# Patient Record
Sex: FEMALE | Race: WHITE | NOT HISPANIC OR LATINO | Employment: OTHER | URBAN - METROPOLITAN AREA
[De-identification: names, ages, dates, MRNs, and addresses within clinical notes are randomized per-mention and may not be internally consistent; named-entity substitution may affect disease eponyms.]

---

## 2019-07-10 ENCOUNTER — TELEPHONE (OUTPATIENT)
Dept: SCHEDULING | Facility: IMAGING CENTER | Age: 79
End: 2019-07-10

## 2019-07-24 ENCOUNTER — OFFICE VISIT (OUTPATIENT)
Dept: MEDICAL GROUP | Facility: MEDICAL CENTER | Age: 79
End: 2019-07-24
Payer: MEDICARE

## 2019-07-24 ENCOUNTER — HOSPITAL ENCOUNTER (OUTPATIENT)
Dept: RADIOLOGY | Facility: MEDICAL CENTER | Age: 79
End: 2019-07-24
Attending: FAMILY MEDICINE
Payer: MEDICARE

## 2019-07-24 VITALS
BODY MASS INDEX: 27.02 KG/M2 | SYSTOLIC BLOOD PRESSURE: 150 MMHG | HEART RATE: 76 BPM | WEIGHT: 146.83 LBS | HEIGHT: 62 IN | TEMPERATURE: 98.7 F | RESPIRATION RATE: 16 BRPM | DIASTOLIC BLOOD PRESSURE: 80 MMHG | OXYGEN SATURATION: 97 %

## 2019-07-24 DIAGNOSIS — I10 ESSENTIAL HYPERTENSION: ICD-10-CM

## 2019-07-24 DIAGNOSIS — E03.9 ACQUIRED HYPOTHYROIDISM: ICD-10-CM

## 2019-07-24 DIAGNOSIS — F51.04 CHRONIC INSOMNIA: ICD-10-CM

## 2019-07-24 DIAGNOSIS — F41.9 ANXIETY: ICD-10-CM

## 2019-07-24 DIAGNOSIS — M54.2 NECK PAIN: ICD-10-CM

## 2019-07-24 DIAGNOSIS — E78.2 MIXED HYPERLIPIDEMIA: ICD-10-CM

## 2019-07-24 DIAGNOSIS — Z00.00 HEALTHCARE MAINTENANCE: ICD-10-CM

## 2019-07-24 DIAGNOSIS — Z00.00 HEALTH CARE MAINTENANCE: ICD-10-CM

## 2019-07-24 DIAGNOSIS — N18.30 CKD (CHRONIC KIDNEY DISEASE) STAGE 3, GFR 30-59 ML/MIN (HCC): ICD-10-CM

## 2019-07-24 PROCEDURE — 72040 X-RAY EXAM NECK SPINE 2-3 VW: CPT

## 2019-07-24 PROCEDURE — 99204 OFFICE O/P NEW MOD 45 MIN: CPT | Performed by: FAMILY MEDICINE

## 2019-07-24 RX ORDER — FUROSEMIDE 20 MG/1
20 TABLET ORAL 2 TIMES DAILY
COMMUNITY
End: 2020-03-31 | Stop reason: SDUPTHER

## 2019-07-24 RX ORDER — CHOLECALCIFEROL (VITAMIN D3) 125 MCG
CAPSULE ORAL
COMMUNITY
End: 2021-11-16

## 2019-07-24 RX ORDER — SIMVASTATIN 10 MG
10 TABLET ORAL NIGHTLY
COMMUNITY
End: 2020-05-11 | Stop reason: SDUPTHER

## 2019-07-24 RX ORDER — SODIUM CHLORIDE 1 G/1
1 TABLET ORAL 3 TIMES DAILY
COMMUNITY
End: 2019-09-03 | Stop reason: SDUPTHER

## 2019-07-24 RX ORDER — LOSARTAN POTASSIUM 50 MG/1
50 TABLET ORAL DAILY
COMMUNITY
End: 2020-05-29 | Stop reason: SDUPTHER

## 2019-07-24 RX ORDER — DIAZEPAM 10 MG/1
10 TABLET ORAL EVERY 6 HOURS PRN
COMMUNITY
End: 2019-07-24

## 2019-07-24 RX ORDER — CHOLECALCIFEROL (VITAMIN D3) 25 MCG
TABLET,CHEWABLE ORAL
COMMUNITY
End: 2021-11-16

## 2019-07-24 RX ORDER — LEVOTHYROXINE SODIUM 0.05 MG/1
50 TABLET ORAL
COMMUNITY
End: 2020-05-29 | Stop reason: SDUPTHER

## 2019-07-24 RX ORDER — MIRTAZAPINE 15 MG/1
15 TABLET, FILM COATED ORAL
Qty: 90 TAB | Refills: 2 | Status: SHIPPED | OUTPATIENT
Start: 2019-07-24 | End: 2020-11-13

## 2019-07-24 NOTE — LETTER
[facility]  Scar Limon M.D.  [address]  [place] [address]  Fax: [phone]   Authorization for Release/Disclosure of   Protected Health Information   Name: MARIELY BERMUDEZ : 1940 SSN: [id number]   Address: [address] [place] [address]  [place] [address] Phone:    [phone] (home)    I authorize the entity listed below to release/disclose the PHI below to:   [facility]/Scar Limon M.D. and Scar Limon M.D.   Provider or Entity Name:  Jessica Day   Address   City, [facility], [facility]   Phone:  [phone]-    Fax:  [phone]   Reason for request: continuity of care   Information to be released:    [  ] LAST COLONOSCOPY,  including any PATH REPORT and follow-up  [  ] LAST FIT/COLOGUARD RESULT [  ] LAST DEXA  [  ] LAST MAMMOGRAM  [  ] LAST PAP  [  ] LAST LABS [  ] RETINA EXAM REPORT  [  ] IMMUNIZATION RECORDS  [  ] Release all info      [  ] Check here and initial the line next to each item to release ALL health information INCLUDING  _____ Care and treatment for drug and / or alcohol abuse  _____ HIV testing, infection status, or AIDS  _____ Genetic Testing    DATES OF SERVICE OR TIME PERIOD TO BE DISCLOSED: _____________  I understand and acknowledge that:  * This Authorization may be revoked at any time by you in writing, except if your health information has already been used or disclosed.  * Your health information that will be used or disclosed as a result of you signing this authorization could be re-disclosed by the recipient. If this occurs, your re-disclosed health information may no longer be protected by State or Federal laws.  * You may refuse to sign this Authorization. Your refusal will not affect your ability to obtain treatment.  * This Authorization becomes effective upon signing and will  on (date) __________.      If no date is indicated, this Authorization will  one (1) year from the signature date.    Name: Mariely  Easley    Signature:   Date:     7/24/2019       PLEASE FAX REQUESTED RECORDS BACK TO: [phone]

## 2019-07-24 NOTE — LETTER
[facility]  Scar Limon M.D.  [address]  [place] [address]  Fax: [phone]   Authorization for Release/Disclosure of   Protected Health Information   Name: MARIELY BERMUDEZ : 1940 SSN: [id number]   Address: [address] [place] [address]  [place] [address] Phone:    [phone] (home)    I authorize the entity listed below to release/disclose the PHI below to:   [facility]/Scar Limon M.D. and Scar Limon M.D.   Provider or Entity Name:  Taras Her   Address   [facility], [facility], [facility]   Phone:  [phone]    Fax:  [phone]   Reason for request: continuity of care   Information to be released:    [  ] LAST COLONOSCOPY,  including any PATH REPORT and follow-up  [  ] LAST FIT/COLOGUARD RESULT [  ] LAST DEXA  [  ] LAST MAMMOGRAM  [  ] LAST PAP  [  ] LAST LABS [  ] RETINA EXAM REPORT  [  ] IMMUNIZATION RECORDS  [ X ] Release all info      [  ] Check here and initial the line next to each item to release ALL health information INCLUDING  _____ Care and treatment for drug and / or alcohol abuse  _____ HIV testing, infection status, or AIDS  _____ Genetic Testing    DATES OF SERVICE OR TIME PERIOD TO BE DISCLOSED: _____________  I understand and acknowledge that:  * This Authorization may be revoked at any time by you in writing, except if your health information has already been used or disclosed.  * Your health information that will be used or disclosed as a result of you signing this authorization could be re-disclosed by the recipient. If this occurs, your re-disclosed health information may no longer be protected by State or Federal laws.  * You may refuse to sign this Authorization. Your refusal will not affect your ability to obtain treatment.  * This Authorization becomes effective upon signing and will  on (date) __________.      If no date is indicated, this Authorization will  one (1) year from the signature date.    Name: Mariely  Easley    Signature:   Date:     7/24/2019       PLEASE FAX REQUESTED RECORDS BACK TO: [phone]
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
## 2019-09-03 RX ORDER — SODIUM CHLORIDE 1 G/1
1 TABLET ORAL 3 TIMES DAILY
Qty: 90 TAB | Refills: 0 | Status: SHIPPED | OUTPATIENT
Start: 2019-09-03 | End: 2019-10-18 | Stop reason: SDUPTHER

## 2019-10-20 RX ORDER — SODIUM CHLORIDE 1 G/1
TABLET ORAL
Qty: 90 TAB | Refills: 1 | Status: SHIPPED | OUTPATIENT
Start: 2019-10-20 | End: 2020-02-03 | Stop reason: SDUPTHER

## 2019-12-16 ENCOUNTER — OFFICE VISIT (OUTPATIENT)
Dept: MEDICAL GROUP | Facility: PHYSICIAN GROUP | Age: 79
End: 2019-12-16
Payer: MEDICARE

## 2019-12-16 VITALS
OXYGEN SATURATION: 99 % | HEIGHT: 62 IN | TEMPERATURE: 97.2 F | SYSTOLIC BLOOD PRESSURE: 144 MMHG | DIASTOLIC BLOOD PRESSURE: 66 MMHG | WEIGHT: 146 LBS | HEART RATE: 60 BPM | BODY MASS INDEX: 26.87 KG/M2

## 2019-12-16 DIAGNOSIS — E78.2 MIXED HYPERLIPIDEMIA: ICD-10-CM

## 2019-12-16 DIAGNOSIS — N18.30 CKD (CHRONIC KIDNEY DISEASE) STAGE 3, GFR 30-59 ML/MIN (HCC): ICD-10-CM

## 2019-12-16 DIAGNOSIS — E03.9 ACQUIRED HYPOTHYROIDISM: ICD-10-CM

## 2019-12-16 DIAGNOSIS — Z23 NEED FOR VACCINATION: ICD-10-CM

## 2019-12-16 DIAGNOSIS — I10 ESSENTIAL HYPERTENSION: ICD-10-CM

## 2019-12-16 DIAGNOSIS — R09.81 SINUS CONGESTION: ICD-10-CM

## 2019-12-16 DIAGNOSIS — E53.8 VITAMIN B12 DEFICIENCY: ICD-10-CM

## 2019-12-16 DIAGNOSIS — G47.00 INSOMNIA, UNSPECIFIED TYPE: ICD-10-CM

## 2019-12-16 PROCEDURE — G0009 ADMIN PNEUMOCOCCAL VACCINE: HCPCS | Performed by: FAMILY MEDICINE

## 2019-12-16 PROCEDURE — 90732 PPSV23 VACC 2 YRS+ SUBQ/IM: CPT | Performed by: FAMILY MEDICINE

## 2019-12-16 PROCEDURE — 99214 OFFICE O/P EST MOD 30 MIN: CPT | Mod: 25 | Performed by: FAMILY MEDICINE

## 2019-12-16 RX ORDER — FLUTICASONE PROPIONATE 50 MCG
1 SPRAY, SUSPENSION (ML) NASAL DAILY
Qty: 16 G | Refills: 5 | Status: SHIPPED | OUTPATIENT
Start: 2019-12-16 | End: 2020-11-13 | Stop reason: SDUPTHER

## 2019-12-16 RX ORDER — TRAZODONE HYDROCHLORIDE 50 MG/1
50 TABLET ORAL NIGHTLY PRN
Qty: 30 TAB | Refills: 3 | Status: SHIPPED | OUTPATIENT
Start: 2019-12-16 | End: 2020-11-13

## 2019-12-16 SDOH — HEALTH STABILITY: MENTAL HEALTH: HOW OFTEN DO YOU HAVE A DRINK CONTAINING ALCOHOL?: MONTHLY OR LESS

## 2019-12-16 SDOH — HEALTH STABILITY: MENTAL HEALTH: HOW MANY STANDARD DRINKS CONTAINING ALCOHOL DO YOU HAVE ON A TYPICAL DAY?: 1 OR 2

## 2019-12-16 ASSESSMENT — PATIENT HEALTH QUESTIONNAIRE - PHQ9: CLINICAL INTERPRETATION OF PHQ2 SCORE: 0

## 2019-12-16 NOTE — PROGRESS NOTES
CC: Insomnia    HISTORY OF THE PRESENT ILLNESS: Patient is a 79 y.o. female. This pleasant patient is here today to establish care discuss health problems below.    Patient's main concern today is insomnia.  She moved here from [place] earlier this year.  Reports that in [place] she was getting Valium for her insomnia.  States she only took it up to 1 time per week.  She was seen by geriatrics.  Who would not refill the Valium and discussed risks of benzodiazepines with her.  She was given a prescription for Remeron instead but reports being very worried about the side effects after reading the label.  She is open to trying something else.    Also wants to get lab work done.  Has a history of hypothyroidism and reports it is been a long time since her thyroid labs were checked.  She is currently on 50 mcg of Synthroid daily.    She is also complaining of sinus congestion.  Reports that she had a fairly severe cold and cough a few weeks ago and has some residual congestion at this time.  No issues with fevers or chills.  She has been using saline nasal spray.    Allergies: Patient has no known allergies.    Current Outpatient Medications Ordered in Epic   Medication Sig Dispense Refill   • traZODone (DESYREL) 50 MG Tab Take 1 Tab by mouth at bedtime as needed for Sleep. 30 Tab 3   • fluticasone (FLONASE) 50 MCG/ACT nasal spray Spray 1 Spray in nose every day. 16 g 5   • sodium chloride (SALT) 1 GM Tab TAKE 1 TABLET BY MOUTH THREE TIMES DAILY 90 Tab 1   • furosemide (LASIX) 20 MG Tab Take 20 mg by mouth 2 times a day.     • levothyroxine (SYNTHROID) 50 MCG Tab Take 50 mcg by mouth Every morning on an empty stomach.     • metoprolol (LOPRESSOR) 25 MG Tab Take 25 mg by mouth 2 times a day.     • aspirin EC (ECOTRIN) 81 MG Tablet Delayed Response Take 81 mg by mouth every day.     • losartan (COZAAR) 50 MG Tab Take 50 mg by mouth every day.     • Melatonin 5 MG Tab Take  by mouth.     • simvastatin (ZOCOR) 10 MG  "Tab Take 10 mg by mouth every evening.     • Cyanocobalamin (B-12) 2500 MCG Tab Take  by mouth.     • Cholecalciferol (VITAMIN D3) 2000 units Tab Take  by mouth.     • SUPER B COMPLEX/C Cap Take  by mouth.     • mirtazapine (REMERON) 15 MG Tab Take 1 Tab by mouth every bedtime. 90 Tab 2     No current Epic-ordered facility-administered medications on file.        History reviewed. No pertinent past medical history.    History reviewed. No pertinent surgical history.    Social History     Tobacco Use   • Smoking status: Former Smoker     Packs/day: 0.00     Last attempt to quit: 1979     Years since quittin.4   • Smokeless tobacco: Never Used   Substance Use Topics   • Alcohol use: Yes     Alcohol/week: 8.4 oz     Types: 14 Glasses of wine per week     Frequency: Monthly or less     Drinks per session: 1 or 2   • Drug use: No       Social History     Patient does not qualify to have social determinant information on file (likely too young).   Social History Narrative   • Not on file       History reviewed. No pertinent family history.    ROS:     - Constitutional: Negative for fever, chills, unexpected weight change, and fatigue/generalized weakness.     - Respiratory: Negative for cough, sputum production, chest congestion, dyspnea, wheezing, and crackles.      - Cardiovascular: Negative for chest pain, palpitations, orthopnea, PND, and bilateral lower extremity edema.     Exam: /66 (BP Location: Left arm, Patient Position: Sitting, BP Cuff Size: Adult)   Pulse 60   Temp 36.2 °C (97.2 °F) (Temporal)   Ht 1.575 m (5' 2\")   Wt 66.2 kg (146 lb)   SpO2 99%  Body mass index is 26.7 kg/m².    General: Well appearing, NAD  HEENT: Normocephalic. Conjunctiva clear, lids without ptosis, pupils equal and reactive to light accommodation, ears normal shape and contour, canals are clear bilaterally, tympanic membranes without bulging or erythema and normal cone of light, oropharynx is without erythema, edema " or exudates.   Neck: Supple without JVD. No thyromegaly or nodules  Pulmonary: Clear to ausculation.  Normal effort. No rales, ronchi, or wheezing.  Cardiovascular: Regular rate and rhythm without murmur, rubs or gallop.   Abdomen: Soft, nontender, nondistended. Normal bowel sounds. Liver and spleen are not palpable. No rebound or guarding  Neurologic: normal gait  Lymph: No cervical, supraclavicular lymph nodes are palpable  Skin: Warm and dry.  No obvious lesions.  Musculoskeletal:  No extremity cyanosis, clubbing, or edema.  Psych: Normal mood and affect. Alert and oriented. Judgment and insight is normal.    Please note that this dictation was created using voice recognition software. I have made every reasonable attempt to correct obvious errors, but I expect that there are errors of grammar and possibly content that I did not discover before finalizing the note.      Assessment/Plan  Mariely was seen today for establish care, annual exam and requesting labs.    Diagnoses and all orders for this visit:    Essential hypertension  Chronic well-controlled problem for the patient.  Continue Lopressor and Cozaar.  -     Comp Metabolic Panel; Future    Mixed hyperlipidemia  Chronic issue for the patient, has been well controlled on simvastatin.  Will continue at this time.    Acquired hypothyroidism  Chronic well-controlled problem for the patient.  Checking TSH.  Continue levothyroxine.  -     TSH WITH REFLEX TO FT4; Future    Vitamin B12 deficiency  Chronic issue for the patient, on supplementation.  Checking vitamin B-12 levels.  -     VITAMIN B12; Future    Sinus congestion  New problem for the patient as she is recovering from a cold.  No signs of bacterial infection at this time.  Will trial Flonase nasal spray.  -     fluticasone (FLONASE) 50 MCG/ACT nasal spray; Spray 1 Spray in nose every day.    Need for vaccination  -     PneumoVax PPV23 =>5yo    CKD (chronic kidney disease) stage 3, GFR 30-59 ml/min  ([facility]CC)  Chronic issue for the patient.  Checking CMP as above.    Insomnia, unspecified type  Chronic uncontrolled issue for the patient.  Had extended discussion with both patient and daughter today about risk of benzodiazepine use and specifically why we do not use them for sleep especially in elderly patients.  I provided her reassurance with regard to the Remeron as I feel would be a much more appropriate sleep medication for her.  I have also given her trazodone to try as well (low-dose).  She knows not to trial them together.  She agrees to let me know if either of them work for her.  At this time we will not continue Valium.  As she only takes it once in a while, no risk of withdrawal.  -     traZODone (DESYREL) 50 MG Tab; Take 1 Tab by mouth at bedtime as needed for Sleep.    Follow up in 4 months or sooner if needed.     Fernanda Askew, DO  [facility]

## 2020-01-03 ENCOUNTER — HOSPITAL ENCOUNTER (OUTPATIENT)
Dept: LAB | Facility: MEDICAL CENTER | Age: 80
End: 2020-01-03
Attending: FAMILY MEDICINE
Payer: MEDICARE

## 2020-01-03 DIAGNOSIS — I10 ESSENTIAL HYPERTENSION: ICD-10-CM

## 2020-01-03 DIAGNOSIS — E53.8 VITAMIN B12 DEFICIENCY: ICD-10-CM

## 2020-01-03 DIAGNOSIS — E03.9 ACQUIRED HYPOTHYROIDISM: ICD-10-CM

## 2020-01-03 LAB
ALBUMIN SERPL BCP-MCNC: 4.3 G/DL (ref 3.2–4.9)
ALBUMIN/GLOB SERPL: 1.7 G/DL
ALP SERPL-CCNC: 49 U/L (ref 30–99)
ALT SERPL-CCNC: 22 U/L (ref 2–50)
ANION GAP SERPL CALC-SCNC: 9 MMOL/L (ref 0–11.9)
AST SERPL-CCNC: 16 U/L (ref 12–45)
BILIRUB SERPL-MCNC: 0.5 MG/DL (ref 0.1–1.5)
BUN SERPL-MCNC: 15 MG/DL (ref 8–22)
CALCIUM SERPL-MCNC: 8.6 MG/DL (ref 8.5–10.5)
CHLORIDE SERPL-SCNC: 105 MMOL/L (ref 96–112)
CO2 SERPL-SCNC: 26 MMOL/L (ref 20–33)
CREAT SERPL-MCNC: 1 MG/DL (ref 0.5–1.4)
GLOBULIN SER CALC-MCNC: 2.6 G/DL (ref 1.9–3.5)
GLUCOSE SERPL-MCNC: 106 MG/DL (ref 65–99)
POTASSIUM SERPL-SCNC: 4.6 MMOL/L (ref 3.6–5.5)
PROT SERPL-MCNC: 6.9 G/DL (ref 6–8.2)
SODIUM SERPL-SCNC: 140 MMOL/L (ref 135–145)
TSH SERPL DL<=0.005 MIU/L-ACNC: 0.54 UIU/ML (ref 0.38–5.33)
VIT B12 SERPL-MCNC: >1500 PG/ML (ref 211–911)

## 2020-01-03 PROCEDURE — 80053 COMPREHEN METABOLIC PANEL: CPT

## 2020-01-03 PROCEDURE — 82607 VITAMIN B-12: CPT

## 2020-01-03 PROCEDURE — 36415 COLL VENOUS BLD VENIPUNCTURE: CPT

## 2020-01-03 PROCEDURE — 84443 ASSAY THYROID STIM HORMONE: CPT

## 2020-01-07 ENCOUNTER — TELEPHONE (OUTPATIENT)
Dept: MEDICAL GROUP | Facility: PHYSICIAN GROUP | Age: 80
End: 2020-01-07

## 2020-01-07 NOTE — TELEPHONE ENCOUNTER
Patient notified via phone.    Patient describes runny nose / post nasal drip which causes her to lose her voice for a short time in the morning, wants to know what you would recommend, OTC if possible. Thank you.

## 2020-01-07 NOTE — TELEPHONE ENCOUNTER
----- Message from Fernanda Askew D.O. sent at 1/6/2020  5:56 PM PST -----  Please call patient let her know that her labs were all normal/stable.  Kidney function is mildly decreased, but this is something that we will continue to keep an eye on.

## 2020-02-03 RX ORDER — SODIUM CHLORIDE 1 G/1
1 TABLET ORAL 3 TIMES DAILY
Qty: 90 TAB | Refills: 1 | Status: SHIPPED | OUTPATIENT
Start: 2020-02-03 | End: 2020-07-30 | Stop reason: SDUPTHER

## 2020-03-31 NOTE — TELEPHONE ENCOUNTER
Received request via: Patient    Was the patient seen in the last year in this department? Yes LOV 12/16/2019    Does the patient have an active prescription (recently filled or refills available) for medication(s) requested? No

## 2020-04-01 RX ORDER — FUROSEMIDE 20 MG/1
20 TABLET ORAL 2 TIMES DAILY
Qty: 60 TAB | Refills: 5 | Status: SHIPPED | OUTPATIENT
Start: 2020-04-01 | End: 2020-11-06 | Stop reason: SDUPTHER

## 2020-04-01 NOTE — TELEPHONE ENCOUNTER
Was the patient seen in the last year in this department? Yes    Does patient have an active prescription for medications requested? No     Received Request Via: Pharmacy      Pt met protocol?: Yes, ov 012/19, bp 144/66

## 2020-04-10 NOTE — TELEPHONE ENCOUNTER
Historical med, not written by Fernanda Askew.  Discussed in Last office visit with PCP on 12/16/2019.   Please refill as you see fit.

## 2020-05-11 RX ORDER — SIMVASTATIN 10 MG
10 TABLET ORAL EVERY EVENING
Qty: 90 TAB | Refills: 3 | Status: SHIPPED | OUTPATIENT
Start: 2020-05-11 | End: 2021-02-11 | Stop reason: SDUPTHER

## 2020-05-29 ENCOUNTER — TELEPHONE (OUTPATIENT)
Dept: URGENT CARE | Facility: PHYSICIAN GROUP | Age: 80
End: 2020-05-29

## 2020-05-29 RX ORDER — LOSARTAN POTASSIUM 50 MG/1
50 TABLET ORAL DAILY
Qty: 90 TAB | Refills: 3 | Status: SHIPPED | OUTPATIENT
Start: 2020-05-29 | End: 2021-04-12

## 2020-05-29 RX ORDER — LEVOTHYROXINE SODIUM 0.05 MG/1
50 TABLET ORAL
Qty: 90 TAB | Refills: 3 | Status: SHIPPED | OUTPATIENT
Start: 2020-05-29

## 2020-05-29 NOTE — TELEPHONE ENCOUNTER
1. Name: Mariely Easley    Call Back Number: [phone]      How would the patient prefer to be contacted with a response: Phone call OK to leave a detailed message    2. Which medication(s) is being requested? Levothyroxine 0.05 mg Tablets, Losartan 50 mg Tablets    3. What is the preferred Pharmacy? [facility] in [place] at [address] [place] [address]    Patient was informed they may receive a return phone call from our office with any additional questions before processing this request.

## 2020-07-31 RX ORDER — SODIUM CHLORIDE 1 G/1
1 TABLET ORAL 3 TIMES DAILY
Qty: 90 TAB | Refills: 0 | Status: SHIPPED | OUTPATIENT
Start: 2020-07-31 | End: 2021-11-16

## 2020-10-09 ENCOUNTER — OFFICE VISIT (OUTPATIENT)
Dept: MEDICAL GROUP | Facility: PHYSICIAN GROUP | Age: 80
End: 2020-10-09
Payer: MEDICARE

## 2020-10-09 VITALS
OXYGEN SATURATION: 97 % | HEART RATE: 94 BPM | WEIGHT: 150 LBS | DIASTOLIC BLOOD PRESSURE: 76 MMHG | TEMPERATURE: 98.6 F | HEIGHT: 62 IN | SYSTOLIC BLOOD PRESSURE: 152 MMHG | BODY MASS INDEX: 27.6 KG/M2

## 2020-10-09 DIAGNOSIS — I10 ESSENTIAL HYPERTENSION: ICD-10-CM

## 2020-10-09 DIAGNOSIS — R22.1 NECK SWELLING: ICD-10-CM

## 2020-10-09 DIAGNOSIS — M62.838 NECK MUSCLE SPASM: ICD-10-CM

## 2020-10-09 PROCEDURE — 99214 OFFICE O/P EST MOD 30 MIN: CPT | Performed by: FAMILY MEDICINE

## 2020-10-09 RX ORDER — CYCLOBENZAPRINE HCL 5 MG
5 TABLET ORAL NIGHTLY PRN
Qty: 30 TAB | Refills: 0 | Status: SHIPPED | OUTPATIENT
Start: 2020-10-09 | End: 2020-11-13

## 2020-10-09 ASSESSMENT — PATIENT HEALTH QUESTIONNAIRE - PHQ9: CLINICAL INTERPRETATION OF PHQ2 SCORE: 0
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
## 2020-11-09 RX ORDER — FUROSEMIDE 20 MG/1
20 TABLET ORAL 2 TIMES DAILY
Qty: 180 TAB | Refills: 1 | Status: SHIPPED | OUTPATIENT
Start: 2020-11-09 | End: 2021-03-02

## 2020-11-09 NOTE — TELEPHONE ENCOUNTER
Refill X 6 months, sent to pharmacy.Pt. Seen in the last 6 months per protocol.   Lab Results   Component Value Date/Time    SODIUM 140 01/03/2020 12:54 PM    POTASSIUM 4.6 01/03/2020 12:54 PM    CHLORIDE 105 01/03/2020 12:54 PM    CO2 26 01/03/2020 12:54 PM    GLUCOSE 106 (H) 01/03/2020 12:54 PM    BUN 15 01/03/2020 12:54 PM    CREATININE 1.00 01/03/2020 12:54 PM

## 2020-11-13 ENCOUNTER — OFFICE VISIT (OUTPATIENT)
Dept: MEDICAL GROUP | Facility: PHYSICIAN GROUP | Age: 80
End: 2020-11-13
Payer: MEDICARE

## 2020-11-13 VITALS
BODY MASS INDEX: 27.23 KG/M2 | DIASTOLIC BLOOD PRESSURE: 70 MMHG | OXYGEN SATURATION: 96 % | HEIGHT: 62 IN | TEMPERATURE: 97.7 F | SYSTOLIC BLOOD PRESSURE: 128 MMHG | WEIGHT: 148 LBS | HEART RATE: 66 BPM

## 2020-11-13 DIAGNOSIS — Z78.0 POSTMENOPAUSAL: ICD-10-CM

## 2020-11-13 DIAGNOSIS — R22.1 NECK SWELLING: ICD-10-CM

## 2020-11-13 PROCEDURE — 99214 OFFICE O/P EST MOD 30 MIN: CPT | Performed by: FAMILY MEDICINE

## 2020-11-13 RX ORDER — CETIRIZINE HYDROCHLORIDE 10 MG/1
10 TABLET ORAL DAILY
COMMUNITY
End: 2021-11-16

## 2020-11-13 RX ORDER — FLUTICASONE PROPIONATE 50 MCG
1 SPRAY, SUSPENSION (ML) NASAL DAILY
Qty: 16 G | Refills: 5 | Status: SHIPPED | OUTPATIENT
Start: 2020-11-13 | End: 2020-12-09 | Stop reason: SDUPTHER

## 2020-11-13 NOTE — PROGRESS NOTES
CC: Neck swelling    HISTORY OF THE PRESENT ILLNESS: Patient is a 80 y.o. female. This pleasant patient is here today for follow-up.    Patient is here today for follow-up on her neck swelling.  She was seen about a month ago.  In the setting of straining her neck muscles moving boxes, she noted some significant swelling in her neck in the left supraclavicular area.  She was prescribed a muscle relaxer and was also using Tylenol as needed.  She reports that symptoms have completely resolved.  She did use the muscle relaxers a couple of times which helped with her symptoms.  She did use the muscle relaxers a couple of times which helped with her symptoms.  She has no other concerns from the standpoint today.    She does note that she needs refills on her Flonase which is very effective for her and her allergies.    It has been about 5 years since her last DEXA scan and she is open to getting this repeated.    Allergies: Patient has no known allergies.    Current Outpatient Medications Ordered in Epic   Medication Sig Dispense Refill   • cetirizine (ZYRTEC) 10 MG Tab Take 10 mg by mouth every day.     • fluticasone (FLONASE) 50 MCG/ACT nasal spray Administer 1 Spray into affected nostril(S) every day. 16 g 5   • furosemide (LASIX) 20 MG Tab Take 1 Tab by mouth 2 times a day. 180 Tab 1   • metoprolol (LOPRESSOR) 25 MG Tab Take 1 Tab by mouth 2 times a day. 180 Tab 0   • sodium chloride (SALT) 1 GM Tab Take 1 Tab by mouth 3 times a day. 90 Tab 0   • levothyroxine (SYNTHROID) 50 MCG Tab Take 1 Tab by mouth Every morning on an empty stomach. 90 Tab 3   • losartan (COZAAR) 50 MG Tab Take 1 Tab by mouth every day. 90 Tab 3   • simvastatin (ZOCOR) 10 MG Tab Take 1 Tab by mouth every evening. 90 Tab 3   • aspirin EC (ECOTRIN) 81 MG Tablet Delayed Response Take 81 mg by mouth every day.     • Melatonin 5 MG Tab Take  by mouth.     • Cyanocobalamin (B-12) 2500 MCG Tab Take  by mouth.     • Cholecalciferol (VITAMIN D3) 2000  "units Tab Take  by mouth.     • SUPER B COMPLEX/C Cap Take  by mouth.       No current Epic-ordered facility-administered medications on file.        History reviewed. No pertinent past medical history.    History reviewed. No pertinent surgical history.    Social History     Tobacco Use   • Smoking status: Former Smoker     Packs/day: 0.00     Quit date: 1979     Years since quittin.3   • Smokeless tobacco: Never Used   Substance Use Topics   • Alcohol use: Yes     Alcohol/week: 8.4 oz     Types: 14 Glasses of wine per week     Frequency: Monthly or less     Drinks per session: 1 or 2   • Drug use: No       Social History     Social History Narrative   • Not on file       History reviewed. No pertinent family history.    ROS:     - Constitutional: Negative for fever, chills, unexpected weight change, and fatigue/generalized weakness.     - Respiratory: Negative for cough, sputum production, chest congestion, dyspnea, wheezing, and crackles.      - Cardiovascular: Negative for chest pain, palpitations, orthopnea, PND, and bilateral lower extremity edema.     Exam: /70 (BP Location: Right arm, Patient Position: Sitting, BP Cuff Size: Adult)   Pulse 66   Temp 36.5 °C (97.7 °F) (Temporal)   Ht 1.575 m (5' 2\")   Wt 67.1 kg (148 lb)   SpO2 96%  Body mass index is 27.07 kg/m².    General: Well appearing, NAD  Neck: Supple without JVD. No thyromegaly or nodules  Pulmonary: Clear to ausculation.  Normal effort. No rales, ronchi, or wheezing.  Lymph: No cervical, supraclavicular lymph nodes are palpable  Skin: Warm and dry.  No obvious lesions.  Musculoskeletal:  No extremity cyanosis, clubbing, or edema.  Psych: Normal mood and affect. Alert and oriented. Judgment and insight is normal.    Please note that this dictation was created using voice recognition software. I have made every reasonable attempt to correct obvious errors, but I expect that there are errors of grammar and possibly content that I " did not discover before finalizing the note.      Assessment/Plan  Mariely was seen today for follow-up and medication refill.    Diagnoses and all orders for this visit:    Postmenopausal  -     DS-BONE DENSITY STUDY (DEXA); Future    Neck swelling  Noted 1 month ago in the setting of a neck strain.  She had some supraclavicular fullness on the left at that time.  I wanted her to return so I could reevaluate and make sure we did not need to obtain an ultrasound.  On exam today, it is completely resolved.  She has no lymphadenopathy or other notable abnormalities on exam.    Other orders  -     fluticasone (FLONASE) 50 MCG/ACT nasal spray; Administer 1 Spray into affected nostril(S) every day.    Follow-up in 6 months or sooner if needed.    Fernanda Askew,   [facility]

## 2020-12-09 RX ORDER — FLUTICASONE PROPIONATE 50 MCG
1 SPRAY, SUSPENSION (ML) NASAL DAILY
Qty: 16 ML | Refills: 5 | Status: SHIPPED | OUTPATIENT
Start: 2020-12-09 | End: 2021-04-12

## 2020-12-17 ENCOUNTER — PATIENT OUTREACH (OUTPATIENT)
Dept: HEALTH INFORMATION MANAGEMENT | Facility: OTHER | Age: 80
End: 2020-12-17

## 2020-12-17 NOTE — PROGRESS NOTES
Welcome Call for [facility]    Outcome:   Spoke to patient. She stated she would like to continue with Fernanda Askew DO as her PCP.       Attempt # 1  -[id number]

## 2021-01-11 DIAGNOSIS — Z23 NEED FOR VACCINATION: ICD-10-CM

## 2021-02-11 NOTE — TELEPHONE ENCOUNTER
[facility] is requesting new Rx for Simvastatin 10 MG Tablet as a 100-Day Supply to be sent to the pharmacy. Please advise if appropriate.

## 2021-02-12 RX ORDER — SIMVASTATIN 10 MG
10 TABLET ORAL EVERY EVENING
Qty: 100 TABLET | Refills: 1 | Status: SHIPPED | OUTPATIENT
Start: 2021-02-12 | End: 2021-11-08

## 2021-02-12 NOTE — TELEPHONE ENCOUNTER
No results found for: CHOLSTRLTOT, LDL, HDL, TRIGLYCERIDE    Lab Results   Component Value Date/Time    SODIUM 140 01/03/2020 12:54 PM    POTASSIUM 4.6 01/03/2020 12:54 PM    CHLORIDE 105 01/03/2020 12:54 PM    CO2 26 01/03/2020 12:54 PM    GLUCOSE 106 (H) 01/03/2020 12:54 PM    BUN 15 01/03/2020 12:54 PM    CREATININE 1.00 01/03/2020 12:54 PM        Last seen by PCP 11/13/2020. Will send 6 month(s) to the pharmacy.

## 2021-03-02 RX ORDER — FUROSEMIDE 20 MG/1
TABLET ORAL
Qty: 180 TABLET | Refills: 1 | Status: SHIPPED | OUTPATIENT
Start: 2021-03-02 | End: 2021-11-19

## 2021-04-13 RX ORDER — FLUTICASONE PROPIONATE 50 MCG
SPRAY, SUSPENSION (ML) NASAL
Qty: 48 ML | Refills: 1 | Status: SHIPPED | OUTPATIENT
Start: 2021-04-13 | End: 2021-11-08

## 2021-04-13 RX ORDER — LOSARTAN POTASSIUM 50 MG/1
TABLET ORAL
Qty: 90 TABLET | Refills: 1 | Status: SHIPPED | OUTPATIENT
Start: 2021-04-13 | End: 2021-08-26

## 2021-05-17 ENCOUNTER — PATIENT MESSAGE (OUTPATIENT)
Dept: HEALTH INFORMATION MANAGEMENT | Facility: OTHER | Age: 81
End: 2021-05-17

## 2021-07-16 ENCOUNTER — OFFICE VISIT (OUTPATIENT)
Dept: MEDICAL GROUP | Facility: PHYSICIAN GROUP | Age: 81
End: 2021-07-16
Payer: MEDICARE

## 2021-07-16 VITALS
OXYGEN SATURATION: 97 % | DIASTOLIC BLOOD PRESSURE: 70 MMHG | TEMPERATURE: 98 F | SYSTOLIC BLOOD PRESSURE: 128 MMHG | HEIGHT: 62 IN | HEART RATE: 68 BPM | WEIGHT: 148 LBS | BODY MASS INDEX: 27.23 KG/M2

## 2021-07-16 DIAGNOSIS — I10 ESSENTIAL HYPERTENSION: ICD-10-CM

## 2021-07-16 DIAGNOSIS — E78.2 MIXED HYPERLIPIDEMIA: ICD-10-CM

## 2021-07-16 DIAGNOSIS — E87.1 HYPONATREMIA: ICD-10-CM

## 2021-07-16 DIAGNOSIS — E03.9 ACQUIRED HYPOTHYROIDISM: ICD-10-CM

## 2021-07-16 DIAGNOSIS — Z00.00 ENCOUNTER FOR PREVENTIVE CARE: ICD-10-CM

## 2021-07-16 DIAGNOSIS — E53.8 VITAMIN B12 DEFICIENCY: ICD-10-CM

## 2021-07-16 DIAGNOSIS — F41.9 ANXIETY: ICD-10-CM

## 2021-07-16 DIAGNOSIS — N18.30 STAGE 3 CHRONIC KIDNEY DISEASE, UNSPECIFIED WHETHER STAGE 3A OR 3B CKD: ICD-10-CM

## 2021-07-16 DIAGNOSIS — Z00.00 MEDICARE ANNUAL WELLNESS VISIT, SUBSEQUENT: Primary | ICD-10-CM

## 2021-07-16 PROCEDURE — G0439 PPPS, SUBSEQ VISIT: HCPCS | Performed by: FAMILY MEDICINE

## 2021-07-16 ASSESSMENT — ACTIVITIES OF DAILY LIVING (ADL): BATHING_REQUIRES_ASSISTANCE: 0

## 2021-07-16 ASSESSMENT — PATIENT HEALTH QUESTIONNAIRE - PHQ9: CLINICAL INTERPRETATION OF PHQ2 SCORE: 0

## 2021-07-16 ASSESSMENT — ENCOUNTER SYMPTOMS: GENERAL WELL-BEING: EXCELLENT

## 2021-07-16 NOTE — PROGRESS NOTES
Chief Complaint   Patient presents with   • Annual Wellness Visit         HPI:  Mariely is a 81 y.o. here for Medicare Annual Wellness Visit    Patient is here for annual wellness visit today.  She is doing remarkably well, and denies any updates on her health.  She did stop taking her sodium chloride tablets as she felt they were giving her an upset stomach.  She was previously on this per nephrology in [place] due to hyponatremia.    Patient Active Problem List    Diagnosis Date Noted   • Vitamin B12 deficiency 12/16/2019   • Essential hypertension 07/24/2019   • Acquired hypothyroidism 07/24/2019   • Mixed hyperlipidemia 07/24/2019   • Anxiety 07/24/2019   • CKD (chronic kidney disease) stage 3, GFR 30-59 ml/min ([facility]) 07/24/2019       Current Outpatient Medications   Medication Sig Dispense Refill   • losartan (COZAAR) 50 MG Tab TAKE 1 TABLET BY MOUTH EVERY DAY 90 tablet 1   • fluticasone (FLONASE) 50 MCG/ACT nasal spray USE 1 SPRAY INTO AFFECTED NOSTRIL EVERY DAY 48 mL 1   • furosemide (LASIX) 20 MG Tab TAKE 1 TABLET BY MOUTH TWICE A  tablet 1   • simvastatin (ZOCOR) 10 MG Tab Take 1 tablet by mouth every evening. Pt to make appt and get labs prior to more refills. 100 tablet 1   • metoprolol (LOPRESSOR) 25 MG Tab Take 1 Tab by mouth 2 times a day. 180 Tab 2   • cetirizine (ZYRTEC) 10 MG Tab Take 10 mg by mouth every day.     • levothyroxine (SYNTHROID) 50 MCG Tab Take 1 Tab by mouth Every morning on an empty stomach. 90 Tab 3   • aspirin EC (ECOTRIN) 81 MG Tablet Delayed Response Take 81 mg by mouth every day.     • Melatonin 5 MG Tab Take  by mouth.     • Cyanocobalamin (B-12) 2500 MCG Tab Take  by mouth.     • Cholecalciferol (VITAMIN D3) 2000 units Tab Take  by mouth.     • sodium chloride (SALT) 1 GM Tab Take 1 Tab by mouth 3 times a day. (Patient not taking: Reported on 7/16/2021) 90 Tab 0     No current facility-administered medications for this visit.        Patient is taking medications as noted  "in medication list.  Current supplements as per medication list.     Allergies: Patient has no known allergies.    Current social contact/activities:     Is patient current with immunizations? No, due for SHINGRIX (Shingles). Patient is interested in receiving NONE today.    She  reports that she quit smoking about 42 years ago. She smoked 0.00 packs per day. She has never used smokeless tobacco. She reports current alcohol use of about 8.4 oz of alcohol per week. She reports that she does not use drugs.  Counseling given: Not Answered        DPA/Advanced directive: Patient has Advanced Directive on file.     ROS:    Gait: Uses no assistive device   Ostomy: No   Other tubes: No   Amputations: No   Chronic oxygen use No   Last eye exam \"probably due\"  Wears hearing aids: No   : Reports urinary leakage during the last 6 months that has somewhat interfered with their daily activities or sleep.      Screening:        Depression Screening    Little interest or pleasure in doing things?  0 - not at all  Feeling down, depressed, or hopeless? 0 - not at all  Patient Health Questionnaire Score: 0    If depressive symptoms identified deferred to follow up visit unless specifically addressed in assessment and plan.    Interpretation of PHQ-9 Total Score   Score Severity   1-4 No Depression   5-9 Mild Depression   10-14 Moderate Depression   15-19 Moderately Severe Depression   20-27 Severe Depression    Screening for Cognitive Impairment    Three Minute Recall (captain, garden, picture)  3/3    Nathan clock face with all 12 numbers and set the hands to show 5 past 8.  Yes    If cognitive concerns identified, deferred for follow up unless specifically addressed in assessment and plan.    Fall Risk Assessment    Has the patient had two or more falls in the last year or any fall with injury in the last year?  No  If fall risk identified, deferred for follow up unless specifically addressed in assessment and plan.    Safety " Assessment    Throw rugs on floor.  Yes  Handrails on all stairs.  Yes  Good lighting in all hallways.  Yes  Difficulty hearing.  No  Patient counseled about all safety risks that were identified.    Functional Assessment ADLs    Are there any barriers preventing you from cooking for yourself or meeting nutritional needs?  No.    Are there any barriers preventing you from driving safely or obtaining transportation?  No.    Are there any barriers preventing you from using a telephone or calling for help?  No.    Are there any barriers preventing you from shopping?  No.    Are there any barriers preventing you from taking care of your own finances?  No.    Are there any barriers preventing you from managing your medications?  No.    Are there any barriers preventing you from showering, bathing or dressing yourself?  No.    Are you currently engaging in any exercise or physical activity?  Yes.     What is your perception of your health?  Excellent.    Health Maintenance Summary                IMM DTaP/Tdap/Td Vaccine Overdue 3/12/1961     IMM ZOSTER VACCINES Overdue 3/12/1992     BONE DENSITY Overdue 3/12/2007     IMM INFLUENZA Next Due 2021      Done 10/6/2020 Imm Admin: Influenza Vaccine Quad Inj (Pf)     Patient has more history with this topic...          Patient Care Team:  Fernanda Askew D.O. as PCP - General (Family Medicine)    Social History     Tobacco Use   • Smoking status: Former Smoker     Packs/day: 0.00     Quit date: 1979     Years since quittin.0   • Smokeless tobacco: Never Used   Vaping Use   • Vaping Use: Never used   Substance Use Topics   • Alcohol use: Yes     Alcohol/week: 8.4 oz     Types: 14 Glasses of wine per week   • Drug use: No     History reviewed. No pertinent family history.  She  has no past medical history on file.   History reviewed. No pertinent surgical history.        Exam:     /70 (BP Location: Right arm, Patient Position: Sitting, BP Cuff Size:  "Adult)   Pulse 68   Temp 36.7 °C (98 °F) (Temporal)   Ht 1.575 m (5' 2\")   Wt 67.1 kg (148 lb)   SpO2 97%  Body mass index is 27.07 kg/m².    Hearing excellent.    Dentition good  Alert, oriented in no acute distress.  Eye contact is good, speech goal directed, affect calm      Assessment and Plan. The following treatment and monitoring plan is recommended:      Mariely was seen today for annual wellness visit.    Diagnoses and all orders for this visit:    Medicare annual wellness visit, subsequent    Essential hypertension  Very well controlled on current medication, due for metabolic panel.  -     Comp Metabolic Panel; Future    Acquired hypothyroidism  Historically well controlled on levothyroxine.  Due for thyroid labs.  -     TSH; Future  -     FREE THYROXINE; Future    Mixed hyperlipidemia  Well-controlled on simvastatin, lipid profile ordered.  -     Lipid Profile; Future    Anxiety  Patient reports very well controlled at this time, not on medication.    Stage 3 chronic kidney disease, unspecified whether stage 3a or 3b CKD (HCC)  Last GFR 54 in January 2020.  Will recheck at this time.  -     Comp Metabolic Panel; Future    Vitamin B12 deficiency  Currently on B12 supplementation, recheck levels.  -     VITAMIN B12; Future    Encounter for preventive care  -     CBC WITH DIFFERENTIAL; Future  -     Comp Metabolic Panel; Future  -     Lipid Profile; Future  -     VITAMIN B12; Future  -     VITAMIN D,25 HYDROXY; Future  -     TSH; Future  -     FREE THYROXINE; Future    Hyponatremia  Has not been taking sodium chloride tablets for the past month due to upset stomach.  Checking CMP/sodium levels as above.    Services suggested: No services needed at this time  Health Care Screening recommendations as per orders if indicated.  Referrals offered: PT/OT/Nutrition counseling/Behavioral Health/Smoking cessation as per orders if indicated.    Discussion today about general wellness and lifestyle habits:  "   · Prevent falls and reduce trip hazards; Cautioned about securing or removing rugs.  · Have a working fire alarm and carbon monoxide detector;   · Engage in regular physical activity and social activities       Follow-up: Return in about 4 months (around 11/16/2021).

## 2021-08-20 ENCOUNTER — HOSPITAL ENCOUNTER (OUTPATIENT)
Dept: LAB | Facility: MEDICAL CENTER | Age: 81
End: 2021-08-20
Attending: FAMILY MEDICINE
Payer: MEDICARE

## 2021-08-20 DIAGNOSIS — E53.8 VITAMIN B12 DEFICIENCY: ICD-10-CM

## 2021-08-20 DIAGNOSIS — N18.30 STAGE 3 CHRONIC KIDNEY DISEASE, UNSPECIFIED WHETHER STAGE 3A OR 3B CKD: ICD-10-CM

## 2021-08-20 DIAGNOSIS — Z00.00 ENCOUNTER FOR PREVENTIVE CARE: ICD-10-CM

## 2021-08-20 DIAGNOSIS — E78.2 MIXED HYPERLIPIDEMIA: ICD-10-CM

## 2021-08-20 DIAGNOSIS — I10 ESSENTIAL HYPERTENSION: ICD-10-CM

## 2021-08-20 DIAGNOSIS — E03.9 ACQUIRED HYPOTHYROIDISM: ICD-10-CM

## 2021-08-20 LAB
25(OH)D3 SERPL-MCNC: 47 NG/ML (ref 30–100)
ALBUMIN SERPL BCP-MCNC: 4.6 G/DL (ref 3.2–4.9)
ALBUMIN/GLOB SERPL: 1.8 G/DL
ALP SERPL-CCNC: 68 U/L (ref 30–99)
ALT SERPL-CCNC: 27 U/L (ref 2–50)
ANION GAP SERPL CALC-SCNC: 14 MMOL/L (ref 7–16)
AST SERPL-CCNC: 19 U/L (ref 12–45)
BASOPHILS # BLD AUTO: 0.7 % (ref 0–1.8)
BASOPHILS # BLD: 0.06 K/UL (ref 0–0.12)
BILIRUB SERPL-MCNC: 0.4 MG/DL (ref 0.1–1.5)
BUN SERPL-MCNC: 31 MG/DL (ref 8–22)
CALCIUM SERPL-MCNC: 9.7 MG/DL (ref 8.5–10.5)
CHLORIDE SERPL-SCNC: 98 MMOL/L (ref 96–112)
CHOLEST SERPL-MCNC: 172 MG/DL (ref 100–199)
CO2 SERPL-SCNC: 25 MMOL/L (ref 20–33)
CREAT SERPL-MCNC: 1.54 MG/DL (ref 0.5–1.4)
EOSINOPHIL # BLD AUTO: 0.17 K/UL (ref 0–0.51)
EOSINOPHIL NFR BLD: 2 % (ref 0–6.9)
ERYTHROCYTE [DISTWIDTH] IN BLOOD BY AUTOMATED COUNT: 53.3 FL (ref 35.9–50)
GLOBULIN SER CALC-MCNC: 2.6 G/DL (ref 1.9–3.5)
GLUCOSE SERPL-MCNC: 133 MG/DL (ref 65–99)
HCT VFR BLD AUTO: 47.3 % (ref 37–47)
HDLC SERPL-MCNC: 60 MG/DL
HGB BLD-MCNC: 14.9 G/DL (ref 12–16)
IMM GRANULOCYTES # BLD AUTO: 0.06 K/UL (ref 0–0.11)
IMM GRANULOCYTES NFR BLD AUTO: 0.7 % (ref 0–0.9)
LDLC SERPL CALC-MCNC: 76 MG/DL
LYMPHOCYTES # BLD AUTO: 1.29 K/UL (ref 1–4.8)
LYMPHOCYTES NFR BLD: 15.5 % (ref 22–41)
MCH RBC QN AUTO: 30.5 PG (ref 27–33)
MCHC RBC AUTO-ENTMCNC: 31.5 G/DL (ref 33.6–35)
MCV RBC AUTO: 96.7 FL (ref 81.4–97.8)
MONOCYTES # BLD AUTO: 0.44 K/UL (ref 0–0.85)
MONOCYTES NFR BLD AUTO: 5.3 % (ref 0–13.4)
NEUTROPHILS # BLD AUTO: 6.32 K/UL (ref 2–7.15)
NEUTROPHILS NFR BLD: 75.8 % (ref 44–72)
NRBC # BLD AUTO: 0 K/UL
NRBC BLD-RTO: 0 /100 WBC
PLATELET # BLD AUTO: 245 K/UL (ref 164–446)
PMV BLD AUTO: 12.8 FL (ref 9–12.9)
POTASSIUM SERPL-SCNC: 4.5 MMOL/L (ref 3.6–5.5)
PROT SERPL-MCNC: 7.2 G/DL (ref 6–8.2)
RBC # BLD AUTO: 4.89 M/UL (ref 4.2–5.4)
SODIUM SERPL-SCNC: 137 MMOL/L (ref 135–145)
T4 FREE SERPL-MCNC: 1.61 NG/DL (ref 0.93–1.7)
TRIGL SERPL-MCNC: 179 MG/DL (ref 0–149)
TSH SERPL DL<=0.005 MIU/L-ACNC: 1.9 UIU/ML (ref 0.38–5.33)
VIT B12 SERPL-MCNC: >4000 PG/ML (ref 211–911)
WBC # BLD AUTO: 8.3 K/UL (ref 4.8–10.8)

## 2021-08-20 PROCEDURE — 82607 VITAMIN B-12: CPT

## 2021-08-20 PROCEDURE — 82306 VITAMIN D 25 HYDROXY: CPT

## 2021-08-20 PROCEDURE — 84439 ASSAY OF FREE THYROXINE: CPT

## 2021-08-20 PROCEDURE — 85025 COMPLETE CBC W/AUTO DIFF WBC: CPT

## 2021-08-20 PROCEDURE — 80061 LIPID PANEL: CPT

## 2021-08-20 PROCEDURE — 80053 COMPREHEN METABOLIC PANEL: CPT

## 2021-08-20 PROCEDURE — 36415 COLL VENOUS BLD VENIPUNCTURE: CPT

## 2021-08-20 PROCEDURE — 84443 ASSAY THYROID STIM HORMONE: CPT

## 2021-08-24 DIAGNOSIS — N18.30 STAGE 3 CHRONIC KIDNEY DISEASE, UNSPECIFIED WHETHER STAGE 3A OR 3B CKD: ICD-10-CM

## 2021-08-24 DIAGNOSIS — R74.8 ELEVATED VITAMIN B12 LEVEL: ICD-10-CM

## 2021-08-24 DIAGNOSIS — R73.01 ELEVATED FASTING BLOOD SUGAR: ICD-10-CM

## 2021-08-26 RX ORDER — LOSARTAN POTASSIUM 50 MG/1
TABLET ORAL
Qty: 100 TABLET | Refills: 1 | Status: SHIPPED | OUTPATIENT
Start: 2021-08-26 | End: 2022-01-04

## 2021-08-26 NOTE — TELEPHONE ENCOUNTER
Refill X 6 months, sent to pharmacy.Pt. Seen in the last 6 months per protocol.   Lab Results   Component Value Date/Time    SODIUM 137 08/20/2021 11:16 AM    POTASSIUM 4.5 08/20/2021 11:16 AM    CHLORIDE 98 08/20/2021 11:16 AM    CO2 25 08/20/2021 11:16 AM    GLUCOSE 133 (H) 08/20/2021 11:16 AM    BUN 31 (H) 08/20/2021 11:16 AM    CREATININE 1.54 (H) 08/20/2021 11:16 AM

## 2021-11-08 RX ORDER — SIMVASTATIN 10 MG
10 TABLET ORAL EVERY EVENING
Qty: 100 TABLET | Refills: 3 | Status: SHIPPED | OUTPATIENT
Start: 2021-11-08 | End: 2022-11-17 | Stop reason: SDUPTHER

## 2021-11-08 RX ORDER — FLUTICASONE PROPIONATE 50 MCG
1 SPRAY, SUSPENSION (ML) NASAL DAILY
Qty: 48 ML | Refills: 3 | Status: SHIPPED | OUTPATIENT
Start: 2021-11-08 | End: 2023-01-08

## 2021-11-09 ENCOUNTER — TELEPHONE (OUTPATIENT)
Dept: MEDICAL GROUP | Facility: PHYSICIAN GROUP | Age: 81
End: 2021-11-09

## 2021-11-09 NOTE — TELEPHONE ENCOUNTER
ESTABLISHED PATIENT PRE-VISIT PLANNING     Patient was contacted to complete PVP.     Note: Patient will not be contacted if there is no indication to call.     1.  Reviewed notes from the last few office visits within the medical group: Yes    2.  If any orders were placed at last visit or intended to be done for this visit (i.e. 6 mos follow-up), do we have Results/Consult Notes?         •  Labs - Labs ordered, NOT completed. Patient advised to complete prior to next appointment.  Note: If patient appointment is for lab review and patient did not complete labs, check with provider if OK to reschedule patient until labs completed.       •  Imaging - Imaging was not ordered at last office visit.       •  Referrals - No referrals were ordered at last office visit.    3. Is this appointment scheduled as a Hospital Follow-Up? No    4.  Immunizations were updated in Epic using Reconcile Outside Information activity? Yes    5.  Patient is due for the following Health Maintenance Topics:   Health Maintenance Due   Topic Date Due   • IMM DTaP/Tdap/Td Vaccine (1 - Tdap) Never done   • IMM ZOSTER VACCINES (1 of 2) Never done   • BONE DENSITY  Never done   • IMM INFLUENZA (1) 09/01/2021   • COVID-19 Vaccine (3 - Booster for Moderna series) 09/08/2021       6.  AHA (Pulse8) form printed for Provider? Yes

## 2021-11-10 ENCOUNTER — HOSPITAL ENCOUNTER (OUTPATIENT)
Dept: LAB | Facility: MEDICAL CENTER | Age: 81
End: 2021-11-10
Attending: FAMILY MEDICINE
Payer: MEDICARE

## 2021-11-10 DIAGNOSIS — N18.30 STAGE 3 CHRONIC KIDNEY DISEASE, UNSPECIFIED WHETHER STAGE 3A OR 3B CKD: ICD-10-CM

## 2021-11-10 DIAGNOSIS — R74.8 ELEVATED VITAMIN B12 LEVEL: ICD-10-CM

## 2021-11-10 DIAGNOSIS — R73.01 ELEVATED FASTING BLOOD SUGAR: ICD-10-CM

## 2021-11-10 LAB
ALBUMIN SERPL BCP-MCNC: 5.1 G/DL (ref 3.2–4.9)
BUN SERPL-MCNC: 25 MG/DL (ref 8–22)
CALCIUM SERPL-MCNC: 9.5 MG/DL (ref 8.5–10.5)
CHLORIDE SERPL-SCNC: 100 MMOL/L (ref 96–112)
CO2 SERPL-SCNC: 25 MMOL/L (ref 20–33)
CREAT SERPL-MCNC: 1.02 MG/DL (ref 0.5–1.4)
EST. AVERAGE GLUCOSE BLD GHB EST-MCNC: 114 MG/DL
GLUCOSE SERPL-MCNC: 129 MG/DL (ref 65–99)
HBA1C MFR BLD: 5.6 % (ref 4–5.6)
PHOSPHATE SERPL-MCNC: 3.8 MG/DL (ref 2.5–4.5)
POTASSIUM SERPL-SCNC: 4.7 MMOL/L (ref 3.6–5.5)
SODIUM SERPL-SCNC: 136 MMOL/L (ref 135–145)
VIT B12 SERPL-MCNC: >4000 PG/ML (ref 211–911)

## 2021-11-10 PROCEDURE — 83036 HEMOGLOBIN GLYCOSYLATED A1C: CPT

## 2021-11-10 PROCEDURE — 36415 COLL VENOUS BLD VENIPUNCTURE: CPT

## 2021-11-10 PROCEDURE — 80069 RENAL FUNCTION PANEL: CPT

## 2021-11-10 PROCEDURE — 82607 VITAMIN B-12: CPT

## 2021-11-16 ENCOUNTER — OFFICE VISIT (OUTPATIENT)
Dept: MEDICAL GROUP | Facility: PHYSICIAN GROUP | Age: 81
End: 2021-11-16
Payer: MEDICARE

## 2021-11-16 VITALS
WEIGHT: 147 LBS | OXYGEN SATURATION: 97 % | DIASTOLIC BLOOD PRESSURE: 64 MMHG | BODY MASS INDEX: 27.05 KG/M2 | HEART RATE: 57 BPM | HEIGHT: 62 IN | SYSTOLIC BLOOD PRESSURE: 136 MMHG | TEMPERATURE: 97.4 F

## 2021-11-16 DIAGNOSIS — N18.30 STAGE 3 CHRONIC KIDNEY DISEASE, UNSPECIFIED WHETHER STAGE 3A OR 3B CKD: ICD-10-CM

## 2021-11-16 DIAGNOSIS — J30.2 SEASONAL ALLERGIES: ICD-10-CM

## 2021-11-16 DIAGNOSIS — E78.2 MIXED HYPERLIPIDEMIA: ICD-10-CM

## 2021-11-16 DIAGNOSIS — E03.9 ACQUIRED HYPOTHYROIDISM: ICD-10-CM

## 2021-11-16 DIAGNOSIS — Z78.0 POSTMENOPAUSAL: ICD-10-CM

## 2021-11-16 DIAGNOSIS — E53.8 VITAMIN B12 DEFICIENCY: ICD-10-CM

## 2021-11-16 DIAGNOSIS — I10 ESSENTIAL HYPERTENSION: ICD-10-CM

## 2021-11-16 PROCEDURE — 99214 OFFICE O/P EST MOD 30 MIN: CPT | Performed by: FAMILY MEDICINE

## 2021-11-16 RX ORDER — CETIRIZINE HYDROCHLORIDE 10 MG/1
10 TABLET ORAL DAILY
Qty: 90 TABLET | Refills: 3 | Status: SHIPPED | OUTPATIENT
Start: 2021-11-16 | End: 2023-01-08

## 2021-11-16 ASSESSMENT — FIBROSIS 4 INDEX: FIB4 SCORE: 1.18

## 2021-11-16 NOTE — PROGRESS NOTES
CC: Allergies    HISTORY OF THE PRESENT ILLNESS: Patient is a 81 y.o. female.     Patient is here today to follow-up on chronic health conditions including chronic kidney disease, hypothyroidism, hypertension, hyperlipidemia.  She has new concern of seasonal allergies as well.    Allergies: Patient has no known allergies.    Current Outpatient Medications Ordered in Epic   Medication Sig Dispense Refill   • cetirizine (ZYRTEC) 10 MG Tab Take 1 Tablet by mouth every day. 90 Tablet 3   • simvastatin (ZOCOR) 10 MG Tab Take 1 Tablet by mouth every evening. *Appointment needed for additional refills* 100 Tablet 3   • fluticasone (FLONASE) 50 MCG/ACT nasal spray Administer 1 Spray into affected nostril(S) every day. *Follow up needed for additional refills* 48 mL 3   • metoprolol tartrate (LOPRESSOR) 25 MG Tab TAKE 1 TABLET BY MOUTH TWICE A  Tablet 2   • losartan (COZAAR) 50 MG Tab TAKE 1 TABLET BY MOUTH EVERY  Tablet 1   • furosemide (LASIX) 20 MG Tab TAKE 1 TABLET BY MOUTH TWICE A  tablet 1   • levothyroxine (SYNTHROID) 50 MCG Tab Take 1 Tab by mouth Every morning on an empty stomach. 90 Tab 3   • aspirin EC (ECOTRIN) 81 MG Tablet Delayed Response Take 81 mg by mouth every day.       No current [facility]-ordered facility-administered medications on file.       History reviewed. No pertinent past medical history.    History reviewed. No pertinent surgical history.    Social History     Tobacco Use   • Smoking status: Former Smoker     Packs/day: 0.00     Quit date: 1979     Years since quittin.3   • Smokeless tobacco: Never Used   Vaping Use   • Vaping Use: Never used   Substance Use Topics   • Alcohol use: Yes     Alcohol/week: 8.4 oz     Types: 14 Glasses of wine per week   • Drug use: No       Social History     Social History Narrative   • Not on file       History reviewed. No pertinent family history.      Labs: Labs reviewed from 2021 and November 10, 2021.      Exam: /64  "(BP Location: Left arm, Patient Position: Sitting, BP Cuff Size: Adult)   Pulse (!) 57   Temp 36.3 °C (97.4 °F) (Temporal)   Ht 1.575 m (5' 2\")   Wt 66.7 kg (147 lb)   SpO2 97%  Body mass index is 26.89 kg/m².    General: Well appearing, NAD  Pulmonary: Clear to ausculation.  Normal effort. No rales, ronchi, or wheezing.  Cardiovascular: Regular rate and rhythm without murmur, rubs or gallop. No lower extremity edema.   Psych: Normal mood and affect. Alert and oriented. Judgment and insight is normal.    Please note that this dictation was created using voice recognition software. I have made every reasonable attempt to correct obvious errors, but I expect that there are errors of grammar and possibly content that I did not discover before finalizing the note.      Assessment/Plan  Mariely was seen today for follow-up and seasonal allergies.    Diagnoses and all orders for this visit:    Seasonal allergies  Patient reports significant runny nose and watering eyes.  She was using Zyrtec successfully, but reports that the medication is fairly pricey for her.  We will go ahead and send in prescription for Zyrtec.  -     cetirizine (ZYRTEC) 10 MG Tab; Take 1 Tablet by mouth every day.    Vitamin B12 deficiency  Patient noted to have vitamin B12 levels greater than 4000.  Currently she is taking high dosage of vitamin B12 daily.  Recommend she discontinue at this time, and will recheck labs in about 4 to 6 months.  -     VITAMIN B12; Future    Postmenopausal  She reports her last DEXA scan was about 5 years ago and that she was osteopenic at that time.  -     DS-BONE DENSITY STUDY (DEXA); Future    Mixed hyperlipidemia  Chronic issue, very well controlled on simvastatin based on most recent lab work.  We will continue current medication.    Essential hypertension  She does bring in her home blood pressures readings which all look excellent and age-appropriate.  We will continue current dosage of losartan, metoprolol " and Lasix.    Stage 3 chronic kidney disease, unspecified whether stage 3a or 3b CKD (HCC)  Most recent GFR noted to be 52.  Recommend good hydration and avoidance of nephrotoxins.  Recheck kidney function in 4 to 6 months.  -     Renal Function Panel; Future    Acquired hypothyroidism  Well-controlled on current dose of levothyroxine based on most recent lab work.    Follow up in 4-6 months or sooner if needed.     Fernanda Askew DO  [facility]

## 2021-11-19 RX ORDER — FUROSEMIDE 20 MG/1
TABLET ORAL
Qty: 180 TABLET | Refills: 2 | Status: SHIPPED | OUTPATIENT
Start: 2021-11-19 | End: 2022-08-03 | Stop reason: SDUPTHER

## 2021-11-24 ENCOUNTER — TELEPHONE (OUTPATIENT)
Dept: HEALTH INFORMATION MANAGEMENT | Facility: OTHER | Age: 81
End: 2021-11-24

## 2022-04-04 ENCOUNTER — TELEPHONE (OUTPATIENT)
Dept: MEDICAL GROUP | Facility: PHYSICIAN GROUP | Age: 82
End: 2022-04-04
Payer: MEDICARE

## 2022-04-04 NOTE — TELEPHONE ENCOUNTER
ESTABLISHED PATIENT PRE-VISIT PLANNING     Patient was contacted to complete PVP.     Note: Patient will not be contacted if there is no indication to call.     1.  Reviewed notes from the last few office visits within the medical group: Yes    2.  If any orders were placed at last visit or intended to be done for this visit (i.e. 6 mos follow-up), do we have Results/Consult Notes?         •  Labs - Labs ordered, NOT completed. Patient advised to complete prior to next appointment.  Note: If patient appointment is for lab review and patient did not complete labs, check with provider if OK to reschedule patient until labs completed.       •  Imaging - Imaging ordered, NOT completed. Patient advised to complete prior to next appointment.       •  Referrals - No referrals were ordered at last office visit.    3. Is this appointment scheduled as a Hospital Follow-Up? No    4.  Immunizations were updated in Epic using Reconcile Outside Information activity? Yes    5.  Patient is due for the following Health Maintenance Topics:   Health Maintenance Due   Topic Date Due   • IMM DTaP/Tdap/Td Vaccine (1 - Tdap) Never done   • IMM ZOSTER VACCINES (1 of 2) Never done   • BONE DENSITY  Never done       6.  AHA (Pulse8) form printed for Provider? Yes

## 2022-04-05 ENCOUNTER — HOSPITAL ENCOUNTER (OUTPATIENT)
Dept: LAB | Facility: MEDICAL CENTER | Age: 82
End: 2022-04-05
Attending: FAMILY MEDICINE
Payer: MEDICARE

## 2022-04-05 DIAGNOSIS — N18.30 STAGE 3 CHRONIC KIDNEY DISEASE, UNSPECIFIED WHETHER STAGE 3A OR 3B CKD: ICD-10-CM

## 2022-04-05 DIAGNOSIS — E53.8 VITAMIN B12 DEFICIENCY: ICD-10-CM

## 2022-04-05 LAB
ALBUMIN SERPL BCP-MCNC: 4.1 G/DL (ref 3.2–4.9)
BUN SERPL-MCNC: 17 MG/DL (ref 8–22)
CALCIUM SERPL-MCNC: 9.1 MG/DL (ref 8.5–10.5)
CHLORIDE SERPL-SCNC: 99 MMOL/L (ref 96–112)
CO2 SERPL-SCNC: 22 MMOL/L (ref 20–33)
CREAT SERPL-MCNC: 1.03 MG/DL (ref 0.5–1.4)
GFR SERPLBLD CREATININE-BSD FMLA CKD-EPI: 55 ML/MIN/1.73 M 2
GLUCOSE SERPL-MCNC: 112 MG/DL (ref 65–99)
PHOSPHATE SERPL-MCNC: 3.4 MG/DL (ref 2.5–4.5)
POTASSIUM SERPL-SCNC: 4.7 MMOL/L (ref 3.6–5.5)
SODIUM SERPL-SCNC: 135 MMOL/L (ref 135–145)
VIT B12 SERPL-MCNC: 2608 PG/ML (ref 211–911)

## 2022-04-05 PROCEDURE — 82607 VITAMIN B-12: CPT

## 2022-04-05 PROCEDURE — 80069 RENAL FUNCTION PANEL: CPT

## 2022-04-05 PROCEDURE — 36415 COLL VENOUS BLD VENIPUNCTURE: CPT

## 2022-04-06 ENCOUNTER — HOSPITAL ENCOUNTER (EMERGENCY)
Facility: MEDICAL CENTER | Age: 82
End: 2022-04-06
Attending: EMERGENCY MEDICINE
Payer: MEDICARE

## 2022-04-06 ENCOUNTER — APPOINTMENT (OUTPATIENT)
Dept: RADIOLOGY | Facility: MEDICAL CENTER | Age: 82
End: 2022-04-06
Attending: EMERGENCY MEDICINE
Payer: MEDICARE

## 2022-04-06 VITALS
OXYGEN SATURATION: 97 % | TEMPERATURE: 97 F | SYSTOLIC BLOOD PRESSURE: 173 MMHG | DIASTOLIC BLOOD PRESSURE: 74 MMHG | HEIGHT: 62 IN | BODY MASS INDEX: 27.22 KG/M2 | HEART RATE: 79 BPM | WEIGHT: 147.93 LBS | RESPIRATION RATE: 20 BRPM

## 2022-04-06 DIAGNOSIS — M54.9 PAIN, UPPER BACK: ICD-10-CM

## 2022-04-06 LAB
ALBUMIN SERPL BCP-MCNC: 4.2 G/DL (ref 3.2–4.9)
ALBUMIN/GLOB SERPL: 1.4 G/DL
ALP SERPL-CCNC: 144 U/L (ref 30–99)
ALT SERPL-CCNC: 24 U/L (ref 2–50)
ANION GAP SERPL CALC-SCNC: 13 MMOL/L (ref 7–16)
AST SERPL-CCNC: 18 U/L (ref 12–45)
BASOPHILS # BLD AUTO: 0.4 % (ref 0–1.8)
BASOPHILS # BLD: 0.04 K/UL (ref 0–0.12)
BILIRUB SERPL-MCNC: 0.4 MG/DL (ref 0.1–1.5)
BUN SERPL-MCNC: 19 MG/DL (ref 8–22)
CALCIUM SERPL-MCNC: 9.2 MG/DL (ref 8.5–10.5)
CHLORIDE SERPL-SCNC: 99 MMOL/L (ref 96–112)
CO2 SERPL-SCNC: 23 MMOL/L (ref 20–33)
CREAT SERPL-MCNC: 1 MG/DL (ref 0.5–1.4)
D DIMER PPP IA.FEU-MCNC: 0.78 UG/ML (FEU) (ref 0–0.5)
EOSINOPHIL # BLD AUTO: 0.31 K/UL (ref 0–0.51)
EOSINOPHIL NFR BLD: 2.9 % (ref 0–6.9)
ERYTHROCYTE [DISTWIDTH] IN BLOOD BY AUTOMATED COUNT: 52.2 FL (ref 35.9–50)
GFR SERPLBLD CREATININE-BSD FMLA CKD-EPI: 57 ML/MIN/1.73 M 2
GLOBULIN SER CALC-MCNC: 2.9 G/DL (ref 1.9–3.5)
GLUCOSE SERPL-MCNC: 127 MG/DL (ref 65–99)
HCT VFR BLD AUTO: 38.4 % (ref 37–47)
HGB BLD-MCNC: 12.2 G/DL (ref 12–16)
IMM GRANULOCYTES # BLD AUTO: 0.09 K/UL (ref 0–0.11)
IMM GRANULOCYTES NFR BLD AUTO: 0.8 % (ref 0–0.9)
LIPASE SERPL-CCNC: 17 U/L (ref 11–82)
LYMPHOCYTES # BLD AUTO: 1.11 K/UL (ref 1–4.8)
LYMPHOCYTES NFR BLD: 10.3 % (ref 22–41)
MCH RBC QN AUTO: 29.2 PG (ref 27–33)
MCHC RBC AUTO-ENTMCNC: 31.8 G/DL (ref 33.6–35)
MCV RBC AUTO: 91.9 FL (ref 81.4–97.8)
MONOCYTES # BLD AUTO: 0.57 K/UL (ref 0–0.85)
MONOCYTES NFR BLD AUTO: 5.3 % (ref 0–13.4)
NEUTROPHILS # BLD AUTO: 8.7 K/UL (ref 2–7.15)
NEUTROPHILS NFR BLD: 80.3 % (ref 44–72)
NRBC # BLD AUTO: 0 K/UL
NRBC BLD-RTO: 0 /100 WBC
PLATELET # BLD AUTO: 310 K/UL (ref 164–446)
PMV BLD AUTO: 11.5 FL (ref 9–12.9)
POTASSIUM SERPL-SCNC: 4.7 MMOL/L (ref 3.6–5.5)
PROT SERPL-MCNC: 7.1 G/DL (ref 6–8.2)
RBC # BLD AUTO: 4.18 M/UL (ref 4.2–5.4)
SODIUM SERPL-SCNC: 135 MMOL/L (ref 135–145)
TROPONIN T SERPL-MCNC: 13 NG/L (ref 6–19)
WBC # BLD AUTO: 10.8 K/UL (ref 4.8–10.8)

## 2022-04-06 PROCEDURE — 93005 ELECTROCARDIOGRAM TRACING: CPT | Performed by: EMERGENCY MEDICINE

## 2022-04-06 PROCEDURE — 83690 ASSAY OF LIPASE: CPT

## 2022-04-06 PROCEDURE — 71045 X-RAY EXAM CHEST 1 VIEW: CPT

## 2022-04-06 PROCEDURE — 71275 CT ANGIOGRAPHY CHEST: CPT | Mod: ME

## 2022-04-06 PROCEDURE — 96375 TX/PRO/DX INJ NEW DRUG ADDON: CPT

## 2022-04-06 PROCEDURE — 85025 COMPLETE CBC W/AUTO DIFF WBC: CPT

## 2022-04-06 PROCEDURE — 99285 EMERGENCY DEPT VISIT HI MDM: CPT

## 2022-04-06 PROCEDURE — 84484 ASSAY OF TROPONIN QUANT: CPT

## 2022-04-06 PROCEDURE — 700111 HCHG RX REV CODE 636 W/ 250 OVERRIDE (IP): Performed by: EMERGENCY MEDICINE

## 2022-04-06 PROCEDURE — 700117 HCHG RX CONTRAST REV CODE 255: Performed by: EMERGENCY MEDICINE

## 2022-04-06 PROCEDURE — 36415 COLL VENOUS BLD VENIPUNCTURE: CPT

## 2022-04-06 PROCEDURE — 96374 THER/PROPH/DIAG INJ IV PUSH: CPT

## 2022-04-06 PROCEDURE — 80053 COMPREHEN METABOLIC PANEL: CPT

## 2022-04-06 PROCEDURE — 85379 FIBRIN DEGRADATION QUANT: CPT

## 2022-04-06 PROCEDURE — 93005 ELECTROCARDIOGRAM TRACING: CPT

## 2022-04-06 RX ORDER — FAMOTIDINE 20 MG/1
20 TABLET, FILM COATED ORAL 2 TIMES DAILY
Qty: 30 TABLET | Refills: 0 | Status: SHIPPED | OUTPATIENT
Start: 2022-04-06 | End: 2022-04-28

## 2022-04-06 RX ORDER — KETOROLAC TROMETHAMINE 30 MG/ML
15 INJECTION, SOLUTION INTRAMUSCULAR; INTRAVENOUS ONCE
Status: COMPLETED | OUTPATIENT
Start: 2022-04-06 | End: 2022-04-06

## 2022-04-06 RX ORDER — NAPROXEN 500 MG/1
500 TABLET ORAL 2 TIMES DAILY PRN
Qty: 20 TABLET | Refills: 0 | Status: SHIPPED | OUTPATIENT
Start: 2022-04-06 | End: 2022-06-20

## 2022-04-06 RX ORDER — MORPHINE SULFATE 4 MG/ML
2 INJECTION INTRAVENOUS ONCE
Status: COMPLETED | OUTPATIENT
Start: 2022-04-06 | End: 2022-04-06

## 2022-04-06 RX ORDER — CYCLOBENZAPRINE HCL 10 MG
10 TABLET ORAL 3 TIMES DAILY PRN
Qty: 30 TABLET | Refills: 0 | Status: SHIPPED | OUTPATIENT
Start: 2022-04-06 | End: 2022-06-20

## 2022-04-06 RX ORDER — ONDANSETRON 2 MG/ML
4 INJECTION INTRAMUSCULAR; INTRAVENOUS ONCE
Status: COMPLETED | OUTPATIENT
Start: 2022-04-06 | End: 2022-04-06

## 2022-04-06 RX ADMIN — KETOROLAC TROMETHAMINE 15 MG: 30 INJECTION, SOLUTION INTRAMUSCULAR at 13:22

## 2022-04-06 RX ADMIN — IOHEXOL 50 ML: 350 INJECTION, SOLUTION INTRAVENOUS at 15:36

## 2022-04-06 RX ADMIN — ONDANSETRON 4 MG: 2 INJECTION INTRAMUSCULAR; INTRAVENOUS at 13:21

## 2022-04-06 RX ADMIN — MORPHINE SULFATE 2 MG: 4 INJECTION INTRAVENOUS at 13:22

## 2022-04-06 ASSESSMENT — FIBROSIS 4 INDEX: FIB4 SCORE: 1.19

## 2022-04-06 NOTE — DISCHARGE INSTRUCTIONS
Your back pain appears to be musculoskeletal.  Please follow-up with your primary doctor in the next week for recheck.  Take your medications as prescribed.  Recommend follow-up physical therapy for ongoing symptoms that are not improving.

## 2022-04-06 NOTE — ED NOTES
Pt provided with discharge instrucitons. Pt verbalized understanding. PIV removed and pt assisted out of ed with steady gait.

## 2022-04-06 NOTE — ED TRIAGE NOTES
Chief Complaint   Patient presents with   • Neck Pain     RT sided neck pain since 4/1. Now radiates into shoulder and RT side. Denies injury. Reports she has difficulty breathing, even at rest.    • Shoulder Pain   • Side Pain   • Difficulty Breathing     Pt to triage for above. Had basic labs drawn yesterday for routine lab work. Nothing of note. Protocol ordered. Pt to EKG.

## 2022-04-06 NOTE — ED PROVIDER NOTES
ED Provider Note    Scribed for Navarro Hall M.D. by Shaila Adorno. 2022, 1:06 PM.    Primary care provider: Fernanda Askew D.O.  Means of arrival: Walk-In  History obtained from: Patient  History limited by: None    CHIEF COMPLAINT  Chief Complaint   Patient presents with   • Neck Pain     RT sided neck pain since . Now radiates into shoulder and RT side. Denies injury. Reports she has difficulty breathing, even at rest.    • Shoulder Pain   • Side Pain   • Difficulty Breathing       HPI  Mariely Easley is a 82 y.o. female who presents to the Emergency Department for persistent right neck pain onset . The patient states it radiates to her right shoulder and lower back. It is exacerbated when turning her body. She has associated difficulty sleeping and difficulty breathing secondary to pain, denies cough, rash, dysuria, abdominal pain, leg swelling, or chest pain. She denies any recent falls. She notes she typically has muscle spasms on her left shoulder. She denies any past surgeries. She admits to recently traveling by car to [place].     REVIEW OF SYSTEMS  Pertinent positives include neck pain, shoulder pain, back pain, difficulty sleeping, and difficulty breathing. Pertinent negatives include no cough, rash, dysuria, abdominal pain, leg swelling or chest pain.  All other systems reviewed and negative.    PAST MEDICAL HISTORY   has a past medical history of Hypertension.    SURGICAL HISTORY  patient denies any surgical history    SOCIAL HISTORY  Social History     Tobacco Use   • Smoking status: Former Smoker     Packs/day: 0.00     Quit date: 1979     Years since quittin.7   • Smokeless tobacco: Never Used   Vaping Use   • Vaping Use: Never used   Substance Use Topics   • Alcohol use: Yes     Alcohol/week: 8.4 oz     Types: 14 Glasses of wine per week   • Drug use: No      Social History     Substance and Sexual Activity   Drug Use No       FAMILY HISTORY  None noted    CURRENT  "MEDICATIONS  Home Medications     Reviewed by Joby Winkler R.N. (Registered Nurse) on 04/06/22 at 1055  Med List Status: Partial   Medication Last Dose Status   aspirin EC (ECOTRIN) 81 MG Tablet Delayed Response  Active   cetirizine (ZYRTEC) 10 MG Tab  Active   fluticasone (FLONASE) 50 MCG/ACT nasal spray  Active   furosemide (LASIX) 20 MG Tab  Active   levothyroxine (SYNTHROID) 50 MCG Tab  Active   losartan (COZAAR) 50 MG Tab  Active   metoprolol tartrate (LOPRESSOR) 25 MG Tab  Active   simvastatin (ZOCOR) 10 MG Tab  Active                ALLERGIES  No Known Allergies    PHYSICAL EXAM  VITAL SIGNS: BP (!) 164/81   Pulse 73   Temp 36.5 °C (97.7 °F) (Temporal)   Resp 19   Ht 1.575 m (5' 2\")   Wt 67.1 kg (147 lb 14.9 oz)   SpO2 97%   BMI 27.06 kg/m²     Constitutional: Well developed, Well nourished, No acute distress, Non-toxic appearance.   HENT: Normocephalic, Atraumatic, mucous membranes moist, no erythema, exudates, swelling, or masses, nares patent  Eyes: nonicteric  Neck: Supple, no meningismus  Lymphatic: No lymphadenopathy noted.   Cardiovascular: Regular rate and rhythm, no gallops rubs or murmurs  Lungs: Clear bilaterally. No wheezing.  Abdomen: Bowel sounds normal, Soft, No lower quadrant tenderness.  Skin: Warm, Dry, no rash  Back: No tenderness, No CVA tenderness.   Genitalia: Deferred  Rectal: Deferred  Extremities: No edema. Pulses intact in all extremities.  Musculoskeletal: Reproducable pain with motion and palpation.  Neurologic: Alert, appropriate, follows commands, moving all extremities, normal speech. Strength is possibly slightly diminished in the right upper extremity. Sensation intact.   Psychiatric: Affect normal    DIAGNOSTIC STUDIES / PROCEDURES    LABS  Results for orders placed or performed during the hospital encounter of 04/06/22   Troponin   Result Value Ref Range    Troponin T 13 6 - 19 ng/L   CBC WITH DIFFERENTIAL   Result Value Ref Range    WBC 10.8 4.8 - 10.8 K/uL    RBC " 4.18 (L) 4.20 - 5.40 M/uL    Hemoglobin 12.2 12.0 - 16.0 g/dL    Hematocrit 38.4 37.0 - 47.0 %    MCV 91.9 81.4 - 97.8 fL    MCH 29.2 27.0 - 33.0 pg    MCHC 31.8 (L) 33.6 - 35.0 g/dL    RDW 52.2 (H) 35.9 - 50.0 fL    Platelet Count 310 164 - 446 K/uL    MPV 11.5 9.0 - 12.9 fL    Neutrophils-Polys 80.30 (H) 44.00 - 72.00 %    Lymphocytes 10.30 (L) 22.00 - 41.00 %    Monocytes 5.30 0.00 - 13.40 %    Eosinophils 2.90 0.00 - 6.90 %    Basophils 0.40 0.00 - 1.80 %    Immature Granulocytes 0.80 0.00 - 0.90 %    Nucleated RBC 0.00 /100 WBC    Neutrophils (Absolute) 8.70 (H) 2.00 - 7.15 K/uL    Lymphs (Absolute) 1.11 1.00 - 4.80 K/uL    Monos (Absolute) 0.57 0.00 - 0.85 K/uL    Eos (Absolute) 0.31 0.00 - 0.51 K/uL    Baso (Absolute) 0.04 0.00 - 0.12 K/uL    Immature Granulocytes (abs) 0.09 0.00 - 0.11 K/uL    NRBC (Absolute) 0.00 K/uL   COMP METABOLIC PANEL   Result Value Ref Range    Sodium 135 135 - 145 mmol/L    Potassium 4.7 3.6 - 5.5 mmol/L    Chloride 99 96 - 112 mmol/L    Co2 23 20 - 33 mmol/L    Anion Gap 13.0 7.0 - 16.0    Glucose 127 (H) 65 - 99 mg/dL    Bun 19 8 - 22 mg/dL    Creatinine 1.00 0.50 - 1.40 mg/dL    Calcium 9.2 8.5 - 10.5 mg/dL    AST(SGOT) 18 12 - 45 U/L    ALT(SGPT) 24 2 - 50 U/L    Alkaline Phosphatase 144 (H) 30 - 99 U/L    Total Bilirubin 0.4 0.1 - 1.5 mg/dL    Albumin 4.2 3.2 - 4.9 g/dL    Total Protein 7.1 6.0 - 8.2 g/dL    Globulin 2.9 1.9 - 3.5 g/dL    A-G Ratio 1.4 g/dL   LIPASE   Result Value Ref Range    Lipase 17 11 - 82 U/L   ESTIMATED GFR   Result Value Ref Range    GFR (CKD-EPI) 57 (A) >60 mL/min/1.73 m 2   EKG   Result Value Ref Range    Report       [facility] Emergency Dept.    Test Date:  2022  Pt Name:    JONES BERMUDEZ                Department: ER  MRN:        [id number]                      Room:  Gender:     Female                       Technician: [id number]  :        [id number]                   Requested By:ER TRIAGE PROTOCOL  Order #:    [id number]                     Reading MD:    Measurements  Intervals                                Axis  Rate:       69                           P:          27  MD:         188                          QRS:        6  QRSD:       72                           T:          18  QT:         428  QTc:        459    Interpretive Statements  SINUS RHYTHM  PROBABLE INFERIOR INFARCT, AGE INDETERMINATE  CONSIDER POSTERIOR WALL INVOLVEMENT  No previous ECG available for comparison        All labs reviewed by me.    EKG  Obtained at 1:24 PM  Normal Sinus rhythm   Rate 69  No ST change or ectopy  Axis normal   Intervals normal   Q wave in Lead 3     RADIOLOGY  CT-CTA CHEST PULMONARY ARTERY W/ RECONS   Final Result      1.  There is no CT evidence of acute pulmonary embolism.   2.  There is no pneumonia or pleural effusion.   3.  There is minimal subpleural interstitial and nodular opacity, probably chronic interstitial disease.   4.  There are small incidental ovoid right breast masses.            DX-CHEST-PORTABLE (1 VIEW)   Final Result      1.  Mild cardiomegaly.   2.  Otherwise, no acute cardiopulmonary disease.        The radiologist's interpretation of all radiological studies have been reviewed by me.    COURSE & MEDICAL DECISION MAKING  Nursing notes, VS, PMSFHx reviewed in chart.     1:06 PM Patient seen and examined at bedside. Ordered for DX-Chest, D-Dimer, Troponin, CBC w/ diff, CMP, Lipase, UA, and EKG to evaluate. Patient was treated with Zofran 4 mg, Toradol 15 mg, and Morphine 2 mg for her symptoms.     Decision Making:  This is a 82 y.o. year old female who presents with initially right neck pain going down into her upper back and then to her lower back.  The patient appears to have reproducible symptoms with turning bending and palpation.  Given the patient's age she had a cardiac work-up initiated that was negative.  She also had a D-dimer obtained which was positive-subsequent CT chest demonstrates no PE, no pneumonia, no  evidence of other obvious pathology.  The patient's vitals are reassuring.  She will be treated for musculoskeletal cause of pain and discharged home.  She does not have any abdominal pain on exam to suggest intra-abdominal pathology such as cholecystitis.  Transaminases are normal.      FINAL IMPRESSION  1. Pain, upper back          Shaila MARIE (Scribpatrick), am scribing for, and in the presence of, Navarro Hall M.D..    Electronically signed by: Shaila Adorno (Maribelibpatrick), 4/6/2022    Navarro MARIE M.D. personally performed the services described in this documentation, as scribed by Shaila Adorno in my presence, and it is both accurate and complete.    The note accurately reflects work and decisions made by me.  Navarro Hall M.D.  4/6/2022  4:02 PM

## 2022-04-12 ENCOUNTER — OFFICE VISIT (OUTPATIENT)
Dept: MEDICAL GROUP | Facility: PHYSICIAN GROUP | Age: 82
End: 2022-04-12
Payer: MEDICARE

## 2022-04-12 VITALS
OXYGEN SATURATION: 98 % | TEMPERATURE: 97.6 F | HEIGHT: 62 IN | HEART RATE: 85 BPM | BODY MASS INDEX: 25.03 KG/M2 | SYSTOLIC BLOOD PRESSURE: 134 MMHG | DIASTOLIC BLOOD PRESSURE: 72 MMHG | WEIGHT: 136 LBS

## 2022-04-12 DIAGNOSIS — Z76.89 ENCOUNTER TO ESTABLISH CARE: ICD-10-CM

## 2022-04-12 DIAGNOSIS — N18.31 STAGE 3A CHRONIC KIDNEY DISEASE: ICD-10-CM

## 2022-04-12 DIAGNOSIS — M62.830 MUSCLE SPASM OF BACK: ICD-10-CM

## 2022-04-12 DIAGNOSIS — I10 ESSENTIAL HYPERTENSION: ICD-10-CM

## 2022-04-12 DIAGNOSIS — E03.9 ACQUIRED HYPOTHYROIDISM: ICD-10-CM

## 2022-04-12 DIAGNOSIS — R74.8 ELEVATED VITAMIN B12 LEVEL: ICD-10-CM

## 2022-04-12 DIAGNOSIS — E78.2 MIXED HYPERLIPIDEMIA: ICD-10-CM

## 2022-04-12 PROCEDURE — 99214 OFFICE O/P EST MOD 30 MIN: CPT | Performed by: STUDENT IN AN ORGANIZED HEALTH CARE EDUCATION/TRAINING PROGRAM

## 2022-04-12 RX ORDER — DIAZEPAM 10 MG/1
5-10 TABLET ORAL EVERY 12 HOURS PRN
Qty: 30 TABLET | Refills: 0 | Status: SHIPPED | OUTPATIENT
Start: 2022-04-12 | End: 2022-10-13 | Stop reason: SDUPTHER

## 2022-04-12 ASSESSMENT — PATIENT HEALTH QUESTIONNAIRE - PHQ9: CLINICAL INTERPRETATION OF PHQ2 SCORE: 0

## 2022-04-12 ASSESSMENT — FIBROSIS 4 INDEX: FIB4 SCORE: 0.95

## 2022-04-12 NOTE — PROGRESS NOTES
Subjective:     Chief Complaint   Patient presents with   • Establish Care   • Spasms     Er fv      HISTORY OF THE PRESENT ILLNESS: Patient is a 82 y.o. female. This pleasant patient is here today to establish care and discuss ED follow up. His/her prior PCP was Dr. Askew.    ED follow up  Patient presented to the ER for right-sided neck pain that began on April 1 with radiation down the upper/lower back.  I reviewed the related encounter note, labs and imaging.    Muscle spasm of back  Acute on chronic but this time right when normally left. Used to take valium 10 mg as needed without any adverse side effects in great results but when she moved to [place] in 2018 that was not refilled.  She did take Flexeril recently for this spasm that occurred on the right prompting her ER visit and it was somewhat effective but not as much as Valium prior.  Had some trouble walking due to this pain but that is improved as well.  Denies any imbalance.    She states that she is improved.  Taking Tylenol in addition which has been helpful and was also given naproxen and famotidine by the ER which seems to be helpful as well, did not take this morning.    Essential hypertension  This is a chronic condition.  Patient reports compliance with her medication.  She states that her blood pressure is typically higher in the office so she brings a home log with good measurements.    Current Outpatient Medications Ordered in Epic   Medication Sig Dispense Refill   • diazepam (VALIUM) 10 MG tablet Take 0.5-1 Tablets by mouth every 12 hours as needed (muscle spasm) for up to 90 days. 30 Tablet 0   • cyclobenzaprine (FLEXERIL) 10 mg Tab Take 1 Tablet by mouth 3 times a day as needed for Moderate Pain. 30 Tablet 0   • naproxen (NAPROSYN) 500 MG Tab Take 1 Tablet by mouth 2 times a day as needed. 20 Tablet 0   • famotidine (PEPCID) 20 MG Tab Take 1 Tablet by mouth 2 times a day. 30 Tablet 0   • losartan (COZAAR) 50 MG Tab TAKE 1 TABLET BY MOUTH  "EVERY  Tablet 3   • furosemide (LASIX) 20 MG Tab TAKE 1 TABLET BY MOUTH TWICE A  Tablet 2   • cetirizine (ZYRTEC) 10 MG Tab Take 1 Tablet by mouth every day. 90 Tablet 3   • simvastatin (ZOCOR) 10 MG Tab Take 1 Tablet by mouth every evening. *Appointment needed for additional refills* 100 Tablet 3   • fluticasone (FLONASE) 50 MCG/ACT nasal spray Administer 1 Spray into affected nostril(S) every day. *Follow up needed for additional refills* 48 mL 3   • metoprolol tartrate (LOPRESSOR) 25 MG Tab TAKE 1 TABLET BY MOUTH TWICE A  Tablet 2   • levothyroxine (SYNTHROID) 50 MCG Tab Take 1 Tab by mouth Every morning on an empty stomach. 90 Tab 3   • aspirin EC (ECOTRIN) 81 MG Tablet Delayed Response Take 81 mg by mouth every day.       No current [facility]-ordered facility-administered medications on file.     ROS:   Gen: no fevers/chills, no changes in weight  ENT: no sore throat  Pulm: no sob, no cough  CV: no chest pain, no palpitations  GI: no nausea/vomiting, no diarrhea  : no dysuria  Skin: no rash  Neuro: no headaches, no numbness/tingling  Heme/Lymph: no easy bruising      Objective:     Exam: /72   Pulse 85   Temp 36.4 °C (97.6 °F) (Temporal)   Ht 1.575 m (5' 2\")   Wt 61.7 kg (136 lb)   SpO2 98%  Body mass index is 24.87 kg/m².    General: Well developed, well nourished in no acute distress.  Head: Normocephalic and atraumatic.  Eyes: Conjunctivae and extraocular motions are normal. Pupils are equal, round. No scleral icterus.   Mouth/Throat: Wearing mask.  Neck: Supple. Thyroid is not grossly enlarged.  Pulmonary: Clear to ausculation.  Normal effort. No rales, ronchi, or wheezing.  Cardiovascular: Regular rate and rhythm without murmur.  Neurologic: No gross/focal deficits. Normal gait.   Skin: Warm and dry.  No obvious lesions.  Musculoskeletal: No extremity cyanosis, clubbing, or edema.  Muscle spasm noted at the thoracolumbar junction on the right laterally and right middle " trapezius with some tenderness but mild.  No other findings.  Psych: Normal mood and affect. Alert and oriented x3. Judgment and insight is normal.    Labs: Reviewed from 8/20/2021, 4/6/2022  Imaging: Reviewed CTA chest 4/6/2022, chest x-ray 4/6/2022, EKG 4/6/2022    Assessment & Plan:   82 y.o. female with the following -    1. Encounter to establish care    2. Stage 3a chronic kidney disease (HCC)  This is a chronic condition, stable.  Continue adequate blood pressure control, ARB, avoid nephrotoxic medications.  Plan to trend in approximately 6 months.  Ensure adequate hydration.  - Comp Metabolic Panel; Future  - VITAMIN D,25 HYDROXY; Future    3. Essential hypertension  Chronic, acceptable control on repeat and her home log shows normal values.  No changes to current treatment.  - Comp Metabolic Panel; Future    4. Muscle spasm of back  Acute on chronic, this time on the right and improved.  Given her CKD we should in general try to avoid NSAIDs especially for any prolonged periods, patient agrees.  Okay to continue heat, Tylenol and other conservative measures such as topical pain relief.  As Flexeril was not very effective and had tolerated Valium for these type of spasm in the past we can refill in the event this recurs.  Patient states that this does not occur often but in the past has typically resulted in a ER visit unless she has a muscle relaxer at home.  Discussed the nature of this medication and its risks, patient expressed understanding and will not drink ETOH when taking.  Recommend that she trial a lower dose although she states that she did well with the 10 mg dose prior.  PDMP reviewed.  - diazepam (VALIUM) 10 MG tablet; Take 0.5-1 Tablets by mouth every 12 hours as needed (muscle spasm) for up to 90 days.  Dispense: 30 Tablet; Refill: 0    5. Elevated vitamin B12 level  Improved, continue to hold off on B12 level and will trend with her annual labs.  - VITAMIN B12; Future    6. Acquired  hypothyroidism  Chronic, well controlled. No changes to current treatment TSH for annual labs.,   - TSH WITH REFLEX TO FT4; Future    7. Mixed hyperlipidemia  Chronic, well controlled. No changes to current treatment.  Discussed the Q waves in lead III on her recent EKG, patient will bring her prior cardiology notes to her next visit.  Continue statin and aspirin.  - Lipid Profile; Future    I spent a total of 31 minutes with record review, exam, communication with the patient, and documentation of this encounter.    Return in about 4 months (around 8/12/2022) for Annual.    Please note that this dictation was created using voice recognition software. I have made every reasonable attempt to correct obvious errors, but I expect that there are errors of grammar and possibly content that I did not discover before finalizing the note.

## 2022-04-12 NOTE — ASSESSMENT & PLAN NOTE
Acute on chronic but this time right when normally left. Used to take valium 10 mg as needed without any adverse side effects in great results but when she moved to [place] in 2018 that was not refilled.  She did take Flexeril recently for this spasm that occurred on the right prompting her ER visit and it was somewhat effective but not as much as Valium prior.  Had some trouble walking due to this pain but that is improved as well.  Denies any imbalance.    She states that she is improved.  Taking Tylenol in addition which has been helpful and was also given naproxen and famotidine by the ER which seems to be helpful as well, did not take this morning.

## 2022-04-12 NOTE — ASSESSMENT & PLAN NOTE
This is a chronic condition.  Patient reports compliance with her medication.  She states that her blood pressure is typically higher in the office so she brings a home log with good measurements.

## 2022-05-18 ENCOUNTER — OFFICE VISIT (OUTPATIENT)
Dept: MEDICAL GROUP | Facility: PHYSICIAN GROUP | Age: 82
End: 2022-05-18
Payer: MEDICARE

## 2022-05-18 ENCOUNTER — HOSPITAL ENCOUNTER (OUTPATIENT)
Dept: RADIOLOGY | Facility: MEDICAL CENTER | Age: 82
End: 2022-05-18
Attending: NURSE PRACTITIONER
Payer: MEDICARE

## 2022-05-18 ENCOUNTER — TELEPHONE (OUTPATIENT)
Dept: MEDICAL GROUP | Facility: PHYSICIAN GROUP | Age: 82
End: 2022-05-18

## 2022-05-18 ENCOUNTER — HOSPITAL ENCOUNTER (OUTPATIENT)
Dept: LAB | Facility: MEDICAL CENTER | Age: 82
End: 2022-05-18
Attending: NURSE PRACTITIONER
Payer: MEDICARE

## 2022-05-18 VITALS
OXYGEN SATURATION: 95 % | HEART RATE: 79 BPM | TEMPERATURE: 98.6 F | DIASTOLIC BLOOD PRESSURE: 68 MMHG | SYSTOLIC BLOOD PRESSURE: 124 MMHG | BODY MASS INDEX: 26.72 KG/M2 | WEIGHT: 145.2 LBS | HEIGHT: 62 IN

## 2022-05-18 DIAGNOSIS — M79.605 PAIN AND SWELLING OF LEFT LOWER EXTREMITY: ICD-10-CM

## 2022-05-18 DIAGNOSIS — Z79.1 PATIENT TAKES NSAID (NON-STEROID ANTI-INFLAMMATORY DRUG): ICD-10-CM

## 2022-05-18 DIAGNOSIS — M79.89 PAIN AND SWELLING OF LEFT LOWER EXTREMITY: ICD-10-CM

## 2022-05-18 DIAGNOSIS — R79.89 ELEVATED D-DIMER: ICD-10-CM

## 2022-05-18 LAB
BASOPHILS # BLD AUTO: 0.7 % (ref 0–1.8)
BASOPHILS # BLD: 0.06 K/UL (ref 0–0.12)
CRP SERPL HS-MCNC: 5.15 MG/DL (ref 0–0.75)
D DIMER PPP IA.FEU-MCNC: 0.66 UG/ML (FEU) (ref 0–0.5)
EOSINOPHIL # BLD AUTO: 0.36 K/UL (ref 0–0.51)
EOSINOPHIL NFR BLD: 4 % (ref 0–6.9)
ERYTHROCYTE [DISTWIDTH] IN BLOOD BY AUTOMATED COUNT: 53.5 FL (ref 35.9–50)
HCT VFR BLD AUTO: 35.9 % (ref 37–47)
HGB BLD-MCNC: 11.2 G/DL (ref 12–16)
IMM GRANULOCYTES # BLD AUTO: 0.09 K/UL (ref 0–0.11)
IMM GRANULOCYTES NFR BLD AUTO: 1 % (ref 0–0.9)
LYMPHOCYTES # BLD AUTO: 0.83 K/UL (ref 1–4.8)
LYMPHOCYTES NFR BLD: 9.1 % (ref 22–41)
MCH RBC QN AUTO: 27.9 PG (ref 27–33)
MCHC RBC AUTO-ENTMCNC: 31.2 G/DL (ref 33.6–35)
MCV RBC AUTO: 89.3 FL (ref 81.4–97.8)
MONOCYTES # BLD AUTO: 0.5 K/UL (ref 0–0.85)
MONOCYTES NFR BLD AUTO: 5.5 % (ref 0–13.4)
NEUTROPHILS # BLD AUTO: 7.27 K/UL (ref 2–7.15)
NEUTROPHILS NFR BLD: 79.7 % (ref 44–72)
NRBC # BLD AUTO: 0 K/UL
NRBC BLD-RTO: 0 /100 WBC
PLATELET # BLD AUTO: 282 K/UL (ref 164–446)
PMV BLD AUTO: 11.9 FL (ref 9–12.9)
RBC # BLD AUTO: 4.02 M/UL (ref 4.2–5.4)
WBC # BLD AUTO: 9.1 K/UL (ref 4.8–10.8)

## 2022-05-18 PROCEDURE — 85379 FIBRIN DEGRADATION QUANT: CPT

## 2022-05-18 PROCEDURE — 93971 EXTREMITY STUDY: CPT | Mod: LT

## 2022-05-18 PROCEDURE — 85025 COMPLETE CBC W/AUTO DIFF WBC: CPT

## 2022-05-18 PROCEDURE — 86140 C-REACTIVE PROTEIN: CPT

## 2022-05-18 PROCEDURE — 36415 COLL VENOUS BLD VENIPUNCTURE: CPT

## 2022-05-18 PROCEDURE — 99215 OFFICE O/P EST HI 40 MIN: CPT | Performed by: NURSE PRACTITIONER

## 2022-05-18 ASSESSMENT — FIBROSIS 4 INDEX: FIB4 SCORE: 0.95

## 2022-05-18 NOTE — ASSESSMENT & PLAN NOTE
Patient reports pain in her calf for the last 3 weeks and swelling of her leg for about a week. She has tenderness to the touch. She reports difficulty walking. She denies any personal history of blood clot or family history. Denies any fever or chills. She denies any trauma to the leg or falls.     Will order STAT US to rule out DVT.     >>>Duplex negative for DVT. Patient does have slightly elevated CRP.     As patient has tenderness and welling of the left lower extremity will obtain US to rule out tendon rupture vs bakers cyst rupture.     Patient provided with strict ER precautions.

## 2022-05-18 NOTE — PROGRESS NOTES
CC: left calf swelling for 1 week                                                                                                                                   HPI:    Mareily presents today with the following.    Problem   Pain and Swelling of Left Lower Extremity       Current Outpatient Medications   Medication Sig Dispense Refill   • metoprolol tartrate (LOPRESSOR) 25 MG Tab TAKE 1 TABLET BY MOUTH TWICE A  Tablet 3   • diazepam (VALIUM) 10 MG tablet Take 0.5-1 Tablets by mouth every 12 hours as needed (muscle spasm) for up to 90 days. 30 Tablet 0   • losartan (COZAAR) 50 MG Tab TAKE 1 TABLET BY MOUTH EVERY  Tablet 3   • furosemide (LASIX) 20 MG Tab TAKE 1 TABLET BY MOUTH TWICE A  Tablet 2   • cetirizine (ZYRTEC) 10 MG Tab Take 1 Tablet by mouth every day. 90 Tablet 3   • simvastatin (ZOCOR) 10 MG Tab Take 1 Tablet by mouth every evening. *Appointment needed for additional refills* 100 Tablet 3   • fluticasone (FLONASE) 50 MCG/ACT nasal spray Administer 1 Spray into affected nostril(S) every day. *Follow up needed for additional refills* 48 mL 3   • levothyroxine (SYNTHROID) 50 MCG Tab Take 1 Tab by mouth Every morning on an empty stomach. 90 Tab 3   • aspirin EC (ECOTRIN) 81 MG Tablet Delayed Response Take 81 mg by mouth every day.     • famotidine (PEPCID) 20 MG Tab Take 1 Tablet by mouth 2 times a day. If taking naproxen (Patient not taking: Reported on 5/18/2022) 30 Tablet 0   • cyclobenzaprine (FLEXERIL) 10 mg Tab Take 1 Tablet by mouth 3 times a day as needed for Moderate Pain. (Patient not taking: Reported on 5/18/2022) 30 Tablet 0   • naproxen (NAPROSYN) 500 MG Tab Take 1 Tablet by mouth 2 times a day as needed. (Patient not taking: Reported on 5/18/2022) 20 Tablet 0     No current facility-administered medications for this visit.       Allergies as of 05/18/2022   • (No Known Allergies)        ROS:  All systems negative expect as addressed in assessment and plan.     /68  "(BP Location: Left arm, Patient Position: Sitting, BP Cuff Size: Adult)   Pulse 79   Temp 37 °C (98.6 °F) (Temporal)   Ht 1.575 m (5' 2\")   Wt 65.9 kg (145 lb 3.2 oz)   SpO2 95%   BMI 26.56 kg/m²     Physical Exam:  Gen:         Alert and oriented, No apparent distress.  Neck:        No Lymphadenopathy.   Lungs:     Clear to auscultation bilaterally. No wheezes, rales, or rhonchi.   CV:          Regular rate and rhythm. No murmurs, rubs or gallops.         Ext:          No clubbing, cyanosis, or peripheral edema.  Skin:  All visible skin intact without lesions.       Assessment and Plan:  82 y.o. female with the following issues.    1. Pain and swelling of left lower extremity  CRP QUANTITIVE (NON-CARDIAC)    D-DIMER    CBC WITH DIFFERENTIAL    US-EXTREMITY VENOUS LOWER UNILAT LEFT    US-EXTREMITY NON VASCULAR UNILATERAL LEFT   2. Elevated d-dimer  CRP QUANTITIVE (NON-CARDIAC)    D-DIMER    CBC WITH DIFFERENTIAL    US-EXTREMITY VENOUS LOWER UNILAT LEFT        Pain and swelling of left lower extremity  Patient reports pain in her calf for the last 3 weeks and swelling of her leg for about a week. She has tenderness to the touch. She reports difficulty walking. She denies any personal history of blood clot or family history. Denies any fever or chills. She denies any trauma to the leg or falls.     Will order STAT US to rule out DVT.     >>>Duplex negative for DVT. Patient does have slightly elevated CRP.     As patient has tenderness and welling of the left lower extremity will obtain US to rule out tendon rupture vs bakers cyst rupture.     Patient provided with strict ER precautions.         Return in about 1 week (around 5/25/2022) for follow up for leg swelling.    I spent a total of 45 minutes with record review, exam, and communication with the patient, communication with other providers, and documentation of this encounter. This does not include time spent on separately billable procedures/tests.    I " have placed the below orders and discussed them with an approved delegating provider.  The MA is performing the below orders under the direction of Dr. mendoza.    Please note that this dictation was created using voice recognition software. I have worked with consultants from the vendor as well as technical experts from [facility] to optimize the interface. I have made every reasonable attempt to correct obvious errors, but I expect that there are errors of grammar and possibly content that I did not discover before finalizing the note.

## 2022-05-18 NOTE — TELEPHONE ENCOUNTER
----- Message from MEREDITH BundyRLULU sent at 5/18/2022  1:43 PM PDT -----  Tripp guadarrama,     I also sent STAT labs and US to rule out DVT in left lower leg for this patient. Please keep an eye out and contact me ASAP if the patient has a blood clot.     Thank you,       ED Cervantes

## 2022-05-18 NOTE — TELEPHONE ENCOUNTER
Notified Aracelis WARD via voalte, per provider request, of abnormal stat labs. Notified that pt is scheduled for DVT-US for today at 1700. No new orders received.

## 2022-05-19 RX ORDER — FAMOTIDINE 20 MG/1
20 TABLET, FILM COATED ORAL 2 TIMES DAILY
Qty: 30 TABLET | Refills: 0 | Status: SHIPPED | OUTPATIENT
Start: 2022-05-19 | End: 2022-05-31

## 2022-05-19 NOTE — TELEPHONE ENCOUNTER
Spoke with pt and daughter. Let them know their US is scheduled for June 5th @1:45pm at the [facility] location.

## 2022-05-19 NOTE — TELEPHONE ENCOUNTER
Patient called and notified of STAT lab results and imaging results.     Patient does not have a blood clot but there is concern as she has elevated inflammatory markers.     Patient advised that case was also discussed with Dr. Vieira, her PCP. Advised that pain/swelling may be MSK related and recommended a soft tissue US and follow up with Dr. Vieira in 1 week.     Patient is agreeable to this and verbalizes understanding.

## 2022-05-28 DIAGNOSIS — Z79.1 PATIENT TAKES NSAID (NON-STEROID ANTI-INFLAMMATORY DRUG): ICD-10-CM

## 2022-05-31 RX ORDER — FAMOTIDINE 20 MG/1
20 TABLET, FILM COATED ORAL 2 TIMES DAILY
Qty: 30 TABLET | Refills: 0 | Status: SHIPPED | OUTPATIENT
Start: 2022-05-31 | End: 2022-06-09

## 2022-06-05 ENCOUNTER — APPOINTMENT (OUTPATIENT)
Dept: RADIOLOGY | Facility: MEDICAL CENTER | Age: 82
End: 2022-06-05
Attending: NURSE PRACTITIONER
Payer: MEDICARE

## 2022-06-08 ENCOUNTER — TELEPHONE (OUTPATIENT)
Dept: MEDICAL GROUP | Facility: PHYSICIAN GROUP | Age: 82
End: 2022-06-08
Payer: MEDICARE

## 2022-06-08 NOTE — TELEPHONE ENCOUNTER
VOICEMAIL  1. Caller Name: Mariely Easley           Call Back Number: [phone]    2. Message: Jose MENDEZ. Mariely's daughter called in regards to Mariely's sonogram appointment that is scheduled tomorrow for her left leg. Lexi states that Mariely woke up this morning with a lot of pain on her right leg as well as redness and swelling, she would like to know if she should get a sonogram on her right leg as well and wants to see if she can take anything for pain and to reduce the swelling.    3. Patient approves office to leave a detailed voicemail/Transmithart message: yes

## 2022-06-09 ENCOUNTER — HOSPITAL ENCOUNTER (OUTPATIENT)
Dept: RADIOLOGY | Facility: MEDICAL CENTER | Age: 82
End: 2022-06-09
Attending: NURSE PRACTITIONER
Payer: MEDICARE

## 2022-06-09 DIAGNOSIS — M79.89 PAIN AND SWELLING OF LEFT LOWER EXTREMITY: ICD-10-CM

## 2022-06-09 DIAGNOSIS — M79.605 PAIN AND SWELLING OF LEFT LOWER EXTREMITY: ICD-10-CM

## 2022-06-09 DIAGNOSIS — Z79.1 PATIENT TAKES NSAID (NON-STEROID ANTI-INFLAMMATORY DRUG): ICD-10-CM

## 2022-06-09 PROCEDURE — 76882 US LMTD JT/FCL EVL NVASC XTR: CPT | Mod: LT

## 2022-06-09 RX ORDER — FAMOTIDINE 20 MG/1
20 TABLET, FILM COATED ORAL 2 TIMES DAILY
Qty: 30 TABLET | Refills: 0 | Status: SHIPPED | OUTPATIENT
Start: 2022-06-09 | End: 2022-06-20

## 2022-06-09 NOTE — TELEPHONE ENCOUNTER
"Phone Number Called: [phone] (home)     Call outcome: Spoke to patient regarding message below.    Message: Spoke to patient regarding her right leg swelling. Patient reports swelling, redness, pain, and a \"lump\" in the right leg.  Advised patient to go to the ER for further evaluation of possible dvt.  Patient was agreeable.  "

## 2022-06-10 ENCOUNTER — APPOINTMENT (OUTPATIENT)
Dept: RADIOLOGY | Facility: MEDICAL CENTER | Age: 82
End: 2022-06-10
Attending: EMERGENCY MEDICINE
Payer: MEDICARE

## 2022-06-10 ENCOUNTER — HOSPITAL ENCOUNTER (EMERGENCY)
Facility: MEDICAL CENTER | Age: 82
End: 2022-06-10
Attending: EMERGENCY MEDICINE
Payer: MEDICARE

## 2022-06-10 VITALS
BODY MASS INDEX: 27.26 KG/M2 | OXYGEN SATURATION: 96 % | SYSTOLIC BLOOD PRESSURE: 131 MMHG | TEMPERATURE: 97.4 F | HEIGHT: 62 IN | WEIGHT: 148.15 LBS | DIASTOLIC BLOOD PRESSURE: 60 MMHG | HEART RATE: 64 BPM | RESPIRATION RATE: 18 BRPM

## 2022-06-10 DIAGNOSIS — Z71.1 FEARED CONDITION NOT DEMONSTRATED: ICD-10-CM

## 2022-06-10 DIAGNOSIS — L03.115 CELLULITIS OF RIGHT LOWER EXTREMITY: ICD-10-CM

## 2022-06-10 LAB
ALBUMIN SERPL BCP-MCNC: 4.1 G/DL (ref 3.2–4.9)
ALBUMIN/GLOB SERPL: 1.5 G/DL
ALP SERPL-CCNC: 126 U/L (ref 30–99)
ALT SERPL-CCNC: 11 U/L (ref 2–50)
ANION GAP SERPL CALC-SCNC: 14 MMOL/L (ref 7–16)
AST SERPL-CCNC: 12 U/L (ref 12–45)
BASOPHILS # BLD AUTO: 0.5 % (ref 0–1.8)
BASOPHILS # BLD: 0.05 K/UL (ref 0–0.12)
BILIRUB SERPL-MCNC: 0.5 MG/DL (ref 0.1–1.5)
BUN SERPL-MCNC: 15 MG/DL (ref 8–22)
CALCIUM SERPL-MCNC: 8.8 MG/DL (ref 8.5–10.5)
CHLORIDE SERPL-SCNC: 95 MMOL/L (ref 96–112)
CO2 SERPL-SCNC: 22 MMOL/L (ref 20–33)
CREAT SERPL-MCNC: 0.88 MG/DL (ref 0.5–1.4)
EOSINOPHIL # BLD AUTO: 0.37 K/UL (ref 0–0.51)
EOSINOPHIL NFR BLD: 3.6 % (ref 0–6.9)
ERYTHROCYTE [DISTWIDTH] IN BLOOD BY AUTOMATED COUNT: 55.1 FL (ref 35.9–50)
GFR SERPLBLD CREATININE-BSD FMLA CKD-EPI: 66 ML/MIN/1.73 M 2
GLOBULIN SER CALC-MCNC: 2.7 G/DL (ref 1.9–3.5)
GLUCOSE SERPL-MCNC: 116 MG/DL (ref 65–99)
HCT VFR BLD AUTO: 35.8 % (ref 37–47)
HGB BLD-MCNC: 11.6 G/DL (ref 12–16)
IMM GRANULOCYTES # BLD AUTO: 0.17 K/UL (ref 0–0.11)
IMM GRANULOCYTES NFR BLD AUTO: 1.6 % (ref 0–0.9)
LYMPHOCYTES # BLD AUTO: 1.01 K/UL (ref 1–4.8)
LYMPHOCYTES NFR BLD: 9.8 % (ref 22–41)
MCH RBC QN AUTO: 28.9 PG (ref 27–33)
MCHC RBC AUTO-ENTMCNC: 32.4 G/DL (ref 33.6–35)
MCV RBC AUTO: 89.3 FL (ref 81.4–97.8)
MONOCYTES # BLD AUTO: 0.54 K/UL (ref 0–0.85)
MONOCYTES NFR BLD AUTO: 5.2 % (ref 0–13.4)
NEUTROPHILS # BLD AUTO: 8.19 K/UL (ref 2–7.15)
NEUTROPHILS NFR BLD: 79.3 % (ref 44–72)
NRBC # BLD AUTO: 0 K/UL
NRBC BLD-RTO: 0 /100 WBC
PLATELET # BLD AUTO: 268 K/UL (ref 164–446)
PMV BLD AUTO: 11.3 FL (ref 9–12.9)
POTASSIUM SERPL-SCNC: 4.1 MMOL/L (ref 3.6–5.5)
PROT SERPL-MCNC: 6.8 G/DL (ref 6–8.2)
RBC # BLD AUTO: 4.01 M/UL (ref 4.2–5.4)
SODIUM SERPL-SCNC: 131 MMOL/L (ref 135–145)
WBC # BLD AUTO: 10.3 K/UL (ref 4.8–10.8)

## 2022-06-10 PROCEDURE — 85025 COMPLETE CBC W/AUTO DIFF WBC: CPT

## 2022-06-10 PROCEDURE — A9270 NON-COVERED ITEM OR SERVICE: HCPCS | Performed by: EMERGENCY MEDICINE

## 2022-06-10 PROCEDURE — 93971 EXTREMITY STUDY: CPT | Mod: RT

## 2022-06-10 PROCEDURE — 36415 COLL VENOUS BLD VENIPUNCTURE: CPT

## 2022-06-10 PROCEDURE — 99284 EMERGENCY DEPT VISIT MOD MDM: CPT

## 2022-06-10 PROCEDURE — 700102 HCHG RX REV CODE 250 W/ 637 OVERRIDE(OP): Performed by: EMERGENCY MEDICINE

## 2022-06-10 PROCEDURE — 80053 COMPREHEN METABOLIC PANEL: CPT

## 2022-06-10 RX ORDER — SULFAMETHOXAZOLE AND TRIMETHOPRIM 800; 160 MG/1; MG/1
1 TABLET ORAL ONCE
Status: COMPLETED | OUTPATIENT
Start: 2022-06-10 | End: 2022-06-10

## 2022-06-10 RX ORDER — SULFAMETHOXAZOLE AND TRIMETHOPRIM 800; 160 MG/1; MG/1
1 TABLET ORAL 2 TIMES DAILY
Qty: 14 TABLET | Refills: 0 | Status: SHIPPED | OUTPATIENT
Start: 2022-06-10 | End: 2022-06-17

## 2022-06-10 RX ADMIN — SULFAMETHOXAZOLE AND TRIMETHOPRIM 1 TABLET: 800; 160 TABLET ORAL at 13:09

## 2022-06-10 ASSESSMENT — PAIN DESCRIPTION - PAIN TYPE: TYPE: ACUTE PAIN

## 2022-06-10 ASSESSMENT — LIFESTYLE VARIABLES: DO YOU DRINK ALCOHOL: NO

## 2022-06-10 ASSESSMENT — FIBROSIS 4 INDEX: FIB4 SCORE: 1.04

## 2022-06-10 NOTE — ED TRIAGE NOTES
Mariely Easley  82 y.o. female  Chief Complaint   Patient presents with   • Leg Pain     Pt has right leg swelling with redness and pain. Pt was at a radiology appointment yesterday and they recommended she come to the ER for evaluation of  a possible blood clot.        Vitals:    06/10/22 0959   BP: (!) 140/66   Pulse: 70   Resp: 17   Temp: 36.3 °C (97.3 °F)   SpO2: 98%       Patient educated on triage process and encouraged to alert staff of any changes in condition.    Pt states she has had similar symptoms on her left leg and her PCP has sent her for US outpatient. Pt states the process has been long and they believe they ruled out a blood clot in that leg, but now the left leg has similar symtpoms and the pain is unbearable.     Upon assessment the right leg is hot to the touch, bright red and with tight turgor. Pt does not take any anticoagulants at home.

## 2022-06-10 NOTE — ED NOTES
Discharge instructions given to pt. Prescriptions sent to pt's pharmacy. Pt educated, verbalizes understanding. All belongings accounted for. Pt ambulated out of ED with steady gait to go home with family at side.

## 2022-06-10 NOTE — ED PROVIDER NOTES
ED Provider Note    ED Provider Note    Primary care provider: Luz Vieira D.O.  Means of arrival: Walk-in  History obtained from: Patient    CHIEF COMPLAINT  Chief Complaint   Patient presents with   • Leg Pain     Pt has right leg swelling with redness and pain. Pt was at a radiology appointment yesterday and they recommended she come to the ER for evaluation of  a possible blood clot.      Seen at 11:35 AM.   HPI  Mariely Easley is a 82 y.o. female who presents to the Emergency Department with right leg pain.  The patient has had left lower extremity pain for the past month, she has had 2 ultrasounds to evaluate this, 1 was most recent as yesterday.  The patient developed some right lower leg pain over the past 24 hours.  She was told at the ultrasound appointment that she should go to the emergency department for evaluation of this and that they could not add a right lower extremity ultrasound yesterday.  She denies any trauma to the right leg, she states that because the left leg has been hurting she has been favoring the right leg a little bit more.  She also reports getting severe charley horses intermittently that cause some pain.    She denies any recent fevers, chills, chest pain, shortness of breath, abdominal pain, nausea, vomiting, numbness or focal weakness.  No prior history of DVT or pulmonary embolus.  She does take 81 mg of aspirin daily.  She denies any trauma to the affected leg.    REVIEW OF SYSTEMS  See HPI,   Remainder of ROS negative.     PAST MEDICAL HISTORY   has a past medical history of Hypertension.    SURGICAL HISTORY  patient denies any surgical history    SOCIAL HISTORY  Social History     Tobacco Use   • Smoking status: Former Smoker     Packs/day: 0.00     Quit date: 1979     Years since quittin.9   • Smokeless tobacco: Never Used   Vaping Use   • Vaping Use: Never used   Substance Use Topics   • Alcohol use: Yes     Alcohol/week: 8.4 oz     Types: 14 Glasses of  "wine per week   • Drug use: No      Social History     Substance and Sexual Activity   Drug Use No       FAMILY HISTORY  History reviewed. No pertinent family history.    CURRENT MEDICATIONS  Reviewed.  See Encounter Summary.     ALLERGIES  No Known Allergies    PHYSICAL EXAM  VITAL SIGNS: /60   Pulse 64   Temp 36.3 °C (97.4 °F) (Temporal)   Resp 18   Ht 1.575 m (5' 2\")   Wt 67.2 kg (148 lb 2.4 oz)   SpO2 96%   BMI 27.10 kg/m²   Constitutional: Awake, alert in no apparent distress.  HENT: Normocephalic, Bilateral external ears normal. Nose normal.   Eyes: Conjunctiva normal, non-icteric, EOMI.    Thorax & Lungs: Easy unlabored respirations, Clear to ascultation bilaterally.  Cardiovascular: Regular rate, Regular rhythm, No murmurs, rubs or gallops. Bilateral pulses symmetrical.   Abdomen:  Soft, nontender, nondistended, normal active bowel sounds.   :    Skin: Visualized skin is  Dry, No erythema, No rash.   Musculoskeletal:   No cyanosis, clubbing or edema. No leg asymmetry.  Right lower leg: There is some firmness, slight increased warmth and mild ill-defined erythema over the medial aspect of the mid lower leg.  Neurologic: Alert, Grossly non-focal.  Normal speech, stance and gait.  Psychiatric: Normal affect, Normal mood  Lymphatic:  No cervical LAD        RADIOLOGY  US-EXTREMITY VENOUS LOWER UNILAT RIGHT   Final Result            COURSE & MEDICAL DECISION MAKING  Pertinent Labs & Imaging studies reviewed. (See chart for details)    Differential diagnoses include but are not limited to: Hematoma, infected hematoma, cellulitis, less likely DVT    11:35 AM - Medical record reviewed, patient has had 2 ultrasounds of the left lower extremity that have been negative for DVT, most recently was completed yesterday.    Decision Making:  This is a pleasant 82 y.o. year old female who presents with subacute left calf pain has been evaluated serial ultrasounds presents with right calf pain, swelling and " redness.  Differential as above.  Ultrasound negative for DVT, no hypoechoic region concerning for abscess or hematoma was identified either.  There is some mild interstitial edema noted on ultrasound.  On exam it is possible this is early cellulitis, there is a woody texture to the skin with some overlying erythema and some questionable streaking proximally.  She has no leukocytosis or leftward shift, she is afebrile.  This would not explain why the patient has had recurrent left leg pain.    Plan to treat with a week of Bactrim.  The patient was given the first dose in the emergency department.  If this is infectious, she should note improvement after about 48 hours.  If she has any worsening of symptoms recommend repeat evaluation in the ER.    Discharge Medications:  Discharge Medication List as of 6/10/2022  1:12 PM      START taking these medications    Details   sulfamethoxazole-trimethoprim (BACTRIM DS) 800-160 MG tablet Take 1 Tablet by mouth 2 times a day for 7 days., Disp-14 Tablet, R-0, Normal             The patient was discharged home (see d/c instructions) was told to return immediately for any signs or symptoms listed, or any worsening at all.  The patient verbally agreed to the discharge precautions and follow-up plan which is documented in EPIC.        FINAL IMPRESSION  1. Cellulitis of right lower extremity    2. Feared condition not demonstrated

## 2022-06-10 NOTE — ED NOTES
Patient from lobby to [facility] ambulatory with steady gait accompanied by ED volunteer and family. Chart up for ERP.

## 2022-06-20 ENCOUNTER — OFFICE VISIT (OUTPATIENT)
Dept: URGENT CARE | Facility: PHYSICIAN GROUP | Age: 82
End: 2022-06-20
Payer: MEDICARE

## 2022-06-20 VITALS
OXYGEN SATURATION: 96 % | SYSTOLIC BLOOD PRESSURE: 138 MMHG | BODY MASS INDEX: 26.54 KG/M2 | TEMPERATURE: 98.3 F | HEIGHT: 62 IN | DIASTOLIC BLOOD PRESSURE: 70 MMHG | HEART RATE: 83 BPM | WEIGHT: 144.2 LBS

## 2022-06-20 DIAGNOSIS — M79.605 LEG PAIN, BILATERAL: ICD-10-CM

## 2022-06-20 DIAGNOSIS — M79.604 LEG PAIN, BILATERAL: ICD-10-CM

## 2022-06-20 DIAGNOSIS — L03.115 CELLULITIS OF RIGHT LOWER EXTREMITY: ICD-10-CM

## 2022-06-20 PROCEDURE — 99214 OFFICE O/P EST MOD 30 MIN: CPT

## 2022-06-20 RX ORDER — DOXYCYCLINE HYCLATE 100 MG
100 TABLET ORAL 2 TIMES DAILY
Qty: 14 TABLET | Refills: 0 | Status: SHIPPED | OUTPATIENT
Start: 2022-06-20 | End: 2022-06-27

## 2022-06-20 RX ORDER — TRAMADOL HYDROCHLORIDE 50 MG/1
50 TABLET ORAL 2 TIMES DAILY PRN
Qty: 14 TABLET | Refills: 0 | Status: SHIPPED | OUTPATIENT
Start: 2022-06-20 | End: 2022-06-27

## 2022-06-20 RX ORDER — NALOXONE HYDROCHLORIDE 4 MG/.1ML
SPRAY NASAL
Qty: 1 EACH | Refills: 0 | Status: SHIPPED | OUTPATIENT
Start: 2022-06-20 | End: 2022-09-05

## 2022-06-20 ASSESSMENT — ENCOUNTER SYMPTOMS
DIZZINESS: 0
PALPITATIONS: 0
SHORTNESS OF BREATH: 0
VOMITING: 0
HEADACHES: 0
CHILLS: 0
FEVER: 0
COUGH: 0
NAUSEA: 0
SPUTUM PRODUCTION: 0

## 2022-06-20 ASSESSMENT — FIBROSIS 4 INDEX: FIB4 SCORE: 1.08

## 2022-06-20 NOTE — PROGRESS NOTES
Subjective     Mariely Easley is a 82 y.o. female who presents with Leg Pain (R leg pain x2 months. ER on 6/10 was given antibiotics that did not help it's gotten worse. Was diagnosed with Cellulitis of R lower extremity)            HPI     Patient presents today with symptoms that started 2 months ago.  Patient reports pain and swelling in both legs, with right worse.  She describes pain as achy to sharp, 9 out of 10 at its worst, aggravated by walking and movement, relieved by rest.  Patient reports taking ibuprofen with no relief.  Patient further reports she cannot take too much ibuprofen due to her kidney function.  Patient was seen at the ER on 6/10/2022.  Ultrasound of both lower extremities were completed which did not show any DVT, abscess, or hematoma.  Patient is prescribed Bactrim for a week.  Patient reports she has not had any improvement since.  She has also not been able to see her PCP, with her next appointment scheduled in August.  Patient denies any chest pain, shortness of breath.      Patient's current problem list, medications, and past medical/surgical history were reviewed in Epic.    PMH:  has a past medical history of Hypertension.  MEDS:   Current Outpatient Medications:   •  metoprolol tartrate (LOPRESSOR) 25 MG Tab, TAKE 1 TABLET BY MOUTH TWICE A DAY, Disp: 180 Tablet, Rfl: 3  •  diazepam (VALIUM) 10 MG tablet, Take 0.5-1 Tablets by mouth every 12 hours as needed (muscle spasm) for up to 90 days., Disp: 30 Tablet, Rfl: 0  •  losartan (COZAAR) 50 MG Tab, TAKE 1 TABLET BY MOUTH EVERY DAY, Disp: 100 Tablet, Rfl: 3  •  furosemide (LASIX) 20 MG Tab, TAKE 1 TABLET BY MOUTH TWICE A DAY, Disp: 180 Tablet, Rfl: 2  •  cetirizine (ZYRTEC) 10 MG Tab, Take 1 Tablet by mouth every day., Disp: 90 Tablet, Rfl: 3  •  simvastatin (ZOCOR) 10 MG Tab, Take 1 Tablet by mouth every evening. *Appointment needed for additional refills*, Disp: 100 Tablet, Rfl: 3  •  fluticasone (FLONASE) 50 MCG/ACT nasal  "spray, Administer 1 Spray into affected nostril(S) every day. *Follow up needed for additional refills*, Disp: 48 mL, Rfl: 3  •  levothyroxine (SYNTHROID) 50 MCG Tab, Take 1 Tab by mouth Every morning on an empty stomach., Disp: 90 Tab, Rfl: 3  •  aspirin EC (ECOTRIN) 81 MG Tablet Delayed Response, Take 81 mg by mouth every day., Disp: , Rfl:   ALLERGIES: No Known Allergies  SURGHX: No past surgical history on file.  SOCHX:  reports that she quit smoking about 42 years ago. She smoked 0.00 packs per day. She has never used smokeless tobacco. She reports current alcohol use of about 8.4 oz of alcohol per week. She reports that she does not use drugs.  FH: Reviewed with patient, not pertinent to this visit.       Review of Systems   Constitutional: Positive for malaise/fatigue. Negative for chills and fever.   Respiratory: Negative for cough, sputum production and shortness of breath.    Cardiovascular: Negative for chest pain and palpitations.   Gastrointestinal: Negative for nausea and vomiting.   Neurological: Negative for dizziness and headaches.              Objective     /70 (BP Location: Right arm, Patient Position: Sitting, BP Cuff Size: Adult)   Pulse 83   Temp 36.8 °C (98.3 °F) (Temporal)   Ht 1.575 m (5' 2\")   Wt 65.4 kg (144 lb 3.2 oz)   SpO2 96%   BMI 26.37 kg/m²      Physical Exam  Constitutional:       Appearance: Normal appearance.   HENT:      Head: Normocephalic.      Nose: Nose normal.   Eyes:      Extraocular Movements: Extraocular movements intact.   Cardiovascular:      Rate and Rhythm: Normal rate and regular rhythm.      Pulses: Normal pulses.      Heart sounds: Normal heart sounds.   Pulmonary:      Effort: Pulmonary effort is normal.      Breath sounds: Normal breath sounds.   Musculoskeletal:         General: Swelling and tenderness present. Normal range of motion.      Cervical back: Normal range of motion.        Legs:       Comments: Erythema on the anterior portion of the " right lower leg.  Left calf swollen and tender to deep palpation.   Skin:     General: Skin is warm and dry.   Neurological:      General: No focal deficit present.      Mental Status: She is alert.   Psychiatric:         Mood and Affect: Mood normal.         Behavior: Behavior normal.         Judgment: Judgment normal.         Assessment & Plan        1. Cellulitis of right lower extremity    - doxycycline (VIBRAMYCIN) 100 MG Tab; Take 1 Tablet by mouth 2 times a day for 7 days.  Dispense: 14 Tablet; Refill: 0    2. Leg pain, bilateral    - traMADol (ULTRAM) 50 MG Tab; Take 1 Tablet by mouth 2 times a day as needed for Moderate Pain or Severe Pain for up to 7 days.  Dispense: 14 Tablet; Refill: 0  - Naloxone (NARCAN) 4 MG/0.1ML Liquid; One spray in one nostril for overdose and call 911.  Dispense: 1 Each; Refill: 0  - Consent for Opiate Prescription    Discussed differentials with patient, DVT versus cellulitis, with the latter being more likely.  Patient is prescribed doxycycline twice daily for 7 days.  She is also given tramadol twice daily as needed for pain relief.  Patient was instructed to follow-up with PCP ASAP.  Patient instructed to go to the ER if no symptom improvement within 48 hours of taking antibiotics. .Discussed treatment plan with patient, she is agreeable and verbalized understanding.  Educated patient on signs and symptoms watch out for, when to return to the clinic or go to the ER.    Differential diagnoses, supportive care, and indications for immediate follow-up discussed with patient.  Pathogenesis of diagnosis discussed including typical length and natural progression.     Instructed to return to clinic or nearest emergency department for any change in condition, further concerns, or worsening of symptoms.    Electronically Signed by ED Falk

## 2022-06-26 ENCOUNTER — TELEPHONE (OUTPATIENT)
Dept: MEDICAL GROUP | Facility: PHYSICIAN GROUP | Age: 82
End: 2022-06-26
Payer: MEDICARE

## 2022-06-26 NOTE — TELEPHONE ENCOUNTER
ESTABLISHED PATIENT PRE-VISIT PLANNING     Patient was NOT contacted to complete PVP.     Note: Patient will not be contacted if there is no indication to call.     1.  Reviewed notes from the last few office visits within the medical group: Yes    2.  If any orders were placed at last visit or intended to be done for this visit (i.e. 6 mos follow-up), do we have Results/Consult Notes?         •  Labs - Labs ordered, completed on 5/18/22 and results are in chart.  Note: If patient appointment is for lab review and patient did not complete labs, check with provider if OK to reschedule patient until labs completed.       •  Imaging - Imaging was not ordered at last office visit.       •  Referrals - No referrals were ordered at last office visit.    3. Is this appointment scheduled as a Hospital Follow-Up? No    4.  Immunizations were updated in Epic using Reconcile Outside Information activity? Yes    5.  Patient is due for the following Health Maintenance Topics:   Health Maintenance Due   Topic Date Due   • IMM DTaP/Tdap/Td Vaccine (1 - Tdap) Never done   • IMM ZOSTER VACCINES (1 of 2) Never done   • BONE DENSITY  Never done       6.  AHA (Pulse8) form printed for Provider? Yes

## 2022-06-28 ENCOUNTER — HOSPITAL ENCOUNTER (OUTPATIENT)
Dept: LAB | Facility: MEDICAL CENTER | Age: 82
End: 2022-06-28
Attending: STUDENT IN AN ORGANIZED HEALTH CARE EDUCATION/TRAINING PROGRAM
Payer: MEDICARE

## 2022-06-28 ENCOUNTER — OFFICE VISIT (OUTPATIENT)
Dept: MEDICAL GROUP | Facility: PHYSICIAN GROUP | Age: 82
End: 2022-06-28
Payer: MEDICARE

## 2022-06-28 VITALS
WEIGHT: 139 LBS | OXYGEN SATURATION: 98 % | TEMPERATURE: 97.3 F | HEIGHT: 62 IN | HEART RATE: 66 BPM | BODY MASS INDEX: 25.58 KG/M2 | DIASTOLIC BLOOD PRESSURE: 60 MMHG | SYSTOLIC BLOOD PRESSURE: 140 MMHG

## 2022-06-28 DIAGNOSIS — L03.115 CELLULITIS OF RIGHT LOWER EXTREMITY: ICD-10-CM

## 2022-06-28 DIAGNOSIS — E87.1 HYPONATREMIA: ICD-10-CM

## 2022-06-28 DIAGNOSIS — E78.2 MIXED HYPERLIPIDEMIA: ICD-10-CM

## 2022-06-28 DIAGNOSIS — E03.9 ACQUIRED HYPOTHYROIDISM: ICD-10-CM

## 2022-06-28 DIAGNOSIS — D64.9 ANEMIA, UNSPECIFIED TYPE: ICD-10-CM

## 2022-06-28 DIAGNOSIS — I83.813 VARICOSE VEINS OF BOTH LOWER EXTREMITIES WITH PAIN: ICD-10-CM

## 2022-06-28 DIAGNOSIS — R60.0 EDEMA OF RIGHT LOWER EXTREMITY: ICD-10-CM

## 2022-06-28 PROBLEM — M79.605 PAIN AND SWELLING OF LEFT LOWER EXTREMITY: Status: RESOLVED | Noted: 2022-05-18 | Resolved: 2022-06-28

## 2022-06-28 PROBLEM — M79.89 PAIN AND SWELLING OF LEFT LOWER EXTREMITY: Status: RESOLVED | Noted: 2022-05-18 | Resolved: 2022-06-28

## 2022-06-28 LAB
ALBUMIN SERPL BCP-MCNC: 4.3 G/DL (ref 3.2–4.9)
ALBUMIN/GLOB SERPL: 1.7 G/DL
ALP SERPL-CCNC: 123 U/L (ref 30–99)
ALT SERPL-CCNC: 14 U/L (ref 2–50)
ANION GAP SERPL CALC-SCNC: 13 MMOL/L (ref 7–16)
AST SERPL-CCNC: 14 U/L (ref 12–45)
BASOPHILS # BLD AUTO: 1.1 % (ref 0–1.8)
BASOPHILS # BLD: 0.11 K/UL (ref 0–0.12)
BILIRUB SERPL-MCNC: 0.4 MG/DL (ref 0.1–1.5)
BUN SERPL-MCNC: 40 MG/DL (ref 8–22)
CALCIUM SERPL-MCNC: 9.3 MG/DL (ref 8.5–10.5)
CHLORIDE SERPL-SCNC: 98 MMOL/L (ref 96–112)
CHOLEST SERPL-MCNC: 101 MG/DL (ref 100–199)
CO2 SERPL-SCNC: 22 MMOL/L (ref 20–33)
CREAT SERPL-MCNC: 1.14 MG/DL (ref 0.5–1.4)
CRP SERPL HS-MCNC: 2.63 MG/DL (ref 0–0.75)
EOSINOPHIL # BLD AUTO: 0.53 K/UL (ref 0–0.51)
EOSINOPHIL NFR BLD: 5.2 % (ref 0–6.9)
ERYTHROCYTE [DISTWIDTH] IN BLOOD BY AUTOMATED COUNT: 58.5 FL (ref 35.9–50)
FERRITIN SERPL-MCNC: 261 NG/ML (ref 10–291)
FOLATE SERPL-MCNC: 2.9 NG/ML
GFR SERPLBLD CREATININE-BSD FMLA CKD-EPI: 49 ML/MIN/1.73 M 2
GLOBULIN SER CALC-MCNC: 2.6 G/DL (ref 1.9–3.5)
GLUCOSE SERPL-MCNC: 121 MG/DL (ref 65–99)
HCT VFR BLD AUTO: 37.5 % (ref 37–47)
HDLC SERPL-MCNC: 59 MG/DL
HGB BLD-MCNC: 11.9 G/DL (ref 12–16)
IMM GRANULOCYTES # BLD AUTO: 0.16 K/UL (ref 0–0.11)
IMM GRANULOCYTES NFR BLD AUTO: 1.6 % (ref 0–0.9)
IRON SATN MFR SERPL: 24 % (ref 15–55)
IRON SERPL-MCNC: 52 UG/DL (ref 40–170)
LDLC SERPL CALC-MCNC: 25 MG/DL
LYMPHOCYTES # BLD AUTO: 1.26 K/UL (ref 1–4.8)
LYMPHOCYTES NFR BLD: 12.3 % (ref 22–41)
MCH RBC QN AUTO: 28.9 PG (ref 27–33)
MCHC RBC AUTO-ENTMCNC: 31.7 G/DL (ref 33.6–35)
MCV RBC AUTO: 91 FL (ref 81.4–97.8)
MONOCYTES # BLD AUTO: 0.65 K/UL (ref 0–0.85)
MONOCYTES NFR BLD AUTO: 6.3 % (ref 0–13.4)
NEUTROPHILS # BLD AUTO: 7.54 K/UL (ref 2–7.15)
NEUTROPHILS NFR BLD: 73.5 % (ref 44–72)
NRBC # BLD AUTO: 0 K/UL
NRBC BLD-RTO: 0 /100 WBC
PLATELET # BLD AUTO: 279 K/UL (ref 164–446)
PMV BLD AUTO: 12.4 FL (ref 9–12.9)
POTASSIUM SERPL-SCNC: 4.2 MMOL/L (ref 3.6–5.5)
PROT SERPL-MCNC: 6.9 G/DL (ref 6–8.2)
RBC # BLD AUTO: 4.12 M/UL (ref 4.2–5.4)
SODIUM SERPL-SCNC: 133 MMOL/L (ref 135–145)
TIBC SERPL-MCNC: 216 UG/DL (ref 250–450)
TRIGL SERPL-MCNC: 86 MG/DL (ref 0–149)
TSH SERPL DL<=0.005 MIU/L-ACNC: 1.37 UIU/ML (ref 0.38–5.33)
UIBC SERPL-MCNC: 164 UG/DL (ref 110–370)
VIT B12 SERPL-MCNC: 3227 PG/ML (ref 211–911)
WBC # BLD AUTO: 10.3 K/UL (ref 4.8–10.8)

## 2022-06-28 PROCEDURE — 82746 ASSAY OF FOLIC ACID SERUM: CPT

## 2022-06-28 PROCEDURE — 83550 IRON BINDING TEST: CPT

## 2022-06-28 PROCEDURE — 86140 C-REACTIVE PROTEIN: CPT

## 2022-06-28 PROCEDURE — 84443 ASSAY THYROID STIM HORMONE: CPT

## 2022-06-28 PROCEDURE — 36415 COLL VENOUS BLD VENIPUNCTURE: CPT

## 2022-06-28 PROCEDURE — 82607 VITAMIN B-12: CPT

## 2022-06-28 PROCEDURE — 80053 COMPREHEN METABOLIC PANEL: CPT

## 2022-06-28 PROCEDURE — 85025 COMPLETE CBC W/AUTO DIFF WBC: CPT

## 2022-06-28 PROCEDURE — 99214 OFFICE O/P EST MOD 30 MIN: CPT | Performed by: STUDENT IN AN ORGANIZED HEALTH CARE EDUCATION/TRAINING PROGRAM

## 2022-06-28 PROCEDURE — 83540 ASSAY OF IRON: CPT

## 2022-06-28 PROCEDURE — 82728 ASSAY OF FERRITIN: CPT

## 2022-06-28 PROCEDURE — 80061 LIPID PANEL: CPT

## 2022-06-28 RX ORDER — AMOXICILLIN 875 MG/1
875 TABLET, COATED ORAL EVERY 12 HOURS
Qty: 14 TABLET | Refills: 0 | Status: SHIPPED | OUTPATIENT
Start: 2022-06-28 | End: 2022-07-05

## 2022-06-28 RX ORDER — DOXYCYCLINE HYCLATE 100 MG
100 TABLET ORAL EVERY 12 HOURS
Qty: 14 TABLET | Refills: 0 | Status: SHIPPED | OUTPATIENT
Start: 2022-06-28 | End: 2022-07-05

## 2022-06-28 ASSESSMENT — FIBROSIS 4 INDEX: FIB4 SCORE: 1.08

## 2022-06-28 NOTE — PROGRESS NOTES
Subjective:     Chief Complaint   Patient presents with   • Follow-Up     HPI:   Mariely presents today for ER/urgent care follow-up.    Patient has been struggling with that first left-sided lower extremity edema and most recently right-sided lower extremity edema along with erythema and pain.    She reports that she finished the Bactrim that was provided to her by the emergency department for the left leg issues.  Most recently she completed a course of doxycycline which was of some benefit of the right lower leg with reduction of erythema, edema and pain but still continues with the symptoms without fever/chills.  Denies any increased warmth of that area.  Describes the pain as throbbing.  She is wondering if vein problems is contributing.    Current Outpatient Medications Ordered in Epic   Medication Sig Dispense Refill   • doxycycline (VIBRAMYCIN) 100 MG Tab Take 1 Tablet by mouth every 12 hours for 7 days. 14 Tablet 0   • amoxicillin (AMOXIL) 875 MG tablet Take 1 Tablet by mouth every 12 hours for 7 days. 14 Tablet 0   • Naloxone (NARCAN) 4 MG/0.1ML Liquid One spray in one nostril for overdose and call 911. 1 Each 0   • metoprolol tartrate (LOPRESSOR) 25 MG Tab TAKE 1 TABLET BY MOUTH TWICE A  Tablet 3   • diazepam (VALIUM) 10 MG tablet Take 0.5-1 Tablets by mouth every 12 hours as needed (muscle spasm) for up to 90 days. 30 Tablet 0   • losartan (COZAAR) 50 MG Tab TAKE 1 TABLET BY MOUTH EVERY  Tablet 3   • furosemide (LASIX) 20 MG Tab TAKE 1 TABLET BY MOUTH TWICE A  Tablet 2   • cetirizine (ZYRTEC) 10 MG Tab Take 1 Tablet by mouth every day. 90 Tablet 3   • simvastatin (ZOCOR) 10 MG Tab Take 1 Tablet by mouth every evening. *Appointment needed for additional refills* 100 Tablet 3   • fluticasone (FLONASE) 50 MCG/ACT nasal spray Administer 1 Spray into affected nostril(S) every day. *Follow up needed for additional refills* 48 mL 3   • levothyroxine (SYNTHROID) 50 MCG Tab Take 1 Tab by mouth  "Every morning on an empty stomach. 90 Tab 3   • aspirin EC (ECOTRIN) 81 MG Tablet Delayed Response Take 81 mg by mouth every day.       No current [facility]-ordered facility-administered medications on file.     ROS:  Gen: no fevers/chills, no changes in weight  Eyes: no changes in vision  ENT: no sore throat  Pulm: no sob, no cough  CV: no chest pain, no palpitations  GI: no nausea/vomiting, no diarrhea  Skin: no rash  Neuro: no headaches, no numbness/tingling  Heme/Lymph: no easy bruising    Objective:     Exam:  BP (!) 140/60 (BP Location: Right arm, Patient Position: Sitting, BP Cuff Size: Adult)   Pulse 66   Temp 36.3 °C (97.3 °F) (Temporal)   Ht 1.575 m (5' 2\")   Wt 63 kg (139 lb)   SpO2 98%   BMI 25.42 kg/m²  Body mass index is 25.42 kg/m².    Physical Exam:  Constitutional: Well-developed and well-nourished. No acute distress.   Skin: Skin is warm and dry.   Head: Atraumatic without lesions.  Eyes: Conjunctivae and extraocular motions are normal. Pupils are equal, round. No scleral icterus.   Mouth/Throat: Wearing mask  Neck: Supple, trachea midline.   Cardiovascular: Regular rate and rhythm, S1 and S2 without murmur, rubs, or gallops.  Lungs: Normal inspiratory effort, CTA bilaterally, no wheezes/rhonchi/rales  Extremities: No cyanosis, clubbing.  Area of known well demarcated erythema without increased warmth but indurated and a focal location of the medial calf on the right with mild tenderness.  In that area +1 pitting edema.  Varicose veins noted.  Pulses +2 bilateral dorsalis pedis.  Neurological: Alert and oriented x 3. No gross/focal deficits.  Psychiatric:  Behavior, mood, and affect are appropriate.    Assessment & Plan:     82 y.o. female with the following -     1. Cellulitis of right lower extremity  Acute condition.  When she took Bactrim she had no erythema of the right lower extremity and has had some improvement but not complete resolution of findings as above.  We discussed that I am not " entirely certain that this does represent cellulitis given the lack of increased warmth and other systemic symptoms however we agreed to extend her antibiotics and also provided with amoxicillin as well for better strep coverage.  Evaluate this area of concern with ultrasound as below.  Trend CRP. Discussed return precautions.  She has a follow-up with me next week.  - doxycycline (VIBRAMYCIN) 100 MG Tab; Take 1 Tablet by mouth every 12 hours for 7 days.  Dispense: 14 Tablet; Refill: 0  - amoxicillin (AMOXIL) 875 MG tablet; Take 1 Tablet by mouth every 12 hours for 7 days.  Dispense: 14 Tablet; Refill: 0  - Referral to Vascular Surgery  - US-EXTREMITY NON VASCULAR UNILATERAL RIGHT; Future  - CRP QUANTITIVE (NON-CARDIAC); Future    2. Varicose veins of both lower extremities with pain  3. Edema of right lower extremity  Reviewed ultrasounds with patient.  It does appear that she did have a small thrombosis on the left which explains her symptoms on the left which have now resolved-I'm wondering if something similar is occurring on the right. Will evaluate further as above, refer to vascular surgery.  - Referral to Vascular Surgery    4. Hyponatremia  New, states that she has been on furosemide prior to this without any history of hyponatremia.  States that she was given salt tablets but has not started.  Will trend prior to having her start.  - Comp Metabolic Panel; Future    5. Anemia, unspecified type  Mild, new. Trend and evaluate further with labs as below.  - CBC WITH DIFFERENTIAL; Future  - VITAMIN B12; Future  - FOLATE; Future  - IRON/TOTAL IRON BIND; Future  - FERRITIN; Future    6. Mixed hyperlipidemia  Chronic, well controlled aside from slightly elevated triglycerides last year. No changes to current treatment pending repeat labs (ordered early for ease).  - Comp Metabolic Panel; Future  - Lipid Profile; Future    7. Acquired hypothyroidism  Chronic, well controlled. No changes to current treatment pending  labs.  - TSH WITH REFLEX TO FT4; Future    I spent a total of 34 minutes with record review, exam, communication with the patient, and documentation of this encounter.    Return in about 1 week (around 7/5/2022), or if symptoms worsen or fail to improve.    Please note that this dictation was created using voice recognition software. I have made every reasonable attempt to correct obvious errors, but I expect that there are errors of grammar and possibly content that I did not discover before finalizing the note.

## 2022-06-30 ENCOUNTER — HOSPITAL ENCOUNTER (OUTPATIENT)
Dept: RADIOLOGY | Facility: MEDICAL CENTER | Age: 82
End: 2022-06-30
Attending: STUDENT IN AN ORGANIZED HEALTH CARE EDUCATION/TRAINING PROGRAM
Payer: MEDICARE

## 2022-06-30 ENCOUNTER — TELEPHONE (OUTPATIENT)
Dept: MEDICAL GROUP | Facility: PHYSICIAN GROUP | Age: 82
End: 2022-06-30
Payer: MEDICARE

## 2022-06-30 DIAGNOSIS — L03.115 CELLULITIS OF RIGHT LOWER EXTREMITY: ICD-10-CM

## 2022-06-30 PROCEDURE — 76882 US LMTD JT/FCL EVL NVASC XTR: CPT | Mod: RT

## 2022-06-30 NOTE — TELEPHONE ENCOUNTER
----- Message from Luz Vieira D.O. sent at 6/30/2022 12:30 PM PDT -----  It looks like patient's vascular surgery referral went to [facility] and they are waiting on notes.  Other of there is any way to expedite getting her notes faxed over including the most recent ultrasounds.  If you are able to do that please do so.    Please let the patient know that it looks like there is a blood clot in the varicose vein on the right calf.  I don't know if she heard from vascular surgery yet. Please let me know if she has any questions or concerns or needs to be seen sooner.  She does have a follow-up with me on the fifth which I like to make sure she keeps that appointment.

## 2022-06-30 NOTE — TELEPHONE ENCOUNTER
Phone Number Called: [phone] (home)     Call outcome: Spoke to patient regarding message below.    Message: no questions or concerns. Will be coming in for her appt

## 2022-07-01 ENCOUNTER — TELEPHONE (OUTPATIENT)
Dept: MEDICAL GROUP | Facility: PHYSICIAN GROUP | Age: 82
End: 2022-07-01
Payer: MEDICARE

## 2022-07-01 NOTE — TELEPHONE ENCOUNTER
ESTABLISHED PATIENT PRE-VISIT PLANNING     Patient was NOT contacted to complete PVP.     Note: Patient will not be contacted if there is no indication to call.     1.  Reviewed notes from the last few office visits within the medical group: Yes    2.  If any orders were placed at last visit or intended to be done for this visit (i.e. 6 mos follow-up), do we have Results/Consult Notes?         •  Labs - Labs ordered, completed on 6/28/22 and results are in chart.  Note: If patient appointment is for lab review and patient did not complete labs, check with provider if OK to reschedule patient until labs completed.       •  Imaging - Imaging ordered, completed and results are in chart.       •  Referrals - Referral ordered, patient has NOT been seen.    3. Is this appointment scheduled as a Hospital Follow-Up? No    4.  Immunizations were updated in Epic using Reconcile Outside Information activity? Yes    5.  Patient is due for the following Health Maintenance Topics:   Health Maintenance Due   Topic Date Due   • IMM DTaP/Tdap/Td Vaccine (1 - Tdap) Never done   • IMM ZOSTER VACCINES (1 of 2) Never done   • BONE DENSITY  Never done       6.  AHA (Pulse8) form printed for Provider? Yes

## 2022-07-05 ENCOUNTER — OFFICE VISIT (OUTPATIENT)
Dept: MEDICAL GROUP | Facility: PHYSICIAN GROUP | Age: 82
End: 2022-07-05
Payer: MEDICARE

## 2022-07-05 ENCOUNTER — PATIENT OUTREACH (OUTPATIENT)
Dept: HEALTH INFORMATION MANAGEMENT | Facility: OTHER | Age: 82
End: 2022-07-05

## 2022-07-05 VITALS
BODY MASS INDEX: 25.21 KG/M2 | TEMPERATURE: 98.2 F | DIASTOLIC BLOOD PRESSURE: 60 MMHG | SYSTOLIC BLOOD PRESSURE: 126 MMHG | WEIGHT: 137 LBS | OXYGEN SATURATION: 96 % | HEART RATE: 69 BPM | HEIGHT: 62 IN

## 2022-07-05 DIAGNOSIS — I82.811 SUPERFICIAL THROMBOSIS OF LOWER EXTREMITY, RIGHT: ICD-10-CM

## 2022-07-05 DIAGNOSIS — N18.31 STAGE 3A CHRONIC KIDNEY DISEASE: ICD-10-CM

## 2022-07-05 DIAGNOSIS — D64.9 ANEMIA, UNSPECIFIED TYPE: ICD-10-CM

## 2022-07-05 DIAGNOSIS — E87.1 HYPONATREMIA: ICD-10-CM

## 2022-07-05 DIAGNOSIS — I83.813 VARICOSE VEINS OF BOTH LOWER EXTREMITIES WITH PAIN: ICD-10-CM

## 2022-07-05 DIAGNOSIS — E53.8 FOLATE DEFICIENCY: ICD-10-CM

## 2022-07-05 DIAGNOSIS — I10 ESSENTIAL HYPERTENSION: ICD-10-CM

## 2022-07-05 PROCEDURE — 99215 OFFICE O/P EST HI 40 MIN: CPT | Performed by: STUDENT IN AN ORGANIZED HEALTH CARE EDUCATION/TRAINING PROGRAM

## 2022-07-05 RX ORDER — FOLIC ACID 1 MG/1
1 TABLET ORAL DAILY
Qty: 90 TABLET | Refills: 3 | Status: SHIPPED | OUTPATIENT
Start: 2022-07-05 | End: 2023-06-21

## 2022-07-05 ASSESSMENT — FIBROSIS 4 INDEX: FIB4 SCORE: 1.07

## 2022-07-05 NOTE — PROGRESS NOTES
Subjective:     Chief Complaint   Patient presents with   • Follow-Up     HPI:   Mariely presents today for follow-up.    Patient reports improvement from last visit, completed antibiotics.  No fever or chills.  Denies any shortness of breath at rest or chest pain.  No palpitations.  Patient feels that since.  Leg does hurt she feels slightly short of breath related to that.  Pain has improved however.  Using cool compresses help, hasn't been able to wear compression stockings prior. She has not heard from vascular surgery.  Would like to go over her labs.    Current Outpatient Medications Ordered in Epic   Medication Sig Dispense Refill   • folic acid (FOLVITE) 1 MG Tab Take 1 Tablet by mouth every day. 90 Tablet 3   • Naloxone (NARCAN) 4 MG/0.1ML Liquid One spray in one nostril for overdose and call 911. 1 Each 0   • metoprolol tartrate (LOPRESSOR) 25 MG Tab TAKE 1 TABLET BY MOUTH TWICE A  Tablet 3   • diazepam (VALIUM) 10 MG tablet Take 0.5-1 Tablets by mouth every 12 hours as needed (muscle spasm) for up to 90 days. 30 Tablet 0   • losartan (COZAAR) 50 MG Tab TAKE 1 TABLET BY MOUTH EVERY  Tablet 3   • furosemide (LASIX) 20 MG Tab TAKE 1 TABLET BY MOUTH TWICE A  Tablet 2   • cetirizine (ZYRTEC) 10 MG Tab Take 1 Tablet by mouth every day. 90 Tablet 3   • simvastatin (ZOCOR) 10 MG Tab Take 1 Tablet by mouth every evening. *Appointment needed for additional refills* 100 Tablet 3   • fluticasone (FLONASE) 50 MCG/ACT nasal spray Administer 1 Spray into affected nostril(S) every day. *Follow up needed for additional refills* 48 mL 3   • levothyroxine (SYNTHROID) 50 MCG Tab Take 1 Tab by mouth Every morning on an empty stomach. 90 Tab 3   • aspirin EC (ECOTRIN) 81 MG Tablet Delayed Response Take 81 mg by mouth every day.       No current [facility]-ordered facility-administered medications on file.     ROS:  Gen: no fevers/chills, no changes in weight  Eyes: no changes in vision  ENT: no sore throat  Pulm:  "no cough  CV: no chest pain, no palpitations  GI: no nausea/vomiting  Skin: no rash  Neuro: no headaches, no numbness/tingling  Heme/Lymph: no easy bruising    Objective:     Exam:  /60 (BP Location: Left arm, Patient Position: Sitting, BP Cuff Size: Adult)   Pulse 69   Temp 36.8 °C (98.2 °F) (Temporal)   Ht 1.575 m (5' 2\")   Wt 62.1 kg (137 lb)   SpO2 96%   BMI 25.06 kg/m²  Body mass index is 25.06 kg/m².    Physical Exam:  Constitutional: Well-developed and well-nourished. No acute distress.   Skin: Skin is warm and dry. No rash noted.  Head: Atraumatic without lesions.  Eyes: Conjunctivae and extraocular motions are normal. Pupils are equal, round. No scleral icterus.   Mouth/Throat: Wearing mask.  Neck: Supple, trachea midline.   Cardiovascular: Regular rate and rhythm, S1 and S2 without murmur, rubs, or gallops.  Lungs: Normal inspiratory effort, CTA bilaterally, no wheezes/rhonchi/rales  Extremities: No cyanosis, clubbing, erythema.  Area of hyperpigmenation without erythema or increased warmth with induration (improved) with associated trace pitting edema in that location only in medial calf on the right with mild tenderness. Varicose veins noted bilaterally.  Neurological: Alert and oriented x 3. No gross/focal deficits.  Psychiatric:  Behavior, mood, and affect are appropriate.    Labs: Reviewed from 6/28/2022  Imaging: Vascular and soft tissue ultrasound reviewed from May/June 2022    Assessment & Plan:     82 y.o. female with the following -     1. Superficial thrombosis of lower extremity, right  2. Varicose veins of both lower extremities with pain  Acute issues in the setting of chronic varicose veins.  No evidence of infection at this time, appears improved from last visit.  Discussed diagnosis of thrombophlebitis and we reviewed her imaging.  Given that she has improved recommend continuing conservative measures with compression, elevation, warm compress and pain medications (avoiding " anti-inflammatories given CKD).  Discussed that if her symptoms were to progress she would need to follow-up as we would need to repeat her imaging to rule out DVT, understands return precautions.  Unfortunately her referral went to a center that does not deal with this issue so another urgent referral placed to vascular surgery.  - Referral to Vascular Surgery    3. Anemia, unspecified type  Chronic and stable.  May be related to folate deficiency so will replace and trend in 3 months.  - CBC WITH DIFFERENTIAL; Future  - FOLATE; Future  - VITAMIN B12; Future    4. Folate deficiency  Acute, see above.  - folic acid (FOLVITE) 1 MG Tab; Take 1 Tablet by mouth every day.  Dispense: 90 Tablet; Refill: 3  - FOLATE; Future    5. Hyponatremia  Acute but ongoing and improved.  Patient reports that she does not eat often and skips meals which may be related, will trend.  - Basic Metabolic Panel; Future    6. Stage 3a chronic kidney disease (HCC)  This is a chronic condition, encouraged hydration and avoid NSAIDs.  Blood pressure at goal today, continue ARB.  Trend with next labs.  - Basic Metabolic Panel; Future    I spent a total of 40 minutes with record review, exam, communication with the patient, communication with [facility] and documentation of this encounter.    Return in 5 weeks (on 8/9/2022), or if symptoms worsen or fail to improve.    Please note that this dictation was created using voice recognition software. I have made every reasonable attempt to correct obvious errors, but I expect that there are errors of grammar and possibly content that I did not discover before finalizing the note.

## 2022-07-05 NOTE — PROGRESS NOTES
Spoke with pt & daughter. Introduced self and CCM program. Pt agreed to enroll in CCM program. Scheduled intake for 8/9/22 at 1040 in [facility].

## 2022-08-03 DIAGNOSIS — Z76.0 MEDICATION REFILL: ICD-10-CM

## 2022-08-08 RX ORDER — FUROSEMIDE 20 MG/1
20 TABLET ORAL 2 TIMES DAILY
Qty: 180 TABLET | Refills: 0 | Status: SHIPPED | OUTPATIENT
Start: 2022-08-08 | End: 2022-11-09

## 2022-08-09 ENCOUNTER — PATIENT OUTREACH (OUTPATIENT)
Dept: HEALTH INFORMATION MANAGEMENT | Facility: OTHER | Age: 82
End: 2022-08-09

## 2022-08-09 ENCOUNTER — PATIENT MESSAGE (OUTPATIENT)
Dept: HEALTH INFORMATION MANAGEMENT | Facility: OTHER | Age: 82
End: 2022-08-09

## 2022-08-09 ENCOUNTER — OFFICE VISIT (OUTPATIENT)
Dept: MEDICAL GROUP | Facility: PHYSICIAN GROUP | Age: 82
End: 2022-08-09
Payer: MEDICARE

## 2022-08-09 ENCOUNTER — HOSPITAL ENCOUNTER (OUTPATIENT)
Dept: LAB | Facility: MEDICAL CENTER | Age: 82
End: 2022-08-09
Attending: STUDENT IN AN ORGANIZED HEALTH CARE EDUCATION/TRAINING PROGRAM
Payer: MEDICARE

## 2022-08-09 VITALS
TEMPERATURE: 97.1 F | OXYGEN SATURATION: 97 % | HEIGHT: 62 IN | SYSTOLIC BLOOD PRESSURE: 122 MMHG | WEIGHT: 137 LBS | DIASTOLIC BLOOD PRESSURE: 60 MMHG | HEART RATE: 64 BPM | BODY MASS INDEX: 25.21 KG/M2

## 2022-08-09 DIAGNOSIS — Z00.00 MEDICARE ANNUAL WELLNESS VISIT, SUBSEQUENT: Primary | ICD-10-CM

## 2022-08-09 DIAGNOSIS — Z78.0 ENCOUNTER FOR OSTEOPOROSIS SCREENING IN ASYMPTOMATIC POSTMENOPAUSAL PATIENT: ICD-10-CM

## 2022-08-09 DIAGNOSIS — N18.31 STAGE 3A CHRONIC KIDNEY DISEASE: ICD-10-CM

## 2022-08-09 DIAGNOSIS — E87.1 HYPONATREMIA: ICD-10-CM

## 2022-08-09 DIAGNOSIS — Z13.820 ENCOUNTER FOR OSTEOPOROSIS SCREENING IN ASYMPTOMATIC POSTMENOPAUSAL PATIENT: ICD-10-CM

## 2022-08-09 DIAGNOSIS — R25.2 MUSCLE CRAMPING: ICD-10-CM

## 2022-08-09 DIAGNOSIS — N39.3 STRESS INCONTINENCE OF URINE: ICD-10-CM

## 2022-08-09 DIAGNOSIS — I10 ESSENTIAL HYPERTENSION: ICD-10-CM

## 2022-08-09 DIAGNOSIS — I83.813 VARICOSE VEINS OF BOTH LOWER EXTREMITIES WITH PAIN: ICD-10-CM

## 2022-08-09 LAB
ANION GAP SERPL CALC-SCNC: 13 MMOL/L (ref 7–16)
BUN SERPL-MCNC: 17 MG/DL (ref 8–22)
CALCIUM SERPL-MCNC: 9 MG/DL (ref 8.5–10.5)
CHLORIDE SERPL-SCNC: 101 MMOL/L (ref 96–112)
CO2 SERPL-SCNC: 23 MMOL/L (ref 20–33)
CREAT SERPL-MCNC: 0.9 MG/DL (ref 0.5–1.4)
GFR SERPLBLD CREATININE-BSD FMLA CKD-EPI: 64 ML/MIN/1.73 M 2
GLUCOSE SERPL-MCNC: 118 MG/DL (ref 65–99)
MAGNESIUM SERPL-MCNC: 1.8 MG/DL (ref 1.5–2.5)
POTASSIUM SERPL-SCNC: 4.4 MMOL/L (ref 3.6–5.5)
SODIUM SERPL-SCNC: 137 MMOL/L (ref 135–145)

## 2022-08-09 PROCEDURE — 36415 COLL VENOUS BLD VENIPUNCTURE: CPT

## 2022-08-09 PROCEDURE — G0439 PPPS, SUBSEQ VISIT: HCPCS | Performed by: STUDENT IN AN ORGANIZED HEALTH CARE EDUCATION/TRAINING PROGRAM

## 2022-08-09 PROCEDURE — 83735 ASSAY OF MAGNESIUM: CPT

## 2022-08-09 PROCEDURE — 80048 BASIC METABOLIC PNL TOTAL CA: CPT

## 2022-08-09 RX ORDER — RIVAROXABAN 15 MG-20MG
KIT ORAL
COMMUNITY
Start: 2022-07-13 | End: 2022-09-05

## 2022-08-09 SDOH — ECONOMIC STABILITY: HOUSING INSECURITY
IN THE LAST 12 MONTHS, WAS THERE A TIME WHEN YOU DID NOT HAVE A STEADY PLACE TO SLEEP OR SLEPT IN A SHELTER (INCLUDING NOW)?: NO

## 2022-08-09 SDOH — ECONOMIC STABILITY: FOOD INSECURITY: WITHIN THE PAST 12 MONTHS, YOU WORRIED THAT YOUR FOOD WOULD RUN OUT BEFORE YOU GOT MONEY TO BUY MORE.: NEVER TRUE

## 2022-08-09 SDOH — ECONOMIC STABILITY: TRANSPORTATION INSECURITY
IN THE PAST 12 MONTHS, HAS THE LACK OF TRANSPORTATION KEPT YOU FROM MEDICAL APPOINTMENTS OR FROM GETTING MEDICATIONS?: NO

## 2022-08-09 SDOH — ECONOMIC STABILITY: TRANSPORTATION INSECURITY
IN THE PAST 12 MONTHS, HAS LACK OF TRANSPORTATION KEPT YOU FROM MEETINGS, WORK, OR FROM GETTING THINGS NEEDED FOR DAILY LIVING?: NO

## 2022-08-09 SDOH — ECONOMIC STABILITY: INCOME INSECURITY: IN THE LAST 12 MONTHS, WAS THERE A TIME WHEN YOU WERE NOT ABLE TO PAY THE MORTGAGE OR RENT ON TIME?: NO

## 2022-08-09 SDOH — ECONOMIC STABILITY: FOOD INSECURITY: WITHIN THE PAST 12 MONTHS, THE FOOD YOU BOUGHT JUST DIDN'T LAST AND YOU DIDN'T HAVE MONEY TO GET MORE.: NEVER TRUE

## 2022-08-09 SDOH — ECONOMIC STABILITY: HOUSING INSECURITY: IN THE LAST 12 MONTHS, HOW MANY PLACES HAVE YOU LIVED?: 1

## 2022-08-09 SDOH — HEALTH STABILITY: PHYSICAL HEALTH: ON AVERAGE, HOW MANY MINUTES DO YOU ENGAGE IN EXERCISE AT THIS LEVEL?: 0 MIN

## 2022-08-09 SDOH — HEALTH STABILITY: PHYSICAL HEALTH: ON AVERAGE, HOW MANY DAYS PER WEEK DO YOU ENGAGE IN MODERATE TO STRENUOUS EXERCISE (LIKE A BRISK WALK)?: 0 DAYS

## 2022-08-09 ASSESSMENT — LIFESTYLE VARIABLES
AUDIT-C TOTAL SCORE: 3
HOW OFTEN DO YOU HAVE A DRINK CONTAINING ALCOHOL: 2-3 TIMES A WEEK
HOW OFTEN DO YOU HAVE SIX OR MORE DRINKS ON ONE OCCASION: NEVER
HOW MANY STANDARD DRINKS CONTAINING ALCOHOL DO YOU HAVE ON A TYPICAL DAY: 1 OR 2
SKIP TO QUESTIONS 9-10: 1

## 2022-08-09 ASSESSMENT — PATIENT HEALTH QUESTIONNAIRE - PHQ9
CLINICAL INTERPRETATION OF PHQ2 SCORE: 1
SUM OF ALL RESPONSES TO PHQ QUESTIONS 1-9: 3
5. POOR APPETITE OR OVEREATING: 0 - NOT AT ALL

## 2022-08-09 ASSESSMENT — SOCIAL DETERMINANTS OF HEALTH (SDOH)
WITHIN THE LAST YEAR, HAVE YOU BEEN KICKED, HIT, SLAPPED, OR OTHERWISE PHYSICALLY HURT BY YOUR PARTNER OR EX-PARTNER?: NO
HOW OFTEN DO YOU GET TOGETHER WITH FRIENDS OR RELATIVES?: MORE THAN THREE TIMES A WEEK
IN A TYPICAL WEEK, HOW MANY TIMES DO YOU TALK ON THE PHONE WITH FAMILY, FRIENDS, OR NEIGHBORS?: MORE THAN THREE TIMES A WEEK
WITHIN THE LAST YEAR, HAVE YOU BEEN AFRAID OF YOUR PARTNER OR EX-PARTNER?: NO
HOW HARD IS IT FOR YOU TO PAY FOR THE VERY BASICS LIKE FOOD, HOUSING, MEDICAL CARE, AND HEATING?: NOT HARD AT ALL
WITHIN THE LAST YEAR, HAVE YOU BEEN HUMILIATED OR EMOTIONALLY ABUSED IN OTHER WAYS BY YOUR PARTNER OR EX-PARTNER?: NO
WITHIN THE LAST YEAR, HAVE TO BEEN RAPED OR FORCED TO HAVE ANY KIND OF SEXUAL ACTIVITY BY YOUR PARTNER OR EX-PARTNER?: NO

## 2022-08-09 ASSESSMENT — FIBROSIS 4 INDEX: FIB4 SCORE: 1.07

## 2022-08-09 ASSESSMENT — ENCOUNTER SYMPTOMS: GENERAL WELL-BEING: GOOD

## 2022-08-09 ASSESSMENT — ACTIVITIES OF DAILY LIVING (ADL): BATHING_REQUIRES_ASSISTANCE: 0

## 2022-08-09 NOTE — PROGRESS NOTES
8/9/2  [facility] Community Health Worker Intake  RN and CHW completed [facility] enrollment with patient Mariely. Mariely was accompanied by her daughter, Lexi. Mariely reports no financial barriers to housing or food. She lives in a stable home with another daughter. Mariely reports money is tight but denied food resources at this time. Mariely has not been able to drive due to blood clots in legs. Her 3 daughters will rotate transporting her to her medical visits - this has become challenging since 2 daughters are bus drivers for the school district and only able to take certain hours of the day off. CHW educated Mariely on [facility] transportation benefits through [employer]. Provided Mariely [facility] customer service contact information to call and schedule [employer] health transportation when needed. Mariely shares she has been doing heart healthy exercises the best that she can without the use of her legs. CHW provided exercise worksheets to do while seated - chair yoga, senior seated activities. Mariely reports a good understanding of her chronic conditions. Mariely has a strong family connection in town and is happy with her current level of socialization.     • Social determinates of health intake: Completed.   • Identified barriers to: Transportation.  • Contact information provided to Mariely Carrie Kaushal.  • Has PCP appointment scheduled for 10/13/22 with Luz Vieira DO.   • Case Management General Assessment in Epic: Completed.   • Review of care plan goals: Yes.   • Internal Referral to : No.       Action Plan:  CHW will continue to collaborate with RN on how to best support Mariely with her health goals.

## 2022-08-09 NOTE — PATIENT INSTRUCTIONS
Shingles vaccine can be given at the pharmacy.   Tetanus you can come back for when you are ready.  Consider pelvic floor physical therapy for incontinence if needed.  Avoid any alcohol with xarelto.    Labs today and the blood counts prior to next appointment in October    Advance Directive    Advance directives are legal documents that let you make choices ahead of time about your health care and medical treatment in case you become unable to communicate for yourself. Advance directives are a way for you to communicate your wishes to family, friends, and health care providers. This can help convey your decisions about end-of-life care if you become unable to communicate.  Discussing and writing advance directives should happen over time rather than all at once. Advance directives can be changed depending on your situation and what you want, even after you have signed the advance directives.  If you do not have an advance directive, some states assign family decision makers to act on your behalf based on how closely you are related to them. Each state has its own laws regarding advance directives. You may want to check with your health care provider, , or state representative about the laws in your state. There are different types of advance directives, such as:  Medical power of .  Living will.  Do not resuscitate (DNR) or do not attempt resuscitation (DNAR) order.  Health care proxy and medical power of   A health care proxy, also called a health care agent, is a person who is appointed to make medical decisions for you in cases in which you are unable to make the decisions yourself. Generally, people choose someone they know well and trust to represent their preferences. Make sure to ask this person for an agreement to act as your proxy. A proxy may have to exercise judgment in the event of a medical decision for which your wishes are not known.  A medical power of  is a legal  document that names your health care proxy. Depending on the laws in your state, after the document is written, it may also need to be:  Signed.  Notarized.  Dated.  Copied.  Witnessed.  Incorporated into your medical record.  You may also want to appoint someone to manage your financial affairs in a situation in which you are unable to do so. This is called a durable power of  for finances. It is a separate legal document from the durable power of  for health care. You may choose the same person or someone different from your health care proxy to act as your agent in financial matters.  If you do not appoint a proxy, or if there is a concern that the proxy is not acting in your best interests, a court-appointed guardian may be designated to act on your behalf.  Living will  A living will is a set of instructions documenting your wishes about medical care when you cannot express them yourself. Health care providers should keep a copy of your living will in your medical record. You may want to give a copy to family members or friends. To alert caregivers in case of an emergency, you can place a card in your wallet to let them know that you have a living will and where they can find it. A living will is used if you become:  Terminally ill.  Incapacitated.  Unable to communicate or make decisions.  Items to consider in your living will include:  The use or non-use of life-sustaining equipment, such as dialysis machines and breathing machines (ventilators).  A DNR or DNAR order, which is the instruction not to use cardiopulmonary resuscitation (CPR) if breathing or heartbeat stops.  The use or non-use of tube feeding.  Withholding of food and fluids.  Comfort (palliative) care when the goal becomes comfort rather than a cure.  Organ and tissue donation.  A living will does not give instructions for distributing your money and property if you should pass away. It is recommended that you seek the advice of  a  when writing a will. Decisions about taxes, beneficiaries, and asset distribution will be legally binding. This process can relieve your family and friends of any concerns surrounding disputes or questions that may come up about the distribution of your assets.  DNR or DNAR  A DNR or DNAR order is a request not to have CPR in the event that your heart stops beating or you stop breathing. If a DNR or DNAR order has not been made and shared, a health care provider will try to help any patient whose heart has stopped or who has stopped breathing. If you plan to have surgery, talk with your health care provider about how your DNR or DNAR order will be followed if problems occur.  Summary  Advance directives are the legal documents that allow you to make choices ahead of time about your health care and medical treatment in case you become unable to communicate for yourself.  The process of discussing and writing advance directives should happen over time. You can change the advance directives, even after you have signed them.  Advance directives include DNR or DNAR orders, living tay, and designating an agent as your medical power of .  This information is not intended to replace advice given to you by your health care provider. Make sure you discuss any questions you have with your health care provider.  Document Released: 03/26/2009 Document Revised: 01/22/2020 Document Reviewed: 11/06/2017  Elsevier Patient Education © 2020 [employer]

## 2022-08-09 NOTE — PROGRESS NOTES
INITIAL CARE MANAGEMENT CARE PLAN/ASSESEMENT     Mariely is a pleasant, 81yo female that agreed to participate in the Chronic Care Management (CCM) program. Mariely ie eligible for the CCM program with a risk score of three (3) and the diagnoses of Hypertension (HTN) and Chronic Kidney Disease (CKD). Mariely lives with one of her daughters, she has three daughters that live close by, her daughter Natasha was at the visit today and provided input into this assessment. Mariely has had no falls in the last year. Mariely's GFR today was 64, her previous lab work on 6/28/22 showed a GFR of 49. Mariely's blood pressure today was 122/60. Reviewing Mariely's lab work, her recent Triglyceride level was also elevated at 179. Mariely's daughters usually provide transportation to appointments, but are in need of a back-up plan as some of Mariely's specialty appointments can be very long. Mariely has been receiving treatment from [facility] for painful, lower extremity varicose veins, she has a follow-up appointment tomorrow. Mariely has some concerns over how to pay for bills she should be getting soon for the ultrasounds she needed to receive in regards to the varicose veins. Mariely also reported some mild food insecurity, although her daughter reports there is no need for food bank assistance. Mariely scored as inactive and will be provided with appropriate physical activities to meet her needs.         Medication Self-Management Goals:     Reviewed medications listed below with patient.      Current Outpatient Medications:     XARELTO STARTER PACK 15 & 20 MG Tablet Therapy Pack, AS DIRECTED PER PACKAGE INSTRUCTIONS, Disp: , Rfl:     furosemide (LASIX) 20 MG Tab, Take 1 Tablet by mouth 2 times a day., Disp: 180 Tablet, Rfl: 0    folic acid (FOLVITE) 1 MG Tab, Take 1 Tablet by mouth every day., Disp: 90 Tablet, Rfl: 3    Naloxone (NARCAN) 4 MG/0.1ML Liquid, One spray in one nostril for overdose and call 911., Disp: 1 Each, Rfl: 0    metoprolol  tartrate (LOPRESSOR) 25 MG Tab, TAKE 1 TABLET BY MOUTH TWICE A DAY, Disp: 180 Tablet, Rfl: 3    losartan (COZAAR) 50 MG Tab, TAKE 1 TABLET BY MOUTH EVERY DAY, Disp: 100 Tablet, Rfl: 3    cetirizine (ZYRTEC) 10 MG Tab, Take 1 Tablet by mouth every day., Disp: 90 Tablet, Rfl: 3    simvastatin (ZOCOR) 10 MG Tab, Take 1 Tablet by mouth every evening. *Appointment needed for additional refills*, Disp: 100 Tablet, Rfl: 3    fluticasone (FLONASE) 50 MCG/ACT nasal spray, Administer 1 Spray into affected nostril(S) every day. *Follow up needed for additional refills*, Disp: 48 mL, Rfl: 3    levothyroxine (SYNTHROID) 50 MCG Tab, Take 1 Tab by mouth Every morning on an empty stomach., Disp: 90 Tab, Rfl: 3    aspirin EC (ECOTRIN) 81 MG Tablet Delayed Response, Take 81 mg by mouth every day. (Patient not taking: Reported on 8/9/2022), Disp: , Rfl:       Goal: Mariely will continue to adhere to medication regimen as prescribed.            Physical/Functional/Environmental Status:      One or more falls in the last year: No  Advised to use a cane or walker to get around safely: No  Feels unsteady when walking: No  Steadies self on furniture while walking at home: No  Worried about falling: No  Needs to push with hands when rising from a chair: No  Has trouble stepping up onto a curb / using stairs: No  Often has to rush to the toilet: No  Has lost some feeling in feet: No  Takes medicine that makes him/her feel lightheaded or more tired than usual: No  Takes medicine to sleep or improve mood: No  Often feels sad or depressed: No  STEADI Risk for Falling Score: 0       Goal: Mariely will remain free of falls, staff will educate on fall risks.            Financial Status: Mariely's only concerns for future medical bills.     Goal: Staff to provide resources once Mariely presents medical bills.            Transportation Status: Mariely reports need for transportation to medical appointments.    Goal: Staff provided information on the free [employer]  john Schuster can receive through [employer]- her insurance.           Mental/Behavioral/Psychosocial Status:    Depression Screen (PHQ-2/PHQ-9) 7/16/2021 4/12/2022 8/9/2022   PHQ-2 Total Score 0 0 1   PHQ-9 Total Score - - 3       Interpretation of PHQ-9 Total Score   Score Severity   1-4 No Depression   5-9 Mild Depression   10-14 Moderate Depression   15-19 Moderately Severe Depression   20-27 Severe Depression       Goal:  Mariely reports no mental health needs at this time, staff to monitor for needs.              Chronic Care Management Care Plan      Goal:  Mariely will be able to identify which foods are kidney-friendly.   Barriers: None  Interventions: Staff to provide resources and educational opportunities.   Start Date: 8/9/2022  End Date:           Goal:  Mariely will verbalize how she can reduce her Triglyceride level through diet management and exercise.   Barriers: Knowledge of how Triglyceride levels can raise or lower with diet management.   Interventions: Staff to provide resources and diet education.     Start Date: 8/9/2022  End Date:                   Discussion: Spent time with Mariely and her daughter, discussed needs, both present and future    Goals: Created     Next Scheduled patient outreach: 9/9/2022

## 2022-08-09 NOTE — PROGRESS NOTES
Chief Complaint   Patient presents with   • Annual Wellness Visit     HPI:  Mariely is a 82 y.o. here for Medicare Annual Wellness Visit    Patient was started on xarelto for clot (?DVT-had an US in their office), has appt with vascular surgery tomorrow.  The pain has significantly improved, getting more issues from the left lower leg rather than the right currently.  No fevers or chills.  Still having issues with leg cramps, mostly at night. Taking lasix 20mg BID which is effective for edema, finds that she still needs to take it.     Patient Active Problem List    Diagnosis Date Noted   • Muscle cramping 08/09/2022   • Folate deficiency 07/05/2022   • Superficial thrombosis of lower extremity, right 07/05/2022   • Varicose veins of both lower extremities with pain 06/28/2022   • Hyponatremia 06/28/2022   • Anemia 06/28/2022   • Muscle spasm of back 04/12/2022   • Vitamin B12 deficiency 12/16/2019   • Essential hypertension 07/24/2019   • Acquired hypothyroidism 07/24/2019   • Mixed hyperlipidemia 07/24/2019   • Anxiety 07/24/2019   • CKD (chronic kidney disease) stage 3, GFR 30-59 ml/min ([facility]) 07/24/2019       Current Outpatient Medications   Medication Sig Dispense Refill   • XARELTO STARTER PACK 15 & 20 MG Tablet Therapy Pack AS DIRECTED PER PACKAGE INSTRUCTIONS     • furosemide (LASIX) 20 MG Tab Take 1 Tablet by mouth 2 times a day. 180 Tablet 0   • folic acid (FOLVITE) 1 MG Tab Take 1 Tablet by mouth every day. 90 Tablet 3   • Naloxone (NARCAN) 4 MG/0.1ML Liquid One spray in one nostril for overdose and call 911. 1 Each 0   • metoprolol tartrate (LOPRESSOR) 25 MG Tab TAKE 1 TABLET BY MOUTH TWICE A  Tablet 3   • losartan (COZAAR) 50 MG Tab TAKE 1 TABLET BY MOUTH EVERY  Tablet 3   • cetirizine (ZYRTEC) 10 MG Tab Take 1 Tablet by mouth every day. 90 Tablet 3   • simvastatin (ZOCOR) 10 MG Tab Take 1 Tablet by mouth every evening. *Appointment needed for additional refills* 100 Tablet 3   • fluticasone  (FLONASE) 50 MCG/ACT nasal spray Administer 1 Spray into affected nostril(S) every day. *Follow up needed for additional refills* 48 mL 3   • levothyroxine (SYNTHROID) 50 MCG Tab Take 1 Tab by mouth Every morning on an empty stomach. 90 Tab 3   • aspirin EC (ECOTRIN) 81 MG Tablet Delayed Response Take 81 mg by mouth every day. (Patient not taking: Reported on 8/9/2022)       No current facility-administered medications for this visit.      Patient is taking medications as noted in medication list.  Current supplements as per medication list.     Allergies: Patient has no known allergies.    Current social contact/activities: cooking, family interaction, shopping      Is patient current with immunizations? No, due for TDAP and SHINGRIX (Shingles). Patient is interested in receiving NONE today.  Patient would like to hold off until vascular issues are resolved.    She  reports that she quit smoking about 43 years ago. She has never used smokeless tobacco. She reports current alcohol use of about 8.4 oz of alcohol per week. She reports that she does not use drugs.  Counseling given: Not Answered    DPA/Advanced directive: Patient does not have an Advanced Directive.  A packet and workshop information was given on Advanced Directives.    ROS:    Gait: Uses no assistive device   Ostomy: No   Other tubes: No   Amputations: No   Chronic oxygen use No   Last eye exam will schedule an appt    Wears hearing aids: No   : Reports urinary leakage during the last 6 months that has somewhat interfered with their daily activities or sleep.  Only when cough/sneezing, wears a pad.  Not bothersome.    Screening:    Depression Screening  Little interest or pleasure in doing things?  0 - not at all  Feeling down, depressed, or hopeless? 1 - several days. Due to medical conditions.   Trouble falling or staying asleep, or sleeping too much?  1 - several days  Feeling tired or having little energy?  1 - several days  Poor appetite or  overeating?  0 - not at all  Feeling bad about yourself - or that you are a failure or have let yourself or your family down? 0 - not at all  Trouble concentrating on things, such as reading the newspaper or watching television? 0 - not at all  Moving or speaking so slowly that other people could have noticed.  Or the opposite - being so fidgety or restless that you have been moving around a lot more than usual?  0 - not at all  Thoughts that you would be better off dead, or of hurting yourself?  0 - not at all  Patient Health Questionnaire Score: 3    If depressive symptoms identified deferred to follow up visit unless specifically addressed in assessment and plan.    Interpretation of PHQ-9 Total Score   Score Severity   1-4 No Depression   5-9 Mild Depression   10-14 Moderate Depression   15-19 Moderately Severe Depression   20-27 Severe Depression    Screening for Cognitive Impairment  Three Minute Recall (daughter, heaven, mountain)  2/3    Draw clock face with all 12 numbers and set the hands to show 10 past 11.  Yes 4/5 (wrote 12 twice)  If cognitive concerns identified, deferred for follow up unless specifically addressed in assessment and plan.  Discussed with patient and her daughter, no cognitive concerns at this time.    Fall Risk Assessment  Has the patient had two or more falls in the last year or any fall with injury in the last year?  No  If fall risk identified, deferred for follow up unless specifically addressed in assessment and plan.    Safety Assessment  Throw rugs on floor.  No  Handrails on all stairs.  Yes  Good lighting in all hallways.  Yes  Difficulty hearing.  No  Patient counseled about all safety risks that were identified.    Functional Assessment ADLs  Are there any barriers preventing you from cooking for yourself or meeting nutritional needs?  No.    Are there any barriers preventing you from driving safely or obtaining transportation?  No.    Are there any barriers preventing you  from using a telephone or calling for help?  No.    Are there any barriers preventing you from shopping?  No.    Are there any barriers preventing you from taking care of your own finances?  No.    Are there any barriers preventing you from managing your medications?    No.    Are there any barriers preventing you from showering, bathing or dressing yourself?  No.    Are you currently engaging in any exercise or physical activity?  Yes.  Exercise, deep breathing   What is your perception of your health?  Good.    Health Maintenance Summary          Ordered - BONE DENSITY (Every 5 Years) Ordered on 8/9/2022    No completion history exists for this topic.          Overdue - IMM DTaP/Tdap/Td Vaccine (1 - Tdap) Overdue - never done    No completion history exists for this topic.          Overdue - IMM ZOSTER VACCINES (1 of 2) Overdue - never done    No completion history exists for this topic.          IMM INFLUENZA (1) Next due on 9/1/2022 11/11/2021  Imm Admin: Influenza Vaccine Adult HD    10/06/2020  Imm Admin: Influenza Vaccine Quad Inj (Pf)    09/13/2019  Imm Admin: Influenza Vaccine Adult HD    08/28/2018  Imm Admin: Influenza Vaccine Adult HD    08/17/2018  Imm Admin: Influenza Vaccine Adult HD    Only the first 5 history entries have been loaded, but more history exists.          Annual Wellness Visit (Every 366 Days) Next due on 8/10/2023    08/09/2022  Visit Dx: Medicare annual wellness visit, subsequent    08/09/2022  Level of Service: ANNUAL WELLNESS VISIT-INCLUDES PPPS SUBSEQUE*    07/16/2021  Level of Service: [place] ANNUAL WELLNESS VISIT-INCLUDES PPPS SUBSEQUE*    07/16/2021  Visit Dx: Medicare annual wellness visit, subsequent          IMM PNEUMOCOCCAL VACCINE: 65+ Years (Series Information) Completed    12/16/2019  Imm Admin: Pneumococcal polysaccharide vaccine (PPSV-23)    07/28/2015  Imm Admin: Pneumococcal Conjugate Vaccine (Prevnar/PCV-13)          COVID-19 Vaccine (Series Information) Completed     "2022  Imm Admin: Moderna SARS-CoV-2 Vaccine    2021  Imm Admin: Moderna SARS-CoV-2 Vaccine    2021  Imm Admin: Moderna SARS-CoV-2 Vaccine    2021  Imm Admin: Moderna SARS-CoV-2 Vaccine          IMM HEP B VACCINE (Series Information) Aged Out    No completion history exists for this topic.          IMM MENINGOCOCCAL ACWY VACCINE (Series Information) Aged Out    No completion history exists for this topic.          Discontinued - MAMMOGRAM  Discontinued    No completion history exists for this topic.          Discontinued - PAP SMEAR  Discontinued    No completion history exists for this topic.          Discontinued - COLORECTAL CANCER SCREENING  Discontinued    No completion history exists for this topic.              Patient Care Team:  Luz Vieira D.O. as PCP - General (Family Medicine)  Dulce Doe R.N. as RN Care Coordinator     Social History     Tobacco Use   • Smoking status: Former Smoker     Quit date: 1979     Years since quittin.0   • Smokeless tobacco: Never Used   Vaping Use   • Vaping Use: Never used   Substance Use Topics   • Alcohol use: Yes     Alcohol/week: 8.4 oz     Types: 14 Glasses of wine per week   • Drug use: No     History reviewed. No pertinent family history.  She  has a past medical history of Hypertension and Pain and swelling of left lower extremity (2022).   History reviewed. No pertinent surgical history.    Exam:   /60 (BP Location: Left arm, Patient Position: Sitting, BP Cuff Size: Adult)   Pulse 64   Temp 36.2 °C (97.1 °F) (Temporal)   Ht 1.575 m (5' 2\")   Wt 62.1 kg (137 lb)   SpO2 97%  Body mass index is 25.06 kg/m².    Hearing good.    Dentition good, partials and crowns noted.  Alert, oriented in no acute distress  Eye contact is good, speech goal directed, affect calm  Cardiovascular: Regular rate and rhythm, S1 and S2 without murmur, rubs, or gallops.  Lungs: Normal inspiratory effort, CTA bilaterally, no " wheezes/rhonchi/rales  Abdomen: Soft, nondistended.  Nontender without masses.  Extremities: No cyanosis, clubbing, erythema.  No edema. Area of hyperpigmenation without erythema or increased warmth  of medial calf on the right without tenderness and subtle firmness. Induration without acute findings on left calf. Varicose veins noted bilaterally.    Assessment and Plan. The following treatment and monitoring plan is recommended:      1. Medicare annual wellness visit, subsequent  Services suggested: No services needed at this time  Health Care Screening recommendations as per orders if indicated.  Referrals offered: PT/OT/Nutrition counseling/Behavioral Health/Smoking cessation as per orders if indicated.    Discussion today about general wellness and lifestyle habits:    · Prevent falls and reduce trip hazards; Cautioned about securing or removing rugs.  · Engage in regular physical activity and social activities.   · Reviewed recommended vaccinations  · Discussed advanced directives, patient at this point would like to be full code still.    2. Hyponatremia  Acute, suspect related to diuretic and mild at 133.  Will trend.  Discussed return precautions for signs/symptoms of hyponatremia.  - Basic Metabolic Panel; Future  - MAGNESIUM; Future    3. Muscle cramping  Acute on chronic-suspect related to #5 and related issues as below, will check electrolytes.  - Basic Metabolic Panel; Future  - MAGNESIUM; Future    4. Encounter for osteoporosis screening in asymptomatic postmenopausal patient  - DS-BONE DENSITY STUDY (DEXA); Future    5. Varicose veins of both lower extremities with pain  Working with [facility] for superficial thrombosis (?DVT based on patients report-we will request records).  Has a follow-up tomorrow, continue care with specialist.    6. Stress incontinence of urine  This is a chronic condition and mild.  Advised Kegel exercises.  If needed could consider pelvic floor physical therapy but she  prefers to defer at this time.    Follow-up: Return in about 2 months (around 10/9/2022), or if symptoms worsen or fail to improve.

## 2022-08-09 NOTE — LETTER
[facility]  Luz Vieira D.O.  [address]  [place] [address]  Fax: [phone]   Authorization for Release/Disclosure of   Protected Health Information   Name: JONES BERMUDEZ : 1940 SSN: [id number]   Address: [address] [place] [address]  [place] [address] Phone:    [phone] (home)    I authorize the entity listed below to release/disclose the PHI below to:   [facility]/Luz Vieira D.O. and Luz Vieira D.O.   Provider or Entity Name:     Address   City, State, [facility]   Phone:      Fax:     Reason for request: continuity of care   Information to be released:    [  ] LAST COLONOSCOPY,  including any PATH REPORT and follow-up  [  ] LAST FIT/COLOGUARD RESULT [  ] LAST DEXA  [  ] LAST MAMMOGRAM  [  ] LAST PAP  [  ] LAST LABS [  ] RETINA EXAM REPORT  [  ] IMMUNIZATION RECORDS  [  ] Release all info      [  ] Check here and initial the line next to each item to release ALL health information INCLUDING  _____ Care and treatment for drug and / or alcohol abuse  _____ HIV testing, infection status, or AIDS  _____ Genetic Testing    DATES OF SERVICE OR TIME PERIOD TO BE DISCLOSED: _____________  I understand and acknowledge that:  * This Authorization may be revoked at any time by you in writing, except if your health information has already been used or disclosed.  * Your health information that will be used or disclosed as a result of you signing this authorization could be re-disclosed by the recipient. If this occurs, your re-disclosed health information may no longer be protected by State or Federal laws.  * You may refuse to sign this Authorization. Your refusal will not affect your ability to obtain treatment.  * This Authorization becomes effective upon signing and will  on (date) __________.      If no date is indicated, this Authorization will  one (1) year from the signature date.    Name: Jones Bermudez    Signature:   Date:     2022       PLEASE FAX  REQUESTED RECORDS BACK TO: [phone]

## 2022-08-19 ENCOUNTER — PATIENT MESSAGE (OUTPATIENT)
Dept: HEALTH INFORMATION MANAGEMENT | Facility: OTHER | Age: 82
End: 2022-08-19

## 2022-09-05 ENCOUNTER — APPOINTMENT (OUTPATIENT)
Dept: RADIOLOGY | Facility: MEDICAL CENTER | Age: 82
DRG: 871 | End: 2022-09-05
Attending: EMERGENCY MEDICINE
Payer: MEDICARE

## 2022-09-05 ENCOUNTER — HOSPITAL ENCOUNTER (INPATIENT)
Facility: MEDICAL CENTER | Age: 82
LOS: 2 days | DRG: 871 | End: 2022-09-08
Attending: EMERGENCY MEDICINE | Admitting: INTERNAL MEDICINE
Payer: MEDICARE

## 2022-09-05 DIAGNOSIS — A41.9 SEPSIS WITHOUT ACUTE ORGAN DYSFUNCTION, DUE TO UNSPECIFIED ORGANISM (HCC): ICD-10-CM

## 2022-09-05 DIAGNOSIS — N10 ACUTE PYELONEPHRITIS: ICD-10-CM

## 2022-09-05 DIAGNOSIS — R00.0 TACHYCARDIA: ICD-10-CM

## 2022-09-05 DIAGNOSIS — R65.20 SEPSIS WITH ACUTE ORGAN DYSFUNCTION, DUE TO UNSPECIFIED ORGANISM, UNSPECIFIED TYPE, UNSPECIFIED WHETHER SEPTIC SHOCK PRESENT: ICD-10-CM

## 2022-09-05 DIAGNOSIS — A41.9 SEPSIS WITH ACUTE ORGAN DYSFUNCTION, DUE TO UNSPECIFIED ORGANISM, UNSPECIFIED TYPE, UNSPECIFIED WHETHER SEPTIC SHOCK PRESENT: ICD-10-CM

## 2022-09-05 DIAGNOSIS — R79.89 ELEVATED TROPONIN: ICD-10-CM

## 2022-09-05 DIAGNOSIS — R78.81 GRAM-NEGATIVE BACTEREMIA: ICD-10-CM

## 2022-09-05 DIAGNOSIS — N12 PYELONEPHRITIS: ICD-10-CM

## 2022-09-05 DIAGNOSIS — R06.02 SHORTNESS OF BREATH: ICD-10-CM

## 2022-09-05 LAB
ALBUMIN SERPL BCP-MCNC: 3.9 G/DL (ref 3.2–4.9)
ALBUMIN/GLOB SERPL: 1.3 G/DL
ALP SERPL-CCNC: 185 U/L (ref 30–99)
ALT SERPL-CCNC: 14 U/L (ref 2–50)
AMPHET UR QL SCN: NEGATIVE
ANION GAP SERPL CALC-SCNC: 17 MMOL/L (ref 7–16)
APPEARANCE UR: ABNORMAL
APTT PPP: 31.3 SEC (ref 24.7–36)
AST SERPL-CCNC: 12 U/L (ref 12–45)
B-OH-BUTYR SERPL-MCNC: 0.35 MMOL/L (ref 0.02–0.27)
BACTERIA #/AREA URNS HPF: ABNORMAL /HPF
BARBITURATES UR QL SCN: NEGATIVE
BASOPHILS # BLD AUTO: 0.2 % (ref 0–1.8)
BASOPHILS # BLD: 0.01 K/UL (ref 0–0.12)
BENZODIAZ UR QL SCN: NEGATIVE
BILIRUB SERPL-MCNC: 0.6 MG/DL (ref 0.1–1.5)
BILIRUB UR QL STRIP.AUTO: NEGATIVE
BUN SERPL-MCNC: 22 MG/DL (ref 8–22)
BZE UR QL SCN: NEGATIVE
CALCIUM SERPL-MCNC: 8.8 MG/DL (ref 8.5–10.5)
CANNABINOIDS UR QL SCN: NEGATIVE
CHLORIDE SERPL-SCNC: 99 MMOL/L (ref 96–112)
CK SERPL-CCNC: 29 U/L (ref 0–154)
CO2 SERPL-SCNC: 16 MMOL/L (ref 20–33)
COLOR UR: YELLOW
CREAT SERPL-MCNC: 1.27 MG/DL (ref 0.5–1.4)
EKG IMPRESSION: NORMAL
EKG IMPRESSION: NORMAL
EOSINOPHIL # BLD AUTO: 0.13 K/UL (ref 0–0.51)
EOSINOPHIL NFR BLD: 2.3 % (ref 0–6.9)
EPI CELLS #/AREA URNS HPF: NEGATIVE /HPF
ERYTHROCYTE [DISTWIDTH] IN BLOOD BY AUTOMATED COUNT: 63.5 FL (ref 35.9–50)
ETHANOL BLD-MCNC: <10.1 MG/DL
GFR SERPLBLD CREATININE-BSD FMLA CKD-EPI: 43 ML/MIN/1.73 M 2
GLOBULIN SER CALC-MCNC: 2.9 G/DL (ref 1.9–3.5)
GLUCOSE SERPL-MCNC: 83 MG/DL (ref 65–99)
GLUCOSE UR STRIP.AUTO-MCNC: NEGATIVE MG/DL
HCT VFR BLD AUTO: 33.6 % (ref 37–47)
HGB BLD-MCNC: 11.1 G/DL (ref 12–16)
HYALINE CASTS #/AREA URNS LPF: ABNORMAL /LPF
IMM GRANULOCYTES # BLD AUTO: 0.04 K/UL (ref 0–0.11)
IMM GRANULOCYTES NFR BLD AUTO: 0.7 % (ref 0–0.9)
INR PPP: 1.17 (ref 0.87–1.13)
KETONES UR STRIP.AUTO-MCNC: NEGATIVE MG/DL
LACTATE SERPL-SCNC: 1.7 MMOL/L (ref 0.5–2)
LACTATE SERPL-SCNC: 4.8 MMOL/L (ref 0.5–2)
LEUKOCYTE ESTERASE UR QL STRIP.AUTO: ABNORMAL
LYMPHOCYTES # BLD AUTO: 0.4 K/UL (ref 1–4.8)
LYMPHOCYTES NFR BLD: 6.9 % (ref 22–41)
MAGNESIUM SERPL-MCNC: 1.3 MG/DL (ref 1.5–2.5)
MCH RBC QN AUTO: 31.2 PG (ref 27–33)
MCHC RBC AUTO-ENTMCNC: 33 G/DL (ref 33.6–35)
MCV RBC AUTO: 94.4 FL (ref 81.4–97.8)
METHADONE UR QL SCN: NEGATIVE
MICRO URNS: ABNORMAL
MONOCYTES # BLD AUTO: 0.02 K/UL (ref 0–0.85)
MONOCYTES NFR BLD AUTO: 0.3 % (ref 0–13.4)
NEUTROPHILS # BLD AUTO: 5.16 K/UL (ref 2–7.15)
NEUTROPHILS NFR BLD: 89.6 % (ref 44–72)
NITRITE UR QL STRIP.AUTO: POSITIVE
NRBC # BLD AUTO: 0 K/UL
NRBC BLD-RTO: 0 /100 WBC
NT-PROBNP SERPL IA-MCNC: 1727 PG/ML (ref 0–125)
OPIATES UR QL SCN: NEGATIVE
OXYCODONE UR QL SCN: NEGATIVE
PCP UR QL SCN: NEGATIVE
PH UR STRIP.AUTO: 6.5 [PH] (ref 5–8)
PHOSPHATE SERPL-MCNC: 2.2 MG/DL (ref 2.5–4.5)
PLATELET # BLD AUTO: 262 K/UL (ref 164–446)
PMV BLD AUTO: 11.1 FL (ref 9–12.9)
POTASSIUM SERPL-SCNC: 4.3 MMOL/L (ref 3.6–5.5)
PROPOXYPH UR QL SCN: NEGATIVE
PROT SERPL-MCNC: 6.8 G/DL (ref 6–8.2)
PROT UR QL STRIP: 30 MG/DL
PROTHROMBIN TIME: 14.7 SEC (ref 12–14.6)
RBC # BLD AUTO: 3.56 M/UL (ref 4.2–5.4)
RBC # URNS HPF: ABNORMAL /HPF
RBC UR QL AUTO: ABNORMAL
SODIUM SERPL-SCNC: 132 MMOL/L (ref 135–145)
SP GR UR STRIP.AUTO: 1.03
TROPONIN T SERPL-MCNC: 24 NG/L (ref 6–19)
TROPONIN T SERPL-MCNC: 43 NG/L (ref 6–19)
TSH SERPL DL<=0.005 MIU/L-ACNC: 3.38 UIU/ML (ref 0.38–5.33)
UFH PPP CHRO-ACNC: <0.1 IU/ML
UROBILINOGEN UR STRIP.AUTO-MCNC: 0.2 MG/DL
WBC # BLD AUTO: 5.8 K/UL (ref 4.8–10.8)
WBC #/AREA URNS HPF: ABNORMAL /HPF

## 2022-09-05 PROCEDURE — 84100 ASSAY OF PHOSPHORUS: CPT

## 2022-09-05 PROCEDURE — 93971 EXTREMITY STUDY: CPT | Mod: LT

## 2022-09-05 PROCEDURE — 700111 HCHG RX REV CODE 636 W/ 250 OVERRIDE (IP)

## 2022-09-05 PROCEDURE — 99291 CRITICAL CARE FIRST HOUR: CPT

## 2022-09-05 PROCEDURE — 71045 X-RAY EXAM CHEST 1 VIEW: CPT

## 2022-09-05 PROCEDURE — 96367 TX/PROPH/DG ADDL SEQ IV INF: CPT

## 2022-09-05 PROCEDURE — 87086 URINE CULTURE/COLONY COUNT: CPT

## 2022-09-05 PROCEDURE — 700102 HCHG RX REV CODE 250 W/ 637 OVERRIDE(OP): Performed by: INTERNAL MEDICINE

## 2022-09-05 PROCEDURE — 700102 HCHG RX REV CODE 250 W/ 637 OVERRIDE(OP): Performed by: EMERGENCY MEDICINE

## 2022-09-05 PROCEDURE — 93005 ELECTROCARDIOGRAM TRACING: CPT

## 2022-09-05 PROCEDURE — 82550 ASSAY OF CK (CPK): CPT

## 2022-09-05 PROCEDURE — 700105 HCHG RX REV CODE 258: Performed by: EMERGENCY MEDICINE

## 2022-09-05 PROCEDURE — 99220 PR INITIAL OBSERVATION CARE,LEVL III: CPT | Performed by: INTERNAL MEDICINE

## 2022-09-05 PROCEDURE — 81001 URINALYSIS AUTO W/SCOPE: CPT

## 2022-09-05 PROCEDURE — 71275 CT ANGIOGRAPHY CHEST: CPT

## 2022-09-05 PROCEDURE — 80053 COMPREHEN METABOLIC PANEL: CPT

## 2022-09-05 PROCEDURE — A9270 NON-COVERED ITEM OR SERVICE: HCPCS | Performed by: INTERNAL MEDICINE

## 2022-09-05 PROCEDURE — 93005 ELECTROCARDIOGRAM TRACING: CPT | Performed by: EMERGENCY MEDICINE

## 2022-09-05 PROCEDURE — 82077 ASSAY SPEC XCP UR&BREATH IA: CPT

## 2022-09-05 PROCEDURE — 87040 BLOOD CULTURE FOR BACTERIA: CPT

## 2022-09-05 PROCEDURE — 84484 ASSAY OF TROPONIN QUANT: CPT

## 2022-09-05 PROCEDURE — 83735 ASSAY OF MAGNESIUM: CPT

## 2022-09-05 PROCEDURE — 83880 ASSAY OF NATRIURETIC PEPTIDE: CPT

## 2022-09-05 PROCEDURE — 96368 THER/DIAG CONCURRENT INF: CPT

## 2022-09-05 PROCEDURE — 700117 HCHG RX CONTRAST REV CODE 255: Performed by: EMERGENCY MEDICINE

## 2022-09-05 PROCEDURE — 700105 HCHG RX REV CODE 258: Performed by: INTERNAL MEDICINE

## 2022-09-05 PROCEDURE — 84443 ASSAY THYROID STIM HORMONE: CPT

## 2022-09-05 PROCEDURE — 85610 PROTHROMBIN TIME: CPT

## 2022-09-05 PROCEDURE — 83605 ASSAY OF LACTIC ACID: CPT

## 2022-09-05 PROCEDURE — 96365 THER/PROPH/DIAG IV INF INIT: CPT

## 2022-09-05 PROCEDURE — 87186 SC STD MICRODIL/AGAR DIL: CPT

## 2022-09-05 PROCEDURE — C9803 HOPD COVID-19 SPEC COLLECT: HCPCS | Performed by: EMERGENCY MEDICINE

## 2022-09-05 PROCEDURE — 700111 HCHG RX REV CODE 636 W/ 250 OVERRIDE (IP): Performed by: EMERGENCY MEDICINE

## 2022-09-05 PROCEDURE — 85730 THROMBOPLASTIN TIME PARTIAL: CPT

## 2022-09-05 PROCEDURE — 87077 CULTURE AEROBIC IDENTIFY: CPT

## 2022-09-05 PROCEDURE — 36415 COLL VENOUS BLD VENIPUNCTURE: CPT

## 2022-09-05 PROCEDURE — G0378 HOSPITAL OBSERVATION PER HR: HCPCS

## 2022-09-05 PROCEDURE — 80307 DRUG TEST PRSMV CHEM ANLYZR: CPT

## 2022-09-05 PROCEDURE — 85025 COMPLETE CBC W/AUTO DIFF WBC: CPT

## 2022-09-05 PROCEDURE — 700101 HCHG RX REV CODE 250: Performed by: INTERNAL MEDICINE

## 2022-09-05 PROCEDURE — 0241U HCHG SARS-COV-2 COVID-19 NFCT DS RESP RNA 4 TRGT MIC: CPT

## 2022-09-05 PROCEDURE — 700111 HCHG RX REV CODE 636 W/ 250 OVERRIDE (IP): Performed by: INTERNAL MEDICINE

## 2022-09-05 PROCEDURE — 82010 KETONE BODYS QUAN: CPT

## 2022-09-05 PROCEDURE — 96375 TX/PRO/DX INJ NEW DRUG ADDON: CPT

## 2022-09-05 PROCEDURE — A9270 NON-COVERED ITEM OR SERVICE: HCPCS | Performed by: EMERGENCY MEDICINE

## 2022-09-05 PROCEDURE — 85520 HEPARIN ASSAY: CPT

## 2022-09-05 RX ORDER — ACETAMINOPHEN 325 MG/1
650 TABLET ORAL ONCE
Status: COMPLETED | OUTPATIENT
Start: 2022-09-05 | End: 2022-09-05

## 2022-09-05 RX ORDER — HEPARIN SODIUM 1000 [USP'U]/ML
40 INJECTION, SOLUTION INTRAVENOUS; SUBCUTANEOUS PRN
Status: DISCONTINUED | OUTPATIENT
Start: 2022-09-05 | End: 2022-09-06

## 2022-09-05 RX ORDER — LEVOTHYROXINE SODIUM 0.05 MG/1
50 TABLET ORAL
Status: DISCONTINUED | OUTPATIENT
Start: 2022-09-06 | End: 2022-09-08 | Stop reason: HOSPADM

## 2022-09-05 RX ORDER — HEPARIN SODIUM 5000 [USP'U]/100ML
0-30 INJECTION, SOLUTION INTRAVENOUS CONTINUOUS
Status: DISCONTINUED | OUTPATIENT
Start: 2022-09-05 | End: 2022-09-06

## 2022-09-05 RX ORDER — FLUTICASONE PROPIONATE 50 MCG
1 SPRAY, SUSPENSION (ML) NASAL DAILY
Status: DISCONTINUED | OUTPATIENT
Start: 2022-09-06 | End: 2022-09-06

## 2022-09-05 RX ORDER — ACETAMINOPHEN 325 MG/1
650 TABLET ORAL EVERY 6 HOURS PRN
Status: DISCONTINUED | OUTPATIENT
Start: 2022-09-06 | End: 2022-09-08 | Stop reason: HOSPADM

## 2022-09-05 RX ORDER — BISACODYL 10 MG
10 SUPPOSITORY, RECTAL RECTAL
Status: DISCONTINUED | OUTPATIENT
Start: 2022-09-05 | End: 2022-09-08 | Stop reason: HOSPADM

## 2022-09-05 RX ORDER — FOLIC ACID 1 MG/1
1 TABLET ORAL DAILY
Status: DISCONTINUED | OUTPATIENT
Start: 2022-09-06 | End: 2022-09-08 | Stop reason: HOSPADM

## 2022-09-05 RX ORDER — ONDANSETRON 2 MG/ML
4 INJECTION INTRAMUSCULAR; INTRAVENOUS ONCE
Status: COMPLETED | OUTPATIENT
Start: 2022-09-05 | End: 2022-09-05

## 2022-09-05 RX ORDER — ENOXAPARIN SODIUM 100 MG/ML
40 INJECTION SUBCUTANEOUS DAILY
Status: DISCONTINUED | OUTPATIENT
Start: 2022-09-05 | End: 2022-09-08 | Stop reason: HOSPADM

## 2022-09-05 RX ORDER — OXYCODONE HYDROCHLORIDE 5 MG/1
2.5 TABLET ORAL
Status: DISCONTINUED | OUTPATIENT
Start: 2022-09-05 | End: 2022-09-06

## 2022-09-05 RX ORDER — ASPIRIN 81 MG/1
324 TABLET, CHEWABLE ORAL ONCE
Status: COMPLETED | OUTPATIENT
Start: 2022-09-05 | End: 2022-09-05

## 2022-09-05 RX ORDER — OXYCODONE HYDROCHLORIDE 5 MG/1
5 TABLET ORAL
Status: DISCONTINUED | OUTPATIENT
Start: 2022-09-05 | End: 2022-09-06

## 2022-09-05 RX ORDER — ONDANSETRON 4 MG/1
4 TABLET, ORALLY DISINTEGRATING ORAL EVERY 4 HOURS PRN
Status: DISCONTINUED | OUTPATIENT
Start: 2022-09-05 | End: 2022-09-08 | Stop reason: HOSPADM

## 2022-09-05 RX ORDER — CETIRIZINE HYDROCHLORIDE 10 MG/1
10 TABLET ORAL DAILY
Status: DISCONTINUED | OUTPATIENT
Start: 2022-09-06 | End: 2022-09-08 | Stop reason: HOSPADM

## 2022-09-05 RX ORDER — SODIUM CHLORIDE, SODIUM LACTATE, POTASSIUM CHLORIDE, CALCIUM CHLORIDE 600; 310; 30; 20 MG/100ML; MG/100ML; MG/100ML; MG/100ML
1000 INJECTION, SOLUTION INTRAVENOUS ONCE
Status: COMPLETED | OUTPATIENT
Start: 2022-09-05 | End: 2022-09-05

## 2022-09-05 RX ORDER — HEPARIN SODIUM 1000 [USP'U]/ML
80 INJECTION, SOLUTION INTRAVENOUS; SUBCUTANEOUS ONCE
Status: COMPLETED | OUTPATIENT
Start: 2022-09-05 | End: 2022-09-05

## 2022-09-05 RX ORDER — POLYETHYLENE GLYCOL 3350 17 G/17G
1 POWDER, FOR SOLUTION ORAL
Status: DISCONTINUED | OUTPATIENT
Start: 2022-09-05 | End: 2022-09-08 | Stop reason: HOSPADM

## 2022-09-05 RX ORDER — MORPHINE SULFATE 4 MG/ML
2 INJECTION INTRAVENOUS
Status: DISCONTINUED | OUTPATIENT
Start: 2022-09-05 | End: 2022-09-06

## 2022-09-05 RX ORDER — DIPHENHYDRAMINE HYDROCHLORIDE 50 MG/ML
12.5 INJECTION INTRAMUSCULAR; INTRAVENOUS ONCE
Status: DISCONTINUED | OUTPATIENT
Start: 2022-09-05 | End: 2022-09-06

## 2022-09-05 RX ORDER — ONDANSETRON 2 MG/ML
4 INJECTION INTRAMUSCULAR; INTRAVENOUS EVERY 4 HOURS PRN
Status: DISCONTINUED | OUTPATIENT
Start: 2022-09-05 | End: 2022-09-08 | Stop reason: HOSPADM

## 2022-09-05 RX ORDER — SIMVASTATIN 20 MG
10 TABLET ORAL EVERY EVENING
Status: DISCONTINUED | OUTPATIENT
Start: 2022-09-05 | End: 2022-09-08 | Stop reason: HOSPADM

## 2022-09-05 RX ORDER — AMOXICILLIN 250 MG
2 CAPSULE ORAL 2 TIMES DAILY
Status: DISCONTINUED | OUTPATIENT
Start: 2022-09-05 | End: 2022-09-08 | Stop reason: HOSPADM

## 2022-09-05 RX ORDER — SODIUM CHLORIDE 9 MG/ML
1000 INJECTION, SOLUTION INTRAVENOUS ONCE
Status: COMPLETED | OUTPATIENT
Start: 2022-09-05 | End: 2022-09-05

## 2022-09-05 RX ADMIN — HEPARIN SODIUM 18 UNITS/KG/HR: 5000 INJECTION, SOLUTION INTRAVENOUS at 19:48

## 2022-09-05 RX ADMIN — ACETAMINOPHEN 650 MG: 325 TABLET, FILM COATED ORAL at 21:10

## 2022-09-05 RX ADMIN — ASPIRIN 81 MG 324 MG: 81 TABLET ORAL at 20:40

## 2022-09-05 RX ADMIN — POTASSIUM PHOSPHATE, MONOBASIC AND POTASSIUM PHOSPHATE, DIBASIC 30 MMOL: 224; 236 INJECTION, SOLUTION, CONCENTRATE INTRAVENOUS at 23:30

## 2022-09-05 RX ADMIN — SIMVASTATIN 10 MG: 10 TABLET, FILM COATED ORAL at 22:57

## 2022-09-05 RX ADMIN — HEPARIN SODIUM 5000 UNITS: 1000 INJECTION, SOLUTION INTRAVENOUS; SUBCUTANEOUS at 19:47

## 2022-09-05 RX ADMIN — ONDANSETRON 4 MG: 2 INJECTION INTRAMUSCULAR; INTRAVENOUS at 21:07

## 2022-09-05 RX ADMIN — THIAMINE HYDROCHLORIDE 100 MG: 100 INJECTION, SOLUTION INTRAMUSCULAR; INTRAVENOUS at 23:30

## 2022-09-05 RX ADMIN — SODIUM CHLORIDE, POTASSIUM CHLORIDE, SODIUM LACTATE AND CALCIUM CHLORIDE 1000 ML: 600; 310; 30; 20 INJECTION, SOLUTION INTRAVENOUS at 21:02

## 2022-09-05 RX ADMIN — SODIUM CHLORIDE 1000 ML: 9 INJECTION, SOLUTION INTRAVENOUS at 18:15

## 2022-09-05 RX ADMIN — PIPERACILLIN AND TAZOBACTAM 4.5 G: 4; .5 INJECTION, POWDER, LYOPHILIZED, FOR SOLUTION INTRAVENOUS; PARENTERAL at 21:25

## 2022-09-05 RX ADMIN — MAGNESIUM SULFATE HEPTAHYDRATE 3 G: 500 INJECTION, SOLUTION INTRAMUSCULAR; INTRAVENOUS at 23:30

## 2022-09-05 RX ADMIN — IOHEXOL 50 ML: 350 INJECTION, SOLUTION INTRAVENOUS at 20:30

## 2022-09-05 RX ADMIN — METOPROLOL TARTRATE 25 MG: 25 TABLET, FILM COATED ORAL at 22:57

## 2022-09-05 ASSESSMENT — COPD QUESTIONNAIRES
HAVE YOU SMOKED AT LEAST 100 CIGARETTES IN YOUR ENTIRE LIFE: YES
DO YOU EVER COUGH UP ANY MUCUS OR PHLEGM?: NO/ONLY WITH OCCASIONAL COLDS OR INFECTIONS
DURING THE PAST 4 WEEKS HOW MUCH DID YOU FEEL SHORT OF BREATH: NONE/LITTLE OF THE TIME
COPD SCREENING SCORE: 4

## 2022-09-05 ASSESSMENT — FIBROSIS 4 INDEX: FIB4 SCORE: 1.07

## 2022-09-05 NOTE — ED TRIAGE NOTES
"Chief Complaint   Patient presents with    Shortness of Breath     Patient attended the rib cook off followed by an outing with daughter. Per daughter, patient all of a sudden began shivering and her lips were quivering. Patient stated at this time she felt dizzy as well as short of breath. Patient states she still feels jittery and feels as if she was \"shaking on the inside.\" Patient was recently told she had blood clots in her legs. Takes daily aspirin. Patient was recently on a blood thinner.     Dizziness     Endorses weakness during her period of dizziness.        "

## 2022-09-06 ENCOUNTER — APPOINTMENT (OUTPATIENT)
Dept: RADIOLOGY | Facility: MEDICAL CENTER | Age: 82
DRG: 871 | End: 2022-09-06
Attending: INTERNAL MEDICINE
Payer: MEDICARE

## 2022-09-06 ENCOUNTER — APPOINTMENT (OUTPATIENT)
Dept: CARDIOLOGY | Facility: MEDICAL CENTER | Age: 82
DRG: 871 | End: 2022-09-06
Attending: INTERNAL MEDICINE
Payer: MEDICARE

## 2022-09-06 PROBLEM — E83.42 HYPOMAGNESEMIA: Status: ACTIVE | Noted: 2022-09-06

## 2022-09-06 PROBLEM — I35.1 AORTIC INSUFFICIENCY: Status: ACTIVE | Noted: 2022-09-06

## 2022-09-06 PROBLEM — N28.1 RENAL CYST, LEFT: Status: ACTIVE | Noted: 2022-09-06

## 2022-09-06 PROBLEM — I95.9 HYPOTENSION: Status: ACTIVE | Noted: 2022-09-06

## 2022-09-06 PROBLEM — A41.9 SEPTIC SHOCK (HCC): Status: ACTIVE | Noted: 2022-09-06

## 2022-09-06 PROBLEM — N39.0 UTI (URINARY TRACT INFECTION): Status: ACTIVE | Noted: 2022-09-06

## 2022-09-06 PROBLEM — R79.89 ELEVATED LACTIC ACID LEVEL: Status: ACTIVE | Noted: 2022-09-06

## 2022-09-06 PROBLEM — R78.81 GRAM-NEGATIVE BACTEREMIA: Status: ACTIVE | Noted: 2022-09-06

## 2022-09-06 PROBLEM — R93.89 ABNORMAL CT OF THE CHEST: Status: ACTIVE | Noted: 2022-09-06

## 2022-09-06 PROBLEM — N12 PYELONEPHRITIS: Status: ACTIVE | Noted: 2022-09-06

## 2022-09-06 PROBLEM — R79.89 ELEVATED TROPONIN: Status: ACTIVE | Noted: 2022-09-06

## 2022-09-06 PROBLEM — R65.21 SEPTIC SHOCK (HCC): Status: ACTIVE | Noted: 2022-09-06

## 2022-09-06 LAB
ALBUMIN SERPL BCP-MCNC: 2.9 G/DL (ref 3.2–4.9)
ALBUMIN/GLOB SERPL: 1.2 G/DL
ALP SERPL-CCNC: 179 U/L (ref 30–99)
ALT SERPL-CCNC: 29 U/L (ref 2–50)
ANION GAP SERPL CALC-SCNC: 15 MMOL/L (ref 7–16)
ANISOCYTOSIS BLD QL SMEAR: ABNORMAL
AST SERPL-CCNC: 30 U/L (ref 12–45)
BASOPHILS # BLD AUTO: 0 % (ref 0–1.8)
BASOPHILS # BLD: 0 K/UL (ref 0–0.12)
BILIRUB SERPL-MCNC: 1 MG/DL (ref 0.1–1.5)
BUN SERPL-MCNC: 21 MG/DL (ref 8–22)
CALCIUM SERPL-MCNC: 7.9 MG/DL (ref 8.5–10.5)
CHLORIDE SERPL-SCNC: 101 MMOL/L (ref 96–112)
CO2 SERPL-SCNC: 15 MMOL/L (ref 20–33)
CORTIS SERPL-MCNC: 77.7 UG/DL (ref 0–23)
CREAT SERPL-MCNC: 1.36 MG/DL (ref 0.5–1.4)
EOSINOPHIL # BLD AUTO: 0 K/UL (ref 0–0.51)
EOSINOPHIL NFR BLD: 0 % (ref 0–6.9)
ERYTHROCYTE [DISTWIDTH] IN BLOOD BY AUTOMATED COUNT: 64.9 FL (ref 35.9–50)
FLUAV RNA SPEC QL NAA+PROBE: NEGATIVE
FLUBV RNA SPEC QL NAA+PROBE: NEGATIVE
GFR SERPLBLD CREATININE-BSD FMLA CKD-EPI: 39 ML/MIN/1.73 M 2
GLOBULIN SER CALC-MCNC: 2.5 G/DL (ref 1.9–3.5)
GLUCOSE SERPL-MCNC: 111 MG/DL (ref 65–99)
HCT VFR BLD AUTO: 29.7 % (ref 37–47)
HGB BLD-MCNC: 9.6 G/DL (ref 12–16)
LACTATE SERPL-SCNC: 2.8 MMOL/L (ref 0.5–2)
LV EJECT FRACT  99904: 65
LV EJECT FRACT MOD 2C 99903: 69.52
LV EJECT FRACT MOD 4C 99902: 65.66
LV EJECT FRACT MOD BP 99901: 66.89
LYMPHOCYTES # BLD AUTO: 1.47 K/UL (ref 1–4.8)
LYMPHOCYTES NFR BLD: 4.3 % (ref 22–41)
MACROCYTES BLD QL SMEAR: ABNORMAL
MAGNESIUM SERPL-MCNC: 2.3 MG/DL (ref 1.5–2.5)
MANUAL DIFF BLD: NORMAL
MCH RBC QN AUTO: 31 PG (ref 27–33)
MCHC RBC AUTO-ENTMCNC: 32.3 G/DL (ref 33.6–35)
MCV RBC AUTO: 95.8 FL (ref 81.4–97.8)
MONOCYTES # BLD AUTO: 0 K/UL (ref 0–0.85)
MONOCYTES NFR BLD AUTO: 0 % (ref 0–13.4)
MORPHOLOGY BLD-IMP: NORMAL
NEUTROPHILS # BLD AUTO: 32.73 K/UL (ref 2–7.15)
NEUTROPHILS NFR BLD: 95.7 % (ref 44–72)
NRBC # BLD AUTO: 0.02 K/UL
NRBC BLD-RTO: 0.1 /100 WBC
PLATELET # BLD AUTO: 257 K/UL (ref 164–446)
PLATELET BLD QL SMEAR: NORMAL
PMV BLD AUTO: 11.3 FL (ref 9–12.9)
POTASSIUM SERPL-SCNC: 4.6 MMOL/L (ref 3.6–5.5)
PROT SERPL-MCNC: 5.4 G/DL (ref 6–8.2)
RBC # BLD AUTO: 3.1 M/UL (ref 4.2–5.4)
RBC BLD AUTO: PRESENT
RSV RNA SPEC QL NAA+PROBE: NEGATIVE
SARS-COV-2 RNA RESP QL NAA+PROBE: NOTDETECTED
SODIUM SERPL-SCNC: 131 MMOL/L (ref 135–145)
SPECIMEN SOURCE: NORMAL
TROPONIN T SERPL-MCNC: 239 NG/L (ref 6–19)
TROPONIN T SERPL-MCNC: 272 NG/L (ref 6–19)
TROPONIN T SERPL-MCNC: 277 NG/L (ref 6–19)
WBC # BLD AUTO: 34.2 K/UL (ref 4.8–10.8)

## 2022-09-06 PROCEDURE — 96367 TX/PROPH/DG ADDL SEQ IV INF: CPT

## 2022-09-06 PROCEDURE — 96375 TX/PRO/DX INJ NEW DRUG ADDON: CPT

## 2022-09-06 PROCEDURE — 85025 COMPLETE CBC W/AUTO DIFF WBC: CPT

## 2022-09-06 PROCEDURE — 770022 HCHG ROOM/CARE - ICU (200)

## 2022-09-06 PROCEDURE — 700105 HCHG RX REV CODE 258: Performed by: INTERNAL MEDICINE

## 2022-09-06 PROCEDURE — 93306 TTE W/DOPPLER COMPLETE: CPT | Mod: 26 | Performed by: INTERNAL MEDICINE

## 2022-09-06 PROCEDURE — 93356 MYOCRD STRAIN IMG SPCKL TRCK: CPT

## 2022-09-06 PROCEDURE — 84484 ASSAY OF TROPONIN QUANT: CPT | Mod: 91

## 2022-09-06 PROCEDURE — 82533 TOTAL CORTISOL: CPT

## 2022-09-06 PROCEDURE — 80053 COMPREHEN METABOLIC PANEL: CPT

## 2022-09-06 PROCEDURE — 99292 CRITICAL CARE ADDL 30 MIN: CPT | Mod: GC | Performed by: INTERNAL MEDICINE

## 2022-09-06 PROCEDURE — 99291 CRITICAL CARE FIRST HOUR: CPT | Performed by: INTERNAL MEDICINE

## 2022-09-06 PROCEDURE — 700111 HCHG RX REV CODE 636 W/ 250 OVERRIDE (IP): Performed by: INTERNAL MEDICINE

## 2022-09-06 PROCEDURE — 93356 MYOCRD STRAIN IMG SPCKL TRCK: CPT | Performed by: INTERNAL MEDICINE

## 2022-09-06 PROCEDURE — 700101 HCHG RX REV CODE 250: Performed by: INTERNAL MEDICINE

## 2022-09-06 PROCEDURE — 83605 ASSAY OF LACTIC ACID: CPT

## 2022-09-06 PROCEDURE — 85007 BL SMEAR W/DIFF WBC COUNT: CPT

## 2022-09-06 PROCEDURE — A9270 NON-COVERED ITEM OR SERVICE: HCPCS | Performed by: INTERNAL MEDICINE

## 2022-09-06 PROCEDURE — 96366 THER/PROPH/DIAG IV INF ADDON: CPT

## 2022-09-06 PROCEDURE — 700102 HCHG RX REV CODE 250 W/ 637 OVERRIDE(OP): Performed by: INTERNAL MEDICINE

## 2022-09-06 PROCEDURE — 76775 US EXAM ABDO BACK WALL LIM: CPT

## 2022-09-06 PROCEDURE — 83735 ASSAY OF MAGNESIUM: CPT

## 2022-09-06 RX ORDER — SODIUM CHLORIDE 9 MG/ML
500 INJECTION, SOLUTION INTRAVENOUS ONCE
Status: COMPLETED | OUTPATIENT
Start: 2022-09-06 | End: 2022-09-06

## 2022-09-06 RX ORDER — MAGNESIUM SULFATE HEPTAHYDRATE 40 MG/ML
2 INJECTION, SOLUTION INTRAVENOUS ONCE
Status: COMPLETED | OUTPATIENT
Start: 2022-09-06 | End: 2022-09-06

## 2022-09-06 RX ORDER — NOREPINEPHRINE BITARTRATE 0.03 MG/ML
0-30 INJECTION, SOLUTION INTRAVENOUS CONTINUOUS
Status: DISCONTINUED | OUTPATIENT
Start: 2022-09-06 | End: 2022-09-07

## 2022-09-06 RX ORDER — SODIUM CHLORIDE, SODIUM LACTATE, POTASSIUM CHLORIDE, AND CALCIUM CHLORIDE .6; .31; .03; .02 G/100ML; G/100ML; G/100ML; G/100ML
500 INJECTION, SOLUTION INTRAVENOUS ONCE
Status: COMPLETED | OUTPATIENT
Start: 2022-09-06 | End: 2022-09-06

## 2022-09-06 RX ADMIN — NOREPINEPHRINE BITARTRATE 5 MCG/MIN: 1 INJECTION, SOLUTION, CONCENTRATE INTRAVENOUS at 03:37

## 2022-09-06 RX ADMIN — CEFTRIAXONE SODIUM 1 G: 10 INJECTION, POWDER, FOR SOLUTION INTRAVENOUS at 04:33

## 2022-09-06 RX ADMIN — ENOXAPARIN SODIUM 40 MG: 40 INJECTION SUBCUTANEOUS at 17:10

## 2022-09-06 RX ADMIN — FOLIC ACID 1 MG: 1 TABLET ORAL at 05:12

## 2022-09-06 RX ADMIN — FAMOTIDINE 20 MG: 10 INJECTION, SOLUTION INTRAVENOUS at 05:12

## 2022-09-06 RX ADMIN — MAGNESIUM SULFATE HEPTAHYDRATE 2 G: 40 INJECTION, SOLUTION INTRAVENOUS at 09:49

## 2022-09-06 RX ADMIN — HYDROCORTISONE SODIUM SUCCINATE 50 MG: 100 INJECTION, POWDER, FOR SOLUTION INTRAMUSCULAR; INTRAVENOUS at 00:55

## 2022-09-06 RX ADMIN — LEVOTHYROXINE SODIUM 50 MCG: 0.05 TABLET ORAL at 05:12

## 2022-09-06 RX ADMIN — SODIUM CHLORIDE 500 ML: 9 INJECTION, SOLUTION INTRAVENOUS at 01:46

## 2022-09-06 RX ADMIN — SIMVASTATIN 10 MG: 10 TABLET, FILM COATED ORAL at 17:09

## 2022-09-06 RX ADMIN — SODIUM CHLORIDE, POTASSIUM CHLORIDE, SODIUM LACTATE AND CALCIUM CHLORIDE 500 ML: 600; 310; 30; 20 INJECTION, SOLUTION INTRAVENOUS at 00:30

## 2022-09-06 RX ADMIN — CETIRIZINE HYDROCHLORIDE 10 MG: 10 TABLET, FILM COATED ORAL at 05:11

## 2022-09-06 ASSESSMENT — COGNITIVE AND FUNCTIONAL STATUS - GENERAL
MOBILITY SCORE: 18
CLIMB 3 TO 5 STEPS WITH RAILING: A LITTLE
SUGGESTED CMS G CODE MODIFIER DAILY ACTIVITY: CK
WALKING IN HOSPITAL ROOM: A LITTLE
SUGGESTED CMS G CODE MODIFIER MOBILITY: CK
TOILETING: A LITTLE
DRESSING REGULAR UPPER BODY CLOTHING: A LITTLE
HELP NEEDED FOR BATHING: A LITTLE
TURNING FROM BACK TO SIDE WHILE IN FLAT BAD: A LITTLE
MOVING FROM LYING ON BACK TO SITTING ON SIDE OF FLAT BED: A LITTLE
PERSONAL GROOMING: A LITTLE
DAILY ACTIVITIY SCORE: 18
DRESSING REGULAR LOWER BODY CLOTHING: A LITTLE
STANDING UP FROM CHAIR USING ARMS: A LITTLE
EATING MEALS: A LITTLE
MOVING TO AND FROM BED TO CHAIR: A LITTLE

## 2022-09-06 ASSESSMENT — ENCOUNTER SYMPTOMS
PALPITATIONS: 0
CHILLS: 0
WEIGHT LOSS: 1
HEARTBURN: 0
ABDOMINAL PAIN: 0
VOMITING: 0
BLURRED VISION: 0
DIARRHEA: 0
ORTHOPNEA: 0
NECK PAIN: 0
FOCAL WEAKNESS: 0
DOUBLE VISION: 0
FEVER: 0
COUGH: 0
HEMOPTYSIS: 0
FLANK PAIN: 0
HEADACHES: 0
NERVOUS/ANXIOUS: 0
NAUSEA: 0
BRUISES/BLEEDS EASILY: 0
POLYDIPSIA: 0
CHILLS: 1
SHORTNESS OF BREATH: 0
PHOTOPHOBIA: 0
SPUTUM PRODUCTION: 0
CONSTIPATION: 0
SHORTNESS OF BREATH: 1
BACK PAIN: 0
TREMORS: 0
SPEECH CHANGE: 0
HALLUCINATIONS: 0

## 2022-09-06 ASSESSMENT — LIFESTYLE VARIABLES
HAVE PEOPLE ANNOYED YOU BY CRITICIZING YOUR DRINKING: NO
TOTAL SCORE: 0
HOW MANY TIMES IN THE PAST YEAR HAVE YOU HAD 5 OR MORE DRINKS IN A DAY: 0
AVERAGE NUMBER OF DAYS PER WEEK YOU HAVE A DRINK CONTAINING ALCOHOL: 7
ON A TYPICAL DAY WHEN YOU DRINK ALCOHOL HOW MANY DRINKS DO YOU HAVE: 1
EVER FELT BAD OR GUILTY ABOUT YOUR DRINKING: NO
TOTAL SCORE: 0
ALCOHOL_USE: YES
TOTAL SCORE: 0
SUBSTANCE_ABUSE: 0
DOES PATIENT WANT TO STOP DRINKING: NO
HAVE YOU EVER FELT YOU SHOULD CUT DOWN ON YOUR DRINKING: NO
CONSUMPTION TOTAL: NEGATIVE
EVER HAD A DRINK FIRST THING IN THE MORNING TO STEADY YOUR NERVES TO GET RID OF A HANGOVER: NO

## 2022-09-06 ASSESSMENT — PATIENT HEALTH QUESTIONNAIRE - PHQ9
1. LITTLE INTEREST OR PLEASURE IN DOING THINGS: NOT AT ALL
SUM OF ALL RESPONSES TO PHQ9 QUESTIONS 1 AND 2: 0
2. FEELING DOWN, DEPRESSED, IRRITABLE, OR HOPELESS: NOT AT ALL

## 2022-09-06 ASSESSMENT — PAIN DESCRIPTION - PAIN TYPE: TYPE: ACUTE PAIN

## 2022-09-06 ASSESSMENT — FIBROSIS 4 INDEX: FIB4 SCORE: 1.73

## 2022-09-06 NOTE — ED NOTES
Lab called with critical result of Troponin 272 at 0358. Critical lab result read back to lab.   Dr. Calabrese notified of critical lab result at 0358.  Critical lab result read back by Dr. Calabrese.

## 2022-09-06 NOTE — H&P
"Hospital Medicine History & Physical Note    Date of Service  9/5/2022    Primary Care Physician  Luz Vieira D.O.      Code Status  Full Code    Chief Complaint  Chief Complaint   Patient presents with    Shortness of Breath     Patient attended the [facility] cook off followed by an outing with daughter. Per daughter, patient all of a sudden began shivering and her lips were quivering. Patient stated at this time she felt dizzy as well as short of breath. Patient states she still feels jittery and feels as if she was \"shaking on the inside.\" Patient was recently told she had blood clots in her legs. Takes daily aspirin. Patient was recently on a blood thinner.     Dizziness     Endorses weakness during her period of dizziness.        History of Presenting Illness  Mariely Easley is a 82 y.o. female with past medical history of hypertension, superficial vein thrombosis, who presented 9/5/2022 with complaints of acute onset shivering, subjective shortness of breath, dizziness, chills that started in [place].  Patient admits having dysuria in the last 3 days.  She was feeling okay when she woke up.  She is experiencing mild nausea, dry heaves, denies abdominal pain..  Patient was evaluated with CTA of pulmonary artery for possible PE, while she was started on heparin empirically.  CTA of pulmonary artery, as well as bilateral lower extremity DVT came back negative for DVT.  Patient noted to have fever 100.7, tachycardia 131, tachypnea 22, therefore started on antibiotics IV Zosyn and IV fluids for sepsis.  UA came back infected.  Patient felt better after fluids and antibiotics.  Lactic acid went up from 1.7->4.8 and blood pressure became soft 86/47, patient is asymptomatic, comfortable..    I discussed the plan of care with patient.    Review of Systems  Review of Systems   Constitutional:  Positive for chills, malaise/fatigue and weight loss. Negative for fever.   HENT:  Negative for ear pain, hearing loss " and tinnitus.    Eyes:  Negative for blurred vision, double vision and photophobia.   Respiratory:  Positive for shortness of breath. Negative for cough, hemoptysis and sputum production.    Cardiovascular:  Negative for chest pain, palpitations and orthopnea.   Gastrointestinal:  Negative for heartburn, nausea and vomiting.   Genitourinary:  Positive for dysuria. Negative for flank pain, frequency and hematuria.   Musculoskeletal:  Negative for back pain, joint pain and neck pain.   Skin:  Negative for itching and rash.   Neurological:  Negative for tremors, speech change, focal weakness and headaches.   Endo/Heme/Allergies:  Negative for environmental allergies and polydipsia. Does not bruise/bleed easily.   Psychiatric/Behavioral:  Negative for hallucinations and substance abuse. The patient is not nervous/anxious.      Past Medical History   has a past medical history of Hypertension and Pain and swelling of left lower extremity (5/18/2022).    Surgical History   has no past surgical history on file.     Family History     Family history reviewed with patient. There is no family history that is pertinent to the chief complaint.     Social History   reports that she quit smoking about 43 years ago. She has never used smokeless tobacco. She reports current alcohol use of about 8.4 oz per week. She reports that she does not use drugs.    Allergies  No Known Allergies    Medications  Prior to Admission Medications   Prescriptions Last Dose Informant Patient Reported? Taking?   Naloxone (NARCAN) 4 MG/0.1ML Liquid   No No   Sig: One spray in one nostril for overdose and call 911.   XARELTO STARTER PACK 15 & 20 MG Tablet Therapy Pack   Yes No   Sig: AS DIRECTED PER PACKAGE INSTRUCTIONS   aspirin EC (ECOTRIN) 81 MG Tablet Delayed Response   Yes No   Sig: Take 81 mg by mouth every day.   Patient not taking: Reported on 8/9/2022   cetirizine (ZYRTEC) 10 MG Tab   No No   Sig: Take 1 Tablet by mouth every day.   fluticasone  (FLONASE) 50 MCG/ACT nasal spray   No No   Sig: Administer 1 Spray into affected nostril(S) every day. *Follow up needed for additional refills*   folic acid (FOLVITE) 1 MG Tab   No No   Sig: Take 1 Tablet by mouth every day.   furosemide (LASIX) 20 MG Tab   No No   Sig: Take 1 Tablet by mouth 2 times a day.   levothyroxine (SYNTHROID) 50 MCG Tab   No No   Sig: Take 1 Tab by mouth Every morning on an empty stomach.   losartan (COZAAR) 50 MG Tab   No No   Sig: TAKE 1 TABLET BY MOUTH EVERY DAY   metoprolol tartrate (LOPRESSOR) 25 MG Tab   No No   Sig: TAKE 1 TABLET BY MOUTH TWICE A DAY   simvastatin (ZOCOR) 10 MG Tab   No No   Sig: Take 1 Tablet by mouth every evening. *Appointment needed for additional refills*      Facility-Administered Medications: None       Physical Exam  Temp:  [37.4 °C (99.4 °F)-38.2 °C (100.7 °F)] 38.2 °C (100.7 °F)  Pulse:  [110-131] 116  Resp:  [18-21] 18  BP: (124-137)/(57-78) 128/60  SpO2:  [94 %-95 %] 95 %  Blood Pressure : 128/60   Temperature: (!) 38.2 °C (100.7 °F)   Pulse: (!) 116   Respiration: 18   Pulse Oximetry: 95 %       Physical Exam  Vitals and nursing note reviewed.   Constitutional:       General: She is in acute distress.      Appearance: Normal appearance. She is ill-appearing.   HENT:      Head: Normocephalic and atraumatic.      Nose: Nose normal.      Mouth/Throat:      Mouth: Mucous membranes are moist.   Eyes:      Extraocular Movements: Extraocular movements intact.      Pupils: Pupils are equal, round, and reactive to light.   Cardiovascular:      Rate and Rhythm: Regular rhythm. Tachycardia present.   Pulmonary:      Effort: Pulmonary effort is normal.      Breath sounds: Normal breath sounds.   Abdominal:      General: Abdomen is flat. There is no distension.      Tenderness: There is no abdominal tenderness.   Musculoskeletal:         General: No swelling or deformity. Normal range of motion.      Cervical back: Normal range of motion and neck supple.   Skin:      General: Skin is warm and dry.   Neurological:      General: No focal deficit present.      Mental Status: She is alert and oriented to person, place, and time.      Comments: shivering   Psychiatric:         Mood and Affect: Mood normal.         Behavior: Behavior normal.       Laboratory:  Recent Labs     09/05/22  1657   WBC 5.8   RBC 3.56*   HEMOGLOBIN 11.1*   HEMATOCRIT 33.6*   MCV 94.4   MCH 31.2   MCHC 33.0*   RDW 63.5*   PLATELETCT 262   MPV 11.1     Recent Labs     09/05/22  1657   SODIUM 132*   POTASSIUM 4.3   CHLORIDE 99   CO2 16*   GLUCOSE 83   BUN 22   CREATININE 1.27   CALCIUM 8.8     Recent Labs     09/05/22  1657   ALTSGPT 14   ASTSGOT 12   ALKPHOSPHAT 185*   TBILIRUBIN 0.6   GLUCOSE 83     Recent Labs     09/05/22  1818   APTT 31.3   INR 1.17*     Recent Labs     09/05/22  1818   NTPROBNP 1727*         Recent Labs     09/05/22  1657 09/05/22  1818   TROPONINT 24* 43*       Imaging:  CT-CTA CHEST PULMONARY ARTERY W/ RECONS   Final Result         1. No CT evidence of pulmonary embolism.      2. Ill-defined interlobular septal thickening throughout, most in the periphery and lung bases, similar to prior, likely chronic interstitial lung disease.      DX-CHEST-PORTABLE (1 VIEW)   Final Result         Ill-defined opacifications in each lung have increased compared to the prior radiograph.  This could indicate worsening of pulmonary edema or inflammation.      US-EXTREMITY VENOUS LOWER UNILAT LEFT    (Results Pending)   EC-ECHOCARDIOGRAM COMPLETE W/O CONT    (Results Pending)       X-Ray:  I have personally reviewed the images and compared with prior images.    Assessment/Plan:  Justification for Admission Status  I anticipate this patient is appropriate for observation status at this time because UTI, sepsis    Patient will need a  bed on EMERGENCY service .  The need is secondary to UTI, sepsis.    * Sepsis (HCC)- (present on admission)  Assessment & Plan  This is Severe Sepsis Present on  admission  SIRS criteria identified on my evaluation include: Tachycardia, with heart rate greater than 90 BPM and Tachypnea, with respirations greater than 20 per minute  Clinical indicators of end organ dysfunction include Systolic blood pressure (SBP) <90 mmHg or mean arterial pressure <65 mmHg  Source is urinary  Sepsis protocol initiated  Crystalloid Fluid Administration: Fluid resuscitation ordered per standard protocol - 30 mL/kg per current or ideal body weight  IV antibiotics as appropriate for source of sepsis  Reassessment: I have reassessed the patient's hemodynamic status    Abnormal CT of the chest  Assessment & Plan  CTA pulmonary artery, negative for PE  . Ill-defined interlobular septal thickening throughout, most in the periphery and lung bases, similar to prior, likely chronic interstitial lung disease.  On room air  COVID-19/influenza screen is pending  Follow-up as outpatient with repeat images    Elevated troponin  Assessment & Plan  Denies prior history of heart disease.  CTA pulmonary artery negative for PE.  Denies chest pain, likely type II myocardial injury secondary to sepsis.  Rule out underlying cardiomyopathy and CAD.  Plan: Monitor on telemetry, repeat troponin, transthoracic echo    UTI (urinary tract infection)  Assessment & Plan  UA as noted positive for nitrates, leukocyte esterase, pyuria, bacteria.  Review of systems positive for dysuria.  Presented with sepsis  Continue Zosyn  Obtain urine culture    Hypotension  Assessment & Plan  Query secondary to sepsis and possible relative adrenal insufficiency  Received sepsis bolus at this time.  Will give additional 500 cc and empirically IV hydrocortisone and thiamine  Monitor blood pressure every 4 hours        CKD (chronic kidney disease) stage 3, GFR 30-59 ml/min ([facility])- (present on admission)  Assessment & Plan  Avoid nephrotoxins      Acquired hypothyroidism- (present on admission)  Assessment & Plan  Continue levothyroxine  TSH  within normal limits.    Essential hypertension  Assessment & Plan  We will hold blood pressure medications losartan and Lasix due to soft blood pressure.  We will continue metoprolol to avoid rebound tachycardia      VTE prophylaxis: enoxaparin ppx

## 2022-09-06 NOTE — ASSESSMENT & PLAN NOTE
This is Septic shock Present on admission  SIRS criteria identified on my evaluation include: Fever, with temperature greater than 101 deg F, Tachycardia, with heart rate greater than 90 BPM and Tachypnea, with respirations greater than 20 per minute  Indicators of septic shock include: Severe sepsis present and persistent hypotension after 30 ml/kg completed.   Sources is: urine  Sepsis protocol initiated  Crystalloid Fluid Administration: Fluid resuscitation ordered per standard protocol - 30 mL/kg per current or ideal body weight  IV antibiotics as appropriate for source of sepsis  Reassessment: I have reassessed the patient's hemodynamic status  - Blood culture positive for GNR - continue Ceftriaxone  - MAP > 65, currently off pressors but restart if needed    - If on pressors overnight, switch to Midodrine (10 mg, TID)

## 2022-09-06 NOTE — ASSESSMENT & PLAN NOTE
UA with nitrite, LE, bacteria   Associated septic shock  - UCx and renal US pending, blood culture positive for GNR  - Continue C3

## 2022-09-06 NOTE — PROGRESS NOTES
"ICU Daily Progress Note    Date of Service  9/6/2022    R Team: [facility] ICU Gold Team   Attending: GERRY Turner Jr.o.  Senior Resident: Dr. Roy  Contact Number: [phone]    Chief Complaint  Mariely Easley is a 82 y.o. female admitted 9/5/2022 with UTI, complicated by hypotension and thus transferred to ICU on 9/6 early morning for septic shock, managed with pressors.     Hospital Course  \"82 y.o. female with a history of HTN and hypothyroidism who presented 9/5/2022 with fevers, chills, light-headedness. The ED evaluation was notable for initial lactate 1.7, CTPA negative for PE/no significant infiltrates, UA concerning for infection. She was admitted to telemetry after she was given zosyn and IVF feeling better however developed hypotension and elevated lactated while waiting in the ER. I was consulted for septic shock requiring vasopressors.      She currently feels improved on levophed, she has mild malaise but otherwise denies SOB, CP, HA, abdominal pain. 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 Calabrese    9/6: BP doing much better, currently off pressors. No current complaints at this time.     Interval Problem Update     Transferred from floors to ICU for septic shock, initially requiring pressors for low BP. BP currently within normal range, even after discontinuing pressors. Denies any complaints at this time. Blood cultures positive for GNR - continue Ceftriaxone.     Consultants/Specialty  None    Code Status  Full Code    Disposition  Patient is not medically cleared for discharge.   Anticipate discharge to to home with close outpatient follow-up.  I have placed the appropriate orders for post-discharge needs.    Review of Systems  Review of Systems   Constitutional:  Negative for chills and fever.   HENT:  Negative for hearing loss and tinnitus.    Eyes:  Negative for blurred vision and double " vision.   Respiratory:  Negative for cough and shortness of breath.    Cardiovascular:  Negative for chest pain and palpitations.   Gastrointestinal:  Negative for abdominal pain, constipation, diarrhea, nausea and vomiting.   Genitourinary:  Positive for dysuria. Negative for flank pain, frequency and hematuria.   Neurological:  Negative for headaches.   Psychiatric/Behavioral:  The patient is not nervous/anxious.       Physical Exam  Temp:  [35.9 °C (96.7 °F)-38.2 °C (100.7 °F)] 35.9 °C (96.7 °F)  Pulse:  [] 82  Resp:  [17-36] 35  BP: ()/(45-78) 107/52  SpO2:  [91 %-97 %] 94 %    Physical Exam  Vitals reviewed.   Constitutional:       Appearance: She is ill-appearing.   HENT:      Head: Normocephalic and atraumatic.      Right Ear: External ear normal.      Left Ear: External ear normal.      Nose: Nose normal.      Mouth/Throat:      Mouth: Mucous membranes are moist.   Eyes:      Pupils: Pupils are equal, round, and reactive to light.   Cardiovascular:      Rate and Rhythm: Normal rate and regular rhythm.      Pulses: Normal pulses.   Pulmonary:      Effort: Pulmonary effort is normal. No respiratory distress.   Abdominal:      General: Abdomen is flat. There is no distension.      Palpations: Abdomen is soft.      Tenderness: There is no abdominal tenderness. There is no guarding or rebound.   Musculoskeletal:      Right lower leg: Edema (1+, pitting) present.      Left lower leg: Edema (1+, pititng) present.      Comments: Left calf > R calf which she says is baseline; no crepitus or fluctuance. No significant pain to palpation    Skin:     General: Skin is warm and dry.      Capillary Refill: Capillary refill takes 2 to 3 seconds.   Neurological:      General: No focal deficit present.      Mental Status: She is alert.      Motor: No weakness.       Fluids    Intake/Output Summary (Last 24 hours) at 9/6/2022 1149  Last data filed at 9/6/2022 1000  Gross per 24 hour   Intake 3469.44 ml   Output 400  ml   Net 3069.44 ml       Laboratory  Recent Labs     09/05/22  1657 09/06/22  0315   WBC 5.8 34.2*   RBC 3.56* 3.10*   HEMOGLOBIN 11.1* 9.6*   HEMATOCRIT 33.6* 29.7*   MCV 94.4 95.8   MCH 31.2 31.0   MCHC 33.0* 32.3*   RDW 63.5* 64.9*   PLATELETCT 262 257   MPV 11.1 11.3     Recent Labs     09/05/22  1657 09/06/22  0315   SODIUM 132* 131*   POTASSIUM 4.3 4.6   CHLORIDE 99 101   CO2 16* 15*   GLUCOSE 83 111*   BUN 22 21   CREATININE 1.27 1.36   CALCIUM 8.8 7.9*     Recent Labs     09/05/22  1818   APTT 31.3   INR 1.17*               Imaging  EC-ECHOCARDIOGRAM COMPLETE W/O CONT   Final Result      US-RENAL   Final Result      1.  No hydronephrosis. No renal calculus.   2.  A 1.3 cm left renal cyst.      US-EXTREMITY VENOUS LOWER UNILAT LEFT   Final Result      CT-CTA CHEST PULMONARY ARTERY W/ RECONS   Final Result         1. No CT evidence of pulmonary embolism.      2. Ill-defined interlobular septal thickening throughout, most in the periphery and lung bases, similar to prior, likely chronic interstitial lung disease.      DX-CHEST-PORTABLE (1 VIEW)   Final Result         Ill-defined opacifications in each lung have increased compared to the prior radiograph.  This could indicate worsening of pulmonary edema or inflammation.           Assessment/Plan  Problem Representation:    * Septic shock (HCC)- (present on admission)  Assessment & Plan  This is Septic shock Present on admission  SIRS criteria identified on my evaluation include: Fever, with temperature greater than 101 deg F, Tachycardia, with heart rate greater than 90 BPM and Tachypnea, with respirations greater than 20 per minute  Indicators of septic shock include: Severe sepsis present and persistent hypotension after 30 ml/kg completed.   Sources is: urine  Sepsis protocol initiated  Crystalloid Fluid Administration: Fluid resuscitation ordered per standard protocol - 30 mL/kg per current or ideal body weight  IV antibiotics as appropriate for source of  sepsis  Reassessment: I have reassessed the patient's hemodynamic status  - Blood culture positive for GNR - continue Ceftriaxone  - MAP > 65, currently off pressors but restart if needed    - If on pressors overnight, switch to Midodrine (10 mg, TID)    Aortic insufficiency- (present on admission)  Assessment & Plan  Moderate AI  F/U as an outpatient    Hypomagnesemia- (present on admission)  Assessment & Plan  1.3, given 5g of Mag Sulfate  Improved to 2.3    Gram-negative bacteremia- (present on admission)  Assessment & Plan  Due to pyelo - continue rocephin x7 day    Renal cyst, left- (present on admission)  Assessment & Plan  Patient notified of incidental finding    Pyelonephritis- (present on admission)  Assessment & Plan  UA with nitrite, LE, bacteria   Associated septic shock  - UCx and renal US pending, blood culture positive for GNR  - Continue C3    Elevated lactic acid level- (present on admission)  Assessment & Plan  - Likely 2/2 septic shock  - Now resolved with IF  - MAP goal > 65    Abnormal CT of the chest- (present on admission)  Assessment & Plan  Thickening of the intra-bronchial septum  Outpatient f/u    Elevated troponin  Assessment & Plan  - Troponin elevated to 272  - No cardiac symptoms, normal EKG on Tele  - Likely 2/2 demand ischemia  - Appears to be stabilizing, will recheck until numbers start to decrease    CKD (chronic kidney disease) stage 3, GFR 30-59 ml/min ([facility])- (present on admission)  Assessment & Plan  Renal dose meds, avoid nephrotoxins  Strict I/Os  Follow renal function    Mixed hyperlipidemia- (present on admission)  Assessment & Plan  Continue statin    Acquired hypothyroidism- (present on admission)  Assessment & Plan  Continue home synthroid    Essential hypertension- (present on admission)  Assessment & Plan  - Hold for now, plan to restart tomorrow if BPs are elevated       VTE prophylaxis: enoxaparin ppx  GI prophylaxis:  None

## 2022-09-06 NOTE — CONSULTS
Critical Care Consultation    Date of consult: 9/6/2022    Referring Physician  Hay Stout M.D.    Reason for Consultation  Septic Shock    History of Presenting Illness  82 y.o. female with a history of HTN and hypothyroidism who presented 9/5/2022 with fevers, chills, light-headedness. The ED evaluation was notable for initial lactate 1.7, CTPA negative for PE/no significant infiltrates, UA concerning for infection. She was admitted to telemetry after she was given zosyn and IVF feeling better however developed hypotension and elevated lactated while waiting in the ER. I was consulted for septic shock requiring vasopressors.     She currently feels improved on levophed, she has mild malaise but otherwise denies SOB, CP, HA, abdominal pain. 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    Code Status  Full Code    Review of Systems  Review of Systems   Unable to perform ROS: Critical illness     Past Medical History   has a past medical history of Hypertension and Pain and swelling of left lower extremity (5/18/2022).    Surgical History   has no past surgical history on file.    Family History  family history is not on file.    Social History   reports that she quit smoking about 43 years ago. She has never used smokeless tobacco. She reports current alcohol use of about 8.4 oz per week. She reports that she does not use drugs.    Medications  Home Medications       Reviewed by Mariam Glover (Pharmacy Tech) on 09/05/22 at 2221  Med List Status: Complete     Medication Last Dose Status   aspirin EC (ECOTRIN) 325 MG Tablet Delayed Response 9/5/2022 Active   cetirizine (ZYRTEC) 10 MG Tab > 1 week Active   fluticasone (FLONASE) 50 MCG/ACT nasal spray > 1 week Active   folic acid (FOLVITE) 1 MG Tab 9/5/2022 Active   furosemide (LASIX) 20 MG Tab 9/5/2022 Active   levothyroxine (SYNTHROID) 50 MCG Tab 9/5/2022 Active   losartan  (COZAAR) 50 MG Tab 9/5/2022 Active   metoprolol tartrate (LOPRESSOR) 25 MG Tab 9/5/2022 Active   simvastatin (ZOCOR) 10 MG Tab 9/4/2022 Active                  Current Facility-Administered Medications   Medication Dose Route Frequency Provider Last Rate Last Admin    norepinephrine (Levophed) 8 mg in 250 mL NS infusion (premix)  0-30 mcg/min Intravenous Continuous Swapnil Calabrese M.D. 28.1 mL/hr at 09/06/22 0355 15 mcg/min at 09/06/22 0355    cefTRIAXone (Rocephin) syringe 1 g  1 g Intravenous Q24HRS Swapnil Calabrese M.D.        MD Alert...ICU Electrolyte Replacement per Pharmacy   Other PHARMACY TO DOSE Swapnil Calabrese M.D.        famotidine (PEPCID) injection 20 mg  20 mg Intravenous DAILY Hay Stout M.D.        senna-docusate (PERICOLACE or SENOKOT S) 8.6-50 MG per tablet 2 Tablet  2 Tablet Oral BID Hay Stout M.D.        And    polyethylene glycol/lytes (MIRALAX) PACKET 1 Packet  1 Packet Oral QDAY PRN Hay Stout M.D.        And    magnesium hydroxide (MILK OF MAGNESIA) suspension 30 mL  30 mL Oral QDAY PRN Hay Stout M.D.        And    bisacodyl (DULCOLAX) suppository 10 mg  10 mg Rectal QDAY PRN Hay Stout M.D.        Respiratory Therapy Consult   Nebulization Continuous RT Hay Stout M.D.        enoxaparin (Lovenox) inj 40 mg  40 mg Subcutaneous DAILY AT 1800 Hay Stout M.D.        acetaminophen (Tylenol) tablet 650 mg  650 mg Oral Q6HRS PRN Hay Stout M.D.        Pharmacy Consult Request ...Pain Management Review 1 Each  1 Each Other PHARMACY TO DOSE Hay Stout M.D.        ondansetron (ZOFRAN) syringe/vial injection 4 mg  4 mg Intravenous Q4HRS PRN Hay Stout M.D.        ondansetron (ZOFRAN ODT) dispertab 4 mg  4 mg Oral Q4HRS PRN Hay Stout M.D.        cetirizine (ZYRTEC) tablet 10 mg  10 mg Oral DAILY Hay Stout M.D.        fluticasone (FLONASE) nasal spray 50 mcg  1 Spray Nasal DAILY Hay Stout M.D.         folic acid (FOLVITE) tablet 1 mg  1 mg Oral DAILY Hay Stout M.D.        levothyroxine (SYNTHROID) tablet 50 mcg  50 mcg Oral AM ES Hay Stout M.D.        simvastatin (ZOCOR) tablet 10 mg  10 mg Oral Q EVENING Hay Stout M.D.   10 mg at 09/05/22 2257    potassium phosphate IVPB 30 mmol in 500 mL D5W (premix)  30 mmol Intravenous Once Hay Stout M.D. 83.3 mL/hr at 09/05/22 2330 30 mmol at 09/05/22 2330       Allergies  No Known Allergies    Vital Signs last 24 hours  Temp:  [36.2 °C (97.2 °F)-38.2 °C (100.7 °F)] 36.4 °C (97.6 °F)  Pulse:  [] 89  Resp:  [18-28] 28  BP: ()/(45-78) 121/58  SpO2:  [91 %-97 %] 95 %    Physical Exam  Physical Exam  Vitals reviewed.   Constitutional:       Appearance: She is ill-appearing.   HENT:      Head: Normocephalic and atraumatic.      Right Ear: External ear normal.      Left Ear: External ear normal.      Nose: Nose normal.      Mouth/Throat:      Mouth: Mucous membranes are moist.   Eyes:      Pupils: Pupils are equal, round, and reactive to light.   Cardiovascular:      Rate and Rhythm: Normal rate and regular rhythm.      Pulses: Normal pulses.   Pulmonary:      Effort: Pulmonary effort is normal. No respiratory distress.   Abdominal:      General: Abdomen is flat. There is no distension.      Palpations: Abdomen is soft.      Tenderness: There is no abdominal tenderness. There is no guarding or rebound.   Musculoskeletal:      Right lower leg: Edema present.      Left lower leg: Edema present.      Comments: Left calf > R calf which she says is baseline; no crepitus or fluctuance. No significant pain to palpation    Skin:     General: Skin is warm and dry.      Capillary Refill: Capillary refill takes 2 to 3 seconds.   Neurological:      General: No focal deficit present.      Mental Status: She is alert.      Motor: No weakness.       Fluids    Intake/Output Summary (Last 24 hours) at 9/6/2022 0431  Last data filed at 9/6/2022  0146  Gross per 24 hour   Intake 700 ml   Output --   Net 700 ml       Laboratory  Recent Results (from the past 48 hour(s))   CoV-2, FLU A/B, and RSV by PCR (2-4 Hours CEPHEID) : Collect NP swab in VTM    Collection Time: 22  1:26 AM    Specimen: Respirate   Result Value Ref Range    Influenza virus A RNA Negative Negative    Influenza virus B, PCR Negative Negative    RSV, PCR Negative Negative    SARS-CoV-2 by PCR NotDetected     SARS-CoV-2 Source NP Swab    EKG (NOW)    Collection Time: 22  4:38 PM   Result Value Ref Range    Report       [facility]    Test Date:  2022  Pt Name:    JONES BERMUDEZ                Department: ER  MRN:        [id number]                      Room:  Gender:     Female                       Technician: [id number]  :        [id number]                   Requested By:ER TRIAGE PROTOCOL  Order #:    [id number]                    Reading MD: Teja Mcbride MD    Measurements  Intervals                                Axis  Rate:       129                          P:          66  MN:         168                          QRS:        37  QRSD:       74                           T:          46  QT:         283  QTc:        415    Interpretive Statements  Sinus tachycardia  Electronically Signed On 2022 17:47:25 PDT by Teja Mcbride MD     CBC with Differential    Collection Time: 22  4:57 PM   Result Value Ref Range    WBC 5.8 4.8 - 10.8 K/uL    RBC 3.56 (L) 4.20 - 5.40 M/uL    Hemoglobin 11.1 (L) 12.0 - 16.0 g/dL    Hematocrit 33.6 (L) 37.0 - 47.0 %    MCV 94.4 81.4 - 97.8 fL    MCH 31.2 27.0 - 33.0 pg    MCHC 33.0 (L) 33.6 - 35.0 g/dL    RDW 63.5 (H) 35.9 - 50.0 fL    Platelet Count 262 164 - 446 K/uL    MPV 11.1 9.0 - 12.9 fL    Neutrophils-Polys 89.60 (H) 44.00 - 72.00 %    Lymphocytes 6.90 (L) 22.00 - 41.00 %    Monocytes 0.30 0.00 - 13.40 %    Eosinophils 2.30 0.00 - 6.90 %    Basophils 0.20 0.00 - 1.80 %    Immature Granulocytes 0.70 0.00 - 0.90 %     Nucleated RBC 0.00 /100 WBC    Neutrophils (Absolute) 5.16 2.00 - 7.15 K/uL    Lymphs (Absolute) 0.40 (L) 1.00 - 4.80 K/uL    Monos (Absolute) 0.02 0.00 - 0.85 K/uL    Eos (Absolute) 0.13 0.00 - 0.51 K/uL    Baso (Absolute) 0.01 0.00 - 0.12 K/uL    Immature Granulocytes (abs) 0.04 0.00 - 0.11 K/uL    NRBC (Absolute) 0.00 K/uL   Comp Metabolic Panel    Collection Time: 09/05/22  4:57 PM   Result Value Ref Range    Sodium 132 (L) 135 - 145 mmol/L    Potassium 4.3 3.6 - 5.5 mmol/L    Chloride 99 96 - 112 mmol/L    Co2 16 (L) 20 - 33 mmol/L    Anion Gap 17.0 (H) 7.0 - 16.0    Glucose 83 65 - 99 mg/dL    Bun 22 8 - 22 mg/dL    Creatinine 1.27 0.50 - 1.40 mg/dL    Calcium 8.8 8.5 - 10.5 mg/dL    AST(SGOT) 12 12 - 45 U/L    ALT(SGPT) 14 2 - 50 U/L    Alkaline Phosphatase 185 (H) 30 - 99 U/L    Total Bilirubin 0.6 0.1 - 1.5 mg/dL    Albumin 3.9 3.2 - 4.9 g/dL    Total Protein 6.8 6.0 - 8.2 g/dL    Globulin 2.9 1.9 - 3.5 g/dL    A-G Ratio 1.3 g/dL   ESTIMATED GFR    Collection Time: 09/05/22  4:57 PM   Result Value Ref Range    GFR (CKD-EPI) 43 (A) >60 mL/min/1.73 m 2   TROPONIN    Collection Time: 09/05/22  4:57 PM   Result Value Ref Range    Troponin T 24 (H) 6 - 19 ng/L   TSH WITH REFLEX TO FT4    Collection Time: 09/05/22  4:57 PM   Result Value Ref Range    TSH 3.380 0.380 - 5.330 uIU/mL   Lactic Acid    Collection Time: 09/05/22  6:18 PM   Result Value Ref Range    Lactic Acid 1.7 0.5 - 2.0 mmol/L   PROTHROMBIN TIME (INR)    Collection Time: 09/05/22  6:18 PM   Result Value Ref Range    PT 14.7 (H) 12.0 - 14.6 sec    INR 1.17 (H) 0.87 - 1.13   APTT    Collection Time: 09/05/22  6:18 PM   Result Value Ref Range    APTT 31.3 24.7 - 36.0 sec   proBrain Natriuretic Peptide, NT    Collection Time: 09/05/22  6:18 PM   Result Value Ref Range    NT-proBNP 1727 (H) 0 - 125 pg/mL   TROPONIN    Collection Time: 09/05/22  6:18 PM   Result Value Ref Range    Troponin T 43 (H) 6 - 19 ng/L   Heparin Xa (Unfractionated)    Collection  Time: 09/05/22  6:18 PM   Result Value Ref Range    Heparin Xa (UFH) <0.10 IU/mL   Urinalysis    Collection Time: 09/05/22  9:21 PM    Specimen: Urine   Result Value Ref Range    Color Yellow     Character Cloudy (A)     Specific Gravity 1.026 <1.035    Ph 6.5 5.0 - 8.0    Glucose Negative Negative mg/dL    Ketones Negative Negative mg/dL    Protein 30 (A) Negative mg/dL    Bilirubin Negative Negative    Urobilinogen, Urine 0.2 Negative    Nitrite Positive (A) Negative    Leukocyte Esterase Moderate (A) Negative    Occult Blood Small (A) Negative    Micro Urine Req Microscopic    URINE DRUG SCREEN    Collection Time: 09/05/22  9:21 PM   Result Value Ref Range    Amphetamines Urine Negative Negative    Barbiturates Negative Negative    Benzodiazepines Negative Negative    Cocaine Metabolite Negative Negative    Methadone Negative Negative    Opiates Negative Negative    Oxycodone Negative Negative    Phencyclidine -Pcp Negative Negative    Propoxyphene Negative Negative    Cannabinoid Metab Negative Negative   URINE MICROSCOPIC (W/UA)    Collection Time: 09/05/22  9:21 PM   Result Value Ref Range    WBC  (A) /hpf    RBC 5-10 (A) /hpf    Bacteria Moderate (A) None /hpf    Epithelial Cells Negative /hpf    Hyaline Cast 0-2 /lpf   LACTIC ACID    Collection Time: 09/05/22 10:11 PM   Result Value Ref Range    Lactic Acid 4.8 (HH) 0.5 - 2.0 mmol/L   BETA-HYDROXYBUTYRIC ACID    Collection Time: 09/05/22 10:11 PM   Result Value Ref Range    beta-Hydroxybutyric Acid 0.35 (H) 0.02 - 0.27 mmol/L   CREATINE KINASE    Collection Time: 09/05/22 10:11 PM   Result Value Ref Range    CPK Total 29 0 - 154 U/L   DIAGNOSTIC ALCOHOL    Collection Time: 09/05/22 10:11 PM   Result Value Ref Range    Diagnostic Alcohol <10.1 <10.1 mg/dL   MAGNESIUM    Collection Time: 09/05/22 10:11 PM   Result Value Ref Range    Magnesium 1.3 (L) 1.5 - 2.5 mg/dL   PHOSPHORUS    Collection Time: 09/05/22 10:11 PM   Result Value Ref Range    Phosphorus  2.2 (L) 2.5 - 4.5 mg/dL   CBC with Differential    Collection Time: 09/06/22  3:15 AM   Result Value Ref Range    WBC 34.2 (H) 4.8 - 10.8 K/uL    RBC 3.10 (L) 4.20 - 5.40 M/uL    Hemoglobin 9.6 (L) 12.0 - 16.0 g/dL    Hematocrit 29.7 (L) 37.0 - 47.0 %    MCV 95.8 81.4 - 97.8 fL    MCH 31.0 27.0 - 33.0 pg    MCHC 32.3 (L) 33.6 - 35.0 g/dL    RDW 64.9 (H) 35.9 - 50.0 fL    Platelet Count 257 164 - 446 K/uL    MPV 11.3 9.0 - 12.9 fL    Neutrophils-Polys 95.70 (H) 44.00 - 72.00 %    Lymphocytes 4.30 (L) 22.00 - 41.00 %    Monocytes 0.00 0.00 - 13.40 %    Eosinophils 0.00 0.00 - 6.90 %    Basophils 0.00 0.00 - 1.80 %    Nucleated RBC 0.10 /100 WBC    Neutrophils (Absolute) 32.73 (H) 2.00 - 7.15 K/uL    Lymphs (Absolute) 1.47 1.00 - 4.80 K/uL    Monos (Absolute) 0.00 0.00 - 0.85 K/uL    Eos (Absolute) 0.00 0.00 - 0.51 K/uL    Baso (Absolute) 0.00 0.00 - 0.12 K/uL    NRBC (Absolute) 0.02 K/uL    Anisocytosis 1+     Macrocytosis 1+    Comp Metabolic Panel (CMP)    Collection Time: 09/06/22  3:15 AM   Result Value Ref Range    Sodium 131 (L) 135 - 145 mmol/L    Potassium 4.6 3.6 - 5.5 mmol/L    Chloride 101 96 - 112 mmol/L    Co2 15 (L) 20 - 33 mmol/L    Anion Gap 15.0 7.0 - 16.0    Glucose 111 (H) 65 - 99 mg/dL    Bun 21 8 - 22 mg/dL    Creatinine 1.36 0.50 - 1.40 mg/dL    Calcium 7.9 (L) 8.5 - 10.5 mg/dL    AST(SGOT) 30 12 - 45 U/L    ALT(SGPT) 29 2 - 50 U/L    Alkaline Phosphatase 179 (H) 30 - 99 U/L    Total Bilirubin 1.0 0.1 - 1.5 mg/dL    Albumin 2.9 (L) 3.2 - 4.9 g/dL    Total Protein 5.4 (L) 6.0 - 8.2 g/dL    Globulin 2.5 1.9 - 3.5 g/dL    A-G Ratio 1.2 g/dL   TROPONIN    Collection Time: 09/06/22  3:15 AM   Result Value Ref Range    Troponin T 272 (H) 6 - 19 ng/L   CORTISOL    Collection Time: 09/06/22  3:15 AM   Result Value Ref Range    Cortisol 77.7 (H) 0.0 - 23.0 ug/dL   LACTIC ACID    Collection Time: 09/06/22  3:15 AM   Result Value Ref Range    Lactic Acid 2.8 (H) 0.5 - 2.0 mmol/L   ESTIMATED GFR     Collection Time: 09/06/22  3:15 AM   Result Value Ref Range    GFR (CKD-EPI) 39 (A) >60 mL/min/1.73 m 2   DIFFERENTIAL MANUAL    Collection Time: 09/06/22  3:15 AM   Result Value Ref Range    Manual Diff Status PERFORMED    PERIPHERAL SMEAR REVIEW    Collection Time: 09/06/22  3:15 AM   Result Value Ref Range    Peripheral Smear Review see below    PLATELET ESTIMATE    Collection Time: 09/06/22  3:15 AM   Result Value Ref Range    Plt Estimation Normal    MORPHOLOGY    Collection Time: 09/06/22  3:15 AM   Result Value Ref Range    RBC Morphology Present        Imaging  US-EXTREMITY VENOUS LOWER UNILAT LEFT   Final Result      CT-CTA CHEST PULMONARY ARTERY W/ RECONS   Final Result         1. No CT evidence of pulmonary embolism.      2. Ill-defined interlobular septal thickening throughout, most in the periphery and lung bases, similar to prior, likely chronic interstitial lung disease.      DX-CHEST-PORTABLE (1 VIEW)   Final Result         Ill-defined opacifications in each lung have increased compared to the prior radiograph.  This could indicate worsening of pulmonary edema or inflammation.      EC-ECHOCARDIOGRAM COMPLETE W/O CONT    (Results Pending)   US-RENAL    (Results Pending)       Assessment/Plan  * Septic shock (HCC)- (present on admission)  Assessment & Plan  This is Septic shock Present on admission  SIRS criteria identified on my evaluation include: Fever, with temperature greater than 101 deg F, Tachycardia, with heart rate greater than 90 BPM and Tachypnea, with respirations greater than 20 per minute  Indicators of septic shock include: Severe sepsis present and persistent hypotension after 30 ml/kg completed.   Sources is: urine  Sepsis protocol initiated  Crystalloid Fluid Administration: Fluid resuscitation ordered per standard protocol - 30 mL/kg per current or ideal body weight  IV antibiotics as appropriate for source of sepsis  Reassessment: I have reassessed the patient's hemodynamic  status    Empiric ceftriaxone  NE titrated to MAP > 65  Follow cultures until finalized     Pyelonephritis  Assessment & Plan  UA with nitrite, LE, bacteria   Associated septic shock    Urine culture has been ordered as an add-on  Renal US ordered  Empiric ceftriaxone    Elevated lactic acid level  Assessment & Plan  Secondary to septic shock  Resuscitation  Empiric antibiotics  NE titrated to MAP > 65  Trend - repeat is in process    Mixed hyperlipidemia- (present on admission)  Assessment & Plan  Continue statin    Acquired hypothyroidism- (present on admission)  Assessment & Plan  Continue home synthroid    Essential hypertension- (present on admission)  Assessment & Plan  Hold home regimen while in shock        Discussed patient condition and risk of morbidity and/or mortality with Hospitalist, RN, RT, Therapies, Pharmacy, Charge nurse / hot rounds, and Patient.    The patient remains critically ill.  Critical care time = 61 minutes in directly providing and coordinating critical care and extensive data review.  No time overlap and excludes procedures.

## 2022-09-06 NOTE — ASSESSMENT & PLAN NOTE
UA as noted positive for nitrates, leukocyte esterase, pyuria, bacteria.  Review of systems positive for dysuria.  Presented with sepsis  Continue rocephin  Obtain urine culture

## 2022-09-06 NOTE — CARE PLAN
The patient is Stable - Low risk of patient condition declining or worsening    Shift Goals  Clinical Goals: titrate Levophed off while maintaining MAP greater than 65    Progress made toward(s) clinical / shift goals:    Problem: Hemodynamics  Goal: Patient's hemodynamics, fluid balance and neurologic status will be stable or improve  Outcome: Met  Note: Patient has been successfully titrated off levophed and her blood pressures have remained within established range.     Problem: Urinary - Renal Perfusion  Goal: Ability to achieve and maintain adequate renal perfusion and functioning will improve  Outcome: Progressing  Note: Patient had trouble urinating in the ER, but she has successfully urinated twice since the beginning of the shift.       Problem: Knowledge Deficit - Standard  Goal: Patient and family/care givers will demonstrate understanding of plan of care, disease process/condition, diagnostic tests and medications  Outcome: Progressing  Note: Patient and family ask appropriate questions regarding patient's condition and medical treatments.              Patient is not progressing towards the following goals:

## 2022-09-06 NOTE — ASSESSMENT & PLAN NOTE
CTA pulmonary artery, negative for PE  . Ill-defined interlobular septal thickening throughout, most in the periphery and lung bases, similar to prior, likely chronic interstitial lung disease.  On room air  9/5/22 COVID-19/influenza screen negative  Follow-up as outpatient with repeat images  Mild elevated BNP with normal echocardiogram EF 65%

## 2022-09-06 NOTE — ED NOTES
PIV established, blood work collected and sent to lab. Lab at bedside for 2nd set of blood cultures

## 2022-09-06 NOTE — PROGRESS NOTES
Patient's daughters are worried about patient's poor oral intake and they are requesting a dietary consult. Dr. Roy notified and orders received to place a dietary consult. Orders verified and implemented.

## 2022-09-06 NOTE — PROGRESS NOTES
NOC HOSPITALIST CROSS COVER    Notified by RN regarding hypotension.  Blood pressure 73/45 for which the patient was given an additional fluid bolus by prior hospitalist.  The patient has now received a total of 3 L of IV fluids with continual blood pressures in the 80s systolic via manual blood pressure cuff.  She also has some notable peripheral edema.  She was admitted for sepsis secondary to a UTI.  Given her refractory hypotension despite extensive fluid resuscitation, intensivist Dr. Calabrese was consulted for possible upgrade to IMCU versus ICU.  Dr. Calabrese was agreeable to upgrading patient for hemodynamic instability and septic shock.      Vitals:    09/06/22 0530   BP: 108/55   Pulse: 83   Resp: (!) 25   Temp:    SpO2: 95%          Plan:  #Septic shock  -Dr. Calabrese consulted.  Please see his note for updated plan of care  -Upgrade to ICU        -----------------------------------------------------------------------------------------------------------    Electronically signed by:  ED Velazco AGACNP-BC  Hospitalist Services

## 2022-09-06 NOTE — ASSESSMENT & PLAN NOTE
S/p pressors and fluids  Concern of septic shock due to pyelonephritis  Holding home BP meds for now.

## 2022-09-06 NOTE — HOSPITAL COURSE
"\"82 y.o. female with a history of HTN and hypothyroidism who presented 9/5/2022 with fevers, chills, light-headedness. The ED evaluation was notable for initial lactate 1.7, CTPA negative for PE/no significant infiltrates, UA concerning for infection. She was admitted to telemetry after she was given zosyn and IVF feeling better however developed hypotension and elevated lactated while waiting in the ER. I was consulted for septic shock requiring vasopressors.      She currently feels improved on levophed, she has mild malaise but otherwise denies SOB, CP, HA, abdominal pain. 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 Calabrese    9/6: BP doing much better, currently off pressors. No current complaints at this time.   " Detail Level: Detailed

## 2022-09-06 NOTE — PROGRESS NOTES
Received report from HANS White. This RN assumed care of patient. Lines and gtts verified. Plan of care discussed with patient and daughter (Jam) at bedside; all questions and concerns addressed. Fall education provided to patient-she verbalizes understanding that she must call before getting out of bed. Call light and personal belongings with in reach, bed in locked and low position, treaded slipper socks on patient.     Dr. Matthew at bedside at 0735 to assess patient.

## 2022-09-06 NOTE — PROGRESS NOTES
NOC HOSPITALIST CROSS COVER    Notified by RN regarding hypotension.  Blood pressure 73/45 for which the patient was given an additional fluid bolus by prior hospitalist.  The patient has now received a total of 3 L of IV fluids with continual blood pressures in the 80s systolic via manual blood pressure cuff.  She also has some notable peripheral edema.  She was admitted for sepsis secondary to a UTI.  Given her refractory hypotension despite extensive fluid resuscitation, intensivist Dr. Calabrese was consulted for possible upgrade to IMCU versus ICU.  Dr. Calabrese was agreeable to upgrading patient for hemodynamic instability and septic shock.      Vitals:    09/06/22 0306   BP: (!) 82/48   Pulse:    Resp:    Temp:    SpO2:           Plan:  #Septic shock  -Dr. Calabrese consulted.  Please see his note for updated plan of care  -Upgrade to ICU        -----------------------------------------------------------------------------------------------------------    Electronically signed by:  ED Velazco AGACN-BC  Hospitalist Services

## 2022-09-06 NOTE — PROGRESS NOTES
Admitted pt to T616 From ER via monitored stretcher.  Levophed running via peripheral line.  IV site blanched and tender.

## 2022-09-06 NOTE — ASSESSMENT & PLAN NOTE
- Troponin elevated to 272  - No cardiac symptoms, normal EKG on Tele  - Likely 2/2 demand ischemia  - Appears to be stabilizing, will recheck until numbers start to decrease

## 2022-09-06 NOTE — PROGRESS NOTES
Dr. Roy notified that patient's blood cultures came back positive for gram - rods. This RN received no new orders.

## 2022-09-06 NOTE — ED NOTES
Tech from Lab called with critical result of lactic acid of 4.8 at 2302. Critical lab result read back to tech.   Dr. Stout notified of critical lab result at 2302.  Critical lab result read back by Dr. Stout.

## 2022-09-06 NOTE — ED PROVIDER NOTES
"ED Provider Note    Scribed for Laurent Garcia M.D. by Molly Guevara. 9/5/2022,  5:34 PM.    Means of Arrival: Walk-In  History obtained from: Patient and daughters  History limited by: None    CHIEF COMPLAINT  Chief Complaint   Patient presents with    Shortness of Breath     Patient attended the [facility] off followed by an outing with daughter. Per daughter, patient all of a sudden began shivering and her lips were quivering. Patient stated at this time she felt dizzy as well as short of breath. Patient states she still feels jittery and feels as if she was \"shaking on the inside.\" Patient was recently told she had blood clots in her legs. Takes daily aspirin. Patient was recently on a blood thinner.     Dizziness     Endorses weakness during her period of dizziness.        HPI  Mariely Easley is a 82 y.o. female who presents to the Emergency Department for evaluation of sudden onset dizziness and shortness of breath onset prior to arrival. Her daughters at bedside state she was sitting at the [facility] when she asked her if she was cold and noticed she suddenly began shivering and sort of slumped over. Daughter states her lips were shivering to the point where she could barely talk and her hands were clasped and cold. Daughter states she went to the restroom to put warm water on her hands and then began to feel dizzy, weak, and short of breath, prompting them to bring her to the ED for further evaluation. She states she still feels \"jittery\" like she is \"shaking on the inside\". She admits to associated symptoms of palpitations and dry mouth, but denies any chest pain or fevers. No alleviating factors were reported. She reports she takes daily aspirin and had recently been taking blood thinners for one month due to blood clots in her legs, but stopped because they had dissolved. However, she notes she is still symptomatic with ongoing, worsening leg pain. 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      REVIEW OF SYSTEMS  CONSTITUTIONAL:  Positive chills. No fever.  CARDIOVASCULAR:  Positive palpitations. No chest discomfort.  RESPIRATORY:  Positive shortness of breath. No pleuritic chest pain.  NEUROLOGIC:  Positive dizziness. No headache.  See HPI for further details.   All other systems are negative.     PAST MEDICAL HISTORY  Past Medical History:   Diagnosis Date    Hypertension     Pain and swelling of left lower extremity 5/18/2022       FAMILY HISTORY  None reported    SOCIAL HISTORY   reports that she quit smoking about 43 years ago. She has never used smokeless tobacco. She reports current alcohol use of about 8.4 oz per week. She reports that she does not use drugs.    SURGICAL HISTORY  No past surgical history on file.    CURRENT MEDICATIONS  Home Medications       Reviewed by Katie Guillaume R.N. (Registered Nurse) on 09/05/22 at 1649  Med List Status: Partial     Medication Last Dose Status   aspirin EC (ECOTRIN) 81 MG Tablet Delayed Response  Active   cetirizine (ZYRTEC) 10 MG Tab  Active   fluticasone (FLONASE) 50 MCG/ACT nasal spray  Active   folic acid (FOLVITE) 1 MG Tab  Active   furosemide (LASIX) 20 MG Tab  Active   levothyroxine (SYNTHROID) 50 MCG Tab  Active   losartan (COZAAR) 50 MG Tab  Active   metoprolol tartrate (LOPRESSOR) 25 MG Tab  Active   Naloxone (NARCAN) 4 MG/0.1ML Liquid  Active   simvastatin (ZOCOR) 10 MG Tab  Active   XARELTO STARTER PACK 15 & 20 MG Tablet Therapy Pack  Active                    ALLERGIES  No Known Allergies    PHYSICAL EXAM  VITAL SIGNS: /78   Pulse (!) 131   Temp 37.4 °C (99.4 °F) (Temporal)   Resp (!) 21   Ht 1.575 m (5' 2\")   Wt 62.1 kg (137 lb)   SpO2 95%   BMI 25.06 kg/m²    Gen: Alert, no acute distress  HEENT: ATNC  Eyes: PERRL, EOMI, normal conjunctiva.   Neck: trachea midline  Resp: no respiratory distress. Lungs clear.  CV: Tachycardic. No JVD  Abd: non-distended, soft, non-tender.  Ext: Swelling to left leg with " tenderness.  Psych: normal mood  Neuro: speech fluent, moves all extremities, no focal neurologic deficits    DIAGNOSTIC STUDIES / PROCEDURES     LABS  Labs Reviewed   CBC WITH DIFFERENTIAL - Abnormal; Notable for the following components:       Result Value    RBC 3.56 (*)     Hemoglobin 11.1 (*)     Hematocrit 33.6 (*)     MCHC 33.0 (*)     RDW 63.5 (*)     Neutrophils-Polys 89.60 (*)     Lymphocytes 6.90 (*)     Lymphs (Absolute) 0.40 (*)     All other components within normal limits   COMP METABOLIC PANEL - Abnormal; Notable for the following components:    Sodium 132 (*)     Co2 16 (*)     Anion Gap 17.0 (*)     Alkaline Phosphatase 185 (*)     All other components within normal limits   ESTIMATED GFR - Abnormal; Notable for the following components:    GFR (CKD-EPI) 43 (*)     All other components within normal limits   TROPONIN - Abnormal; Notable for the following components:    Troponin T 24 (*)     All other components within normal limits   URINALYSIS - Abnormal; Notable for the following components:    Character Cloudy (*)     Protein 30 (*)     Nitrite Positive (*)     Leukocyte Esterase Moderate (*)     Occult Blood Small (*)     All other components within normal limits   PROTHROMBIN TIME - Abnormal; Notable for the following components:    PT 14.7 (*)     INR 1.17 (*)     All other components within normal limits    Narrative:     Indicate which anticoagulants the patient is on:->UNKNOWN   PROBRAIN NATRIURETIC PEPTIDE, NT - Abnormal; Notable for the following components:    NT-proBNP 1727 (*)     All other components within normal limits   TROPONIN - Abnormal; Notable for the following components:    Troponin T 43 (*)     All other components within normal limits   LACTIC ACID - Abnormal; Notable for the following components:    Lactic Acid 4.8 (*)     All other components within normal limits   URINE MICROSCOPIC (W/UA) - Abnormal; Notable for the following components:    WBC  (*)     RBC 5-10  "(*)     Bacteria Moderate (*)     All other components within normal limits   MAGNESIUM - Abnormal; Notable for the following components:    Magnesium 1.3 (*)     All other components within normal limits   PHOSPHORUS - Abnormal; Notable for the following components:    Phosphorus 2.2 (*)     All other components within normal limits   LACTIC ACID   APTT    Narrative:     Indicate which anticoagulants the patient is on:->UNKNOWN   HEPARIN XA (UNFRACTIONATED)   TSH WITH REFLEX TO FT4   URINE DRUG SCREEN   CREATINE KINASE   DIAGNOSTIC ALCOHOL   BLOOD CULTURE    Narrative:     1 of 2 for Blood Culture x 2 sites order. Per Hospital  Policy: Only change Specimen Src: to \"Line\" if specified by  physician order.   BLOOD CULTURE    Narrative:     2 of 2 blood culture x2  Sites order. Per Hospital Policy:  Only change Specimen Src: to \"Line\" if specified by physician  order.   COV-2, FLU A/B, AND RSV BY PCR ([employer])   BETA-HYDROXYBUTYRIC ACID   CBC WITH DIFFERENTIAL   COMP METABOLIC PANEL   TROPONIN     All labs reviewed by me.    RADIOLOGY  CT-CTA CHEST PULMONARY ARTERY W/ RECONS   Final Result         1. No CT evidence of pulmonary embolism.      2. Ill-defined interlobular septal thickening throughout, most in the periphery and lung bases, similar to prior, likely chronic interstitial lung disease.      DX-CHEST-PORTABLE (1 VIEW)   Final Result         Ill-defined opacifications in each lung have increased compared to the prior radiograph.  This could indicate worsening of pulmonary edema or inflammation.      US-EXTREMITY VENOUS LOWER UNILAT LEFT    (Results Pending)   EC-ECHOCARDIOGRAM COMPLETE W/O CONT    (Results Pending)     The radiologist’s interpretation of all radiology studies have been reviewed by me.    COURSE & MEDICAL DECISION MAKING  Pertinent Labs & Imaging studies reviewed. (See chart for details)    5:34 PM Patient seen and examined at bedside. Ordered for EKG, labs and imaging to evaluate.    5:58 PM " Patient will be treated with NS 1,000 mL for her symptoms.     7:00 PM Patient will be treated with heparin infusion 25,000 units in 500 mL 0.45% NACL, heparin injection 2,500 units, and heparin injection 5,000 units.     7:28 PM Patient was reevaluated at bedside. Discussed lab results with the patient and family. CT pending.    8:20 PM - I reevaluated the patient at bedside. Patient will be treated with aspirin 324 mg chewable tablet. I discussed the patient's diagnostic study results which show no CT evidence of pulmonary embolism. I informed the patient of my plan to admit today given the patient's current presentation and diagnostic study results. Patient verbalizes understanding and support with my plan for admission. Ordered additional labs. [facility] Hospitalist.    8:49 PM - Daughter informed me the patient is experiencing another episode of shaking chills. Instructed nursing staff to see if she has spiked a fever. Ordered additional labs.    8:55 PM Nursing staff informed me the patient is febrile at 100.7 °F. Patient was reevaluated at bedside. She will be treated with acetaminophen 650 mg tablet and Zosyn 4.5 g in  mL IVPB.     9:00 PM - I discussed the patient's case and the above findings with Dr. Stout  (Hospitalist) who will assess the patient for hospitalization.     9:04 PM - Patient was reevaluated at bedside. Informed patient UA is pending. Updated the patient and family of the plan of care. The patient was given an opportunity to ask questions. The patient and family are agreeable to the plan of care.    9:22 PM I discussed the patient's case and the above findings with Dr. Stout (Hospitalist) who is requesting additional studies.     9:53 PM Patient was reevaluated at bedside. Discussed lab results with the patient and family which suggest UTI and sepsis. She will be placed on 2L O2 via nasal cannula.      HYDRATION: Based on the patient's presentation of Tachycardia the patient was  given IV fluids. IV Hydration was used because oral hydration was not adequate alone. Upon recheck following hydration, the patient was improved.    CRITICAL CARE  The very real possibilty of a deterioration of this patient's condition required the highest level of my preparedness for sudden, emergent intervention.  I provided critical care services, which included medication orders, frequent reevaluations of the patient's condition and response to treatment, ordering and reviewing test results, and discussing the case with various consultants.  The critical care time associated with the care of the patient was 35 minutes. Review chart for interventions. This time is exclusive of any other billable procedures.       Medical Decision Making:  Patient presents with shortness of breath and the sensation of shivering.  This sounds more like rigors, no evidence for generalized seizure.    The patient does have swelling to the left leg, is tachypneic, tachycardic, this is highly concerning for PE.  Bedside ultrasound demonstrates possible dilated right ventricle, however no clear Kenny sign.  IVC is collapsible.  Patient was given IV fluids.  Given the patient's history of blood clots in the legs, swollen legs, will initiate heparin awaiting CTA of the chest.    CTA chest demonstrates no blood clot, no pneumonia.  Patient has a benign abdominal examination.  She had another episode of rigors followed by fever.  At this point, more likely infectious etiology, will treat with empiric antibiotics.    Patient's urine resulted positive.  At this point, she rules in for sepsis as we now have a source of infection.  She has already been treated with Zosyn.    I wore appropriate PPE during the encounter    DISPOSITION:  Patient will be hospitalized by Dr. Stout in guarded condition.      FINAL IMPRESSION  1. Shortness of breath    2. Elevated troponin    3. Tachycardia    4. Sepsis without acute organ dysfunction, due to  unspecified organism (HCC)    5.    The critical care time associated with the care of the patient was 35 minutes.     IMolly (Scribe), am scribing for, and in the presence of, Laurent Garcia M.D..    Electronically signed by: Molly Guevara (Scribe), 9/5/2022    Laurent MARIE M.D. personally performed the services described in this documentation, as scribed by Molly Guevara in my presence, and it is both accurate and complete.    The note accurately reflects work and decisions made by me.  Laurent Garcia M.D.  9/5/2022  11:09 PM      This dictation was created using voice recognition software. The accuracy of the dictation is limited to the abilities of the software. I expect there may be some errors of grammar and possibly content. The nursing notes were reviewed and certain aspects of this information were incorporated into this note.

## 2022-09-06 NOTE — CARE PLAN
The patient is Watcher - Medium risk of patient condition declining or worsening    Shift Goals  Clinical Goals: SBP greater than 90    Progress made toward(s) clinical / shift goals:  Levophed required to maintain SBP      Problem: Hemodynamics  Goal: Patient's hemodynamics, fluid balance and neurologic status will be stable or improve  Outcome: Progressing  Note: Vitals stable with Levophed     Problem: Urinary - Renal Perfusion  Goal: Ability to achieve and maintain adequate renal perfusion and functioning will improve  Note: Pt required straight cath in ER.  Has not voided in the 2 hours she has been in [facility]

## 2022-09-06 NOTE — ASSESSMENT & PLAN NOTE
Denies prior history of heart disease.  CTA pulmonary artery negative for PE.  Denies chest pain, likely type II myocardial injury secondary to sepsis.    Echo reviewed with normal EF and no significant valvular issue  No chest pain

## 2022-09-06 NOTE — PROGRESS NOTES
4 Eyes Skin Assessment Completed by HANS Carrillo and HANS White.    Head WDL  Ears WDL  Nose WDL  Mouth WDL  Neck WDL  Breast/Chest WDL  Shoulder Blades WDL  Spine WDL  (R) Arm/Elbow/Hand WDL  (L) Arm/Elbow/Hand WDL  Abdomen WDL  Groin WDL  Scrotum/Coccyx/Buttocks WDL  (R) Leg WDL  (L) Leg Edema, discoloration, induration medial calf  (R) Heel/Foot/Toe Discoloration  (L) Heel/Foot/Toe Discoloration and Edema          Devices In Places ECG, Blood Pressure Cuff, and Pulse Ox      Interventions In Place Pillows, Low Air Loss Mattress, and Heels Loaded W/Pillows    Possible Skin Injury No    Pictures Uploaded Into Epic Yes  Wound Consult Placed N/A  RN Wound Prevention Protocol Ordered No

## 2022-09-07 LAB
ALBUMIN SERPL BCP-MCNC: 3.1 G/DL (ref 3.2–4.9)
ALBUMIN/GLOB SERPL: 1.2 G/DL
ALP SERPL-CCNC: 153 U/L (ref 30–99)
ALT SERPL-CCNC: 18 U/L (ref 2–50)
ANION GAP SERPL CALC-SCNC: 13 MMOL/L (ref 7–16)
AST SERPL-CCNC: 14 U/L (ref 12–45)
BASOPHILS # BLD AUTO: 0.8 % (ref 0–1.8)
BASOPHILS # BLD: 0.18 K/UL (ref 0–0.12)
BILIRUB SERPL-MCNC: 0.4 MG/DL (ref 0.1–1.5)
BUN SERPL-MCNC: 25 MG/DL (ref 8–22)
CALCIUM SERPL-MCNC: 7.8 MG/DL (ref 8.5–10.5)
CHLORIDE SERPL-SCNC: 105 MMOL/L (ref 96–112)
CO2 SERPL-SCNC: 17 MMOL/L (ref 20–33)
CREAT SERPL-MCNC: 1.15 MG/DL (ref 0.5–1.4)
EOSINOPHIL # BLD AUTO: 0.44 K/UL (ref 0–0.51)
EOSINOPHIL NFR BLD: 1.9 % (ref 0–6.9)
ERYTHROCYTE [DISTWIDTH] IN BLOOD BY AUTOMATED COUNT: 66.8 FL (ref 35.9–50)
GFR SERPLBLD CREATININE-BSD FMLA CKD-EPI: 48 ML/MIN/1.73 M 2
GLOBULIN SER CALC-MCNC: 2.6 G/DL (ref 1.9–3.5)
GLUCOSE SERPL-MCNC: 104 MG/DL (ref 65–99)
HCT VFR BLD AUTO: 29.7 % (ref 37–47)
HGB BLD-MCNC: 9.3 G/DL (ref 12–16)
IMM GRANULOCYTES # BLD AUTO: 0.33 K/UL (ref 0–0.11)
IMM GRANULOCYTES NFR BLD AUTO: 1.4 % (ref 0–0.9)
LYMPHOCYTES # BLD AUTO: 0.98 K/UL (ref 1–4.8)
LYMPHOCYTES NFR BLD: 4.3 % (ref 22–41)
MAGNESIUM SERPL-MCNC: 3 MG/DL (ref 1.5–2.5)
MCH RBC QN AUTO: 30.4 PG (ref 27–33)
MCHC RBC AUTO-ENTMCNC: 31.3 G/DL (ref 33.6–35)
MCV RBC AUTO: 97.1 FL (ref 81.4–97.8)
MONOCYTES # BLD AUTO: 0.87 K/UL (ref 0–0.85)
MONOCYTES NFR BLD AUTO: 3.8 % (ref 0–13.4)
NEUTROPHILS # BLD AUTO: 19.99 K/UL (ref 2–7.15)
NEUTROPHILS NFR BLD: 87.8 % (ref 44–72)
NRBC # BLD AUTO: 0 K/UL
NRBC BLD-RTO: 0 /100 WBC
PHOSPHATE SERPL-MCNC: 3.4 MG/DL (ref 2.5–4.5)
PLATELET # BLD AUTO: 176 K/UL (ref 164–446)
PMV BLD AUTO: 11.6 FL (ref 9–12.9)
POTASSIUM SERPL-SCNC: 4.6 MMOL/L (ref 3.6–5.5)
PROT SERPL-MCNC: 5.7 G/DL (ref 6–8.2)
RBC # BLD AUTO: 3.06 M/UL (ref 4.2–5.4)
SODIUM SERPL-SCNC: 135 MMOL/L (ref 135–145)
WBC # BLD AUTO: 22.8 K/UL (ref 4.8–10.8)

## 2022-09-07 PROCEDURE — 700111 HCHG RX REV CODE 636 W/ 250 OVERRIDE (IP): Performed by: INTERNAL MEDICINE

## 2022-09-07 PROCEDURE — 85025 COMPLETE CBC W/AUTO DIFF WBC: CPT

## 2022-09-07 PROCEDURE — 83735 ASSAY OF MAGNESIUM: CPT

## 2022-09-07 PROCEDURE — 700102 HCHG RX REV CODE 250 W/ 637 OVERRIDE(OP): Performed by: INTERNAL MEDICINE

## 2022-09-07 PROCEDURE — 770001 HCHG ROOM/CARE - MED/SURG/GYN PRIV*

## 2022-09-07 PROCEDURE — 700101 HCHG RX REV CODE 250: Performed by: INTERNAL MEDICINE

## 2022-09-07 PROCEDURE — A9270 NON-COVERED ITEM OR SERVICE: HCPCS | Performed by: INTERNAL MEDICINE

## 2022-09-07 PROCEDURE — 80053 COMPREHEN METABOLIC PANEL: CPT

## 2022-09-07 PROCEDURE — 99232 SBSQ HOSP IP/OBS MODERATE 35: CPT | Performed by: HOSPITALIST

## 2022-09-07 PROCEDURE — 84100 ASSAY OF PHOSPHORUS: CPT

## 2022-09-07 RX ADMIN — ENOXAPARIN SODIUM 40 MG: 40 INJECTION SUBCUTANEOUS at 17:32

## 2022-09-07 RX ADMIN — SENNOSIDES AND DOCUSATE SODIUM 2 TABLET: 50; 8.6 TABLET ORAL at 05:51

## 2022-09-07 RX ADMIN — ACETAMINOPHEN 650 MG: 325 TABLET ORAL at 17:31

## 2022-09-07 RX ADMIN — LEVOTHYROXINE SODIUM 50 MCG: 0.05 TABLET ORAL at 05:51

## 2022-09-07 RX ADMIN — FOLIC ACID 1 MG: 1 TABLET ORAL at 05:51

## 2022-09-07 RX ADMIN — CEFTRIAXONE SODIUM 1 G: 10 INJECTION, POWDER, FOR SOLUTION INTRAVENOUS at 05:52

## 2022-09-07 RX ADMIN — SIMVASTATIN 10 MG: 10 TABLET, FILM COATED ORAL at 17:31

## 2022-09-07 RX ADMIN — CETIRIZINE HYDROCHLORIDE 10 MG: 10 TABLET, FILM COATED ORAL at 05:51

## 2022-09-07 ASSESSMENT — FIBROSIS 4 INDEX: FIB4 SCORE: 1.499923475244191718

## 2022-09-07 NOTE — DIETARY
"Nutrition Services: Day 1 of admit.  Mariely Easley is a 82 y.o. female with admitting DX of Sepsis (HCC), Septic shock (HCC).    Consult received for SUPPLEMENTS: Family is worried about patient's poor oral intake.    Assessment:  Height: 157.5 cm (5' 2\")  Weight: 67.1 kg (147 lb 14.9 oz)  Body mass index is 27.06 kg/m²., BMI classification: Overweight.     Diet/Intake: Regular diet in place. PO intake 50-75% of breakfast this morning per ADLs. PO was <25% of 3 meals yesterday.    Evaluation:   Urinary sepsis  Reviewed PMH, labs, and meds.  Pt has been visited by Nutrition Representative and made specific selections for upcoming meals.   Can also trial Boost supplements.    Recommendations/Plan:  Encourage pt to order preferred meals.   Trial Boost supplements.  Document all PO intake in ADLs.   Monitor weight.  Nutrition Rep will continue to see pt for ongoing meal preferences.     RD will monitor.          "

## 2022-09-07 NOTE — ASSESSMENT & PLAN NOTE
One of two bottles.  Await final ID and sensitivity  Continue rocephin for now.  Likely UTI/Pyelonephritis as source

## 2022-09-07 NOTE — ASSESSMENT & PLAN NOTE
Await urine culture  1 of 2 blood cultures positive  UA with UTI  Ceftriaxone for now.  S/p pressors and fluids.  Wbc:22.8 < 34.2

## 2022-09-07 NOTE — CARE PLAN
Problem: Knowledge Deficit - Standard  Goal: Patient and family/care givers will demonstrate understanding of plan of care, disease process/condition, diagnostic tests and medications  Outcome: Progressing     Problem: Urinary - Renal Perfusion  Goal: Ability to achieve and maintain adequate renal perfusion and functioning will improve  Outcome: Progressing     Problem: Physical Regulation  Goal: Diagnostic test results will improve  Outcome: Progressing   The patient is Stable - Low risk of patient condition declining or worsening    Shift Goals  Clinical Goals: Remain    Progress made toward(s) clinical / shift goals:     Patient is not progressing towards the following goals:

## 2022-09-07 NOTE — PROGRESS NOTES
"Miss Easley is a 82 y.o. female admitted 9/5/2022 with UTI, complicated by hypotension and thus transferred to ICU on 9/6 early morning for septic shock, managed with pressors.     Hospital Course:   \"ED evaluation was notable for initial lactate 1.7, CTPA negative for PE/no significant infiltrates, UA concerning for infection. She was admitted to telemetry after she was given zosyn and IVF feeling better however developed hypotension and elevated lactated while waiting in the ER. I was consulted for septic shock requiring vasopressors.\" - Dr. Calabrese on 9/6 AM    9/6: BP doing much better, currently off pressors. No current complaints at this time. Blood culture positive for GNR, waiting on further results.   9/7: Not required pressors for almost 24 hours, will discontinue. Denies any other symptoms.     IU: NAEON, doing well. No acute concerns or complaints. Feels subjectively much better than day before    O: VSS  Physical Exam  Constitutional:       General: She is not in acute distress.     Appearance: Normal appearance. She is not ill-appearing.   HENT:      Head: Normocephalic and atraumatic.   Eyes:      Extraocular Movements: Extraocular movements intact.      Conjunctiva/sclera: Conjunctivae normal.   Cardiovascular:      Rate and Rhythm: Normal rate and regular rhythm.      Heart sounds: Murmur (aortic regurgitation) heard.     No friction rub. No gallop.   Abdominal:      General: Bowel sounds are normal. There is no distension.      Palpations: Abdomen is soft.      Tenderness: There is no abdominal tenderness.   Musculoskeletal:      Right lower leg: Edema (1+ pitting) present.      Left lower leg: Edema (1+ pitting) present.      Comments: L > R edema   Neurological:      General: No focal deficit present.      Mental Status: She is alert and oriented to person, place, and time. Mental status is at baseline.      Cranial Nerves: No cranial nerve deficit.      Sensory: No sensory deficit.      Motor: " No weakness.   Psychiatric:         Mood and Affect: Mood normal.         Behavior: Behavior normal.     Will transfer to Medicine.    Follow-up:  - Continue Ceftriaxone for total of 7 days, follow-up on blood culture results  - Shock resolved  - Outpatient follow-up for aortic insufficiency  - Outpatient follow-up for abnormal CT chest (showed thickening of intra-bronchial septum)

## 2022-09-07 NOTE — CARE PLAN
The patient is Stable - Low risk of patient condition declining or worsening    Progress made toward(s) clinical / shift goals:    Problem: Knowledge Deficit - Standard  Goal: Patient and family/care givers will demonstrate understanding of plan of care, disease process/condition, diagnostic tests and medications  Outcome: Progressing     Problem: Fall Risk  Goal: Patient will remain free from falls  Outcome: Progressing     Problem: Skin Integrity  Goal: Skin integrity is maintained or improved  Outcome: Progressing     Problem: Pain - Standard  Goal: Alleviation of pain or a reduction in pain to the patient’s comfort goal  Outcome: Progressing       Patient is not progressing towards the following goals:

## 2022-09-07 NOTE — PROGRESS NOTES
[facility] Medicine Daily Progress Note    Date of Service  9/7/2022    Chief Complaint  Rigors, feeling cold, and dysuria.    Hospital Course  Mariely Easley is a 82 y.o. female with HTN, hypothryoid, veinous insufficiency admitted 9/5/2022 with septic shock due to pyelonephritis.  She required fluids and pressor support in the ICU.    Interval Problem Update  9/7: WBC:22.8 Hgb:9.3.  Alert and awake.  Daughters at bedside.  Patient feeling better.  Off pressors and IV fluids.  Eating.  Denies any current dysuria but has some frequency.  Ongoing leg swelling.    I have discussed this patient's plan of care and discharge plan at IDT rounds today with Case Management, Nursing, Nursing leadership, and other members of the IDT team.    Consultants/Specialty  critical care    Code Status  Full Code    Disposition  Patient is not medically cleared for discharge.   Anticipate discharge to to home with close outpatient follow-up.  I have placed the appropriate orders for post-discharge needs.    Review of Systems  ROS     Physical Exam  Temp:  [36.5 °C (97.7 °F)-37.1 °C (98.8 °F)] 36.5 °C (97.7 °F)  Pulse:  [68-84] 79  Resp:  [16-22] 18  BP: ()/(45-65) 136/63  SpO2:  [89 %-97 %] 93 %    Physical Exam    Fluids    Intake/Output Summary (Last 24 hours) at 9/7/2022 1108  Last data filed at 9/7/2022 0900  Gross per 24 hour   Intake 1045.42 ml   Output 650 ml   Net 395.42 ml       Laboratory  Recent Labs     09/05/22 1657 09/06/22 0315 09/07/22  0430   WBC 5.8 34.2* 22.8*   RBC 3.56* 3.10* 3.06*   HEMOGLOBIN 11.1* 9.6* 9.3*   HEMATOCRIT 33.6* 29.7* 29.7*   MCV 94.4 95.8 97.1   MCH 31.2 31.0 30.4   MCHC 33.0* 32.3* 31.3*   RDW 63.5* 64.9* 66.8*   PLATELETCT 262 257 176   MPV 11.1 11.3 11.6     Recent Labs     09/05/22  1657 09/06/22  0315 09/07/22  0430   SODIUM 132* 131* 135   POTASSIUM 4.3 4.6 4.6   CHLORIDE 99 101 105   CO2 16* 15* 17*   GLUCOSE 83 111* 104*   BUN 22 21 25*   CREATININE 1.27 1.36 1.15   CALCIUM 8.8  7.9* 7.8*     Recent Labs     09/05/22  1818   APTT 31.3   INR 1.17*               Imaging  EC-ECHOCARDIOGRAM COMPLETE W/O CONT   Final Result      US-RENAL   Final Result      1.  No hydronephrosis. No renal calculus.   2.  A 1.3 cm left renal cyst.      US-EXTREMITY VENOUS LOWER UNILAT LEFT   Final Result      CT-CTA CHEST PULMONARY ARTERY W/ RECONS   Final Result         1. No CT evidence of pulmonary embolism.      2. Ill-defined interlobular septal thickening throughout, most in the periphery and lung bases, similar to prior, likely chronic interstitial lung disease.      DX-CHEST-PORTABLE (1 VIEW)   Final Result         Ill-defined opacifications in each lung have increased compared to the prior radiograph.  This could indicate worsening of pulmonary edema or inflammation.           Assessment/Plan  * Septic shock (HCC)- (present on admission)  Assessment & Plan  S/p pressors and fluids  Antibiotics  Due to pyelonephritis/cystitis/bacteremia.    Gram-negative bacteremia- (present on admission)  Assessment & Plan  One of two bottles.  Await final ID and sensitivity  Continue rocephin for now.  Likely UTI/Pyelonephritis as source    Renal cyst, left- (present on admission)  Assessment & Plan  Found this visit on imaging.  Will need outpatient follow up imaging in 6 months.    Pyelonephritis- (present on admission)  Assessment & Plan  Await urine culture  1 of 2 blood cultures positive  UA with UTI  Ceftriaxone for now.  S/p pressors and fluids.  Wbc:22.8 < 34.2    Abnormal CT of the chest- (present on admission)  Assessment & Plan  CTA pulmonary artery, negative for PE  . Ill-defined interlobular septal thickening throughout, most in the periphery and lung bases, similar to prior, likely chronic interstitial lung disease.  On room air  9/5/22 COVID-19/influenza screen negative  Follow-up as outpatient with repeat images  Mild elevated BNP with normal echocardiogram EF 65%    Elevated troponin  Assessment &  Plan  Denies prior history of heart disease.  CTA pulmonary artery negative for PE.  Denies chest pain, likely type II myocardial injury secondary to sepsis.    Echo reviewed with normal EF and no significant valvular issue  No chest pain    UTI (urinary tract infection)  Assessment & Plan  UA as noted positive for nitrates, leukocyte esterase, pyuria, bacteria.  Review of systems positive for dysuria.  Presented with sepsis  Continue rocephin  Obtain urine culture    Hypotension  Assessment & Plan  S/p pressors and fluids  Concern of septic shock due to pyelonephritis  Holding home BP meds for now.    Sepsis (HCC)- (present on admission)  Assessment & Plan  S/p pressors and fluids  Ceftriaxone  Due to pyelonephritis and cystitis.    CKD (chronic kidney disease) stage 3, GFR 30-59 ml/min ([facility])- (present on admission)  Assessment & Plan  Avoid nephrotoxins  9/7 BUN:25, Cr:1.15    Mixed hyperlipidemia- (present on admission)  Assessment & Plan  Simvastatin for active management    Acquired hypothyroidism- (present on admission)  Assessment & Plan  Active treatment with levothyroxine 50 mcg daily  TSH:3.38    Essential hypertension- (present on admission)  Assessment & Plan  Monitor vitals and restart outpatient medications as BP allows and needs       VTE prophylaxis: enoxaparin ppx    I have performed a physical exam and reviewed and updated ROS and Plan today (9/7/2022). In review of yesterday's note (9/6/2022), there are no changes except as documented above.

## 2022-09-08 VITALS
BODY MASS INDEX: 27.22 KG/M2 | DIASTOLIC BLOOD PRESSURE: 64 MMHG | TEMPERATURE: 96.5 F | WEIGHT: 147.93 LBS | HEIGHT: 62 IN | HEART RATE: 81 BPM | OXYGEN SATURATION: 96 % | SYSTOLIC BLOOD PRESSURE: 155 MMHG | RESPIRATION RATE: 18 BRPM

## 2022-09-08 PROBLEM — R93.89 ABNORMAL CT OF THE CHEST: Status: RESOLVED | Noted: 2022-09-06 | Resolved: 2022-09-08

## 2022-09-08 PROBLEM — N39.0 UTI (URINARY TRACT INFECTION): Status: RESOLVED | Noted: 2022-09-06 | Resolved: 2022-09-08

## 2022-09-08 PROBLEM — R79.89 ELEVATED TROPONIN: Status: RESOLVED | Noted: 2022-09-06 | Resolved: 2022-09-08

## 2022-09-08 PROBLEM — R65.21 SEPTIC SHOCK (HCC): Status: RESOLVED | Noted: 2022-09-06 | Resolved: 2022-09-08

## 2022-09-08 PROBLEM — R79.89 ELEVATED LACTIC ACID LEVEL: Status: RESOLVED | Noted: 2022-09-06 | Resolved: 2022-09-08

## 2022-09-08 PROBLEM — I95.9 HYPOTENSION: Status: RESOLVED | Noted: 2022-09-06 | Resolved: 2022-09-08

## 2022-09-08 PROBLEM — N12 PYELONEPHRITIS: Status: RESOLVED | Noted: 2022-09-06 | Resolved: 2022-09-08

## 2022-09-08 PROBLEM — E83.42 HYPOMAGNESEMIA: Status: RESOLVED | Noted: 2022-09-06 | Resolved: 2022-09-08

## 2022-09-08 PROBLEM — A41.9 SEPTIC SHOCK (HCC): Status: RESOLVED | Noted: 2022-09-06 | Resolved: 2022-09-08

## 2022-09-08 PROBLEM — A41.9 SEPSIS (HCC): Status: RESOLVED | Noted: 2022-09-05 | Resolved: 2022-09-08

## 2022-09-08 PROBLEM — R78.81 GRAM-NEGATIVE BACTEREMIA: Status: RESOLVED | Noted: 2022-09-06 | Resolved: 2022-09-08

## 2022-09-08 LAB
ALBUMIN SERPL BCP-MCNC: 3.1 G/DL (ref 3.2–4.9)
ALBUMIN/GLOB SERPL: 1.2 G/DL
ALP SERPL-CCNC: 244 U/L (ref 30–99)
ALT SERPL-CCNC: 17 U/L (ref 2–50)
ANION GAP SERPL CALC-SCNC: 10 MMOL/L (ref 7–16)
AST SERPL-CCNC: 14 U/L (ref 12–45)
BACTERIA BLD CULT: ABNORMAL
BACTERIA BLD CULT: ABNORMAL
BACTERIA UR CULT: ABNORMAL
BACTERIA UR CULT: ABNORMAL
BASOPHILS # BLD AUTO: 0.5 % (ref 0–1.8)
BASOPHILS # BLD: 0.07 K/UL (ref 0–0.12)
BILIRUB SERPL-MCNC: 0.3 MG/DL (ref 0.1–1.5)
BUN SERPL-MCNC: 23 MG/DL (ref 8–22)
CALCIUM SERPL-MCNC: 8.1 MG/DL (ref 8.5–10.5)
CHLORIDE SERPL-SCNC: 107 MMOL/L (ref 96–112)
CO2 SERPL-SCNC: 18 MMOL/L (ref 20–33)
CREAT SERPL-MCNC: 1.16 MG/DL (ref 0.5–1.4)
EOSINOPHIL # BLD AUTO: 0.43 K/UL (ref 0–0.51)
EOSINOPHIL NFR BLD: 3 % (ref 0–6.9)
ERYTHROCYTE [DISTWIDTH] IN BLOOD BY AUTOMATED COUNT: 64.3 FL (ref 35.9–50)
GFR SERPLBLD CREATININE-BSD FMLA CKD-EPI: 48 ML/MIN/1.73 M 2
GLOBULIN SER CALC-MCNC: 2.6 G/DL (ref 1.9–3.5)
GLUCOSE SERPL-MCNC: 110 MG/DL (ref 65–99)
HCT VFR BLD AUTO: 30.1 % (ref 37–47)
HGB BLD-MCNC: 9.5 G/DL (ref 12–16)
IMM GRANULOCYTES # BLD AUTO: 0.21 K/UL (ref 0–0.11)
IMM GRANULOCYTES NFR BLD AUTO: 1.5 % (ref 0–0.9)
LYMPHOCYTES # BLD AUTO: 1.04 K/UL (ref 1–4.8)
LYMPHOCYTES NFR BLD: 7.3 % (ref 22–41)
MAGNESIUM SERPL-MCNC: 2.8 MG/DL (ref 1.5–2.5)
MCH RBC QN AUTO: 30.1 PG (ref 27–33)
MCHC RBC AUTO-ENTMCNC: 31.6 G/DL (ref 33.6–35)
MCV RBC AUTO: 95.3 FL (ref 81.4–97.8)
MONOCYTES # BLD AUTO: 0.65 K/UL (ref 0–0.85)
MONOCYTES NFR BLD AUTO: 4.6 % (ref 0–13.4)
NEUTROPHILS # BLD AUTO: 11.77 K/UL (ref 2–7.15)
NEUTROPHILS NFR BLD: 83.1 % (ref 44–72)
NRBC # BLD AUTO: 0.02 K/UL
NRBC BLD-RTO: 0.1 /100 WBC
PHOSPHATE SERPL-MCNC: 3.7 MG/DL (ref 2.5–4.5)
PLATELET # BLD AUTO: 190 K/UL (ref 164–446)
PMV BLD AUTO: 11.2 FL (ref 9–12.9)
POTASSIUM SERPL-SCNC: 4.4 MMOL/L (ref 3.6–5.5)
PROT SERPL-MCNC: 5.7 G/DL (ref 6–8.2)
RBC # BLD AUTO: 3.16 M/UL (ref 4.2–5.4)
SIGNIFICANT IND 70042: ABNORMAL
SIGNIFICANT IND 70042: ABNORMAL
SITE SITE: ABNORMAL
SITE SITE: ABNORMAL
SODIUM SERPL-SCNC: 135 MMOL/L (ref 135–145)
SOURCE SOURCE: ABNORMAL
SOURCE SOURCE: ABNORMAL
WBC # BLD AUTO: 14.2 K/UL (ref 4.8–10.8)

## 2022-09-08 PROCEDURE — 700111 HCHG RX REV CODE 636 W/ 250 OVERRIDE (IP): Performed by: INTERNAL MEDICINE

## 2022-09-08 PROCEDURE — 83735 ASSAY OF MAGNESIUM: CPT

## 2022-09-08 PROCEDURE — 80053 COMPREHEN METABOLIC PANEL: CPT

## 2022-09-08 PROCEDURE — 84100 ASSAY OF PHOSPHORUS: CPT

## 2022-09-08 PROCEDURE — 700101 HCHG RX REV CODE 250: Performed by: INTERNAL MEDICINE

## 2022-09-08 PROCEDURE — 85025 COMPLETE CBC W/AUTO DIFF WBC: CPT

## 2022-09-08 PROCEDURE — 99239 HOSP IP/OBS DSCHRG MGMT >30: CPT | Performed by: HOSPITALIST

## 2022-09-08 PROCEDURE — A9270 NON-COVERED ITEM OR SERVICE: HCPCS | Performed by: INTERNAL MEDICINE

## 2022-09-08 PROCEDURE — 700102 HCHG RX REV CODE 250 W/ 637 OVERRIDE(OP): Performed by: INTERNAL MEDICINE

## 2022-09-08 RX ORDER — AMOXICILLIN AND CLAVULANATE POTASSIUM 875; 125 MG/1; MG/1
1 TABLET, FILM COATED ORAL 2 TIMES DAILY
Qty: 12 TABLET | Refills: 0 | Status: SHIPPED | OUTPATIENT
Start: 2022-09-08 | End: 2022-09-14

## 2022-09-08 RX ADMIN — CETIRIZINE HYDROCHLORIDE 10 MG: 10 TABLET, FILM COATED ORAL at 04:20

## 2022-09-08 RX ADMIN — LEVOTHYROXINE SODIUM 50 MCG: 0.05 TABLET ORAL at 04:20

## 2022-09-08 RX ADMIN — CEFTRIAXONE SODIUM 1 G: 10 INJECTION, POWDER, FOR SOLUTION INTRAVENOUS at 04:20

## 2022-09-08 RX ADMIN — SENNOSIDES AND DOCUSATE SODIUM 2 TABLET: 50; 8.6 TABLET ORAL at 04:20

## 2022-09-08 RX ADMIN — FOLIC ACID 1 MG: 1 TABLET ORAL at 04:20

## 2022-09-08 NOTE — DISCHARGE INSTRUCTIONS
Discharge Instructions per Mike Khalil D.O.      DIET: Healthy balanced diet    ACTIVITY: as tolerates    DIAGNOSIS: Sepsis     Return to ER if needed

## 2022-09-08 NOTE — DISCHARGE SUMMARY
"Discharge Summary    CHIEF COMPLAINT ON ADMISSION  Chief Complaint   Patient presents with    Shortness of Breath     Patient attended the [facility] cook off followed by an outing with daughter. Per daughter, patient all of a sudden began shivering and her lips were quivering. Patient stated at this time she felt dizzy as well as short of breath. Patient states she still feels jittery and feels as if she was \"shaking on the inside.\" Patient was recently told she had blood clots in her legs. Takes daily aspirin. Patient was recently on a blood thinner.     Dizziness     Endorses weakness during her period of dizziness.        Reason for Admission  sob     Admission Date  9/5/2022    CODE STATUS  Full Code    HPI & HOSPITAL COURSE  Mariely Easley is an 82-year-old female with hypertension, hypothyroid, venous insufficiency.  She was admitted 9/5/2022 with septic shock due to pyelonephritis.  She required IV fluids and pressor support in the intensive care.  Urine cultures grew out pansensitive E. coli.  1 of 2 blood cultures were positive for E. coli as well.  Patient had significant improvement at time of discharge.  She was able to ambulate.  She has 3 daughters locally to help care for her.  On 9/5 initial WBC was 5.8 that on 9/6 jumped up to 34.2.  On date of discharge her WBC: 14.2.  She had some ongoing anemia with hemoglobin of 9.5 dated discharge.  Patient did have elevated alkaline phosphatase during hospitalization with elevated alkaline Phosphatase on 9/8 of 244.  She had an elevated troponin on 9/5: 24 did jump up on 9/6 to 272.  This is felt to be secondary to demand ischemia and or septic shock.  She had no chest pain.  On September 5 she did have an elevated proBNP of 1727.  On 9/6 an echocardiogram showed normal left ventricular systolic function moderate aortic insufficiency mild tricuspid regurgitation and a RVSP 45 mmHg.  The patient was initiated on Zosyn then switched to Rocephin for 3 days and will be " discharged on Augmentin x6 more days.  She should complete a total antibiotic course of 10 days.    Therefore, she is discharged in good and stable condition to home with close outpatient follow-up.    The patient met 2-midnight criteria for an inpatient stay at the time of discharge.    Discharge Date  09/08/22    FOLLOW UP ITEMS POST DISCHARGE  None from the hospital    DISCHARGE DIAGNOSES  Principal Problem (Resolved):    Septic shock (HCC) POA: Yes  Active Problems:    Essential hypertension POA: Yes    Acquired hypothyroidism POA: Yes    Mixed hyperlipidemia POA: Yes    CKD (chronic kidney disease) stage 3, GFR 30-59 ml/min (HCC) POA: Yes    Renal cyst, left POA: Yes    Aortic insufficiency POA: Yes  Resolved Problems:    Sepsis ([facility]) POA: Yes    Hypotension POA: Unknown    UTI (urinary tract infection) POA: Unknown    Elevated troponin POA: Unknown    Abnormal CT of the chest POA: Yes    Elevated lactic acid level POA: Yes    Pyelonephritis POA: Yes    Gram-negative bacteremia POA: Yes    Hypomagnesemia POA: Yes      FOLLOW UP  Future Appointments   Date Time Provider Department Center   10/13/2022 11:00 AM Luz Vieira D.O. [facility]     Luz Vieira D.O.  [address] [place] [id number] [phone]    Follow up        MEDICATIONS ON DISCHARGE     Medication List        START taking these medications        Instructions   amoxicillin-clavulanate 875-125 MG Tabs  Commonly known as: AUGMENTIN   Take 1 Tablet by mouth 2 times a day for 6 days.  Dose: 1 Tablet            CONTINUE taking these medications        Instructions   aspirin  MG Tbec  Commonly known as: ECOTRIN   Take 325 mg by mouth every day.  Dose: 325 mg     cetirizine 10 MG Tabs  Commonly known as: ZYRTEC   Take 1 Tablet by mouth every day.  Dose: 10 mg     fluticasone 50 MCG/ACT nasal spray  Commonly known as: FLONASE   Administer 1 Spray into affected nostril(S) every day. *Follow up needed for additional  refills*  Dose: 1 Spray     folic acid 1 MG Tabs  Commonly known as: FOLVITE   Take 1 Tablet by mouth every day.  Dose: 1 mg     furosemide 20 MG Tabs  Commonly known as: LASIX   Take 1 Tablet by mouth 2 times a day.  Dose: 20 mg     levothyroxine 50 MCG Tabs  Commonly known as: SYNTHROID   Take 1 Tab by mouth Every morning on an empty stomach.  Dose: 50 mcg     losartan 50 MG Tabs  Commonly known as: COZAAR   TAKE 1 TABLET BY MOUTH EVERY DAY     metoprolol tartrate 25 MG Tabs  Commonly known as: LOPRESSOR   TAKE 1 TABLET BY MOUTH TWICE A DAY     simvastatin 10 MG Tabs  Commonly known as: ZOCOR   Take 1 Tablet by mouth every evening. *Appointment needed for additional refills*  Dose: 10 mg              Allergies  No Known Allergies    DIET  Orders Placed This Encounter   Procedures    Diet Order Diet: Regular     Standing Status:   Standing     Number of Occurrences:   1     Order Specific Question:   Diet:     Answer:   Regular [1]       ACTIVITY  As tolerated.  Weight bearing as tolerated    CONSULTATIONS  Critical Care    PROCEDURES  None    LABORATORY  Lab Results   Component Value Date    SODIUM 135 09/08/2022    POTASSIUM 4.4 09/08/2022    CHLORIDE 107 09/08/2022    CO2 18 (L) 09/08/2022    GLUCOSE 110 (H) 09/08/2022    BUN 23 (H) 09/08/2022    CREATININE 1.16 09/08/2022        Lab Results   Component Value Date    WBC 14.2 (H) 09/08/2022    HEMOGLOBIN 9.5 (L) 09/08/2022    HEMATOCRIT 30.1 (L) 09/08/2022    PLATELETCT 190 09/08/2022        Total time of the discharge process exceeds 32 minutes.

## 2022-09-08 NOTE — PROGRESS NOTES
Pt DC'd. IV removed, discharge instructions provided to patient, pt verbalizes understanding. Pt states all questions have been answered. Copy of discharge paperwork provided to pt, signed copy in chart. Pt states all belongings in possession. Pt escorted off unit by RN without incident, pt's daughter taking her home

## 2022-09-08 NOTE — CARE PLAN
Problem: Knowledge Deficit - Standard  Goal: Patient and family/care givers will demonstrate understanding of plan of care, disease process/condition, diagnostic tests and medications  Outcome: Progressing     Problem: Pain - Standard  Goal: Alleviation of pain or a reduction in pain to the patient’s comfort goal  Outcome: Progressing   The patient is Stable - Low risk of patient condition declining or worsening    Shift Goals  Clinical Goals: Remain    Progress made toward(s) clinical / shift goals:  pt reports no pain

## 2022-09-08 NOTE — PROGRESS NOTES
Received report from pedrito RN, POC discussed. Pt A&Ox4, RA, self to the bathroom. Pt has no reports of pain.

## 2022-09-09 ENCOUNTER — PATIENT OUTREACH (OUTPATIENT)
Dept: MEDICAL GROUP | Facility: PHYSICIAN GROUP | Age: 82
End: 2022-09-09
Payer: MEDICARE

## 2022-09-09 NOTE — PROGRESS NOTES
Subjective:     Mariely Easley is a 82 y.o. female who presents for Hospital Follow-up.    POST DISCHARGE CALL:  Discharge Date:9/8/2022   Date of Outreach Call: 9/9/2022  8:53 AM  Now that you're home, how are you doing? Good  Did you receive any new prescriptions? Yes  Were you able to fill those medications? No  How did you fill those prescriptions?   If not able to fill prescriptions, why? Other (Comment)    Do you have questions about your medications? No  Do you have a follow-up appointment scheduled?Yes  Any issues or paperwork you wish to discuss with your PCP? No  Does patient qualify for CCM Program? Yes  If patient qualifies, was CCM Program Introduced? Yes  If patient does not qualify, comment?   Number of Attempts: 1  Current or previous attempts completed within two business days of discharge? Yes  Provided education regarding treatment plan, medication, self-management, ADLs? Yes  Has patient completed Advance Directive? If yes, advise them to bring to appointment. No  Care Manager phone number provided? Yes  Is there anything else I can help you with? No  Chief Complaint? SOB, Dizziness  Admitting Dx? SOB  Discharge Diagnosis? Pyelonephritis  Additional Comments? Pt reports [facility] did not have Aug[facility]in in stock yesterday- she may have to [facility] another [facility]. Aware she needs to complete abx.Scheduled f/u once abx has completed.    HPI:   Recently hospitalized for septic shock.    Patient was in her usual state of health and had very minimal symptoms of UTI that she did not really recognize until she went to the rib cough and then developed rigors/chills and was brought to the emergency department.  She was diagnosed with a pansensitive E. coli urinary tract infection and one of the 2 sets of blood cultures grew E. coli as well.    She finished her antibiotics yesterday and feels well today aside from generalized fatigue since she got out of the hospital.  No longer with shortness of  "breath, dizziness or chills.    Current medicines (including reconciliation performed today)  Current Outpatient Medications   Medication Sig Dispense Refill    furosemide (LASIX) 20 MG Tab Take 1 Tablet by mouth 2 times a day. (Patient taking differently: Take 20 mg by mouth every day.) 180 Tablet 0    folic acid (FOLVITE) 1 MG Tab Take 1 Tablet by mouth every day. 90 Tablet 3    metoprolol tartrate (LOPRESSOR) 25 MG Tab TAKE 1 TABLET BY MOUTH TWICE A  Tablet 3    losartan (COZAAR) 50 MG Tab TAKE 1 TABLET BY MOUTH EVERY  Tablet 3    cetirizine (ZYRTEC) 10 MG Tab Take 1 Tablet by mouth every day. 90 Tablet 3    simvastatin (ZOCOR) 10 MG Tab Take 1 Tablet by mouth every evening. *Appointment needed for additional refills* 100 Tablet 3    fluticasone (FLONASE) 50 MCG/ACT nasal spray Administer 1 Spray into affected nostril(S) every day. *Follow up needed for additional refills* 48 mL 3    levothyroxine (SYNTHROID) 50 MCG Tab Take 1 Tab by mouth Every morning on an empty stomach. 90 Tab 3     No current facility-administered medications for this visit.     Allergies:   Patient has no known allergies.    Social History     Tobacco Use    Smoking status: Former     Types: Cigarettes     Quit date: 1979     Years since quittin.1    Smokeless tobacco: Never   Vaping Use    Vaping Use: Never used   Substance Use Topics    Alcohol use: Yes     Alcohol/week: 8.4 oz     Types: 14 Glasses of wine per week    Drug use: No       ROS:  Denies, see HPI.  Patient reports lower extremity DVTs resolved.  No lower extremity edema.    Objective:     Vitals:    22 1717   BP: 138/62   BP Location: Right arm   Patient Position: Sitting   BP Cuff Size: Adult   Pulse: (!) 57   Temp: 36.3 °C (97.4 °F)   TempSrc: Temporal   SpO2: 99%   Weight: 62.1 kg (137 lb)   Height: 1.575 m (5' 2\")     Body mass index is 25.06 kg/m².    Physical Exam  Constitutional:       Appearance: Normal appearance.   HENT:      Head: " Normocephalic and atraumatic.   Cardiovascular:      Rate and Rhythm: Normal rate and regular rhythm.      Heart sounds: No murmur heard.    No gallop.   Pulmonary:      Effort: Pulmonary effort is normal. No respiratory distress.      Breath sounds: Normal breath sounds. No wheezing, rhonchi or rales.   Abdominal:      General: There is no distension.      Palpations: Abdomen is soft. There is no mass.      Tenderness: There is no abdominal tenderness.   Musculoskeletal:         General: No swelling or tenderness.      Right lower leg: No edema.      Left lower leg: No edema.      Comments: Varicose veins noted, non-tender bilat.   Skin:     General: Skin is warm and dry.      Findings: No rash.   Neurological:      General: No focal deficit present.      Mental Status: She is alert and oriented to person, place, and time.   Psychiatric:         Mood and Affect: Mood normal.         Behavior: Behavior normal.         Thought Content: Thought content normal.         Judgment: Judgment normal.      Labs/imaging reviewed from inpatient stay. Correct Ca last 8.8.    Assessment and Plan:   1. Hospital discharge follow-up  - Chart and discharge summary were reviewed.   - Hospitalization and results reviewed with patient.   - Medications reviewed including instructions regarding high risk medications, dosing and side effects.  - Recommended Services: No services needed at this time  - Advance directive/POLST on file?  No, advised.    2. History of UTI  Patient reports no history of frequent UTI in the last being approximately 14 years ago led to significant illness as this.  Patient feeling back to her baseline aside from some mild fatigue today without yesterday abdominal symptoms.  Completed IV and oral antibiotics.  Repeat urine analysis to the ensure resolution.  - URINALYSIS,CULTURE IF INDICATED; Future    3. Hypermagnesemia  Had hypomagnesemia in the hospital, slightly elevated prior to discharge so we will repeat  labs to ensure normalization.  - MAGNESIUM; Future  - Comp Metabolic Panel; Future    4. Abnormal albumin  Albumin slightly decreased while in hospital, trend.  Patient chronically not the best eater but this has been stable for years.  - Comp Metabolic Panel; Future    5. Stage 3a chronic kidney disease (HCC)  Trend creatinine along with other blood work.  She has been taking furosemide previously 20 mg twice daily and now just 20 mg in the morning with complete resolution of lower extremity edema that previously was associated with superficial than deep vein thrombosis which have now resolved.  Previously she was initially started on furosemide when she was taking sodium chloride tablets for hyponatremia but has not been taking this for quite some time so may be able to discontinue furosemide which would be of benefit to her renal function if able.  Can start by reducing furosemide by half and then consider holding if not necessary, may resume if lower extremity edema develops. Discussed ideal BP ranges.  - Comp Metabolic Panel; Future    6. Anemia, unspecified type  7. Folate deficiency  Acute on chronic anemia with prior known history of folate deficiency.  Continue with folic acid and will trend labs as below, suspect decline in hemoglobin related to acute illness with frequent phlebotomy.  - CBC WITH DIFFERENTIAL; Future  - FERRITIN; Future  - FOLATE; Future  - VITAMIN B12; Future  - IRON/TOTAL IRON BIND; Future    8. Elevated alkaline phosphatase level  Acute on chronic, trend.  - Comp Metabolic Panel; Future  - ALKALINE PHOSPHATASE ISOENZYMES; Future  ______  9. E. coli UTI (urinary tract infection)  Contacted patient with results of repeat urine culture at approximately 9580 9/30/2022.  Patient reporting some urinary frequency without abdominal pain, fever, chills, nausea, vomiting or flank pain.  We will treat with Bactrim as below.  Has a follow-up with me next week.  - sulfamethoxazole-trimethoprim  (BACTRIM DS) 800-160 MG tablet; Take 1 Tablet by mouth 2 times a day for 3 days.  Dispense: 6 Tablet; Refill: 0    Follow-up:Return in about 2 weeks (around 9/30/2022), or if symptoms worsen or fail to improve.    Face-to-face transitional care management services with MODERATE (today's visit is within 14 days post discharge & LACE+ score of 28-58) medical decision complexity were provided.     LACE+ Historical Score Over Time (0-28: Low, 29-58: Medium, 59+: High): 30

## 2022-09-10 LAB
BACTERIA BLD CULT: NORMAL
SIGNIFICANT IND 70042: NORMAL
SITE SITE: NORMAL
SOURCE SOURCE: NORMAL

## 2022-09-15 ENCOUNTER — PATIENT OUTREACH (OUTPATIENT)
Dept: HEALTH INFORMATION MANAGEMENT | Facility: OTHER | Age: 82
End: 2022-09-15
Payer: MEDICARE

## 2022-09-15 DIAGNOSIS — N18.31 STAGE 3A CHRONIC KIDNEY DISEASE: ICD-10-CM

## 2022-09-15 DIAGNOSIS — I10 ESSENTIAL HYPERTENSION: ICD-10-CM

## 2022-09-15 NOTE — PROGRESS NOTES
Called pt for monthly CCM outreach call. Pt was recently hospitalized, urosepsis. Pt states she did have some dysuria a couple of days before hospitalization. Finished current course of abx on 9/14/22. Has appt tomorrow to f/u with pcp. Mariely reports she is doing much better. She didn't have any needs or questions. Will follow-up next month- next CCM outreach call 10/11/2022.

## 2022-09-16 ENCOUNTER — OFFICE VISIT (OUTPATIENT)
Dept: MEDICAL GROUP | Facility: PHYSICIAN GROUP | Age: 82
End: 2022-09-16
Payer: MEDICARE

## 2022-09-16 VITALS
OXYGEN SATURATION: 99 % | TEMPERATURE: 97.4 F | BODY MASS INDEX: 25.21 KG/M2 | SYSTOLIC BLOOD PRESSURE: 138 MMHG | DIASTOLIC BLOOD PRESSURE: 62 MMHG | HEIGHT: 62 IN | WEIGHT: 137 LBS | HEART RATE: 57 BPM

## 2022-09-16 DIAGNOSIS — Z87.440 HISTORY OF UTI: ICD-10-CM

## 2022-09-16 DIAGNOSIS — D64.9 ANEMIA, UNSPECIFIED TYPE: ICD-10-CM

## 2022-09-16 DIAGNOSIS — N18.31 STAGE 3A CHRONIC KIDNEY DISEASE: ICD-10-CM

## 2022-09-16 DIAGNOSIS — R77.0 ABNORMAL ALBUMIN: ICD-10-CM

## 2022-09-16 DIAGNOSIS — R74.8 ELEVATED ALKALINE PHOSPHATASE LEVEL: ICD-10-CM

## 2022-09-16 DIAGNOSIS — N39.0 E. COLI UTI (URINARY TRACT INFECTION): ICD-10-CM

## 2022-09-16 DIAGNOSIS — E53.8 FOLATE DEFICIENCY: ICD-10-CM

## 2022-09-16 DIAGNOSIS — Z09 HOSPITAL DISCHARGE FOLLOW-UP: Primary | ICD-10-CM

## 2022-09-16 DIAGNOSIS — B96.20 E. COLI UTI (URINARY TRACT INFECTION): ICD-10-CM

## 2022-09-16 DIAGNOSIS — E83.41 HYPERMAGNESEMIA: ICD-10-CM

## 2022-09-16 PROCEDURE — 99495 TRANSJ CARE MGMT MOD F2F 14D: CPT | Performed by: STUDENT IN AN ORGANIZED HEALTH CARE EDUCATION/TRAINING PROGRAM

## 2022-09-16 ASSESSMENT — FIBROSIS 4 INDEX: FIB4 SCORE: 1.43

## 2022-09-17 NOTE — PATIENT INSTRUCTIONS
Ideal blood pressure <130/80 and at least <140/90    Can see if 10mg furosemide instead of 20mg daily and if not needed then can try to stop daily and use only as needed.

## 2022-09-28 ENCOUNTER — HOSPITAL ENCOUNTER (OUTPATIENT)
Dept: LAB | Facility: MEDICAL CENTER | Age: 82
End: 2022-09-28
Attending: STUDENT IN AN ORGANIZED HEALTH CARE EDUCATION/TRAINING PROGRAM
Payer: MEDICARE

## 2022-09-28 DIAGNOSIS — E83.41 HYPERMAGNESEMIA: ICD-10-CM

## 2022-09-28 DIAGNOSIS — Z87.440 HISTORY OF UTI: ICD-10-CM

## 2022-09-28 DIAGNOSIS — R74.8 ELEVATED ALKALINE PHOSPHATASE LEVEL: ICD-10-CM

## 2022-09-28 DIAGNOSIS — D64.9 ANEMIA, UNSPECIFIED TYPE: ICD-10-CM

## 2022-09-28 DIAGNOSIS — R77.0 ABNORMAL ALBUMIN: ICD-10-CM

## 2022-09-28 DIAGNOSIS — N18.31 STAGE 3A CHRONIC KIDNEY DISEASE: ICD-10-CM

## 2022-09-28 LAB
ALBUMIN SERPL BCP-MCNC: 3.6 G/DL (ref 3.2–4.9)
ALBUMIN/GLOB SERPL: 1.3 G/DL
ALP SERPL-CCNC: 153 U/L (ref 30–99)
ALT SERPL-CCNC: 15 U/L (ref 2–50)
ANION GAP SERPL CALC-SCNC: 12 MMOL/L (ref 7–16)
APPEARANCE UR: CLEAR
AST SERPL-CCNC: 16 U/L (ref 12–45)
BACTERIA #/AREA URNS HPF: NEGATIVE /HPF
BASOPHILS # BLD AUTO: 0.9 % (ref 0–1.8)
BASOPHILS # BLD: 0.12 K/UL (ref 0–0.12)
BILIRUB SERPL-MCNC: 0.4 MG/DL (ref 0.1–1.5)
BILIRUB UR QL STRIP.AUTO: NEGATIVE
BUN SERPL-MCNC: 19 MG/DL (ref 8–22)
CALCIUM SERPL-MCNC: 8.9 MG/DL (ref 8.5–10.5)
CHLORIDE SERPL-SCNC: 101 MMOL/L (ref 96–112)
CO2 SERPL-SCNC: 21 MMOL/L (ref 20–33)
COLOR UR: YELLOW
CREAT SERPL-MCNC: 1.08 MG/DL (ref 0.5–1.4)
EOSINOPHIL # BLD AUTO: 0.58 K/UL (ref 0–0.51)
EOSINOPHIL NFR BLD: 4.2 % (ref 0–6.9)
EPI CELLS #/AREA URNS HPF: NEGATIVE /HPF
ERYTHROCYTE [DISTWIDTH] IN BLOOD BY AUTOMATED COUNT: 58.1 FL (ref 35.9–50)
FERRITIN SERPL-MCNC: 288 NG/ML (ref 10–291)
FOLATE SERPL-MCNC: >40 NG/ML
GFR SERPLBLD CREATININE-BSD FMLA CKD-EPI: 52 ML/MIN/1.73 M 2
GLOBULIN SER CALC-MCNC: 2.8 G/DL (ref 1.9–3.5)
GLUCOSE SERPL-MCNC: 108 MG/DL (ref 65–99)
GLUCOSE UR STRIP.AUTO-MCNC: NEGATIVE MG/DL
HCT VFR BLD AUTO: 35.4 % (ref 37–47)
HGB BLD-MCNC: 11 G/DL (ref 12–16)
HYALINE CASTS #/AREA URNS LPF: ABNORMAL /LPF
IMM GRANULOCYTES # BLD AUTO: 0.27 K/UL (ref 0–0.11)
IMM GRANULOCYTES NFR BLD AUTO: 2 % (ref 0–0.9)
IRON SATN MFR SERPL: 13 % (ref 15–55)
IRON SERPL-MCNC: 28 UG/DL (ref 40–170)
KETONES UR STRIP.AUTO-MCNC: NEGATIVE MG/DL
LEUKOCYTE ESTERASE UR QL STRIP.AUTO: ABNORMAL
LYMPHOCYTES # BLD AUTO: 1.21 K/UL (ref 1–4.8)
LYMPHOCYTES NFR BLD: 8.8 % (ref 22–41)
MAGNESIUM SERPL-MCNC: 1.9 MG/DL (ref 1.5–2.5)
MCH RBC QN AUTO: 29.9 PG (ref 27–33)
MCHC RBC AUTO-ENTMCNC: 31.1 G/DL (ref 33.6–35)
MCV RBC AUTO: 96.2 FL (ref 81.4–97.8)
MICRO URNS: ABNORMAL
MONOCYTES # BLD AUTO: 0.67 K/UL (ref 0–0.85)
MONOCYTES NFR BLD AUTO: 4.9 % (ref 0–13.4)
NEUTROPHILS # BLD AUTO: 10.87 K/UL (ref 2–7.15)
NEUTROPHILS NFR BLD: 79.2 % (ref 44–72)
NITRITE UR QL STRIP.AUTO: NEGATIVE
NRBC # BLD AUTO: 0 K/UL
NRBC BLD-RTO: 0 /100 WBC
PH UR STRIP.AUTO: 7 [PH] (ref 5–8)
PLATELET # BLD AUTO: 362 K/UL (ref 164–446)
PMV BLD AUTO: 12 FL (ref 9–12.9)
POTASSIUM SERPL-SCNC: 4.8 MMOL/L (ref 3.6–5.5)
PROT SERPL-MCNC: 6.4 G/DL (ref 6–8.2)
PROT UR QL STRIP: NEGATIVE MG/DL
RBC # BLD AUTO: 3.68 M/UL (ref 4.2–5.4)
RBC # URNS HPF: ABNORMAL /HPF
RBC UR QL AUTO: NEGATIVE
SODIUM SERPL-SCNC: 134 MMOL/L (ref 135–145)
SP GR UR STRIP.AUTO: 1.01
TIBC SERPL-MCNC: 222 UG/DL (ref 250–450)
UIBC SERPL-MCNC: 194 UG/DL (ref 110–370)
UROBILINOGEN UR STRIP.AUTO-MCNC: 0.2 MG/DL
VIT B12 SERPL-MCNC: 1939 PG/ML (ref 211–911)
WBC # BLD AUTO: 13.7 K/UL (ref 4.8–10.8)
WBC #/AREA URNS HPF: ABNORMAL /HPF

## 2022-09-28 PROCEDURE — 81001 URINALYSIS AUTO W/SCOPE: CPT

## 2022-09-28 PROCEDURE — 82728 ASSAY OF FERRITIN: CPT

## 2022-09-28 PROCEDURE — 80053 COMPREHEN METABOLIC PANEL: CPT

## 2022-09-28 PROCEDURE — 36415 COLL VENOUS BLD VENIPUNCTURE: CPT

## 2022-09-28 PROCEDURE — 83735 ASSAY OF MAGNESIUM: CPT

## 2022-09-28 PROCEDURE — 82607 VITAMIN B-12: CPT

## 2022-09-28 PROCEDURE — 84080 ASSAY ALKALINE PHOSPHATASES: CPT

## 2022-09-28 PROCEDURE — 87186 SC STD MICRODIL/AGAR DIL: CPT

## 2022-09-28 PROCEDURE — 82746 ASSAY OF FOLIC ACID SERUM: CPT

## 2022-09-28 PROCEDURE — 85025 COMPLETE CBC W/AUTO DIFF WBC: CPT

## 2022-09-28 PROCEDURE — 84075 ASSAY ALKALINE PHOSPHATASE: CPT

## 2022-09-28 PROCEDURE — 83550 IRON BINDING TEST: CPT

## 2022-09-28 PROCEDURE — 87086 URINE CULTURE/COLONY COUNT: CPT

## 2022-09-28 PROCEDURE — 87077 CULTURE AEROBIC IDENTIFY: CPT

## 2022-09-28 PROCEDURE — 83540 ASSAY OF IRON: CPT

## 2022-09-30 LAB
ALP BONE SERPL-CCNC: 29 U/L (ref 0–55)
ALP ISOS SERPL HS-CCNC: 0 U/L
ALP LIVER SERPL-CCNC: 125 U/L (ref 0–94)
ALP SERPL-CCNC: 154 U/L (ref 40–120)
BACTERIA UR CULT: ABNORMAL
BACTERIA UR CULT: ABNORMAL
SIGNIFICANT IND 70042: ABNORMAL
SITE SITE: ABNORMAL
SOURCE SOURCE: ABNORMAL

## 2022-09-30 RX ORDER — SULFAMETHOXAZOLE AND TRIMETHOPRIM 800; 160 MG/1; MG/1
1 TABLET ORAL 2 TIMES DAILY
Qty: 6 TABLET | Refills: 0 | Status: SHIPPED | OUTPATIENT
Start: 2022-09-30 | End: 2022-10-03

## 2022-10-04 ENCOUNTER — OFFICE VISIT (OUTPATIENT)
Dept: MEDICAL GROUP | Facility: PHYSICIAN GROUP | Age: 82
End: 2022-10-04
Payer: MEDICARE

## 2022-10-04 VITALS
DIASTOLIC BLOOD PRESSURE: 50 MMHG | HEART RATE: 64 BPM | HEIGHT: 62 IN | WEIGHT: 137.2 LBS | BODY MASS INDEX: 25.25 KG/M2 | TEMPERATURE: 97.2 F | OXYGEN SATURATION: 97 % | SYSTOLIC BLOOD PRESSURE: 128 MMHG

## 2022-10-04 DIAGNOSIS — D72.829 LEUKOCYTOSIS, UNSPECIFIED TYPE: ICD-10-CM

## 2022-10-04 DIAGNOSIS — Z87.440 HISTORY OF UTI: ICD-10-CM

## 2022-10-04 DIAGNOSIS — D50.9 IRON DEFICIENCY ANEMIA, UNSPECIFIED IRON DEFICIENCY ANEMIA TYPE: ICD-10-CM

## 2022-10-04 DIAGNOSIS — R60.0 EDEMA OF RIGHT LOWER LEG: ICD-10-CM

## 2022-10-04 DIAGNOSIS — E87.1 HYPONATREMIA: ICD-10-CM

## 2022-10-04 DIAGNOSIS — R74.8 ELEVATED ALKALINE PHOSPHATASE LEVEL: ICD-10-CM

## 2022-10-04 PROBLEM — I82.811: Status: RESOLVED | Noted: 2022-07-05 | Resolved: 2022-10-04

## 2022-10-04 PROBLEM — R77.0 ABNORMAL ALBUMIN: Status: RESOLVED | Noted: 2022-09-16 | Resolved: 2022-10-04

## 2022-10-04 PROBLEM — E83.41 HYPERMAGNESEMIA: Status: RESOLVED | Noted: 2022-09-16 | Resolved: 2022-10-04

## 2022-10-04 PROCEDURE — 99214 OFFICE O/P EST MOD 30 MIN: CPT | Performed by: STUDENT IN AN ORGANIZED HEALTH CARE EDUCATION/TRAINING PROGRAM

## 2022-10-04 RX ORDER — ASPIRIN 81 MG/1
325 TABLET, CHEWABLE ORAL DAILY
COMMUNITY
End: 2023-01-08

## 2022-10-04 RX ORDER — FERROUS SULFATE 325(65) MG
325 TABLET ORAL DAILY
Qty: 90 TABLET | Refills: 0 | Status: SHIPPED | OUTPATIENT
Start: 2022-10-04 | End: 2022-11-15 | Stop reason: SDUPTHER

## 2022-10-04 ASSESSMENT — FIBROSIS 4 INDEX: FIB4 SCORE: 0.91

## 2022-10-05 NOTE — PROGRESS NOTES
Subjective:     Chief Complaint   Patient presents with    Results     labs     HPI:   Mariely presents today to go over lab results.    Feeling improved and getting energy back. Still not eating often, but this is chronic/stable. Has been up and about cooking the last few days. Noted some edema in redness in the RLE anterior, not painful and started 3 days ago. Just like when she had superficial clots prior, hasn't gotten bigger. No fever/chills.    No further urinary symptoms, completed antibiotics.     Current Outpatient Medications Ordered in Epic   Medication Sig Dispense Refill    aspirin (ASA) 81 MG Chew Tab chewable tablet Chew 81 mg every day.      ferrous sulfate 325 (65 Fe) MG tablet Take 1 Tablet by mouth every day. 90 Tablet 0    furosemide (LASIX) 20 MG Tab Take 1 Tablet by mouth 2 times a day. (Patient taking differently: Take 20 mg by mouth every day.) 180 Tablet 0    folic acid (FOLVITE) 1 MG Tab Take 1 Tablet by mouth every day. 90 Tablet 3    metoprolol tartrate (LOPRESSOR) 25 MG Tab TAKE 1 TABLET BY MOUTH TWICE A  Tablet 3    losartan (COZAAR) 50 MG Tab TAKE 1 TABLET BY MOUTH EVERY  Tablet 3    cetirizine (ZYRTEC) 10 MG Tab Take 1 Tablet by mouth every day. 90 Tablet 3    simvastatin (ZOCOR) 10 MG Tab Take 1 Tablet by mouth every evening. *Appointment needed for additional refills* 100 Tablet 3    fluticasone (FLONASE) 50 MCG/ACT nasal spray Administer 1 Spray into affected nostril(S) every day. *Follow up needed for additional refills* 48 mL 3    levothyroxine (SYNTHROID) 50 MCG Tab Take 1 Tab by mouth Every morning on an empty stomach. 90 Tab 3     No current Epic-ordered facility-administered medications on file.     ROS:  Gen: no fevers/chills, no changes in weight  Eyes: no changes in vision  ENT: no sore throat  Pulm: no sob, no cough  CV: no chest pain, no palpitations  GI: no nausea/vomiting, no diarrhea  : no dysuria  MSk: no myalgias  Neuro: no headaches  Heme/Lymph: no  "easy bruising    Objective:     Exam:  /50 (BP Location: Left arm, Patient Position: Sitting, BP Cuff Size: Adult)   Pulse 64   Temp 36.2 °C (97.2 °F) (Temporal)   Ht 1.575 m (5' 2\")   Wt 62.2 kg (137 lb 3.2 oz)   SpO2 97%   BMI 25.09 kg/m²  Body mass index is 25.09 kg/m².    Physical Exam:  Constitutional: Well-developed and well-nourished. No acute distress.   Skin: Skin is warm and dry.   Head: Atraumatic without lesions.  Eyes: Conjunctivae and extraocular motions are normal. Pupils are equal, round. No scleral icterus.   Mouth/Throat: Wearing mask.  Neck: Supple, trachea midline.   Cardiovascular: Regular rate and rhythm, S1 and S2 without murmur, rubs, or gallops.  Lungs: Normal inspiratory effort, CTA bilaterally, no wheezes/rhonchi/rales  Abdomen: Soft, non tender, and without distention. No rebound, guarding, masses or HSM.  Extremities: No cyanosis, clubbing. Discreet area of erythema and induration approx 5cmx4.5cm over the distal anterior shin with mild associated RLE edema, no increased warmth mild tenderness.  Neurological: Alert and oriented x 3. No gross/focal deficits.  Psychiatric:  Behavior, mood, and affect are appropriate.    Labs: Reviewed from 9/28/2022    Assessment & Plan:     82 y.o. female with the following -     1. History of UTI  Symptoms resolved, repeat culture to ensure sterilization of urine.  - URINALYSIS,CULTURE IF INDICATED; Future    2. Iron deficiency anemia, unspecified iron deficiency anemia type  Improved. Start iron, trend labs in 1m.  - IRON/TOTAL IRON BIND; Future  - ferrous sulfate 325 (65 Fe) MG tablet; Take 1 Tablet by mouth every day.  Dispense: 90 Tablet; Refill: 0    3. Hyponatremia  Mild acute on chronic. Trend with next labs, gave return precautions to f/u or obtain labs sooner.  - Basic Metabolic Panel; Future    4. Leukocytosis, unspecified type  Improving after hospital stay for sepsis, recently treated again for UTI, trend.  - CBC WITH " DIFFERENTIAL; Future    5. Elevated alkaline phosphatase level  Chronic, improved with elevated liver fractions. Check RUQ US.  - US-RUQ; Future    6. Edema of right lower leg  Acute on chronic, consistent with superficial thrombosis as she has had prior. Aware of treatment and return precautions for progression, would benefit from f/u with vascular since this is a recurrence.   - US-EXTREMITY VENOUS LOWER UNILAT RIGHT; Future    Return in about 4 weeks (around 11/1/2022), or if symptoms worsen or fail to improve.    Please note that this dictation was created using voice recognition software. I have made every reasonable attempt to correct obvious errors, but I expect that there are errors of grammar and possibly content that I did not discover before finalizing the note.

## 2022-10-07 ENCOUNTER — TELEPHONE (OUTPATIENT)
Dept: MEDICAL GROUP | Facility: PHYSICIAN GROUP | Age: 82
End: 2022-10-07
Payer: MEDICARE

## 2022-10-07 DIAGNOSIS — Z87.440 HISTORY OF UTI: ICD-10-CM

## 2022-10-07 NOTE — TELEPHONE ENCOUNTER
Phone Number Called: [phone]    Call outcome:  Spoke to the lab    Message: The lab stated that we need to recollect the Urinalysis, culture if indicated as it needs to be in a sterile container (blue top or cherry top).

## 2022-10-10 ENCOUNTER — PATIENT OUTREACH (OUTPATIENT)
Dept: HEALTH INFORMATION MANAGEMENT | Facility: OTHER | Age: 82
End: 2022-10-10
Payer: MEDICARE

## 2022-10-10 DIAGNOSIS — N18.31 STAGE 3A CHRONIC KIDNEY DISEASE: ICD-10-CM

## 2022-10-10 DIAGNOSIS — E78.2 MIXED HYPERLIPIDEMIA: ICD-10-CM

## 2022-10-10 DIAGNOSIS — I10 ESSENTIAL HYPERTENSION: ICD-10-CM

## 2022-10-10 DIAGNOSIS — I83.813 VARICOSE VEINS OF BOTH LOWER EXTREMITIES WITH PAIN: ICD-10-CM

## 2022-10-10 PROCEDURE — 99490 CHRNC CARE MGMT STAFF 1ST 20: CPT | Performed by: STUDENT IN AN ORGANIZED HEALTH CARE EDUCATION/TRAINING PROGRAM

## 2022-10-10 NOTE — PROGRESS NOTES
Called Natasha to let her know we have tried to reach [facility] a couple of times this morning and have been disconnected after being placed on hold. Natasha said she will try to reach them also. Advised Natasha that if we cannot reach [facility] and have them see Mariely today- then Mariely needs to go to the ED for an evaluation. Natasha agrees, but she said Mariely probably will refuse because an ED visit takes so long.

## 2022-10-10 NOTE — PROGRESS NOTES
Natasha called questioning lab results of urine sample last week. Reviewed chart, urine sample still processing. Then noticed 10/7 note which states the urine needs to be recollected.   Natasha also reports Mariely's leg is swelling today. Natasha does not want to take Mariely to ED. Natasha tried to call [facility] to get an appt today for an US of Mariely's leg- but she hasn't been able to speak with anyone at [facility]. Offered to schedule US trough [facility] as Dr. Vieira placed an order for the procedure last week. Natasha states they prefer to be seen by [facility] and Natasha is requesting we assist with calling [facility] and scheduling an appt. Svetlana (CHW) has agreed to call [facility] now.    Melolabial Interpolation Flap Text: A decision was made to reconstruct the defect utilizing an interpolation axial flap and a staged reconstruction.  A telfa template was made of the defect.  This telfa template was then used to outline the melolabial interpolation flap.  The donor area for the pedicle flap was then injected with anesthesia.  The flap was excised through the skin and subcutaneous tissue down to the layer of the underlying musculature.  The pedicle flap was carefully excised within this deep plane to maintain its blood supply.  The edges of the donor site were undermined.   The donor site was closed in a primary fashion.  The pedicle was then rotated into position and sutured.  Once the tube was sutured into place, adequate blood supply was confirmed with blanching and refill.  The pedicle was then wrapped with xeroform gauze and dressed appropriately with a telfa and gauze bandage to ensure continued blood supply and protect the attached pedicle.

## 2022-10-12 NOTE — TELEPHONE ENCOUNTER
Phone Number Called: [phone]     Call outcome: Spoke to patient regarding message below.    Message: Spoke to Patient and informed her of the following:  Please inform the patient that her urine will have to be recollected in the correct bottle.  I have ordered a regular urine culture for her to complete at the lab, please advise a clean-catch urine for accuracy. Thanks!     Patient acknowledged and states she has an appt tomorrow however will come in a little earlier to get culture done in labs.

## 2022-10-13 ENCOUNTER — OFFICE VISIT (OUTPATIENT)
Dept: MEDICAL GROUP | Facility: PHYSICIAN GROUP | Age: 82
End: 2022-10-13
Payer: MEDICARE

## 2022-10-13 ENCOUNTER — HOSPITAL ENCOUNTER (OUTPATIENT)
Facility: MEDICAL CENTER | Age: 82
End: 2022-10-13
Attending: STUDENT IN AN ORGANIZED HEALTH CARE EDUCATION/TRAINING PROGRAM
Payer: MEDICARE

## 2022-10-13 VITALS
SYSTOLIC BLOOD PRESSURE: 146 MMHG | RESPIRATION RATE: 20 BRPM | BODY MASS INDEX: 25.21 KG/M2 | OXYGEN SATURATION: 99 % | WEIGHT: 137 LBS | HEART RATE: 60 BPM | TEMPERATURE: 98.4 F | HEIGHT: 62 IN | DIASTOLIC BLOOD PRESSURE: 50 MMHG

## 2022-10-13 DIAGNOSIS — R82.71 BACTERIURIA: ICD-10-CM

## 2022-10-13 DIAGNOSIS — Z87.440 HISTORY OF UTI: ICD-10-CM

## 2022-10-13 DIAGNOSIS — I82.811 SUPERFICIAL THROMBOSIS OF LEG, RIGHT: ICD-10-CM

## 2022-10-13 DIAGNOSIS — I10 ESSENTIAL HYPERTENSION: ICD-10-CM

## 2022-10-13 DIAGNOSIS — Z79.899 CHRONIC USE OF BENZODIAZEPINE FOR THERAPEUTIC PURPOSE: ICD-10-CM

## 2022-10-13 DIAGNOSIS — M62.830 MUSCLE SPASM OF BACK: ICD-10-CM

## 2022-10-13 DIAGNOSIS — R60.0 EDEMA OF RIGHT LOWER LEG: ICD-10-CM

## 2022-10-13 PROBLEM — I80.01 THROMBOPHLEBITIS OF SUPERFICIAL VEINS OF RIGHT LOWER EXTREMITY: Status: ACTIVE | Noted: 2022-07-05

## 2022-10-13 PROCEDURE — 87086 URINE CULTURE/COLONY COUNT: CPT

## 2022-10-13 PROCEDURE — 99214 OFFICE O/P EST MOD 30 MIN: CPT | Performed by: STUDENT IN AN ORGANIZED HEALTH CARE EDUCATION/TRAINING PROGRAM

## 2022-10-13 PROCEDURE — 87077 CULTURE AEROBIC IDENTIFY: CPT

## 2022-10-13 RX ORDER — DIAZEPAM 10 MG/1
10 TABLET ORAL EVERY 12 HOURS PRN
Qty: 30 TABLET | Refills: 0 | Status: SHIPPED | OUTPATIENT
Start: 2022-10-13 | End: 2023-01-26

## 2022-10-13 ASSESSMENT — FIBROSIS 4 INDEX: FIB4 SCORE: 0.91

## 2022-10-13 NOTE — PROGRESS NOTES
Subjective:     Chief Complaint   Patient presents with    Follow-Up     Pt thinks that its about the urine specimen that was done      HPI:   Mariely presents today for follow up.    Here to give another urine sample since the last was in the wrong container. No urinary symptoms.     Has an appt with vascular surgery this afternoon. Has some pedal edema and the red/firm area on her right anterior leg has gotten smaller since her last visit.    Needs refill of valium that she takes for muscle spasm of upper back/neck. Has done PT prior and continues with stretches at home to prevent.    Current Outpatient Medications Ordered in Epic   Medication Sig Dispense Refill    diclofenac sodium (VOLTAREN) 1 % Gel Apply 4 g topically 4 times a day as needed (32g/day max (do not use with NSAIDS)). 100 g 1    diazepam (VALIUM) 10 MG tablet Take 1 Tablet by mouth every 12 hours as needed (muscle spasm) for up to 180 days. 30 Tablet 0    aspirin (ASA) 81 MG Chew Tab chewable tablet Chew 325 mg every day.      ferrous sulfate 325 (65 Fe) MG tablet Take 1 Tablet by mouth every day. 90 Tablet 0    furosemide (LASIX) 20 MG Tab Take 1 Tablet by mouth 2 times a day. (Patient taking differently: Take 20 mg by mouth every day.) 180 Tablet 0    folic acid (FOLVITE) 1 MG Tab Take 1 Tablet by mouth every day. 90 Tablet 3    metoprolol tartrate (LOPRESSOR) 25 MG Tab TAKE 1 TABLET BY MOUTH TWICE A  Tablet 3    losartan (COZAAR) 50 MG Tab TAKE 1 TABLET BY MOUTH EVERY  Tablet 3    cetirizine (ZYRTEC) 10 MG Tab Take 1 Tablet by mouth every day. 90 Tablet 3    simvastatin (ZOCOR) 10 MG Tab Take 1 Tablet by mouth every evening. *Appointment needed for additional refills* 100 Tablet 3    fluticasone (FLONASE) 50 MCG/ACT nasal spray Administer 1 Spray into affected nostril(S) every day. *Follow up needed for additional refills* 48 mL 3    levothyroxine (SYNTHROID) 50 MCG Tab Take 1 Tab by mouth Every morning on an empty stomach. 90 Tab 3  "    No current [facility]-ordered facility-administered medications on file.     Health Maintenance: Declines vaccination until right lower extremity improved.    ROS:  Gen: no fevers/chills, no changes in weight  Eyes: no changes in vision  ENT: no sore throat  Pulm: no sob, no cough  CV: no chest pain, no palpitations  GI: no nausea/vomiting, no diarrhea  : no dysuria  MSk: no myalgias  Skin: no rash  Neuro: no headaches, no numbness/tingling  Heme/Lymph: no easy bruising    Objective:     Exam:  BP (!) 146/50   Pulse 60   Temp 36.9 °C (98.4 °F) (Temporal)   Resp 20   Ht 1.575 m (5' 2\")   Wt 62.1 kg (137 lb)   SpO2 99%   BMI 25.06 kg/m²  Body mass index is 25.06 kg/m².    Physical Exam:  Constitutional: Well-developed and well-nourished. No acute distress.   Skin: Skin is warm and dry.  Head: Atraumatic without lesions.  Eyes: Conjunctivae and extraocular motions are normal. Pupils are equal, round. No scleral icterus.   Mouth/Throat: Wearing mask.  Neck: Supple, trachea midline.  Cardiovascular: Regular rate and rhythm, S1 and S2 without murmur, rubs, or gallops.  Lungs: Normal inspiratory effort, CTA bilaterally, no wheezes/rhonchi/rales  Extremities: No cyanosis, clubbing. Area of erythema and induration distal anterior shin (improved from prior exam) with mild associated RLE edema, no increased warmth, mild tenderness but none in the calf.  Musculoskeletal: Ropy hypertonicity with minimal tenderness of the left middle trapezius.  Neurological: Alert and oriented x 3. No gross/focal deficits.  Psychiatric:  Behavior, mood, and affect are appropriate.    Assessment & Plan:     82 y.o. female with the following -     1. History of UTI  2. Bacteriuria   History of e. Coli UTI which lead to sepsis and bacteremia. Repeated after hospital f/u showed E.coli persisting so treated. Repeating culture to confirm resolution.   - URINE CULTURE(NEW); Future    3. Superficial thrombosis of leg, right  4. Edema of right " lower leg  Acute on chronic issues, fortunately has a follow-up with vascular today.  The erythema has improved from last visit but she still has some discomfort.  Okay to continue with cool clothes which helps but also could trial Voltaren gel as below.  - diclofenac sodium (VOLTAREN) 1 % Gel; Apply 4 g topically 4 times a day as needed (32g/day max (do not use with NSAIDS)).  Dispense: 100 g; Refill: 1    5. Muscle spasm of back  6. 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  In the past she has had needed to go to the hospital due to the severity of pain with lack of range of motion when this occurs, other medications have not been as effective.  She has had physical therapy in the past and continues with home stretching.  Only taking diazepam on an as-needed basis for this severe spasm without any adverse side effects.  Denies any illicit drug use or alcohol.  Controlled substance agreement updated today, UDS obtained and PDMP reviewed.  - diazepam (VALIUM) 10 MG tablet; Take 1 Tablet by mouth every 12 hours as needed (muscle spasm) for up to 180 days.  Dispense: 30 Tablet; Refill: 0  - Controlled Substance Treatment Agreement  - Pain Management Screen; Future    7. Essential hypertension  Chronic, well controlled typically and reports today at home was 131/50. No changes to current medications. Discussed ideal ranges/return precautions.    Return in about 4 weeks (around 11/10/2022), or if symptoms worsen or fail to improve.    Please note that this dictation was created using voice recognition software. I have made every reasonable attempt to correct obvious errors, but I expect that there are errors of grammar and possibly content that I did not discover before finalizing the note.

## 2022-10-16 LAB
BACTERIA UR CULT: NORMAL
SIGNIFICANT IND 70042: NORMAL
SITE SITE: NORMAL
SOURCE SOURCE: NORMAL

## 2022-11-15 DIAGNOSIS — D50.9 IRON DEFICIENCY ANEMIA, UNSPECIFIED IRON DEFICIENCY ANEMIA TYPE: ICD-10-CM

## 2022-11-15 RX ORDER — FERROUS SULFATE 325(65) MG
325 TABLET ORAL DAILY
Qty: 90 TABLET | Refills: 0 | Status: SHIPPED | OUTPATIENT
Start: 2022-11-15 | End: 2023-01-18

## 2022-11-16 NOTE — TELEPHONE ENCOUNTER
Received request via: Pharmacy    Was the patient seen in the last year in this department? Yes    Does the patient have an active prescription (recently filled or refills available) for medication(s) requested? No    Does the patient have long term Plus and need 100 day supply (blood pressure, diabetes and cholesterol meds only)? Medication is not for cholesterol, blood pressure or diabetes

## 2022-11-17 DIAGNOSIS — E78.2 MIXED HYPERLIPIDEMIA: ICD-10-CM

## 2022-11-17 RX ORDER — SIMVASTATIN 10 MG
10 TABLET ORAL EVERY EVENING
Qty: 100 TABLET | Refills: 3 | Status: SHIPPED | OUTPATIENT
Start: 2022-11-17 | End: 2023-07-25 | Stop reason: SDUPTHER

## 2022-11-17 NOTE — TELEPHONE ENCOUNTER
Received request via: Patient    Was the patient seen in the last year in this department? Yes    Does the patient have an active prescription (recently filled or refills available) for medication(s) requested? No    Does the patient have half-way Plus and need 100 day supply (blood pressure, diabetes and cholesterol meds only)? Yes, quantity updated to 100 days

## 2022-11-18 ENCOUNTER — PATIENT OUTREACH (OUTPATIENT)
Dept: HEALTH INFORMATION MANAGEMENT | Facility: OTHER | Age: 82
End: 2022-11-18
Payer: MEDICARE

## 2022-11-18 DIAGNOSIS — I10 ESSENTIAL HYPERTENSION: ICD-10-CM

## 2022-11-18 DIAGNOSIS — N18.31 STAGE 3A CHRONIC KIDNEY DISEASE: ICD-10-CM

## 2022-11-18 NOTE — PROGRESS NOTES
Called pt for monthly CCM outreach. Mariely reports she visited [facility] and was told they would contact her soon. Mariely is experiencing pain in her legs. She has been taking 1000mg Tylenol with some relief. Discussed home interventions including leg elevation, using a cold pack, etc. [facility] told Mariely to cancel her Lymphedema PT appts.    Mariely reports her blood pressure readings have been around 130/56. Next CCM outreach 12/12/22.

## 2022-11-28 ENCOUNTER — APPOINTMENT (OUTPATIENT)
Dept: PHYSICAL THERAPY | Facility: REHABILITATION | Age: 82
End: 2022-11-28
Attending: PHYSICIAN ASSISTANT
Payer: MEDICARE

## 2022-12-05 ENCOUNTER — APPOINTMENT (OUTPATIENT)
Dept: PHYSICAL THERAPY | Facility: REHABILITATION | Age: 82
End: 2022-12-05
Attending: PHYSICIAN ASSISTANT
Payer: MEDICARE

## 2022-12-08 ENCOUNTER — APPOINTMENT (OUTPATIENT)
Dept: PHYSICAL THERAPY | Facility: REHABILITATION | Age: 82
End: 2022-12-08
Attending: PHYSICIAN ASSISTANT
Payer: MEDICARE

## 2022-12-12 ENCOUNTER — APPOINTMENT (OUTPATIENT)
Dept: PHYSICAL THERAPY | Facility: REHABILITATION | Age: 82
End: 2022-12-12
Attending: PHYSICIAN ASSISTANT
Payer: MEDICARE

## 2022-12-15 ENCOUNTER — APPOINTMENT (OUTPATIENT)
Dept: PHYSICAL THERAPY | Facility: REHABILITATION | Age: 82
End: 2022-12-15
Attending: PHYSICIAN ASSISTANT
Payer: MEDICARE

## 2022-12-19 ENCOUNTER — APPOINTMENT (OUTPATIENT)
Dept: PHYSICAL THERAPY | Facility: REHABILITATION | Age: 82
End: 2022-12-19
Attending: PHYSICIAN ASSISTANT
Payer: MEDICARE

## 2022-12-22 ENCOUNTER — APPOINTMENT (OUTPATIENT)
Dept: PHYSICAL THERAPY | Facility: REHABILITATION | Age: 82
End: 2022-12-22
Attending: PHYSICIAN ASSISTANT
Payer: MEDICARE

## 2022-12-23 ENCOUNTER — PATIENT OUTREACH (OUTPATIENT)
Dept: MEDICAL GROUP | Facility: PHYSICIAN GROUP | Age: 82
End: 2022-12-23
Payer: MEDICARE

## 2022-12-23 DIAGNOSIS — I10 ESSENTIAL HYPERTENSION: ICD-10-CM

## 2022-12-23 DIAGNOSIS — E78.2 MIXED HYPERLIPIDEMIA: ICD-10-CM

## 2022-12-23 DIAGNOSIS — I82.811 SUPERFICIAL THROMBOSIS OF LEG, RIGHT: ICD-10-CM

## 2022-12-23 PROCEDURE — 99490 CHRNC CARE MGMT STAFF 1ST 20: CPT | Performed by: NURSE PRACTITIONER

## 2022-12-23 NOTE — PROGRESS NOTES
Called Mariely for monthly CCM outreach. No maik, SCOTTIE asking her to return my call when she has time.

## 2022-12-29 NOTE — PROGRESS NOTES
"Called Mariely for monthly CCM outreach. Mariely has an appt on 1/16/23 with Heme/Onc. She said the pain in her lower legs gets unbearable. She has been addressing the pain with 1000mg Tylenol tid. She also elevates her legs, this helps with pain. Her last few blood pressure readings in the office have been 146/50 (10/13), 128/50 (10/4), 138/62 (9/16) and 122/60 (8/9). Светлана said she checks her Bp at home, she told me her Bp yesterday was 130/67- Mariely stated her readings at home have all been \"good\". Reinforced the need to keep a systolic reading under 130. Mariely had no other questions or concerns. Nect CCM outreach on 1/27/23.  "

## 2023-01-08 ENCOUNTER — HOSPITAL ENCOUNTER (INPATIENT)
Facility: MEDICAL CENTER | Age: 83
LOS: 3 days | DRG: 291 | End: 2023-01-11
Attending: EMERGENCY MEDICINE | Admitting: HOSPITALIST
Payer: MEDICARE

## 2023-01-08 ENCOUNTER — APPOINTMENT (OUTPATIENT)
Dept: RADIOLOGY | Facility: MEDICAL CENTER | Age: 83
DRG: 291 | End: 2023-01-08
Attending: EMERGENCY MEDICINE
Payer: MEDICARE

## 2023-01-08 DIAGNOSIS — J81.0 ACUTE PULMONARY EDEMA (HCC): ICD-10-CM

## 2023-01-08 DIAGNOSIS — R06.00 DYSPNEA, UNSPECIFIED TYPE: ICD-10-CM

## 2023-01-08 DIAGNOSIS — R79.89 ELEVATED BRAIN NATRIURETIC PEPTIDE (BNP) LEVEL: ICD-10-CM

## 2023-01-08 DIAGNOSIS — I50.43 CHF (CONGESTIVE HEART FAILURE), NYHA CLASS I, ACUTE ON CHRONIC, COMBINED (HCC): ICD-10-CM

## 2023-01-08 PROBLEM — D64.9 ANEMIA: Status: ACTIVE | Noted: 2023-01-08

## 2023-01-08 PROBLEM — R06.02 SOB (SHORTNESS OF BREATH): Status: ACTIVE | Noted: 2023-01-08

## 2023-01-08 LAB
ALBUMIN SERPL BCP-MCNC: 4.2 G/DL (ref 3.2–4.9)
ALBUMIN/GLOB SERPL: 1.4 G/DL
ALP SERPL-CCNC: 130 U/L (ref 30–99)
ALT SERPL-CCNC: 6 U/L (ref 2–50)
ANION GAP SERPL CALC-SCNC: 13 MMOL/L (ref 7–16)
AST SERPL-CCNC: 8 U/L (ref 12–45)
BASOPHILS # BLD AUTO: 0.5 % (ref 0–1.8)
BASOPHILS # BLD: 0.06 K/UL (ref 0–0.12)
BILIRUB SERPL-MCNC: 0.4 MG/DL (ref 0.1–1.5)
BUN SERPL-MCNC: 25 MG/DL (ref 8–22)
CALCIUM ALBUM COR SERPL-MCNC: 9 MG/DL (ref 8.5–10.5)
CALCIUM SERPL-MCNC: 9.2 MG/DL (ref 8.5–10.5)
CHLORIDE SERPL-SCNC: 100 MMOL/L (ref 96–112)
CO2 SERPL-SCNC: 19 MMOL/L (ref 20–33)
CREAT SERPL-MCNC: 1.18 MG/DL (ref 0.5–1.4)
EKG IMPRESSION: NORMAL
EOSINOPHIL # BLD AUTO: 0.35 K/UL (ref 0–0.51)
EOSINOPHIL NFR BLD: 3.1 % (ref 0–6.9)
ERYTHROCYTE [DISTWIDTH] IN BLOOD BY AUTOMATED COUNT: 55.5 FL (ref 35.9–50)
FLUAV RNA SPEC QL NAA+PROBE: NEGATIVE
FLUBV RNA SPEC QL NAA+PROBE: NEGATIVE
GFR SERPLBLD CREATININE-BSD FMLA CKD-EPI: 46 ML/MIN/1.73 M 2
GLOBULIN SER CALC-MCNC: 3 G/DL (ref 1.9–3.5)
GLUCOSE SERPL-MCNC: 105 MG/DL (ref 65–99)
HCT VFR BLD AUTO: 33.2 % (ref 37–47)
HGB BLD-MCNC: 10.3 G/DL (ref 12–16)
IMM GRANULOCYTES # BLD AUTO: 0.11 K/UL (ref 0–0.11)
IMM GRANULOCYTES NFR BLD AUTO: 1 % (ref 0–0.9)
LYMPHOCYTES # BLD AUTO: 1.26 K/UL (ref 1–4.8)
LYMPHOCYTES NFR BLD: 11.2 % (ref 22–41)
MCH RBC QN AUTO: 29.4 PG (ref 27–33)
MCHC RBC AUTO-ENTMCNC: 31 G/DL (ref 33.6–35)
MCV RBC AUTO: 94.9 FL (ref 81.4–97.8)
MONOCYTES # BLD AUTO: 0.6 K/UL (ref 0–0.85)
MONOCYTES NFR BLD AUTO: 5.4 % (ref 0–13.4)
NEUTROPHILS # BLD AUTO: 8.83 K/UL (ref 2–7.15)
NEUTROPHILS NFR BLD: 78.8 % (ref 44–72)
NRBC # BLD AUTO: 0 K/UL
NRBC BLD-RTO: 0 /100 WBC
NT-PROBNP SERPL IA-MCNC: ABNORMAL PG/ML (ref 0–125)
PLATELET # BLD AUTO: 315 K/UL (ref 164–446)
PMV BLD AUTO: 11.5 FL (ref 9–12.9)
POTASSIUM SERPL-SCNC: 5.1 MMOL/L (ref 3.6–5.5)
PROT SERPL-MCNC: 7.2 G/DL (ref 6–8.2)
RBC # BLD AUTO: 3.5 M/UL (ref 4.2–5.4)
RSV RNA SPEC QL NAA+PROBE: NEGATIVE
SARS-COV-2 RNA RESP QL NAA+PROBE: NOTDETECTED
SODIUM SERPL-SCNC: 132 MMOL/L (ref 135–145)
SPECIMEN SOURCE: NORMAL
TROPONIN T SERPL-MCNC: 21 NG/L (ref 6–19)
TROPONIN T SERPL-MCNC: 23 NG/L (ref 6–19)
WBC # BLD AUTO: 11.2 K/UL (ref 4.8–10.8)

## 2023-01-08 PROCEDURE — 84484 ASSAY OF TROPONIN QUANT: CPT

## 2023-01-08 PROCEDURE — 85025 COMPLETE CBC W/AUTO DIFF WBC: CPT

## 2023-01-08 PROCEDURE — A9270 NON-COVERED ITEM OR SERVICE: HCPCS | Performed by: HOSPITALIST

## 2023-01-08 PROCEDURE — 700117 HCHG RX CONTRAST REV CODE 255: Performed by: EMERGENCY MEDICINE

## 2023-01-08 PROCEDURE — 71045 X-RAY EXAM CHEST 1 VIEW: CPT

## 2023-01-08 PROCEDURE — 71275 CT ANGIOGRAPHY CHEST: CPT

## 2023-01-08 PROCEDURE — C9803 HOPD COVID-19 SPEC COLLECT: HCPCS | Performed by: HOSPITALIST

## 2023-01-08 PROCEDURE — 700102 HCHG RX REV CODE 250 W/ 637 OVERRIDE(OP): Performed by: HOSPITALIST

## 2023-01-08 PROCEDURE — 99223 1ST HOSP IP/OBS HIGH 75: CPT | Mod: AI | Performed by: HOSPITALIST

## 2023-01-08 PROCEDURE — 99285 EMERGENCY DEPT VISIT HI MDM: CPT

## 2023-01-08 PROCEDURE — 96374 THER/PROPH/DIAG INJ IV PUSH: CPT

## 2023-01-08 PROCEDURE — 700111 HCHG RX REV CODE 636 W/ 250 OVERRIDE (IP): Performed by: HOSPITALIST

## 2023-01-08 PROCEDURE — 36415 COLL VENOUS BLD VENIPUNCTURE: CPT

## 2023-01-08 PROCEDURE — 770020 HCHG ROOM/CARE - TELE (206)

## 2023-01-08 PROCEDURE — 93005 ELECTROCARDIOGRAM TRACING: CPT

## 2023-01-08 PROCEDURE — 80053 COMPREHEN METABOLIC PANEL: CPT

## 2023-01-08 PROCEDURE — 0241U HCHG SARS-COV-2 COVID-19 NFCT DS RESP RNA 4 TRGT MIC: CPT

## 2023-01-08 PROCEDURE — 83880 ASSAY OF NATRIURETIC PEPTIDE: CPT

## 2023-01-08 PROCEDURE — 93005 ELECTROCARDIOGRAM TRACING: CPT | Performed by: EMERGENCY MEDICINE

## 2023-01-08 RX ORDER — FLUTICASONE PROPIONATE 50 MCG
1 SPRAY, SUSPENSION (ML) NASAL
COMMUNITY
End: 2023-01-26

## 2023-01-08 RX ORDER — HYDRALAZINE HYDROCHLORIDE 20 MG/ML
10 INJECTION INTRAMUSCULAR; INTRAVENOUS EVERY 4 HOURS PRN
Status: DISCONTINUED | OUTPATIENT
Start: 2023-01-08 | End: 2023-01-11 | Stop reason: HOSPADM

## 2023-01-08 RX ORDER — ONDANSETRON 2 MG/ML
4 INJECTION INTRAMUSCULAR; INTRAVENOUS EVERY 4 HOURS PRN
Status: DISCONTINUED | OUTPATIENT
Start: 2023-01-08 | End: 2023-01-11 | Stop reason: HOSPADM

## 2023-01-08 RX ORDER — FLUTICASONE PROPIONATE 50 MCG
1 SPRAY, SUSPENSION (ML) NASAL
Status: DISCONTINUED | OUTPATIENT
Start: 2023-01-08 | End: 2023-01-11 | Stop reason: HOSPADM

## 2023-01-08 RX ORDER — POLYETHYLENE GLYCOL 3350 17 G/17G
1 POWDER, FOR SOLUTION ORAL
Status: DISCONTINUED | OUTPATIENT
Start: 2023-01-08 | End: 2023-01-11 | Stop reason: HOSPADM

## 2023-01-08 RX ORDER — ONDANSETRON 4 MG/1
4 TABLET, ORALLY DISINTEGRATING ORAL EVERY 4 HOURS PRN
Status: DISCONTINUED | OUTPATIENT
Start: 2023-01-08 | End: 2023-01-11 | Stop reason: HOSPADM

## 2023-01-08 RX ORDER — LEVOTHYROXINE SODIUM 0.05 MG/1
50 TABLET ORAL
Status: DISCONTINUED | OUTPATIENT
Start: 2023-01-09 | End: 2023-01-11 | Stop reason: HOSPADM

## 2023-01-08 RX ORDER — SIMVASTATIN 10 MG
10 TABLET ORAL EVERY EVENING
Status: DISCONTINUED | OUTPATIENT
Start: 2023-01-08 | End: 2023-01-11 | Stop reason: HOSPADM

## 2023-01-08 RX ORDER — FERROUS SULFATE 325(65) MG
325 TABLET ORAL DAILY
Status: DISCONTINUED | OUTPATIENT
Start: 2023-01-09 | End: 2023-01-11 | Stop reason: HOSPADM

## 2023-01-08 RX ORDER — AMOXICILLIN 250 MG
2 CAPSULE ORAL 2 TIMES DAILY
Status: DISCONTINUED | OUTPATIENT
Start: 2023-01-08 | End: 2023-01-11 | Stop reason: HOSPADM

## 2023-01-08 RX ORDER — ACETAMINOPHEN 500 MG
500-1000 TABLET ORAL EVERY 6 HOURS PRN
Status: DISCONTINUED | OUTPATIENT
Start: 2023-01-08 | End: 2023-01-11 | Stop reason: HOSPADM

## 2023-01-08 RX ORDER — BISACODYL 10 MG
10 SUPPOSITORY, RECTAL RECTAL
Status: DISCONTINUED | OUTPATIENT
Start: 2023-01-08 | End: 2023-01-11 | Stop reason: HOSPADM

## 2023-01-08 RX ORDER — CETIRIZINE HYDROCHLORIDE 10 MG/1
10 TABLET ORAL
COMMUNITY
End: 2023-01-26

## 2023-01-08 RX ORDER — LOSARTAN POTASSIUM 50 MG/1
50 TABLET ORAL DAILY
Status: ON HOLD | COMMUNITY
End: 2023-02-11

## 2023-01-08 RX ORDER — DIAZEPAM 5 MG/1
5 TABLET ORAL EVERY 12 HOURS PRN
Status: DISCONTINUED | OUTPATIENT
Start: 2023-01-08 | End: 2023-01-11 | Stop reason: HOSPADM

## 2023-01-08 RX ORDER — FUROSEMIDE 10 MG/ML
20 INJECTION INTRAMUSCULAR; INTRAVENOUS
Status: DISCONTINUED | OUTPATIENT
Start: 2023-01-08 | End: 2023-01-09

## 2023-01-08 RX ORDER — ACETAMINOPHEN 500 MG
1000 TABLET ORAL EVERY 6 HOURS PRN
COMMUNITY

## 2023-01-08 RX ORDER — FOLIC ACID 1 MG/1
1 TABLET ORAL DAILY
Status: DISCONTINUED | OUTPATIENT
Start: 2023-01-09 | End: 2023-01-11 | Stop reason: HOSPADM

## 2023-01-08 RX ORDER — RIVAROXABAN 20 MG/1
20 TABLET, FILM COATED ORAL
COMMUNITY
Start: 2022-12-06 | End: 2023-01-31

## 2023-01-08 RX ORDER — LOSARTAN POTASSIUM 50 MG/1
50 TABLET ORAL DAILY
Status: DISCONTINUED | OUTPATIENT
Start: 2023-01-09 | End: 2023-01-11 | Stop reason: HOSPADM

## 2023-01-08 RX ADMIN — RIVAROXABAN 20 MG: 20 TABLET, FILM COATED ORAL at 21:20

## 2023-01-08 RX ADMIN — FUROSEMIDE 20 MG: 10 INJECTION, SOLUTION INTRAMUSCULAR; INTRAVENOUS at 21:21

## 2023-01-08 RX ADMIN — IOHEXOL 80 ML: 350 INJECTION, SOLUTION INTRAVENOUS at 21:04

## 2023-01-08 RX ADMIN — SIMVASTATIN 10 MG: 10 TABLET, FILM COATED ORAL at 21:21

## 2023-01-08 RX ADMIN — METOPROLOL TARTRATE 25 MG: 25 TABLET, FILM COATED ORAL at 21:20

## 2023-01-08 ASSESSMENT — ENCOUNTER SYMPTOMS
PALPITATIONS: 0
VOMITING: 0
ORTHOPNEA: 0
PSYCHIATRIC NEGATIVE: 1
DIAPHORESIS: 0
FOCAL WEAKNESS: 0
EYES NEGATIVE: 1
CONSTITUTIONAL NEGATIVE: 1
CHILLS: 0
HEADACHES: 0
DIZZINESS: 0
PND: 1
COUGH: 0
NEUROLOGICAL NEGATIVE: 1
SORE THROAT: 0
BLURRED VISION: 0
BRUISES/BLEEDS EASILY: 0
NAUSEA: 0
DEPRESSION: 0
WEIGHT LOSS: 0
MYALGIAS: 0
GASTROINTESTINAL NEGATIVE: 1
FEVER: 0
ABDOMINAL PAIN: 0
MUSCULOSKELETAL NEGATIVE: 1
WEAKNESS: 0
SHORTNESS OF BREATH: 1

## 2023-01-08 ASSESSMENT — FIBROSIS 4 INDEX: FIB4 SCORE: 0.91

## 2023-01-08 ASSESSMENT — LIFESTYLE VARIABLES
DO YOU DRINK ALCOHOL: NO
SUBSTANCE_ABUSE: 0

## 2023-01-09 ENCOUNTER — APPOINTMENT (OUTPATIENT)
Dept: CARDIOLOGY | Facility: MEDICAL CENTER | Age: 83
DRG: 291 | End: 2023-01-09
Attending: HOSPITALIST
Payer: MEDICARE

## 2023-01-09 LAB
ANION GAP SERPL CALC-SCNC: 15 MMOL/L (ref 7–16)
APPEARANCE UR: CLEAR
BACTERIA #/AREA URNS HPF: NEGATIVE /HPF
BILIRUB UR QL STRIP.AUTO: NEGATIVE
BUN SERPL-MCNC: 24 MG/DL (ref 8–22)
CALCIUM SERPL-MCNC: 9.2 MG/DL (ref 8.5–10.5)
CHLORIDE SERPL-SCNC: 102 MMOL/L (ref 96–112)
CO2 SERPL-SCNC: 17 MMOL/L (ref 20–33)
COLOR UR: YELLOW
CREAT SERPL-MCNC: 1.04 MG/DL (ref 0.5–1.4)
EPI CELLS #/AREA URNS HPF: NEGATIVE /HPF
ERYTHROCYTE [DISTWIDTH] IN BLOOD BY AUTOMATED COUNT: 55 FL (ref 35.9–50)
GFR SERPLBLD CREATININE-BSD FMLA CKD-EPI: 54 ML/MIN/1.73 M 2
GLUCOSE SERPL-MCNC: 95 MG/DL (ref 65–99)
GLUCOSE UR STRIP.AUTO-MCNC: NEGATIVE MG/DL
HCT VFR BLD AUTO: 31.8 % (ref 37–47)
HGB BLD-MCNC: 10.1 G/DL (ref 12–16)
HYALINE CASTS #/AREA URNS LPF: NORMAL /LPF
KETONES UR STRIP.AUTO-MCNC: NEGATIVE MG/DL
LEUKOCYTE ESTERASE UR QL STRIP.AUTO: NEGATIVE
LV EJECT FRACT  99904: 65
LV EJECT FRACT MOD 2C 99903: 61.8
LV EJECT FRACT MOD 4C 99902: 61.79
LV EJECT FRACT MOD BP 99901: 61.94
MAGNESIUM SERPL-MCNC: 1.8 MG/DL (ref 1.5–2.5)
MCH RBC QN AUTO: 29.8 PG (ref 27–33)
MCHC RBC AUTO-ENTMCNC: 31.8 G/DL (ref 33.6–35)
MCV RBC AUTO: 93.8 FL (ref 81.4–97.8)
MICRO URNS: ABNORMAL
NITRITE UR QL STRIP.AUTO: NEGATIVE
PH UR STRIP.AUTO: 6 [PH] (ref 5–8)
PLATELET # BLD AUTO: 308 K/UL (ref 164–446)
PMV BLD AUTO: 11.6 FL (ref 9–12.9)
POTASSIUM SERPL-SCNC: 4.8 MMOL/L (ref 3.6–5.5)
PROCALCITONIN SERPL-MCNC: 0.1 NG/ML
PROT UR QL STRIP: 30 MG/DL
RBC # BLD AUTO: 3.39 M/UL (ref 4.2–5.4)
RBC # URNS HPF: NORMAL /HPF
RBC UR QL AUTO: NEGATIVE
SODIUM SERPL-SCNC: 134 MMOL/L (ref 135–145)
SP GR UR STRIP.AUTO: 1.03
TROPONIN T SERPL-MCNC: 30 NG/L (ref 6–19)
TROPONIN T SERPL-MCNC: 31 NG/L (ref 6–19)
UROBILINOGEN UR STRIP.AUTO-MCNC: 0.2 MG/DL
WBC # BLD AUTO: 12.3 K/UL (ref 4.8–10.8)
WBC #/AREA URNS HPF: NORMAL /HPF

## 2023-01-09 PROCEDURE — 36415 COLL VENOUS BLD VENIPUNCTURE: CPT

## 2023-01-09 PROCEDURE — A9270 NON-COVERED ITEM OR SERVICE: HCPCS | Performed by: HOSPITALIST

## 2023-01-09 PROCEDURE — 81001 URINALYSIS AUTO W/SCOPE: CPT

## 2023-01-09 PROCEDURE — 700102 HCHG RX REV CODE 250 W/ 637 OVERRIDE(OP): Performed by: HOSPITALIST

## 2023-01-09 PROCEDURE — 84484 ASSAY OF TROPONIN QUANT: CPT

## 2023-01-09 PROCEDURE — 99233 SBSQ HOSP IP/OBS HIGH 50: CPT | Performed by: STUDENT IN AN ORGANIZED HEALTH CARE EDUCATION/TRAINING PROGRAM

## 2023-01-09 PROCEDURE — 700111 HCHG RX REV CODE 636 W/ 250 OVERRIDE (IP): Performed by: HOSPITALIST

## 2023-01-09 PROCEDURE — 84145 PROCALCITONIN (PCT): CPT

## 2023-01-09 PROCEDURE — 770020 HCHG ROOM/CARE - TELE (206)

## 2023-01-09 PROCEDURE — 96376 TX/PRO/DX INJ SAME DRUG ADON: CPT

## 2023-01-09 PROCEDURE — 80048 BASIC METABOLIC PNL TOTAL CA: CPT

## 2023-01-09 PROCEDURE — 93306 TTE W/DOPPLER COMPLETE: CPT | Mod: 26 | Performed by: INTERNAL MEDICINE

## 2023-01-09 PROCEDURE — 93306 TTE W/DOPPLER COMPLETE: CPT

## 2023-01-09 PROCEDURE — 83735 ASSAY OF MAGNESIUM: CPT

## 2023-01-09 PROCEDURE — 85027 COMPLETE CBC AUTOMATED: CPT

## 2023-01-09 PROCEDURE — 700111 HCHG RX REV CODE 636 W/ 250 OVERRIDE (IP): Performed by: STUDENT IN AN ORGANIZED HEALTH CARE EDUCATION/TRAINING PROGRAM

## 2023-01-09 RX ORDER — FUROSEMIDE 20 MG/1
20 TABLET ORAL
Status: DISCONTINUED | OUTPATIENT
Start: 2023-01-10 | End: 2023-01-11 | Stop reason: HOSPADM

## 2023-01-09 RX ORDER — FUROSEMIDE 10 MG/ML
20 INJECTION INTRAMUSCULAR; INTRAVENOUS
Status: COMPLETED | OUTPATIENT
Start: 2023-01-09 | End: 2023-01-09

## 2023-01-09 RX ADMIN — METOPROLOL TARTRATE 25 MG: 25 TABLET, FILM COATED ORAL at 17:48

## 2023-01-09 RX ADMIN — FUROSEMIDE 20 MG: 10 INJECTION, SOLUTION INTRAMUSCULAR; INTRAVENOUS at 16:18

## 2023-01-09 RX ADMIN — ACETAMINOPHEN 1000 MG: 500 TABLET ORAL at 05:30

## 2023-01-09 RX ADMIN — METOPROLOL TARTRATE 25 MG: 25 TABLET, FILM COATED ORAL at 05:31

## 2023-01-09 RX ADMIN — SIMVASTATIN 10 MG: 10 TABLET, FILM COATED ORAL at 17:49

## 2023-01-09 RX ADMIN — RIVAROXABAN 20 MG: 20 TABLET, FILM COATED ORAL at 17:49

## 2023-01-09 RX ADMIN — FUROSEMIDE 20 MG: 10 INJECTION, SOLUTION INTRAMUSCULAR; INTRAVENOUS at 05:31

## 2023-01-09 RX ADMIN — LOSARTAN POTASSIUM 50 MG: 50 TABLET, FILM COATED ORAL at 05:32

## 2023-01-09 RX ADMIN — LEVOTHYROXINE SODIUM 50 MCG: 0.05 TABLET ORAL at 05:31

## 2023-01-09 RX ADMIN — FOLIC ACID 1 MG: 1 TABLET ORAL at 05:32

## 2023-01-09 RX ADMIN — FERROUS SULFATE TAB 325 MG (65 MG ELEMENTAL FE) 325 MG: 325 (65 FE) TAB at 05:32

## 2023-01-09 ASSESSMENT — PATIENT HEALTH QUESTIONNAIRE - PHQ9
SUM OF ALL RESPONSES TO PHQ9 QUESTIONS 1 AND 2: 0
1. LITTLE INTEREST OR PLEASURE IN DOING THINGS: NOT AT ALL
2. FEELING DOWN, DEPRESSED, IRRITABLE, OR HOPELESS: NOT AT ALL

## 2023-01-09 ASSESSMENT — ENCOUNTER SYMPTOMS
HEADACHES: 0
COUGH: 0
DIZZINESS: 0
LOSS OF CONSCIOUSNESS: 0
SPUTUM PRODUCTION: 0
SENSORY CHANGE: 0
FEVER: 0
EYE PAIN: 0
PALPITATIONS: 0
FALLS: 0
INSOMNIA: 0
HEMOPTYSIS: 0
NAUSEA: 0
FOCAL WEAKNESS: 0
BLURRED VISION: 0
ABDOMINAL PAIN: 0
SHORTNESS OF BREATH: 1
VOMITING: 0
CHILLS: 0
BACK PAIN: 0

## 2023-01-09 ASSESSMENT — COGNITIVE AND FUNCTIONAL STATUS - GENERAL
SUGGESTED CMS G CODE MODIFIER MOBILITY: CH
SUGGESTED CMS G CODE MODIFIER DAILY ACTIVITY: CH
MOBILITY SCORE: 24
DAILY ACTIVITIY SCORE: 24

## 2023-01-09 ASSESSMENT — LIFESTYLE VARIABLES
TOTAL SCORE: 0
ON A TYPICAL DAY WHEN YOU DRINK ALCOHOL HOW MANY DRINKS DO YOU HAVE: 1
EVER FELT BAD OR GUILTY ABOUT YOUR DRINKING: NO
TOTAL SCORE: 0
ALCOHOL_USE: YES
TOTAL SCORE: 0
CONSUMPTION TOTAL: NEGATIVE
AVERAGE NUMBER OF DAYS PER WEEK YOU HAVE A DRINK CONTAINING ALCOHOL: 7
SUBSTANCE_ABUSE: 0
DOES PATIENT WANT TO STOP DRINKING: NO
HAVE YOU EVER FELT YOU SHOULD CUT DOWN ON YOUR DRINKING: NO
HAVE PEOPLE ANNOYED YOU BY CRITICIZING YOUR DRINKING: NO
EVER HAD A DRINK FIRST THING IN THE MORNING TO STEADY YOUR NERVES TO GET RID OF A HANGOVER: NO
HOW MANY TIMES IN THE PAST YEAR HAVE YOU HAD 5 OR MORE DRINKS IN A DAY: 0

## 2023-01-09 ASSESSMENT — FIBROSIS 4 INDEX: FIB4 SCORE: 0.85

## 2023-01-09 NOTE — ED NOTES
Chief Complaint   Patient presents with    Shortness of Breath     SOB at rest, worse with exertion, x 1 week. SOB has been worsening over the past three days. Patient reports being unable to walk to the restroom. 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      DVT in July. Protocol ordered.

## 2023-01-09 NOTE — PROGRESS NOTES
Al bed. Report received from ED RN. Updated on POC.  Assumed care of patient upon arrival to unit. Patient currently A & O x 4; on RA; up without complaints of acute pain. Pt placed on monitor, monitor room notified, SR 78. Patient oriented to unit and to call light system. Call light within reach. Pt educated to fall risk. Fall precautions in place. Pt provided with personal grooming items. Bed locked and in lowest position. All questions answered. No other needs indicated at this time.

## 2023-01-09 NOTE — ED PROVIDER NOTES
Emergency Physician Note    Chief Concern:  Shortness of breath    Historian:   1.  Patient  2.  Family member    HPI:  Ms. Easley presents to the emergency department today for evaluation of shortness of breath.  She reports a past medical history significant for DVTs, currently anticoagulated on Xarelto.  She has no prior history of pulmonary emboli.  Over the past 3 days she has developed progressively worsening shortness of breath, which is described as dyspnea on exertion.  Family member states that she is no longer able to get up and walk around the house like she usually could.  She does not have any associated chest pain or chest pressure, she has had no associated fevers or upper respiratory symptoms, no cough.  She does report a history of allergies, and has had some mild nasal congestion, though this does not seem to be significantly changed from her typical allergic symptoms.  She reports no thoracic back pain, no abdominal pain.  She has had no associated nausea or vomiting.  She reports no prior history of similar symptoms, no history of pulmonary disease, no history of COPD.  She does have a history of leg swelling previously which prompted the diagnosis of DVTs, however states that the swelling has significantly improved to the point where it has nearly resolved since she started taking the Xarelto, and has not recurred.  She has not noticed any worsening lower extremity swelling over the past 2 to 3 days.    Medical Records Reviewed for Continuity of Care:  Ms. Easley was seen in primary care clinic 1942.  Family physician record reviewed from that visit.  She is noted have a history of bacteriuria, with a history of E. coli urinary tract infection which is previously led to sepsis and bacteremia.  She was also noted to have superficial thrombosis of the right leg, and edema of the right lower leg.  This is followed by vascular surgery.  She is also had a history of chronic back spasms  "and back pain, treated with Valium.  History of essential hypertension noted.    Review of Systems:  Review of systems as documented in HPI.     Past Medical History:   has a past medical history of Abnormal albumin (2022), Hypermagnesemia (2022), Hypertension, Pain and swelling of left lower extremity (2022), and Superficial thrombosis of lower extremity, right (2022).    Past Surgical History:  patient denies any surgical history    Family History:  No family history on file.    Social History:  Social History     Tobacco Use    Smoking status: Former     Types: Cigarettes     Quit date: 1979     Years since quittin.4    Smokeless tobacco: Never   Vaping Use    Vaping Use: Never used   Substance and Sexual Activity    Alcohol use: Yes     Alcohol/week: 8.4 oz     Types: 14 Glasses of wine per week    Drug use: No    Sexual activity: Not on file       Current Medications:  Home Medications       Reviewed by Katie Guillaume R.N. (Registered Nurse) on 23 at 1747  Med List Status: Partial     Medication Last Dose Status   aspirin (ASA) 81 MG Chew Tab chewable tablet  Active   cetirizine (ZYRTEC) 10 MG Tab  Active   diazepam (VALIUM) 10 MG tablet  Active   diclofenac sodium (VOLTAREN) 1 % Gel  Active   ferrous sulfate 325 (65 Fe) MG tablet  Active   fluticasone (FLONASE) 50 MCG/ACT nasal spray  Active   folic acid (FOLVITE) 1 MG Tab  Active   furosemide (LASIX) 20 MG Tab  Active   levothyroxine (SYNTHROID) 50 MCG Tab  Active   losartan (COZAAR) 50 MG Tab  Active   metoprolol tartrate (LOPRESSOR) 25 MG Tab  Active   simvastatin (ZOCOR) 10 MG Tab  Active                    Allergies:  No Known Allergies    Physical Exam:  Vital Signs: BP (!) 165/68   Pulse 73   Temp 36.7 °C (98.1 °F) (Temporal)   Resp (!) 21   Ht 1.575 m (5' 2\")   Wt 60.8 kg (134 lb)   SpO2 96%   BMI 24.51 kg/m²   Constitutional: Alert, no acute distress  HEENT: Normocephalic, atraumatic, normal conjunctiva, no " periorbital edema  Neck: Supple, normal range of motion  Cardiovascular: Extremities are warm and well perfused, no murmur appreciated, normal cardiac auscultation  Pulmonary: Tachycardic rate, normal work of breathing, she does have crackles present in the right middle and right lower lung fields, left lung fields are clear to auscultation, no accessory muscle usage  Abdomen: Soft, non-distended, non-tender to palpation, no peritoneal signs  Skin: Warm, dry, no rashes or lesions  Musculoskeletal: Trace pretibial edema bilaterally, no significant unilateral lower extremity edema  Neurologic: Alert, oriented, normal speech, normal motor function  Psychiatric: Normal and appropriate mood and affect    EKG:  EKG was independently interpreted.   Rate 100, sinus tachycardia, no ST elevation or depression, no T wave inversions, no ectopy, no significant changes compared to prior EKG.    Labs:  Labs Reviewed   CBC WITH DIFFERENTIAL - Abnormal; Notable for the following components:       Result Value    WBC 11.2 (*)     RBC 3.50 (*)     Hemoglobin 10.3 (*)     Hematocrit 33.2 (*)     MCHC 31.0 (*)     RDW 55.5 (*)     Neutrophils-Polys 78.80 (*)     Lymphocytes 11.20 (*)     Immature Granulocytes 1.00 (*)     Neutrophils (Absolute) 8.83 (*)     All other components within normal limits   COMP METABOLIC PANEL - Abnormal; Notable for the following components:    Sodium 132 (*)     Co2 19 (*)     Glucose 105 (*)     Bun 25 (*)     AST(SGOT) 8 (*)     Alkaline Phosphatase 130 (*)     All other components within normal limits   ESTIMATED GFR - Abnormal; Notable for the following components:    GFR (CKD-EPI) 46 (*)     All other components within normal limits   TROPONIN - Abnormal; Notable for the following components:    Troponin T 21 (*)     All other components within normal limits   PROBRAIN NATRIURETIC PEPTIDE, NT - Abnormal; Notable for the following components:    NT-proBNP 85455 (*)     All other components within normal  limits   CORRECTED CALCIUM       Imaging:  I have independently interpreted the chest x-ray, right costophrenic angle is blunted as compared to prior  DX-CHEST-PORTABLE (1 VIEW)   Final Result      1.  Hypoinflation and mild pulmonary edema.   2.  Mild cardiomegaly, increased from prior.   3.  Minimal RIGHT pleural fluid.      CT-CTA CHEST PULMONARY ARTERY W/ RECONS    (Results Pending)        Differential Diagnosis:  Pulmonary edema, pulmonary embolism, fluid overload, pneumonia, pneumothorax, pleural effusion, mass or malignancy    Medical Decision Making:  Ms. Easley presents to the emergency department today for evaluation of acute onset shortness of breath, progressively worsening over the past 3 days.  Symptoms are exacerbated by exertion.  She has had no associated fevers, is afebrile on arrival to the emergency department without tachycardia.  Room air oxygen saturation is 96% at rest.  Vital signs are less concerning for Sirs or sepsis.    On laboratory evaluation she does have a mild leukocytosis to 11.2, however on review of prior values dating back through September of last year, it appears as though she has had previous leukocytosis, most recently 13.7 9/28/2022.  CMP without any significant abnormalities, BUN is 25, which is consistent with her prior baseline values, sodium is just below normal reference range at 132, also not significantly changed from prior.  High-sensitivity troponin is 21, significantly decreased from most recent values, however proBNP is elevated at 11,000.    At this time, I suspect her symptoms are likely due to fluid overload, and may be related to heart failure.  Given her history of DVT, I have ordered a CTA, that imaging is pending at time of admission, and was discussed with hospitalist.  She is currently anticoagulated on Xarelto, no hemodynamic instability that would suggest an indication for thrombolytics.  Out of concern for significant risk of morbidity or mortality  if she continues to decline, plan at this time is for admission to hospitalist service for continued diagnostics, and treatment of pulmonary edema.    Discussion with other physicians:  Care of this patient was discussed with Dr. Diaz, admitting hospitalist.    Disposition:  Admit to hospitalist service in guarded condition    Final Impression:  1. Dyspnea, unspecified type    2. Acute pulmonary edema (HCC)    3. Elevated brain natriuretic peptide (BNP) level        Electronically signed by Trina Brewster MD

## 2023-01-09 NOTE — ASSESSMENT & PLAN NOTE
Minimally elevated  Suspect due to chf  No ischemic changes on ekg  Trending trops  Following  tele

## 2023-01-09 NOTE — PROGRESS NOTES
4 Eyes Skin Assessment Completed by HANS Ospina and HANS Lara.    Head WDL  Ears Redness and Blanching  Nose Redness and Blanching  Mouth WDL  Neck WDL  Breast/Chest WDL  Shoulder Blades WDL  Spine WDL  (R) Arm/Elbow/Hand WDL  (L) Arm/Elbow/Hand WDL  Abdomen WDL  Groin WDL  Scrotum/Coccyx/Buttocks WDL  (R) Leg Redness, Blanching, and Edema  (L) Leg Redness, Blanching, and Edema  (R) Heel/Foot/Toe Redness, Blanching, and Edema  (L) Heel/Foot/Toe Redness, Blanching, and Edema  Blisters on bilateral lower extremities    Devices In Places Tele Box    Interventions In Place Pillows    Possible Skin Injury No    Pictures Uploaded Into Epic N/A  Wound Consult Placed N/A  RN Wound Prevention Protocol Ordered No

## 2023-01-09 NOTE — ASSESSMENT & PLAN NOTE
With JEFFRIES  Suspect due to acute systolic and acute on chronic diastolic chf  See above  Stable on room air  PE unlikely, as she is been on xarelto  - ERP ordered CTA with no evidence of PE

## 2023-01-09 NOTE — ED NOTES
Received report from Molly MAXWELL. Patient resting in [facility]. No needs at this time. Patient placed back on monitor.

## 2023-01-09 NOTE — DISCHARGE PLANNING
Case Management Discharge Planning    Admission Date: 1/8/2023  GMLOS: 5  ALOS: 1    6-Clicks ADL Score: 24  6-Clicks Mobility Score: 24      Anticipated Discharge Dispo:  Home with daughter ., verified home address.  Plan to continue to follow for disposition planning . Patient does her own shopping and independent with ADL prior to admit.    DME Needed: No    Action(s) Taken: Updated Provider/Nurse on Discharge Plan    Escalations Completed: None    Medically Clear: No    Next Steps:    Barriers to Discharge: Medical clearance    Is the patient up for discharge tomorrow: No  Care Transition Team Assessment       Elopement Risk  Legal Hold: No  Ambulatory or Self Mobile in Wheelchair: Yes  Disoriented: No  Psychiatric Symptoms: None  History of Wandering: No  Elopement this Admit: No  Vocalizing Wanting to Leave: No  Displays Behaviors, Body Language Wanting to Leave: No-Not at Risk for Elopement  Elopement Risk: Not at Risk for Elopement    Interdisciplinary Discharge Planning  Lives with - Patient's Self Care Capacity: Adult Children  Patient or legal guardian wants to designate a caregiver: No  Support Systems: Friends / Neighbors, Family Member(s)  Housing / Facility: [address] [place] [address]  Durable Medical Equipment: Not Applicable  Vision / Hearing Impairment  Vision Impairment : Yes  Right Eye Vision: Impaired, Wears Glasses  Left Eye Vision: Impaired, Wears Glasses  Hearing Impairment : No  Domestic Abuse  Have you ever been the victim of abuse or violence?: No  Physical Abuse or Sexual Abuse: No  Verbal Abuse or Emotional Abuse: No  Possible Abuse/Neglect Reported to:: Not Applicable

## 2023-01-09 NOTE — DISCHARGE PLANNING
HTH/SCP TCN chart review completed. Collaborated with CM prior to meeting with the pt. The most current review of medical record, knowledge of pt's PLOF and social support, LACE+ score of 72, 6 clicks scores of N/E mobility were considered.      Pt seen at bedside, daughter present. Introduced TCN program. Provided education regarding post acute levels of care. Discussed HTH/SCP plan benefits (Meds to Beds, medical uber and [facility] transitional care). Pt verbalizes understanding.     Patient reports that she is fairly independent at baseline. She ambulates without AD, her biggest limiting factor is the pain in her BLEs fro her DVTs. She does not use AD and she lives with her daughter who is available to assist her at home, with driving, etc.    Patient reports no needs at this time, as such no choices obtained this visit. TCN will continue to follow and collaborate with discharge planning team as additional post acute needs arise. Thank you.     Completed today:  Choice obtained: NONE at this time   [facility] referral SENT 1/9/23

## 2023-01-09 NOTE — PROGRESS NOTES
[facility] Medicine Daily Progress Note    Date of Service  1/9/2023    Chief Complaint  Mariely Easley is a 82 y.o. female admitted 1/8/2023 with dyspnea.    Hospital Course  Mariely Easley is an 82-year-old female with hypertension, hypothyroid, venous insufficiency and a superficial venous thrombosis.  In Sep of 2022, she was admitted to [facility] for with septic shock due to pyelonephritis.     She presented to [facility] on 1/8/23 with a one week history of sob and JEFFRIES. Her vitals are stable. Last known EF was 65%, Today significant labs include wbc 11.2, hbg 10.3, Na 132, BNP 11K, trop of 21 and a chronically elevated alk phos. CXR reveals pulmonary edema. Denies chest pain, no cough, no fever, no ischemic changes on ekg    Interval Problem Update  Patient admitted yesterday for dyspnea, found to have some pulmonary edema and started on lasix.  On room air, HTN improved.  On xarelto for hx of superficial vein thrombosis. CTA negative for PE or pneumonia.  Echo pending, rule out CHF.  Continue IV lasix, can likely transition to PO lasix tomorrow as patient appears near euvolemic.  Patient states she was on two diuretics at home for weight control as she is on salt tablets, she recently came off one diuretic last week but remains on lasix diuretic.  Patient with urinary frequency although this may relate to diuretic use, check UA.  Anticipate possible discharge home tomorrow if remains stable.      I have discussed this patient's plan of care and discharge plan at IDT rounds today with Case Management, Nursing, Nursing leadership, and other members of the IDT team.    Consultants/Specialty  none    Code Status  Full Code    Disposition  Patient is not medically cleared for discharge.   Anticipate discharge to to home with close outpatient follow-up.  I have placed the appropriate orders for post-discharge needs.    Review of Systems  Review of Systems   Constitutional:  Negative for chills and fever.   Eyes:   Negative for blurred vision and pain.   Respiratory:  Positive for shortness of breath. Negative for cough, hemoptysis and sputum production.    Cardiovascular:  Negative for chest pain, palpitations and leg swelling.   Gastrointestinal:  Negative for abdominal pain, nausea and vomiting.   Genitourinary:  Negative for dysuria and urgency.   Musculoskeletal:  Negative for back pain and falls.   Skin:  Negative for itching and rash.   Neurological:  Negative for dizziness, sensory change, focal weakness, loss of consciousness and headaches.   Psychiatric/Behavioral:  Negative for substance abuse. The patient does not have insomnia.       Physical Exam  Temp:  [36 °C (96.8 °F)-36.7 °C (98.1 °F)] 36.3 °C (97.4 °F)  Pulse:  [58-96] 67  Resp:  [17-23] 18  BP: (119-187)/(50-74) 127/64  SpO2:  [93 %-97 %] 94 %    Physical Exam  Vitals reviewed.   Constitutional:       General: She is not in acute distress.     Appearance: She is not diaphoretic.   HENT:      Head: Normocephalic and atraumatic.      Right Ear: External ear normal.      Left Ear: External ear normal.      Nose: Nose normal. No congestion.      Mouth/Throat:      Pharynx: No oropharyngeal exudate or posterior oropharyngeal erythema.   Eyes:      Extraocular Movements: Extraocular movements intact.      Pupils: Pupils are equal, round, and reactive to light.   Cardiovascular:      Rate and Rhythm: Normal rate and regular rhythm.   Pulmonary:      Effort: Pulmonary effort is normal. No respiratory distress.      Breath sounds: Normal breath sounds. No wheezing or rales.   Abdominal:      General: Bowel sounds are normal. There is no distension.      Palpations: Abdomen is soft.      Tenderness: There is no abdominal tenderness. There is no guarding or rebound.   Musculoskeletal:         General: No swelling. Normal range of motion.      Cervical back: Normal range of motion and neck supple.      Right lower leg: No edema.      Left lower leg: No edema.   Skin:      General: Skin is warm and dry.   Neurological:      General: No focal deficit present.      Mental Status: She is alert and oriented to person, place, and time.      Cranial Nerves: No cranial nerve deficit.      Sensory: No sensory deficit.      Motor: No weakness.   Psychiatric:         Mood and Affect: Mood normal.         Behavior: Behavior normal.       Fluids    Intake/Output Summary (Last 24 hours) at 1/9/2023 1258  Last data filed at 1/9/2023 0820  Gross per 24 hour   Intake --   Output 250 ml   Net -250 ml       Laboratory  Recent Labs     01/08/23  1754 01/09/23  0450   WBC 11.2* 12.3*   RBC 3.50* 3.39*   HEMOGLOBIN 10.3* 10.1*   HEMATOCRIT 33.2* 31.8*   MCV 94.9 93.8   MCH 29.4 29.8   MCHC 31.0* 31.8*   RDW 55.5* 55.0*   PLATELETCT 315 308   MPV 11.5 11.6     Recent Labs     01/08/23  1754 01/09/23  0455   SODIUM 132* 134*   POTASSIUM 5.1 4.8   CHLORIDE 100 102   CO2 19* 17*   GLUCOSE 105* 95   BUN 25* 24*   CREATININE 1.18 1.04   CALCIUM 9.2 9.2                   Imaging  EC-ECHOCARDIOGRAM COMPLETE W/O CONT         CT-CTA CHEST PULMONARY ARTERY W/ RECONS   Final Result         1. No CT evidence of pulmonary embolism.      2. Interlobular septal thickening throughout could relate to pulmonary edema superimposed on prior interstitial lung disease.      3. Small right pleural effusion.      4. Patchy bibasilar opacities could be atelectasis. Superimposed infection cannot be excluded.      5. Mildly prominent mediastinal and hilar lymph nodes, nonspecific.      DX-CHEST-PORTABLE (1 VIEW)   Final Result      1.  Hypoinflation and mild pulmonary edema.   2.  Mild cardiomegaly, increased from prior.   3.  Minimal RIGHT pleural fluid.           Assessment/Plan  * CHF (congestive heart failure), NYHA class I, acute on chronic, combined (HCC)- (present on admission)  Assessment & Plan  Suspected acute systolic on chronic diastolic  Exam consistent with volume overload  No acute ekg changes  Trop minimally  elevated  pulm edema - will start on on iv lasix  Repeat echo  Daily weight, strict I/O  Following tele    Anemia  Assessment & Plan  chronic    SOB (shortness of breath)  Assessment & Plan  With JEFFRIES  Suspect due to acute systolic and acute on chronic diastolic chf  See above  Iv lasix  Echo pending  Stable on room air  PE unlikely, as she is been on xarelto  - ERP ordered CTA with no evidence of PE    Superficial thrombosis of leg, right- (present on admission)  Assessment & Plan  Subacute  She is on xarelto for this    Alkaline phosphatase elevation  Assessment & Plan  chronic    Leukocytosis- (present on admission)  Assessment & Plan  Minimal  Likely stress reaction  procalcitonin, covid and influenza all normal/negative  following    Elevated troponin  Assessment & Plan  Minimally elevated  Suspect due to chf  No ischemic changes on ekg  Trending trops  Following  tele    Hyponatremia- (present on admission)  Assessment & Plan  Mild  following    CKD (chronic kidney disease) stage 3, GFR 30-59 ml/min ([facility])- (present on admission)  Assessment & Plan  At baseline  Avoid nephrotoxins  following    Acquired hypothyroidism- (present on admission)  Assessment & Plan  Continue synthroid    Essential hypertension- (present on admission)  Assessment & Plan  Continue home regimen as tolerated  following         VTE prophylaxis: therapeutic anticoagulation with xarelto    I have performed a physical exam and reviewed and updated ROS and Plan today (1/9/2023). In review of yesterday's note (1/8/2023), there are no changes except as documented above.

## 2023-01-09 NOTE — ED NOTES
Med rec completed per patient and daughter at bedside with RX bottles (reviewed and returned).  Allergies reviewed with patient. NKDA.  No outpatient antibiotics within the last 30 days.  Patient's preferred pharmacy: [facility] on [address].

## 2023-01-09 NOTE — ASSESSMENT & PLAN NOTE
Suspected acute systolic on chronic diastolic  Exam consistent with volume overload  No acute ekg changes  Trop minimally elevated  TTE showed RV overload  Daily weight, strict I/O  Following tele  Hold lasix for creat increase

## 2023-01-09 NOTE — CARE PLAN
Problem: Fall Risk  Goal: Patient will remain free from falls  Outcome: Progressing  Note: Treaded socks and bed/strip alarm on, side rails up x 3. Call light within reach. Pt educated to call for assistance. Reinforce as needed. Continue to monitor.         Problem: Care Map:  Admission Optimal Outcome for the Heart Failure Patient  Goal: Admission:  Optimal Care of the heart failure patient  Outcome: Progressing  Intervention: Start Heart Failure education booklet, provide writing utensils and notepad for questions  Note: Heart failure booklet provided   Intervention: For patient's with heart failure exacerbation, identify precipitant (diet, med compliance, etc.) and direct education towards lifestyle changes to prevent exacerbations.  Note: Diet and medication compliance discussed   Intervention: Instruct patient to write down weights on symptom tracker daily  Note: Daily weight discussed   Intervention: Provide and explain the Stoplight Tool  Note: Stoplight tool provided   Intervention: Ejection fraction documented or plan to assess documented  Note: Echocardiogram completed in the emergency department   Intervention: Hat in bathroom or urinal to bedside (Giraldo if applicable)  Note: Measuring hat placed in the bathroom   Intervention: Confirm IV diuretic is ordered  Note: Furosemide injection 20 mg   Intervention: Assess and document 2 hour post diuretic output  Note: Strict intake and output will be documented   Intervention: Baseline weight documented  (stand up scale, unless contraindicated)  Note: 60.8 kg   Intervention: Document intake and output per shift. Communicate with care team if volume status is improving, stalled or worsening.  Note: Intake and output will be documented throughout shift   Intervention: Assess need for smoking cessation counseling  Note: Non smoker   Intervention: Confirm Pharmacy has assessed patient for pneumonia vaccine  Note: Pneumonia vaccine administered 7 years ago     Intervention: Assess for Influenza vaccine if in season  Note: Already immunized this season   Intervention: Ask patient to opt-in for Meds to Beds Program  Note: Opted in   Intervention: Identify potential discharge barriers and consult case management as appropriate (ex: uninsured, food insecurity, lack of transport, etc.)  Note: No barriers      The patient is Stable - Low risk of patient condition declining or worsening         Progress made toward(s) clinical / shift goals:  CHF education initiated     Patient is not progressing towards the following goals: History of DVTs

## 2023-01-09 NOTE — ASSESSMENT & PLAN NOTE
Minimal  Likely stress reaction  procalcitonin, covid and influenza all normal/negative  following

## 2023-01-09 NOTE — H&P
Hospital Medicine History & Physical Note    Date of Service  1/8/2023    Primary Care Physician  Luz Vieira D.O.    Consultants      Code Status  Full Code    Chief Complaint  Chief Complaint   Patient presents with    Shortness of Breath     SOB at rest, worse with exertion, x 1 week. SOB has been worsening over the past three days. Patient reports being unable to walk to the restroom. 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        History of Presenting Illness    Mariely Easley is an 82-year-old female with hypertension, hypothyroid, venous insufficiency and a superficial venous thrombosis.  In Sep of 2022, she was admitted to [facility] with septic shock due to pyelonephritis.    She presented to [facility] on 1/8/23 with a one week history of sob and JEFFRIES. Her vitals are stable. Last known EF was 65%, Today significant labs include wbc 11.2, hbg 10.3, Na 132, BNP 11K, trop of 21 and a chronically elevated alk phos. CXR reveals pulmonary edema. Denies chest pain, no cough, no fever, no ischemic changes on ekg    I discussed the plan of care with patient, her daughter and ERP    Review of Systems  Review of Systems   Constitutional: Negative.  Negative for chills, diaphoresis, fever, malaise/fatigue and weight loss.   HENT: Negative.  Negative for sore throat.    Eyes: Negative.  Negative for blurred vision.   Respiratory:  Positive for shortness of breath. Negative for cough.    Cardiovascular:  Positive for PND. Negative for chest pain, palpitations, orthopnea and leg swelling.   Gastrointestinal: Negative.  Negative for abdominal pain, nausea and vomiting.   Genitourinary: Negative.  Negative for dysuria.   Musculoskeletal: Negative.  Negative for myalgias.   Skin: Negative.  Negative for itching and rash.   Neurological: Negative.  Negative for dizziness, focal weakness, weakness and headaches.   Endo/Heme/Allergies: Negative.  Does not bruise/bleed easily.   Psychiatric/Behavioral: Negative.  Negative for  depression, substance abuse and suicidal ideas.    All other systems reviewed and are negative.    Past Medical History   has a past medical history of Abnormal albumin (9/16/2022), Hypermagnesemia (9/16/2022), Hypertension, Pain and swelling of left lower extremity (5/18/2022), and Superficial thrombosis of lower extremity, right (7/5/2022).    Surgical History   has no past surgical history on file. denies    Family History  family history is not on file.   Family history reviewed with patient. There is no family history that is pertinent to the chief complaint.     Social History   reports that she quit smoking about 43 years ago. Her smoking use included cigarettes. She has never used smokeless tobacco. She reports current alcohol use of about 8.4 oz per week. She reports that she does not use drugs.    Allergies  No Known Allergies    Medications  Prior to Admission Medications   Prescriptions Last Dose Informant Patient Reported? Taking?   aspirin (ASA) 81 MG Chew Tab chewable tablet   Yes No   Sig: Chew 325 mg every day.   cetirizine (ZYRTEC) 10 MG Tab  Patient No No   Sig: Take 1 Tablet by mouth every day.   diazepam (VALIUM) 10 MG tablet   No No   Sig: Take 1 Tablet by mouth every 12 hours as needed (muscle spasm) for up to 180 days.   diclofenac sodium (VOLTAREN) 1 % Gel   No No   Sig: Apply 4 g topically 4 times a day as needed (32g/day max (do not use with NSAIDS)).   ferrous sulfate 325 (65 Fe) MG tablet   No No   Sig: Take 1 Tablet by mouth every day.   fluticasone (FLONASE) 50 MCG/ACT nasal spray  Patient No No   Sig: Administer 1 Spray into affected nostril(S) every day. *Follow up needed for additional refills*   folic acid (FOLVITE) 1 MG Tab  Patient No No   Sig: Take 1 Tablet by mouth every day.   furosemide (LASIX) 20 MG Tab   No No   Sig: Take 1 Tablet by mouth every day.   levothyroxine (SYNTHROID) 50 MCG Tab  Patient No No   Sig: Take 1 Tab by mouth Every morning on an empty stomach.    losartan (COZAAR) 50 MG Tab  Patient No No   Sig: TAKE 1 TABLET BY MOUTH EVERY DAY   metoprolol tartrate (LOPRESSOR) 25 MG Tab  Patient No No   Sig: TAKE 1 TABLET BY MOUTH TWICE A DAY   simvastatin (ZOCOR) 10 MG Tab   No No   Sig: Take 1 Tablet by mouth every evening.      Facility-Administered Medications: None       Physical Exam  Temp:  [36.7 °C (98.1 °F)] 36.7 °C (98.1 °F)  Pulse:  [73-96] 91  Resp:  [17-23] 23  BP: (165-187)/(61-74) 187/74  SpO2:  [94 %-97 %] 94 %  Blood Pressure : (!) 165/68   Temperature: 36.7 °C (98.1 °F)   Pulse: 73   Respiration: (!) 21   Pulse Oximetry: 96 %       Physical Exam  Vitals and nursing note reviewed. Exam conducted with a chaperone present.   Constitutional:       General: She is not in acute distress.     Appearance: Normal appearance. She is not diaphoretic.   HENT:      Head: Normocephalic.      Nose: Nose normal.      Mouth/Throat:      Mouth: Mucous membranes are moist.   Eyes:      Pupils: Pupils are equal, round, and reactive to light.   Cardiovascular:      Rate and Rhythm: Normal rate and regular rhythm.      Pulses: Normal pulses.      Heart sounds: Normal heart sounds. No murmur heard.    No gallop.   Pulmonary:      Effort: Pulmonary effort is normal. No respiratory distress.      Breath sounds: Rales present. No wheezing.   Abdominal:      General: Abdomen is flat. Bowel sounds are normal. There is no distension.      Palpations: Abdomen is soft. There is no mass.      Tenderness: There is no abdominal tenderness. There is no guarding.      Hernia: No hernia is present.   Musculoskeletal:         General: No swelling or deformity. Normal range of motion.      Cervical back: Normal range of motion and neck supple. No rigidity or tenderness.      Right lower leg: Edema present.      Left lower leg: Edema present.   Lymphadenopathy:      Cervical: No cervical adenopathy.   Skin:     General: Skin is warm and dry.      Capillary Refill: Capillary refill takes less  than 2 seconds.   Neurological:      General: No focal deficit present.      Mental Status: She is alert and oriented to person, place, and time.      Cranial Nerves: No cranial nerve deficit.   Psychiatric:         Mood and Affect: Mood normal.         Behavior: Behavior normal.       Laboratory:  Recent Labs     01/08/23  1754   WBC 11.2*   RBC 3.50*   HEMOGLOBIN 10.3*   HEMATOCRIT 33.2*   MCV 94.9   MCH 29.4   MCHC 31.0*   RDW 55.5*   PLATELETCT 315   MPV 11.5     Recent Labs     01/08/23  1754   SODIUM 132*   POTASSIUM 5.1   CHLORIDE 100   CO2 19*   GLUCOSE 105*   BUN 25*   CREATININE 1.18   CALCIUM 9.2     Recent Labs     01/08/23  1754   ALTSGPT 6   ASTSGOT 8*   ALKPHOSPHAT 130*   TBILIRUBIN 0.4   GLUCOSE 105*         Recent Labs     01/08/23  1754   NTPROBNP 18554*         Recent Labs     01/08/23  1754   TROPONINT 21*       Imaging:  DX-CHEST-PORTABLE (1 VIEW)   Final Result      1.  Hypoinflation and mild pulmonary edema.   2.  Mild cardiomegaly, increased from prior.   3.  Minimal RIGHT pleural fluid.      CT-CTA CHEST PULMONARY ARTERY W/ RECONS    (Results Pending)   EC-ECHOCARDIOGRAM COMPLETE W/O CONT    (Results Pending)         Assessment/Plan:  Justification for Admission Status  I anticipate this patient will require at least two midnights for appropriate medical management, necessitating inpatient admission because of volume overload, need for diuresis and med adjustment      * CHF (congestive heart failure), NYHA class I, acute on chronic, combined (HCC)- (present on admission)  Assessment & Plan  Suspected acute systolic on chronic diastolic  Exam consistent with volume overload  No acute ekg changes  Trop minimally elevated  pulm edema - will start on on iv lasix  Repeat echo  Daily weight, strict I/O  Following tele    SOB (shortness of breath)  Assessment & Plan  With JEFFRIES  Suspect due to acute systolic and acute on chronic diastolic chf  See above  Iv lasix  Echo pending  Following  Covid and  influenza pending  procal pending  Stable on room air  PE unlikely, as she is been on xarelto  - ERP ordered CTA - results pending    Anemia  Assessment & Plan  chronic    Superficial thrombosis of leg, right- (present on admission)  Assessment & Plan  Subacute  She is on xarelto for this    Alkaline phosphatase elevation  Assessment & Plan  chronic    Leukocytosis- (present on admission)  Assessment & Plan  Minimal  Likely stress reaction  Will check procalcitonin, covid and influenza  following    Elevated troponin  Assessment & Plan  Minimally elevated  Suspect due to chf  No ischemic changes on ekg  Trending trops  Following  tele    Hyponatremia- (present on admission)  Assessment & Plan  Mild  following    CKD (chronic kidney disease) stage 3, GFR 30-59 ml/min ([facility])- (present on admission)  Assessment & Plan  At baseline  Avoid nephrotoxins  following    Acquired hypothyroidism- (present on admission)  Assessment & Plan  Continue synthroid    Essential hypertension- (present on admission)  Assessment & Plan  Continue home regimen as tolerated  following        VTE prophylaxis: xarelto

## 2023-01-10 LAB
ANION GAP SERPL CALC-SCNC: 14 MMOL/L (ref 7–16)
BUN SERPL-MCNC: 27 MG/DL (ref 8–22)
CALCIUM SERPL-MCNC: 8.6 MG/DL (ref 8.5–10.5)
CHLORIDE SERPL-SCNC: 100 MMOL/L (ref 96–112)
CO2 SERPL-SCNC: 19 MMOL/L (ref 20–33)
CREAT SERPL-MCNC: 1.41 MG/DL (ref 0.5–1.4)
GFR SERPLBLD CREATININE-BSD FMLA CKD-EPI: 38 ML/MIN/1.73 M 2
GLUCOSE SERPL-MCNC: 90 MG/DL (ref 65–99)
POTASSIUM SERPL-SCNC: 4.9 MMOL/L (ref 3.6–5.5)
SODIUM SERPL-SCNC: 133 MMOL/L (ref 135–145)

## 2023-01-10 PROCEDURE — 700102 HCHG RX REV CODE 250 W/ 637 OVERRIDE(OP): Performed by: STUDENT IN AN ORGANIZED HEALTH CARE EDUCATION/TRAINING PROGRAM

## 2023-01-10 PROCEDURE — 700102 HCHG RX REV CODE 250 W/ 637 OVERRIDE(OP): Performed by: HOSPITALIST

## 2023-01-10 PROCEDURE — A9270 NON-COVERED ITEM OR SERVICE: HCPCS | Performed by: HOSPITALIST

## 2023-01-10 PROCEDURE — 80048 BASIC METABOLIC PNL TOTAL CA: CPT

## 2023-01-10 PROCEDURE — 99232 SBSQ HOSP IP/OBS MODERATE 35: CPT | Performed by: INTERNAL MEDICINE

## 2023-01-10 PROCEDURE — A9270 NON-COVERED ITEM OR SERVICE: HCPCS | Performed by: STUDENT IN AN ORGANIZED HEALTH CARE EDUCATION/TRAINING PROGRAM

## 2023-01-10 PROCEDURE — 770020 HCHG ROOM/CARE - TELE (206)

## 2023-01-10 RX ADMIN — FERROUS SULFATE TAB 325 MG (65 MG ELEMENTAL FE) 325 MG: 325 (65 FE) TAB at 05:48

## 2023-01-10 RX ADMIN — METOPROLOL TARTRATE 25 MG: 25 TABLET, FILM COATED ORAL at 17:42

## 2023-01-10 RX ADMIN — SIMVASTATIN 10 MG: 10 TABLET, FILM COATED ORAL at 17:43

## 2023-01-10 RX ADMIN — LEVOTHYROXINE SODIUM 50 MCG: 0.05 TABLET ORAL at 05:49

## 2023-01-10 RX ADMIN — ACETAMINOPHEN 1000 MG: 500 TABLET ORAL at 05:52

## 2023-01-10 RX ADMIN — FUROSEMIDE 20 MG: 20 TABLET ORAL at 05:49

## 2023-01-10 RX ADMIN — RIVAROXABAN 20 MG: 20 TABLET, FILM COATED ORAL at 17:43

## 2023-01-10 RX ADMIN — METOPROLOL TARTRATE 25 MG: 25 TABLET, FILM COATED ORAL at 05:48

## 2023-01-10 RX ADMIN — LOSARTAN POTASSIUM 50 MG: 50 TABLET, FILM COATED ORAL at 05:49

## 2023-01-10 RX ADMIN — FOLIC ACID 1 MG: 1 TABLET ORAL at 05:48

## 2023-01-10 ASSESSMENT — FIBROSIS 4 INDEX
FIB4 SCORE: 0.85
FIB4 SCORE: 0.85

## 2023-01-10 ASSESSMENT — PAIN DESCRIPTION - PAIN TYPE: TYPE: ACUTE PAIN

## 2023-01-10 ASSESSMENT — ENCOUNTER SYMPTOMS
DIZZINESS: 0
ABDOMINAL PAIN: 0
SHORTNESS OF BREATH: 0
WEAKNESS: 0

## 2023-01-10 NOTE — PROGRESS NOTES
[facility] Medicine Daily Progress Note    Date of Service  1/10/2023    Chief Complaint  Mariely Easley is a 82 y.o. female admitted 1/8/2023 with SOB    Hospital Course  Mariely Easley is an 82-year-old female with hypertension, hypothyroid, venous insufficiency and a superficial venous thrombosis.  In Sep of 2022, she was admitted to [facility] with septic shock due to pyelonephritis.     She presented to [facility] on 1/8/23 with a one week history of sob and JEFFRIES. Her vitals are stable. Last known EF was 65%, Today significant labs include wbc 11.2, hbg 10.3, Na 132, BNP 11K, trop of 21 and a chronically elevated alk phos. CXR reveals pulmonary edema. Denies chest pain, no cough, no fever, no ischemic changes on ekg  TTE showed increased RV pressure    Interval Problem Update  Creat increased today, hold lasix and losartan  She feels her SOB and edema are improved    I have discussed this patient's plan of care and discharge plan at IDT rounds today with Case Management, Nursing, Nursing leadership, and other members of the IDT team.    Consultants/Specialty  none    Code Status  Full Code    Disposition  Patient is not medically cleared for discharge.   Anticipate discharge to to home with close outpatient follow-up.  I have placed the appropriate orders for post-discharge needs.    Review of Systems  Review of Systems   Constitutional:  Negative for malaise/fatigue.   Respiratory:  Negative for shortness of breath.    Cardiovascular:  Negative for chest pain.   Gastrointestinal:  Negative for abdominal pain.   Neurological:  Negative for dizziness and weakness.   All other systems reviewed and are negative.     Physical Exam  Temp:  [36.7 °C (98 °F)-37.8 °C (100 °F)] 36.8 °C (98.2 °F)  Pulse:  [60-82] 69  Resp:  [16-18] 16  BP: (118-136)/(38-50) 124/38  SpO2:  [93 %-98 %] 97 %    Physical Exam  Vitals and nursing note reviewed.   Constitutional:       General: She is not in acute distress.     Appearance: She  is not toxic-appearing.   HENT:      Head: Normocephalic.      Mouth/Throat:      Mouth: Mucous membranes are moist.   Eyes:      General:         Right eye: No discharge.         Left eye: No discharge.   Cardiovascular:      Rate and Rhythm: Normal rate and regular rhythm.   Pulmonary:      Effort: Pulmonary effort is normal. No respiratory distress.      Breath sounds: No wheezing or rales.   Abdominal:      Palpations: Abdomen is soft.      Tenderness: There is no abdominal tenderness.   Musculoskeletal:      Cervical back: Neck supple.      Right lower leg: No edema.      Left lower leg: No edema.   Skin:     General: Skin is warm and dry.   Neurological:      Mental Status: She is alert and oriented to person, place, and time.       Fluids    Intake/Output Summary (Last 24 hours) at 1/10/2023 1735  Last data filed at 1/10/2023 1200  Gross per 24 hour   Intake 580 ml   Output 300 ml   Net 280 ml       Laboratory  Recent Labs     01/08/23  1754 01/09/23  0450   WBC 11.2* 12.3*   RBC 3.50* 3.39*   HEMOGLOBIN 10.3* 10.1*   HEMATOCRIT 33.2* 31.8*   MCV 94.9 93.8   MCH 29.4 29.8   MCHC 31.0* 31.8*   RDW 55.5* 55.0*   PLATELETCT 315 308   MPV 11.5 11.6     Recent Labs     01/08/23  1754 01/09/23  0455 01/10/23  0041   SODIUM 132* 134* 133*   POTASSIUM 5.1 4.8 4.9   CHLORIDE 100 102 100   CO2 19* 17* 19*   GLUCOSE 105* 95 90   BUN 25* 24* 27*   CREATININE 1.18 1.04 1.41*   CALCIUM 9.2 9.2 8.6                   Imaging  EC-ECHOCARDIOGRAM COMPLETE W/O CONT   Final Result      CT-CTA CHEST PULMONARY ARTERY W/ RECONS   Final Result         1. No CT evidence of pulmonary embolism.      2. Interlobular septal thickening throughout could relate to pulmonary edema superimposed on prior interstitial lung disease.      3. Small right pleural effusion.      4. Patchy bibasilar opacities could be atelectasis. Superimposed infection cannot be excluded.      5. Mildly prominent mediastinal and hilar lymph nodes, nonspecific.       DX-CHEST-PORTABLE (1 VIEW)   Final Result      1.  Hypoinflation and mild pulmonary edema.   2.  Mild cardiomegaly, increased from prior.   3.  Minimal RIGHT pleural fluid.           Assessment/Plan  * CHF (congestive heart failure), NYHA class I, acute on chronic, combined ([facility])- (present on admission)  Assessment & Plan  Suspected acute systolic on chronic diastolic  Exam consistent with volume overload  No acute ekg changes  Trop minimally elevated  TTE showed RV overload  Daily weight, strict I/O  Following tele  Hold lasix for creat increase    Anemia  Assessment & Plan  chronic    SOB (shortness of breath)  Assessment & Plan  With JEFFRIES  Suspect due to acute systolic and acute on chronic diastolic chf  See above  Stable on room air  PE unlikely, as she is been on xarelto  - ERP ordered CTA with no evidence of PE    Superficial thrombosis of leg, right- (present on admission)  Assessment & Plan  Subacute  She is on xarelto for this    Alkaline phosphatase elevation  Assessment & Plan  chronic    Leukocytosis- (present on admission)  Assessment & Plan  Minimal  Likely stress reaction  procalcitonin, covid and influenza all normal/negative  following    Elevated troponin  Assessment & Plan  Minimally elevated  Suspect due to chf  No ischemic changes on ekg  Trending trops  Following  tele    Hyponatremia- (present on admission)  Assessment & Plan  Mild  following    CKD (chronic kidney disease) stage 3, GFR 30-59 ml/min ([facility])- (present on admission)  Assessment & Plan  At baseline  Avoid nephrotoxins  following    Acquired hypothyroidism- (present on admission)  Assessment & Plan  Continue synthroid    Essential hypertension- (present on admission)  Assessment & Plan  Continue home regimen as tolerated  following         VTE prophylaxis: therapeutic anticoagulation with xarelto    I have performed a physical exam and reviewed and updated ROS and Plan today (1/10/2023). In review of yesterday's note (1/9/2023),  there are no changes except as documented above.

## 2023-01-10 NOTE — PROGRESS NOTES
Assumed care of patient, bedside report received from Anai MAXWELL. Updated on POC, call light within reach and fall precautions in place. Bed locked and in lowest position. Patient instructed to call for assistance before getting out of bed. All questions answered, no other needs at this time.

## 2023-01-10 NOTE — CARE PLAN
Problem: Care Map:  Day 1 Optimal Outcome for the Heart Failure Patient  Goal: Day 1:  Optimal Care of the heart failure patient  Outcome: Progressing     Problem: Pain - Standard  Goal: Alleviation of pain or a reduction in pain to the patient’s comfort goal  Outcome: Progressing  Flowsheets (Taken 1/10/2023 4547)  Pain Rating Scale (NPRS): 0  Note: Assess and monitor for pain. Provide pharmacological and non-pharmacological interventions as appropriate. Re-evaluate and continue to monitor.      The patient is Stable - Low risk of patient condition declining or worsening    Shift Goals: Monitor weight   Clinical Goals: Monitor strict intake and output  Patient Goals: Discharge plan    Progress made toward(s) clinical / shift goals:  patient weight trending down    Patient is not progressing towards the following goals: Monitoring strict intake and output

## 2023-01-10 NOTE — DISCHARGE PLANNING
HTH/SCP TCN chart review completed. Collaborated with GHAZALA Jackson.  Anticipate home with [facility] referral and close outpatient f/u when medically cleared.  Patient seen at bedside and has no TCN concerns at this time.  TCN will continue to follow and collaborate with discharge planning team as additional post acute needs arise. Thank you.    Completed:  Choice obtained: NONE at this time   [facility] referral SENT 1/9/23

## 2023-01-10 NOTE — PROGRESS NOTES
Bedside report received from April MIN RN. Pt appears to be resting comfortably. Call light and belongings within reach. Bed locked and in lowest position. Alarm and fall precautions in place.

## 2023-01-10 NOTE — CARE PLAN
The patient is Stable - Low risk of patient condition declining or worsening    Shift Goals  Clinical Goals: Monitor BP  Patient Goals: relieve SOB    Progress made toward(s) clinical / shift goals:      Problem: Fall Risk  Goal: Patient will remain free from falls  Outcome: Progressing  Note: Patient calls appropriately, SBA up to bathroom. Fall precautions in place.      Problem: Care Map:  Day 1 Optimal Outcome for the Heart Failure Patient  Goal: Day 1:  Optimal Care of the heart failure patient  Outcome: Progressing  Note: Oral Lasix given per MAR. I/Os monitored, hat in bathroom for urine. Daily weights. Edema assessed this shift.        Patient is not progressing towards the following goals:

## 2023-01-11 ENCOUNTER — TELEPHONE (OUTPATIENT)
Dept: MEDICAL GROUP | Facility: PHYSICIAN GROUP | Age: 83
End: 2023-01-11
Payer: MEDICARE

## 2023-01-11 VITALS
WEIGHT: 136.24 LBS | TEMPERATURE: 97.7 F | HEART RATE: 68 BPM | SYSTOLIC BLOOD PRESSURE: 150 MMHG | RESPIRATION RATE: 16 BRPM | DIASTOLIC BLOOD PRESSURE: 44 MMHG | BODY MASS INDEX: 25.07 KG/M2 | OXYGEN SATURATION: 97 % | HEIGHT: 62 IN

## 2023-01-11 LAB
ANION GAP SERPL CALC-SCNC: 14 MMOL/L (ref 7–16)
BUN SERPL-MCNC: 29 MG/DL (ref 8–22)
CALCIUM SERPL-MCNC: 9 MG/DL (ref 8.5–10.5)
CHLORIDE SERPL-SCNC: 99 MMOL/L (ref 96–112)
CO2 SERPL-SCNC: 21 MMOL/L (ref 20–33)
CREAT SERPL-MCNC: 1.35 MG/DL (ref 0.5–1.4)
GFR SERPLBLD CREATININE-BSD FMLA CKD-EPI: 40 ML/MIN/1.73 M 2
GLUCOSE SERPL-MCNC: 91 MG/DL (ref 65–99)
POTASSIUM SERPL-SCNC: 4.7 MMOL/L (ref 3.6–5.5)
SODIUM SERPL-SCNC: 134 MMOL/L (ref 135–145)

## 2023-01-11 PROCEDURE — 700102 HCHG RX REV CODE 250 W/ 637 OVERRIDE(OP): Performed by: HOSPITALIST

## 2023-01-11 PROCEDURE — 80048 BASIC METABOLIC PNL TOTAL CA: CPT

## 2023-01-11 PROCEDURE — A9270 NON-COVERED ITEM OR SERVICE: HCPCS | Performed by: HOSPITALIST

## 2023-01-11 PROCEDURE — 99239 HOSP IP/OBS DSCHRG MGMT >30: CPT | Performed by: INTERNAL MEDICINE

## 2023-01-11 RX ADMIN — LEVOTHYROXINE SODIUM 50 MCG: 0.05 TABLET ORAL at 05:02

## 2023-01-11 RX ADMIN — FOLIC ACID 1 MG: 1 TABLET ORAL at 05:02

## 2023-01-11 RX ADMIN — FERROUS SULFATE TAB 325 MG (65 MG ELEMENTAL FE) 325 MG: 325 (65 FE) TAB at 05:02

## 2023-01-11 RX ADMIN — METOPROLOL TARTRATE 25 MG: 25 TABLET, FILM COATED ORAL at 05:02

## 2023-01-11 NOTE — DISCHARGE PLANNING
Case Management Discharge Planning    Admission Date: 1/8/2023  GMLOS: 3.9  ALOS: 3    6-Clicks ADL Score: 24  6-Clicks Mobility Score: 24    DC  home with family   Anticipated Discharge Dispo:  Home with family ,[facility] arranged     DME Needed: No    Action(s) Taken: Updated Provider/Nurse on Discharge Plan    Escalations Completed: None    Medically Clear: Yes    Next Steps:none     Barriers to Discharge: Medical clearance    Is the patient up for discharge tomorrow: No  Home with daughter who will provide transportation .

## 2023-01-11 NOTE — CARE PLAN
Problem: Fall Risk  Goal: Patient will remain free from falls  Outcome: Progressing     Problem: Care Map:  Day of Discharge Optimal Outcome for the Heart Failure Patient  Goal: Day of Discharge:  Optimal Care of the heart failure patient  Outcome: Progressing     The patient is Stable - Low risk of patient condition declining or worsening    Shift Goals: Discharge home   Clinical Goals: Discharge planning  Patient Goals: Discharge    Progress made toward(s) clinical / shift goals:  Discharge home     Patient is not progressing towards the following goals: MD will assess patient prior to discharge

## 2023-01-11 NOTE — DISCHARGE INSTRUCTIONS
Discharge Instructions per Amarjit Castillo M.D.    Do not resume your Lasix (furosemide) and Losartan until Friday. Please go to [facility] lab on Monday to get your kidney function lab test (you do not have to be fasting) and follow up with your primary care doctor next week.        HF Patient Discharge Instructions  Monitor your weight daily, and maintain a weight chart, to track your weight changes.   Activity as tolerated, unless your Doctor has ordered otherwise.   Follow a low fat, low cholesterol, low salt diet unless instructed otherwise by your Doctor. Read the labels on the back of food products and track your intake of fat, cholesterol and salt.   Fluid Restriction No. If a Fluid Restriction has been ordered by your Doctor, measure fluids with a measuring cup to ensure that you are not exceeding the restriction.   No smoking.  Oxygen No. If your Doctor has ordered that you wear Oxygen at home, it is important to wear it as ordered.  Did you receive an explanation from staff on the importance of taking each of your medications and why it is necessary to keep taking them unless your doctor says to stop? Yes  Were all of your questions answered about how to manage your heart failure and what to do if you have increased signs and symptoms after you go home? Yes  Do you feel like your heart failure care team involved you in the care treatment plan and allowed you to make decisions regarding your care while in the hospital and addressed any discharge needs you might have? Yes    See the educational handout provided at discharge for more information on monitoring your daily weight, activity and diet. This also explains more about Heart Failure, symptoms of a flare-up and some of the tests that you have undergone.     Warning Signs of a Flare-Up include:  Swelling in the ankles or lower legs.  Shortness of breath, while at rest, or while doing normal activities.   Shortness of breath at night when in bed, or coughing  in bed.   Requiring more pillows to sleep at night, or needing to sit up at night to sleep.  Feeling weak, dizzy or fatigued.     When to call your Doctor:  Call [facility] seven days a week from 8:00 a.m. to 8:00 p.m. for medical questions [phone].  Call your Primary Care Physician or Cardiologist if:   You experience any pain radiating to your jaw or neck.  You have any difficulty breathing.  You experience weight gain of 3 lbs in a day or 5 lbs in a week.   You feel any palpitations or irregular heartbeats.  You become dizzy or lose consciousness.   If you have had an angiogram or had a pacemaker or AICD placed, and experience:  Bleeding, drainage or swelling at the surgical / puncture site.  Fever greater than 100.0 F  Shock from internal defibrillator.  Cool and / or numb extremities.     Please access the [facility] My HF Guide/Heart Failure Interactive Workbook:   [email or web address]

## 2023-01-11 NOTE — PROGRESS NOTES
Bedside report received from April MIN RN. Pt A&Ox4. On room air. No current complaints of pain. POC discussed with pt. Pt verbalizes understanding. Call light and belongings within reach. Bed locked and in lowest position. Alarm and fall precautions in place.

## 2023-01-11 NOTE — CARE PLAN
The patient is Stable - Low risk of patient condition declining or worsening    Shift Goals  Clinical Goals: Monitor I/Os  Patient Goals: Sleep    Progress made toward(s) clinical / shift goals:      Problem: Care Map:  Day 1 Optimal Outcome for the Heart Failure Patient  Goal: Day 1:  Optimal Care of the heart failure patient  Outcome: Progressing  Note: Strict I/Os monitored. Daily weights obtained. Lasix currently held for kidney function.      Problem: Pain - Standard  Goal: Alleviation of pain or a reduction in pain to the patient’s comfort goal  Outcome: Progressing  Note: Pt denies pain at this time.        Patient is not progressing towards the following goals:

## 2023-01-11 NOTE — PROGRESS NOTES
01/16/23    Subjective    Chief Complaint:  History of DVT    HPI:  82 female referred for consultation by Je Brewster P.A.-C from [facility] with history of RLE DVT in November 2022 as well as varicose veins, lymphedema and superficial LLE phlebitis. . She was started on Xarelto in November, 2022. She was hospitalized earlier this month with dyspnea. CT angio was negative for pulmonary embolism. She is still SOB.     ROS:    Constitutional: No weight loss  Skin: No rash or jaundice  HENT: No change in eyesight or hearing  Cardiovascular:No chest pain or arrythmia  Respiratory:No cough or SOB  GI:No nausea, vomiting, diarrhea, constipation  :No dysuria or frequency  Musculoskeletal:No bone or joint pain  Neuro:No sx's of neuropathy  Psych: No complaints    PMH:      No Known Allergies    Past Medical History:   Diagnosis Date    Abnormal albumin 9/16/2022    Hypermagnesemia 9/16/2022    Hypertension     Pain and swelling of left lower extremity 5/18/2022    Superficial thrombosis of lower extremity, right 7/5/2022        History reviewed. No pertinent surgical history.     Medications:    Current Outpatient Medications on File Prior to Encounter   Medication Sig Dispense Refill    XARELTO 20 MG Tab tablet Take 20 mg by mouth with dinner.      cetirizine (ZYRTEC) 10 MG Tab Take 10 mg by mouth 1 time a day as needed for Allergies.      fluticasone (FLONASE) 50 MCG/ACT nasal spray Administer 1 Spray into each nostril 1 time a day as needed (Allergies).      losartan (COZAAR) 50 MG Tab Take 50 mg by mouth every day.      metoprolol tartrate (LOPRESSOR) 25 MG Tab Take 25 mg by mouth 2 times a day.      acetaminophen (TYLENOL) 500 MG Tab Take 500-1,000 mg by mouth every 6 hours as needed for Mild Pain.      simvastatin (ZOCOR) 10 MG Tab Take 1 Tablet by mouth every evening. 100 Tablet 3    ferrous sulfate 325 (65 Fe) MG tablet Take 1 Tablet by mouth every day. 90 Tablet 0    furosemide (LASIX) 20 MG Tab Take 1  "Tablet by mouth every day. 90 Tablet 0    diclofenac sodium (VOLTAREN) 1 % Gel Apply 4 g topically 4 times a day as needed (32g/day max (do not use with NSAIDS)). 100 g 1    diazepam (VALIUM) 10 MG tablet Take 1 Tablet by mouth every 12 hours as needed (muscle spasm) for up to 180 days. 30 Tablet 0    folic acid (FOLVITE) 1 MG Tab Take 1 Tablet by mouth every day. 90 Tablet 3    levothyroxine (SYNTHROID) 50 MCG Tab Take 1 Tab by mouth Every morning on an empty stomach. 90 Tab 3     No current facility-administered medications on file prior to encounter.       Social History     Tobacco Use    Smoking status: Former     Types: Cigarettes     Quit date: 1979     Years since quittin.5    Smokeless tobacco: Never   Substance Use Topics    Alcohol use: Yes     Alcohol/week: 8.4 oz     Types: 14 Glasses of wine per week        History reviewed. No pertinent family history.     Objective    Vitals:    BP (!) 148/60 (BP Location: Left arm, Patient Position: Sitting, BP Cuff Size: Adult)   Pulse 80   Temp 36.1 °C (96.9 °F) (Temporal)   Resp 18   Ht 1.575 m (5' 2.01\")   Wt 62.9 kg (138 lb 12.5 oz)   SpO2 97%   BMI 25.38 kg/m²     Physical Exam:    Appears well-developed and well-nourished. No distress.  Looks young for her age.  Head -  Normocephalic .   Eyes - Pupils are equal. Conjunctivae normal. No scleral icterus.   Ears - normal hearing  Mouth - Throat: Oropharynx is clear and moist. No oropharyngeal exudate  Neck - Neck supple. No thyromegaly  Cardiovascular - Normal rate, regular rhythm, normal heart sounds and intact distal pulses. No  gallop, murmur or rub  Pulmonary - Normal breath sounds.  No wheeze, rales or rhonchi  Breast - symmetrical. No mass on indentation.  Abdominal -Soft. No distension, tenderness, organomegaly or mass  Extremities-  No edema or tenderness.  Good DP pulse. Negative Quita's sign.   Nodes - No submental, submandibular, preauricular, cervical, axillary or inguinal " adenopathy.    Neurological -   Alert and oriented.  Skin - Skin is warm and dry. No rash noted. Not diaphoretic. No erythema. No pallor. No jaundice   Psychiatric -  Normal mood and affect.    Labs:      Assessment  Doubt a hereditary thrombophilia but could be an acquired one.   Imp:    Visit Diagnosis:    1. Varicose veins of both lower extremities with pain        2. History of DVT (deep vein thrombosis)        3. CHF (congestive heart failure), NYHA class I, acute on chronic, combined (HCC)            Plan:  Continue Xarelto until end of Fenruary, then dc for 7 days and then get lab  See me 2 weeks after lab drawn  Go on ASA during  the hiatus between stopping Xarelto and seeing me in follow up    Oc Luke M.D.

## 2023-01-12 ENCOUNTER — PATIENT OUTREACH (OUTPATIENT)
Dept: HEALTH INFORMATION MANAGEMENT | Facility: OTHER | Age: 83
End: 2023-01-12
Payer: MEDICARE

## 2023-01-12 DIAGNOSIS — N18.31 STAGE 3A CHRONIC KIDNEY DISEASE: ICD-10-CM

## 2023-01-12 DIAGNOSIS — I50.43 CHF (CONGESTIVE HEART FAILURE), NYHA CLASS I, ACUTE ON CHRONIC, COMBINED (HCC): ICD-10-CM

## 2023-01-12 DIAGNOSIS — I10 ESSENTIAL HYPERTENSION: ICD-10-CM

## 2023-01-12 NOTE — PROGRESS NOTES
Subjective:     Mariely Easley is a 82 y.o. female who presents for Hospital Follow-up.    POST DISCHARGE CALL:  Discharge Date:1/11/2023   Date of Outreach Call: 1/12/2023  9:51 AM  Now that you're home, how are you doing? Good  Did you receive any new prescriptions? No  Were you able to fill those medications? No  How did you fill those prescriptions? *  If not able to fill prescriptions, why? Other (Comment)    Do you have questions about your medications? No  Do you have a follow-up appointment scheduled?Yes  Any issues or paperwork you wish to discuss with your PCP? No  Does patient qualify for CCM Program? Yes  If patient qualifies, was CCM Program Introduced? Yes  If patient does not qualify, comment? *  Number of Attempts: 1  Current or previous attempts completed within two business days of discharge? Yes  Provided education regarding treatment plan, medication, self-management, ADLs? Yes  Has patient completed Advance Directive? If yes, advise them to bring to appointment. No  Care Manager phone number provided? Yes  Is there anything else I can help you with? No  Chief Complaint? SOB  Admitting Dx? SOB  Discharge Diagnosis? CHF  Additional Comments? Mariely is aware she is not to start Lasix or Losartan until Friday. She is coming in for repeat BMP lab on Monday (1/16) and has pcp f/u appt on Thurs (1/19). She is enrolled in the CCM program, so she has my contact info if she needs anything. She also has a scales at home and is weighing herself daily & recording.    HPI:   Recently hospitalized for shortness of breath 1 week prior to admission.    Patient states she was in her usual state of health until 1 week prior to admission when she developed shortness of breath and dyspnea on exertion with just walking in her house. Shortness of breath at rest is not all the time just on occasion.  She denies fever, chills, cough and chest pain.  Does report some intermittent chest pressure.    Prior to  admission she had no weight gain or changes in her lower extremity edema. Taking 20mg lasix daily again since Friday. Has not noted any improvement in her SOB since prior to admission. Weight and LE edema has been stable. No orthopnea.    Losartan 50mg daily restarted Friday 1/13. Continues with Xarelto until end of Fenruary, then dc for 7 days and start ASA for that week and then get lab for hematologist.     Current medicines (including reconciliation performed today)  Current Outpatient Medications   Medication Sig Dispense Refill    amoxicillin-clavulanate (AUGMENTIN) 500-125 MG Tab Take 1 Tablet by mouth every 12 hours for 7 days. 14 Tablet 0    azithromycin (ZITHROMAX) 250 MG Tab Take 2 Tablets by mouth every day for 1 day, THEN 1 Tablet every day for 4 days. 6 Tablet 0    ferrous sulfate 325 (65 Fe) MG tablet TAKE 1 TABLET BY MOUTH EVERY DAY 90 Tablet 0    XARELTO 20 MG Tab tablet Take 20 mg by mouth with dinner.      cetirizine (ZYRTEC) 10 MG Tab Take 10 mg by mouth 1 time a day as needed for Allergies.      fluticasone (FLONASE) 50 MCG/ACT nasal spray Administer 1 Spray into each nostril 1 time a day as needed (Allergies).      losartan (COZAAR) 50 MG Tab Take 50 mg by mouth every day.      metoprolol tartrate (LOPRESSOR) 25 MG Tab Take 25 mg by mouth 2 times a day.      acetaminophen (TYLENOL) 500 MG Tab Take 500-1,000 mg by mouth every 6 hours as needed for Mild Pain.      simvastatin (ZOCOR) 10 MG Tab Take 1 Tablet by mouth every evening. 100 Tablet 3    furosemide (LASIX) 20 MG Tab Take 1 Tablet by mouth every day. 90 Tablet 0    diclofenac sodium (VOLTAREN) 1 % Gel Apply 4 g topically 4 times a day as needed (32g/day max (do not use with NSAIDS)). 100 g 1    diazepam (VALIUM) 10 MG tablet Take 1 Tablet by mouth every 12 hours as needed (muscle spasm) for up to 180 days. 30 Tablet 0    folic acid (FOLVITE) 1 MG Tab Take 1 Tablet by mouth every day. 90 Tablet 3    levothyroxine (SYNTHROID) 50 MCG Tab  "Take 1 Tab by mouth Every morning on an empty stomach. 90 Tab 3     No current facility-administered medications for this visit.     Allergies:   Patient has no known allergies.    Social History     Tobacco Use    Smoking status: Former     Types: Cigarettes     Quit date: 1979     Years since quittin.5    Smokeless tobacco: Never   Vaping Use    Vaping Use: Never used   Substance Use Topics    Alcohol use: Yes     Alcohol/week: 8.4 oz     Types: 14 Glasses of wine per week    Drug use: No     ROS:  Denies aside from HPI    Objective:     Vitals:    23 0955   BP: 122/72   BP Location: Left arm   Patient Position: Sitting   BP Cuff Size: Adult   Pulse: 71   Resp: 20   Temp: 36.2 °C (97.1 °F)   TempSrc: Temporal   SpO2: 98%   Weight: 62.9 kg (138 lb 9.6 oz)   Height: 1.575 m (5' 2\")     Body mass index is 25.35 kg/m².    Physical Exam:  Constitutional: Well-developed and well-nourished. No acute distress.   Skin: Skin is warm and dry. No rash noted.  Head: Atraumatic without lesions.  Eyes: Conjunctivae and extraocular motions are normal. Pupils are equal, round. No scleral icterus.   Nose: Nares patent. No discharge.  Mouth/Throat: Oropharynx is moist.  Neck: Supple, trachea midline. No JVD while seated.  Cardiovascular: Regular rate and rhythm, S1 and S2 without murmur, rubs, or gallops.  Lungs: Normal inspiratory effort, no wheezes/rhonchi. Diminished on the right base and rales on the left.  Extremities: No cyanosis, clubbing, erythema. Trace pitting edema on left and +1 on left.  Neurological: Alert and oriented x 3. No gross/focal deficits.  Psychiatric:  Behavior, mood, and affect are appropriate.    Assessment and Plan:   1. Hospital discharge follow-up  - Chart and discharge summary were reviewed.   - Hospitalization and results reviewed with patient.   - Medications reviewed including instructions regarding high risk medications, dosing and side effects. Patient aware of plan for labs with " hemology after stopping xarelto x 7 days.  - Recommended Services: Cardiology  - Advance directive/POLST on file?  No, recommended    2. Community acquired pneumonia of right lower lobe of lung  - amoxicillin-clavulanate (AUGMENTIN) 500-125 MG Tab; Take 1 Tablet by mouth every 12 hours for 7 days.  Dispense: 14 Tablet; Refill: 0  - azithromycin (ZITHROMAX) 250 MG Tab; Take 2 Tablets by mouth every day for 1 day, THEN 1 Tablet every day for 4 days.  Dispense: 6 Tablet; Refill: 0  - CBC WITH DIFFERENTIAL; Future  3. Pleural effusion on right  4. SOB (shortness of breath)  - DX-CHEST-2 VIEWS; Future  - REFERRAL TO CARDIOLOGY  Acute SOB with persistent opacification on the RLL with associated pleural effusion, no PE. Has had no benefit with diuresis and echo was stable from prior. Will plan to treat for CAP as above, no hypoxia on trial of ambulation in the clinic and stable for treatment at home. Close interval f/u next week for repeat exam and possibly repeat imaging.    5. Acute on chronic diastolic heart failure (HCC)  6. Aortic valve insufficiency, etiology of cardiac valve disease unspecified  Continue gentle diuresis with lasix 20mg daily. BP controlled with losartan 50mg daily. Referred to cardiology.  - REFERRAL TO CARDIOLOGY    7. Hyponatremia  Acute on chronic, mild and asymptomatic. Suspect multifactorial with CHF, pneumonia (SIADH) and current diuretic use. Trend next week prior to follow up.  - Basic Metabolic Panel; Future    8. Stage 3b chronic kidney disease (HCC)  Acute on chronic worsening. BP controlled, continue ARB. Trend next week prior to follow up.  - Basic Metabolic Panel; Future    Follow-up:Return in about 1 week (around 1/26/2023), or if symptoms worsen or fail to improve.    Face-to-face transitional care management services with HIGH (today's visit is within days post discharge & LACE+ score 59+) medical decision complexity were provided.     LACE+ Historical Score Over Time (0-28: Low,  29-58: Medium, 59+: High): 74

## 2023-01-12 NOTE — DISCHARGE SUMMARY
Discharge Summary    CHIEF COMPLAINT ON ADMISSION  Chief Complaint   Patient presents with    Shortness of Breath     SOB at rest, worse with exertion, x 1 week. SOB has been worsening over the past three days. Patient reports being unable to walk to the restroom. 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        Reason for Admission  sob     Admission Date  1/8/2023    CODE STATUS  Full    HPI & HOSPITAL COURSE  This is a 82 y.o. female here with HTN, superficial venous thrombosis, hypothyroid who presented with SOB and edema. CXR showed pulm edema and BNP was >70031. CTA chest was neg for PT. She was admitted for diuresis. TTE showed grade II diastolic dysfunction and increased RV pressure, concerned for diastolic heart failure. She improved with diuresis, but then her creatinine began to rise so lasix and losartan were held. Her creatinine did improve. She will discharge and hold off on restarting her lasix and losartan for 2 days then repeat a BMP outpatient after the weekend. I discussed with her to follow up with her PCP next week to monitor her renal function and fluid status for further lasix management.     Therefore, she is discharged in good and stable condition to home with close outpatient follow-up.    The patient met 2-midnight criteria for an inpatient stay at the time of discharge.    Discharge Date  1/11/2023    FOLLOW UP ITEMS POST DISCHARGE  F/u renal function and mild increase while on diuresis  Diastolic heart failure, outpatient management of diuretic    DISCHARGE DIAGNOSES  Principal Problem:    CHF (congestive heart failure), NYHA class I, acute on chronic, combined (HCC) POA: Yes  Active Problems:    Essential hypertension POA: Yes    Acquired hypothyroidism POA: Yes    CKD (chronic kidney disease) stage 3, GFR 30-59 ml/min ([facility]) POA: Yes    Hyponatremia POA: Yes    Elevated troponin POA: Unknown    Leukocytosis POA: Yes    Alkaline phosphatase elevation POA: Unknown    Superficial thrombosis of  leg, right POA: Yes    SOB (shortness of breath) POA: Unknown    Anemia POA: Unknown  Resolved Problems:    * No resolved hospital problems. *      FOLLOW UP  Future Appointments   Date Time Provider Department Center   1/16/2023 11:00 AM Oc Luke M.D. [facility] None   1/19/2023 10:00 AM Luz Vieira D.O. [facility]     Luz Vieira D.O.  [address]  [place] [address]  [phone]    Schedule an appointment as soon as possible for a visit in 1 week(s)  Follow-up after hospital stay      MEDICATIONS ON DISCHARGE     Medication List        CONTINUE taking these medications        Instructions   acetaminophen 500 MG Tabs  Commonly known as: TYLENOL   Take 500-1,000 mg by mouth every 6 hours as needed for Mild Pain.  Dose: 500-1,000 mg     cetirizine 10 MG Tabs  Commonly known as: ZYRTEC   Take 10 mg by mouth 1 time a day as needed for Allergies.  Dose: 10 mg     diazepam 10 MG tablet  Commonly known as: Valium   Take 1 Tablet by mouth every 12 hours as needed (muscle spasm) for up to 180 days.  Dose: 10 mg     diclofenac sodium 1 % Gel  Commonly known as: Voltaren   Apply 4 g topically 4 times a day as needed (32g/day max (do not use with NSAIDS)).  Dose: 4 g     ferrous sulfate 325 (65 Fe) MG tablet   Take 1 Tablet by mouth every day.  Dose: 325 mg     fluticasone 50 MCG/ACT nasal spray  Commonly known as: FLONASE   Administer 1 Spray into each nostril 1 time a day as needed (Allergies).  Dose: 1 Spray     folic acid 1 MG Tabs  Commonly known as: FOLVITE   Take 1 Tablet by mouth every day.  Dose: 1 mg     furosemide 20 MG Tabs  Commonly known as: LASIX   Take 1 Tablet by mouth every day.  Dose: 20 mg     levothyroxine 50 MCG Tabs  Commonly known as: SYNTHROID   Take 1 Tab by mouth Every morning on an empty stomach.  Dose: 50 mcg     losartan 50 MG Tabs  Commonly known as: COZAAR   Take 50 mg by mouth every day.  Dose: 50 mg     metoprolol tartrate 25 MG Tabs  Commonly known as:  LOPRESSOR   Take 25 mg by mouth 2 times a day.  Dose: 25 mg     simvastatin 10 MG Tabs  Commonly known as: ZOCOR   Take 1 Tablet by mouth every evening.  Dose: 10 mg     Xarelto 20 MG Tabs tablet  Generic drug: rivaroxaban   Take 20 mg by mouth with dinner.  Dose: 20 mg              Allergies  No Known Allergies    DIET  No orders of the defined types were placed in this encounter.      ACTIVITY  As tolerated.    CONSULTATIONS  none    PROCEDURES  EC-ECHOCARDIOGRAM COMPLETE W/O CONT   Final Result      CT-CTA CHEST PULMONARY ARTERY W/ RECONS   Final Result         1. No CT evidence of pulmonary embolism.      2. Interlobular septal thickening throughout could relate to pulmonary edema superimposed on prior interstitial lung disease.      3. Small right pleural effusion.      4. Patchy bibasilar opacities could be atelectasis. Superimposed infection cannot be excluded.      5. Mildly prominent mediastinal and hilar lymph nodes, nonspecific.      DX-CHEST-PORTABLE (1 VIEW)   Final Result      1.  Hypoinflation and mild pulmonary edema.   2.  Mild cardiomegaly, increased from prior.   3.  Minimal RIGHT pleural fluid.            LABORATORY  Lab Results   Component Value Date    SODIUM 134 (L) 01/11/2023    POTASSIUM 4.7 01/11/2023    CHLORIDE 99 01/11/2023    CO2 21 01/11/2023    GLUCOSE 91 01/11/2023    BUN 29 (H) 01/11/2023    CREATININE 1.35 01/11/2023        Lab Results   Component Value Date    WBC 12.3 (H) 01/09/2023    HEMOGLOBIN 10.1 (L) 01/09/2023    HEMATOCRIT 31.8 (L) 01/09/2023    PLATELETCT 308 01/09/2023        Total time of the discharge process exceeds 32 minutes.

## 2023-01-16 ENCOUNTER — HOSPITAL ENCOUNTER (OUTPATIENT)
Dept: HEMATOLOGY ONCOLOGY | Facility: MEDICAL CENTER | Age: 83
End: 2023-01-16
Attending: INTERNAL MEDICINE
Payer: MEDICARE

## 2023-01-16 VITALS
HEART RATE: 80 BPM | BODY MASS INDEX: 25.54 KG/M2 | HEIGHT: 62 IN | TEMPERATURE: 96.9 F | RESPIRATION RATE: 18 BRPM | OXYGEN SATURATION: 97 % | DIASTOLIC BLOOD PRESSURE: 60 MMHG | SYSTOLIC BLOOD PRESSURE: 148 MMHG | WEIGHT: 138.78 LBS

## 2023-01-16 DIAGNOSIS — I83.813 VARICOSE VEINS OF BOTH LOWER EXTREMITIES WITH PAIN: ICD-10-CM

## 2023-01-16 DIAGNOSIS — I50.43 CHF (CONGESTIVE HEART FAILURE), NYHA CLASS I, ACUTE ON CHRONIC, COMBINED (HCC): ICD-10-CM

## 2023-01-16 DIAGNOSIS — Z86.718 HISTORY OF DVT (DEEP VEIN THROMBOSIS): ICD-10-CM

## 2023-01-16 PROCEDURE — 99212 OFFICE O/P EST SF 10 MIN: CPT | Performed by: INTERNAL MEDICINE

## 2023-01-16 PROCEDURE — 99203 OFFICE O/P NEW LOW 30 MIN: CPT | Performed by: INTERNAL MEDICINE

## 2023-01-16 ASSESSMENT — FIBROSIS 4 INDEX: FIB4 SCORE: 0.85

## 2023-01-16 ASSESSMENT — PAIN SCALES - GENERAL: PAINLEVEL: NO PAIN

## 2023-01-17 ENCOUNTER — HOSPITAL ENCOUNTER (OUTPATIENT)
Dept: LAB | Facility: MEDICAL CENTER | Age: 83
End: 2023-01-17
Attending: INTERNAL MEDICINE
Payer: MEDICARE

## 2023-01-17 DIAGNOSIS — I50.43 CHF (CONGESTIVE HEART FAILURE), NYHA CLASS I, ACUTE ON CHRONIC, COMBINED (HCC): ICD-10-CM

## 2023-01-17 LAB
ANION GAP SERPL CALC-SCNC: 15 MMOL/L (ref 7–16)
BUN SERPL-MCNC: 30 MG/DL (ref 8–22)
CALCIUM SERPL-MCNC: 9.4 MG/DL (ref 8.5–10.5)
CHLORIDE SERPL-SCNC: 98 MMOL/L (ref 96–112)
CO2 SERPL-SCNC: 19 MMOL/L (ref 20–33)
CREAT SERPL-MCNC: 1.44 MG/DL (ref 0.5–1.4)
GFR SERPLBLD CREATININE-BSD FMLA CKD-EPI: 37 ML/MIN/1.73 M 2
GLUCOSE SERPL-MCNC: 115 MG/DL (ref 65–99)
POTASSIUM SERPL-SCNC: 4.8 MMOL/L (ref 3.6–5.5)
SODIUM SERPL-SCNC: 132 MMOL/L (ref 135–145)

## 2023-01-17 PROCEDURE — 36415 COLL VENOUS BLD VENIPUNCTURE: CPT

## 2023-01-17 PROCEDURE — 80048 BASIC METABOLIC PNL TOTAL CA: CPT

## 2023-01-19 ENCOUNTER — APPOINTMENT (OUTPATIENT)
Dept: RADIOLOGY | Facility: IMAGING CENTER | Age: 83
End: 2023-01-19
Attending: STUDENT IN AN ORGANIZED HEALTH CARE EDUCATION/TRAINING PROGRAM
Payer: MEDICARE

## 2023-01-19 ENCOUNTER — OFFICE VISIT (OUTPATIENT)
Dept: MEDICAL GROUP | Facility: PHYSICIAN GROUP | Age: 83
End: 2023-01-19
Payer: MEDICARE

## 2023-01-19 VITALS
SYSTOLIC BLOOD PRESSURE: 122 MMHG | BODY MASS INDEX: 25.51 KG/M2 | RESPIRATION RATE: 20 BRPM | HEIGHT: 62 IN | OXYGEN SATURATION: 98 % | TEMPERATURE: 97.1 F | HEART RATE: 71 BPM | DIASTOLIC BLOOD PRESSURE: 72 MMHG | WEIGHT: 138.6 LBS

## 2023-01-19 DIAGNOSIS — R06.02 SOB (SHORTNESS OF BREATH): ICD-10-CM

## 2023-01-19 DIAGNOSIS — J90 PLEURAL EFFUSION ON RIGHT: ICD-10-CM

## 2023-01-19 DIAGNOSIS — I50.33 ACUTE ON CHRONIC DIASTOLIC HEART FAILURE (HCC): ICD-10-CM

## 2023-01-19 DIAGNOSIS — I35.1 AORTIC VALVE INSUFFICIENCY, ETIOLOGY OF CARDIAC VALVE DISEASE UNSPECIFIED: ICD-10-CM

## 2023-01-19 DIAGNOSIS — E87.1 HYPONATREMIA: ICD-10-CM

## 2023-01-19 DIAGNOSIS — N18.32 STAGE 3B CHRONIC KIDNEY DISEASE: ICD-10-CM

## 2023-01-19 DIAGNOSIS — J18.9 COMMUNITY ACQUIRED PNEUMONIA OF RIGHT LOWER LOBE OF LUNG: ICD-10-CM

## 2023-01-19 DIAGNOSIS — Z09 HOSPITAL DISCHARGE FOLLOW-UP: Primary | ICD-10-CM

## 2023-01-19 PROCEDURE — 99214 OFFICE O/P EST MOD 30 MIN: CPT | Performed by: STUDENT IN AN ORGANIZED HEALTH CARE EDUCATION/TRAINING PROGRAM

## 2023-01-19 PROCEDURE — 71046 X-RAY EXAM CHEST 2 VIEWS: CPT | Mod: TC | Performed by: RADIOLOGY

## 2023-01-19 RX ORDER — AZITHROMYCIN 250 MG/1
TABLET, FILM COATED ORAL
Qty: 6 TABLET | Refills: 0 | Status: SHIPPED | OUTPATIENT
Start: 2023-01-19 | End: 2023-01-24

## 2023-01-19 RX ORDER — AMOXICILLIN AND CLAVULANATE POTASSIUM 500; 125 MG/1; MG/1
1 TABLET, FILM COATED ORAL EVERY 12 HOURS
Qty: 14 TABLET | Refills: 0 | Status: ON HOLD | OUTPATIENT
Start: 2023-01-19 | End: 2023-01-28

## 2023-01-19 ASSESSMENT — FIBROSIS 4 INDEX: FIB4 SCORE: 0.85

## 2023-01-23 ENCOUNTER — TELEPHONE (OUTPATIENT)
Dept: HEALTH INFORMATION MANAGEMENT | Facility: OTHER | Age: 83
End: 2023-01-23
Payer: MEDICARE

## 2023-01-24 ENCOUNTER — HOSPITAL ENCOUNTER (OUTPATIENT)
Dept: LAB | Facility: MEDICAL CENTER | Age: 83
DRG: 291 | End: 2023-01-24
Attending: STUDENT IN AN ORGANIZED HEALTH CARE EDUCATION/TRAINING PROGRAM
Payer: MEDICARE

## 2023-01-24 DIAGNOSIS — E87.1 HYPONATREMIA: ICD-10-CM

## 2023-01-24 DIAGNOSIS — N18.32 STAGE 3B CHRONIC KIDNEY DISEASE: ICD-10-CM

## 2023-01-24 DIAGNOSIS — J18.9 COMMUNITY ACQUIRED PNEUMONIA OF RIGHT LOWER LOBE OF LUNG: ICD-10-CM

## 2023-01-24 LAB
ANION GAP SERPL CALC-SCNC: 13 MMOL/L (ref 7–16)
BASOPHILS # BLD AUTO: 0.6 % (ref 0–1.8)
BASOPHILS # BLD: 0.08 K/UL (ref 0–0.12)
BUN SERPL-MCNC: 31 MG/DL (ref 8–22)
CALCIUM SERPL-MCNC: 9.3 MG/DL (ref 8.5–10.5)
CHLORIDE SERPL-SCNC: 100 MMOL/L (ref 96–112)
CO2 SERPL-SCNC: 19 MMOL/L (ref 20–33)
CREAT SERPL-MCNC: 1.24 MG/DL (ref 0.5–1.4)
EOSINOPHIL # BLD AUTO: 0.33 K/UL (ref 0–0.51)
EOSINOPHIL NFR BLD: 2.6 % (ref 0–6.9)
ERYTHROCYTE [DISTWIDTH] IN BLOOD BY AUTOMATED COUNT: 55.8 FL (ref 35.9–50)
GFR SERPLBLD CREATININE-BSD FMLA CKD-EPI: 44 ML/MIN/1.73 M 2
GLUCOSE SERPL-MCNC: 109 MG/DL (ref 65–99)
HCT VFR BLD AUTO: 33.3 % (ref 37–47)
HGB BLD-MCNC: 10.1 G/DL (ref 12–16)
IMM GRANULOCYTES # BLD AUTO: 0.16 K/UL (ref 0–0.11)
IMM GRANULOCYTES NFR BLD AUTO: 1.2 % (ref 0–0.9)
LYMPHOCYTES # BLD AUTO: 1 K/UL (ref 1–4.8)
LYMPHOCYTES NFR BLD: 7.8 % (ref 22–41)
MCH RBC QN AUTO: 29.3 PG (ref 27–33)
MCHC RBC AUTO-ENTMCNC: 30.3 G/DL (ref 33.6–35)
MCV RBC AUTO: 96.5 FL (ref 81.4–97.8)
MONOCYTES # BLD AUTO: 0.62 K/UL (ref 0–0.85)
MONOCYTES NFR BLD AUTO: 4.8 % (ref 0–13.4)
NEUTROPHILS # BLD AUTO: 10.63 K/UL (ref 2–7.15)
NEUTROPHILS NFR BLD: 83 % (ref 44–72)
NRBC # BLD AUTO: 0 K/UL
NRBC BLD-RTO: 0 /100 WBC
PLATELET # BLD AUTO: 315 K/UL (ref 164–446)
PMV BLD AUTO: 12.2 FL (ref 9–12.9)
POTASSIUM SERPL-SCNC: 5.1 MMOL/L (ref 3.6–5.5)
RBC # BLD AUTO: 3.45 M/UL (ref 4.2–5.4)
SODIUM SERPL-SCNC: 132 MMOL/L (ref 135–145)
WBC # BLD AUTO: 12.8 K/UL (ref 4.8–10.8)

## 2023-01-24 PROCEDURE — 80048 BASIC METABOLIC PNL TOTAL CA: CPT

## 2023-01-24 PROCEDURE — 85025 COMPLETE CBC W/AUTO DIFF WBC: CPT

## 2023-01-24 PROCEDURE — 36415 COLL VENOUS BLD VENIPUNCTURE: CPT

## 2023-01-26 ENCOUNTER — OFFICE VISIT (OUTPATIENT)
Dept: MEDICAL GROUP | Facility: PHYSICIAN GROUP | Age: 83
End: 2023-01-26
Payer: MEDICARE

## 2023-01-26 ENCOUNTER — APPOINTMENT (OUTPATIENT)
Dept: RADIOLOGY | Facility: MEDICAL CENTER | Age: 83
DRG: 291 | End: 2023-01-26
Attending: EMERGENCY MEDICINE
Payer: MEDICARE

## 2023-01-26 ENCOUNTER — HOSPITAL ENCOUNTER (INPATIENT)
Facility: MEDICAL CENTER | Age: 83
LOS: 2 days | DRG: 291 | End: 2023-01-28
Attending: EMERGENCY MEDICINE | Admitting: INTERNAL MEDICINE
Payer: MEDICARE

## 2023-01-26 VITALS
OXYGEN SATURATION: 97 % | TEMPERATURE: 97.9 F | WEIGHT: 137 LBS | RESPIRATION RATE: 18 BRPM | HEIGHT: 62 IN | DIASTOLIC BLOOD PRESSURE: 40 MMHG | HEART RATE: 81 BPM | BODY MASS INDEX: 25.21 KG/M2 | SYSTOLIC BLOOD PRESSURE: 152 MMHG

## 2023-01-26 DIAGNOSIS — I50.33 ACUTE ON CHRONIC DIASTOLIC CONGESTIVE HEART FAILURE (HCC): ICD-10-CM

## 2023-01-26 DIAGNOSIS — R06.09 DYSPNEA ON EXERTION: ICD-10-CM

## 2023-01-26 DIAGNOSIS — R60.0 EDEMA OF RIGHT LOWER LEG: ICD-10-CM

## 2023-01-26 DIAGNOSIS — E87.1 HYPONATREMIA: ICD-10-CM

## 2023-01-26 DIAGNOSIS — R06.02 SOB (SHORTNESS OF BREATH): ICD-10-CM

## 2023-01-26 DIAGNOSIS — I50.33 ACUTE ON CHRONIC DIASTOLIC HEART FAILURE (HCC): ICD-10-CM

## 2023-01-26 PROBLEM — Z79.01 CHRONIC ANTICOAGULATION: Status: ACTIVE | Noted: 2023-01-26

## 2023-01-26 PROBLEM — E78.5 DYSLIPIDEMIA: Status: ACTIVE | Noted: 2023-01-26

## 2023-01-26 LAB
ALBUMIN SERPL BCP-MCNC: 4.2 G/DL (ref 3.2–4.9)
ALBUMIN/GLOB SERPL: 1.4 G/DL
ALP SERPL-CCNC: 112 U/L (ref 30–99)
ALT SERPL-CCNC: 9 U/L (ref 2–50)
ANION GAP SERPL CALC-SCNC: 13 MMOL/L (ref 7–16)
APPEARANCE UR: CLEAR
AST SERPL-CCNC: 9 U/L (ref 12–45)
BASOPHILS # BLD AUTO: 0.6 % (ref 0–1.8)
BASOPHILS # BLD: 0.08 K/UL (ref 0–0.12)
BILIRUB SERPL-MCNC: 0.4 MG/DL (ref 0.1–1.5)
BILIRUB UR QL STRIP.AUTO: NEGATIVE
BUN SERPL-MCNC: 31 MG/DL (ref 8–22)
CALCIUM ALBUM COR SERPL-MCNC: 9.2 MG/DL (ref 8.5–10.5)
CALCIUM SERPL-MCNC: 9.4 MG/DL (ref 8.5–10.5)
CHLORIDE SERPL-SCNC: 102 MMOL/L (ref 96–112)
CO2 SERPL-SCNC: 19 MMOL/L (ref 20–33)
COLOR UR: YELLOW
CREAT SERPL-MCNC: 1.34 MG/DL (ref 0.5–1.4)
EKG IMPRESSION: NORMAL
EOSINOPHIL # BLD AUTO: 0.34 K/UL (ref 0–0.51)
EOSINOPHIL NFR BLD: 2.4 % (ref 0–6.9)
ERYTHROCYTE [DISTWIDTH] IN BLOOD BY AUTOMATED COUNT: 54.5 FL (ref 35.9–50)
GFR SERPLBLD CREATININE-BSD FMLA CKD-EPI: 40 ML/MIN/1.73 M 2
GLOBULIN SER CALC-MCNC: 2.9 G/DL (ref 1.9–3.5)
GLUCOSE SERPL-MCNC: 127 MG/DL (ref 65–99)
GLUCOSE UR STRIP.AUTO-MCNC: NEGATIVE MG/DL
HCT VFR BLD AUTO: 34.8 % (ref 37–47)
HGB BLD-MCNC: 10.7 G/DL (ref 12–16)
IMM GRANULOCYTES # BLD AUTO: 0.16 K/UL (ref 0–0.11)
IMM GRANULOCYTES NFR BLD AUTO: 1.1 % (ref 0–0.9)
INR PPP: 1.53 (ref 0.87–1.13)
KETONES UR STRIP.AUTO-MCNC: NEGATIVE MG/DL
LACTATE SERPL-SCNC: 0.9 MMOL/L (ref 0.5–2)
LACTATE SERPL-SCNC: 1 MMOL/L (ref 0.5–2)
LACTATE SERPL-SCNC: 1.3 MMOL/L (ref 0.5–2)
LACTATE SERPL-SCNC: 1.3 MMOL/L (ref 0.5–2)
LEUKOCYTE ESTERASE UR QL STRIP.AUTO: NEGATIVE
LYMPHOCYTES # BLD AUTO: 1.19 K/UL (ref 1–4.8)
LYMPHOCYTES NFR BLD: 8.4 % (ref 22–41)
MCH RBC QN AUTO: 28.8 PG (ref 27–33)
MCHC RBC AUTO-ENTMCNC: 30.7 G/DL (ref 33.6–35)
MCV RBC AUTO: 93.5 FL (ref 81.4–97.8)
MICRO URNS: NORMAL
MONOCYTES # BLD AUTO: 0.55 K/UL (ref 0–0.85)
MONOCYTES NFR BLD AUTO: 3.9 % (ref 0–13.4)
NEUTROPHILS # BLD AUTO: 11.77 K/UL (ref 2–7.15)
NEUTROPHILS NFR BLD: 83.6 % (ref 44–72)
NITRITE UR QL STRIP.AUTO: NEGATIVE
NRBC # BLD AUTO: 0 K/UL
NRBC BLD-RTO: 0 /100 WBC
NT-PROBNP SERPL IA-MCNC: ABNORMAL PG/ML (ref 0–125)
PH UR STRIP.AUTO: 6 [PH] (ref 5–8)
PLATELET # BLD AUTO: 336 K/UL (ref 164–446)
PMV BLD AUTO: 11.7 FL (ref 9–12.9)
POTASSIUM SERPL-SCNC: 5.2 MMOL/L (ref 3.6–5.5)
PROCALCITONIN SERPL-MCNC: 0.13 NG/ML
PROT SERPL-MCNC: 7.1 G/DL (ref 6–8.2)
PROT UR QL STRIP: NEGATIVE MG/DL
PROTHROMBIN TIME: 18 SEC (ref 12–14.6)
RBC # BLD AUTO: 3.72 M/UL (ref 4.2–5.4)
RBC UR QL AUTO: NEGATIVE
SODIUM SERPL-SCNC: 134 MMOL/L (ref 135–145)
SP GR UR STRIP.AUTO: 1.01
TROPONIN T SERPL-MCNC: 28 NG/L (ref 6–19)
UROBILINOGEN UR STRIP.AUTO-MCNC: 0.2 MG/DL
WBC # BLD AUTO: 14.1 K/UL (ref 4.8–10.8)

## 2023-01-26 PROCEDURE — 93005 ELECTROCARDIOGRAM TRACING: CPT

## 2023-01-26 PROCEDURE — 85025 COMPLETE CBC W/AUTO DIFF WBC: CPT

## 2023-01-26 PROCEDURE — 770020 HCHG ROOM/CARE - TELE (206)

## 2023-01-26 PROCEDURE — 84484 ASSAY OF TROPONIN QUANT: CPT

## 2023-01-26 PROCEDURE — 87040 BLOOD CULTURE FOR BACTERIA: CPT

## 2023-01-26 PROCEDURE — 99223 1ST HOSP IP/OBS HIGH 75: CPT | Mod: AI | Performed by: INTERNAL MEDICINE

## 2023-01-26 PROCEDURE — 36415 COLL VENOUS BLD VENIPUNCTURE: CPT

## 2023-01-26 PROCEDURE — 93005 ELECTROCARDIOGRAM TRACING: CPT | Performed by: EMERGENCY MEDICINE

## 2023-01-26 PROCEDURE — 81003 URINALYSIS AUTO W/O SCOPE: CPT

## 2023-01-26 PROCEDURE — 700111 HCHG RX REV CODE 636 W/ 250 OVERRIDE (IP): Performed by: EMERGENCY MEDICINE

## 2023-01-26 PROCEDURE — 700102 HCHG RX REV CODE 250 W/ 637 OVERRIDE(OP): Performed by: INTERNAL MEDICINE

## 2023-01-26 PROCEDURE — 84145 PROCALCITONIN (PCT): CPT

## 2023-01-26 PROCEDURE — 85610 PROTHROMBIN TIME: CPT

## 2023-01-26 PROCEDURE — A9270 NON-COVERED ITEM OR SERVICE: HCPCS | Performed by: INTERNAL MEDICINE

## 2023-01-26 PROCEDURE — 71045 X-RAY EXAM CHEST 1 VIEW: CPT

## 2023-01-26 PROCEDURE — 99285 EMERGENCY DEPT VISIT HI MDM: CPT

## 2023-01-26 PROCEDURE — 83605 ASSAY OF LACTIC ACID: CPT

## 2023-01-26 PROCEDURE — 96374 THER/PROPH/DIAG INJ IV PUSH: CPT

## 2023-01-26 PROCEDURE — 99215 OFFICE O/P EST HI 40 MIN: CPT

## 2023-01-26 PROCEDURE — 80053 COMPREHEN METABOLIC PANEL: CPT

## 2023-01-26 PROCEDURE — 83880 ASSAY OF NATRIURETIC PEPTIDE: CPT

## 2023-01-26 RX ORDER — AMOXICILLIN 250 MG
2 CAPSULE ORAL 2 TIMES DAILY
Status: DISCONTINUED | OUTPATIENT
Start: 2023-01-26 | End: 2023-01-26

## 2023-01-26 RX ORDER — BISACODYL 10 MG
10 SUPPOSITORY, RECTAL RECTAL
Status: DISCONTINUED | OUTPATIENT
Start: 2023-01-26 | End: 2023-01-26

## 2023-01-26 RX ORDER — FUROSEMIDE 10 MG/ML
20 INJECTION INTRAMUSCULAR; INTRAVENOUS ONCE
Status: COMPLETED | OUTPATIENT
Start: 2023-01-26 | End: 2023-01-26

## 2023-01-26 RX ORDER — FUROSEMIDE 10 MG/ML
40 INJECTION INTRAMUSCULAR; INTRAVENOUS
Status: DISCONTINUED | OUTPATIENT
Start: 2023-01-26 | End: 2023-01-28 | Stop reason: HOSPADM

## 2023-01-26 RX ORDER — CHOLECALCIFEROL (VITAMIN D3) 125 MCG
5 CAPSULE ORAL NIGHTLY PRN
Status: DISCONTINUED | OUTPATIENT
Start: 2023-01-26 | End: 2023-01-28 | Stop reason: HOSPADM

## 2023-01-26 RX ORDER — LABETALOL HYDROCHLORIDE 5 MG/ML
10 INJECTION, SOLUTION INTRAVENOUS EVERY 4 HOURS PRN
Status: DISCONTINUED | OUTPATIENT
Start: 2023-01-26 | End: 2023-01-28 | Stop reason: HOSPADM

## 2023-01-26 RX ORDER — LEVOTHYROXINE SODIUM 0.05 MG/1
50 TABLET ORAL
Status: DISCONTINUED | OUTPATIENT
Start: 2023-01-27 | End: 2023-01-28 | Stop reason: HOSPADM

## 2023-01-26 RX ORDER — FOLIC ACID 1 MG/1
1 TABLET ORAL DAILY
Status: DISCONTINUED | OUTPATIENT
Start: 2023-01-26 | End: 2023-01-28 | Stop reason: HOSPADM

## 2023-01-26 RX ORDER — LABETALOL HYDROCHLORIDE 5 MG/ML
10 INJECTION, SOLUTION INTRAVENOUS EVERY 4 HOURS PRN
Status: DISCONTINUED | OUTPATIENT
Start: 2023-01-26 | End: 2023-01-26

## 2023-01-26 RX ORDER — SIMVASTATIN 10 MG
10 TABLET ORAL EVERY EVENING
Status: DISCONTINUED | OUTPATIENT
Start: 2023-01-26 | End: 2023-01-28 | Stop reason: HOSPADM

## 2023-01-26 RX ORDER — FERROUS SULFATE 325(65) MG
325 TABLET ORAL DAILY
Status: DISCONTINUED | OUTPATIENT
Start: 2023-01-26 | End: 2023-01-28 | Stop reason: HOSPADM

## 2023-01-26 RX ORDER — POLYETHYLENE GLYCOL 3350 17 G/17G
1 POWDER, FOR SOLUTION ORAL
Status: DISCONTINUED | OUTPATIENT
Start: 2023-01-26 | End: 2023-01-26

## 2023-01-26 RX ORDER — AZITHROMYCIN 250 MG/1
250-500 TABLET, FILM COATED ORAL DAILY
Status: ON HOLD | COMMUNITY
End: 2023-01-28

## 2023-01-26 RX ORDER — ACETAMINOPHEN 325 MG/1
650 TABLET ORAL EVERY 6 HOURS PRN
Status: DISCONTINUED | OUTPATIENT
Start: 2023-01-26 | End: 2023-01-28 | Stop reason: HOSPADM

## 2023-01-26 RX ORDER — LOPERAMIDE HYDROCHLORIDE 2 MG/1
2 CAPSULE ORAL 4 TIMES DAILY PRN
Status: ON HOLD | COMMUNITY
End: 2023-02-11

## 2023-01-26 RX ADMIN — SIMVASTATIN 10 MG: 10 TABLET, FILM COATED ORAL at 17:11

## 2023-01-26 RX ADMIN — RIVAROXABAN 20 MG: 20 TABLET, FILM COATED ORAL at 17:11

## 2023-01-26 RX ADMIN — FOLIC ACID 1 MG: 1 TABLET ORAL at 15:59

## 2023-01-26 RX ADMIN — FUROSEMIDE 20 MG: 10 INJECTION, SOLUTION INTRAMUSCULAR; INTRAVENOUS at 13:59

## 2023-01-26 RX ADMIN — FERROUS SULFATE TAB 325 MG (65 MG ELEMENTAL FE) 325 MG: 325 (65 FE) TAB at 15:59

## 2023-01-26 RX ADMIN — METOPROLOL TARTRATE 25 MG: 25 TABLET, FILM COATED ORAL at 17:10

## 2023-01-26 ASSESSMENT — FIBROSIS 4 INDEX
FIB4 SCORE: 0.83
FIB4 SCORE: .7142857142857142857
FIB4 SCORE: .7142857142857142857
FIB4 SCORE: 0.83
FIB4 SCORE: .7142857142857142857

## 2023-01-26 ASSESSMENT — COGNITIVE AND FUNCTIONAL STATUS - GENERAL
MOBILITY SCORE: 24
DAILY ACTIVITIY SCORE: 24
SUGGESTED CMS G CODE MODIFIER MOBILITY: CH
SUGGESTED CMS G CODE MODIFIER DAILY ACTIVITY: CH

## 2023-01-26 ASSESSMENT — ENCOUNTER SYMPTOMS
EYE PAIN: 0
SHORTNESS OF BREATH: 1
FALLS: 0
WEAKNESS: 0
EYE REDNESS: 0
LOSS OF CONSCIOUSNESS: 0
DIARRHEA: 0
COUGH: 0
HEADACHES: 0
PALPITATIONS: 0
FEVER: 0
NAUSEA: 0
INSOMNIA: 0
HEMOPTYSIS: 0
CONSTIPATION: 0
SHORTNESS OF BREATH: 1
WHEEZING: 0
VOMITING: 0
CHILLS: 0
TREMORS: 0
NERVOUS/ANXIOUS: 0
MYALGIAS: 0
FOCAL WEAKNESS: 0
BLOOD IN STOOL: 0
ABDOMINAL PAIN: 0
SEIZURES: 0
DIZZINESS: 0

## 2023-01-26 ASSESSMENT — LIFESTYLE VARIABLES
DO YOU DRINK ALCOHOL: NO
CONSUMPTION TOTAL: NEGATIVE
HAVE YOU EVER FELT YOU SHOULD CUT DOWN ON YOUR DRINKING: NO
HOW MANY TIMES IN THE PAST YEAR HAVE YOU HAD 5 OR MORE DRINKS IN A DAY: 0
HAVE PEOPLE ANNOYED YOU BY CRITICIZING YOUR DRINKING: NO
EVER FELT BAD OR GUILTY ABOUT YOUR DRINKING: NO
ON A TYPICAL DAY WHEN YOU DRINK ALCOHOL HOW MANY DRINKS DO YOU HAVE: 1
TOTAL SCORE: 0
EVER HAD A DRINK FIRST THING IN THE MORNING TO STEADY YOUR NERVES TO GET RID OF A HANGOVER: NO
TOTAL SCORE: 0
AVERAGE NUMBER OF DAYS PER WEEK YOU HAVE A DRINK CONTAINING ALCOHOL: 7
TOTAL SCORE: 0
DOES PATIENT WANT TO STOP DRINKING: NO
ALCOHOL_USE: YES

## 2023-01-26 ASSESSMENT — PAIN DESCRIPTION - PAIN TYPE: TYPE: ACUTE PAIN

## 2023-01-26 ASSESSMENT — PATIENT HEALTH QUESTIONNAIRE - PHQ9
SUM OF ALL RESPONSES TO PHQ9 QUESTIONS 1 AND 2: 0
2. FEELING DOWN, DEPRESSED, IRRITABLE, OR HOPELESS: NOT AT ALL
1. LITTLE INTEREST OR PLEASURE IN DOING THINGS: NOT AT ALL

## 2023-01-26 NOTE — ASSESSMENT & PLAN NOTE
Vitals:    01/27/23 0733   BP: (!) 126/35   Pulse: 61   Resp: 18   Temp: 37.1 °C (98.8 °F)   SpO2: 95%     Lasix  PRN IV antihypertensive

## 2023-01-26 NOTE — ED PROVIDER NOTES
ER Provider Note    Scribed for Maria Guadalupe Sultana M.D. by Yossi Espinosa. 1/26/2023  12:40 PM    Primary Care Provider: Luz Vieira D.O.    CHIEF COMPLAINT  Chief Complaint   Patient presents with    Shortness of Breath     Pt reports that she was told a week ago by her PCP that she had pneumonia, pt has just completed a course of 2 different antibiotics but is not feeling any better.      EXTERNAL RECORDS REVIEWED  Care everywhere shows the patient had a chest x-ray done on 1/19/23 which showed pleural effusion and consolidation. She has a history of diastolic heart failure. Previous CT was negative for PE.    HPI/ROS  LIMITATION TO HISTORY   Select: : None  OUTSIDE HISTORIAN(S):  Family      Mariely Easley is a 82 y.o. female who presents to the ED complaining of acute, mild shortness of breath onset a week ago. The patient was here at [facility] on January 8th to 11th for CHF. She was given Lasix here but it made her kidney function elevated.  Therefore prior to discharge the Lasix was stopped.  She followed up with her primary care a week ago and had an x-ray done which showed she had pneumonia. She was put on antibiotics for 7 days and followed up with her PCP again today but says there is improvement. She has associated symptoms of lightheadedness and chest pain, but denies fever, cough, vomiting, or diarrhea. No alleviating or exacerbating factors reported. She has been on Xarelto for 3 months secondary to DVT. Denies history of PE or COPD. Denies smoking cigarettes.    PAST MEDICAL HISTORY  Past Medical History:   Diagnosis Date    Abnormal albumin 9/16/2022    Hypermagnesemia 9/16/2022    Hypertension     Pain and swelling of left lower extremity 5/18/2022    Superficial thrombosis of lower extremity, right 7/5/2022       SURGICAL HISTORY  History reviewed. No pertinent surgical history.    FAMILY HISTORY  No family history noted.    SOCIAL HISTORY   reports that she quit smoking about 43 years ago. Her  "smoking use included cigarettes. She has never used smokeless tobacco. She reports current alcohol use of about 8.4 oz per week. She reports that she does not use drugs.    CURRENT MEDICATIONS  Previous Medications    ACETAMINOPHEN (TYLENOL) 500 MG TAB    Take 500-1,000 mg by mouth every 6 hours as needed for Mild Pain.    AMOXICILLIN-CLAVULANATE (AUGMENTIN) 500-125 MG TAB    Take 1 Tablet by mouth every 12 hours for 7 days.    CETIRIZINE (ZYRTEC) 10 MG TAB    Take 10 mg by mouth 1 time a day as needed for Allergies.    DIAZEPAM (VALIUM) 10 MG TABLET    Take 1 Tablet by mouth every 12 hours as needed (muscle spasm) for up to 180 days.    DICLOFENAC SODIUM (VOLTAREN) 1 % GEL    Apply 4 g topically 4 times a day as needed (32g/day max (do not use with NSAIDS)).    FERROUS SULFATE 325 (65 FE) MG TABLET    TAKE 1 TABLET BY MOUTH EVERY DAY    FLUTICASONE (FLONASE) 50 MCG/ACT NASAL SPRAY    Administer 1 Spray into each nostril 1 time a day as needed (Allergies).    FOLIC ACID (FOLVITE) 1 MG TAB    Take 1 Tablet by mouth every day.    FUROSEMIDE (LASIX) 20 MG TAB    Take 1 Tablet by mouth every day.    LEVOTHYROXINE (SYNTHROID) 50 MCG TAB    Take 1 Tab by mouth Every morning on an empty stomach.    LOSARTAN (COZAAR) 50 MG TAB    Take 50 mg by mouth every day.    METOPROLOL TARTRATE (LOPRESSOR) 25 MG TAB    Take 25 mg by mouth 2 times a day.    SIMVASTATIN (ZOCOR) 10 MG TAB    Take 1 Tablet by mouth every evening.    XARELTO 20 MG TAB TABLET    Take 20 mg by mouth with dinner.       ALLERGIES  Patient has no known allergies.    PHYSICAL EXAM  BP (!) 187/74   Pulse 87   Temp 37.1 °C (98.7 °F) (Temporal)   Resp 16   Ht 1.575 m (5' 2\")   Wt 62.7 kg (138 lb 3.7 oz)   SpO2 98%   BMI 25.28 kg/m²   Constitutional: Well developed, No acute distress, Non-toxic appearance.   HENT: Normocephalic, Atraumatic, Bilateral external ears normal, Nose normal.   Eyes: PERRL, EOMI, Conjunctiva normal.   Neck: Normal range of motion, No " tenderness, Supple. No stridor.    Cardiovascular: Normal heart rate, Normal rhythm.   Thorax & Lungs: Normal breath sounds, No respiratory distress. No wheezing, No chest tenderness.  Abdomen: Benign abdominal exam, no tenderness, no distention, no guarding, no rebound.   Skin: Warm, Dry, No erythema, No rash.   Back: No tenderness, No CVA tenderness.   Extremities: Intact distal pulses, 2+ edema bilaterally, No tenderness   Neurologic: Alert & oriented x 3, Normal motor function, Normal sensory function, No focal deficits noted.   Psychiatric: Appropriate             DIAGNOSTIC STUDIES    Labs:   Results for orders placed or performed during the hospital encounter of 01/26/23   CBC with Differential   Result Value Ref Range    WBC 14.1 (H) 4.8 - 10.8 K/uL    RBC 3.72 (L) 4.20 - 5.40 M/uL    Hemoglobin 10.7 (L) 12.0 - 16.0 g/dL    Hematocrit 34.8 (L) 37.0 - 47.0 %    MCV 93.5 81.4 - 97.8 fL    MCH 28.8 27.0 - 33.0 pg    MCHC 30.7 (L) 33.6 - 35.0 g/dL    RDW 54.5 (H) 35.9 - 50.0 fL    Platelet Count 336 164 - 446 K/uL    MPV 11.7 9.0 - 12.9 fL    Neutrophils-Polys 83.60 (H) 44.00 - 72.00 %    Lymphocytes 8.40 (L) 22.00 - 41.00 %    Monocytes 3.90 0.00 - 13.40 %    Eosinophils 2.40 0.00 - 6.90 %    Basophils 0.60 0.00 - 1.80 %    Immature Granulocytes 1.10 (H) 0.00 - 0.90 %    Nucleated RBC 0.00 /100 WBC    Neutrophils (Absolute) 11.77 (H) 2.00 - 7.15 K/uL    Lymphs (Absolute) 1.19 1.00 - 4.80 K/uL    Monos (Absolute) 0.55 0.00 - 0.85 K/uL    Eos (Absolute) 0.34 0.00 - 0.51 K/uL    Baso (Absolute) 0.08 0.00 - 0.12 K/uL    Immature Granulocytes (abs) 0.16 (H) 0.00 - 0.11 K/uL    NRBC (Absolute) 0.00 K/uL   Comp Metabolic Panel   Result Value Ref Range    Sodium 134 (L) 135 - 145 mmol/L    Potassium 5.2 3.6 - 5.5 mmol/L    Chloride 102 96 - 112 mmol/L    Co2 19 (L) 20 - 33 mmol/L    Anion Gap 13.0 7.0 - 16.0    Glucose 127 (H) 65 - 99 mg/dL    Bun 31 (H) 8 - 22 mg/dL    Creatinine 1.34 0.50 - 1.40 mg/dL    Calcium 9.4  8.5 - 10.5 mg/dL    AST(SGOT) 9 (L) 12 - 45 U/L    ALT(SGPT) 9 2 - 50 U/L    Alkaline Phosphatase 112 (H) 30 - 99 U/L    Total Bilirubin 0.4 0.1 - 1.5 mg/dL    Albumin 4.2 3.2 - 4.9 g/dL    Total Protein 7.1 6.0 - 8.2 g/dL    Globulin 2.9 1.9 - 3.5 g/dL    A-G Ratio 1.4 g/dL   ESTIMATED GFR   Result Value Ref Range    GFR (CKD-EPI) 40 (A) >60 mL/min/1.73 m 2   CORRECTED CALCIUM   Result Value Ref Range    Correct Calcium 9.2 8.5 - 10.5 mg/dL   Lactic Acid   Result Value Ref Range    Lactic Acid 1.3 0.5 - 2.0 mmol/L   proBrain Natriuretic Peptide, NT   Result Value Ref Range    NT-proBNP 68360 (H) 0 - 125 pg/mL   TROPONIN   Result Value Ref Range    Troponin T 28 (H) 6 - 19 ng/L   EKG   Result Value Ref Range    Report       [facility] Emergency Dept.    Test Date:  2023  Pt Name:    JONES BERMUDEZ                Department: ER  MRN:        [id number]                      Room:  Gender:     Female                       Technician: [id number]  :        [id number]                   Requested By:ER TRIAGE PROTOCOL  Order #:    [id number]                    Reading MD: Maria Guadalupe Puente MD    Measurements  Intervals                                Axis  Rate:       77                           P:          59  OK:         185                          QRS:        72  QRSD:       86                           T:          30  QT:         395  QTc:        448    Interpretive Statements  Sinus rhythm  Compared to ECG 2023 17:34:52  Sinus tachycardia no longer present  ST (T wave) deviation no longer present  Electronically Signed On 2023 13:51:01 PST by Maria Guadalupe Puente MD       Radiology:   The attending emergency physician has independently interpreted the diagnostic imaging associated with this visit and am waiting the final reading from the radiologist.   DX-CHEST-PORTABLE (1 VIEW)   Final Result      Stable cardiomegaly and mild pulmonary edema with small right pleural effusion.         COURSE  & MEDICAL DECISION MAKING     ED Observation Status? Yes; I am placing the patient in to an observation status due to a diagnostic uncertainty as well as therapeutic intensity. Patient placed in observation status at 12:48 PM, 1/26/2023.     Observation plan is as follows: Evaluation of O2 saturation, evaluation of breathing issues with ambulation and laboratory studies to evaluate inpatient versus outpatient management.    Upon Reevaluation, the patient's condition has: not improved; and will be escalated to hospitalization.    Patient discharged from ED Observation status at 1:56 PM, 1/26/2023.     INITIAL ASSESSMENT AND PLAN    12:40 PM - Patient seen and examined at bedside. Discussed plan of care, including labs and radiology. Patient agrees to the plan of care. Ordered for DX-Chest, Lactic Acid x2, Blood Culture x2, Troponin, BNP, CBC w/ Diff, CMP, and EKG to evaluate her symptoms. Differential diagnoses include, but is not limited to: CHF vs. Pneumonia vs. PE    1:50 PM - The patient was medicated with Lasix 20 mg for her symptoms.    1:56 PM - Patient was reevaluated at bedside. Discussed plan of care, including admission to the hospital. Patient agrees to plan of care.    2:17 PM - Paged Hospitalist    2:28 PM - I discussed the patient's case and the above findings with Dr. Garg (Hospitalist) who will assess the patient for hospitalization.     Care Narrative: Patient presents to the emergency department with with shortness of breath.  History of CHF in the past.  Also history of recent possible pneumonia just finished course of antibiotics.  Patient is comfortable while sitting but has quite significant shortness of breath while ambulating.  Exam does suggest fluid overload.  She is not febrile here.  She denies fever productive cough therefore I think persistent pneumonia is unlikely.  Recently admitted for the same and had CT of the chest with no evidence of PE and patient is on anticoagulation.   Therefore I think repeat CT is not necessary at this time.  Laboratory studies returned and suggest fluid overload.  Chest x-ray shows pulmonary edema with a small right pleural effusion.  Creatinine is slightly elevated.  She has some mild electrolyte abnormalities.  I have given her Lasix.  I think hospitalization is indicated as she is quite sensitive to Lasix in light of her renal function and I think will take some titrating to get her adequately diuresed without going back into renal failure.    ADDITIONAL PROBLEM LIST AND DISPOSITION    Problem#1 renal insuffiency  Problem#2 anemia  I have discussed management of the patient with the following physicians and SATURNINO's:  Dr. Garg (Hospitalist)    Discussion of management with other Q or appropriate source(s): None     Escalation of care considered, and ultimately not performed: None.    Barriers to care at this time, including but not limited to:  None .     Decision tools and prescription drugs considered including, but not limited to: D-dimer Recent hospitalization with CTA negative for PE so no further PE evaluation done on this visit .    FINAL DIAGNOSIS  1. Dyspnea on exertion    2. Acute on chronic diastolic congestive heart failure (HCC)       Yossi MARIE (Domenica), am scribing for, and in the presence of, Maria Guadalupe Sultana M.D..    Electronically signed by: Yossi Espinosa (Domenica), 1/26/2023    IMaria Guadalupe M.D. personally performed the services described in this documentation, as scribed by Yossi Espinosa in my presence, and it is both accurate and complete.     The note accurately reflects work and decisions made by me.  Maria Guadalupe Sultana M.D.  1/26/2023  8:24 PM

## 2023-01-26 NOTE — PROGRESS NOTES
"Subjective:     CC: Pt presents today after being on antibiotics for pneumonia, as prescribed by Dr. Vieira. She reports she feels horribly, is short of breath, and does not feel any better. After reviewing her labs, and Blood pressure, I recommend she go to the ER for further evaluation, and possible IV antibiotics. Her Daughter is with her and will bring her there now.     HPI:   Mariely presents today with    Problem   Acute On Chronic Diastolic Heart Failure (Hcc)   Sob (Shortness of Breath)   Hyponatremia     ROS:  Review of Systems   Constitutional:  Positive for malaise/fatigue.   Respiratory:  Positive for shortness of breath.      Objective:     Exam:  BP (!) 152/40 (BP Location: Left arm, Patient Position: Sitting, BP Cuff Size: Small adult)   Pulse 81   Temp 36.6 °C (97.9 °F) (Temporal)   Resp 18   Ht 1.575 m (5' 2\")   Wt 62.1 kg (137 lb)   SpO2 97%   BMI 25.06 kg/m²  Body mass index is 25.06 kg/m².    Physical Exam  Constitutional:       General: She is in acute distress.      Appearance: She is ill-appearing.   Pulmonary:      Effort: Respiratory distress present.       Labs:    Latest Reference Range & Units 01/24/23 12:14   WBC 4.8 - 10.8 K/uL 12.8 (H)   RBC 4.20 - 5.40 M/uL 3.45 (L)   Hemoglobin 12.0 - 16.0 g/dL 10.1 (L)   Hematocrit 37.0 - 47.0 % 33.3 (L)   MCV 81.4 - 97.8 fL 96.5   MCH 27.0 - 33.0 pg 29.3   MCHC 33.6 - 35.0 g/dL 30.3 (L)   RDW 35.9 - 50.0 fL 55.8 (H)   Platelet Count 164 - 446 K/uL 315   MPV 9.0 - 12.9 fL 12.2   Neutrophils-Polys 44.00 - 72.00 % 83.00 (H)   Neutrophils (Absolute) 2.00 - 7.15 K/uL 10.63 (H)   Lymphocytes 22.00 - 41.00 % 7.80 (L)   Lymphs (Absolute) 1.00 - 4.80 K/uL 1.00   Monocytes 0.00 - 13.40 % 4.80   Monos (Absolute) 0.00 - 0.85 K/uL 0.62   Eosinophils 0.00 - 6.90 % 2.60   Eos (Absolute) 0.00 - 0.51 K/uL 0.33   Basophils 0.00 - 1.80 % 0.60   Baso (Absolute) 0.00 - 0.12 K/uL 0.08   Immature Granulocytes 0.00 - 0.90 % 1.20 (H)   Immature Granulocytes (abs) 0.00 " - 0.11 K/uL 0.16 (H)   Nucleated RBC /100 WBC 0.00   NRBC (Absolute) K/uL 0.00   Sodium 135 - 145 mmol/L 132 (L)   Potassium 3.6 - 5.5 mmol/L 5.1   Chloride 96 - 112 mmol/L 100   Co2 20 - 33 mmol/L 19 (L)   Anion Gap 7.0 - 16.0  13.0   Glucose 65 - 99 mg/dL 109 (H)   Bun 8 - 22 mg/dL 31 (H)   Creatinine 0.50 - 1.40 mg/dL 1.24   GFR (CKD-EPI) >60 mL/min/1.73 m 2 44 !   Calcium 8.5 - 10.5 mg/dL 9.3   (H): Data is abnormally high  (L): Data is abnormally low  !: Data is abnormal    Assessment & Plan:     82 y.o. female with the following -     Problem List Items Addressed This Visit       Hyponatremia     No improvement         Acute on chronic diastolic heart failure (HCC)     Chronic, unstable.  Patient appears pale, is short of breath, reports she feels poorly.  Recommend emergency room for further evaluation and treatment.  Previous referral for cardiology has been authorized.    [facility]  [address], [address]  [place] [address]  Phone: [phone]         SOB (shortness of breath)     CAP, with Augmentin, azithromycin antibiotics completed as ordered, with no improvement in SOB.          I have placed the below orders and discussed them with an approved delegating provider.  The MA is performing the below orders under the direction of Dr. Alvarez.    I spent a total of 14 minutes with record review, exam, communication with the patient, communication with other providers, and documentation of this encounter.    Return if symptoms worsen or fail to improve.    Please note that this dictation was created using voice recognition software. I have made every reasonable attempt to correct obvious errors, but I expect that there are errors of grammar and possibly content that I did not discover before finalizing the note.

## 2023-01-26 NOTE — ASSESSMENT & PLAN NOTE
Chronic, unstable.  Patient appears pale, is short of breath, reports she feels poorly.  Recommend emergency room for further evaluation and treatment.  Previous referral for cardiology has been authorized.    [facility]  [address] [place] [address]  [place] [address]  Phone: [phone]

## 2023-01-26 NOTE — ASSESSMENT & PLAN NOTE
COmpleted Abx for PNA    No clear indication for continuation of antibiotics at this time    Elevated white cell count of 13 but normal procalcitonin level    Normal UA  Blood cultures in process.

## 2023-01-26 NOTE — H&P
Hospital Medicine History & Physical Note    Date of Service  1/26/2023    Primary Care Physician  Luz Vieira D.O.    Consultants      Code Status  Prior    Chief Complaint  Chief Complaint   Patient presents with    Shortness of Breath     Pt reports that she was told a week ago by her PCP that she had pneumonia, pt has just completed a course of 2 different antibiotics but is not feeling any better.        History of Presenting Illness  Mariely Easley is a 82 y.o. female who presented 1/26/2023 with Shortness of Breath (Pt reports that she was told a week ago by her PCP that she had pneumonia, pt has just completed a course of 2 different antibiotics but is not feeling any better. )  She has a history of hypothyroidism, hypertension, diastolic CHF scheduled to see Dr. Resendez, and was recently hospitalized for pneumonia with which she completed antibiotics. She also had acute kidney injury due to overdiuresis and thus her Lasix was held.SHe has been dyspneic on exertion but it was a little more severe today. Of note she does not have cough or fever.  At the ED, she is afebrile , hypertensive  CXR on my review shows pulmonary vascular congestion, small pulmonary effusion.  Cr- at 1.32, K 5.2  When I saw her at ED, no acute distress, not hypoxic. Daughter at bedside.  I discussed the plan of care with patient, family, and bedside RN.    Review of Systems  Review of Systems   Constitutional:  Negative for chills and fever.   HENT:  Negative for congestion, hearing loss and nosebleeds.    Eyes:  Negative for pain and redness.   Respiratory:  Positive for shortness of breath. Negative for cough, hemoptysis and wheezing.    Cardiovascular:  Negative for chest pain and palpitations.   Gastrointestinal:  Negative for abdominal pain, blood in stool, constipation, diarrhea, nausea and vomiting.   Genitourinary:  Negative for dysuria, frequency and hematuria.   Musculoskeletal:  Negative for falls, joint pain and  myalgias.   Skin:  Negative for rash.   Neurological:  Negative for dizziness, tremors, focal weakness, seizures, loss of consciousness, weakness and headaches.   Psychiatric/Behavioral:  The patient is not nervous/anxious and does not have insomnia.    All other systems reviewed and are negative.    Past Medical History   has a past medical history of Abnormal albumin (9/16/2022), Hypermagnesemia (9/16/2022), Hypertension, Pain and swelling of left lower extremity (5/18/2022), and Superficial thrombosis of lower extremity, right (7/5/2022).    Surgical History   has no past surgical history on file.     Family History  family history is not on file.   Family history reviewed with patient. There is no family history that is pertinent to the chief complaint.     Social History   reports that she quit smoking about 43 years ago. Her smoking use included cigarettes. She has never used smokeless tobacco. She reports current alcohol use of about 8.4 oz per week. She reports that she does not use drugs.    Allergies  No Known Allergies    Medications  Prior to Admission Medications   Prescriptions Last Dose Informant Patient Reported? Taking?   XARELTO 20 MG Tab tablet  Patient, Rx Bottle (For Med Information) Yes No   Sig: Take 20 mg by mouth with dinner.   acetaminophen (TYLENOL) 500 MG Tab  Patient Yes No   Sig: Take 500-1,000 mg by mouth every 6 hours as needed for Mild Pain.   amoxicillin-clavulanate (AUGMENTIN) 500-125 MG Tab   No No   Sig: Take 1 Tablet by mouth every 12 hours for 7 days.   Patient not taking: Reported on 1/26/2023   cetirizine (ZYRTEC) 10 MG Tab  Patient, Rx Bottle (For Med Information) Yes No   Sig: Take 10 mg by mouth 1 time a day as needed for Allergies.   diazepam (VALIUM) 10 MG tablet  Patient No No   Sig: Take 1 Tablet by mouth every 12 hours as needed (muscle spasm) for up to 180 days.   diclofenac sodium (VOLTAREN) 1 % Gel  Patient No No   Sig: Apply 4 g topically 4 times a day as needed  (32g/day max (do not use with NSAIDS)).   ferrous sulfate 325 (65 Fe) MG tablet   No No   Sig: TAKE 1 TABLET BY MOUTH EVERY DAY   fluticasone (FLONASE) 50 MCG/ACT nasal spray  Patient Yes No   Sig: Administer 1 Spray into each nostril 1 time a day as needed (Allergies).   folic acid (FOLVITE) 1 MG Tab  Patient, Rx Bottle (For Med Information) No No   Sig: Take 1 Tablet by mouth every day.   furosemide (LASIX) 20 MG Tab  Patient, Rx Bottle (For Med Information) No No   Sig: Take 1 Tablet by mouth every day.   levothyroxine (SYNTHROID) 50 MCG Tab  Patient, Rx Bottle (For Med Information) No No   Sig: Take 1 Tab by mouth Every morning on an empty stomach.   losartan (COZAAR) 50 MG Tab  Patient, Rx Bottle (For Med Information) Yes No   Sig: Take 50 mg by mouth every day.   metoprolol tartrate (LOPRESSOR) 25 MG Tab  Patient, Rx Bottle (For Med Information) Yes No   Sig: Take 25 mg by mouth 2 times a day.   simvastatin (ZOCOR) 10 MG Tab  Patient, Rx Bottle (For Med Information) No No   Sig: Take 1 Tablet by mouth every evening.      Facility-Administered Medications: None       Physical Exam  Temp:  [36.6 °C (97.9 °F)-37.1 °C (98.7 °F)] 37.1 °C (98.7 °F)  Pulse:  [64-87] 64  Resp:  [16-18] 16  BP: (146-187)/(40-74) 166/70  SpO2:  [96 %-98 %] 96 %  Blood Pressure : (!) 166/70   Temperature: 37.1 °C (98.7 °F)   Pulse: 64   Respiration: 16   Pulse Oximetry: 96 %       Physical Exam  Vitals and nursing note reviewed.   Constitutional:       General: She is not in acute distress.     Comments: Elderly   HENT:      Head: Normocephalic and atraumatic.      Right Ear: External ear normal.      Left Ear: External ear normal.      Nose: Nose normal.      Mouth/Throat:      Mouth: Mucous membranes are moist.   Eyes:      General: No scleral icterus.     Conjunctiva/sclera: Conjunctivae normal.   Cardiovascular:      Rate and Rhythm: Normal rate and regular rhythm.      Pulses: Normal pulses.      Heart sounds: Murmur heard.     No  friction rub. No gallop.   Pulmonary:      Effort: Pulmonary effort is normal. No respiratory distress.      Breath sounds: No stridor. Rales (Bibasilar crackles) present. No wheezing or rhonchi.   Chest:      Chest wall: No tenderness.   Abdominal:      General: Abdomen is flat. Bowel sounds are normal. There is no distension.      Palpations: Abdomen is soft.      Tenderness: There is no abdominal tenderness. There is no guarding or rebound.   Musculoskeletal:         General: No swelling, tenderness or deformity. Normal range of motion.      Cervical back: Normal range of motion and neck supple. No rigidity.   Skin:     General: Skin is warm.      Coloration: Skin is not jaundiced.   Neurological:      General: No focal deficit present.      Mental Status: She is alert and oriented to person, place, and time. Mental status is at baseline.   Psychiatric:         Mood and Affect: Mood normal.         Behavior: Behavior normal.         Thought Content: Thought content normal.         Judgment: Judgment normal.       Laboratory:  Recent Labs     01/24/23  1214 01/26/23  1109   WBC 12.8* 14.1*   RBC 3.45* 3.72*   HEMOGLOBIN 10.1* 10.7*   HEMATOCRIT 33.3* 34.8*   MCV 96.5 93.5   MCH 29.3 28.8   MCHC 30.3* 30.7*   RDW 55.8* 54.5*   PLATELETCT 315 336   MPV 12.2 11.7     Recent Labs     01/24/23  1214 01/26/23  1109   SODIUM 132* 134*   POTASSIUM 5.1 5.2   CHLORIDE 100 102   CO2 19* 19*   GLUCOSE 109* 127*   BUN 31* 31*   CREATININE 1.24 1.34   CALCIUM 9.3 9.4     Recent Labs     01/24/23  1214 01/26/23  1109   ALTSGPT  --  9   ASTSGOT  --  9*   ALKPHOSPHAT  --  112*   TBILIRUBIN  --  0.4   GLUCOSE 109* 127*         Recent Labs     01/26/23  1109   NTPROBNP 88559*         Recent Labs     01/26/23  1109   TROPONINT 28*       Imaging:  DX-CHEST-PORTABLE (1 VIEW)   Final Result      Stable cardiomegaly and mild pulmonary edema with small right pleural effusion.          Assessment/Plan:      * Acute on chronic heart failure  with preserved ejection fraction (HFpEF), SVT on anticoagulation?, leukocytosis (HCC)  Assessment & Plan  Admit to INPATIENT telemetry. Will need more than 2 midnights for diuresis, monitoring kidney function, K+  IV Lasix and fluid restriction  Continue BB  Hold losartan for now given Cr- 1.32 and K 5.2.    Chronic anticoagulation  Assessment & Plan  For Superficial VTE  COntinue for now    Dyslipidemia  Assessment & Plan  COntinue statin    Leukocytosis- (present on admission)  Assessment & Plan  COmpleted Abx for PNA  Currently no cough or fever  CXR reviewed no obvious consolidation  Ordered procalcitonin, blood cultures and urinalysis. Pls follow.    Hyponatremia- (present on admission)  Assessment & Plan  MIld    CKD (chronic kidney disease) stage 3, GFR 30-59 ml/min ([facility])- (present on admission)  Assessment & Plan  Noted  Monitor Cr- in the setting of diuresis    Acquired hypothyroidism- (present on admission)  Assessment & Plan  COntinue Synthroid    Essential hypertension- (present on admission)  Assessment & Plan  Vitals:    01/26/23 1330   BP: (!) 166/70   Pulse: 64   Resp:    Temp:    SpO2: 96%     Lasix  PRN IV antihypertensive        VTE prophylaxis: therapeutic anticoagulation with Xarelto

## 2023-01-26 NOTE — ED NOTES
Med Rec complete per patient  Allergies reviewed  Preferred Pharmacy: [facility] on [address]     Patient recently completed a 7 day course of Augmentin and a Z-debi

## 2023-01-26 NOTE — ED TRIAGE NOTES
"Chief Complaint   Patient presents with    Shortness of Breath     Pt reports that she was told a week ago by her PCP that she had pneumonia, pt has just completed a course of 2 different antibiotics but is not feeling any better.      BP (!) 147/44   Pulse 87   Temp 37.1 °C (98.7 °F) (Temporal)   Resp 16   Ht 1.575 m (5' 2\")   Wt 62.7 kg (138 lb 3.7 oz)   SpO2 98%   BMI 25.28 kg/m²     Pt is ambulatory in and out of triage. Appropriate PPE worn throughout entire encounter. Pt placed back in the lobby and educated about triage process.    "

## 2023-01-27 LAB
ANION GAP SERPL CALC-SCNC: 15 MMOL/L (ref 7–16)
BUN SERPL-MCNC: 32 MG/DL (ref 8–22)
CALCIUM SERPL-MCNC: 8.8 MG/DL (ref 8.5–10.5)
CHLORIDE SERPL-SCNC: 100 MMOL/L (ref 96–112)
CO2 SERPL-SCNC: 17 MMOL/L (ref 20–33)
CREAT SERPL-MCNC: 1.23 MG/DL (ref 0.5–1.4)
ERYTHROCYTE [DISTWIDTH] IN BLOOD BY AUTOMATED COUNT: 56.4 FL (ref 35.9–50)
GFR SERPLBLD CREATININE-BSD FMLA CKD-EPI: 44 ML/MIN/1.73 M 2
GLUCOSE SERPL-MCNC: 95 MG/DL (ref 65–99)
HCT VFR BLD AUTO: 30.3 % (ref 37–47)
HGB BLD-MCNC: 9.2 G/DL (ref 12–16)
LACTATE SERPL-SCNC: 0.9 MMOL/L (ref 0.5–2)
MCH RBC QN AUTO: 29.2 PG (ref 27–33)
MCHC RBC AUTO-ENTMCNC: 30.4 G/DL (ref 33.6–35)
MCV RBC AUTO: 96.2 FL (ref 81.4–97.8)
PLATELET # BLD AUTO: 284 K/UL (ref 164–446)
PMV BLD AUTO: 11.8 FL (ref 9–12.9)
POTASSIUM SERPL-SCNC: 4.6 MMOL/L (ref 3.6–5.5)
RBC # BLD AUTO: 3.15 M/UL (ref 4.2–5.4)
SODIUM SERPL-SCNC: 132 MMOL/L (ref 135–145)
WBC # BLD AUTO: 13.1 K/UL (ref 4.8–10.8)

## 2023-01-27 PROCEDURE — 700111 HCHG RX REV CODE 636 W/ 250 OVERRIDE (IP): Performed by: INTERNAL MEDICINE

## 2023-01-27 PROCEDURE — 770020 HCHG ROOM/CARE - TELE (206)

## 2023-01-27 PROCEDURE — 80048 BASIC METABOLIC PNL TOTAL CA: CPT

## 2023-01-27 PROCEDURE — 700102 HCHG RX REV CODE 250 W/ 637 OVERRIDE(OP): Performed by: INTERNAL MEDICINE

## 2023-01-27 PROCEDURE — 85027 COMPLETE CBC AUTOMATED: CPT

## 2023-01-27 PROCEDURE — 83605 ASSAY OF LACTIC ACID: CPT

## 2023-01-27 PROCEDURE — 700102 HCHG RX REV CODE 250 W/ 637 OVERRIDE(OP)

## 2023-01-27 PROCEDURE — 36415 COLL VENOUS BLD VENIPUNCTURE: CPT

## 2023-01-27 PROCEDURE — A9270 NON-COVERED ITEM OR SERVICE: HCPCS

## 2023-01-27 PROCEDURE — A9270 NON-COVERED ITEM OR SERVICE: HCPCS | Performed by: INTERNAL MEDICINE

## 2023-01-27 PROCEDURE — 99232 SBSQ HOSP IP/OBS MODERATE 35: CPT | Performed by: HOSPITALIST

## 2023-01-27 RX ADMIN — Medication 5 MG: at 22:38

## 2023-01-27 RX ADMIN — RIVAROXABAN 20 MG: 20 TABLET, FILM COATED ORAL at 17:35

## 2023-01-27 RX ADMIN — FUROSEMIDE 40 MG: 10 INJECTION, SOLUTION INTRAMUSCULAR; INTRAVENOUS at 05:26

## 2023-01-27 RX ADMIN — ACETAMINOPHEN 650 MG: 325 TABLET, FILM COATED ORAL at 20:47

## 2023-01-27 RX ADMIN — METOPROLOL TARTRATE 25 MG: 25 TABLET, FILM COATED ORAL at 05:25

## 2023-01-27 RX ADMIN — Medication 5 MG: at 00:35

## 2023-01-27 RX ADMIN — METOPROLOL TARTRATE 25 MG: 25 TABLET, FILM COATED ORAL at 17:35

## 2023-01-27 RX ADMIN — FUROSEMIDE 40 MG: 10 INJECTION, SOLUTION INTRAMUSCULAR; INTRAVENOUS at 16:22

## 2023-01-27 RX ADMIN — FERROUS SULFATE TAB 325 MG (65 MG ELEMENTAL FE) 325 MG: 325 (65 FE) TAB at 05:25

## 2023-01-27 RX ADMIN — LEVOTHYROXINE SODIUM 50 MCG: 0.05 TABLET ORAL at 05:25

## 2023-01-27 RX ADMIN — FOLIC ACID 1 MG: 1 TABLET ORAL at 05:25

## 2023-01-27 RX ADMIN — SIMVASTATIN 10 MG: 10 TABLET, FILM COATED ORAL at 17:35

## 2023-01-27 ASSESSMENT — ENCOUNTER SYMPTOMS
HALLUCINATIONS: 0
ABDOMINAL PAIN: 0
TINGLING: 0
PHOTOPHOBIA: 0
MYALGIAS: 0
HEARTBURN: 0
DEPRESSION: 0
DIZZINESS: 0
FEVER: 0
NAUSEA: 0
VOMITING: 0
NECK PAIN: 0
BLURRED VISION: 0
EYE PAIN: 0
TREMORS: 0
SHORTNESS OF BREATH: 1
CHILLS: 0
HEADACHES: 0
SENSORY CHANGE: 0

## 2023-01-27 ASSESSMENT — PAIN DESCRIPTION - PAIN TYPE: TYPE: ACUTE PAIN

## 2023-01-27 ASSESSMENT — FIBROSIS 4 INDEX: FIB4 SCORE: 0.85

## 2023-01-27 NOTE — PROGRESS NOTES
Hospital Medicine Daily Progress Note    Date of Service  1/27/2023    Chief Complaint  Mariely Easley is a 82 y.o. female admitted 1/26/2023 with dyspnea    Hospital Course  Mariely Easley is a 82 y.o. female who presented 1/26/2023 with Shortness of Breath (Pt reports that she was told a week ago by her PCP that she had pneumonia, pt has just completed a course of 2 different antibiotics but is not feeling any better. )  She has a history of hypothyroidism, hypertension, diastolic CHF scheduled to see Dr. Resendez, and was recently hospitalized for pneumonia with which she completed antibiotics. She also had acute kidney injury due to overdiuresis and thus her Lasix was held.SHe has been dyspneic on exertion but it was a little more severe today. Of note she does not have cough or fever.  At the ED, she is afebrile , hypertensive  CXR on my review shows pulmonary vascular congestion, small pulmonary effusion.  Cr- at 1.32, K 5.2      Interval Problem Update  Patient states breathing is about the same    No chest pain    No nausea vomiting fever or chills    Patient receiving IV diuresis with Lasix    Reviewed CT chest done on 1/8/2023 did not show any evidence of pulm emboli    Check a.m. CBC BMP,bnp      Patient had a recent echocardiogram that showed grade 2 diastolic dysfunction with a EF of 65%      I have discussed this patient's plan of care and discharge plan at IDT rounds today with Case Management, Nursing, Nursing leadership, and other members of the IDT team.    Consultants/Specialty      Code Status  Full Code    Disposition  Patient is not medically cleared for discharge.   Anticipate discharge to to home with close outpatient follow-up.  I have placed the appropriate orders for post-discharge needs.    Review of Systems  Review of Systems   Constitutional:  Positive for malaise/fatigue. Negative for chills and fever.   HENT:  Negative for ear discharge, hearing loss, nosebleeds and tinnitus.     Eyes:  Negative for blurred vision, photophobia and pain.   Respiratory:  Positive for shortness of breath.    Cardiovascular:  Positive for leg swelling.   Gastrointestinal:  Negative for abdominal pain, heartburn, nausea and vomiting.   Genitourinary:  Negative for dysuria, frequency and urgency.   Musculoskeletal:  Negative for myalgias and neck pain.   Neurological:  Negative for dizziness, tingling, tremors, sensory change and headaches.   Psychiatric/Behavioral:  Negative for depression, hallucinations and suicidal ideas.       Physical Exam  Temp:  [36.6 °C (97.9 °F)-37.2 °C (99 °F)] 37.1 °C (98.8 °F)  Pulse:  [61-88] 61  Resp:  [14-20] 18  BP: (122-187)/(35-74) 126/35  SpO2:  [94 %-98 %] 95 %    Physical Exam  Constitutional:       General: She is not in acute distress.     Appearance: She is ill-appearing. She is not toxic-appearing.   HENT:      Right Ear: Tympanic membrane normal.      Nose: Nose normal.   Eyes:      Extraocular Movements: Extraocular movements intact.      Pupils: Pupils are equal, round, and reactive to light.   Cardiovascular:      Rate and Rhythm: Normal rate and regular rhythm.      Pulses: Normal pulses.      Heart sounds: No murmur heard.    No gallop.   Pulmonary:      Breath sounds: Rhonchi present.   Abdominal:      General: Abdomen is flat. There is no distension.      Palpations: There is no mass.      Tenderness: There is no abdominal tenderness. There is no guarding or rebound.      Hernia: No hernia is present.   Musculoskeletal:         General: Normal range of motion.      Cervical back: Normal range of motion. No rigidity.      Right lower leg: Edema (trace) present.      Left lower leg: Edema (trace) present.   Lymphadenopathy:      Cervical: No cervical adenopathy.   Skin:     Capillary Refill: Capillary refill takes 2 to 3 seconds.      Coloration: Skin is not jaundiced or pale.      Findings: No bruising, erythema or lesion.   Neurological:      General: No focal  deficit present.      Cranial Nerves: No cranial nerve deficit.      Motor: No weakness.      Gait: Gait normal.   Psychiatric:         Mood and Affect: Mood normal.       Fluids    Intake/Output Summary (Last 24 hours) at 1/27/2023 0958  Last data filed at 1/26/2023 2300  Gross per 24 hour   Intake 240 ml   Output 750 ml   Net -510 ml       Laboratory  Recent Labs     01/24/23  1214 01/26/23  1109 01/27/23  0321   WBC 12.8* 14.1* 13.1*   RBC 3.45* 3.72* 3.15*   HEMOGLOBIN 10.1* 10.7* 9.2*   HEMATOCRIT 33.3* 34.8* 30.3*   MCV 96.5 93.5 96.2   MCH 29.3 28.8 29.2   MCHC 30.3* 30.7* 30.4*   RDW 55.8* 54.5* 56.4*   PLATELETCT 315 336 284   MPV 12.2 11.7 11.8     Recent Labs     01/24/23  1214 01/26/23  1109 01/27/23  0321   SODIUM 132* 134* 132*   POTASSIUM 5.1 5.2 4.6   CHLORIDE 100 102 100   CO2 19* 19* 17*   GLUCOSE 109* 127* 95   BUN 31* 31* 32*   CREATININE 1.24 1.34 1.23   CALCIUM 9.3 9.4 8.8     Recent Labs     01/26/23  1109   INR 1.53*               Imaging  DX-CHEST-PORTABLE (1 VIEW)   Final Result      Stable cardiomegaly and mild pulmonary edema with small right pleural effusion.           Assessment/Plan  * Acute on chronic heart failure with preserved ejection fraction (HFpEF), SVT on anticoagulation?, leukocytosis (HCC)  Assessment & Plan  Admit to INPATIENT telemetry. Will need more than 2 midnights for diuresis, monitoring kidney function, K+  IV Lasix and fluid restriction  Continue BB      Chronic anticoagulation  Assessment & Plan  For Superficial VTE  COntinue for now    Dyslipidemia  Assessment & Plan  COntinue statin    Leukocytosis- (present on admission)  Assessment & Plan  COmpleted Abx for PNA    No clear indication for continuation of antibiotics at this time    Elevated white cell count of 13 but normal procalcitonin level    Normal UA  Blood cultures in process.    Hyponatremia- (present on admission)  Assessment & Plan  MIld    CKD (chronic kidney disease) stage 3, GFR 30-59 ml/min ([facility])-  (present on admission)  Assessment & Plan  Noted  Monitor Cr- in the setting of diuresis    Acquired hypothyroidism- (present on admission)  Assessment & Plan  COntinue Synthroid    Essential hypertension- (present on admission)  Assessment & Plan  Vitals:    01/27/23 0733   BP: (!) 126/35   Pulse: 61   Resp: 18   Temp: 37.1 °C (98.8 °F)   SpO2: 95%     Lasix  PRN IV antihypertensive         VTE prophylaxis: SCDs/TEDs    I have performed a physical exam and reviewed and updated ROS and Plan today (1/27/2023). In review of yesterday's note (1/26/2023), there are no changes except as documented above.

## 2023-01-27 NOTE — CARE PLAN
The patient is Stable - Low risk of patient condition declining or worsening    Shift Goals  Clinical Goals: Diurese  Patient Goals: get something for sleep  Family Goals: ISI    Progress made toward(s) clinical / shift goals:  Pt has melatonin ordered for sleep, pt UO exceeds 30 ml/hr, pt able to ambulate well with minimal assistance and FWW    Patient is not progressing towards the following goals:      Problem: Knowledge Deficit - Standard  Goal: Patient and family/care givers will demonstrate understanding of plan of care, disease process/condition, diagnostic tests and medications  Outcome: Progressing     Problem: Urinary - Renal Perfusion  Goal: Ability to achieve and maintain adequate renal perfusion and functioning will improve  Outcome: Progressing

## 2023-01-27 NOTE — PROGRESS NOTES
Monitor Summary    Rhythm: SR 63-86    Ectopy: O Pac, R Pvc, coup    Intervals: 20/08/46

## 2023-01-27 NOTE — DISCHARGE PLANNING
HTH/SCP TCN chart review completed. Collaborated with MARIA ESTHER Garner.The most current review of medical record, knowledge of pt's PLOF and social support, LACE+ score of 72, 6 clicks scores of 24 mobility were considered.      Pt seen at bedside. Introduced TCN program. Provided education regarding post acute levels of care. Discussed HTH/SCP plan benefits (Meds to Beds, medical uber and [facility] transitional care). Pt verbalizes understanding.     She is interested in [facility] services if available and TCN sent referral to [facility] via email. She reports that she has been utilizing FWW to allow standing rest breaks to assist with breathing 2' to current SOB. She reports if her SOB has improved, she does not anticipate need for FWW/AD at time of dc. However she is interested in FWW/AD if her SOB is still issue at dc. She does not indicate functional or balance needs for FWW/AD that would indicate need for therapy evaluation/assessment at this time. Pt reports she will discuss with MD/bedside nurse for possible AD at time of dc.    Given aforementioned, no additional provider requests at this time. Choice proactively obtained for DME (for possible need for AD and/or 02; see above for details), faxed to THERESA and MARIA ESTHER aware. TCN will continue to follow and collaborate with discharge planning team as additional post acute needs arise. Thank you.     Completed today:  PT/OT with recommendations   Choice obtained: DME (AD or 02 needs if indicated as noted above)  [facility] referral sent 1/27

## 2023-01-27 NOTE — PROGRESS NOTES
4 Eyes Skin Assessment Completed by HANS Churchill and ESCOBAR Kraft.    Head WDL  Ears WDL  Nose WDL  Mouth WDL  Neck WDL  Breast/Chest WDL  Shoulder Blades WDL  Spine WDL  (R) Arm/Elbow/Hand WDL  (L) Arm/Elbow/Hand WDL  Abdomen WDL  Groin WDL  Scrotum/Coccyx/Buttocks WDL  (R) Leg WDL  (L) Leg WDL  (R) Heel/Foot/Toe WDL  (L) Heel/Foot/Toe WDL          Devices In Places Tele Box      Interventions In Place N/A    Possible Skin Injury No    Pictures Uploaded Into Epic N/A  Wound Consult Placed N/A  RN Wound Prevention Protocol Ordered No

## 2023-01-27 NOTE — CARE PLAN
Problem: Knowledge Deficit - Standard  Goal: Patient and family/care givers will demonstrate understanding of plan of care, disease process/condition, diagnostic tests and medications  Outcome: Progressing     Problem: Respiratory  Goal: Patient will achieve/maintain optimum respiratory ventilation and gas exchange  Outcome: Progressing   The patient is Stable - Low risk of patient condition declining or worsening         Progress made toward(s) clinical / shift goals:      Pt educated on plan of care verbalizes understanding; fall risk precautions in place, pt oriented to surroundings and educated on fall precautions, pt remains free of falls; pt educated on pain scales and pain relieving measures, pain is controlled at this time. Pt educated on importance to turn and reposition, pt verbalizes understanding, pt remains free of further skin breakdown.

## 2023-01-27 NOTE — HOSPITAL COURSE
Mariely Easley is a 82 y.o. female who presented 1/26/2023 with Shortness of Breath (Pt reports that she was told a week ago by her PCP that she had pneumonia, pt has just completed a course of 2 different antibiotics but is not feeling any better. )  She has a history of hypothyroidism, hypertension, diastolic CHF scheduled to see Dr. Resendez, and was recently hospitalized for pneumonia with which she completed antibiotics. She also had acute kidney injury due to overdiuresis and thus her Lasix was held.SHe has been dyspneic on exertion but it was a little more severe today. Of note she does not have cough or fever.  At the ED, she is afebrile , hypertensive  CXR on my review shows pulmonary vascular congestion, small pulmonary effusion.  Cr- at 1.32, K 5.2

## 2023-01-27 NOTE — PROGRESS NOTES
Bedside report received from HANS Churchill. Patient A&O x 4. Pt does not complain of pain at this time. POC discussed with patient. Patient verbalizes understanding. Call light and belongings within reach. Bed locked in lowest position, alarm and fall precautions in place.

## 2023-01-27 NOTE — PROGRESS NOTES
Pt arrived to unit at 1530 via stretcher, on zoll monitor, and on RA, transported by [facility]. Pt was A&Ox4, VSS, and denied pain. Report received from ED nurse HANS Barnes at 1520. Pt placed on unit telemetry monitor.

## 2023-01-28 ENCOUNTER — PHARMACY VISIT (OUTPATIENT)
Dept: PHARMACY | Facility: MEDICAL CENTER | Age: 83
End: 2023-01-28
Payer: COMMERCIAL

## 2023-01-28 VITALS
DIASTOLIC BLOOD PRESSURE: 38 MMHG | WEIGHT: 132.28 LBS | HEIGHT: 62 IN | BODY MASS INDEX: 24.34 KG/M2 | TEMPERATURE: 98.4 F | SYSTOLIC BLOOD PRESSURE: 136 MMHG | HEART RATE: 67 BPM | RESPIRATION RATE: 17 BRPM | OXYGEN SATURATION: 95 %

## 2023-01-28 LAB
ANION GAP SERPL CALC-SCNC: 14 MMOL/L (ref 7–16)
BUN SERPL-MCNC: 34 MG/DL (ref 8–22)
CALCIUM SERPL-MCNC: 8.8 MG/DL (ref 8.5–10.5)
CHLORIDE SERPL-SCNC: 102 MMOL/L (ref 96–112)
CO2 SERPL-SCNC: 19 MMOL/L (ref 20–33)
CREAT SERPL-MCNC: 1.34 MG/DL (ref 0.5–1.4)
ERYTHROCYTE [DISTWIDTH] IN BLOOD BY AUTOMATED COUNT: 55.1 FL (ref 35.9–50)
GFR SERPLBLD CREATININE-BSD FMLA CKD-EPI: 40 ML/MIN/1.73 M 2
GLUCOSE SERPL-MCNC: 101 MG/DL (ref 65–99)
HCT VFR BLD AUTO: 28.8 % (ref 37–47)
HGB BLD-MCNC: 8.9 G/DL (ref 12–16)
MCH RBC QN AUTO: 29 PG (ref 27–33)
MCHC RBC AUTO-ENTMCNC: 30.9 G/DL (ref 33.6–35)
MCV RBC AUTO: 93.8 FL (ref 81.4–97.8)
NT-PROBNP SERPL IA-MCNC: ABNORMAL PG/ML (ref 0–125)
PLATELET # BLD AUTO: 284 K/UL (ref 164–446)
PMV BLD AUTO: 11.8 FL (ref 9–12.9)
POTASSIUM SERPL-SCNC: 4.1 MMOL/L (ref 3.6–5.5)
RBC # BLD AUTO: 3.07 M/UL (ref 4.2–5.4)
SODIUM SERPL-SCNC: 135 MMOL/L (ref 135–145)
WBC # BLD AUTO: 12.8 K/UL (ref 4.8–10.8)

## 2023-01-28 PROCEDURE — 80048 BASIC METABOLIC PNL TOTAL CA: CPT

## 2023-01-28 PROCEDURE — 99239 HOSP IP/OBS DSCHRG MGMT >30: CPT | Performed by: HOSPITALIST

## 2023-01-28 PROCEDURE — RXMED WILLOW AMBULATORY MEDICATION CHARGE: Performed by: HOSPITALIST

## 2023-01-28 PROCEDURE — A9270 NON-COVERED ITEM OR SERVICE: HCPCS | Performed by: INTERNAL MEDICINE

## 2023-01-28 PROCEDURE — 700111 HCHG RX REV CODE 636 W/ 250 OVERRIDE (IP): Performed by: INTERNAL MEDICINE

## 2023-01-28 PROCEDURE — 36415 COLL VENOUS BLD VENIPUNCTURE: CPT

## 2023-01-28 PROCEDURE — 83880 ASSAY OF NATRIURETIC PEPTIDE: CPT

## 2023-01-28 PROCEDURE — 85027 COMPLETE CBC AUTOMATED: CPT

## 2023-01-28 PROCEDURE — 700102 HCHG RX REV CODE 250 W/ 637 OVERRIDE(OP): Performed by: INTERNAL MEDICINE

## 2023-01-28 RX ORDER — FUROSEMIDE 40 MG/1
40 TABLET ORAL DAILY
Qty: 60 TABLET | Refills: 0 | Status: SHIPPED | OUTPATIENT
Start: 2023-01-28 | End: 2023-02-03 | Stop reason: SDUPTHER

## 2023-01-28 RX ADMIN — FOLIC ACID 1 MG: 1 TABLET ORAL at 05:18

## 2023-01-28 RX ADMIN — FERROUS SULFATE TAB 325 MG (65 MG ELEMENTAL FE) 325 MG: 325 (65 FE) TAB at 05:18

## 2023-01-28 RX ADMIN — LEVOTHYROXINE SODIUM 50 MCG: 0.05 TABLET ORAL at 05:19

## 2023-01-28 RX ADMIN — METOPROLOL TARTRATE 25 MG: 25 TABLET, FILM COATED ORAL at 05:19

## 2023-01-28 RX ADMIN — FUROSEMIDE 40 MG: 10 INJECTION, SOLUTION INTRAMUSCULAR; INTRAVENOUS at 05:18

## 2023-01-28 NOTE — DISCHARGE SUMMARY
Discharge Summary    CHIEF COMPLAINT ON ADMISSION  Chief Complaint   Patient presents with    Shortness of Breath     Pt reports that she was told a week ago by her PCP that she had pneumonia, pt has just completed a course of 2 different antibiotics but is not feeling any better.        Reason for Admission  Semt by md     Admission Date  1/26/2023    CODE STATUS  Full Code    HPI & HOSPITAL COURSE    Mariely Easley is a 82 y.o. female who presented 1/26/2023 with Shortness of Breath (Pt reports that she was told a week ago by her PCP that she had pneumonia, pt has just completed a course of 2 different antibiotics but is not feeling any better. )  She has a history of hypothyroidism, hypertension, diastolic CHF scheduled to see Dr. Resendez, and was recently hospitalized for pneumonia with which she completed antibiotics. She also had acute kidney injury due to overdiuresis and thus her Lasix was held.SHe has been dyspneic on exertion but it was a little more severe today. Of note she does not have cough or fever.  At the ED, she is afebrile , hypertensive  CXR on my review shows pulmonary vascular congestion, small pulmonary effusion.  Cr- at 1.32, K 5.2    ============================================    Patient was diuresed with IV Lasix.  He had good urine output and good clinical improvement.  On day of discharge patient was afebrile, on room air    sHe was able to ambulate in the hallway without any distress or tachypnea or tachycardia or hypoxia    Her BNP was paradoxical    She was sent home with increasing dose of oral Lasix    Follow-up with her PCP further care and management  Therefore, she is discharged in good and stable condition to home with close outpatient follow-up.    The patient met 2-midnight criteria for an inpatient stay at the time of discharge.    Discharge Date  1/28/23    FOLLOW UP ITEMS POST DISCHARGE      DISCHARGE DIAGNOSES  Principal Problem:    Acute on chronic heart failure with  preserved ejection fraction (HFpEF), SVT on anticoagulation?, leukocytosis (HCC) POA: Yes  Active Problems:    Essential hypertension POA: Yes    Acquired hypothyroidism POA: Yes    CKD (chronic kidney disease) stage 3, GFR 30-59 ml/min (HCC) POA: Yes    Hyponatremia POA: Yes    Leukocytosis POA: Yes    Dyslipidemia POA: Yes    Diastolic CHF, acute on chronic (HCC) POA: Yes    Chronic anticoagulation POA: Yes  Resolved Problems:    * No resolved hospital problems. *      FOLLOW UP  Future Appointments   Date Time Provider Department Center   3/17/2023  4:00 PM Álvaro Resendez M.D. [facility] None   3/24/2023 11:30 AM Oc Luke M.D. [facility] None     No follow-up provider specified.    MEDICATIONS ON DISCHARGE     Medication List        CHANGE how you take these medications        Instructions   furosemide 40 MG Tabs  What changed:   medication strength  how much to take  Commonly known as: LASIX   Take 1 Tablet by mouth every day.  Dose: 40 mg            CONTINUE taking these medications        Instructions   acetaminophen 500 MG Tabs  Commonly known as: TYLENOL   Take 1,000 mg by mouth every 6 hours as needed for Mild Pain.  Dose: 1,000 mg     diclofenac sodium 1 % Gel  Commonly known as: Voltaren   Apply 4 g topically 4 times a day as needed (32g/day max (do not use with NSAIDS)).  Dose: 4 g     ferrous sulfate 325 (65 Fe) MG tablet   TAKE 1 TABLET BY MOUTH EVERY DAY  Dose: 325 mg     folic acid 1 MG Tabs  Commonly known as: FOLVITE   Take 1 Tablet by mouth every day.  Dose: 1 mg     levothyroxine 50 MCG Tabs  Commonly known as: SYNTHROID   Take 1 Tab by mouth Every morning on an empty stomach.  Dose: 50 mcg     loperamide 2 MG Caps  Commonly known as: IMODIUM   Take 2 mg by mouth 4 times a day as needed for Diarrhea.  Dose: 2 mg     losartan 50 MG Tabs  Commonly known as: COZAAR   Take 50 mg by mouth every day.  Dose: 50 mg     metoprolol tartrate 25 MG Tabs  Commonly known as: LOPRESSOR   Take 25 mg by mouth 2  times a day.  Dose: 25 mg     simvastatin 10 MG Tabs  Commonly known as: ZOCOR   Take 1 Tablet by mouth every evening.  Dose: 10 mg     Xarelto 20 MG Tabs tablet  Generic drug: rivaroxaban   Take 20 mg by mouth with dinner.  Dose: 20 mg            STOP taking these medications      amoxicillin-clavulanate 500-125 MG Tabs  Commonly known as: Augmentin     azithromycin 250 MG Tabs  Commonly known as: ZITHROMAX              Allergies  No Known Allergies    DIET  Orders Placed This Encounter   Procedures    Diet Order Diet: Cardiac; Fluid modifications: (optional): 2000 ml Fluid Restriction     Standing Status:   Standing     Number of Occurrences:   1     Order Specific Question:   Diet:     Answer:   Cardiac [6]     Order Specific Question:   Fluid modifications: (optional)     Answer:   2000 ml Fluid Restriction [11]       ACTIVITY  As tolerated.  Weight bearing as tolerated    CONSULTATIONS      PROCEDURES  Reading Physician Reading Date Result Priority   No Reading Provider Prelim 1/9/2023    Tommie Sarmiento M.D.  [phone] 1/9/2023      Narrative & Impression  Transthoracic  Echo Report        Echocardiography Laboratory     CONCLUSIONS  Compared to the images of the prior study 9/6/22, there has been no   significant change.      Normal left ventricular systolic function.  The left ventricular ejection fraction is visually estimated to be 65%.  Grade II diastolic dysfunction.  The right ventricle is normal in size and systolic function.  Mild mitral regurgitation.  Moderate aortic insufficiency.  Mild tricuspid regurgitation.  Right ventricular systolic pressure is estimated to be 50 mmHg.     JONES BERMUDEZ  Exam Date:         01/09/2023                      07:31  Exam Location:     Inpatient  Priority:          Routine     Ordering Physician:        LIBBY BESS  Referring Physician:       [id number] Carter  Sonographer:               Ori MCKEON     Age:    82     Gender:    F  MRN:     [id number]  :    1940  BSA:    1.61   Ht (in):    62     Wt (lb):    134  Exam Type:     Complete     Indications:     Shortness of breath  ICD Codes:       786.05     CPT Codes:       37920     BP:   165    /   68     HR:   61  Technical Quality:       Fair     MEASUREMENTS  (Male / Female) Normal Values  2D ECHO  LV Diastolic Diameter PLAX        4.7 cm                4.2 - 5.9 / 3.9 - 5.3   cm  LV Systolic Diameter PLAX         3.3 cm                2.1 - 4.0 cm  IVS Diastolic Thickness           1.2 cm                  LVPW Diastolic Thickness          1.2 cm                  LVOT Diameter                     2 cm                    Estimated LV Ejection Fraction    65 %                    LV Ejection Fraction MOD BP       61.9 %                >= 55  %  LV Ejection Fraction MOD 4C       61.8 %                  LV Ejection Fraction MOD 2C       61.8 %                  IVC Diameter                      2.1 cm                     DOPPLER  AV Peak Velocity                  2.3 m/s                 AV Peak Gradient                  21.7 mmHg               AV Mean Gradient                  8.2 mmHg                AI Pressure Half Time             298 ms                  LVOT Peak Velocity                1.1 m/s                 AV Area Cont Eq vti               1.8 cm2                 MV Velocity Time Integral         41.5 cm                 Mitral E Point Velocity           0.92 m/s                Mitral E to A Ratio               1.6                     Mitral A Duration                 166 ms                  MV Pressure Half Time             60.2 ms                 MV Area PHT                       3.7 cm2                 MV Deceleration Time              175 ms                  TR Peak Velocity                  326 cm/s                PV Peak Velocity                  0.98 m/s                PV Peak Gradient                  3.8 mmHg                RVOT Peak Velocity                0.8 m/s                 MV  E' Velocity                    8 cm/s                     * Indicates values subject to auto-interpretation  LV EF:  65    %     FINDINGS  Left Ventricle  Normal left ventricular chamber size. Normal left ventricular wall   thickness. Normal left ventricular systolic function. The left   ventricular ejection fraction is visually estimated to be 65%. Normal   regional wall motion. Grade II diastolic dysfunction.     Right Ventricle  The right ventricle is normal in size and systolic function.     Right Atrium  The right atrium is normal in size. Normal inferior vena cava size and   inspiratory collapse.     Left Atrium  Severely dilated left atrium. . Left atrial volume index is 50 mL/sq m.     Mitral Valve  Structurally normal mitral valve without significant stenosis. Mild   mitral regurgitation.     Aortic Valve  Structurally normal aortic valve without significant stenosis. Aortic   valve sclerosis without significant stenosis.  Moderate aortic   insufficiency.     Tricuspid Valve  Structurally normal tricuspid valve without significant stenosis. Mild   tricuspid regurgitation. Right ventricular systolic pressure is   estimated to be 50 mmHg.     Pulmonic Valve  Structurally normal pulmonic valve. Mild pulmonic insufficiency.     Pericardium  No pericardial effusion.     Aorta  Normal aortic root for body surface area. The ascending aorta diameter   is 2.9 cm.                                Tommie Sarmiento MD  (Electronically Signed)  Final Date:     09 January 2023                   13:38     Component 2 wk ago    Eject.Frac. MOD BP 61.94    Eject.Frac. MOD 4C 61.79    Eject.Frac. MOD 2C 61.8    Left Ventrical Ejection Fraction 65    Resulting Agency [facility]              Specimen Collected: 01/09/23  7:31 AM Last Resulted: 01/09/23  1:38 PM        Lab Flowsheet     Order Details     View Encounter     Lab and Collection Details     Routing     Result History - Result Edited     View Encounter Conversation            Linked Documents     View SynapseCV Report      Result Care Coordination      Patient Communication     Add Comments   Not seen Back to Top           EC-ECHOCARDIOGRAM COMPLETE W/O CONT [[id number]]    Electronically signed by: Minerva Diaz M.D. on 01/08/23 2056 Status: Completed   Ordering user: Minerva Diaz M.D. 01/08/23 2056 Ordering provider: Minerva Diaz M.D.   Authorized by: Minerva Diaz M.D. Ordered during: ED to Hosp-Admission (Discharged) on 01/08/2023    Add Signature Requirement   Frequency: Once 01/08/23 2057 - 1  occurrence   Questionnaire    Question Answer   Reason for exam? Shortness of Breath (SOB)   Release to patient Immediate     Reprint Order Requisition    EC-ECHOCARDIOGRAM COMPLETE W/O CONT (Order #[id number]) on 1/8/23     Linked Documents     View SynapseCV Report     Last Resulted Time   Mon Jan 9, 2023  1:38 PM       Procedure Documentation Timeline (1/28/2023 09:10:12 to 1/28/2023 09:10:12)      Screening Form Questions    No questions have been answered for this form.   Reading Provider Reading Date   No Reading Provider Prelim Jan 9,          LABORATORY  Lab Results   Component Value Date    SODIUM 135 01/28/2023    POTASSIUM 4.1 01/28/2023    CHLORIDE 102 01/28/2023    CO2 19 (L) 01/28/2023    GLUCOSE 101 (H) 01/28/2023    BUN 34 (H) 01/28/2023    CREATININE 1.34 01/28/2023        Lab Results   Component Value Date    WBC 12.8 (H) 01/28/2023    HEMOGLOBIN 8.9 (L) 01/28/2023    HEMATOCRIT 28.8 (L) 01/28/2023    PLATELETCT 284 01/28/2023        Total time of the discharge process exceeds 38 minutes.

## 2023-01-28 NOTE — DISCHARGE INSTRUCTIONS
HF Patient Discharge Instructions  Monitor your weight daily, and maintain a weight chart, to track your weight changes.   Activity as tolerated, unless your Doctor has ordered otherwise.  Follow a low fat, low cholesterol, low salt diet unless instructed otherwise by your Doctor. Read the labels on the back of food products and track your intake of fat, cholesterol and salt.   Fluid Restriction Yes. If a Fluid Restriction has been ordered by your Doctor, measure fluids with a measuring cup to ensure that you are not exceeding the restriction: 2000mL per day.  No smoking.  Oxygen No. If your Doctor has ordered that you wear Oxygen at home, it is important to wear it as ordered.  Did you receive an explanation from staff on the importance of taking each of your medications and why it is necessary to keep taking them unless your doctor says to stop? Yes  Were all of your questions answered about how to manage your heart failure and what to do if you have increased signs and symptoms after you go home? Yes  Do you feel like your heart failure care team involved you in the care treatment plan and allowed you to make decisions regarding your care while in the hospital and addressed any discharge needs you might have? Yes    See the educational handout provided at discharge for more information on monitoring your daily weight, activity and diet. This also explains more about Heart Failure, symptoms of a flare-up and some of the tests that you have undergone.     Warning Signs of a Flare-Up include:  Swelling in the ankles or lower legs.  Shortness of breath, while at rest, or while doing normal activities.   Shortness of breath at night when in bed, or coughing in bed.   Requiring more pillows to sleep at night, or needing to sit up at night to sleep.  Feeling weak, dizzy or fatigued.     When to call your Doctor:  Call [facility] seven days a week from 8:00 a.m. to 8:00 p.m. for medical questions [phone].  Call your Primary Care Physician or Cardiologist if:   You experience any pain radiating to your jaw or neck.  You have any difficulty breathing.  You experience weight gain of 3 lbs in a day or 5 lbs in a week.   You feel any palpitations or irregular heartbeats.  You become dizzy or lose consciousness.   If you have had an angiogram or had a pacemaker or AICD placed, and experience:  Bleeding, drainage or swelling at the surgical / puncture site.  Fever greater than 100.0 F  Shock from internal defibrillator.  Cool and / or numb extremities.     Please access the [facility] My HF Guide/Heart Failure Interactive Workbook:   [email or web address]

## 2023-01-28 NOTE — PROGRESS NOTES
Pt rcvd all DC edu w teachback and meds to beds. Dressed and ambulated independently in room, NAD, VSS, speaking complete coherent sentences, denies n/v/d, SOB, CP. DC to daughter in private vehicle.

## 2023-01-28 NOTE — CARE PLAN
The patient is Stable - Low risk of patient condition declining or worsening    Shift Goals  Clinical Goals: diuresis  Patient Goals: comfort, sleep  Family Goals: ISI    Progress made toward(s) clinical / shift goals:    Problem: Knowledge Deficit - Standard  Goal: Patient and family/care givers will demonstrate understanding of plan of care, disease process/condition, diagnostic tests and medications  Outcome: Progressing  Note: POC discussed with patient. Questions and concerns are addressed. Education provided on low sodium diet, fluid restriction, daily weight, worsening signs and symptoms of heart failure, diuresis.     Problem: Fall Risk  Goal: Patient will remain free from falls  Outcome: Progressing  Note: Fall precautions in place. Hourly rounding. Patient instructed to use call light for assistance.     Problem: Pain - Standard  Goal: Alleviation of pain or a reduction in pain to the patient’s comfort goal  Outcome: Progressing  Note: Pain management per MAR.     Problem: Care Map:  Day 2 Optimal Outcome for the Heart Failure Patient  Goal: Day 2:  Optimal Care of the heart failure patient  Outcome: Progressing  Note: Education provided on low sodium diet, fluid restriction, daily weight, worsening signs and symptoms of heart failure, diuresis.       Patient is not progressing towards the following goals:

## 2023-01-30 ENCOUNTER — PATIENT OUTREACH (OUTPATIENT)
Dept: HEALTH INFORMATION MANAGEMENT | Facility: OTHER | Age: 83
End: 2023-01-30
Payer: MEDICARE

## 2023-01-30 DIAGNOSIS — I50.43 CHF (CONGESTIVE HEART FAILURE), NYHA CLASS I, ACUTE ON CHRONIC, COMBINED (HCC): ICD-10-CM

## 2023-01-30 DIAGNOSIS — N18.31 STAGE 3A CHRONIC KIDNEY DISEASE: ICD-10-CM

## 2023-01-30 DIAGNOSIS — I10 ESSENTIAL HYPERTENSION: ICD-10-CM

## 2023-01-30 PROCEDURE — 99999 PR NO CHARGE: CPT | Performed by: STUDENT IN AN ORGANIZED HEALTH CARE EDUCATION/TRAINING PROGRAM

## 2023-01-30 NOTE — PROGRESS NOTES
Subjective:     Mariely Easley is a 82 y.o. female who presents for Hospital Follow-up.    POST DISCHARGE CALL:  Discharge Date:1/28/2023   Date of Outreach Call: 1/30/2023  1:23 PM  Now that you're home, how are you doing? Fair  Did you receive any new prescriptions? Yes  Were you able to fill those medications? Yes  How did you fill those prescriptions? *  If not able to fill prescriptions, why? Other (Comment)    Do you have questions about your medications? No  Do you have a follow-up appointment scheduled?Yes  Any issues or paperwork you wish to discuss with your PCP? No  Does patient qualify for CCM Program? Yes  If patient qualifies, was CCM Program Introduced? Yes  If patient does not qualify, comment? *  Number of Attempts: 1  Current or previous attempts completed within two business days of discharge? Yes  Provided education regarding treatment plan, medication, self-management, ADLs? Yes  Has patient completed Advance Directive? If yes, advise them to bring to appointment. No  Care Manager phone number provided? Yes  Is there anything else I can help you with? No  Chief Complaint? SOB  Admitting Dx? SOB  Discharge Diagnosis? Acute on chronic diastolic congestive heart failure (HCC), SOB    HPI:   Recently hospitalized for diastolic heart failure.    Patient saw cardiology recently and started spironolactone on Tuesday. Also stopped xarelto Tuesday per cardiology. Denies abdominal pain, blood in the urine/stool. Weight stable, LE edema stable and SOB with JEFFRIES minimally improved. Reports no cough, fever, or chills. Reports orthopnea.    Current medicines (including reconciliation performed today)  Current Outpatient Medications   Medication Sig Dispense Refill    furosemide (LASIX) 40 MG Tab Take 1 Tablet by mouth 2 times a day. 180 Tablet 0    spironolactone (ALDACTONE) 25 MG Tab Take 1 Tablet by mouth every day. 30 Tablet 11    loperamide (IMODIUM) 2 MG Cap Take 2 mg by mouth 4 times a day as  "needed for Diarrhea.      ferrous sulfate 325 (65 Fe) MG tablet TAKE 1 TABLET BY MOUTH EVERY DAY 90 Tablet 0    losartan (COZAAR) 50 MG Tab Take 50 mg by mouth every day.      metoprolol tartrate (LOPRESSOR) 25 MG Tab Take 25 mg by mouth 2 times a day.      acetaminophen (TYLENOL) 500 MG Tab Take 1,000 mg by mouth every 6 hours as needed for Mild Pain.      simvastatin (ZOCOR) 10 MG Tab Take 1 Tablet by mouth every evening. 100 Tablet 3    diclofenac sodium (VOLTAREN) 1 % Gel Apply 4 g topically 4 times a day as needed (32g/day max (do not use with NSAIDS)). 100 g 1    folic acid (FOLVITE) 1 MG Tab Take 1 Tablet by mouth every day. 90 Tablet 3    levothyroxine (SYNTHROID) 50 MCG Tab Take 1 Tab by mouth Every morning on an empty stomach. 90 Tab 3     No current facility-administered medications for this visit.     Allergies:   Patient has no known allergies.    Social History     Tobacco Use    Smoking status: Former     Types: Cigarettes     Quit date: 1979     Years since quittin.5    Smokeless tobacco: Never   Vaping Use    Vaping Use: Never used   Substance Use Topics    Alcohol use: Yes     Alcohol/week: 8.4 oz     Types: 14 Glasses of wine per week    Drug use: No     ROS:  See HPI    Objective:     Vitals:    23 1546   BP: (!) 152/48   BP Location: Left arm   Patient Position: Sitting   BP Cuff Size: Adult   Pulse: 92   Resp: 20   Temp: 36.5 °C (97.7 °F)   TempSrc: Temporal   SpO2: 97%   Weight: 60.8 kg (134 lb)   Height: 1.575 m (5' 2\")     Body mass index is 24.51 kg/m².    Physical Exam:  Constitutional: Well-developed and well-nourished. No acute distress.   Skin: Skin is warm and dry. No rash noted.  Head: Atraumatic without lesions.  Eyes: Conjunctivae and extraocular motions are normal. Pupils are equal, round. No scleral icterus.   Nose: Nares patent. No discharge.  Mouth/Throat: Oropharynx is moist.  Neck: Supple, trachea midline. JVD.  Cardiovascular: Regular rate and rhythm, S1 and " S2 without murmur, rubs, or gallops.  Lungs: Normal inspiratory effort, no wheezes. Bilateral rales.  Extremities: No cyanosis, clubbing. +1 pitting edema bilat LE  Neurological: Alert and oriented x 3. No gross/focal deficits.  Psychiatric:  Behavior, mood, and affect are appropriate.    Labs: 1/27/2023, 1/28/2023    Assessment and Plan:   1. Hospital discharge follow-up  - Chart and discharge summary were reviewed.   - Hospitalization and results reviewed with patient.   - Medications reviewed including instructions regarding high risk medications, dosing and side effects.  - Recommended Services: No services needed at this time  - Advance directive/POLST on file?  No, advised prior.    2. Diastolic CHF, acute on chronic (HCC)  3. SOB (shortness of breath)  4. Bilateral edema of lower extremity  Acute on chronic, SOB minimally improved and appears more fluid overloaded today than prior. Continue spironolactone 25mg daily. Advised to consider with to torsemide, but patient prefers lasix instead. Increase to 40mg BID as below, labs to be repeated Tuesday. Aware of ED precautions.   - furosemide (LASIX) 40 MG Tab; Take 1 Tablet by mouth 2 times a day.  Dispense: 180 Tablet; Refill: 0    5. Essential hypertension  6. Stage 3b chronic kidney disease (HCC)  Chronic condition, un stable. BP not well controlled, continue losartan 50mg daily and spironolactone 25mg daily, increasing lasix as above. Avoid nephrotoxic medications when able. Trend Cr Tuesday.    7. Anemia, unspecified type  - VITAMIN B12; Future  - FOLATE; Future  - RETICULOCYTES COUNT; Future  - LDH; Future  8. Leukocytosis, unspecified type  - Sed Rate; Future  - CRP QUANTITIVE (NON-CARDIAC); Future  Chronic anemia with this patient with hx of iron, B12, folate def (poor diet). Continue folic acid 1mg daily and oral iron. Trend labs and evaluate for hemolysis, but anemia could also be from phlebotomy and perhaps dilutional as well. Trend with labs  Tuesday.    9. History of DVT (deep vein thrombosis)  Will be off xarelto x 7 days by Tuesday so heme labs reordered to complete.  - FACTOR II DNA ANALYSIS; Future  - PROTEIN S FUNCTIONAL; Future  - PROTEIN C PANEL; Future  - FACTOR V LEIDEN MUTATION; Future  - ANTITHROMBIN PANEL (ARBILL); Future  - ANTICARDIOLIPIN AB IGG IGM; Future    Follow-up:Return in about 6 days (around 2/9/2023), or if symptoms worsen or fail to improve.    Face-to-face transitional care management services with HIGH (today's visit is within days post discharge & LACE+ score 59+) medical decision complexity were provided.     LACE+ Historical Score Over Time (0-28: Low, 29-58: Medium, 59+: High): 73

## 2023-01-30 NOTE — PROGRESS NOTES
Called Mariely for TCM/CCM. Mariely was discharged from hospital 2 days ago. Arrived at  c/o sob, found having CHF exacerbation. Mariely reports she is taking the Furosemide 40mg daily. Denies LE swelling. Still having some sob w/exertion. Scheduled TCM OV for this Friday at 1600 with a check-in time of 1545.

## 2023-01-31 ENCOUNTER — OFFICE VISIT (OUTPATIENT)
Dept: CARDIOLOGY | Facility: MEDICAL CENTER | Age: 83
End: 2023-01-31
Payer: MEDICARE

## 2023-01-31 VITALS
HEART RATE: 82 BPM | RESPIRATION RATE: 14 BRPM | WEIGHT: 136 LBS | OXYGEN SATURATION: 97 % | HEIGHT: 62 IN | SYSTOLIC BLOOD PRESSURE: 154 MMHG | DIASTOLIC BLOOD PRESSURE: 42 MMHG | BODY MASS INDEX: 25.03 KG/M2

## 2023-01-31 DIAGNOSIS — R79.89 ELEVATED TROPONIN: ICD-10-CM

## 2023-01-31 DIAGNOSIS — E78.5 DYSLIPIDEMIA: ICD-10-CM

## 2023-01-31 DIAGNOSIS — R06.02 SOB (SHORTNESS OF BREATH): ICD-10-CM

## 2023-01-31 DIAGNOSIS — I35.1 AORTIC VALVE INSUFFICIENCY, ETIOLOGY OF CARDIAC VALVE DISEASE UNSPECIFIED: ICD-10-CM

## 2023-01-31 DIAGNOSIS — Z79.01 CHRONIC ANTICOAGULATION: ICD-10-CM

## 2023-01-31 DIAGNOSIS — I10 ESSENTIAL HYPERTENSION: ICD-10-CM

## 2023-01-31 DIAGNOSIS — E78.2 MIXED HYPERLIPIDEMIA: ICD-10-CM

## 2023-01-31 DIAGNOSIS — I50.33 DIASTOLIC CHF, ACUTE ON CHRONIC (HCC): ICD-10-CM

## 2023-01-31 DIAGNOSIS — D50.0 IRON DEFICIENCY ANEMIA DUE TO CHRONIC BLOOD LOSS: ICD-10-CM

## 2023-01-31 DIAGNOSIS — R60.0 BILATERAL EDEMA OF LOWER EXTREMITY: ICD-10-CM

## 2023-01-31 DIAGNOSIS — N18.32 STAGE 3B CHRONIC KIDNEY DISEASE: ICD-10-CM

## 2023-01-31 LAB
BACTERIA BLD CULT: NORMAL
BACTERIA BLD CULT: NORMAL
SIGNIFICANT IND 70042: NORMAL
SIGNIFICANT IND 70042: NORMAL
SITE SITE: NORMAL
SITE SITE: NORMAL
SOURCE SOURCE: NORMAL
SOURCE SOURCE: NORMAL

## 2023-01-31 PROCEDURE — 99205 OFFICE O/P NEW HI 60 MIN: CPT | Performed by: INTERNAL MEDICINE

## 2023-01-31 RX ORDER — SPIRONOLACTONE 25 MG/1
25 TABLET ORAL DAILY
Qty: 30 TABLET | Refills: 11 | Status: SHIPPED | OUTPATIENT
Start: 2023-01-31 | End: 2023-02-26

## 2023-01-31 ASSESSMENT — MINNESOTA LIVING WITH HEART FAILURE QUESTIONNAIRE (MLHF)
DIFFICULTY WORKING TO EARN A LIVING: 0
HAVING TO SIT OR LIE DOWN DURING THE DAY: 5
LOSS OF SELF CONTROL IN YOUR LIFE: 5
TOTAL_SCORE: 87
MAKING YOU FEEL DEPRESSED: 5
DIFFICULTY GOING AWAY FROM HOME: 5
MAKING YOU SHORT OF BREATH: 5
DIFFICULTY SOCIALIZING WITH FAMILY OR FRIENDS: 4
EATING LESS FOODS YOU LIKE: 5
TIRED, FATIGUED OR LOW ON ENERGY: 5
COSTING YOU MONEY FOR MEDICAL CARE: 5
FEELING LIKE A BURDEN TO FAMILY AND FRIENDS: 5
SWELLING IN ANKLES OR LEGS: 3
DIFFICULTY TO CONCENTRATE OR REMEMBERING THINGS: 3
MAKING YOU WORRY: 5
GIVING YOU SIDE EFFECTS FROM TREATMENTS: 4
WORKING AROUND THE HOUSE OR YARD DIFFICULT: 5
MAKING YOU STAY IN A HOSPITAL: 5
DIFFICULTY WITH SEXUAL ACTIVITIES: 0
DIFFICULTY SLEEPING WELL AT NIGHT: 4
WALKING ABOUT OR CLIMBING STAIRS DIFFICULT: 5
DIFFICULTY WITH RECREATIONAL PASTIMES, SPORTS, HOBBIES: 4

## 2023-01-31 ASSESSMENT — FIBROSIS 4 INDEX: FIB4 SCORE: 0.85

## 2023-02-01 ASSESSMENT — ENCOUNTER SYMPTOMS
SORE THROAT: 0
CLAUDICATION: 0
PND: 0
CARDIOVASCULAR NEGATIVE: 1
HEMOPTYSIS: 0
CONSTITUTIONAL NEGATIVE: 1
DIZZINESS: 0
GASTROINTESTINAL NEGATIVE: 1
EYES NEGATIVE: 1
CHILLS: 0
LOSS OF CONSCIOUSNESS: 0
STRIDOR: 0
SHORTNESS OF BREATH: 1
SPUTUM PRODUCTION: 0
BRUISES/BLEEDS EASILY: 0
ORTHOPNEA: 0
FEVER: 0
WHEEZING: 0
NEUROLOGICAL NEGATIVE: 1
WEAKNESS: 0
PALPITATIONS: 0
COUGH: 0
MUSCULOSKELETAL NEGATIVE: 1

## 2023-02-01 NOTE — PROGRESS NOTES
Chief Complaint   Patient presents with    New Patient     Dx: Acute on chronic heart failure with preserved ejection fraction (HFpEF), SVT on anticoagulation?    CHF (Diastolic)     NP Dx: Diastolic CHF, acute on chronic (HCC)      Dyslipidemia       Subjective     Mariely Easley is a 82 y.o. female who presents today as a new consultation for heart failure.  She is an 82-year-old female admitted to hospital for pulmonary edema and shortness of breath and possibly heart failure.  She had an echocardiogram that showed preserved ejection fraction with grade 1 grade 2 diastolic dysfunction.  She is started on Lasix with mild improvement.  She has no past chest pain.    Past Medical History:   Diagnosis Date    Abnormal albumin 2022    Hypermagnesemia 2022    Hypertension     Pain and swelling of left lower extremity 2022    Superficial thrombosis of lower extremity, right 2022     History reviewed. No pertinent surgical history.  History reviewed. No pertinent family history.  Social History     Socioeconomic History    Marital status:      Spouse name: Not on file    Number of children: Not on file    Years of education: Not on file    Highest education level: Not on file   Occupational History    Not on file   Tobacco Use    Smoking status: Former     Types: Cigarettes     Quit date: 1979     Years since quittin.5    Smokeless tobacco: Never   Vaping Use    Vaping Use: Never used   Substance and Sexual Activity    Alcohol use: Yes     Alcohol/week: 8.4 oz     Types: 14 Glasses of wine per week    Drug use: No    Sexual activity: Not on file   Other Topics Concern    Not on file   Social History Narrative    Not on file     Social Determinants of Health     Financial Resource Strain: Low Risk     Difficulty of Paying Living Expenses: Not hard at all   Food Insecurity: No Food Insecurity    Worried About Running Out of Food in the Last Year: Never true    Ran Out of Food in  the Last Year: Never true   Transportation Needs: No Transportation Needs    Lack of Transportation (Medical): No    Lack of Transportation (Non-Medical): No   Physical Activity: Inactive    Days of Exercise per Week: 0 days    Minutes of Exercise per Session: 0 min   Stress: No Stress Concern Present    Feeling of Stress : Not at all   Social Connections: Unknown    Frequency of Communication with Friends and Family: More than three times a week    Frequency of Social Gatherings with Friends and Family: More than three times a week    Attends Buddhist Services: Not on file    Active Member of Clubs or Organizations: Not on file    Attends Club or Organization Meetings: Not on file    Marital Status: Not on file   Intimate Partner Violence: Not At Risk    Fear of Current or Ex-Partner: No    Emotionally Abused: No    Physically Abused: No    Sexually Abused: No   Housing Stability: Low Risk     Unable to Pay for Housing in the Last Year: No    Number of Places Lived in the Last Year: 1    Unstable Housing in the Last Year: No     No Known Allergies  Outpatient Encounter Medications as of 1/31/2023   Medication Sig Dispense Refill    spironolactone (ALDACTONE) 25 MG Tab Take 1 Tablet by mouth every day. 30 Tablet 11    furosemide (LASIX) 40 MG Tab Take 1 Tablet by mouth every day. 60 Tablet 0    loperamide (IMODIUM) 2 MG Cap Take 2 mg by mouth 4 times a day as needed for Diarrhea.      ferrous sulfate 325 (65 Fe) MG tablet TAKE 1 TABLET BY MOUTH EVERY DAY 90 Tablet 0    losartan (COZAAR) 50 MG Tab Take 50 mg by mouth every day.      metoprolol tartrate (LOPRESSOR) 25 MG Tab Take 25 mg by mouth 2 times a day.      acetaminophen (TYLENOL) 500 MG Tab Take 1,000 mg by mouth every 6 hours as needed for Mild Pain.      simvastatin (ZOCOR) 10 MG Tab Take 1 Tablet by mouth every evening. 100 Tablet 3    diclofenac sodium (VOLTAREN) 1 % Gel Apply 4 g topically 4 times a day as needed (32g/day max (do not use with NSAIDS)).  "100 g 1    folic acid (FOLVITE) 1 MG Tab Take 1 Tablet by mouth every day. 90 Tablet 3    levothyroxine (SYNTHROID) 50 MCG Tab Take 1 Tab by mouth Every morning on an empty stomach. 90 Tab 3    [DISCONTINUED] XARELTO 20 MG Tab tablet Take 20 mg by mouth with dinner.       No facility-administered encounter medications on file as of 1/31/2023.     Review of Systems   Constitutional: Negative.  Negative for chills, fever and malaise/fatigue.   HENT: Negative.  Negative for sore throat.    Eyes: Negative.    Respiratory:  Positive for shortness of breath. Negative for cough, hemoptysis, sputum production, wheezing and stridor.    Cardiovascular: Negative.  Negative for chest pain, palpitations, orthopnea, claudication, leg swelling and PND.   Gastrointestinal: Negative.    Genitourinary: Negative.    Musculoskeletal: Negative.    Skin: Negative.    Neurological: Negative.  Negative for dizziness, loss of consciousness and weakness.   Endo/Heme/Allergies: Negative.  Does not bruise/bleed easily.   All other systems reviewed and are negative.           Objective     BP (!) 154/42 (BP Location: Left arm, Patient Position: Sitting, BP Cuff Size: Adult)   Pulse 82   Resp 14   Ht 1.575 m (5' 2\")   Wt 61.7 kg (136 lb)   SpO2 97%   BMI 24.87 kg/m²     Physical Exam  Vitals and nursing note reviewed.   Constitutional:       General: She is not in acute distress.     Appearance: She is well-developed. She is not diaphoretic.   HENT:      Head: Normocephalic and atraumatic.      Right Ear: External ear normal.      Left Ear: External ear normal.      Nose: Nose normal.      Mouth/Throat:      Pharynx: No oropharyngeal exudate.   Eyes:      General: No scleral icterus.        Right eye: No discharge.         Left eye: No discharge.      Conjunctiva/sclera: Conjunctivae normal.      Pupils: Pupils are equal, round, and reactive to light.   Neck:      Vascular: No JVD.   Cardiovascular:      Rate and Rhythm: Normal rate and " regular rhythm.      Heart sounds: No murmur heard.    No friction rub. No gallop.   Pulmonary:      Effort: Pulmonary effort is normal. No respiratory distress.      Breath sounds: No stridor. No wheezing or rales.   Chest:      Chest wall: No tenderness.   Abdominal:      General: There is no distension.      Palpations: Abdomen is soft.      Tenderness: There is no guarding.   Musculoskeletal:         General: No tenderness or deformity. Normal range of motion.      Cervical back: Neck supple.   Skin:     General: Skin is warm and dry.      Coloration: Skin is not pale.      Findings: No erythema or rash.   Neurological:      Mental Status: She is alert.      Cranial Nerves: No cranial nerve deficit.      Motor: No abnormal muscle tone.      Coordination: Coordination normal.      Deep Tendon Reflexes: Reflexes are normal and symmetric. Reflexes normal.   Psychiatric:         Behavior: Behavior normal.         Thought Content: Thought content normal.         Judgment: Judgment normal.          Echocardiogram: Dated 1/9/2023 personally viewed inter myself showing normal LV systolic function on valvular heart disease.  Grade 1 to grade 2 diastolic dysfunction.    CTA: Dated 1/8/2023 personally viewed interpret myself showing no cardiovascular abnormalities.  Lab Results   Component Value Date/Time    CHOLSTRLTOT 101 06/28/2022 08:58 AM    LDL 25 06/28/2022 08:58 AM    HDL 59 06/28/2022 08:58 AM    TRIGLYCERIDE 86 06/28/2022 08:58 AM       Lab Results   Component Value Date/Time    SODIUM 135 01/28/2023 12:44 AM    POTASSIUM 4.1 01/28/2023 12:44 AM    CHLORIDE 102 01/28/2023 12:44 AM    CO2 19 (L) 01/28/2023 12:44 AM    GLUCOSE 101 (H) 01/28/2023 12:44 AM    BUN 34 (H) 01/28/2023 12:44 AM    CREATININE 1.34 01/28/2023 12:44 AM     Lab Results   Component Value Date/Time    ALKPHOSPHAT 112 (H) 01/26/2023 11:09 AM    ASTSGOT 9 (L) 01/26/2023 11:09 AM    ALTSGPT 9 01/26/2023 11:09 AM    TBILIRUBIN 0.4 01/26/2023 11:09  AM         Assessment & Plan     1. Essential hypertension        2. Mixed hyperlipidemia        3. Stage 3b chronic kidney disease (HCC)        4. Elevated troponin  FERRITIN      5. Aortic valve insufficiency, etiology of cardiac valve disease unspecified  FERRITIN    spironolactone (ALDACTONE) 25 MG Tab      6. Bilateral edema of lower extremity  FERRITIN    Comp Metabolic Panel      7. Dyslipidemia  CBC WITH DIFFERENTIAL      8. Diastolic CHF, acute on chronic (HCC)  Comp Metabolic Panel    spironolactone (ALDACTONE) 25 MG Tab      9. Chronic anticoagulation  Comp Metabolic Panel    spironolactone (ALDACTONE) 25 MG Tab      10. SOB (shortness of breath)  CBC WITH DIFFERENTIAL    Comp Metabolic Panel    spironolactone (ALDACTONE) 25 MG Tab      11. Iron deficiency anemia due to chronic blood loss  IRON/TOTAL IRON BIND (FEIBC)    FERRITIN    CBC WITH DIFFERENTIAL    Comp Metabolic Panel          Medical Decision Making: Today's Assessment/Status/Plan:        82-year-old female with diastolic dysfunction causing fluid overload with profound anemia.  I will get an iron and TIBC.  We will await her consultation with hematology.  This could be high-output heart failure secondary to anemia.  I will also start on spironolactone for fluid removal given her JVD.  We will check her labs.  I will see her back in 4 weeks.

## 2023-02-03 ENCOUNTER — OFFICE VISIT (OUTPATIENT)
Dept: MEDICAL GROUP | Facility: PHYSICIAN GROUP | Age: 83
End: 2023-02-03
Payer: MEDICARE

## 2023-02-03 VITALS
TEMPERATURE: 97.7 F | BODY MASS INDEX: 24.66 KG/M2 | OXYGEN SATURATION: 97 % | RESPIRATION RATE: 20 BRPM | SYSTOLIC BLOOD PRESSURE: 152 MMHG | DIASTOLIC BLOOD PRESSURE: 48 MMHG | HEART RATE: 92 BPM | HEIGHT: 62 IN | WEIGHT: 134 LBS

## 2023-02-03 DIAGNOSIS — D64.9 ANEMIA, UNSPECIFIED TYPE: ICD-10-CM

## 2023-02-03 DIAGNOSIS — Z86.718 HISTORY OF DVT (DEEP VEIN THROMBOSIS): ICD-10-CM

## 2023-02-03 DIAGNOSIS — R06.02 SOB (SHORTNESS OF BREATH): ICD-10-CM

## 2023-02-03 DIAGNOSIS — I50.33 DIASTOLIC CHF, ACUTE ON CHRONIC (HCC): ICD-10-CM

## 2023-02-03 DIAGNOSIS — N18.32 STAGE 3B CHRONIC KIDNEY DISEASE: ICD-10-CM

## 2023-02-03 DIAGNOSIS — R60.0 BILATERAL EDEMA OF LOWER EXTREMITY: ICD-10-CM

## 2023-02-03 DIAGNOSIS — D72.829 LEUKOCYTOSIS, UNSPECIFIED TYPE: ICD-10-CM

## 2023-02-03 DIAGNOSIS — I10 ESSENTIAL HYPERTENSION: ICD-10-CM

## 2023-02-03 DIAGNOSIS — Z09 HOSPITAL DISCHARGE FOLLOW-UP: Primary | ICD-10-CM

## 2023-02-03 PROBLEM — E87.1 HYPONATREMIA: Status: RESOLVED | Noted: 2022-06-28 | Resolved: 2023-02-03

## 2023-02-03 PROCEDURE — 99214 OFFICE O/P EST MOD 30 MIN: CPT | Performed by: STUDENT IN AN ORGANIZED HEALTH CARE EDUCATION/TRAINING PROGRAM

## 2023-02-03 RX ORDER — FUROSEMIDE 40 MG/1
40 TABLET ORAL 2 TIMES DAILY
Qty: 180 TABLET | Refills: 0 | Status: ON HOLD | OUTPATIENT
Start: 2023-02-03 | End: 2023-02-11

## 2023-02-03 RX ORDER — RIVAROXABAN 20 MG/1
TABLET, FILM COATED ORAL
COMMUNITY
Start: 2023-02-01 | End: 2023-02-03

## 2023-02-03 ASSESSMENT — FIBROSIS 4 INDEX: FIB4 SCORE: 0.85

## 2023-02-04 NOTE — PATIENT INSTRUCTIONS
Increase furosemide to 40mg twice a day    Ideal blood pressure <130/80 and at least <140/90.    Labs Tuesday (fasting)

## 2023-02-06 ENCOUNTER — HOSPITAL ENCOUNTER (INPATIENT)
Facility: MEDICAL CENTER | Age: 83
LOS: 5 days | DRG: 291 | End: 2023-02-11
Attending: EMERGENCY MEDICINE | Admitting: STUDENT IN AN ORGANIZED HEALTH CARE EDUCATION/TRAINING PROGRAM
Payer: MEDICARE

## 2023-02-06 ENCOUNTER — APPOINTMENT (OUTPATIENT)
Dept: RADIOLOGY | Facility: MEDICAL CENTER | Age: 83
DRG: 291 | End: 2023-02-06
Payer: MEDICARE

## 2023-02-06 ENCOUNTER — APPOINTMENT (OUTPATIENT)
Dept: RADIOLOGY | Facility: MEDICAL CENTER | Age: 83
DRG: 291 | End: 2023-02-06
Attending: EMERGENCY MEDICINE
Payer: MEDICARE

## 2023-02-06 DIAGNOSIS — R06.02 SHORTNESS OF BREATH: ICD-10-CM

## 2023-02-06 DIAGNOSIS — I50.33 DIASTOLIC CHF, ACUTE ON CHRONIC (HCC): ICD-10-CM

## 2023-02-06 PROBLEM — R06.09 DYSPNEA ON MINIMAL EXERTION: Status: ACTIVE | Noted: 2023-02-06

## 2023-02-06 PROBLEM — N25.81 SECONDARY RENAL HYPERPARATHYROIDISM (HCC): Status: ACTIVE | Noted: 2023-02-06

## 2023-02-06 PROBLEM — N17.9 ACUTE RENAL FAILURE SUPERIMPOSED ON STAGE 3 CHRONIC KIDNEY DISEASE (HCC): Status: ACTIVE | Noted: 2019-07-24

## 2023-02-06 LAB
ALBUMIN SERPL BCP-MCNC: 4.6 G/DL (ref 3.2–4.9)
ALBUMIN/GLOB SERPL: 1.5 G/DL
ALP SERPL-CCNC: 131 U/L (ref 30–99)
ALT SERPL-CCNC: 9 U/L (ref 2–50)
ANION GAP SERPL CALC-SCNC: 18 MMOL/L (ref 7–16)
ANISOCYTOSIS BLD QL SMEAR: ABNORMAL
AST SERPL-CCNC: 12 U/L (ref 12–45)
BASOPHILS # BLD AUTO: 1.7 % (ref 0–1.8)
BASOPHILS # BLD: 0.29 K/UL (ref 0–0.12)
BILIRUB SERPL-MCNC: 0.5 MG/DL (ref 0.1–1.5)
BUN SERPL-MCNC: 50 MG/DL (ref 8–22)
CALCIUM ALBUM COR SERPL-MCNC: 10 MG/DL (ref 8.5–10.5)
CALCIUM SERPL-MCNC: 10.5 MG/DL (ref 8.5–10.5)
CHLORIDE SERPL-SCNC: 99 MMOL/L (ref 96–112)
CO2 SERPL-SCNC: 18 MMOL/L (ref 20–33)
CREAT SERPL-MCNC: 1.49 MG/DL (ref 0.5–1.4)
DACRYOCYTES BLD QL SMEAR: NORMAL
EKG IMPRESSION: NORMAL
EOSINOPHIL # BLD AUTO: 0.15 K/UL (ref 0–0.51)
EOSINOPHIL NFR BLD: 0.9 % (ref 0–6.9)
ERYTHROCYTE [DISTWIDTH] IN BLOOD BY AUTOMATED COUNT: 54.2 FL (ref 35.9–50)
FERRITIN SERPL-MCNC: 345 NG/ML (ref 10–291)
FOLATE SERPL-MCNC: >40 NG/ML
GFR SERPLBLD CREATININE-BSD FMLA CKD-EPI: 35 ML/MIN/1.73 M 2
GLOBULIN SER CALC-MCNC: 3.1 G/DL (ref 1.9–3.5)
GLUCOSE SERPL-MCNC: 117 MG/DL (ref 65–99)
HCT VFR BLD AUTO: 36.1 % (ref 37–47)
HGB BLD-MCNC: 11.6 G/DL (ref 12–16)
HGB RETIC QN AUTO: 30.5 PG/CELL (ref 29–35)
IMM RETICS NFR: 35.4 % (ref 9.3–17.4)
IRON SATN MFR SERPL: 12 % (ref 15–55)
IRON SERPL-MCNC: 32 UG/DL (ref 40–170)
LYMPHOCYTES # BLD AUTO: 0.88 K/UL (ref 1–4.8)
LYMPHOCYTES NFR BLD: 5.2 % (ref 22–41)
MAGNESIUM SERPL-MCNC: 2 MG/DL (ref 1.5–2.5)
MANUAL DIFF BLD: NORMAL
MCH RBC QN AUTO: 29.8 PG (ref 27–33)
MCHC RBC AUTO-ENTMCNC: 32.1 G/DL (ref 33.6–35)
MCV RBC AUTO: 92.8 FL (ref 81.4–97.8)
METAMYELOCYTES NFR BLD MANUAL: 2.6 %
MICROCYTES BLD QL SMEAR: ABNORMAL
MONOCYTES # BLD AUTO: 0.29 K/UL (ref 0–0.85)
MONOCYTES NFR BLD AUTO: 1.7 % (ref 0–13.4)
MORPHOLOGY BLD-IMP: NORMAL
MYELOCYTES NFR BLD MANUAL: 0.9 %
NEUTROPHILS # BLD AUTO: 14.7 K/UL (ref 2–7.15)
NEUTROPHILS NFR BLD: 87 % (ref 44–72)
NRBC # BLD AUTO: 0.03 K/UL
NRBC BLD-RTO: 0.2 /100 WBC
NT-PROBNP SERPL IA-MCNC: ABNORMAL PG/ML (ref 0–125)
PHOSPHATE SERPL-MCNC: 5.3 MG/DL (ref 2.5–4.5)
PLATELET # BLD AUTO: 353 K/UL (ref 164–446)
PLATELET BLD QL SMEAR: NORMAL
PMV BLD AUTO: 12.4 FL (ref 9–12.9)
POIKILOCYTOSIS BLD QL SMEAR: NORMAL
POLYCHROMASIA BLD QL SMEAR: NORMAL
POTASSIUM SERPL-SCNC: 5 MMOL/L (ref 3.6–5.5)
PROT SERPL-MCNC: 7.7 G/DL (ref 6–8.2)
PTH-INTACT SERPL-MCNC: 21.8 PG/ML (ref 14–72)
RBC # BLD AUTO: 3.89 M/UL (ref 4.2–5.4)
RBC BLD AUTO: PRESENT
RETICS # AUTO: 0.14 M/UL (ref 0.04–0.06)
RETICS/RBC NFR: 3.6 % (ref 0.8–2.1)
SODIUM SERPL-SCNC: 135 MMOL/L (ref 135–145)
T4 FREE SERPL-MCNC: 1.71 NG/DL (ref 0.93–1.7)
TIBC SERPL-MCNC: 275 UG/DL (ref 250–450)
TROPONIN T SERPL-MCNC: 43 NG/L (ref 6–19)
TSH SERPL DL<=0.005 MIU/L-ACNC: 5.64 UIU/ML (ref 0.38–5.33)
UIBC SERPL-MCNC: 243 UG/DL (ref 110–370)
VIT B12 SERPL-MCNC: 3456 PG/ML (ref 211–911)
WBC # BLD AUTO: 16.9 K/UL (ref 4.8–10.8)

## 2023-02-06 PROCEDURE — 83540 ASSAY OF IRON: CPT

## 2023-02-06 PROCEDURE — 700111 HCHG RX REV CODE 636 W/ 250 OVERRIDE (IP): Performed by: EMERGENCY MEDICINE

## 2023-02-06 PROCEDURE — 93005 ELECTROCARDIOGRAM TRACING: CPT | Performed by: EMERGENCY MEDICINE

## 2023-02-06 PROCEDURE — A9270 NON-COVERED ITEM OR SERVICE: HCPCS | Performed by: STUDENT IN AN ORGANIZED HEALTH CARE EDUCATION/TRAINING PROGRAM

## 2023-02-06 PROCEDURE — 84484 ASSAY OF TROPONIN QUANT: CPT

## 2023-02-06 PROCEDURE — 84439 ASSAY OF FREE THYROXINE: CPT

## 2023-02-06 PROCEDURE — 82728 ASSAY OF FERRITIN: CPT

## 2023-02-06 PROCEDURE — 85025 COMPLETE CBC W/AUTO DIFF WBC: CPT

## 2023-02-06 PROCEDURE — 93005 ELECTROCARDIOGRAM TRACING: CPT

## 2023-02-06 PROCEDURE — 71045 X-RAY EXAM CHEST 1 VIEW: CPT

## 2023-02-06 PROCEDURE — 99285 EMERGENCY DEPT VISIT HI MDM: CPT

## 2023-02-06 PROCEDURE — 82607 VITAMIN B-12: CPT

## 2023-02-06 PROCEDURE — 770020 HCHG ROOM/CARE - TELE (206)

## 2023-02-06 PROCEDURE — 85007 BL SMEAR W/DIFF WBC COUNT: CPT

## 2023-02-06 PROCEDURE — 82652 VIT D 1 25-DIHYDROXY: CPT

## 2023-02-06 PROCEDURE — 80053 COMPREHEN METABOLIC PANEL: CPT

## 2023-02-06 PROCEDURE — 700102 HCHG RX REV CODE 250 W/ 637 OVERRIDE(OP): Performed by: STUDENT IN AN ORGANIZED HEALTH CARE EDUCATION/TRAINING PROGRAM

## 2023-02-06 PROCEDURE — 82746 ASSAY OF FOLIC ACID SERUM: CPT

## 2023-02-06 PROCEDURE — 36415 COLL VENOUS BLD VENIPUNCTURE: CPT

## 2023-02-06 PROCEDURE — 83970 ASSAY OF PARATHORMONE: CPT

## 2023-02-06 PROCEDURE — 99223 1ST HOSP IP/OBS HIGH 75: CPT | Mod: AI,GC | Performed by: STUDENT IN AN ORGANIZED HEALTH CARE EDUCATION/TRAINING PROGRAM

## 2023-02-06 PROCEDURE — 83550 IRON BINDING TEST: CPT

## 2023-02-06 PROCEDURE — 83880 ASSAY OF NATRIURETIC PEPTIDE: CPT

## 2023-02-06 PROCEDURE — 84443 ASSAY THYROID STIM HORMONE: CPT

## 2023-02-06 PROCEDURE — 83735 ASSAY OF MAGNESIUM: CPT

## 2023-02-06 PROCEDURE — 99222 1ST HOSP IP/OBS MODERATE 55: CPT | Performed by: INTERNAL MEDICINE

## 2023-02-06 PROCEDURE — 84100 ASSAY OF PHOSPHORUS: CPT

## 2023-02-06 PROCEDURE — 85046 RETICYTE/HGB CONCENTRATE: CPT

## 2023-02-06 PROCEDURE — 700111 HCHG RX REV CODE 636 W/ 250 OVERRIDE (IP): Performed by: STUDENT IN AN ORGANIZED HEALTH CARE EDUCATION/TRAINING PROGRAM

## 2023-02-06 PROCEDURE — 96374 THER/PROPH/DIAG INJ IV PUSH: CPT

## 2023-02-06 RX ORDER — FERROUS SULFATE 325(65) MG
325 TABLET ORAL DAILY
Status: DISCONTINUED | OUTPATIENT
Start: 2023-02-07 | End: 2023-02-11 | Stop reason: HOSPADM

## 2023-02-06 RX ORDER — FUROSEMIDE 10 MG/ML
40 INJECTION INTRAMUSCULAR; INTRAVENOUS
Status: DISCONTINUED | OUTPATIENT
Start: 2023-02-07 | End: 2023-02-06

## 2023-02-06 RX ORDER — LEVOTHYROXINE SODIUM 0.05 MG/1
50 TABLET ORAL
Status: DISCONTINUED | OUTPATIENT
Start: 2023-02-07 | End: 2023-02-11 | Stop reason: HOSPADM

## 2023-02-06 RX ORDER — AMOXICILLIN 250 MG
2 CAPSULE ORAL 2 TIMES DAILY
Status: DISCONTINUED | OUTPATIENT
Start: 2023-02-06 | End: 2023-02-08

## 2023-02-06 RX ORDER — FOLIC ACID 1 MG/1
1 TABLET ORAL DAILY
Status: DISCONTINUED | OUTPATIENT
Start: 2023-02-07 | End: 2023-02-11 | Stop reason: HOSPADM

## 2023-02-06 RX ORDER — ACETAMINOPHEN 325 MG/1
650 TABLET ORAL EVERY 6 HOURS PRN
Status: DISCONTINUED | OUTPATIENT
Start: 2023-02-06 | End: 2023-02-11 | Stop reason: HOSPADM

## 2023-02-06 RX ORDER — ONDANSETRON 2 MG/ML
4 INJECTION INTRAMUSCULAR; INTRAVENOUS EVERY 4 HOURS PRN
Status: DISCONTINUED | OUTPATIENT
Start: 2023-02-06 | End: 2023-02-11 | Stop reason: HOSPADM

## 2023-02-06 RX ORDER — FUROSEMIDE 10 MG/ML
40 INJECTION INTRAMUSCULAR; INTRAVENOUS ONCE
Status: COMPLETED | OUTPATIENT
Start: 2023-02-06 | End: 2023-02-06

## 2023-02-06 RX ORDER — AMOXICILLIN AND CLAVULANATE POTASSIUM 500; 125 MG/1; MG/1
1 TABLET, FILM COATED ORAL EVERY 12 HOURS
Status: ON HOLD | COMMUNITY
End: 2023-02-11

## 2023-02-06 RX ORDER — AZITHROMYCIN 250 MG/1
250-500 TABLET, FILM COATED ORAL DAILY
Status: ON HOLD | COMMUNITY
End: 2023-02-11

## 2023-02-06 RX ORDER — FUROSEMIDE 10 MG/ML
40 INJECTION INTRAMUSCULAR; INTRAVENOUS
Status: DISCONTINUED | OUTPATIENT
Start: 2023-02-06 | End: 2023-02-08

## 2023-02-06 RX ORDER — BISACODYL 10 MG
10 SUPPOSITORY, RECTAL RECTAL
Status: DISCONTINUED | OUTPATIENT
Start: 2023-02-06 | End: 2023-02-08

## 2023-02-06 RX ORDER — DIAZEPAM 10 MG/1
10 TABLET ORAL
Status: ON HOLD | COMMUNITY
End: 2023-02-11

## 2023-02-06 RX ORDER — LABETALOL HYDROCHLORIDE 5 MG/ML
10 INJECTION, SOLUTION INTRAVENOUS EVERY 4 HOURS PRN
Status: DISCONTINUED | OUTPATIENT
Start: 2023-02-06 | End: 2023-02-11 | Stop reason: HOSPADM

## 2023-02-06 RX ORDER — HEPARIN SODIUM 5000 [USP'U]/ML
5000 INJECTION, SOLUTION INTRAVENOUS; SUBCUTANEOUS EVERY 8 HOURS
Status: DISCONTINUED | OUTPATIENT
Start: 2023-02-06 | End: 2023-02-11 | Stop reason: HOSPADM

## 2023-02-06 RX ORDER — ENOXAPARIN SODIUM 100 MG/ML
40 INJECTION SUBCUTANEOUS DAILY
Status: DISCONTINUED | OUTPATIENT
Start: 2023-02-06 | End: 2023-02-06

## 2023-02-06 RX ORDER — POLYETHYLENE GLYCOL 3350 17 G/17G
1 POWDER, FOR SOLUTION ORAL
Status: DISCONTINUED | OUTPATIENT
Start: 2023-02-06 | End: 2023-02-08

## 2023-02-06 RX ORDER — SIMVASTATIN 10 MG
10 TABLET ORAL EVERY EVENING
Status: DISCONTINUED | OUTPATIENT
Start: 2023-02-06 | End: 2023-02-11 | Stop reason: HOSPADM

## 2023-02-06 RX ORDER — ONDANSETRON 4 MG/1
4 TABLET, ORALLY DISINTEGRATING ORAL EVERY 4 HOURS PRN
Status: DISCONTINUED | OUTPATIENT
Start: 2023-02-06 | End: 2023-02-11 | Stop reason: HOSPADM

## 2023-02-06 RX ADMIN — SIMVASTATIN 10 MG: 10 TABLET, FILM COATED ORAL at 21:09

## 2023-02-06 RX ADMIN — METOPROLOL TARTRATE 25 MG: 25 TABLET, FILM COATED ORAL at 21:09

## 2023-02-06 RX ADMIN — HEPARIN SODIUM 5000 UNITS: 5000 INJECTION INTRAVENOUS; SUBCUTANEOUS at 21:09

## 2023-02-06 RX ADMIN — FUROSEMIDE 40 MG: 10 INJECTION, SOLUTION INTRAMUSCULAR; INTRAVENOUS at 16:50

## 2023-02-06 ASSESSMENT — ENCOUNTER SYMPTOMS
DIZZINESS: 0
HEADACHES: 0
CHEST TIGHTNESS: 1
SHORTNESS OF BREATH: 1
VOMITING: 0
WEAKNESS: 0
CHILLS: 0
DIARRHEA: 0
BLOOD IN STOOL: 0
PND: 1
FATIGUE: 1
ORTHOPNEA: 1
BACK PAIN: 0
LOSS OF CONSCIOUSNESS: 0
CONSTIPATION: 0
BLURRED VISION: 0
SPUTUM PRODUCTION: 0
WHEEZING: 0
NAUSEA: 0
FEVER: 0
ABDOMINAL PAIN: 0
DIAPHORESIS: 0
CLAUDICATION: 0
PALPITATIONS: 0
COUGH: 0

## 2023-02-06 ASSESSMENT — LIFESTYLE VARIABLES
HOW MANY TIMES IN THE PAST YEAR HAVE YOU HAD 5 OR MORE DRINKS IN A DAY: 0
TOTAL SCORE: 0
HAVE YOU EVER FELT YOU SHOULD CUT DOWN ON YOUR DRINKING: NO
EVER HAD A DRINK FIRST THING IN THE MORNING TO STEADY YOUR NERVES TO GET RID OF A HANGOVER: NO
TOTAL SCORE: 0
DOES PATIENT WANT TO STOP DRINKING: NO
ALCOHOL_USE: YES
TOTAL SCORE: 0
EVER FELT BAD OR GUILTY ABOUT YOUR DRINKING: NO
HAVE PEOPLE ANNOYED YOU BY CRITICIZING YOUR DRINKING: NO
ON A TYPICAL DAY WHEN YOU DRINK ALCOHOL HOW MANY DRINKS DO YOU HAVE: 1
CONSUMPTION TOTAL: NEGATIVE
AVERAGE NUMBER OF DAYS PER WEEK YOU HAVE A DRINK CONTAINING ALCOHOL: 7

## 2023-02-06 ASSESSMENT — PATIENT HEALTH QUESTIONNAIRE - PHQ9
2. FEELING DOWN, DEPRESSED, IRRITABLE, OR HOPELESS: NOT AT ALL
SUM OF ALL RESPONSES TO PHQ9 QUESTIONS 1 AND 2: 0
1. LITTLE INTEREST OR PLEASURE IN DOING THINGS: NOT AT ALL

## 2023-02-06 ASSESSMENT — FIBROSIS 4 INDEX
FIB4 SCORE: 0.91
FIB4 SCORE: 0.85

## 2023-02-06 ASSESSMENT — COGNITIVE AND FUNCTIONAL STATUS - GENERAL
DAILY ACTIVITIY SCORE: 24
SUGGESTED CMS G CODE MODIFIER DAILY ACTIVITY: CH
MOBILITY SCORE: 24
SUGGESTED CMS G CODE MODIFIER MOBILITY: CH

## 2023-02-06 ASSESSMENT — PAIN DESCRIPTION - PAIN TYPE: TYPE: ACUTE PAIN

## 2023-02-07 ENCOUNTER — APPOINTMENT (OUTPATIENT)
Dept: CARDIOLOGY | Facility: MEDICAL CENTER | Age: 83
DRG: 291 | End: 2023-02-07
Attending: STUDENT IN AN ORGANIZED HEALTH CARE EDUCATION/TRAINING PROGRAM
Payer: MEDICARE

## 2023-02-07 LAB
ALBUMIN SERPL BCP-MCNC: 4 G/DL (ref 3.2–4.9)
ALBUMIN/GLOB SERPL: 1.5 G/DL
ALP SERPL-CCNC: 110 U/L (ref 30–99)
ALT SERPL-CCNC: 11 U/L (ref 2–50)
ANION GAP SERPL CALC-SCNC: 16 MMOL/L (ref 7–16)
AST SERPL-CCNC: 9 U/L (ref 12–45)
B PARAP IS1001 DNA NPH QL NAA+NON-PROBE: NOT DETECTED
B PERT.PT PRMT NPH QL NAA+NON-PROBE: NOT DETECTED
BASOPHILS # BLD AUTO: 0.7 % (ref 0–1.8)
BASOPHILS # BLD: 0.11 K/UL (ref 0–0.12)
BILIRUB SERPL-MCNC: 0.4 MG/DL (ref 0.1–1.5)
BUN SERPL-MCNC: 48 MG/DL (ref 8–22)
C PNEUM DNA NPH QL NAA+NON-PROBE: NOT DETECTED
CALCIUM ALBUM COR SERPL-MCNC: 9.7 MG/DL (ref 8.5–10.5)
CALCIUM SERPL-MCNC: 9.7 MG/DL (ref 8.5–10.5)
CHLORIDE SERPL-SCNC: 100 MMOL/L (ref 96–112)
CO2 SERPL-SCNC: 18 MMOL/L (ref 20–33)
CREAT SERPL-MCNC: 1.62 MG/DL (ref 0.5–1.4)
EOSINOPHIL # BLD AUTO: 0.58 K/UL (ref 0–0.51)
EOSINOPHIL NFR BLD: 3.5 % (ref 0–6.9)
ERYTHROCYTE [DISTWIDTH] IN BLOOD BY AUTOMATED COUNT: 54.3 FL (ref 35.9–50)
FLUAV RNA NPH QL NAA+NON-PROBE: NOT DETECTED
FLUBV RNA NPH QL NAA+NON-PROBE: NOT DETECTED
GFR SERPLBLD CREATININE-BSD FMLA CKD-EPI: 32 ML/MIN/1.73 M 2
GLOBULIN SER CALC-MCNC: 2.7 G/DL (ref 1.9–3.5)
GLUCOSE SERPL-MCNC: 112 MG/DL (ref 65–99)
HADV DNA NPH QL NAA+NON-PROBE: NOT DETECTED
HCOV 229E RNA NPH QL NAA+NON-PROBE: NOT DETECTED
HCOV HKU1 RNA NPH QL NAA+NON-PROBE: NOT DETECTED
HCOV NL63 RNA NPH QL NAA+NON-PROBE: NOT DETECTED
HCOV OC43 RNA NPH QL NAA+NON-PROBE: NOT DETECTED
HCT VFR BLD AUTO: 33.1 % (ref 37–47)
HGB BLD-MCNC: 10.2 G/DL (ref 12–16)
HMPV RNA NPH QL NAA+NON-PROBE: NOT DETECTED
HPIV1 RNA NPH QL NAA+NON-PROBE: NOT DETECTED
HPIV2 RNA NPH QL NAA+NON-PROBE: NOT DETECTED
HPIV3 RNA NPH QL NAA+NON-PROBE: NOT DETECTED
HPIV4 RNA NPH QL NAA+NON-PROBE: NOT DETECTED
IMM GRANULOCYTES # BLD AUTO: 0.13 K/UL (ref 0–0.11)
IMM GRANULOCYTES NFR BLD AUTO: 0.8 % (ref 0–0.9)
LYMPHOCYTES # BLD AUTO: 2.42 K/UL (ref 1–4.8)
LYMPHOCYTES NFR BLD: 14.6 % (ref 22–41)
M PNEUMO DNA NPH QL NAA+NON-PROBE: NOT DETECTED
MAGNESIUM SERPL-MCNC: 2 MG/DL (ref 1.5–2.5)
MCH RBC QN AUTO: 28.3 PG (ref 27–33)
MCHC RBC AUTO-ENTMCNC: 30.8 G/DL (ref 33.6–35)
MCV RBC AUTO: 91.7 FL (ref 81.4–97.8)
MONOCYTES # BLD AUTO: 0.96 K/UL (ref 0–0.85)
MONOCYTES NFR BLD AUTO: 5.8 % (ref 0–13.4)
NEUTROPHILS # BLD AUTO: 12.34 K/UL (ref 2–7.15)
NEUTROPHILS NFR BLD: 74.6 % (ref 44–72)
NRBC # BLD AUTO: 0.03 K/UL
NRBC BLD-RTO: 0.2 /100 WBC
PHOSPHATE SERPL-MCNC: 4.7 MG/DL (ref 2.5–4.5)
PLATELET # BLD AUTO: 322 K/UL (ref 164–446)
PMV BLD AUTO: 12.5 FL (ref 9–12.9)
POTASSIUM SERPL-SCNC: 4.7 MMOL/L (ref 3.6–5.5)
PROT SERPL-MCNC: 6.7 G/DL (ref 6–8.2)
RBC # BLD AUTO: 3.61 M/UL (ref 4.2–5.4)
RSV RNA NPH QL NAA+NON-PROBE: NOT DETECTED
RV+EV RNA NPH QL NAA+NON-PROBE: NOT DETECTED
SARS-COV-2 RNA NPH QL NAA+NON-PROBE: NOTDETECTED
SODIUM SERPL-SCNC: 134 MMOL/L (ref 135–145)
TROPONIN T SERPL-MCNC: 51 NG/L (ref 6–19)
WBC # BLD AUTO: 16.5 K/UL (ref 4.8–10.8)

## 2023-02-07 PROCEDURE — 84484 ASSAY OF TROPONIN QUANT: CPT

## 2023-02-07 PROCEDURE — 700111 HCHG RX REV CODE 636 W/ 250 OVERRIDE (IP): Performed by: STUDENT IN AN ORGANIZED HEALTH CARE EDUCATION/TRAINING PROGRAM

## 2023-02-07 PROCEDURE — 770020 HCHG ROOM/CARE - TELE (206)

## 2023-02-07 PROCEDURE — 87581 M.PNEUMON DNA AMP PROBE: CPT

## 2023-02-07 PROCEDURE — A9270 NON-COVERED ITEM OR SERVICE: HCPCS | Performed by: STUDENT IN AN ORGANIZED HEALTH CARE EDUCATION/TRAINING PROGRAM

## 2023-02-07 PROCEDURE — 85025 COMPLETE CBC W/AUTO DIFF WBC: CPT

## 2023-02-07 PROCEDURE — 99233 SBSQ HOSP IP/OBS HIGH 50: CPT | Performed by: INTERNAL MEDICINE

## 2023-02-07 PROCEDURE — 83735 ASSAY OF MAGNESIUM: CPT

## 2023-02-07 PROCEDURE — 36415 COLL VENOUS BLD VENIPUNCTURE: CPT

## 2023-02-07 PROCEDURE — 87633 RESP VIRUS 12-25 TARGETS: CPT

## 2023-02-07 PROCEDURE — 700102 HCHG RX REV CODE 250 W/ 637 OVERRIDE(OP): Performed by: STUDENT IN AN ORGANIZED HEALTH CARE EDUCATION/TRAINING PROGRAM

## 2023-02-07 PROCEDURE — 84100 ASSAY OF PHOSPHORUS: CPT

## 2023-02-07 PROCEDURE — 87486 CHLMYD PNEUM DNA AMP PROBE: CPT

## 2023-02-07 PROCEDURE — 93306 TTE W/DOPPLER COMPLETE: CPT

## 2023-02-07 PROCEDURE — 87798 DETECT AGENT NOS DNA AMP: CPT | Mod: 91

## 2023-02-07 PROCEDURE — 51798 US URINE CAPACITY MEASURE: CPT

## 2023-02-07 PROCEDURE — 700102 HCHG RX REV CODE 250 W/ 637 OVERRIDE(OP)

## 2023-02-07 PROCEDURE — 80053 COMPREHEN METABOLIC PANEL: CPT

## 2023-02-07 PROCEDURE — A9270 NON-COVERED ITEM OR SERVICE: HCPCS

## 2023-02-07 RX ORDER — CHOLECALCIFEROL (VITAMIN D3) 125 MCG
5 CAPSULE ORAL NIGHTLY
Status: DISCONTINUED | OUTPATIENT
Start: 2023-02-07 | End: 2023-02-11 | Stop reason: HOSPADM

## 2023-02-07 RX ADMIN — LEVOTHYROXINE SODIUM 50 MCG: 0.05 TABLET ORAL at 05:37

## 2023-02-07 RX ADMIN — HEPARIN SODIUM 5000 UNITS: 5000 INJECTION INTRAVENOUS; SUBCUTANEOUS at 13:34

## 2023-02-07 RX ADMIN — FUROSEMIDE 40 MG: 10 INJECTION, SOLUTION INTRAMUSCULAR; INTRAVENOUS at 16:45

## 2023-02-07 RX ADMIN — FUROSEMIDE 40 MG: 10 INJECTION, SOLUTION INTRAMUSCULAR; INTRAVENOUS at 00:47

## 2023-02-07 RX ADMIN — FUROSEMIDE 40 MG: 10 INJECTION, SOLUTION INTRAMUSCULAR; INTRAVENOUS at 05:37

## 2023-02-07 RX ADMIN — METOPROLOL TARTRATE 25 MG: 25 TABLET, FILM COATED ORAL at 06:48

## 2023-02-07 RX ADMIN — HEPARIN SODIUM 5000 UNITS: 5000 INJECTION INTRAVENOUS; SUBCUTANEOUS at 21:08

## 2023-02-07 RX ADMIN — FERROUS SULFATE TAB 325 MG (65 MG ELEMENTAL FE) 325 MG: 325 (65 FE) TAB at 05:37

## 2023-02-07 RX ADMIN — FOLIC ACID 1 MG: 1 TABLET ORAL at 05:37

## 2023-02-07 RX ADMIN — HEPARIN SODIUM 5000 UNITS: 5000 INJECTION INTRAVENOUS; SUBCUTANEOUS at 05:37

## 2023-02-07 RX ADMIN — Medication 5 MG: at 21:08

## 2023-02-07 RX ADMIN — SIMVASTATIN 10 MG: 10 TABLET, FILM COATED ORAL at 16:45

## 2023-02-07 RX ADMIN — METOPROLOL TARTRATE 25 MG: 25 TABLET, FILM COATED ORAL at 20:00

## 2023-02-07 ASSESSMENT — ENCOUNTER SYMPTOMS
VOMITING: 0
SHORTNESS OF BREATH: 1
CHILLS: 0
WEAKNESS: 0
BLURRED VISION: 0
LOSS OF CONSCIOUSNESS: 0
ABDOMINAL PAIN: 0
BLOOD IN STOOL: 0
BACK PAIN: 0
ORTHOPNEA: 1
DIAPHORESIS: 0
WHEEZING: 0
HEADACHES: 0
DIARRHEA: 0
SPUTUM PRODUCTION: 0
CLAUDICATION: 0
CONSTIPATION: 0
COUGH: 0
PALPITATIONS: 0
NAUSEA: 0
FEVER: 0
PND: 1
DIZZINESS: 0

## 2023-02-07 ASSESSMENT — PAIN DESCRIPTION - PAIN TYPE
TYPE: ACUTE PAIN

## 2023-02-07 NOTE — DIETARY
"Nutrition services: Day 1 of admit.  Mariely Easley is a 82 y.o. female with admitting DX of Diastolic CHF, acute on chronic.   Consult received for Poor PO.     Assessment:  Height: 157.5 cm (5' 2\")  Weight: 59.4 kg (130 lb 15.3 oz)  Body mass index is 23.95 kg/m²., BMI classification: Normal  Diet/Intake: 2 gm sodium with 1500 ml fluid restriction    Evaluation:   Pt visiting with family members at time of visit.   Pt reports appetite as being <50% for ~1 month since she's \"been sick\". Family in room reports pt appetite as being half a boost and a cup of coffee frequently.   Pt reports UBW of 130 lbs. Current wt via stand up scale is 130 lbs. Per chart review pt was 134 lbs 1/8/23, no significant wt loss noted.   RD and pt family discussed high protein foods to try such as cottage cheese and greek yogurt.   NFPE shows pt is well nourished.   Labs: Na 134, Glu 112, GFR 32, BUN 48, Phos 4.7.   Meds: Ferrous sulfate, folic acid, lasix, bowel regimen prn.   Skin: 1+ edema in RLE and LLE  +BM 2/6    Malnutrition Risk: Pt only meets one criteria at this time ( po <50% x1 month).     Recommendations/Plan:  Boost VHC.   Encourage intake of ~50%.   Document intake of all PO as % taken in ADL's to provide interdisciplinary communication across all shifts.   Monitor weight.  Nutrition rep will continue to see patient for ongoing meal and snack preferences.         RD following.   "

## 2023-02-07 NOTE — DISCHARGE PLANNING
HTH/SCP TCN chart review completed. Collaborated with GHAZALA Schuster prior to meeting with the pt. The most current review of medical record, knowledge of pt's PLOF and social support, LACE+ score of 72, 6 clicks scores of 24 ADL's and 24 mobility were considered.      Pt seen at bedside. Introduced TCN program. Provided education regarding post acute levels of care. Discussed HTH/[facility] plan benefits (Meds to Beds, medical uber and [facility] transitional care). Pt verbalizes understanding.     Per chart review, patient is a re-admit and was admitted from 1/26/23 to 1/28/23 with acute on chronic CHF.  She was diagnosed with PNA a week prior to that and was on @ ABX.      Patient states she lives with her daughter Luisa in a one level home in [place] with 3 steps to entrance. She has two other daughters that live in [place] and can assist as needed.   She states she was independent with ADL's, IADL's, mobility and driving short distances prior and has no concerns with food, housing or transportation.  Patient states she is near baseline except is limited by SOB stating she needs to take frequent rest breaks with minimal activity.  She has a FWW at home and is on RA at home and currently.     No choice obtained.  Patient is 24 6-clicks and has no functional concerns except for SOB with minimal activity.   TCN will continue to follow and collaborate with discharge planning team as additional post acute needs arise. Thank you.     Completed today:  Choice obtained: No choice obtained.  See above.   [facility] [place].

## 2023-02-07 NOTE — DISCHARGE PLANNING
Care Transition Team Assessment  NOK dtr Natasha lives w/ pt and dtr Jam lives in [place].    Lsw spoke to pt and dtr Natasha at bedside to complete d/c planning assessment. Pt reports she was d/sofiya to home but ended up at PCP office, given ABX and then had to come back to ER as the ABX were not working.    Both pt and dtr are very happy w/ their care here, they want pt to be able to breathe prior to d/c.    Pt normally fully independent w I/ADLs except Natasha drives. Pt has DME FWW at home only.    Pt's pharmacy is [facility].    Pt reports no mental health or substance use concerns. Pt has no need for any resources. They are close to the CM w/ [facility]. If pt needs an in home PCP, they will acquire [facility] from [facility] .      Information Source  Orientation Level: Oriented X4  Information Given By: Patient, Relative  Informant's Name: Pamela at bedside today  Who is responsible for making decisions for patient? : Patient    Readmission Evaluation  Is this a readmission?: Yes - unplanned readmission  Why do you think you were readmitted?: the ABX given by PCP after d/c were not working and had to be readmitted when could not breathe    Elopement Risk  Legal Hold: No  Ambulatory or Self Mobile in Wheelchair: Yes  Disoriented: No  Psychiatric Symptoms: None  History of Wandering: No  Elopement this Admit: No  Vocalizing Wanting to Leave: No  Displays Behaviors, Body Language Wanting to Leave: No-Not at Risk for Elopement  Elopement Risk: Not at Risk for Elopement    Interdisciplinary Discharge Planning  Primary Care Physician: Lindsey Vieira, DO  Lives with - Patient's Self Care Capacity:  (lives w/ dtr Natasha and dtr Jam lives in [facility])  Patient or legal guardian wants to designate a caregiver: No  Support Systems: Family Member(s), Friends / Neighbors, Children  Housing / Facility: 1 Story House  Do You Take your Prescribed Medications Regularly: Yes  Durable Medical Equipment:  Walker    Discharge Preparedness  What is your plan after discharge?: Home with help  What are your discharge supports?: Child  Prior Functional Level: Ambulatory, Independent with Activities of Daily Living, Independent with Medication Management  Difficulity with IADLs: Driving (dtr and roommate Natasha drives but pt is usually fully independent otherwise)    Functional Assesment  Prior Functional Level: Ambulatory, Independent with Activities of Daily Living, Independent with Medication Management    Finances  Prescription Coverage: Yes ([facility])    Vision / Hearing Impairment  Vision Impairment : Yes  Right Eye Vision: Impaired, Wears Glasses  Left Eye Vision: Impaired, Wears Glasses  Hearing Impairment : No              Domestic Abuse  Have you ever been the victim of abuse or violence?: No  Physical Abuse or Sexual Abuse: No  Verbal Abuse or Emotional Abuse: No  Possible Abuse/Neglect Reported to:: Not Applicable    Psychological Assessment  History of Substance Abuse: None  History of Psychiatric Problems: No    Discharge Risks or Barriers  Discharge risks or barriers?: Other (comment) (per pt/family readmit d/t dx not resolved, after 2 PCP appointments w/ ABX scripts was readmitted to ER d/t inability to breathe)    Anticipated Discharge Information  Discharge Disposition: Discharged to home/self care (01)  Discharge Address: verified address on face sheet is accurate

## 2023-02-07 NOTE — ED NOTES
Med rec completed per patient and family at bedside.  Allergies reviewed with patient. NKDA.  Patient's preferred pharmacy: [facility] (Meds-to-Beds) for discharge medications, otherwise [facility] on [address].    On 1/19/2023 patient was prescribed a Z-LANI and a 7 day course of Augmentin, both of which she reports she finished.    Patient was previously on XARELTO, however patient and family state this medication has been DISCONTINUED as of 1/31/2023.

## 2023-02-07 NOTE — PROGRESS NOTES
Received bedside report and accepted care of patient. Patient currently resting in bed in no visible or stated signs of distress. Bed controls on and bed in locked and lowest position. Call light and personal possessions within reach. Patient educated about use of call light and verbalized understanding. Family also at bedside.    Pt A&O x 4, on room air and no current c/o pain.

## 2023-02-07 NOTE — H&P
[facility] Internal Medicine History & Physical Note    Date of Service  2/6/2023    [facility] Team: CHANDLER   Attending: Kylah Thomas M.d.  Senior Resident: Dr. Vieira  Contact Number: [phone]    Primary Care Physician  Luz Vieira D.O.    Consultants  Cardiology    Code Status  Full Code    Chief Complaint  Chief Complaint   Patient presents with    Shortness of Breath     Pt seen recently in ED & admitted from CHF & pneumonia. Pt feels shortness of breath is worsening, cannot make it to bathroom. Ongoing x weeks. Saw cardiologist on Tuesday, given another diuretic & ordered additional labs re:anemia, per pt.        History of Presenting Illness (HPI):   Mariely Easley is a 82 y.o. female who presented 2/6/2023 with history of grade 2 diastolic dysfunction congestive heart failure with an EF of 65% with moderate aortic insufficiency, hypertension, iron deficiency anemia, hyperlipidemia, hypothyroidism, history of superficial venous thrombosis and previously on chronic anticoagulation.  She presents today with a 1 month history of dyspnea with minimal exertion associated with orthopnea and paroxysmal nocturnal dyspnea and on and off substernal chest pain that self resolves that feels like a burning sensation.  This chest pain does not have an particular exertional component associated.  She was hospitalized at the beginning of January for pneumonia and completed antibiotic regimen for continued to have her dyspneic symptoms.  She then was hospitalized again later for concerns of diastolic heart failure exacerbation and was diuresed and discharged home with close follow-up with cardiology.  Her last echocardiogram was done in January with preserved systolic function but grade 2 diastolic dysfunction.  Her symptoms despite continuing to uptitrate her Lasix have not changed and during her last visit with Dr. Glover in the clinic 1 week ago there was some concern that this may be high-output heart failure in  the setting of severe anemia as hemoglobin was down to the 8s.  She was also additionally started on spironolactone for concerns of ongoing fluid overload and pending further anemic work-up and outpatient establishment with hematology.  The symptoms have been so debilitating that she ultimately came into the emergency room.    She is afebrile and hemodynamically stable and maintaining saturations on room air.  CBC shows a leukocytosis of 16.9, but does show significant improvement in anemia from 8.9-11.6 in the last week.  CHEM panel is remarkable for potassium rising from 4.1-5.0 as well as creatinine continuing to rise from 1.34-1.49 in the setting of recent initiation of spironolactone.  Chest x-ray showing bilateral interstitial pulmonary edema as well as a BNP greater than 35,000 and a troponin of 43.  EKG showing ST depressions in leads V5 and V6 but patient is not having clinical chest discomfort at this time.  She received 1 dose of IV Lasix 40 mg in the ED. cardiology was contacted from emergency room and agreed with bringing patient in for further diagnostic work-up.    I discussed the plan of care with patient and family.    Review of Systems  Review of Systems   Constitutional:  Positive for malaise/fatigue. Negative for chills, diaphoresis and fever.   Eyes:  Negative for blurred vision.   Respiratory:  Positive for shortness of breath. Negative for cough, sputum production and wheezing.    Cardiovascular:  Positive for chest pain, orthopnea and PND. Negative for palpitations, claudication and leg swelling.   Gastrointestinal:  Negative for abdominal pain, blood in stool, constipation, diarrhea, melena, nausea and vomiting.   Genitourinary:  Negative for dysuria and hematuria.   Musculoskeletal:  Negative for back pain.   Neurological:  Negative for dizziness, loss of consciousness, weakness and headaches.   All other systems reviewed and are negative.    Past Medical History   has a past medical  history of Abnormal albumin (9/16/2022), Hypermagnesemia (9/16/2022), Hypertension, Hyponatremia (6/28/2022), Pain and swelling of left lower extremity (5/18/2022), and Superficial thrombosis of lower extremity, right (7/5/2022).    Surgical History   has no past surgical history on file.     Family History  Family history reviewed with patient.  No pertinent family medical history    Social History  Tobacco: Quit over 50 years ago  Alcohol: About 1 glass of wine per night  Recreational drugs (illegal or prescription): Denies any history of recreational drug use  Employment: Not currently employed  Recent Travel: Denies any recent travel  Primary Care Provider: Reviewed    Other (stressors, spirituality, exposures): Denies    Allergies  No Known Allergies    Medications  Prior to Admission Medications   Prescriptions Last Dose Informant Patient Reported? Taking?   acetaminophen (TYLENOL) 500 MG Tab  Patient Yes No   Sig: Take 1,000 mg by mouth every 6 hours as needed for Mild Pain.   diclofenac sodium (VOLTAREN) 1 % Gel  Patient No No   Sig: Apply 4 g topically 4 times a day as needed (32g/day max (do not use with NSAIDS)).   ferrous sulfate 325 (65 Fe) MG tablet  Patient No No   Sig: TAKE 1 TABLET BY MOUTH EVERY DAY   folic acid (FOLVITE) 1 MG Tab  Patient No No   Sig: Take 1 Tablet by mouth every day.   furosemide (LASIX) 40 MG Tab   No No   Sig: Take 1 Tablet by mouth 2 times a day.   levothyroxine (SYNTHROID) 50 MCG Tab  Patient No No   Sig: Take 1 Tab by mouth Every morning on an empty stomach.   loperamide (IMODIUM) 2 MG Cap  Patient Yes No   Sig: Take 2 mg by mouth 4 times a day as needed for Diarrhea.   losartan (COZAAR) 50 MG Tab  Patient Yes No   Sig: Take 50 mg by mouth every day.   metoprolol tartrate (LOPRESSOR) 25 MG Tab  Patient Yes No   Sig: Take 25 mg by mouth 2 times a day.   simvastatin (ZOCOR) 10 MG Tab  Patient No No   Sig: Take 1 Tablet by mouth every evening.   spironolactone (ALDACTONE) 25 MG  Tab   No No   Sig: Take 1 Tablet by mouth every day.      Facility-Administered Medications: None       Physical Exam  Temp:  [36.3 °C (97.3 °F)] 36.3 °C (97.3 °F)  Pulse:  [61-81] 61  Resp:  [19-27] 19  BP: (136-165)/(43-65) 136/55  SpO2:  [94 %-98 %] 94 %  Blood Pressure : 136/55   Temperature: 36.3 °C (97.3 °F)   Pulse: 61   Respiration: 19   Pulse Oximetry: 94 %       Physical Exam  Vitals and nursing note reviewed.   Constitutional:       General: She is not in acute distress.     Appearance: She is not diaphoretic.   HENT:      Head: Normocephalic and atraumatic.      Mouth/Throat:      Mouth: Mucous membranes are moist.   Eyes:      Conjunctiva/sclera: Conjunctivae normal.      Pupils: Pupils are equal, round, and reactive to light.   Cardiovascular:      Rate and Rhythm: Normal rate and regular rhythm.      Pulses: Normal pulses.      Heart sounds: No murmur heard.  Pulmonary:      Effort: Pulmonary effort is normal. No respiratory distress.      Breath sounds: No wheezing, rhonchi or rales.   Abdominal:      General: Abdomen is flat. Bowel sounds are normal. There is no distension.      Palpations: Abdomen is soft.      Tenderness: There is no abdominal tenderness. There is no guarding.   Musculoskeletal:      Cervical back: Neck supple.      Right lower leg: No edema.      Left lower leg: No edema.   Skin:     General: Skin is warm and dry.      Capillary Refill: Capillary refill takes less than 2 seconds.   Neurological:      Mental Status: She is alert and oriented to person, place, and time. Mental status is at baseline.   Psychiatric:         Mood and Affect: Mood normal.       Laboratory:  Recent Labs     02/06/23  1619   WBC 16.9*   RBC 3.89*   HEMOGLOBIN 11.6*   HEMATOCRIT 36.1*   MCV 92.8   MCH 29.8   MCHC 32.1*   RDW 54.2*   PLATELETCT 353   MPV 12.4     Recent Labs     02/06/23  1619   SODIUM 135   POTASSIUM 5.0   CHLORIDE 99   CO2 18*   GLUCOSE 117*   BUN 50*   CREATININE 1.49*   CALCIUM 10.5      Recent Labs     02/06/23  1619   ALTSGPT 9   ASTSGOT 12   ALKPHOSPHAT 131*   TBILIRUBIN 0.5   GLUCOSE 117*         Recent Labs     02/06/23  1619   NTPROBNP >83995*         Recent Labs     02/06/23  1619   TROPONINT 43*       Imaging:  DX-CHEST-PORTABLE (1 VIEW)   Final Result      Cardiomegaly and mild central perivascular and interstitial pulmonary edema consistent with CHF.          X-Ray:  My impression is: Consistent with bilateral interstitial edema  EKG:  My impression is: Sinus rhythm with normal QTc as well as some ST depression noted in leads V5 and V6    Assessment/Plan:  Problem Representation:   I anticipate this patient will require at least two midnights for appropriate medical management, necessitating inpatient admission because dyspnea with minimal exertion in setting of chest pain and history of diastolic heart failure and anemia    Patient will need a Telemetry bed on MEDICAL service .  The need is secondary to chest pain in the setting of diastolic heart failure.    * Diastolic CHF, acute on chronic (HCC)- (present on admission)  Assessment & Plan  Elevated BNP, elevated troponin in setting of recurrent substernal chest discomfort as well as severe dyspnea on exertion, orthopnea, paroxysmal nocturnal dyspnea  Chest x-ray concerning for bilateral pulmonary edema  Last echocardiogram showing grade 2 diastolic dysfunction in the setting of moderate aortic insufficiency and increased RVSP  Continuing diuresis  Holding home losartan and home spironolactone in the setting of rising creatinine as well as potassium at 5.0 after starting spironolactone 1 week ago  Continue home beta-blocker  2 g sodium diet as well as 2 L fluid restriction  Strict I's and O's  Daily weights  Continuous pulse oximetry as well as telemetry monitoring    Dyspnea on minimal exertion- (present on admission)  Assessment & Plan  About 1 month history of severe dyspnea on minimal exertion  Hospitalized at the beginning a  month for concerns of pneumonia status post antibiotic treatment with no improvement in symptoms  Also recently hospitalized for concerns of fluid overload in the setting of diastolic heart failure and was diuresed, with last echo showing EF of 65% with grade 2 diastolic dysfunction moderate aortic insufficiency and mild mitral regurgitation and mild tricuspid regurgitation with an RVSP of 50 mmHg  She followed up with cardiology outpatient, despite ongoing diuretics patient's symptoms have not improved.  Dr. Glover with cardiology presumed that this may be high-output heart failure in the setting of severe anemia  Patient is pending outpatient establishment with hematologist  Today patient is maintaining saturations well on room air and hemoglobin has improved up to 11  EKG showing minimal ST depressions in V5 and V6  She does also endorse orthopnea paroxysmal nocturnal dyspnea as well as multiple episodes of burning substernal chest discomfort that occur at rest and self resolve over the last month  Initial troponin 43, BNP greater than 35,000  Chest x-ray consistent with bilateral interstitial pulmonary edema  Received 1 dose of Lasix in the emergency room  Continue with IV diuresis  Repeat echocardiogram  Trend troponins.  EKG with any recurrence of chest pain  Further investigation into underlying anemia    Elevated troponin- (present on admission)  Assessment & Plan  Troponin elevated to 43 in the setting of 1 month history of on and off substernal chest discomfort  Last echo a few weeks ago did not show any regional wall motion abnormalities  EKG on admission showing mild ST depressions in leads V5 and V6  Continue to trend troponins  Stat EKG with any symptoms of chest pain    Iron deficiency anemia- (present on admission)  Assessment & Plan  History of iron deficiency anemia last iron studies completed in 9/2022  B12 levels and folic acid levels have been adequate in the past  No active signs or symptoms of  bleeding  T. bili normal, not concerning for underlying hemolytic process  Hemoglobin appears to be improved compared to 1 week ago  Continue iron supplementation  Reticulocyte count  Iron studies, B12, folic acid levels  Thyroid studies    Acute renal failure superimposed on stage 3 chronic kidney disease (HCC)- (present on admission)  Assessment & Plan  Baseline serum creatinine likely around 1.1, has been steadily rising in the setting of up titration of patient's diuretics  Recent initiation of spironolactone 1 week ago, and creatinine continues to rise as well as potassium is now up to 5  Hold home spironolactone and losartan  Avoid nephrotoxins  Renally dose medications to creatinine clearance less than 30  SubQ heparin for pharmacologic VTE prophylaxis  Continue to monitor  PTH, vitamin D, phosphorus levels ordered    Aortic insufficiency- (present on admission)  Assessment & Plan  Last echocardiogram in 1/2023 showing moderate aortic insufficiency, could be contributing in the setting of diastolic dysfunction to patient's clinical dyspnea on exertion    Essential hypertension- (present on admission)  Assessment & Plan  Continue home beta-blocker  Holding home losartan and spironolactone in setting of acute on chronic renal failure  IV as needed antihypertensives ordered    Superficial thrombosis of leg, right- (present on admission)  Assessment & Plan  History of superficial venous thrombosis in use to be on Xarelto for chronic anticoagulation however this was discontinued at last cardiology appointment also in the setting of severe anemia    Mixed hyperlipidemia- (present on admission)  Assessment & Plan  Continue home statin    Acquired hypothyroidism- (present on admission)  Assessment & Plan  Continue home levothyroxine  Check thyroid studies        VTE prophylaxis: SCDs/TEDs and enoxaparin ppx

## 2023-02-07 NOTE — ED PROVIDER NOTES
ED Provider Note    CHIEF COMPLAINT  Chief Complaint   Patient presents with    Shortness of Breath     Pt seen recently in ED & admitted from CHF & pneumonia. Pt feels shortness of breath is worsening, cannot make it to bathroom. Ongoing x weeks. Saw cardiologist on Tuesday, given another diuretic & ordered additional labs re:anemia, per pt.        EXTERNAL RECORDS REVIEWED  Other discharge summary from 2023    HPI/ROS  Mariely Easley is a 82 y.o. female who presents with shortness of breath.  The patient states has been short of breath over the last month or so.  She finished a couple courses of antibiotics and she continued be short of breath and presented to the emergency room on .  She was admitted and found to have heart failure exacerbation and received diuresis and did have some improvement.  She was discharged home and she states that she continues to feel short of breath.  She followed up with her cardiologist who feels her anemia is causing her shortness of breath.  She does not have any swelling to her lower extremities.  She states that shortness of breath occurs when she is walking as well as laying down.  She has recently had some slight rhinorrhea and a cough.  She has not had any fevers.  She does not have any chest pain.  She states she is so short of breath with exertion she has a hard time ambulating to the bathroom.    PAST MEDICAL HISTORY   has a past medical history of Abnormal albumin (2022), Hypermagnesemia (2022), Hypertension, Hyponatremia (2022), Pain and swelling of left lower extremity (2022), and Superficial thrombosis of lower extremity, right (2022).    SURGICAL HISTORY  patient denies any surgical history    FAMILY HISTORY  No family history on file.    SOCIAL HISTORY  Social History     Tobacco Use    Smoking status: Former     Types: Cigarettes     Quit date: 1979     Years since quittin.5    Smokeless tobacco: Never  "  Vaping Use    Vaping Use: Never used   Substance and Sexual Activity    Alcohol use: Yes     Alcohol/week: 8.4 oz     Types: 14 Glasses of wine per week    Drug use: No    Sexual activity: Not on file       CURRENT MEDICATIONS  Home Medications       Reviewed by Korina Carvajal R.N. (Registered Nurse) on 02/06/23 at 1530  Med List Status: Not Addressed     Medication Last Dose Status   acetaminophen (TYLENOL) 500 MG Tab  Active   diclofenac sodium (VOLTAREN) 1 % Gel  Active   ferrous sulfate 325 (65 Fe) MG tablet  Active   folic acid (FOLVITE) 1 MG Tab  Active   furosemide (LASIX) 40 MG Tab  Active   levothyroxine (SYNTHROID) 50 MCG Tab  Active   loperamide (IMODIUM) 2 MG Cap  Active   losartan (COZAAR) 50 MG Tab  Active   metoprolol tartrate (LOPRESSOR) 25 MG Tab  Active   simvastatin (ZOCOR) 10 MG Tab  Active   spironolactone (ALDACTONE) 25 MG Tab  Active                    ALLERGIES  No Known Allergies    PHYSICAL EXAM  VITAL SIGNS: BP (!) 156/43   Pulse 81   Temp 36.3 °C (97.3 °F) (Temporal)   Resp 20   Ht 1.575 m (5' 2\")   Wt 60.8 kg (134 lb)   SpO2 98%   BMI 24.51 kg/m²    In general the patient appears chronically ill in appearance    Ears tympanic memories intact without hemotympanums, nares have swollen turbinates with rhinorrhea, oropharynx nonerythematous    Pulmonary the patient does have diminished breath sounds throughout with some mild diffuse rhonchi and rales at the bases    Cardiovascular S1-S2 with a regular rate and rhythm    GI abdomen is soft    Skin no cyanosis    Extremities no edema    Neurologic examination is grossly intact    DIAGNOSTIC STUDIES  Results for orders placed or performed during the hospital encounter of 02/06/23   CBC with Differential   Result Value Ref Range    WBC 16.9 (H) 4.8 - 10.8 K/uL    RBC 3.89 (L) 4.20 - 5.40 M/uL    Hemoglobin 11.6 (L) 12.0 - 16.0 g/dL    Hematocrit 36.1 (L) 37.0 - 47.0 %    MCV 92.8 81.4 - 97.8 fL    MCH 29.8 27.0 - 33.0 pg    MCHC 32.1 " (L) 33.6 - 35.0 g/dL    RDW 54.2 (H) 35.9 - 50.0 fL    Platelet Count 353 164 - 446 K/uL    MPV 12.4 9.0 - 12.9 fL    Neutrophils-Polys 87.00 (H) 44.00 - 72.00 %    Lymphocytes 5.20 (L) 22.00 - 41.00 %    Monocytes 1.70 0.00 - 13.40 %    Eosinophils 0.90 0.00 - 6.90 %    Basophils 1.70 0.00 - 1.80 %    Nucleated RBC 0.20 /100 WBC    Neutrophils (Absolute) 14.70 (H) 2.00 - 7.15 K/uL    Lymphs (Absolute) 0.88 (L) 1.00 - 4.80 K/uL    Monos (Absolute) 0.29 0.00 - 0.85 K/uL    Eos (Absolute) 0.15 0.00 - 0.51 K/uL    Baso (Absolute) 0.29 (H) 0.00 - 0.12 K/uL    NRBC (Absolute) 0.03 K/uL    Anisocytosis 1+     Microcytosis 1+    Comp Metabolic Panel   Result Value Ref Range    Sodium 135 135 - 145 mmol/L    Potassium 5.0 3.6 - 5.5 mmol/L    Chloride 99 96 - 112 mmol/L    Co2 18 (L) 20 - 33 mmol/L    Anion Gap 18.0 (H) 7.0 - 16.0    Glucose 117 (H) 65 - 99 mg/dL    Bun 50 (H) 8 - 22 mg/dL    Creatinine 1.49 (H) 0.50 - 1.40 mg/dL    Calcium 10.5 8.5 - 10.5 mg/dL    AST(SGOT) 12 12 - 45 U/L    ALT(SGPT) 9 2 - 50 U/L    Alkaline Phosphatase 131 (H) 30 - 99 U/L    Total Bilirubin 0.5 0.1 - 1.5 mg/dL    Albumin 4.6 3.2 - 4.9 g/dL    Total Protein 7.7 6.0 - 8.2 g/dL    Globulin 3.1 1.9 - 3.5 g/dL    A-G Ratio 1.5 g/dL   proBrain Natriuretic Peptide, NT   Result Value Ref Range    NT-proBNP >32932 (H) 0 - 125 pg/mL   TROPONIN   Result Value Ref Range    Troponin T 43 (H) 6 - 19 ng/L   PLATELET ESTIMATE   Result Value Ref Range    Plt Estimation Normal    MORPHOLOGY   Result Value Ref Range    RBC Morphology Present     Polychromia 1+     Poikilocytosis 1+     Tear Drop Cells 1+    PERIPHERAL SMEAR REVIEW   Result Value Ref Range    Peripheral Smear Review see below    DIFFERENTIAL MANUAL   Result Value Ref Range    Metamyelocytes 2.60 %    Myelocytes 0.90 %    Manual Diff Status PERFORMED    CORRECTED CALCIUM   Result Value Ref Range    Correct Calcium 10.0 8.5 - 10.5 mg/dL   ESTIMATED GFR   Result Value Ref Range    GFR (CKD-EPI)  35 (A) >60 mL/min/1.73 m 2   EKG   Result Value Ref Range    Report       [facility] Emergency Dept.    Test Date:  2023  Pt Name:    JONES BERMUDEZ                Department: ER  MRN:        [id number]                      Room:  Gender:     Female                       Technician: [id number]  :        [id number]                   Requested By:ER TRIAGE PROTOCOL  Order #:    [id number]                    Reading MD:    Measurements  Intervals                                Axis  Rate:       78                           P:          42  NJ:         183                          QRS:        4  QRSD:       86                           T:          4  QT:         384  QTc:        438    Interpretive Statements  Sinus rhythm  Minimal ST depression, lateral leads  Compared to ECG 2023 11:40:29  ST (T wave) deviation now present         EKG  I have independently interpreted this EKG  DX-CHEST-PORTABLE (1 VIEW)   Final Result      Cardiomegaly and mild central perivascular and interstitial pulmonary edema consistent with CHF.        RADIOLOGY  I have independently interpreted the diagnostic imaging associated with this visit and am waiting the final reading from the radiologist.   My preliminary interpretation is a follows: X-rays reviewed does show evidence of pulmonary edema  Radiologist interpretation:   DX-CHEST-PORTABLE (1 VIEW)   Final Result      Cardiomegaly and mild central perivascular and interstitial pulmonary edema consistent with CHF.            COURSE & MEDICAL DECISION MAKING  This an 82-year-old female who presents to the Emergency Department with difficulty with breathing.  The patient's laboratory analysis and chest x-ray does support heart failure as she has a BNP greater than 35,000 however her recent echocardiogram shows an ejection fraction of 65% and clinically this does not make sense.  The patient does have some renal insufficiency but I suspect this is from the Lasix that  she has been utilizing for suspected heart failure.  Patient has not had any chest pain but her troponin is slightly higher than her baseline and she will need to be ruled out from an ischemic standpoint.  This does not sound like ischemia.  She could have some shock myocardium and therefore the elevation in the BNP as well as the heart failure.  I did speak with cardiology and they recommend inpatient treatment and further work-up to help determine the source.  There was some concern that her exertional dyspnea is due to her anemia but her hemoglobin has significantly improved from her last visit and this would be unlikely.  Clinically do not appreciate any evidence of an infection.  The source is really unclear at this time and the patient will be admitted to the hospital for further work-up and treatment..    FINAL DIAGNOSIS  1.  Shortness of breath  2.  Acute on chronic renal insufficiency  3.  Elevated troponin rule out acute coronary syndrome    Disposition  Patient will be admitted in guarded condition         Electronically signed by: Delfin Oro M.D., 2/6/2023 4:03 PM

## 2023-02-07 NOTE — ASSESSMENT & PLAN NOTE
About 1 month history of severe dyspnea on minimal exertion  Hospitalized at the beginning a month for concerns of pneumonia status post antibiotic treatment with no improvement in symptoms  Also recently hospitalized for concerns of fluid overload in the setting of diastolic heart failure and was diuresed, with last echo showing EF of 65% with grade 2 diastolic dysfunction moderate aortic insufficiency and mild mitral regurgitation and mild tricuspid regurgitation with an RVSP of 50 mmHg  She followed up with cardiology outpatient, despite ongoing diuretics patient's symptoms have not improved.  Dr. Glover with cardiology presumed that this may be high-output heart failure in the setting of severe anemia  Patient is pending outpatient establishment with hematologist  Today patient is maintaining saturations well on room air and hemoglobin has improved up to 11  EKG showing minimal ST depressions in V5 and V6  She does also endorse orthopnea paroxysmal nocturnal dyspnea as well as multiple episodes of burning substernal chest discomfort that occur at rest and self resolve over the last month  Initial troponin 43, BNP greater than 35,000  Chest x-ray consistent with bilateral interstitial pulmonary edema  Received 1 dose of Lasix in the emergency room  Continue with IV diuresis  Repeat echocardiogram  Trend troponins.  EKG with any recurrence of chest pain  Further investigation into underlying anemia

## 2023-02-07 NOTE — ASSESSMENT & PLAN NOTE
Troponin elevated to 43 in the setting of 1 month history of on and off substernal chest discomfort  Last echo a few weeks ago did not show any regional wall motion abnormalities  EKG on admission showing mild ST depressions in leads V5 and V6  Continue to trend troponins  Stat EKG with any symptoms of chest pain

## 2023-02-07 NOTE — CARE PLAN
The patient is Stable - Low risk of patient condition declining or worsening    Shift Goals  Clinical Goals: Diurese  Patient Goals: comfort, rest  Family Goals: comfort, rest      Problem: Knowledge Deficit - Standard  Goal: Patient and family/care givers will demonstrate understanding of plan of care, disease process/condition, diagnostic tests and medications  Outcome: Progressing     Problem: Care Map:  Admission Optimal Outcome for the Heart Failure Patient  Goal: Admission:  Optimal Care of the heart failure patient  Outcome: Progressing

## 2023-02-07 NOTE — ASSESSMENT & PLAN NOTE
Last echocardiogram in 1/2023 showing moderate aortic insufficiency, could be contributing in the setting of diastolic dysfunction to patient's clinical dyspnea on exertion

## 2023-02-07 NOTE — ASSESSMENT & PLAN NOTE
Last echocardiogram in 1/2023 showing moderate aortic insufficiency, could be contributing in the setting of diastolic dysfunction to patient's clinical dyspnea on exertion  Cardiology following and plan for KYRA tomorrow  Appreciate rec.

## 2023-02-07 NOTE — PROGRESS NOTES
Hospital Medicine Daily Progress Note    Date of Service  2/7/2023    Chief Complaint  Mariely Easley is a 82 y.o. female admitted 2/6/2023 with shortness of breath     Hospital Course  This is a 81 y/o F with PMHX of grade 2 diastolic dysfunction congestive heart failure with an EF of 65% with moderate aortic insufficiency, hypertension, iron deficiency anemia, hyperlipidemia, hypothyroidism, history of superficial venous thrombosis and previously on chronic anticoagulation who was admitted on 2/6/23 after presenting to ER complaining of 1 month history of dyspnea with minimal exertion associated with orthopnea and paroxysmal nocturnal dyspnea and on and off substernal chest pain that self resolves that feels like a burning sensation  She was hospitalized at the beginning of January for pneumonia and completed antibiotic regimen for continued to have her dyspneic symptoms.  She then was hospitalized again later for concerns of diastolic heart failure exacerbation and was diuresed and discharged home with close follow-up with cardiology.  Her last echocardiogram was done in January with preserved systolic function but grade 2 diastolic dysfunction.  Her symptoms despite continuing to uptitrate her Lasix have not changed  She was also additionally started on spironolactone for concerns of ongoing fluid overload and pending further anemic work-up and outpatient establishment with hematology.  The symptoms have been so debilitating that she ultimately came into the emergency room.  Cardiology was also consulted and patient admitted for further work up and treatment     Interval Problem Update  Patient seen and examined, still with some intermittent chest pain,   Still feels SOB, an echo has been ordered and pending  Cardiology has been consulted appreciate rec.   Cont on IV lasix close monitoring to her renal function  Monitor on telemetry     I have discussed this patient's plan of care and discharge plan at [facility]  rounds today with Case Management, Nursing, Nursing leadership, and other members of the IDT team.    Consultants/Specialty  cardiology    Code Status  Full Code    Disposition  Patient is not medically cleared for discharge.   Anticipate discharge to  TBD .  I have placed the appropriate orders for post-discharge needs.    Review of Systems  Review of Systems   Constitutional:  Positive for malaise/fatigue. Negative for chills, diaphoresis and fever.   HENT:  Negative for ear pain and tinnitus.    Eyes:  Negative for blurred vision.   Respiratory:  Positive for shortness of breath. Negative for cough, sputum production and wheezing.    Cardiovascular:  Positive for chest pain, orthopnea and PND. Negative for palpitations, claudication and leg swelling.   Gastrointestinal:  Negative for abdominal pain, blood in stool, constipation, diarrhea, melena, nausea and vomiting.   Genitourinary:  Negative for dysuria and hematuria.   Musculoskeletal:  Negative for back pain.   Neurological:  Negative for dizziness, loss of consciousness, weakness and headaches.   All other systems reviewed and are negative.     Physical Exam  Temp:  [36.3 °C (97.3 °F)-37.2 °C (99 °F)] 37.2 °C (99 °F)  Pulse:  [57-81] 67  Resp:  [18-27] 18  BP: (123-165)/(35-65) 137/36  SpO2:  [93 %-98 %] 94 %    Physical Exam  Vitals and nursing note reviewed.   Constitutional:       General: She is not in acute distress.     Appearance: She is not diaphoretic.   HENT:      Head: Normocephalic and atraumatic.      Mouth/Throat:      Mouth: Mucous membranes are moist.   Eyes:      General: No scleral icterus.        Right eye: No discharge.         Left eye: No discharge.      Conjunctiva/sclera: Conjunctivae normal.      Pupils: Pupils are equal, round, and reactive to light.   Cardiovascular:      Rate and Rhythm: Normal rate and regular rhythm.      Pulses: Normal pulses.      Heart sounds: No murmur heard.  Pulmonary:      Effort: Pulmonary effort is  normal. No respiratory distress.      Breath sounds: No wheezing, rhonchi or rales.   Abdominal:      General: Abdomen is flat. Bowel sounds are normal. There is no distension.      Palpations: Abdomen is soft.      Tenderness: There is no abdominal tenderness. There is no guarding or rebound.   Musculoskeletal:      Cervical back: Neck supple.      Right lower leg: No edema.      Left lower leg: No edema.   Skin:     General: Skin is warm and dry.      Capillary Refill: Capillary refill takes less than 2 seconds.   Neurological:      Mental Status: She is alert and oriented to person, place, and time. Mental status is at baseline.   Psychiatric:         Mood and Affect: Mood normal.       Fluids    Intake/Output Summary (Last 24 hours) at 2/7/2023 1242  Last data filed at 2/7/2023 0800  Gross per 24 hour   Intake 600 ml   Output 930 ml   Net -330 ml       Laboratory  Recent Labs     02/06/23  1619 02/07/23  0044   WBC 16.9* 16.5*   RBC 3.89* 3.61*   HEMOGLOBIN 11.6* 10.2*   HEMATOCRIT 36.1* 33.1*   MCV 92.8 91.7   MCH 29.8 28.3   MCHC 32.1* 30.8*   RDW 54.2* 54.3*   PLATELETCT 353 322   MPV 12.4 12.5     Recent Labs     02/06/23  1619 02/07/23  0044   SODIUM 135 134*   POTASSIUM 5.0 4.7   CHLORIDE 99 100   CO2 18* 18*   GLUCOSE 117* 112*   BUN 50* 48*   CREATININE 1.49* 1.62*   CALCIUM 10.5 9.7                   Imaging  DX-CHEST-PORTABLE (1 VIEW)   Final Result      Cardiomegaly and mild central perivascular and interstitial pulmonary edema consistent with CHF.      EC-ECHOCARDIOGRAM COMPLETE W/O CONT    (Results Pending)        Assessment/Plan  * Diastolic CHF, acute on chronic (HCC)- (present on admission)  Assessment & Plan  Elevated BNP, elevated troponin in setting of recurrent substernal chest discomfort as well as severe dyspnea on exertion, orthopnea, paroxysmal nocturnal dyspnea  Chest x-ray concerning for bilateral pulmonary edema  Last echocardiogram showing grade 2 diastolic dysfunction in the setting  of moderate aortic insufficiency and increased RVSP  Continuing diuresis  Holding home losartan and home spironolactone in the setting of rising creatinine as well as potassium at 5.0 after starting spironolactone 1 week ago  Continue home beta-blocker  2 g sodium diet as well as 2 L fluid restriction  Strict I's and O's  Daily weights  Continuous pulse oximetry as well as telemetry monitoring  Cardiology consulted and following appreciate rec.     Dyspnea on minimal exertion- (present on admission)  Assessment & Plan  About 1 month history of severe dyspnea on minimal exertion  Hospitalized at the beginning a month for concerns of pneumonia status post antibiotic treatment with no improvement in symptoms  Also recently hospitalized for concerns of fluid overload in the setting of diastolic heart failure and was diuresed, with last echo showing EF of 65% with grade 2 diastolic dysfunction moderate aortic insufficiency and mild mitral regurgitation and mild tricuspid regurgitation with an RVSP of 50 mmHg  She followed up with cardiology outpatient, despite ongoing diuretics patient's symptoms have not improved.  Dr. Glover with cardiology presumed that this may be high-output heart failure in the setting of severe anemia  Patient is pending outpatient establishment with hematologist  She does also endorse orthopnea paroxysmal nocturnal dyspnea as well as multiple episodes of burning substernal chest discomfort that occur at rest and self resolve over the last month  Initial troponin 43, BNP greater than 35,000  Chest x-ray consistent with bilateral interstitial pulmonary edema  Received 1 dose of Lasix in the emergency room  Continue with IV diuresis  Repeat echocardiogram  Trend troponins.  EKG with any recurrence of chest pain    Superficial thrombosis of leg, right- (present on admission)  Assessment & Plan  History of superficial venous thrombosis in use to be on Xarelto for chronic anticoagulation however this  was discontinued at last cardiology appointment also in the setting of severe anemia    Aortic insufficiency- (present on admission)  Assessment & Plan  Last echocardiogram in 1/2023 showing moderate aortic insufficiency, could be contributing in the setting of diastolic dysfunction to patient's clinical dyspnea on exertion    Elevated troponin- (present on admission)  Assessment & Plan  Troponin elevated to 43 in the setting of 1 month history of on and off substernal chest discomfort  Last echo a few weeks ago did not show any regional wall motion abnormalities  EKG on admission showing mild ST depressions in leads V5 and V6  Continue to trend troponins  Stat EKG with any symptoms of chest pain  Appreciate cardiology rec.     Iron deficiency anemia- (present on admission)  Assessment & Plan  History of iron deficiency anemia last iron studies completed in 9/2022  B12 levels and folic acid levels have been adequate in the past  No active signs or symptoms of bleeding  T. bili normal, not concerning for underlying hemolytic process  Hemoglobin appears to be improved compared to 1 week ago  Continue iron supplementation  Reticulocyte count  Iron studies, B12, folic acid levels  Thyroid studies    Acute renal failure superimposed on stage 3 chronic kidney disease (HCC)- (present on admission)  Assessment & Plan  Baseline serum creatinine likely around 1.1, has been steadily rising in the setting of up titration of patient's diuretics  Recent initiation of spironolactone 1 week ago, and creatinine continues to rise as well as potassium is now up to 5  Hold home spironolactone and losartan  Avoid nephrotoxins  Renally dose medications to creatinine clearance less than 30  SubQ heparin for pharmacologic VTE prophylaxis  Continue to monitor    Mixed hyperlipidemia- (present on admission)  Assessment & Plan  Continue home statin    Acquired hypothyroidism- (present on admission)  Assessment & Plan  Continue home  levothyroxine  Check thyroid studies       Essential hypertension- (present on admission)  Assessment & Plan  Continue home beta-blocker  Holding home losartan and spironolactone in setting of acute on chronic renal failure  IV as needed antihypertensives ordered         VTE prophylaxis: heparin ppx    I have performed a physical exam and reviewed and updated ROS and Plan today (2/7/2023). In review of yesterday's note (2/6/2023), there are no changes except as documented above.

## 2023-02-07 NOTE — CARE PLAN
The patient is Watcher - Medium risk of patient condition declining or worsening    Shift Goals  Clinical Goals: diuresis, hemodynamic stability  Patient Goals: comfort, rest  Family Goals: comfort, rest    Progress made toward(s) clinical / shift goals:      Problem: Knowledge Deficit - Standard  Goal: Patient and family/care givers will demonstrate understanding of plan of care, disease process/condition, diagnostic tests and medications  Outcome: Progressing     Problem: Care Map:  Admission Optimal Outcome for the Heart Failure Patient  Goal: Admission:  Optimal Care of the heart failure patient  Outcome: Progressing       Patient is not progressing towards the following goals:

## 2023-02-07 NOTE — ASSESSMENT & PLAN NOTE
Baseline serum creatinine likely around 1.1, has been steadily rising in the setting of up titration of patient's diuretics  Recent initiation of spironolactone 1 week ago, and creatinine continues to rise as well as potassium is now up to 5  Hold home spironolactone and losartan  Avoid nephrotoxins  I have consulted nephrology and discussed the case appreciate rec.

## 2023-02-07 NOTE — ASSESSMENT & PLAN NOTE
Elevated BNP, elevated troponin in setting of recurrent substernal chest discomfort as well as severe dyspnea on exertion, orthopnea, paroxysmal nocturnal dyspnea  Chest x-ray concerning for bilateral pulmonary edema  Last echocardiogram showing grade 2 diastolic dysfunction in the setting of moderate aortic insufficiency and increased RVSP  Continuing diuresis  Holding home losartan and home spironolactone in the setting of rising creatinine as well as potassium at 5.0 after starting spironolactone 1 week ago  Continue home beta-blocker  2 g sodium diet as well as 2 L fluid restriction  Strict I's and O's  Daily weights  Continuous pulse oximetry as well as telemetry monitoring  Cardiology consulted and following appreciate rec.

## 2023-02-07 NOTE — ASSESSMENT & PLAN NOTE
Continue home beta-blocker  Holding home losartan and spironolactone in setting of acute on chronic renal failure  IV as needed antihypertensives ordered

## 2023-02-07 NOTE — PROGRESS NOTES
Patient transported to room 714 with ACLS RN. Report received and assumed patient care. Pt A&O x 4 and on room air. No signs of distress noted at this time. Pt placed on the tele box and monitors were notified of arrival. Pt updated on plan of care for the day and has call light within reach. Fall precautions are in place.

## 2023-02-07 NOTE — ASSESSMENT & PLAN NOTE
Troponin elevated to 43 in the setting of 1 month history of on and off substernal chest discomfort  Last echo a few weeks ago did not show any regional wall motion abnormalities  EKG on admission showing mild ST depressions in leads V5 and V6  Continue to trend troponins  Stat EKG with any symptoms of chest pain  Appreciate cardiology rec.

## 2023-02-07 NOTE — ASSESSMENT & PLAN NOTE
History of superficial venous thrombosis in use to be on Xarelto for chronic anticoagulation however this was discontinued at last cardiology appointment also in the setting of severe anemia

## 2023-02-07 NOTE — ASSESSMENT & PLAN NOTE
Elevated BNP, elevated troponin in setting of recurrent substernal chest discomfort as well as severe dyspnea on exertion, orthopnea, paroxysmal nocturnal dyspnea  Chest x-ray concerning for bilateral pulmonary edema  Last echocardiogram showing grade 2 diastolic dysfunction in the setting of moderate aortic insufficiency and increased RVSP  Continuing diuresis  Holding home losartan and home spironolactone in the setting of rising creatinine as well as potassium at 5.0 after starting spironolactone 1 week ago  Continue home beta-blocker  2 g sodium diet as well as 2 L fluid restriction  Strict I's and O's  Daily weights  Continuous pulse oximetry as well as telemetry monitoring

## 2023-02-07 NOTE — ASSESSMENT & PLAN NOTE
Baseline serum creatinine likely around 1.1, has been steadily rising in the setting of up titration of patient's diuretics  Recent initiation of spironolactone 1 week ago, and creatinine continues to rise as well as potassium is now up to 5  Hold home spironolactone and losartan  Avoid nephrotoxins  Renally dose medications to creatinine clearance less than 30  SubQ heparin for pharmacologic VTE prophylaxis  Continue to monitor  PTH, vitamin D, phosphorus levels ordered

## 2023-02-07 NOTE — PROGRESS NOTES
4 Eyes Skin Assessment Completed by HANS Lassiter and HANS Pace.    Head WDL  Ears WDL  Nose WDL  Mouth WDL  Neck WDL  Breast/Chest WDL  Shoulder Blades WDL  Spine WDL  (R) Arm/Elbow/Hand WDL  (L) Arm/Elbow/Hand WDL  Abdomen WDL  Groin WDL  Scrotum/Coccyx/Buttocks WDL  (R) Leg WDL  (L) Leg WDL  (R) Heel/Foot/Toe WDL  (L) Heel/Foot/Toe WDL          Devices In Places Tele Box, Blood Pressure Cuff, Pulse Ox, and Giraldo      Interventions In Place Pillows and Pressure Redistribution Mattress    Possible Skin Injury No    Pictures Uploaded Into Epic N/A  Wound Consult Placed N/A  RN Wound Prevention Protocol Ordered No

## 2023-02-07 NOTE — CONSULTS
Cardiology Initial Consultation    Date of Service  2/6/2023    Referring Physician  Delfin Oro M.D.    Reason for Consultation  Shortness of breath.    History of Presenting Illness  Mariely Easley is a 82 y.o. female with a past medical history of congestive heart failure, hypertension and hyperlipidemia, anemia, chronic kidney disease, previous tobacco abuse who presented 2/6/2023 with worsening fatigue, weakness, shortness of breath, dyspnea on exertion. She is followed by [facility] and was seen for initial encounter with Tommie Carter on 01/31/23 following discharge on 01/26/23. Her heart failure was attributed to high output state secondary to her anemia. She was started on aldactone, referred to hematology and is scheduled to follow up with Rojas Kaye in March, however, due to her worsening condition she was brought back to emergency room.     Review of Systems  Review of Systems   Constitutional:  Positive for fatigue.   Respiratory:  Positive for chest tightness and shortness of breath.      Past Medical History   has a past medical history of Abnormal albumin (9/16/2022), Hypermagnesemia (9/16/2022), Hypertension, Hyponatremia (6/28/2022), Pain and swelling of left lower extremity (5/18/2022), and Superficial thrombosis of lower extremity, right (7/5/2022).    Surgical History   has no past surgical history on file.    Family History  family history is not on file.    Social History   reports that she quit smoking about 43 years ago. Her smoking use included cigarettes. She has never used smokeless tobacco. She reports current alcohol use of about 8.4 oz per week. She reports that she does not use drugs.    Medications  Prior to Admission Medications   Prescriptions Last Dose Informant Patient Reported? Taking?   acetaminophen (TYLENOL) 500 MG Tab  Patient Yes No   Sig: Take 1,000 mg by mouth every 6 hours as needed for Mild Pain.   diclofenac sodium  (VOLTAREN) 1 % Gel  Patient No No   Sig: Apply 4 g topically 4 times a day as needed (32g/day max (do not use with NSAIDS)).   ferrous sulfate 325 (65 Fe) MG tablet  Patient No No   Sig: TAKE 1 TABLET BY MOUTH EVERY DAY   folic acid (FOLVITE) 1 MG Tab  Patient No No   Sig: Take 1 Tablet by mouth every day.   furosemide (LASIX) 40 MG Tab   No No   Sig: Take 1 Tablet by mouth 2 times a day.   levothyroxine (SYNTHROID) 50 MCG Tab  Patient No No   Sig: Take 1 Tab by mouth Every morning on an empty stomach.   loperamide (IMODIUM) 2 MG Cap  Patient Yes No   Sig: Take 2 mg by mouth 4 times a day as needed for Diarrhea.   losartan (COZAAR) 50 MG Tab  Patient Yes No   Sig: Take 50 mg by mouth every day.   metoprolol tartrate (LOPRESSOR) 25 MG Tab  Patient Yes No   Sig: Take 25 mg by mouth 2 times a day.   simvastatin (ZOCOR) 10 MG Tab  Patient No No   Sig: Take 1 Tablet by mouth every evening.   spironolactone (ALDACTONE) 25 MG Tab   No No   Sig: Take 1 Tablet by mouth every day.      Facility-Administered Medications: None       Allergies  No Known Allergies    Vital signs in last 24 hours  Temp:  [36.3 °C (97.3 °F)] 36.3 °C (97.3 °F)  Pulse:  [74-81] 74  Resp:  [20-27] 27  BP: (156-165)/(43-65) 165/65  SpO2:  [95 %-98 %] 95 %    Physical Exam  Physical Exam  Constitutional:       Appearance: Normal appearance. She is well-developed and normal weight.   HENT:      Head: Normocephalic and atraumatic.      Mouth/Throat:      Mouth: Mucous membranes are moist.   Eyes:      Extraocular Movements: Extraocular movements intact.      Conjunctiva/sclera: Conjunctivae normal.   Cardiovascular:      Rate and Rhythm: Normal rate and regular rhythm.      Pulses: Normal pulses.      Heart sounds: Normal heart sounds.   Pulmonary:      Effort: Pulmonary effort is normal.      Breath sounds: Normal breath sounds.   Abdominal:      General: Bowel sounds are normal.      Palpations: Abdomen is soft.   Musculoskeletal:         General:  Normal range of motion.      Cervical back: Normal range of motion and neck supple.   Skin:     General: Skin is warm and dry.   Neurological:      General: No focal deficit present.      Mental Status: She is alert and oriented to person, place, and time. Mental status is at baseline.   Psychiatric:         Mood and Affect: Mood normal.         Behavior: Behavior normal.         Thought Content: Thought content normal.         Judgment: Judgment normal.       Lab Review  Lab Results   Component Value Date/Time    WBC 16.9 (H) 02/06/2023 04:19 PM    RBC 3.89 (L) 02/06/2023 04:19 PM    HEMOGLOBIN 11.6 (L) 02/06/2023 04:19 PM    HEMATOCRIT 36.1 (L) 02/06/2023 04:19 PM    MCV 92.8 02/06/2023 04:19 PM    MCH 29.8 02/06/2023 04:19 PM    MCHC 32.1 (L) 02/06/2023 04:19 PM    MPV 12.4 02/06/2023 04:19 PM      Lab Results   Component Value Date/Time    SODIUM 135 02/06/2023 04:19 PM    POTASSIUM 5.0 02/06/2023 04:19 PM    CHLORIDE 99 02/06/2023 04:19 PM    CO2 18 (L) 02/06/2023 04:19 PM    GLUCOSE 117 (H) 02/06/2023 04:19 PM    BUN 50 (H) 02/06/2023 04:19 PM    CREATININE 1.49 (H) 02/06/2023 04:19 PM      Lab Results   Component Value Date/Time    ASTSGOT 12 02/06/2023 04:19 PM    ALTSGPT 9 02/06/2023 04:19 PM     Lab Results   Component Value Date/Time    CHOLSTRLTOT 101 06/28/2022 08:58 AM    LDL 25 06/28/2022 08:58 AM    HDL 59 06/28/2022 08:58 AM    TRIGLYCERIDE 86 06/28/2022 08:58 AM    TROPONINT 43 (H) 02/06/2023 04:19 PM       Recent Labs     02/06/23  1619   NTPROBNP >10835*       Cardiac Imaging and Procedures Review  CARDIAC STUDIES/PROCEDURES:    ECHOCARDIOGRAM CONCLUSIONS (01/09/23)  Compared to the images of the prior study 9/6/22, there has been no significant change.   Normal left ventricular systolic function.  The left ventricular ejection fraction is visually estimated to be 65%.  Grade II diastolic dysfunction.  The right ventricle is normal in size and systolic function.  Mild mitral  regurgitation.  Moderate aortic insufficiency.  Mild tricuspid regurgitation.  Right ventricular systolic pressure is estimated to be 50 mmHg.  (study result reviewed)    EKG performed on (02/06/23) was reviewed: EKG personally interpreted shows sinus rhythm with mild ST elevation of lead III.  EKG performed on (01/26/23) was reviewed: EKG personally interpreted shows sinus rhythm with Q waves of lead III.    Assessment/Plan  Shortness of breath: She is a 82 y.o. female with a past medical history of congestive heart failure (attributed to high output state secondary to her anemia), hypertension, chronic kidney disease who presented 2/6/2023 with worsening shortness of breath and weakness. Her clinical status is complicated as her left ventricular systolic function was normal on her recent study and she does not appear to be in volume overloaded state though her pro BNP levels continues to increase. We will have Rojas Kaye review her clinically from congestive heart failure standpoint tomorrow.       Thank you for allowing me to participate in the care of this patient.    I will continue to follow this patient    Please contact me with any questions.    Gold Tripathi M.D.   Cardiologist, [facility]  [phone]

## 2023-02-07 NOTE — HEART FAILURE PROGRAM
Special Clinical Considerations Pertinent to HF - patient of Dr. Glover in the HF Clinic    Pneumococcal vaccine: previous  Influenza vaccine: current  Nicotine Status: quit 50+ y/a per h/p  Documentation of nicotine cessation counseling: n/a  DM dx: n/a  Atrial arrhythmia dx: n/a  HF Education documented: MISSING  HF f/u appointment requested: yes requested that schedulers notify me if patient cannot go to the HF Clinic  HF Clinic RN alert: n/a patient does not live rurally    Nurses: Please document HF education in the education tab and populate the HF Care Plan. These tools will guide day to day care of the HF patient.    Providers: below are Guideline Directed Medical Therapy (GDMT) for HFpEF. If any cannot be prescribed by discharge, would you please note the clinical reason for each in your discharge summary? Thanks! Whitney  Anticoagulation for atrial arrhythmia, if applicable  Glycemic control for DM + HF, if applicable  Lipid lowering medication for DM + HF, if applicable  Pneumococcal vaccine, if not previously received, If refused PLEASE DOCUMENT  Influenza vaccine for the current flu season, if not previously received If refused PLEASE DOCUMENT  Documentation of nicotine cessation counseling, if applicable  Acute Referral to disease management program specializing in heart failure care- asked that schedulers notify me if patient is not able to be seen at the Heart Failure Clinic  7 calendar day f/u appointment scheduled prior to discharge, appearing on signed after visit summary.

## 2023-02-07 NOTE — ASSESSMENT & PLAN NOTE
History of iron deficiency anemia last iron studies completed in 9/2022  B12 levels and folic acid levels have been adequate in the past  No active signs or symptoms of bleeding  T. bili normal, not concerning for underlying hemolytic process  Hemoglobin appears to be improved compared to 1 week ago  Continue iron supplementation  Reticulocyte count  Iron studies, B12, folic acid levels  Thyroid studies

## 2023-02-07 NOTE — ASSESSMENT & PLAN NOTE
About 1 month history of severe dyspnea on minimal exertion  Hospitalized at the beginning a month for concerns of pneumonia status post antibiotic treatment with no improvement in symptoms  Also recently hospitalized for concerns of fluid overload in the setting of diastolic heart failure and was diuresed, with last echo showing EF of 65% with grade 2 diastolic dysfunction moderate aortic insufficiency and mild mitral regurgitation and mild tricuspid regurgitation with an RVSP of 50 mmHg  She followed up with cardiology outpatient, despite ongoing diuretics patient's symptoms have not improved.  Dr. Glover with cardiology presumed that this may be high-output heart failure in the setting of severe anemia  Patient is pending outpatient establishment with hematologist  She does also endorse orthopnea paroxysmal nocturnal dyspnea as well as multiple episodes of burning substernal chest discomfort that occur at rest and self resolve over the last month  Initial troponin 43, BNP greater than 35,000  Chest x-ray consistent with bilateral interstitial pulmonary edema  Received 1 dose of Lasix in the emergency room  Continue with IV diuresis  Repeat echocardiogram  Trend troponins.  EKG with any recurrence of chest pain

## 2023-02-08 LAB
1,25(OH)2D3 SERPL-MCNC: 21.9 PG/ML (ref 19.9–79.3)
ANION GAP SERPL CALC-SCNC: 14 MMOL/L (ref 7–16)
BUN SERPL-MCNC: 52 MG/DL (ref 8–22)
CALCIUM SERPL-MCNC: 8.9 MG/DL (ref 8.5–10.5)
CHLORIDE SERPL-SCNC: 100 MMOL/L (ref 96–112)
CO2 SERPL-SCNC: 20 MMOL/L (ref 20–33)
CREAT SERPL-MCNC: 1.84 MG/DL (ref 0.5–1.4)
ERYTHROCYTE [DISTWIDTH] IN BLOOD BY AUTOMATED COUNT: 54.5 FL (ref 35.9–50)
GFR SERPLBLD CREATININE-BSD FMLA CKD-EPI: 27 ML/MIN/1.73 M 2
GLUCOSE SERPL-MCNC: 105 MG/DL (ref 65–99)
HCT VFR BLD AUTO: 29.9 % (ref 37–47)
HGB BLD-MCNC: 9.4 G/DL (ref 12–16)
LV EJECT FRACT  99904: 60
LV EJECT FRACT MOD 4C 99902: 58.65
MCH RBC QN AUTO: 29.1 PG (ref 27–33)
MCHC RBC AUTO-ENTMCNC: 31.4 G/DL (ref 33.6–35)
MCV RBC AUTO: 92.6 FL (ref 81.4–97.8)
NT-PROBNP SERPL IA-MCNC: ABNORMAL PG/ML (ref 0–125)
PLATELET # BLD AUTO: 256 K/UL (ref 164–446)
PMV BLD AUTO: 12 FL (ref 9–12.9)
POTASSIUM SERPL-SCNC: 4.1 MMOL/L (ref 3.6–5.5)
RBC # BLD AUTO: 3.23 M/UL (ref 4.2–5.4)
SODIUM SERPL-SCNC: 134 MMOL/L (ref 135–145)
WBC # BLD AUTO: 11.4 K/UL (ref 4.8–10.8)

## 2023-02-08 PROCEDURE — 85027 COMPLETE CBC AUTOMATED: CPT

## 2023-02-08 PROCEDURE — 99233 SBSQ HOSP IP/OBS HIGH 50: CPT | Performed by: INTERNAL MEDICINE

## 2023-02-08 PROCEDURE — 36415 COLL VENOUS BLD VENIPUNCTURE: CPT

## 2023-02-08 PROCEDURE — A9270 NON-COVERED ITEM OR SERVICE: HCPCS | Performed by: INTERNAL MEDICINE

## 2023-02-08 PROCEDURE — 93306 TTE W/DOPPLER COMPLETE: CPT | Mod: 26 | Performed by: INTERNAL MEDICINE

## 2023-02-08 PROCEDURE — 80048 BASIC METABOLIC PNL TOTAL CA: CPT

## 2023-02-08 PROCEDURE — 770020 HCHG ROOM/CARE - TELE (206)

## 2023-02-08 PROCEDURE — A9270 NON-COVERED ITEM OR SERVICE: HCPCS

## 2023-02-08 PROCEDURE — 700102 HCHG RX REV CODE 250 W/ 637 OVERRIDE(OP): Performed by: INTERNAL MEDICINE

## 2023-02-08 PROCEDURE — 83880 ASSAY OF NATRIURETIC PEPTIDE: CPT

## 2023-02-08 PROCEDURE — A9270 NON-COVERED ITEM OR SERVICE: HCPCS | Performed by: STUDENT IN AN ORGANIZED HEALTH CARE EDUCATION/TRAINING PROGRAM

## 2023-02-08 PROCEDURE — 700111 HCHG RX REV CODE 636 W/ 250 OVERRIDE (IP): Performed by: STUDENT IN AN ORGANIZED HEALTH CARE EDUCATION/TRAINING PROGRAM

## 2023-02-08 PROCEDURE — 700102 HCHG RX REV CODE 250 W/ 637 OVERRIDE(OP): Performed by: STUDENT IN AN ORGANIZED HEALTH CARE EDUCATION/TRAINING PROGRAM

## 2023-02-08 PROCEDURE — 700102 HCHG RX REV CODE 250 W/ 637 OVERRIDE(OP)

## 2023-02-08 RX ORDER — TORSEMIDE 20 MG/1
50 TABLET ORAL
Status: DISCONTINUED | OUTPATIENT
Start: 2023-02-08 | End: 2023-02-10

## 2023-02-08 RX ADMIN — FUROSEMIDE 40 MG: 10 INJECTION, SOLUTION INTRAMUSCULAR; INTRAVENOUS at 05:44

## 2023-02-08 RX ADMIN — FERROUS SULFATE TAB 325 MG (65 MG ELEMENTAL FE) 325 MG: 325 (65 FE) TAB at 05:44

## 2023-02-08 RX ADMIN — FOLIC ACID 1 MG: 1 TABLET ORAL at 05:44

## 2023-02-08 RX ADMIN — METOPROLOL TARTRATE 25 MG: 25 TABLET, FILM COATED ORAL at 07:35

## 2023-02-08 RX ADMIN — HEPARIN SODIUM 5000 UNITS: 5000 INJECTION INTRAVENOUS; SUBCUTANEOUS at 22:15

## 2023-02-08 RX ADMIN — SIMVASTATIN 10 MG: 10 TABLET, FILM COATED ORAL at 17:26

## 2023-02-08 RX ADMIN — METOPROLOL TARTRATE 25 MG: 25 TABLET, FILM COATED ORAL at 20:12

## 2023-02-08 RX ADMIN — LEVOTHYROXINE SODIUM 50 MCG: 0.05 TABLET ORAL at 05:44

## 2023-02-08 RX ADMIN — HEPARIN SODIUM 5000 UNITS: 5000 INJECTION INTRAVENOUS; SUBCUTANEOUS at 05:44

## 2023-02-08 RX ADMIN — TORSEMIDE 50 MG: 20 TABLET ORAL at 10:47

## 2023-02-08 RX ADMIN — Medication 5 MG: at 20:12

## 2023-02-08 RX ADMIN — ONDANSETRON 4 MG: 2 INJECTION INTRAMUSCULAR; INTRAVENOUS at 12:25

## 2023-02-08 ASSESSMENT — PAIN DESCRIPTION - PAIN TYPE
TYPE: ACUTE PAIN
TYPE: ACUTE PAIN;CHRONIC PAIN
TYPE: ACUTE PAIN
TYPE: ACUTE PAIN

## 2023-02-08 ASSESSMENT — ENCOUNTER SYMPTOMS
ABDOMINAL PAIN: 0
PALPITATIONS: 0
DIZZINESS: 0
PND: 1
CLAUDICATION: 0
WEAKNESS: 0
HEADACHES: 0
NAUSEA: 0
VOMITING: 0
ORTHOPNEA: 1
LOSS OF CONSCIOUSNESS: 0
SPUTUM PRODUCTION: 0
DIAPHORESIS: 0
BACK PAIN: 0
DIARRHEA: 0
FEVER: 0
BLOOD IN STOOL: 0
CONSTIPATION: 0
BLURRED VISION: 0
CHILLS: 0
SHORTNESS OF BREATH: 1
COUGH: 0
WHEEZING: 0

## 2023-02-08 ASSESSMENT — FIBROSIS 4 INDEX: FIB4 SCORE: 0.67

## 2023-02-08 NOTE — PROGRESS NOTES
Cardiology Progress Note:    Álvaro Resendez M.D.  Date & Time note created:    2/8/2023   9:33 AM     Referring MD:  Dr. Johnson    Patient ID:   Name:             Mariely Easley   YOB: 1940  Age:                 82 y.o.  female   MRN:               [id number]                                                             Chief Complaint / Reason for consult:  Heart failure exacerbation.    History of Present Illness:    This is an 82 years old woman with hypertensive heart disease, HFpEF, hyperlipidemia, moderate AR, presented to hospital with heart failure exacerbation.    I have independently interpreted and reviewed echocardiogram's actual images with patient which showed normal left ventricular systolic function. No wall motion abnormality. No evidence of pulmonary hypertension. Moderate AR.    NT pro BNP of >46225. GFR of 27, K of 4.1.    I personally interpreted the images of her transthoracic echocardiogram which showed preserved left ventricular systolic function.  However, there is significant aortic regurgitation.    Overnight events:  Feeling better with diuresis.  In and Out still not accurate.    Review of Systems:      Constitutional: Denies fevers, Denies weight changes  Eyes: Denies changes in vision, no eye pain  Ears/Nose/Throat/Mouth: Denies nasal congestion or sore throat   Cardiovascular: no chest pain, no palpitations   Respiratory: yes shortness of breath , Denies cough  Gastrointestinal/Hepatic: Denies abdominal pain, nausea, vomiting, diarrhea, constipation or GI bleeding   Genitourinary: Denies dysuria or frequency  Musculoskeletal/Rheum: Denies  joint pain and swelling   Skin: Denies rash  Neurological: Denies headache, confusion, memory loss or focal weakness/parasthesias  Psychiatric: denies mood disorder   Endocrine: Milagro thyroid problems  Heme/Oncology/Lymph Nodes: Denies enlarged lymph nodes, denies brusing or known bleeding disorder  All other systems were  reviewed and are negative (AMA/CMS criteria)                Past Medical History:   Past Medical History:   Diagnosis Date    Abnormal albumin 9/16/2022    Hypermagnesemia 9/16/2022    Hypertension     Hyponatremia 6/28/2022    Pain and swelling of left lower extremity 5/18/2022    Superficial thrombosis of lower extremity, right 7/5/2022     Active Hospital Problems    Diagnosis     Dyspnea on minimal exertion [R06.09]     Secondary renal hyperparathyroidism (HCC) [N25.81]     Diastolic CHF, acute on chronic (HCC) [I50.33]     Superficial thrombosis of leg, right [I82.811]     Elevated troponin [R77.8]     Aortic insufficiency [I35.1]     Iron deficiency anemia [D50.9]     Mixed hyperlipidemia [E78.2]     Essential hypertension [I10]     Acute renal failure superimposed on stage 3 chronic kidney disease (HCC) [N17.9, N18.30]     Acquired hypothyroidism [E03.9]        Past Surgical History:  No past surgical history on file.    Hospital Medications:    Current Facility-Administered Medications:     melatonin tablet 5 mg, 5 mg, Oral, Nightly, Linsey M Wegener, A.P.RClintonNClinton, 5 mg at 02/07/23 2108    senna-docusate (PERICOLACE or SENOKOT S) 8.6-50 MG per tablet 2 Tablet, 2 Tablet, Oral, BID **AND** polyethylene glycol/lytes (MIRALAX) PACKET 1 Packet, 1 Packet, Oral, QDAY PRN **AND** magnesium hydroxide (MILK OF MAGNESIA) suspension 30 mL, 30 mL, Oral, QDAY PRN **AND** bisacodyl (DULCOLAX) suppository 10 mg, 10 mg, Rectal, QDAY PRN, Ray Vieira M.D.    acetaminophen (Tylenol) tablet 650 mg, 650 mg, Oral, Q6HRS PRN, Ray Vieira M.D.    labetalol (NORMODYNE/TRANDATE) injection 10 mg, 10 mg, Intravenous, Q4HRS PRN, Ray Vieira M.D.    ondansetron (ZOFRAN) syringe/vial injection 4 mg, 4 mg, Intravenous, Q4HRS PRN, Ray Sreenivasan, M.D.    ondansetron (ZOFRAN ODT) dispertab 4 mg, 4 mg, Oral, Q4HRS PRN, Ray Vieira M.D.    ferrous sulfate tablet 325 mg, 325 mg, Oral, DAILY, Ray Vieira,  M.D., 325 mg at 02/08/23 0544    folic acid (FOLVITE) tablet 1 mg, 1 mg, Oral, DAILY, Ray Vieira M.D., 1 mg at 02/08/23 0544    levothyroxine (SYNTHROID) tablet 50 mcg, 50 mcg, Oral, AM ES, Ray Vieira M.D., 50 mcg at 02/08/23 0544    metoprolol tartrate (LOPRESSOR) tablet 25 mg, 25 mg, Oral, BID, Ray Vieira M.D., 25 mg at 02/08/23 0735    simvastatin (ZOCOR) tablet 10 mg, 10 mg, Oral, Q EVENING, Ray Vieira M.D., 10 mg at 02/07/23 1645    heparin injection 5,000 Units, 5,000 Units, Subcutaneous, Q8HRS, Ray Vieira M.D., 5,000 Units at 02/08/23 0544    furosemide (LASIX) injection 40 mg, 40 mg, Intravenous, BID DIURETIC, Ray Vieira M.D., 40 mg at 02/08/23 0544    Current Outpatient Medications:  Medications Prior to Admission   Medication Sig Dispense Refill Last Dose    azithromycin (ZITHROMAX Z-LANI) 250 MG Tab Take 250-500 mg by mouth every day. Z-LANI prescribed 1/19/2023. Take 2 tablets (500 mg) on day 1, then 1 tablet (250 mg) each day on days 2-5. (FINISHED)   1/23/2023 at FINISHED    amoxicillin-clavulanate (AUGMENTIN) 500-125 MG Tab Take 1 Tablet by mouth every 12 hours. 7 day course prescribed 1/19/2023. (FINISHED)   1/26/2023 at FINISHED    diazepam (VALIUM) 10 MG tablet Take 10 mg by mouth 1 time a day as needed (Muscle Spasms).   2/6/2023 at 0200    furosemide (LASIX) 40 MG Tab Take 1 Tablet by mouth 2 times a day. 180 Tablet 0 2/6/2023 at 0930    spironolactone (ALDACTONE) 25 MG Tab Take 1 Tablet by mouth every day. 30 Tablet 11 2/6/2023 at 0930    loperamide (IMODIUM) 2 MG Cap Take 2 mg by mouth 4 times a day as needed for Diarrhea.   2/6/2023 at AM    ferrous sulfate 325 (65 Fe) MG tablet TAKE 1 TABLET BY MOUTH EVERY DAY 90 Tablet 0 2/5/2023 at 1800    losartan (COZAAR) 50 MG Tab Take 50 mg by mouth every day.   2/6/2023 at 0930    metoprolol tartrate (LOPRESSOR) 25 MG Tab Take 25 mg by mouth 2 times a day.   2/6/2023 at 0930    acetaminophen (TYLENOL)  500 MG Tab Take 1,000 mg by mouth every 6 hours as needed for Mild Pain. 2 tablets = 1,000 mg.   2023 at PM    simvastatin (ZOCOR) 10 MG Tab Take 1 Tablet by mouth every evening. 100 Tablet 3 2023 at 1800    folic acid (FOLVITE) 1 MG Tab Take 1 Tablet by mouth every day. 90 Tablet 3 2023 at 0930    levothyroxine (SYNTHROID) 50 MCG Tab Take 1 Tab by mouth Every morning on an empty stomach. 90 Tab 3 2023 at 0930       Medication Allergy:  No Known Allergies    Family History:  No family history on file.    Social History:  Social History     Socioeconomic History    Marital status:      Spouse name: Not on file    Number of children: Not on file    Years of education: Not on file    Highest education level: Not on file   Occupational History    Not on file   Tobacco Use    Smoking status: Former     Types: Cigarettes     Quit date: 1979     Years since quittin.5    Smokeless tobacco: Never   Vaping Use    Vaping Use: Never used   Substance and Sexual Activity    Alcohol use: Yes     Alcohol/week: 8.4 oz     Types: 14 Glasses of wine per week    Drug use: No    Sexual activity: Not on file   Other Topics Concern    Not on file   Social History Narrative    Not on file     Social Determinants of Health     Financial Resource Strain: Low Risk     Difficulty of Paying Living Expenses: Not hard at all   Food Insecurity: No Food Insecurity    Worried About Running Out of Food in the Last Year: Never true    Ran Out of Food in the Last Year: Never true   Transportation Needs: No Transportation Needs    Lack of Transportation (Medical): No    Lack of Transportation (Non-Medical): No   Physical Activity: Inactive    Days of Exercise per Week: 0 days    Minutes of Exercise per Session: 0 min   Stress: No Stress Concern Present    Feeling of Stress : Not at all   Social Connections: Unknown    Frequency of Communication with Friends and Family: More than three times a week    Frequency of Social  "Gatherings with Friends and Family: More than three times a week    Attends Congregation Services: Not on file    Active Member of Clubs or Organizations: Not on file    Attends Club or Organization Meetings: Not on file    Marital Status: Not on file   Intimate Partner Violence: Not At Risk    Fear of Current or Ex-Partner: No    Emotionally Abused: No    Physically Abused: No    Sexually Abused: No   Housing Stability: Low Risk     Unable to Pay for Housing in the Last Year: No    Number of Places Lived in the Last Year: 1    Unstable Housing in the Last Year: No         Physical Exam:  Vitals/ General Appearance:   Weight/BMI: Body mass index is 25.19 kg/m².  BP (!) 120/31   Pulse 63   Temp 36.4 °C (97.5 °F) (Temporal)   Resp 17   Ht 1.575 m (5' 2\")   Wt 62.5 kg (137 lb 11.2 oz)   SpO2 90%   Vitals:    02/07/23 1950 02/08/23 0017 02/08/23 0400 02/08/23 0654   BP: (!) 132/37 (!) 116/34 (!) 115/35 (!) 120/31   Pulse: 66 (!) 58 (!) 59 63   Resp: 18 18 16 17   Temp: 36.6 °C (97.9 °F) 36.7 °C (98.1 °F)  36.4 °C (97.5 °F)   TempSrc: Temporal Temporal Temporal Temporal   SpO2: 95% 92% 92% 90%   Weight:  62.5 kg (137 lb 11.2 oz)     Height:         Oxygen Therapy:  Pulse Oximetry: 90 %, O2 (LPM): 0, O2 Delivery Device: None - Room Air    Constitutional:   No acute distress  HENMT:  Normocephalic, Atraumatic, Oropharynx moist mucous membranes, No oral exudates, Nose normal.  No thyromegaly.  Eyes:  EOMI, Conjunctiva normal, No discharge.  Neck:  Normal range of motion, No cervical tenderness,  no JVD.  Cardiovascular:  improved edema in BLE.  Lungs:  Normal breath sounds, breath sounds clear to auscultation bilaterally,  no rales, no rhonchi, no wheezing.   Abdomen: Bowel sounds normal, Soft, No tenderness, No guarding, No rebound, No masses, No hepatosplenomegaly.  Skin: Warm, Dry, No erythema, No rash, no induration.  Neurologic: Alert & oriented x 3, No focal deficits noted, cranial nerves II through X are " intact.  Psychiatric: Affect normal, Judgment normal, Mood normal.      MDM (Data Review):     Records reviewed and summarized in current documentation    Lab Data Review:  Recent Results (from the past 24 hour(s))   EC-ECHOCARDIOGRAM COMPLETE W/O CONT    Collection Time: 02/07/23 10:02 PM   Result Value Ref Range    Eject.Frac. MOD 4C 58.65     Left Ventrical Ejection Fraction 60    CBC WITHOUT DIFFERENTIAL    Collection Time: 02/08/23  1:59 AM   Result Value Ref Range    WBC 11.4 (H) 4.8 - 10.8 K/uL    RBC 3.23 (L) 4.20 - 5.40 M/uL    Hemoglobin 9.4 (L) 12.0 - 16.0 g/dL    Hematocrit 29.9 (L) 37.0 - 47.0 %    MCV 92.6 81.4 - 97.8 fL    MCH 29.1 27.0 - 33.0 pg    MCHC 31.4 (L) 33.6 - 35.0 g/dL    RDW 54.5 (H) 35.9 - 50.0 fL    Platelet Count 256 164 - 446 K/uL    MPV 12.0 9.0 - 12.9 fL   Basic Metabolic Panel    Collection Time: 02/08/23  1:59 AM   Result Value Ref Range    Sodium 134 (L) 135 - 145 mmol/L    Potassium 4.1 3.6 - 5.5 mmol/L    Chloride 100 96 - 112 mmol/L    Co2 20 20 - 33 mmol/L    Glucose 105 (H) 65 - 99 mg/dL    Bun 52 (H) 8 - 22 mg/dL    Creatinine 1.84 (H) 0.50 - 1.40 mg/dL    Calcium 8.9 8.5 - 10.5 mg/dL    Anion Gap 14.0 7.0 - 16.0   ESTIMATED GFR    Collection Time: 02/08/23  1:59 AM   Result Value Ref Range    GFR (CKD-EPI) 27 (A) >60 mL/min/1.73 m 2       Imaging/Procedures Review:    Chest Xray:  Reviewed    EKG:   As in HPI.     MDM (Assessment and Plan):     Active Hospital Problems    Diagnosis     Dyspnea on minimal exertion [R06.09]     Secondary renal hyperparathyroidism (HCC) [N25.81]     Diastolic CHF, acute on chronic (HCC) [I50.33]     Superficial thrombosis of leg, right [I82.811]     Elevated troponin [R77.8]     Aortic insufficiency [I35.1]     Iron deficiency anemia [D50.9]     Mixed hyperlipidemia [E78.2]     Essential hypertension [I10]     Acute renal failure superimposed on stage 3 chronic kidney disease (HCC) [N17.9, N18.30]     Acquired hypothyroidism [E03.9]           Still volume overloaded.  Continue diuresis but will stop lasix 40 mg IV bid and start Torsemide 50 mg daily.  Will consider Dapa trial enrollment.  Based on the overall clinical history and profile, patient is a good candidate for Cardiomems implantation system to remotely monitor intracardiac pressures. she will benefit from reduced recurrent hospitalization for heart failure exacerbation and also quality of life improvement. Patient also has a good support system and has shown compliance to medical therapy. she is motivated and will be a successful candidate.  Will also screen her research trial of DAPA munir.    At this time, I do think that it is reasonable for us to further evaluate the nature of her aortic valve pathologies.  Therefore, we will proceed with joint esophageal echocardiogram which will be planned for tomorrow.  Risk and benefits were explained to the patient this includes esophageal perforation, bleeding, infections, death.  Patient has agreed to proceed.    Thank you for referring this patient to our cardiology service.    lÁvaro Resendez MD.   Cardiology Inpatient Service.  [facility].  [phone].  Audrey Celis.

## 2023-02-08 NOTE — PROGRESS NOTES
MD updated on urine output. Consult will be placed to nephrology also considering patients developing TIFFANI. Keeping aguila now for accurate measurement of I&Os and for request of specialties.

## 2023-02-08 NOTE — DISCHARGE PLANNING
"HTH/SCP TCN chart review completed. Collaborated with MARIA ESTHER Lerma. Current discharge considerations are home. Per MD note on 2/7, patient \"still volume overloaded.\"  Patient currently ambulating 10 feet with FWW, per Kardex.  Patient is inactive per social determinants of health in [facility].      TCN will continue to follow and collaborate with discharge planning team as additional post acute needs arise. Thank you.    Completed:  Choice obtained: No choice obtained.  See above.   [facility] referral (Y), Sent on 2/7/23.      "

## 2023-02-08 NOTE — PROGRESS NOTES
Report received and assumed patient care. Pt A&O x 4 and on room air. No signs of distress noted at this time. Pt has tele box in place. Pt updated on plan of care for the day and has call light within reach. Fall precautions are in place.

## 2023-02-08 NOTE — CARE PLAN
The patient is Stable - Low risk of patient condition declining or worsening    Shift Goals  Clinical Goals: diurese  Patient Goals: breathe better, sleep  Family Goals: comfort, rest    Progress made toward(s) clinical / shift goals:      Problem: Knowledge Deficit - Standard  Goal: Patient and family/care givers will demonstrate understanding of plan of care, disease process/condition, diagnostic tests and medications  Outcome: Progressing     Problem: Care Map:  Day 1 Optimal Outcome for the Heart Failure Patient  Goal: Day 1:  Optimal Care of the heart failure patient  Outcome: Progressing       Patient is not progressing towards the following goals:

## 2023-02-08 NOTE — CONSULTS
"[facility] -  CONSULTATION NOTE               Author: Horacio Fulton M.D. Date & Time: 2023  3:02 PM       REASON FOR CONSULTATION:   Acute kidney injury    CHIEF COMPLAINT:   \"I had trouble breathing\"    HISTORY OF PRESENT ILLNESS:    81 yo F PMH CKD Stage 3a, HTN, HLD, and chronic diastolic HF who presents to ED with CC as above.  She endorses a 1 month hx of worsening dyspnea with off and on substernal CP on admit on .  The pain resolves on its own, feels like a burning sensation, and has no radiation.  No aggravating or alleviating factors.  [place] seeing a Nephrologist but has not since being here.  Follows with [facility] for her HF.  Before admit she had been started on spironolactone to further help with volume overload issues but her sx's were just so debilitating that she came to ED instead.  IV diuresis was initiated and she had adequate response to that and feels better from then to now.  Unfortunately she came in with TIFFANI SCr ~ 1.5 and has continued to worsen daily.  She has pretty severe AR and so for that Cardiology is planning a KYRA tomorrow.  Otherwise denies any recent F/C/N/V.  No melena, hematochezia, hematemesis.  No HA, visual changes, or abdominal pain.    REVIEW OF SYSTEMS:    10 point ROS was performed and is as per HPI or otherwise negative    PAST MEDICAL HISTORY:   Past Medical History:   Diagnosis Date    Abnormal albumin 2022    Hypermagnesemia 2022    Hypertension     Hyponatremia 2022    Pain and swelling of left lower extremity 2022    Superficial thrombosis of lower extremity, right 2022     PAST SURGICAL HISTORY:   No past surgical history on file.    FAMILY HISTORY:   No family history on file.    SOCIAL HISTORY:   Social History     Tobacco Use   Smoking Status Former    Types: Cigarettes    Quit date: 1979    Years since quittin.5   Smokeless Tobacco " "Never     Social History     Substance and Sexual Activity   Alcohol Use Yes    Alcohol/week: 8.4 oz    Types: 14 Glasses of wine per week     Social History     Substance and Sexual Activity   Drug Use No       HOME MEDICATIONS:   Reviewed and documented in chart    LABORATORY STUDIES:   Recent Labs     02/06/23  1619 02/07/23  0044 02/08/23  0159   SODIUM 135 134* 134*   POTASSIUM 5.0 4.7 4.1   CHLORIDE 99 100 100   CO2 18* 18* 20   GLUCOSE 117* 112* 105*   BUN 50* 48* 52*   CREATININE 1.49* 1.62* 1.84*   CALCIUM 10.5 9.7 8.9       ALLERGIES:  Patient has no known allergies.    VS:  /43 Comment: Nurse notified   Pulse (!) 55   Temp 36.4 °C (97.5 °F) (Temporal)   Resp 18   Ht 1.575 m (5' 2\")   Wt 62.5 kg (137 lb 11.2 oz)   SpO2 91%   BMI 25.19 kg/m²     Physical Exam  Nursing note reviewed.   Constitutional:       Appearance: Normal appearance.   Eyes:      General: No scleral icterus.  Cardiovascular:      Comments: BLE edema  Pulmonary:      Effort: Pulmonary effort is normal.   Abdominal:      General: There is no distension.   Musculoskeletal:         General: No deformity.   Skin:     Findings: No rash.   Neurological:      Mental Status: She is alert.   Psychiatric:         Behavior: Behavior is cooperative.       FLUID BALANCE:  In: 460 [P.O.:460]  Out: 625     IMAGING:  All imaging reviewed from admission to present day    IMPRESSION:  # TIFFANI  - Etiology likely 2/2 CRS  - Non-oliguric  # CKD Stage 3a  - Etiology likely 2/2 CRS/HTN  - BCr ~ 1.1-1.3  # Acute on chronic diastolic HF  - EF ~ 65%  - Moderate-severe AR also noted  - KYRA pending  # HTN  - Goal BP < 140/90  - At goal  # HLD    PLAN:  - No compelling indication for KRT at this time  - Continue diuresis  - Monitor I/O  - Dose all meds per eGFR < 15  - Daily labs to trend SCr  - May have more advanced CKD than labs suggest with adequate diuresis  - FU Cards rec's    Thank you for the consultation!  "

## 2023-02-08 NOTE — DIETARY
Nutrition Services: Heart Failure Diet Education Consult   Day 2 of admit.  Mariely Easley is a 82 y.o. female with admitting DX of Dyspnea on minimal exertion [R06.09]    RD able to visit pt at bedside to provide heart failure education. Pt reports having been through this education before with , pt opted out of receiving education at this time. RD left handout with patients belongings to review at a later date. RD encouraged pt to review with family as they were very motivated to help pt during previous visit yesterday. RD able to answer all questions to patient's satisfaction.     No other education needs identified at this time. Consider referral to outpatient nutrition services for continuation of education as indicated or per pt preferences.     Please re-consult RD as indicated.

## 2023-02-08 NOTE — PROGRESS NOTES
Cardiology Progress Note:    Álvaro Resendez M.D.  Date & Time note created:    2/7/2023   7:09 PM     Referring MD:  Dr. Johnson    Patient ID:   Name:             Mariely Easley   YOB: 1940  Age:                 82 y.o.  female   MRN:               [id number]                                                             Chief Complaint / Reason for consult:  Heart failure exacerbation.    History of Present Illness:    This is an 82 years old woman with hypertensive heart disease, HFpEF, hyperlipidemia, moderate AR, presented to hospital with heart failure exacerbation.    I have independently interpreted and reviewed echocardiogram's actual images with patient which showed normal left ventricular systolic function. No wall motion abnormality. No evidence of pulmonary hypertension. Moderate AR.    NT pro BNP of >75835. GFR of 32, K of 4.7.    Overnight events:  Feeling better with diuresis.  In and Out not accurate.    Review of Systems:      Constitutional: Denies fevers, Denies weight changes  Eyes: Denies changes in vision, no eye pain  Ears/Nose/Throat/Mouth: Denies nasal congestion or sore throat   Cardiovascular: no chest pain, no palpitations   Respiratory: yes shortness of breath , Denies cough  Gastrointestinal/Hepatic: Denies abdominal pain, nausea, vomiting, diarrhea, constipation or GI bleeding   Genitourinary: Denies dysuria or frequency  Musculoskeletal/Rheum: Denies  joint pain and swelling   Skin: Denies rash  Neurological: Denies headache, confusion, memory loss or focal weakness/parasthesias  Psychiatric: denies mood disorder   Endocrine: Milagro thyroid problems  Heme/Oncology/Lymph Nodes: Denies enlarged lymph nodes, denies brusing or known bleeding disorder  All other systems were reviewed and are negative (AMA/CMS criteria)                Past Medical History:   Past Medical History:   Diagnosis Date    Abnormal albumin 9/16/2022    Hypermagnesemia 9/16/2022    Hypertension      Hyponatremia 6/28/2022    Pain and swelling of left lower extremity 5/18/2022    Superficial thrombosis of lower extremity, right 7/5/2022     Active Hospital Problems    Diagnosis     Dyspnea on minimal exertion [R06.09]     Secondary renal hyperparathyroidism (HCC) [N25.81]     Diastolic CHF, acute on chronic (HCC) [I50.33]     Superficial thrombosis of leg, right [I82.811]     Elevated troponin [R77.8]     Aortic insufficiency [I35.1]     Iron deficiency anemia [D50.9]     Mixed hyperlipidemia [E78.2]     Essential hypertension [I10]     Acute renal failure superimposed on stage 3 chronic kidney disease (HCC) [N17.9, N18.30]     Acquired hypothyroidism [E03.9]        Past Surgical History:  No past surgical history on file.    Hospital Medications:    Current Facility-Administered Medications:     senna-docusate (PERICOLACE or SENOKOT S) 8.6-50 MG per tablet 2 Tablet, 2 Tablet, Oral, BID **AND** polyethylene glycol/lytes (MIRALAX) PACKET 1 Packet, 1 Packet, Oral, QDAY PRN **AND** magnesium hydroxide (MILK OF MAGNESIA) suspension 30 mL, 30 mL, Oral, QDAY PRN **AND** bisacodyl (DULCOLAX) suppository 10 mg, 10 mg, Rectal, QDAY PRN, Ray Vieira M.D.    acetaminophen (Tylenol) tablet 650 mg, 650 mg, Oral, Q6HRS PRN, Ray Vieira M.D.    labetalol (NORMODYNE/TRANDATE) injection 10 mg, 10 mg, Intravenous, Q4HRS PRN, Ray Vieira M.D.    ondansetron (ZOFRAN) syringe/vial injection 4 mg, 4 mg, Intravenous, Q4HRS PRN, Ray Vieira M.D.    ondansetron (ZOFRAN ODT) dispertab 4 mg, 4 mg, Oral, Q4HRS PRN, Ray Vieira M.D.    ferrous sulfate tablet 325 mg, 325 mg, Oral, DAILY, Ray Vieira M.D., 325 mg at 02/07/23 0537    folic acid (FOLVITE) tablet 1 mg, 1 mg, Oral, DAILY, Ray Vieira M.D., 1 mg at 02/07/23 0537    levothyroxine (SYNTHROID) tablet 50 mcg, 50 mcg, Oral, AM ES, Ray iVeira M.D., 50 mcg at 02/07/23 0537    metoprolol tartrate (LOPRESSOR) tablet 25 mg,  25 mg, Oral, BID, Ray Vieira M.D., 25 mg at 02/07/23 0648    simvastatin (ZOCOR) tablet 10 mg, 10 mg, Oral, Q EVENING, Ray Vieira M.D., 10 mg at 02/07/23 1645    heparin injection 5,000 Units, 5,000 Units, Subcutaneous, Q8HRS, Ray Vieira M.D., 5,000 Units at 02/07/23 1334    furosemide (LASIX) injection 40 mg, 40 mg, Intravenous, BID DIURETIC, Ray Vieira M.D., 40 mg at 02/07/23 1645    Current Outpatient Medications:  Medications Prior to Admission   Medication Sig Dispense Refill Last Dose    azithromycin (ZITHROMAX Z-LANI) 250 MG Tab Take 250-500 mg by mouth every day. Z-LANI prescribed 1/19/2023. Take 2 tablets (500 mg) on day 1, then 1 tablet (250 mg) each day on days 2-5. (FINISHED)   1/23/2023 at FINISHED    amoxicillin-clavulanate (AUGMENTIN) 500-125 MG Tab Take 1 Tablet by mouth every 12 hours. 7 day course prescribed 1/19/2023. (FINISHED)   1/26/2023 at FINISHED    diazepam (VALIUM) 10 MG tablet Take 10 mg by mouth 1 time a day as needed (Muscle Spasms).   2/6/2023 at 0200    furosemide (LASIX) 40 MG Tab Take 1 Tablet by mouth 2 times a day. 180 Tablet 0 2/6/2023 at 0930    spironolactone (ALDACTONE) 25 MG Tab Take 1 Tablet by mouth every day. 30 Tablet 11 2/6/2023 at 0930    loperamide (IMODIUM) 2 MG Cap Take 2 mg by mouth 4 times a day as needed for Diarrhea.   2/6/2023 at AM    ferrous sulfate 325 (65 Fe) MG tablet TAKE 1 TABLET BY MOUTH EVERY DAY 90 Tablet 0 2/5/2023 at 1800    losartan (COZAAR) 50 MG Tab Take 50 mg by mouth every day.   2/6/2023 at 0930    metoprolol tartrate (LOPRESSOR) 25 MG Tab Take 25 mg by mouth 2 times a day.   2/6/2023 at 0930    acetaminophen (TYLENOL) 500 MG Tab Take 1,000 mg by mouth every 6 hours as needed for Mild Pain. 2 tablets = 1,000 mg.   2/5/2023 at PM    simvastatin (ZOCOR) 10 MG Tab Take 1 Tablet by mouth every evening. 100 Tablet 3 2/5/2023 at 1800    folic acid (FOLVITE) 1 MG Tab Take 1 Tablet by mouth every day. 90 Tablet 3  2023 at 0930    levothyroxine (SYNTHROID) 50 MCG Tab Take 1 Tab by mouth Every morning on an empty stomach. 90 Tab 3 2023 at 0930       Medication Allergy:  No Known Allergies    Family History:  No family history on file.    Social History:  Social History     Socioeconomic History    Marital status:      Spouse name: Not on file    Number of children: Not on file    Years of education: Not on file    Highest education level: Not on file   Occupational History    Not on file   Tobacco Use    Smoking status: Former     Types: Cigarettes     Quit date: 1979     Years since quittin.5    Smokeless tobacco: Never   Vaping Use    Vaping Use: Never used   Substance and Sexual Activity    Alcohol use: Yes     Alcohol/week: 8.4 oz     Types: 14 Glasses of wine per week    Drug use: No    Sexual activity: Not on file   Other Topics Concern    Not on file   Social History Narrative    Not on file     Social Determinants of Health     Financial Resource Strain: Low Risk     Difficulty of Paying Living Expenses: Not hard at all   Food Insecurity: No Food Insecurity    Worried About Running Out of Food in the Last Year: Never true    Ran Out of Food in the Last Year: Never true   Transportation Needs: No Transportation Needs    Lack of Transportation (Medical): No    Lack of Transportation (Non-Medical): No   Physical Activity: Inactive    Days of Exercise per Week: 0 days    Minutes of Exercise per Session: 0 min   Stress: No Stress Concern Present    Feeling of Stress : Not at all   Social Connections: Unknown    Frequency of Communication with Friends and Family: More than three times a week    Frequency of Social Gatherings with Friends and Family: More than three times a week    Attends Restorationism Services: Not on file    Active Member of Clubs or Organizations: Not on file    Attends Club or Organization Meetings: Not on file    Marital Status: Not on file   Intimate Partner Violence: Not At Risk     "Fear of Current or Ex-Partner: No    Emotionally Abused: No    Physically Abused: No    Sexually Abused: No   Housing Stability: Low Risk     Unable to Pay for Housing in the Last Year: No    Number of Places Lived in the Last Year: 1    Unstable Housing in the Last Year: No         Physical Exam:  Vitals/ General Appearance:   Weight/BMI: Body mass index is 23.95 kg/m².  BP (!) 126/38   Pulse 66   Temp 36.6 °C (97.9 °F) (Temporal)   Resp 18   Ht 1.575 m (5' 2\")   Wt 59.4 kg (130 lb 15.3 oz)   SpO2 96%   Vitals:    02/07/23 0726 02/07/23 1159 02/07/23 1542 02/07/23 1646   BP: (!) 139/36 (!) 137/36 (!) 134/38 (!) 126/38   Pulse: 61 67 66    Resp: 18 18 18    Temp: 36.6 °C (97.9 °F) 37.2 °C (99 °F) 36.6 °C (97.9 °F)    TempSrc: Temporal Temporal Temporal    SpO2: 94% 94% 96%    Weight:       Height:         Oxygen Therapy:  Pulse Oximetry: 96 %, O2 (LPM): 0, O2 Delivery Device: None - Room Air    Constitutional:   No acute distress  HENMT:  Normocephalic, Atraumatic, Oropharynx moist mucous membranes, No oral exudates, Nose normal.  No thyromegaly.  Eyes:  EOMI, Conjunctiva normal, No discharge.  Neck:  Normal range of motion, No cervical tenderness,  no JVD.  Cardiovascular:  + edema in BLE.  Lungs:  Normal breath sounds, breath sounds clear to auscultation bilaterally,  no rales, no rhonchi, no wheezing.   Abdomen: Bowel sounds normal, Soft, No tenderness, No guarding, No rebound, No masses, No hepatosplenomegaly.  Skin: Warm, Dry, No erythema, No rash, no induration.  Neurologic: Alert & oriented x 3, No focal deficits noted, cranial nerves II through X are intact.  Psychiatric: Affect normal, Judgment normal, Mood normal.      MDM (Data Review):     Records reviewed and summarized in current documentation    Lab Data Review:  Recent Results (from the past 24 hour(s))   CBC with Differential    Collection Time: 02/07/23 12:44 AM   Result Value Ref Range    WBC 16.5 (H) 4.8 - 10.8 K/uL    RBC 3.61 (L) 4.20 - " 5.40 M/uL    Hemoglobin 10.2 (L) 12.0 - 16.0 g/dL    Hematocrit 33.1 (L) 37.0 - 47.0 %    MCV 91.7 81.4 - 97.8 fL    MCH 28.3 27.0 - 33.0 pg    MCHC 30.8 (L) 33.6 - 35.0 g/dL    RDW 54.3 (H) 35.9 - 50.0 fL    Platelet Count 322 164 - 446 K/uL    MPV 12.5 9.0 - 12.9 fL    Neutrophils-Polys 74.60 (H) 44.00 - 72.00 %    Lymphocytes 14.60 (L) 22.00 - 41.00 %    Monocytes 5.80 0.00 - 13.40 %    Eosinophils 3.50 0.00 - 6.90 %    Basophils 0.70 0.00 - 1.80 %    Immature Granulocytes 0.80 0.00 - 0.90 %    Nucleated RBC 0.20 /100 WBC    Neutrophils (Absolute) 12.34 (H) 2.00 - 7.15 K/uL    Lymphs (Absolute) 2.42 1.00 - 4.80 K/uL    Monos (Absolute) 0.96 (H) 0.00 - 0.85 K/uL    Eos (Absolute) 0.58 (H) 0.00 - 0.51 K/uL    Baso (Absolute) 0.11 0.00 - 0.12 K/uL    Immature Granulocytes (abs) 0.13 (H) 0.00 - 0.11 K/uL    NRBC (Absolute) 0.03 K/uL   Comp Metabolic Panel (CMP)    Collection Time: 02/07/23 12:44 AM   Result Value Ref Range    Sodium 134 (L) 135 - 145 mmol/L    Potassium 4.7 3.6 - 5.5 mmol/L    Chloride 100 96 - 112 mmol/L    Co2 18 (L) 20 - 33 mmol/L    Anion Gap 16.0 7.0 - 16.0    Glucose 112 (H) 65 - 99 mg/dL    Bun 48 (H) 8 - 22 mg/dL    Creatinine 1.62 (H) 0.50 - 1.40 mg/dL    Calcium 9.7 8.5 - 10.5 mg/dL    AST(SGOT) 9 (L) 12 - 45 U/L    ALT(SGPT) 11 2 - 50 U/L    Alkaline Phosphatase 110 (H) 30 - 99 U/L    Total Bilirubin 0.4 0.1 - 1.5 mg/dL    Albumin 4.0 3.2 - 4.9 g/dL    Total Protein 6.7 6.0 - 8.2 g/dL    Globulin 2.7 1.9 - 3.5 g/dL    A-G Ratio 1.5 g/dL   MAGNESIUM    Collection Time: 02/07/23 12:44 AM   Result Value Ref Range    Magnesium 2.0 1.5 - 2.5 mg/dL   PHOSPHORUS    Collection Time: 02/07/23 12:44 AM   Result Value Ref Range    Phosphorus 4.7 (H) 2.5 - 4.5 mg/dL   TROPONIN    Collection Time: 02/07/23 12:44 AM   Result Value Ref Range    Troponin T 51 (H) 6 - 19 ng/L   ESTIMATED GFR    Collection Time: 02/07/23 12:44 AM   Result Value Ref Range    GFR (CKD-EPI) 32 (A) >60 mL/min/1.73 m 2    CORRECTED CALCIUM    Collection Time: 02/07/23 12:44 AM   Result Value Ref Range    Correct Calcium 9.7 8.5 - 10.5 mg/dL   Respiratory Panel By PCR    Collection Time: 02/07/23  5:45 AM    Specimen: Nasopharyngeal   Result Value Ref Range    Adenovirus, PCR Not Detected     SARS-CoV-2 (COVID-19) RNA by MONO NotDetected     Coronavirus 229E, PCR Not Detected     Coronavirus HKU1, PCR Not Detected     Coronavirus NL63, PCR Not Detected     Coronavirus OC43, PCR Not Detected     Human Metapneumovirus, PCR Not Detected     Rhino/Enterovirus, PCR Not Detected     Influenza A, PCR Not Detected     Influenza B, PCR Not Detected     Parainfluenza 1, PCR Not Detected     Parainfluenza 2, PCR Not Detected     Parainfluenza 3, PCR Not Detected     Parainfluenza 4, PCR Not Detected     RSV (Respiratory Syncytial Virus), PCR Not Detected     Bordetella parapertussis ([id number]), PCR Not Detected     Bordetella pertussis (ptxP), PCR Not Detected     Mycoplasma pneumoniae, PCR Not Detected     Chlamydia pneumoniae, PCR Not Detected        Imaging/Procedures Review:    Chest Xray:  Reviewed    EKG:   As in HPI.     MDM (Assessment and Plan):     Active Hospital Problems    Diagnosis     Dyspnea on minimal exertion [R06.09]     Secondary renal hyperparathyroidism (HCC) [N25.81]     Diastolic CHF, acute on chronic (HCC) [I50.33]     Superficial thrombosis of leg, right [I82.811]     Elevated troponin [R77.8]     Aortic insufficiency [I35.1]     Iron deficiency anemia [D50.9]     Mixed hyperlipidemia [E78.2]     Essential hypertension [I10]     Acute renal failure superimposed on stage 3 chronic kidney disease (HCC) [N17.9, N18.30]     Acquired hypothyroidism [E03.9]          Still volume overloaded.  Continue diuresis with lasix 40 mg IV bid.  Will consider Dapa trial enrollment.  Based on the overall clinical history and profile, patient is a good candidate for Cardiomems implantation system to remotely monitor intracardiac pressures.  she will benefit from reduced recurrent hospitalization for heart failure exacerbation and also quality of life improvement. Patient also has a good support system and has shown compliance to medical therapy. she is motivated and will be a successful candidate.        Thank you for referring this patient to our cardiology service.  We will follow patient with you.      Álvaro Resendez MD.   Cardiology Inpatient Service.  [facility].  [phone].  Audrey Celis.

## 2023-02-08 NOTE — PROGRESS NOTES
Hospital Medicine Daily Progress Note    Date of Service  2/8/2023    Chief Complaint  Mariely Easley is a 82 y.o. female admitted 2/6/2023 with shortness of breath     Hospital Course  This is a 83 y/o F with PMHX of grade 2 diastolic dysfunction congestive heart failure with an EF of 65% with moderate aortic insufficiency, hypertension, iron deficiency anemia, hyperlipidemia, hypothyroidism, history of superficial venous thrombosis and previously on chronic anticoagulation who was admitted on 2/6/23 after presenting to ER complaining of 1 month history of dyspnea with minimal exertion associated with orthopnea and paroxysmal nocturnal dyspnea and on and off substernal chest pain that self resolves that feels like a burning sensation  She was hospitalized at the beginning of January for pneumonia and completed antibiotic regimen for continued to have her dyspneic symptoms.  She then was hospitalized again later for concerns of diastolic heart failure exacerbation and was diuresed and discharged home with close follow-up with cardiology.  Her last echocardiogram was done in January with preserved systolic function but grade 2 diastolic dysfunction.  Her symptoms despite continuing to uptitrate her Lasix have not changed  She was also additionally started on spironolactone for concerns of ongoing fluid overload and pending further anemic work-up and outpatient establishment with hematology.  The symptoms have been so debilitating that she ultimately came into the emergency room.  Cardiology was also consulted and patient admitted for further work up and treatment     Interval Problem Update  Patient resting in bed  still with some intermittent chest pain,   Still feels SOB,   Cardiology following and for KYRA tomorrow   Cont on diuresis as per cardiology rec. Now switched to torsemide   Monitor on telemetry   Kidney function worsening so I have consulted nephrology and discussed case appreciate rec.   I have  discussed this patient's plan of care and discharge plan at IDT rounds today with Case Management, Nursing, Nursing leadership, and other members of the IDT team.    Consultants/Specialty  cardiology    Code Status  Full Code    Disposition  Patient is not medically cleared for discharge.   Anticipate discharge to  TBD .  I have placed the appropriate orders for post-discharge needs.    Review of Systems  Review of Systems   Constitutional:  Positive for malaise/fatigue. Negative for chills, diaphoresis and fever.   HENT:  Negative for ear pain and tinnitus.    Eyes:  Negative for blurred vision.   Respiratory:  Positive for shortness of breath. Negative for cough, sputum production and wheezing.    Cardiovascular:  Positive for chest pain, orthopnea and PND. Negative for palpitations, claudication and leg swelling.   Gastrointestinal:  Negative for abdominal pain, blood in stool, constipation, diarrhea, melena, nausea and vomiting.   Genitourinary:  Negative for dysuria and hematuria.   Musculoskeletal:  Negative for back pain.   Neurological:  Negative for dizziness, loss of consciousness, weakness and headaches.   All other systems reviewed and are negative.     Physical Exam  Temp:  [36.4 °C (97.5 °F)-36.7 °C (98.1 °F)] 36.4 °C (97.5 °F)  Pulse:  [55-66] 55  Resp:  [16-18] 18  BP: (111-134)/(31-43) 116/43  SpO2:  [90 %-96 %] 91 %    Physical Exam  Vitals and nursing note reviewed.   Constitutional:       General: She is not in acute distress.     Appearance: She is not diaphoretic.   HENT:      Head: Normocephalic and atraumatic.      Mouth/Throat:      Mouth: Mucous membranes are moist.   Eyes:      General: No scleral icterus.        Right eye: No discharge.         Left eye: No discharge.      Conjunctiva/sclera: Conjunctivae normal.      Pupils: Pupils are equal, round, and reactive to light.   Cardiovascular:      Rate and Rhythm: Normal rate and regular rhythm.      Pulses: Normal pulses.      Heart  sounds: No murmur heard.  Pulmonary:      Effort: Pulmonary effort is normal. No respiratory distress.      Breath sounds: No wheezing, rhonchi or rales.   Abdominal:      General: Abdomen is flat. Bowel sounds are normal. There is no distension.      Palpations: Abdomen is soft.      Tenderness: There is no abdominal tenderness. There is no guarding or rebound.   Musculoskeletal:      Cervical back: Neck supple.      Right lower leg: No edema.      Left lower leg: No edema.   Skin:     General: Skin is warm and dry.      Capillary Refill: Capillary refill takes less than 2 seconds.   Neurological:      Mental Status: She is alert and oriented to person, place, and time. Mental status is at baseline.   Psychiatric:         Mood and Affect: Mood normal.       Fluids    Intake/Output Summary (Last 24 hours) at 2/8/2023 1204  Last data filed at 2/8/2023 1157  Gross per 24 hour   Intake 400 ml   Output 625 ml   Net -225 ml       Laboratory  Recent Labs     02/06/23  1619 02/07/23  0044 02/08/23  0159   WBC 16.9* 16.5* 11.4*   RBC 3.89* 3.61* 3.23*   HEMOGLOBIN 11.6* 10.2* 9.4*   HEMATOCRIT 36.1* 33.1* 29.9*   MCV 92.8 91.7 92.6   MCH 29.8 28.3 29.1   MCHC 32.1* 30.8* 31.4*   RDW 54.2* 54.3* 54.5*   PLATELETCT 353 322 256   MPV 12.4 12.5 12.0     Recent Labs     02/06/23  1619 02/07/23  0044 02/08/23  0159   SODIUM 135 134* 134*   POTASSIUM 5.0 4.7 4.1   CHLORIDE 99 100 100   CO2 18* 18* 20   GLUCOSE 117* 112* 105*   BUN 50* 48* 52*   CREATININE 1.49* 1.62* 1.84*   CALCIUM 10.5 9.7 8.9                   Imaging  EC-ECHOCARDIOGRAM COMPLETE W/O CONT   Final Result      DX-CHEST-PORTABLE (1 VIEW)   Final Result      Cardiomegaly and mild central perivascular and interstitial pulmonary edema consistent with CHF.      EC-KYRA W/O CONT    (Results Pending)        Assessment/Plan  * Diastolic CHF, acute on chronic (HCC)- (present on admission)  Assessment & Plan  Elevated BNP, elevated troponin in setting of recurrent substernal  chest discomfort as well as severe dyspnea on exertion, orthopnea, paroxysmal nocturnal dyspnea  Chest x-ray concerning for bilateral pulmonary edema  Last echocardiogram showing grade 2 diastolic dysfunction in the setting of moderate aortic insufficiency and increased RVSP  Continuing diuresis  Holding home losartan and home spironolactone in the setting of rising creatinine as well as potassium at 5.0 after starting spironolactone 1 week ago  Continue home beta-blocker  2 g sodium diet as well as 2 L fluid restriction  Strict I's and O's  Daily weights  Continuous pulse oximetry as well as telemetry monitoring  Cardiology consulted and following appreciate rec.     Dyspnea on minimal exertion- (present on admission)  Assessment & Plan  About 1 month history of severe dyspnea on minimal exertion  Hospitalized at the beginning a month for concerns of pneumonia status post antibiotic treatment with no improvement in symptoms  Also recently hospitalized for concerns of fluid overload in the setting of diastolic heart failure and was diuresed, with last echo showing EF of 65% with grade 2 diastolic dysfunction moderate aortic insufficiency and mild mitral regurgitation and mild tricuspid regurgitation with an RVSP of 50 mmHg  She followed up with cardiology outpatient, despite ongoing diuretics patient's symptoms have not improved.  Dr. Glover with cardiology presumed that this may be high-output heart failure in the setting of severe anemia  Patient is pending outpatient establishment with hematologist  She does also endorse orthopnea paroxysmal nocturnal dyspnea as well as multiple episodes of burning substernal chest discomfort that occur at rest and self resolve over the last month  Initial troponin 43, BNP greater than 35,000  Chest x-ray consistent with bilateral interstitial pulmonary edema  Received 1 dose of Lasix in the emergency room  Continue with IV diuresis  Repeat echocardiogram  Trend troponins.  EKG  with any recurrence of chest pain    Superficial thrombosis of leg, right- (present on admission)  Assessment & Plan  History of superficial venous thrombosis in use to be on Xarelto for chronic anticoagulation however this was discontinued at last cardiology appointment also in the setting of severe anemia    Aortic insufficiency- (present on admission)  Assessment & Plan  Last echocardiogram in 1/2023 showing moderate aortic insufficiency, could be contributing in the setting of diastolic dysfunction to patient's clinical dyspnea on exertion  Cardiology following and plan for KYRA tomorrow  Appreciate rec.     Elevated troponin- (present on admission)  Assessment & Plan  Troponin elevated to 43 in the setting of 1 month history of on and off substernal chest discomfort  Last echo a few weeks ago did not show any regional wall motion abnormalities  EKG on admission showing mild ST depressions in leads V5 and V6  Continue to trend troponins  Stat EKG with any symptoms of chest pain  Appreciate cardiology rec.     Iron deficiency anemia- (present on admission)  Assessment & Plan  History of iron deficiency anemia last iron studies completed in 9/2022  B12 levels and folic acid levels have been adequate in the past  No active signs or symptoms of bleeding  T. bili normal, not concerning for underlying hemolytic process  Hemoglobin appears to be improved compared to 1 week ago  Continue iron supplementation  Reticulocyte count  Iron studies, B12, folic acid levels  Thyroid studies    Acute renal failure superimposed on stage 3 chronic kidney disease (HCC)- (present on admission)  Assessment & Plan  Baseline serum creatinine likely around 1.1, has been steadily rising in the setting of up titration of patient's diuretics  Recent initiation of spironolactone 1 week ago, and creatinine continues to rise as well as potassium is now up to 5  Hold home spironolactone and losartan  Avoid nephrotoxins  I have consulted  nephrology and discussed the case appreciate rec.     Mixed hyperlipidemia- (present on admission)  Assessment & Plan  Continue home statin    Acquired hypothyroidism- (present on admission)  Assessment & Plan  Continue home levothyroxine  Check thyroid studies       Essential hypertension- (present on admission)  Assessment & Plan  Continue home beta-blocker  Holding home losartan and spironolactone in setting of acute on chronic renal failure  IV as needed antihypertensives ordered         VTE prophylaxis: heparin ppx    I have performed a physical exam and reviewed and updated ROS and Plan today (2/8/2023). In review of yesterday's note (2/7/2023), there are no changes except as documented above.

## 2023-02-09 ENCOUNTER — ANESTHESIA EVENT (OUTPATIENT)
Dept: CARDIOLOGY | Facility: MEDICAL CENTER | Age: 83
DRG: 291 | End: 2023-02-09
Payer: MEDICARE

## 2023-02-09 ENCOUNTER — ANESTHESIA (OUTPATIENT)
Dept: CARDIOLOGY | Facility: MEDICAL CENTER | Age: 83
DRG: 291 | End: 2023-02-09
Payer: MEDICARE

## 2023-02-09 ENCOUNTER — APPOINTMENT (OUTPATIENT)
Dept: CARDIOLOGY | Facility: MEDICAL CENTER | Age: 83
DRG: 291 | End: 2023-02-09
Attending: INTERNAL MEDICINE
Payer: MEDICARE

## 2023-02-09 LAB
ANION GAP SERPL CALC-SCNC: 14 MMOL/L (ref 7–16)
BUN SERPL-MCNC: 58 MG/DL (ref 8–22)
CALCIUM SERPL-MCNC: 8.7 MG/DL (ref 8.5–10.5)
CHLORIDE SERPL-SCNC: 97 MMOL/L (ref 96–112)
CO2 SERPL-SCNC: 20 MMOL/L (ref 20–33)
CREAT SERPL-MCNC: 2.54 MG/DL (ref 0.5–1.4)
ERYTHROCYTE [DISTWIDTH] IN BLOOD BY AUTOMATED COUNT: 56.9 FL (ref 35.9–50)
GFR SERPLBLD CREATININE-BSD FMLA CKD-EPI: 19 ML/MIN/1.73 M 2
GLUCOSE SERPL-MCNC: 93 MG/DL (ref 65–99)
HCT VFR BLD AUTO: 30 % (ref 37–47)
HGB BLD-MCNC: 9.2 G/DL (ref 12–16)
MAGNESIUM SERPL-MCNC: 1.9 MG/DL (ref 1.5–2.5)
MCH RBC QN AUTO: 28.9 PG (ref 27–33)
MCHC RBC AUTO-ENTMCNC: 30.7 G/DL (ref 33.6–35)
MCV RBC AUTO: 94.3 FL (ref 81.4–97.8)
PHOSPHATE SERPL-MCNC: 5.6 MG/DL (ref 2.5–4.5)
PLATELET # BLD AUTO: 248 K/UL (ref 164–446)
PMV BLD AUTO: 12.6 FL (ref 9–12.9)
POTASSIUM SERPL-SCNC: 4.4 MMOL/L (ref 3.6–5.5)
RBC # BLD AUTO: 3.18 M/UL (ref 4.2–5.4)
SODIUM SERPL-SCNC: 131 MMOL/L (ref 135–145)
WBC # BLD AUTO: 11 K/UL (ref 4.8–10.8)

## 2023-02-09 PROCEDURE — 85027 COMPLETE CBC AUTOMATED: CPT

## 2023-02-09 PROCEDURE — 700102 HCHG RX REV CODE 250 W/ 637 OVERRIDE(OP): Performed by: INTERNAL MEDICINE

## 2023-02-09 PROCEDURE — 93312 ECHO TRANSESOPHAGEAL: CPT | Mod: 26 | Performed by: INTERNAL MEDICINE

## 2023-02-09 PROCEDURE — B24BZZ4 ULTRASONOGRAPHY OF HEART WITH AORTA, TRANSESOPHAGEAL: ICD-10-PCS | Performed by: INTERNAL MEDICINE

## 2023-02-09 PROCEDURE — 99233 SBSQ HOSP IP/OBS HIGH 50: CPT | Mod: 25,FS | Performed by: INTERNAL MEDICINE

## 2023-02-09 PROCEDURE — 700105 HCHG RX REV CODE 258: Performed by: ANESTHESIOLOGY

## 2023-02-09 PROCEDURE — 83735 ASSAY OF MAGNESIUM: CPT

## 2023-02-09 PROCEDURE — A9270 NON-COVERED ITEM OR SERVICE: HCPCS | Performed by: INTERNAL MEDICINE

## 2023-02-09 PROCEDURE — 84100 ASSAY OF PHOSPHORUS: CPT

## 2023-02-09 PROCEDURE — 93325 DOPPLER ECHO COLOR FLOW MAPG: CPT | Mod: 26 | Performed by: INTERNAL MEDICINE

## 2023-02-09 PROCEDURE — 700111 HCHG RX REV CODE 636 W/ 250 OVERRIDE (IP): Performed by: STUDENT IN AN ORGANIZED HEALTH CARE EDUCATION/TRAINING PROGRAM

## 2023-02-09 PROCEDURE — A9270 NON-COVERED ITEM OR SERVICE: HCPCS

## 2023-02-09 PROCEDURE — 160002 HCHG RECOVERY MINUTES (STAT)

## 2023-02-09 PROCEDURE — 99232 SBSQ HOSP IP/OBS MODERATE 35: CPT | Performed by: INTERNAL MEDICINE

## 2023-02-09 PROCEDURE — 99100 ANES PT EXTEME AGE<1 YR&>70: CPT | Performed by: ANESTHESIOLOGY

## 2023-02-09 PROCEDURE — 700102 HCHG RX REV CODE 250 W/ 637 OVERRIDE(OP)

## 2023-02-09 PROCEDURE — 770020 HCHG ROOM/CARE - TELE (206)

## 2023-02-09 PROCEDURE — 700111 HCHG RX REV CODE 636 W/ 250 OVERRIDE (IP): Performed by: ANESTHESIOLOGY

## 2023-02-09 PROCEDURE — 01922 ANES N-INVAS IMG/RADJ THER: CPT | Performed by: ANESTHESIOLOGY

## 2023-02-09 PROCEDURE — 80048 BASIC METABOLIC PNL TOTAL CA: CPT

## 2023-02-09 PROCEDURE — 160035 HCHG PACU - 1ST 60 MINS PHASE I

## 2023-02-09 PROCEDURE — 700102 HCHG RX REV CODE 250 W/ 637 OVERRIDE(OP): Performed by: STUDENT IN AN ORGANIZED HEALTH CARE EDUCATION/TRAINING PROGRAM

## 2023-02-09 PROCEDURE — 93325 DOPPLER ECHO COLOR FLOW MAPG: CPT

## 2023-02-09 PROCEDURE — 36415 COLL VENOUS BLD VENIPUNCTURE: CPT

## 2023-02-09 PROCEDURE — A9270 NON-COVERED ITEM OR SERVICE: HCPCS | Performed by: STUDENT IN AN ORGANIZED HEALTH CARE EDUCATION/TRAINING PROGRAM

## 2023-02-09 RX ORDER — ONDANSETRON 2 MG/ML
4 INJECTION INTRAMUSCULAR; INTRAVENOUS
Status: COMPLETED | OUTPATIENT
Start: 2023-02-09 | End: 2023-02-09

## 2023-02-09 RX ORDER — SODIUM CHLORIDE 9 MG/ML
INJECTION, SOLUTION INTRAVENOUS CONTINUOUS
Status: DISCONTINUED | OUTPATIENT
Start: 2023-02-09 | End: 2023-02-09 | Stop reason: HOSPADM

## 2023-02-09 RX ORDER — OXYCODONE HCL 5 MG/5 ML
5 SOLUTION, ORAL ORAL
Status: DISCONTINUED | OUTPATIENT
Start: 2023-02-09 | End: 2023-02-09 | Stop reason: HOSPADM

## 2023-02-09 RX ORDER — OXYCODONE HCL 5 MG/5 ML
10 SOLUTION, ORAL ORAL
Status: DISCONTINUED | OUTPATIENT
Start: 2023-02-09 | End: 2023-02-09 | Stop reason: HOSPADM

## 2023-02-09 RX ORDER — SODIUM CHLORIDE 9 MG/ML
INJECTION, SOLUTION INTRAVENOUS
Status: DISCONTINUED | OUTPATIENT
Start: 2023-02-09 | End: 2023-02-09 | Stop reason: SURG

## 2023-02-09 RX ADMIN — HEPARIN SODIUM 5000 UNITS: 5000 INJECTION INTRAVENOUS; SUBCUTANEOUS at 14:01

## 2023-02-09 RX ADMIN — Medication 5 MG: at 21:43

## 2023-02-09 RX ADMIN — ONDANSETRON 4 MG: 2 INJECTION INTRAMUSCULAR; INTRAVENOUS at 10:18

## 2023-02-09 RX ADMIN — SODIUM CHLORIDE: 9 INJECTION, SOLUTION INTRAVENOUS at 09:46

## 2023-02-09 RX ADMIN — ACETAMINOPHEN 650 MG: 325 TABLET, FILM COATED ORAL at 20:16

## 2023-02-09 RX ADMIN — FERROUS SULFATE TAB 325 MG (65 MG ELEMENTAL FE) 325 MG: 325 (65 FE) TAB at 04:56

## 2023-02-09 RX ADMIN — PROPOFOL 30 MG: 10 INJECTION, EMULSION INTRAVENOUS at 10:02

## 2023-02-09 RX ADMIN — LEVOTHYROXINE SODIUM 50 MCG: 0.05 TABLET ORAL at 04:56

## 2023-02-09 RX ADMIN — METOPROLOL TARTRATE 25 MG: 25 TABLET, FILM COATED ORAL at 17:42

## 2023-02-09 RX ADMIN — HEPARIN SODIUM 5000 UNITS: 5000 INJECTION INTRAVENOUS; SUBCUTANEOUS at 04:58

## 2023-02-09 RX ADMIN — PROPOFOL 60 MG: 10 INJECTION, EMULSION INTRAVENOUS at 09:57

## 2023-02-09 RX ADMIN — FOLIC ACID 1 MG: 1 TABLET ORAL at 04:56

## 2023-02-09 RX ADMIN — SIMVASTATIN 10 MG: 10 TABLET, FILM COATED ORAL at 17:12

## 2023-02-09 RX ADMIN — PROPOFOL 20 MG: 10 INJECTION, EMULSION INTRAVENOUS at 10:01

## 2023-02-09 RX ADMIN — TORSEMIDE 50 MG: 20 TABLET ORAL at 04:56

## 2023-02-09 ASSESSMENT — ENCOUNTER SYMPTOMS
BRUISES/BLEEDS EASILY: 0
SHORTNESS OF BREATH: 1
ABDOMINAL DISTENTION: 0
BACK PAIN: 0
FEVER: 0
PND: 1
FATIGUE: 0
BLURRED VISION: 0
NAUSEA: 0
CLAUDICATION: 0
COUGH: 0
VOMITING: 0
WEAKNESS: 0
HEADACHES: 0
ORTHOPNEA: 1
BLOOD IN STOOL: 0
CHILLS: 0
LOSS OF CONSCIOUSNESS: 0
DIAPHORESIS: 0
SPUTUM PRODUCTION: 0
CONSTIPATION: 0
CONFUSION: 0
CHEST TIGHTNESS: 0
WHEEZING: 0
DIZZINESS: 0
LIGHT-HEADEDNESS: 0
DIARRHEA: 0
PALPITATIONS: 0
ABDOMINAL PAIN: 0
ACTIVITY CHANGE: 0
SHORTNESS OF BREATH: 0
NERVOUS/ANXIOUS: 0

## 2023-02-09 ASSESSMENT — PAIN SCALES - GENERAL: PAIN_LEVEL: 0

## 2023-02-09 ASSESSMENT — FIBROSIS 4 INDEX: FIB4 SCORE: 0.88

## 2023-02-09 NOTE — HEART FAILURE PROGRAM
Per MD note today, Dispo is TBD. Per cardiology rounding note today, patient is still volume overloaded and we will be starting Torsemide. Same note also indicates that we will evaluate AV further tomorrow via KYRA.    We will leave the following appointments as they are for now.    Feb 16, 2023  9:45 AM   Heart Failure New Patient with Irena Ortiz P.A.-C.   [facility] (--) [address], [address]   [place] [address]   [phone]      Mar 17, 2023  4:00 PM   Heart Failure Established Patient with Álvaro Resendez M.D.   [facility] (--) [address], [address]   [place] [address]   [phone]

## 2023-02-09 NOTE — OR NURSING
1010: Pt arrived from procedure room post KYRA under anesthesia. Pt is arousable to RN voice. Cardiac rhythm appears to be SR. Pt has a moist, productive cough.    1018: Pt reports nausea; medicated per MAR. Denies pain.     1029: Pt reports relief of nausea. Dentures returned to pt.     1041: Report to HANS Xie.     1047: Pt back to room via [facility] with RN. Pt on a zoll for transport.

## 2023-02-09 NOTE — PROGRESS NOTES
"[facility] -  PROGRESS NOTE               Author: Horacio Fulton M.D. Date & Time: 2/9/2023  10:12 AM     HPI:  81 yo F PMH CKD Stage 3a, HTN, HLD, and chronic diastolic HF who presents to ED with CC as above.  She endorses a 1 month hx of worsening dyspnea with off and on substernal CP on admit on 2/6.  The pain resolves on its own, feels like a burning sensation, and has no radiation.  No aggravating or alleviating factors.  [place] seeing a Nephrologist but has not since being here.  Follows with [facility] for her HF.  Before admit she had been started on spironolactone to further help with volume overload issues but her sx's were just so debilitating that she came to ED instead.  IV diuresis was initiated and she had adequate response to that and feels better from then to now.  Unfortunately she came in with TIFFANI SCr ~ 1.5 and has continued to worsen daily.  She has pretty severe AR and so for that Cardiology is planning a KYRA tomorrow.  Otherwise denies any recent F/C/N/V.  No melena, hematochezia, hematemesis.  No HA, visual changes, or abdominal pain.    DAILY NEPHROLOGY SUMMARY:  2/08: Consult done  2/09: NAEO, feels good, SOB/CP better, SCr jumped up still    REVIEW OF SYSTEMS:    10 point ROS reviewed and is as per HPI/daily summary or otherwise negative    PMH/PSH/SH/FH:   Reviewed and unchanged since admission note    CURRENT MEDICATIONS:   Reviewed from admission to present day    VS:  BP (!) 128/37   Pulse 60   Temp 36.4 °C (97.5 °F) (Temporal)   Resp 16   Ht 1.575 m (5' 2\")   Wt 62.5 kg (137 lb 11.2 oz)   SpO2 94%   BMI 25.19 kg/m²     Physical Exam  Nursing note reviewed.   Constitutional:       Appearance: Normal appearance.   Eyes:      General: No scleral icterus.  Cardiovascular:      Comments: BLE edema  Pulmonary:      Effort: Pulmonary effort is normal.   Abdominal:      General: There is no distension. "   Musculoskeletal:         General: No deformity.   Skin:     Findings: No rash.   Neurological:      Mental Status: She is alert.   Psychiatric:         Behavior: Behavior is cooperative.     Fluids:  In: 550 [P.O.:550]  Out: 325     LABS:  Recent Labs     02/07/23  0044 02/08/23  0159 02/09/23  0220   SODIUM 134* 134* 131*   POTASSIUM 4.7 4.1 4.4   CHLORIDE 100 100 97   CO2 18* 20 20   GLUCOSE 112* 105* 93   BUN 48* 52* 58*   CREATININE 1.62* 1.84* 2.54*   CALCIUM 9.7 8.9 8.7     IMAGING:   All imaging reviewed from admission to present day    IMPRESSION:  # TIFFANI  - Etiology likely 2/2 CRS  - Non-oliguric  # CKD Stage 3a  - Etiology likely 2/2 CRS/HTN  - BCr ~ 1.1-1.3  # Acute on chronic diastolic HF  - EF ~ 65%  - Moderate-severe AR also noted  - KYRA pending  # HTN  - Goal BP < 140/90  - At goal  # HLD     PLAN:  - No compelling indication for KRT at this time  - Continue diuresis  - Monitor I/O  - Dose all meds per eGFR < 15  - Daily labs to trend SCr  - May have more advanced CKD than labs suggest  - FU KYRA

## 2023-02-09 NOTE — PROGRESS NOTES
Monitor Summary    Sinus vazquez 55 to NSR 62 with rare PVCs and PACs  WA 0.19 ; QRS 0.08 ; QT 0.46

## 2023-02-09 NOTE — PROGRESS NOTES
"Cardiology Follow Up Progress Note    Date of Service  2/9/2023    Attending Physician  Argelia Johnson M.D.    Chief Complaint   Shortness of Breath    HPI  Mariely Easley is a 82 y.o. female admitted 2/6/2023 with per Dr. Tripathi, \"a past medical history of congestive heart failure, hypertension and hyperlipidemia, anemia, chronic kidney disease, previous tobacco abuse who presented 2/6/2023 with worsening fatigue, weakness, shortness of breath, dyspnea on exertion. She is followed by [facility] and was seen for initial encounter with Tommie Carter on 01/31/23 following discharge on 01/26/23. Her heart failure was attributed to high output state secondary to her anemia. She was started on aldactone, referred to hematology and is scheduled to follow up with Rojas Kaye in March, however, due to her worsening condition she was brought back to emergency room.\"     Interim Events  2/9/2023: No overnight cardiac events. Patient reports Tele monitoring personally interpreted by me shows SR. VSS; RA; Daily weight not completed, discussed with nursing. Giraldo UOP -325 mg yesterday; neph following. Labs reviewed. NPO for KYRA today.    Review of Systems  Review of Systems   Constitutional:  Negative for activity change, fatigue and fever.   Respiratory:  Negative for cough, chest tightness and shortness of breath.    Cardiovascular:  Negative for chest pain, palpitations and leg swelling.   Gastrointestinal:  Negative for abdominal distention and abdominal pain.   Genitourinary:  Negative for hematuria.   Skin:  Negative for pallor and rash.   Neurological:  Negative for dizziness and light-headedness.   Hematological:  Does not bruise/bleed easily.   Psychiatric/Behavioral:  Negative for confusion. The patient is not nervous/anxious.      Vital signs in last 24 hours  Temp:  [36.1 °C (97 °F)-36.7 °C (98 °F)] 36.4 °C (97.5 °F)  Pulse:  [55-92] 84  Resp:  [16-28] 28  BP: (109-173)/(34-73) " 160/73  SpO2:  [91 %-100 %] 97 %    Physical Exam  Physical Exam  Constitutional:       General: She is not in acute distress.     Appearance: Normal appearance.   HENT:      Head: Normocephalic and atraumatic.   Eyes:      Extraocular Movements: Extraocular movements intact.      Conjunctiva/sclera: Conjunctivae normal.   Cardiovascular:      Rate and Rhythm: Normal rate and regular rhythm.      Pulses: Normal pulses.      Heart sounds: Normal heart sounds.   Pulmonary:      Effort: Pulmonary effort is normal.      Breath sounds: Normal breath sounds.   Musculoskeletal:      Right lower leg: No edema.      Left lower leg: No edema.   Skin:     General: Skin is warm and dry.      Findings: No rash.   Neurological:      Mental Status: She is alert.       Lab Review  Lab Results   Component Value Date/Time    WBC 11.0 (H) 02/09/2023 02:20 AM    RBC 3.18 (L) 02/09/2023 02:20 AM    HEMOGLOBIN 9.2 (L) 02/09/2023 02:20 AM    HEMATOCRIT 30.0 (L) 02/09/2023 02:20 AM    MCV 94.3 02/09/2023 02:20 AM    MCH 28.9 02/09/2023 02:20 AM    MCHC 30.7 (L) 02/09/2023 02:20 AM    MPV 12.6 02/09/2023 02:20 AM      Lab Results   Component Value Date/Time    SODIUM 131 (L) 02/09/2023 02:20 AM    POTASSIUM 4.4 02/09/2023 02:20 AM    CHLORIDE 97 02/09/2023 02:20 AM    CO2 20 02/09/2023 02:20 AM    GLUCOSE 93 02/09/2023 02:20 AM    BUN 58 (H) 02/09/2023 02:20 AM    CREATININE 2.54 (H) 02/09/2023 02:20 AM      Lab Results   Component Value Date/Time    ASTSGOT 9 (L) 02/07/2023 12:44 AM    ALTSGPT 11 02/07/2023 12:44 AM     Lab Results   Component Value Date/Time    CHOLSTRLTOT 101 06/28/2022 08:58 AM    LDL 25 06/28/2022 08:58 AM    HDL 59 06/28/2022 08:58 AM    TRIGLYCERIDE 86 06/28/2022 08:58 AM    TROPONINT 51 (H) 02/07/2023 12:44 AM       Recent Labs     02/06/23  1619 02/08/23  0159   NTPROBNP >66236* >91188*       Cardiac Imaging and Procedures Review  Echocardiogram (2/7/2023):  Compared to the prior study on 1/9/23, aortic  regurgitation is now   severe.  The left ventricular ejection fraction is visually estimated to be 60%.  Moderately dilated left atrium.  Severe aortic insufficiency. Vena contracta is .7 cm.  ms.  Estimated right ventricular systolic pressure is 40 mmHg.    Imaging  Chest X-Ray (2/6/2023):  Cardiomegaly and mild central perivascular and interstitial pulmonary edema consistent with CHF.     Assessment/Plan  Valvular cardiomyopathy; Severe AS; Acute on CKD  -NPO for KYRA today  -Volume status appears improving. Appreciate additional recs from neph  -Continue strict I+Os and Daily weights.  -Continue PO torsemide, metop, and statin    Thank you for allowing me to participate in the care of this patient.  I will continue to follow this patient    Please contact me with any questions.    Please see Dr. Resendez's attestation for further details and MDM.     I personally spent a total of 20 minutes which includes face-to-face time and non-face-to-face time spent on preparing to see the patient, reviewing hospital notes and tests, obtaining history from the patient, performing a medically appropriate exam, counseling and educating the patient, ordering medications/tests/procedures/referrals as clinically indicated, and documenting information in the electronic medical record.    REYNOLD Barba.   [facility]  [phone]

## 2023-02-09 NOTE — CARE PLAN
The patient is Stable - Low risk of patient condition declining or worsening    Shift Goals  Clinical Goals: Monitor I & Os, No falls  Patient Goals: Get better  Family Goals: ISI      Problem: Care Map:  Day 3 Optimal Outcome for the Heart Failure Patient  Goal: Day 3:  Optimal Care of the heart failure patient  Outcome: Progressing     Problem: Fall Risk  Goal: Patient will remain free from falls  Outcome: Progressing     Problem: Hemodynamics  Goal: Patient's hemodynamics, fluid balance and neurologic status will be stable or improve  Outcome: Progressing     Problem: Fluid Volume  Goal: Fluid volume balance will be maintained  Outcome: Progressing       Progress made toward(s) clinical / shift goals:     Patient is not progressing towards the following goals:

## 2023-02-09 NOTE — ANESTHESIA TIME REPORT
Anesthesia Start and Stop Event Times     Date Time Event    2/9/2023 0944 Ready for Procedure     0946 Anesthesia Start     1011 Anesthesia Stop        Responsible Staff  02/09/23    Name Role Begin End    Mateo Doan M.D. Anesth 0946 1011        Overtime Reason:  no overtime (within assigned shift)    Comments:

## 2023-02-09 NOTE — PROCEDURES
Patient was brought to cath lab holding.  Consent was obtained. Risks and benefits of procedures were explained.    Anesthesia was used for sedation process.    Indication: to evaluate aortic regurgitation.    Complication: none    Diagnosis: severe AI is seen.      Álvaro Resendez MD.

## 2023-02-09 NOTE — DISCHARGE PLANNING
Case Management Discharge Planning    Admission Date: 2/6/2023  GMLOS: 3.9  ALOS: 3    6-Clicks ADL Score: 24  6-Clicks Mobility Score: 24      Anticipated Discharge Dispo: Discharge Disposition: Discharged to home/self care (01)  Discharge Address: verified address on face sheet is accurate    DME Needed: No    Action(s) Taken: Updated Provider/Nurse on Discharge Plan,  Lsw received voalte indicating pt has been referred to [facility] for an acute to acute transfer. The MD to MD discussion has been completed. The accepting MD is Dr Lovett.    INS AUTH has been started.     LSw sent voalte to RTOC to advise this Lsw is d/c planner until 2pm then it is taken over by unit RN GHAZALA Wu. Lsw provided both contacts and asked for address, Rn to RN contact when RTOC is able. Lsw asked for transfer back agreement, and provided fax number for CM office on T7.    LSw began to complete cobra and transfer packet.       Escalations Completed: Provider    Medically Clear: No    Next Steps: Lsw will continue to assist w/ acute to acute transfer to [facility].     Barriers to Discharge: Pending Placement, Pending Insurance Authorization, and Transportation    Is the patient up for discharge tomorrow: No    Addendum:  RTOC voalte received. [facility] does not usually provide a transfer back agreement. The films have been ordered from the film room.    We are waiting for an open bed. INS AUTH has been accepted.    Lsw sent voalte to Dr Johnson to update as above.      Lsw spoke to medical team at bedside, residents. They indicate pt's dtr, Natasha ([phone]), states pt will not be leaving until dtr is back from bus training.    LSw called dtr to explain the flight crew for acute to acute will ask for any family member's weight to see if they are able to take another person w/ them, the medical team, and the medical equipment at d/c. Lsw left VM w/ all of this information and provided pt's desk number so dtr can provide her weight via phone  contact for transport set up.

## 2023-02-09 NOTE — PROGRESS NOTES
Hospital Medicine Daily Progress Note    Date of Service  2/9/2023    Chief Complaint  Mariely Easley is a 82 y.o. female admitted 2/6/2023 with shortness of breath     Hospital Course  This is a 81 y/o F with PMHX of grade 2 diastolic dysfunction congestive heart failure with an EF of 65% with moderate aortic insufficiency, hypertension, iron deficiency anemia, hyperlipidemia, hypothyroidism, history of superficial venous thrombosis and previously on chronic anticoagulation who was admitted on 2/6/23 after presenting to ER complaining of 1 month history of dyspnea with minimal exertion associated with orthopnea and paroxysmal nocturnal dyspnea and on and off substernal chest pain that self resolves that feels like a burning sensation  She was hospitalized at the beginning of January for pneumonia and completed antibiotic regimen for continued to have her dyspneic symptoms.  She then was hospitalized again later for concerns of diastolic heart failure exacerbation and was diuresed and discharged home with close follow-up with cardiology.  Her last echocardiogram was done in January with preserved systolic function but grade 2 diastolic dysfunction.  Her symptoms despite continuing to uptitrate her Lasix have not changed  She was also additionally started on spironolactone for concerns of ongoing fluid overload and pending further anemic work-up and outpatient establishment with hematology.  The symptoms have been so debilitating that she ultimately came into the emergency room.  Cardiology was also consulted and patient admitted for further work up and treatment     Interval Problem Update  Patient resting in bed  still with some intermittent chest pain,   Still feels SOB,   Cardiology following and for KYRA tomorrow   Cont on diuresis as per cardiology rec. Now switched to torsemide   Monitor on telemetry   Kidney function worsening so I have consulted nephrology and discussed case appreciate rec.   2/9:  Patient seen and examined going for KYRA today   Cardiology following cont on diuresis,   Nephrology also following regarding her TIFFANI  Appreciate rec.      I have discussed this patient's plan of care and discharge plan at IDT rounds today with Case Management, Nursing, Nursing leadership, and other members of the IDT team.    Consultants/Specialty  cardiology    Code Status  Full Code    Disposition  Patient is not medically cleared for discharge.   Anticipate discharge to  TBD .  I have placed the appropriate orders for post-discharge needs.    Review of Systems  Review of Systems   Constitutional:  Positive for malaise/fatigue. Negative for chills, diaphoresis and fever.   HENT:  Negative for ear pain and tinnitus.    Eyes:  Negative for blurred vision.   Respiratory:  Positive for shortness of breath. Negative for cough, sputum production and wheezing.    Cardiovascular:  Positive for chest pain, orthopnea and PND. Negative for palpitations, claudication and leg swelling.   Gastrointestinal:  Negative for abdominal pain, blood in stool, constipation, diarrhea, melena, nausea and vomiting.   Genitourinary:  Negative for dysuria and hematuria.   Musculoskeletal:  Negative for back pain.   Neurological:  Negative for dizziness, loss of consciousness, weakness and headaches.   All other systems reviewed and are negative.     Physical Exam  Temp:  [36.1 °C (97 °F)-36.7 °C (98 °F)] 36.3 °C (97.3 °F)  Pulse:  [58-92] 62  Resp:  [16-31] 20  BP: (109-173)/(32-73) 120/32  SpO2:  [91 %-100 %] 93 %    Physical Exam  Vitals and nursing note reviewed.   Constitutional:       General: She is not in acute distress.     Appearance: She is not diaphoretic.   HENT:      Head: Normocephalic and atraumatic.      Mouth/Throat:      Mouth: Mucous membranes are moist.   Eyes:      General: No scleral icterus.        Right eye: No discharge.         Left eye: No discharge.      Conjunctiva/sclera: Conjunctivae normal.      Pupils: Pupils  are equal, round, and reactive to light.   Cardiovascular:      Rate and Rhythm: Normal rate and regular rhythm.      Pulses: Normal pulses.      Heart sounds: No murmur heard.  Pulmonary:      Effort: Pulmonary effort is normal. No respiratory distress.      Breath sounds: No wheezing, rhonchi or rales.   Abdominal:      General: Abdomen is flat. Bowel sounds are normal. There is no distension.      Palpations: Abdomen is soft.      Tenderness: There is no abdominal tenderness. There is no guarding or rebound.   Musculoskeletal:      Cervical back: Neck supple.      Right lower leg: No edema.      Left lower leg: No edema.   Skin:     General: Skin is warm and dry.      Capillary Refill: Capillary refill takes less than 2 seconds.   Neurological:      Mental Status: She is alert and oriented to person, place, and time. Mental status is at baseline.   Psychiatric:         Mood and Affect: Mood normal.       Fluids    Intake/Output Summary (Last 24 hours) at 2/9/2023 1252  Last data filed at 2/9/2023 1011  Gross per 24 hour   Intake 300 ml   Output 325 ml   Net -25 ml         Laboratory  Recent Labs     02/07/23 0044 02/08/23 0159 02/09/23 0220   WBC 16.5* 11.4* 11.0*   RBC 3.61* 3.23* 3.18*   HEMOGLOBIN 10.2* 9.4* 9.2*   HEMATOCRIT 33.1* 29.9* 30.0*   MCV 91.7 92.6 94.3   MCH 28.3 29.1 28.9   MCHC 30.8* 31.4* 30.7*   RDW 54.3* 54.5* 56.9*   PLATELETCT 322 256 248   MPV 12.5 12.0 12.6       Recent Labs     02/07/23 0044 02/08/23 0159 02/09/23 0220   SODIUM 134* 134* 131*   POTASSIUM 4.7 4.1 4.4   CHLORIDE 100 100 97   CO2 18* 20 20   GLUCOSE 112* 105* 93   BUN 48* 52* 58*   CREATININE 1.62* 1.84* 2.54*   CALCIUM 9.7 8.9 8.7                     Imaging  EC-KYRA W/O CONT   Final Result      EC-ECHOCARDIOGRAM COMPLETE W/O CONT   Final Result      DX-CHEST-PORTABLE (1 VIEW)   Final Result      Cardiomegaly and mild central perivascular and interstitial pulmonary edema consistent with CHF.              Assessment/Plan  * Diastolic CHF, acute on chronic (HCC)- (present on admission)  Assessment & Plan  Elevated BNP, elevated troponin in setting of recurrent substernal chest discomfort as well as severe dyspnea on exertion, orthopnea, paroxysmal nocturnal dyspnea  Chest x-ray concerning for bilateral pulmonary edema  Last echocardiogram showing grade 2 diastolic dysfunction in the setting of moderate aortic insufficiency and increased RVSP  Continuing diuresis  Holding home losartan and home spironolactone in the setting of rising creatinine as well as potassium at 5.0 after starting spironolactone 1 week ago  Continue home beta-blocker  2 g sodium diet as well as 2 L fluid restriction  Strict I's and O's  Daily weights  Continuous pulse oximetry as well as telemetry monitoring  Cardiology consulted and following appreciate rec.     Dyspnea on minimal exertion- (present on admission)  Assessment & Plan  About 1 month history of severe dyspnea on minimal exertion  Hospitalized at the beginning a month for concerns of pneumonia status post antibiotic treatment with no improvement in symptoms  Also recently hospitalized for concerns of fluid overload in the setting of diastolic heart failure and was diuresed, with last echo showing EF of 65% with grade 2 diastolic dysfunction moderate aortic insufficiency and mild mitral regurgitation and mild tricuspid regurgitation with an RVSP of 50 mmHg  She followed up with cardiology outpatient, despite ongoing diuretics patient's symptoms have not improved.  Dr. Glover with cardiology presumed that this may be high-output heart failure in the setting of severe anemia  Patient is pending outpatient establishment with hematologist  She does also endorse orthopnea paroxysmal nocturnal dyspnea as well as multiple episodes of burning substernal chest discomfort that occur at rest and self resolve over the last month  Initial troponin 43, BNP greater than 35,000  Chest x-ray  consistent with bilateral interstitial pulmonary edema  Received 1 dose of Lasix in the emergency room  Continue with IV diuresis  Repeat echocardiogram  Trend troponins.  EKG with any recurrence of chest pain    Superficial thrombosis of leg, right- (present on admission)  Assessment & Plan  History of superficial venous thrombosis in use to be on Xarelto for chronic anticoagulation however this was discontinued at last cardiology appointment also in the setting of severe anemia    Aortic insufficiency- (present on admission)  Assessment & Plan  Last echocardiogram in 1/2023 showing moderate aortic insufficiency, could be contributing in the setting of diastolic dysfunction to patient's clinical dyspnea on exertion  Cardiology following and plan for KYRA tomorrow  Appreciate rec.     Elevated troponin- (present on admission)  Assessment & Plan  Troponin elevated to 43 in the setting of 1 month history of on and off substernal chest discomfort  Last echo a few weeks ago did not show any regional wall motion abnormalities  EKG on admission showing mild ST depressions in leads V5 and V6  Continue to trend troponins  Stat EKG with any symptoms of chest pain  Appreciate cardiology rec.     Iron deficiency anemia- (present on admission)  Assessment & Plan  History of iron deficiency anemia last iron studies completed in 9/2022  B12 levels and folic acid levels have been adequate in the past  No active signs or symptoms of bleeding  T. bili normal, not concerning for underlying hemolytic process  Hemoglobin appears to be improved compared to 1 week ago  Continue iron supplementation  Reticulocyte count  Iron studies, B12, folic acid levels  Thyroid studies    Acute renal failure superimposed on stage 3 chronic kidney disease (HCC)- (present on admission)  Assessment & Plan  Baseline serum creatinine likely around 1.1, has been steadily rising in the setting of up titration of patient's diuretics  Recent initiation of  spironolactone 1 week ago, and creatinine continues to rise as well as potassium is now up to 5  Hold home spironolactone and losartan  Avoid nephrotoxins  I have consulted nephrology and discussed the case appreciate rec.     Mixed hyperlipidemia- (present on admission)  Assessment & Plan  Continue home statin    Acquired hypothyroidism- (present on admission)  Assessment & Plan  Continue home levothyroxine  Check thyroid studies       Essential hypertension- (present on admission)  Assessment & Plan  Continue home beta-blocker  Holding home losartan and spironolactone in setting of acute on chronic renal failure  IV as needed antihypertensives ordered         VTE prophylaxis: heparin ppx    I have performed a physical exam and reviewed and updated ROS and Plan today (2/9/2023). In review of yesterday's note (2/8/2023), there are no changes except as documented above.

## 2023-02-09 NOTE — ANESTHESIA PREPROCEDURE EVALUATION
" Date/Time: 02/09/23 1400    Scheduled providers: Álvaro Resendez M.D.; Mateo Doan M.D.    Procedure: EC-KYRA W/O CONT    Diagnosis:       Dyspnea on minimal exertion [R06.09]      Dyspnea on minimal exertion [R06.09]    Location: [facility] - ECHOCARDIOLOGY [facility]          Relevant Problems   PULMONARY   (positive) Dyspnea on minimal exertion   (positive) SOB (shortness of breath)      CARDIAC   (positive) Aortic insufficiency   (positive) Dyspnea on minimal exertion   (positive) Essential hypertension   (positive) Superficial thrombosis of leg, right   (positive) Varicose veins of both lower extremities with pain         (positive) Acute renal failure superimposed on stage 3 chronic kidney disease (HCC)   (positive) Renal cyst, left      ENDO   (positive) Acquired hypothyroidism     BP (!) 128/37   Pulse 60   Temp 36.4 °C (97.5 °F) (Temporal)   Resp 16   Ht 1.575 m (5' 2\")   Wt 62.5 kg (137 lb 11.2 oz)   SpO2 94%   BMI 25.19 kg/m²     Physical Exam    Airway   Mallampati: II  TM distance: >3 FB  Neck ROM: full       Cardiovascular - normal exam  Rhythm: regular  Rate: normal  (-) murmur     Dental - normal exam           Pulmonary - normal exam  Breath sounds clear to auscultation     Abdominal    Neurological - normal exam                 Anesthesia Plan    ASA 3       Plan - general       Airway plan will be natural airway          Induction: intravenous    Postoperative Plan: Postoperative administration of opioids is intended.    Pertinent diagnostic labs and testing reviewed    Informed Consent:    Anesthetic plan and risks discussed with patient.    Use of blood products discussed with: patient whom consented to blood products.         "

## 2023-02-09 NOTE — DISCHARGE PLANNING
HTH/SCP TCN chart review completed. Collaborated with MARIA ESTHER. Current discharge considerations are home with no services.  Per MD note, patient remains overloaded.  Cath lab today to evaluate aortic regurgitation.  Per Kardex, patient remains ambulatory to bathroom without AD or assist.     TCN will continue to follow and collaborate with discharge planning team as additional post acute needs arise. Thank you.    Completed:  Choice obtained: No choice obtained.  See above.   [facility] [place] Sent on 2/7/23.

## 2023-02-09 NOTE — ANESTHESIA POSTPROCEDURE EVALUATION
Patient: Mariely Easley    Procedure Summary     Date: 02/09/23 Room / Location: [facility] IMAGING - [facility]CHOCARDIOLOGY [facility]    Anesthesia Start: 0946 Anesthesia Stop: 1011    Procedure: EC-KYRA W/O CONT Diagnosis:       Dyspnea on minimal exertion      Dyspnea on minimal exertion    Scheduled Providers: Álvaro Resendez M.D.; Mateo Doan M.D. Responsible Provider: Mateo Doan M.D.    Anesthesia Type: general ASA Status: 3          Final Anesthesia Type: general  Last vitals  BP   Blood Pressure : (!) 128/37    Temp   36.4 °C (97.5 °F)    Pulse   60   Resp   16    SpO2   94 %      Anesthesia Post Evaluation    Patient location during evaluation: PACU  Patient participation: complete - patient participated  Level of consciousness: awake and alert  Pain score: 0    Airway patency: patent  Anesthetic complications: no  Cardiovascular status: hemodynamically stable  Respiratory status: acceptable  Hydration status: euvolemic    PONV: none          No notable events documented.     Nurse Pain Score: 0 (NPRS)         Please put in Rx for Tresiba. 25 units every morning. If morning BG is more than 160, increase by 1 unit.  Titrate up every day until BG is less than 160

## 2023-02-09 NOTE — PROGRESS NOTES
NO HARD CHART:     Patient to procedure Room for KYRA with no hard chart, only loose papers. Per Floor RN, no hard chart available. Procedure RN completed Pre-Procedure Consent paperwork packet, including: WHO Yellow Sheet signed by RN and MD, Informed consent for KYRA procedure signed by RN, patient, and Cardiology, and Informed consent for Anesthesia signed by Anesthesia MD and patient. Procedure Packet secured to loose papers and hand delivered to receiving PACU RN who acknowledged receipt and no hard chart.    Bottom only denture x1 given to PACU RN.

## 2023-02-10 ENCOUNTER — APPOINTMENT (OUTPATIENT)
Dept: RADIOLOGY | Facility: MEDICAL CENTER | Age: 83
DRG: 291 | End: 2023-02-10
Payer: MEDICARE

## 2023-02-10 PROBLEM — J18.9 PNEUMONIA: Status: ACTIVE | Noted: 2023-02-10

## 2023-02-10 LAB
ANION GAP SERPL CALC-SCNC: 14 MMOL/L (ref 7–16)
APPEARANCE UR: CLEAR
BACTERIA #/AREA URNS HPF: NEGATIVE /HPF
BILIRUB UR QL STRIP.AUTO: NEGATIVE
BUN SERPL-MCNC: 59 MG/DL (ref 8–22)
CALCIUM SERPL-MCNC: 9 MG/DL (ref 8.5–10.5)
CHLORIDE SERPL-SCNC: 97 MMOL/L (ref 96–112)
CO2 SERPL-SCNC: 20 MMOL/L (ref 20–33)
COLOR UR: YELLOW
CREAT SERPL-MCNC: 2.62 MG/DL (ref 0.5–1.4)
EPI CELLS #/AREA URNS HPF: NEGATIVE /HPF
ERYTHROCYTE [DISTWIDTH] IN BLOOD BY AUTOMATED COUNT: 55.8 FL (ref 35.9–50)
GFR SERPLBLD CREATININE-BSD FMLA CKD-EPI: 18 ML/MIN/1.73 M 2
GLUCOSE SERPL-MCNC: 109 MG/DL (ref 65–99)
GLUCOSE UR STRIP.AUTO-MCNC: NEGATIVE MG/DL
HCT VFR BLD AUTO: 30.5 % (ref 37–47)
HGB BLD-MCNC: 9.4 G/DL (ref 12–16)
HYALINE CASTS #/AREA URNS LPF: ABNORMAL /LPF
KETONES UR STRIP.AUTO-MCNC: NEGATIVE MG/DL
LEUKOCYTE ESTERASE UR QL STRIP.AUTO: NEGATIVE
MCH RBC QN AUTO: 29 PG (ref 27–33)
MCHC RBC AUTO-ENTMCNC: 30.8 G/DL (ref 33.6–35)
MCV RBC AUTO: 94.1 FL (ref 81.4–97.8)
MICRO URNS: ABNORMAL
NITRITE UR QL STRIP.AUTO: NEGATIVE
PH UR STRIP.AUTO: 5.5 [PH] (ref 5–8)
PLATELET # BLD AUTO: 248 K/UL (ref 164–446)
PMV BLD AUTO: 12.1 FL (ref 9–12.9)
POTASSIUM SERPL-SCNC: 4.4 MMOL/L (ref 3.6–5.5)
PROCALCITONIN SERPL-MCNC: 1.99 NG/ML
PROT UR QL STRIP: 30 MG/DL
RBC # BLD AUTO: 3.24 M/UL (ref 4.2–5.4)
RBC # URNS HPF: ABNORMAL /HPF
RBC UR QL AUTO: ABNORMAL
SODIUM SERPL-SCNC: 131 MMOL/L (ref 135–145)
SP GR UR STRIP.AUTO: 1.01
UROBILINOGEN UR STRIP.AUTO-MCNC: 0.2 MG/DL
WBC # BLD AUTO: 23.1 K/UL (ref 4.8–10.8)
WBC #/AREA URNS HPF: ABNORMAL /HPF

## 2023-02-10 PROCEDURE — 770020 HCHG ROOM/CARE - TELE (206)

## 2023-02-10 PROCEDURE — A9270 NON-COVERED ITEM OR SERVICE: HCPCS | Performed by: INTERNAL MEDICINE

## 2023-02-10 PROCEDURE — 81001 URINALYSIS AUTO W/SCOPE: CPT

## 2023-02-10 PROCEDURE — 71045 X-RAY EXAM CHEST 1 VIEW: CPT

## 2023-02-10 PROCEDURE — 700102 HCHG RX REV CODE 250 W/ 637 OVERRIDE(OP)

## 2023-02-10 PROCEDURE — 700111 HCHG RX REV CODE 636 W/ 250 OVERRIDE (IP)

## 2023-02-10 PROCEDURE — 84145 PROCALCITONIN (PCT): CPT

## 2023-02-10 PROCEDURE — A9270 NON-COVERED ITEM OR SERVICE: HCPCS

## 2023-02-10 PROCEDURE — 87040 BLOOD CULTURE FOR BACTERIA: CPT | Mod: 91

## 2023-02-10 PROCEDURE — 99233 SBSQ HOSP IP/OBS HIGH 50: CPT | Performed by: INTERNAL MEDICINE

## 2023-02-10 PROCEDURE — A9270 NON-COVERED ITEM OR SERVICE: HCPCS | Performed by: STUDENT IN AN ORGANIZED HEALTH CARE EDUCATION/TRAINING PROGRAM

## 2023-02-10 PROCEDURE — 700102 HCHG RX REV CODE 250 W/ 637 OVERRIDE(OP): Performed by: INTERNAL MEDICINE

## 2023-02-10 PROCEDURE — 80048 BASIC METABOLIC PNL TOTAL CA: CPT

## 2023-02-10 PROCEDURE — 700111 HCHG RX REV CODE 636 W/ 250 OVERRIDE (IP): Performed by: STUDENT IN AN ORGANIZED HEALTH CARE EDUCATION/TRAINING PROGRAM

## 2023-02-10 PROCEDURE — 36415 COLL VENOUS BLD VENIPUNCTURE: CPT

## 2023-02-10 PROCEDURE — 85027 COMPLETE CBC AUTOMATED: CPT

## 2023-02-10 PROCEDURE — 700102 HCHG RX REV CODE 250 W/ 637 OVERRIDE(OP): Performed by: STUDENT IN AN ORGANIZED HEALTH CARE EDUCATION/TRAINING PROGRAM

## 2023-02-10 PROCEDURE — 700105 HCHG RX REV CODE 258

## 2023-02-10 PROCEDURE — 99233 SBSQ HOSP IP/OBS HIGH 50: CPT | Mod: FS | Performed by: INTERNAL MEDICINE

## 2023-02-10 RX ORDER — TORSEMIDE 20 MG/1
20 TABLET ORAL
Status: DISCONTINUED | OUTPATIENT
Start: 2023-02-11 | End: 2023-02-11 | Stop reason: HOSPADM

## 2023-02-10 RX ADMIN — SIMVASTATIN 10 MG: 10 TABLET, FILM COATED ORAL at 17:45

## 2023-02-10 RX ADMIN — HEPARIN SODIUM 5000 UNITS: 5000 INJECTION INTRAVENOUS; SUBCUTANEOUS at 15:48

## 2023-02-10 RX ADMIN — FERROUS SULFATE TAB 325 MG (65 MG ELEMENTAL FE) 325 MG: 325 (65 FE) TAB at 05:34

## 2023-02-10 RX ADMIN — TORSEMIDE 50 MG: 20 TABLET ORAL at 05:35

## 2023-02-10 RX ADMIN — AMPICILLIN AND SULBACTAM 3 G: 1; 2 INJECTION, POWDER, FOR SOLUTION INTRAMUSCULAR; INTRAVENOUS at 17:43

## 2023-02-10 RX ADMIN — AMPICILLIN AND SULBACTAM 3 G: 1; 2 INJECTION, POWDER, FOR SOLUTION INTRAMUSCULAR; INTRAVENOUS at 06:47

## 2023-02-10 RX ADMIN — LEVOTHYROXINE SODIUM 50 MCG: 0.05 TABLET ORAL at 05:34

## 2023-02-10 RX ADMIN — METOPROLOL TARTRATE 12.5 MG: 25 TABLET, FILM COATED ORAL at 19:46

## 2023-02-10 RX ADMIN — Medication 5 MG: at 20:56

## 2023-02-10 RX ADMIN — FOLIC ACID 1 MG: 1 TABLET ORAL at 05:34

## 2023-02-10 RX ADMIN — HEPARIN SODIUM 5000 UNITS: 5000 INJECTION INTRAVENOUS; SUBCUTANEOUS at 22:00

## 2023-02-10 ASSESSMENT — ENCOUNTER SYMPTOMS
BRUISES/BLEEDS EASILY: 0
DIAPHORESIS: 0
CLAUDICATION: 0
COUGH: 0
BLOOD IN STOOL: 0
SHORTNESS OF BREATH: 1
VOMITING: 0
BLURRED VISION: 0
SPUTUM PRODUCTION: 0
PALPITATIONS: 0
DIARRHEA: 0
CHILLS: 0
NERVOUS/ANXIOUS: 0
HEADACHES: 0
CONFUSION: 0
CONSTIPATION: 0
WHEEZING: 0
CHEST TIGHTNESS: 0
BACK PAIN: 0
ORTHOPNEA: 1
SHORTNESS OF BREATH: 0
LIGHT-HEADEDNESS: 0
ACTIVITY CHANGE: 0
PND: 1
ABDOMINAL PAIN: 0
LOSS OF CONSCIOUSNESS: 0
DIZZINESS: 0
FEVER: 0
WEAKNESS: 0
FATIGUE: 0
NAUSEA: 0
ABDOMINAL DISTENTION: 0

## 2023-02-10 ASSESSMENT — PAIN DESCRIPTION - PAIN TYPE: TYPE: ACUTE PAIN

## 2023-02-10 NOTE — PROGRESS NOTES
Received report from NOC shift RN. Patient is A&Ox4, no complaints of pain at this time, VSS, no signs of distress. All questions and concerns answered, call light in reach, will continue to monitor.   24.4

## 2023-02-10 NOTE — CARE PLAN
The patient is Watcher - Medium risk of patient condition declining or worsening    Shift Goals  Clinical Goals: dicheriee, await heart valve surgery  Patient Goals: comfort  Family Goals: to be with patient    Progress made toward(s) clinical / shift goals:    Problem: Hemodynamics  Goal: Patient's hemodynamics, fluid balance and neurologic status will be stable or improve  Outcome: Not Progressing     Problem: Infection - Standard  Goal: Patient will remain free from infection  Outcome: Not Progressing       Patient is not progressing towards the following goals:      Problem: Hemodynamics  Goal: Patient's hemodynamics, fluid balance and neurologic status will be stable or improve  Outcome: Not Progressing     Problem: Infection - Standard  Goal: Patient will remain free from infection  Outcome: Not Progressing

## 2023-02-10 NOTE — PROGRESS NOTES
"Cardiology Follow Up Progress Note    Date of Service  2/10/2023    Attending Physician  Argelia Johnson M.D.    Chief Complaint   Shortness of Breath    HPI  Mariely Easley is a 82 y.o. female admitted 2/6/2023 with per Dr. Tripathi, \"a past medical history of congestive heart failure, hypertension and hyperlipidemia, anemia, chronic kidney disease, previous tobacco abuse who presented 2/6/2023 with worsening fatigue, weakness, shortness of breath, dyspnea on exertion. She is followed by [facility] and was seen for initial encounter with Tommie Carter on 01/31/23 following discharge on 01/26/23. Her heart failure was attributed to high output state secondary to her anemia. She was started on aldactone, referred to hematology and is scheduled to follow up with Rojas Kaye in March, however, due to her worsening condition she was brought back to emergency room.\"     Interim Events  2/9/2023: No overnight cardiac events. Patient reports Tele monitoring personally interpreted by me shows SR. ERLINDA; NOE; Daily weight not completed, discussed with nursing. Giraldo UOP -325 mg yesterday; neph following. Labs reviewed. NPO for KYRA today.  2/10/2023: No overnight cardiac events. Tele monitoring personally interpreted by me shows . ERLINDA; NOE; Daily weight 64.4 kg-bedscale; - 1000 ml UOP last 24 hours. Labs reviewed, notable for increased procal and new infiltrates on cxr. Awaiting bed availability at [facility].    Review of Systems  Review of Systems   Constitutional:  Negative for activity change, fatigue and fever.   Respiratory:  Negative for cough, chest tightness and shortness of breath.    Cardiovascular:  Negative for chest pain, palpitations and leg swelling.   Gastrointestinal:  Negative for abdominal distention and abdominal pain.   Genitourinary:  Negative for hematuria.   Skin:  Negative for pallor and rash.   Neurological:  Negative for dizziness and light-headedness. "   Hematological:  Does not bruise/bleed easily.   Psychiatric/Behavioral:  Negative for confusion. The patient is not nervous/anxious.      Vital signs in last 24 hours  Temp:  [36.3 °C (97.3 °F)-38.8 °C (101.8 °F)] 36.6 °C (97.9 °F)  Pulse:  [60-92] 62  Resp:  [15-31] 16  BP: (108-173)/(25-73) 111/30  SpO2:  [85 %-100 %] 94 %    Physical Exam  Physical Exam  Constitutional:       General: She is not in acute distress.     Appearance: Normal appearance.   HENT:      Head: Normocephalic and atraumatic.   Eyes:      Extraocular Movements: Extraocular movements intact.      Conjunctiva/sclera: Conjunctivae normal.   Cardiovascular:      Rate and Rhythm: Normal rate and regular rhythm.      Pulses: Normal pulses.      Heart sounds: Normal heart sounds.   Pulmonary:      Effort: Pulmonary effort is normal.      Breath sounds: Normal breath sounds.   Musculoskeletal:      Right lower leg: No edema.      Left lower leg: No edema.   Skin:     General: Skin is warm and dry.      Findings: No rash.   Neurological:      Mental Status: She is alert.       Lab Review  Lab Results   Component Value Date/Time    WBC 23.1 (H) 02/10/2023 02:13 AM    RBC 3.24 (L) 02/10/2023 02:13 AM    HEMOGLOBIN 9.4 (L) 02/10/2023 02:13 AM    HEMATOCRIT 30.5 (L) 02/10/2023 02:13 AM    MCV 94.1 02/10/2023 02:13 AM    MCH 29.0 02/10/2023 02:13 AM    MCHC 30.8 (L) 02/10/2023 02:13 AM    MPV 12.1 02/10/2023 02:13 AM      Lab Results   Component Value Date/Time    SODIUM 131 (L) 02/10/2023 02:13 AM    POTASSIUM 4.4 02/10/2023 02:13 AM    CHLORIDE 97 02/10/2023 02:13 AM    CO2 20 02/10/2023 02:13 AM    GLUCOSE 109 (H) 02/10/2023 02:13 AM    BUN 59 (H) 02/10/2023 02:13 AM    CREATININE 2.62 (H) 02/10/2023 02:13 AM      Lab Results   Component Value Date/Time    ASTSGOT 9 (L) 02/07/2023 12:44 AM    ALTSGPT 11 02/07/2023 12:44 AM     Lab Results   Component Value Date/Time    CHOLSTRLTOT 101 06/28/2022 08:58 AM    LDL 25 06/28/2022 08:58 AM    HDL 59  06/28/2022 08:58 AM    TRIGLYCERIDE 86 06/28/2022 08:58 AM    TROPONINT 51 (H) 02/07/2023 12:44 AM       Recent Labs     02/08/23  0159   NTPROBNP >18059*         Cardiac Imaging and Procedures Review  Echocardiogram (2/7/2023):  Compared to the prior study on 1/9/23, aortic regurgitation is now   severe.  The left ventricular ejection fraction is visually estimated to be 60%.  Moderately dilated left atrium.  Severe aortic insufficiency. Vena contracta is .7 cm.  ms.  Estimated right ventricular systolic pressure is 40 mmHg.    Imaging  Chest X-Ray (2/6/2023):  Cardiomegaly and mild central perivascular and interstitial pulmonary edema consistent with CHF.     Assessment/Plan  Valvular cardiomyopathy; Severe AS; Acute on CKD  -Severe AS on KYRA  -High risk and needs surgical intervention, [facility] agreeable and accepted patient for intervention. Currently, awaiting available bed.  -Volume status appears improving. Appreciate additional recs from neph  -Continue strict I+Os and Daily weights.  -Continue PO torsemide, metop, and statin    Thank you for allowing me to participate in the care of this patient. Discussed with Dr. Johnson.  I will continue to follow this patient    Please contact me with any questions.    Please see Dr. Resendez's attestation for further details and MDM.     I personally spent a total of 20 minutes which includes face-to-face time and non-face-to-face time spent on preparing to see the patient, reviewing hospital notes and tests, obtaining history from the patient, performing a medically appropriate exam, counseling and educating the patient, ordering medications/tests/procedures/referrals as clinically indicated, and documenting information in the electronic medical record.    REYNOLD Barba.   [facility]  [phone]

## 2023-02-10 NOTE — HEART FAILURE PROGRAM
Per current notes, patient will need to transfer to [facility] for intervention on her severe AS. She has been accepted awaiting bed.    I've asked schedulers to cancel the appointment at the HF clinic on 2/16/23 nut asked that they leave the March 17 one as is for now.

## 2023-02-10 NOTE — PROGRESS NOTES
Hospital Medicine Daily Progress Note    Date of Service  2/10/2023    Chief Complaint  Mariely Easley is a 82 y.o. female admitted 2/6/2023 with shortness of breath     Hospital Course  This is a 83 y/o F with PMHX of grade 2 diastolic dysfunction congestive heart failure with an EF of 65% with moderate aortic insufficiency, hypertension, iron deficiency anemia, hyperlipidemia, hypothyroidism, history of superficial venous thrombosis and previously on chronic anticoagulation who was admitted on 2/6/23 after presenting to ER complaining of 1 month history of dyspnea with minimal exertion associated with orthopnea and paroxysmal nocturnal dyspnea and on and off substernal chest pain that self resolves that feels like a burning sensation  She was hospitalized at the beginning of January for pneumonia and completed antibiotic regimen for continued to have her dyspneic symptoms.  She then was hospitalized again later for concerns of diastolic heart failure exacerbation and was diuresed and discharged home with close follow-up with cardiology.  Her last echocardiogram was done in January with preserved systolic function but grade 2 diastolic dysfunction.  Her symptoms despite continuing to uptitrate her Lasix have not changed  She was also additionally started on spironolactone for concerns of ongoing fluid overload and pending further anemic work-up and outpatient establishment with hematology.  The symptoms have been so debilitating that she ultimately came into the emergency room.  Cardiology was also consulted and patient admitted for further work up and treatment     Interval Problem Update  Patient resting in bed  still with some intermittent chest pain,   Still feels SOB,   Cardiology following and for KYRA tomorrow   Cont on diuresis as per cardiology rec. Now switched to torsemide   Monitor on telemetry   Kidney function worsening so I have consulted nephrology and discussed case appreciate rec.   2/9:  Patient seen and examined going for KYRA today   Cardiology following cont on diuresis,   Nephrology also following regarding her TIFFANI  Appreciate rec.    2/10: Patient seen and examined, started on abx for possible pneumonia, CXR shows infiltrate, procalcitonin elevated   Started on IV unasyn   Follow up cultures  Regarding her sever AS awaiting transfer to [facility] following appreciate rec.  Regardign her Acute on chronic renal failure nephrology following appreciate rec.     I have discussed this patient's plan of care and discharge plan at IDT rounds today with Case Management, Nursing, Nursing leadership, and other members of the IDT team.    Consultants/Specialty  cardiology    Code Status  Full Code    Disposition  Patient is not medically cleared for discharge.   Anticipate discharge to  TBD .  I have placed the appropriate orders for post-discharge needs.    Review of Systems  Review of Systems   Constitutional:  Positive for malaise/fatigue. Negative for chills, diaphoresis and fever.   HENT:  Negative for ear pain and tinnitus.    Eyes:  Negative for blurred vision.   Respiratory:  Positive for shortness of breath. Negative for cough, sputum production and wheezing.    Cardiovascular:  Positive for chest pain, orthopnea and PND. Negative for palpitations, claudication and leg swelling.   Gastrointestinal:  Negative for abdominal pain, blood in stool, constipation, diarrhea, melena, nausea and vomiting.   Genitourinary:  Negative for dysuria and hematuria.   Musculoskeletal:  Negative for back pain.   Neurological:  Negative for dizziness, loss of consciousness, weakness and headaches.   All other systems reviewed and are negative.     Physical Exam  Temp:  [36.4 °C (97.5 °F)-38.8 °C (101.8 °F)] 36.6 °C (97.9 °F)  Pulse:  [62-86] 62  Resp:  [15-18] 16  BP: (108-140)/(25-34) 111/30  SpO2:  [85 %-94 %] 94 %    Physical Exam  Vitals and nursing note reviewed.   Constitutional:       General: She is not in  acute distress.     Appearance: She is not diaphoretic.   HENT:      Head: Normocephalic and atraumatic.      Mouth/Throat:      Mouth: Mucous membranes are moist.   Eyes:      General: No scleral icterus.        Right eye: No discharge.         Left eye: No discharge.      Conjunctiva/sclera: Conjunctivae normal.      Pupils: Pupils are equal, round, and reactive to light.   Cardiovascular:      Rate and Rhythm: Normal rate and regular rhythm.      Pulses: Normal pulses.      Heart sounds: No murmur heard.  Pulmonary:      Effort: Pulmonary effort is normal. No respiratory distress.      Breath sounds: No wheezing, rhonchi or rales.   Abdominal:      General: Abdomen is flat. Bowel sounds are normal. There is no distension.      Palpations: Abdomen is soft.      Tenderness: There is no abdominal tenderness. There is no guarding or rebound.   Musculoskeletal:      Cervical back: Neck supple.      Right lower leg: No edema.      Left lower leg: No edema.   Skin:     General: Skin is warm and dry.      Capillary Refill: Capillary refill takes less than 2 seconds.   Neurological:      Mental Status: She is alert and oriented to person, place, and time. Mental status is at baseline.   Psychiatric:         Mood and Affect: Mood normal.       Fluids    Intake/Output Summary (Last 24 hours) at 2/10/2023 1233  Last data filed at 2/10/2023 1001  Gross per 24 hour   Intake 940 ml   Output 875 ml   Net 65 ml       Laboratory  Recent Labs     02/08/23  0159 02/09/23  0220 02/10/23  0213   WBC 11.4* 11.0* 23.1*   RBC 3.23* 3.18* 3.24*   HEMOGLOBIN 9.4* 9.2* 9.4*   HEMATOCRIT 29.9* 30.0* 30.5*   MCV 92.6 94.3 94.1   MCH 29.1 28.9 29.0   MCHC 31.4* 30.7* 30.8*   RDW 54.5* 56.9* 55.8*   PLATELETCT 256 248 248   MPV 12.0 12.6 12.1     Recent Labs     02/08/23  0159 02/09/23  0220 02/10/23  0213   SODIUM 134* 131* 131*   POTASSIUM 4.1 4.4 4.4   CHLORIDE 100 97 97   CO2 20 20 20   GLUCOSE 105* 93 109*   BUN 52* 58* 59*   CREATININE  1.84* 2.54* 2.62*   CALCIUM 8.9 8.7 9.0                   Imaging  DX-CHEST-PORTABLE (1 VIEW)   Final Result         1.  Right midlung and lower lobe infiltrates, new since prior study.   2.  Cardiomegaly   3.  Atherosclerosis      EC-KYRA W/O CONT   Final Result      EC-ECHOCARDIOGRAM COMPLETE W/O CONT   Final Result      DX-CHEST-PORTABLE (1 VIEW)   Final Result      Cardiomegaly and mild central perivascular and interstitial pulmonary edema consistent with CHF.             Assessment/Plan  * Diastolic CHF, acute on chronic (HCC)- (present on admission)  Assessment & Plan  Elevated BNP, elevated troponin in setting of recurrent substernal chest discomfort as well as severe dyspnea on exertion, orthopnea, paroxysmal nocturnal dyspnea  Chest x-ray concerning for bilateral pulmonary edema  Last echocardiogram showing grade 2 diastolic dysfunction in the setting of moderate aortic insufficiency and increased RVSP  Continuing diuresis  Holding home losartan and home spironolactone in the setting of rising creatinine as well as potassium at 5.0 after starting spironolactone 1 week ago  Continue home beta-blocker  2 g sodium diet as well as 2 L fluid restriction  Strict I's and O's  Daily weights  Continuous pulse oximetry as well as telemetry monitoring  Cardiology consulted and following appreciate rec.     Pneumonia  Assessment & Plan  Starting on IV abc Unasyn  procalcitonin elevated   Follow up cultures     Dyspnea on minimal exertion- (present on admission)  Assessment & Plan  About 1 month history of severe dyspnea on minimal exertion  Hospitalized at the beginning a month for concerns of pneumonia status post antibiotic treatment with no improvement in symptoms  Also recently hospitalized for concerns of fluid overload in the setting of diastolic heart failure and was diuresed, with last echo showing EF of 65% with grade 2 diastolic dysfunction moderate aortic insufficiency and mild mitral regurgitation and mild  tricuspid regurgitation with an RVSP of 50 mmHg  She followed up with cardiology outpatient, despite ongoing diuretics patient's symptoms have not improved.  Dr. Glover with cardiology presumed that this may be high-output heart failure in the setting of severe anemia  Patient is pending outpatient establishment with hematologist  She does also endorse orthopnea paroxysmal nocturnal dyspnea as well as multiple episodes of burning substernal chest discomfort that occur at rest and self resolve over the last month  Initial troponin 43, BNP greater than 35,000  Chest x-ray consistent with bilateral interstitial pulmonary edema  Received 1 dose of Lasix in the emergency room  Continue with IV diuresis  Repeat echocardiogram  Trend troponins.  EKG with any recurrence of chest pain    Superficial thrombosis of leg, right- (present on admission)  Assessment & Plan  History of superficial venous thrombosis in use to be on Xarelto for chronic anticoagulation however this was discontinued at last cardiology appointment also in the setting of severe anemia    Aortic insufficiency- (present on admission)  Assessment & Plan  Last echocardiogram in 1/2023 showing moderate aortic insufficiency, could be contributing in the setting of diastolic dysfunction to patient's clinical dyspnea on exertion  Cardiology following and plan for KYRA tomorrow  Appreciate rec.     Elevated troponin- (present on admission)  Assessment & Plan  Troponin elevated to 43 in the setting of 1 month history of on and off substernal chest discomfort  Last echo a few weeks ago did not show any regional wall motion abnormalities  EKG on admission showing mild ST depressions in leads V5 and V6  Continue to trend troponins  Stat EKG with any symptoms of chest pain  Appreciate cardiology rec.     Iron deficiency anemia- (present on admission)  Assessment & Plan  History of iron deficiency anemia last iron studies completed in 9/2022  B12 levels and folic acid  levels have been adequate in the past  No active signs or symptoms of bleeding  T. bili normal, not concerning for underlying hemolytic process  Hemoglobin appears to be improved compared to 1 week ago  Continue iron supplementation  Reticulocyte count  Iron studies, B12, folic acid levels  Thyroid studies    Acute renal failure superimposed on stage 3 chronic kidney disease (HCC)- (present on admission)  Assessment & Plan  Baseline serum creatinine likely around 1.1, has been steadily rising in the setting of up titration of patient's diuretics  Recent initiation of spironolactone 1 week ago, and creatinine continues to rise as well as potassium is now up to 5  Hold home spironolactone and losartan  Avoid nephrotoxins  I have consulted nephrology and discussed the case appreciate rec.     Mixed hyperlipidemia- (present on admission)  Assessment & Plan  Continue home statin    Acquired hypothyroidism- (present on admission)  Assessment & Plan  Continue home levothyroxine  Check thyroid studies       Essential hypertension- (present on admission)  Assessment & Plan  Continue home beta-blocker  Holding home losartan and spironolactone in setting of acute on chronic renal failure  IV as needed antihypertensives ordered         VTE prophylaxis: heparin ppx    I have performed a physical exam and reviewed and updated ROS and Plan today (2/10/2023). In review of yesterday's note (2/9/2023), there are no changes except as documented above.

## 2023-02-10 NOTE — PROGRESS NOTES
"[facility] -  PROGRESS NOTE               Author: Horacio Fulton M.D. Date & Time: 2/10/2023  12:03 PM     HPI:  81 yo F PMH CKD Stage 3a, HTN, HLD, and chronic diastolic HF who presents to ED with CC as above.  She endorses a 1 month hx of worsening dyspnea with off and on substernal CP on admit on 2/6.  The pain resolves on its own, feels like a burning sensation, and has no radiation.  No aggravating or alleviating factors.  [place] seeing a Nephrologist but has not since being here.  Follows with [facility] for her HF.  Before admit she had been started on spironolactone to further help with volume overload issues but her sx's were just so debilitating that she came to ED instead.  IV diuresis was initiated and she had adequate response to that and feels better from then to now.  Unfortunately she came in with TIFFANI SCr ~ 1.5 and has continued to worsen daily.  She has pretty severe AR and so for that Cardiology is planning a KYRA tomorrow.  Otherwise denies any recent F/C/N/V.  No melena, hematochezia, hematemesis.  No HA, visual changes, or abdominal pain.    DAILY NEPHROLOGY SUMMARY:  2/08: Consult done  2/09: NAEO, feels good, SOB/CP better, SCr jumped up still  2/10: NAEO, no complaints, feels better, daughter at bedside    REVIEW OF SYSTEMS:    10 point ROS reviewed and is as per HPI/daily summary or otherwise negative    PMH/PSH/SH/FH:   Reviewed and unchanged since admission note    CURRENT MEDICATIONS:   Reviewed from admission to present day    VS:  BP (!) 111/30   Pulse 62   Temp 36.6 °C (97.9 °F) (Temporal)   Resp 16   Ht 1.575 m (5' 2\")   Wt 64.4 kg (142 lb)   SpO2 94%   BMI 25.97 kg/m²     Physical Exam  Nursing note reviewed.   Constitutional:       Appearance: Normal appearance.   Eyes:      General: No scleral icterus.  Cardiovascular:      Comments: BLE edema  Pulmonary:      Effort: Pulmonary effort is " normal.   Abdominal:      General: There is no distension.   Musculoskeletal:         General: No deformity.   Skin:     Findings: No rash.   Neurological:      Mental Status: She is alert.   Psychiatric:         Behavior: Behavior is cooperative.     Fluids:  In: 350 [P.O.:300; I.V.:50]  Out: 875     LABS:  Recent Labs     02/08/23  0159 02/09/23  0220 02/10/23  0213   SODIUM 134* 131* 131*   POTASSIUM 4.1 4.4 4.4   CHLORIDE 100 97 97   CO2 20 20 20   GLUCOSE 105* 93 109*   BUN 52* 58* 59*   CREATININE 1.84* 2.54* 2.62*   CALCIUM 8.9 8.7 9.0       IMAGING:   All imaging reviewed from admission to present day    IMPRESSION:  # TIFFANI  - Etiology likely 2/2 CRS  - Non-oliguric  - SCr continues to rise, maybe plateauing soon, rate of rise slowing  - No uremia  # CKD Stage 3a  - Etiology likely 2/2 CRS/HTN  - BCr ~ 1.1-1.3  # Acute on chronic diastolic HF  - EF ~ 65%  - Moderate-severe AR also noted  - KYRA severe AI  - oU transfer pending due to high risk surgery needed  # HTN  - Goal BP < 140/90  - At goal  # HLD     PLAN:  - No compelling indication for KRT at this time  - Continue diuresis  - Monitor I/O  - Dose all meds per eGFR < 15  - Daily labs to trend SCr  - May have more advanced CKD than labs suggest  - Will follow until transferred to [facility]  - Will establish in our CKD clinic post DC and return to OP setting

## 2023-02-10 NOTE — CARE PLAN
The patient is Watcher - Medium risk of patient condition declining or worsening    Shift Goals  Clinical Goals: KYRA mercedes  Patient Goals: comfort  Family Goals: plan of care    Progress made toward(s) clinical / shift goals:    Problem: Knowledge Deficit - Standard  Goal: Patient and family/care givers will demonstrate understanding of plan of care, disease process/condition, diagnostic tests and medications  Outcome: Progressing     Problem: Care Map:  Admission Optimal Outcome for the Heart Failure Patient  Goal: Admission:  Optimal Care of the heart failure patient  Outcome: Progressing     Problem: Care Map:  Day 1 Optimal Outcome for the Heart Failure Patient  Goal: Day 1:  Optimal Care of the heart failure patient  Outcome: Progressing       Patient is not progressing towards the following goals:

## 2023-02-10 NOTE — DIETARY
Nutrition Services: Update   Day 4 of admit.  Mariely Easley is a 82 y.o. female with admitting DX of Dyspnea on minimal exertion [R06.09].    Pt is currently on Cardiac diet with Boost Very High Calorie supplements TID.    Recent PO intake per ADLs:   2/10: 25-50% of lunch and % of Boost VHC; <25% of breakfast.  2/9: 25-50% of dinner.  2/8: % of dinner but 0% of Boost; 0% of lunch and Boost; 25-50% of breakfast but 0% of Boost.    Weight up. Reviewed labs and meds.     No additional RD interventions available. Although, expect will be able to offer pt her preferred flavor of Boost (chocolate) within the next 24 hours when it becomes available.    RD will monitor.

## 2023-02-10 NOTE — PROGRESS NOTES
Received bedside report from RN, pt care assumed, VSS, pt assessment complete. Pt AAOx4, with controlled pain at this time. No signs of acute distress noted at this time. Plan of care discussed with pt and verbalizes no questions. Pt denies any additional needs at this time. Bed locked/in lowest position, bed alarm is on, pt educated on fall risk and verbalized understanding, call light within reach, hourly rounding initiated.   2

## 2023-02-10 NOTE — DISCHARGE PLANNING
"HTH/SCP TCN chart review completed. Collaborated with GHAZALA Wu. Current discharge considerations are acute to acute transfer to [facility] at this time. Noted per review per CM \"waiting for an open bed. [facility] has been accepted\". TCN will continue to follow and collaborate with discharge planning team as additional post acute needs arise. Thank you.    Completed:  Choice obtained: No choice obtained.  See above.   [facility] referral (Y), Sent on 2/7/23.    "

## 2023-02-11 VITALS
DIASTOLIC BLOOD PRESSURE: 31 MMHG | BODY MASS INDEX: 23.69 KG/M2 | WEIGHT: 128.75 LBS | RESPIRATION RATE: 16 BRPM | HEIGHT: 62 IN | SYSTOLIC BLOOD PRESSURE: 105 MMHG | HEART RATE: 66 BPM | TEMPERATURE: 98.1 F | OXYGEN SATURATION: 94 %

## 2023-02-11 LAB
ANION GAP SERPL CALC-SCNC: 13 MMOL/L (ref 7–16)
BUN SERPL-MCNC: 65 MG/DL (ref 8–22)
CALCIUM SERPL-MCNC: 8.8 MG/DL (ref 8.5–10.5)
CHLORIDE SERPL-SCNC: 100 MMOL/L (ref 96–112)
CO2 SERPL-SCNC: 20 MMOL/L (ref 20–33)
CREAT SERPL-MCNC: 2.35 MG/DL (ref 0.5–1.4)
ERYTHROCYTE [DISTWIDTH] IN BLOOD BY AUTOMATED COUNT: 54.6 FL (ref 35.9–50)
GFR SERPLBLD CREATININE-BSD FMLA CKD-EPI: 20 ML/MIN/1.73 M 2
GLUCOSE SERPL-MCNC: 102 MG/DL (ref 65–99)
HCT VFR BLD AUTO: 30.8 % (ref 37–47)
HGB BLD-MCNC: 9.4 G/DL (ref 12–16)
MCH RBC QN AUTO: 28.4 PG (ref 27–33)
MCHC RBC AUTO-ENTMCNC: 30.5 G/DL (ref 33.6–35)
MCV RBC AUTO: 93.1 FL (ref 81.4–97.8)
PLATELET # BLD AUTO: 241 K/UL (ref 164–446)
PMV BLD AUTO: 12.1 FL (ref 9–12.9)
POTASSIUM SERPL-SCNC: 4.4 MMOL/L (ref 3.6–5.5)
RBC # BLD AUTO: 3.31 M/UL (ref 4.2–5.4)
SODIUM SERPL-SCNC: 133 MMOL/L (ref 135–145)
WBC # BLD AUTO: 15 K/UL (ref 4.8–10.8)

## 2023-02-11 PROCEDURE — 99239 HOSP IP/OBS DSCHRG MGMT >30: CPT | Performed by: INTERNAL MEDICINE

## 2023-02-11 PROCEDURE — 700111 HCHG RX REV CODE 636 W/ 250 OVERRIDE (IP): Performed by: STUDENT IN AN ORGANIZED HEALTH CARE EDUCATION/TRAINING PROGRAM

## 2023-02-11 PROCEDURE — 700102 HCHG RX REV CODE 250 W/ 637 OVERRIDE(OP): Performed by: INTERNAL MEDICINE

## 2023-02-11 PROCEDURE — A9270 NON-COVERED ITEM OR SERVICE: HCPCS | Performed by: INTERNAL MEDICINE

## 2023-02-11 PROCEDURE — A9270 NON-COVERED ITEM OR SERVICE: HCPCS | Performed by: STUDENT IN AN ORGANIZED HEALTH CARE EDUCATION/TRAINING PROGRAM

## 2023-02-11 PROCEDURE — 99233 SBSQ HOSP IP/OBS HIGH 50: CPT | Mod: FS | Performed by: INTERNAL MEDICINE

## 2023-02-11 PROCEDURE — 85027 COMPLETE CBC AUTOMATED: CPT

## 2023-02-11 PROCEDURE — 36415 COLL VENOUS BLD VENIPUNCTURE: CPT

## 2023-02-11 PROCEDURE — 80048 BASIC METABOLIC PNL TOTAL CA: CPT

## 2023-02-11 PROCEDURE — 700105 HCHG RX REV CODE 258

## 2023-02-11 PROCEDURE — 700102 HCHG RX REV CODE 250 W/ 637 OVERRIDE(OP): Performed by: STUDENT IN AN ORGANIZED HEALTH CARE EDUCATION/TRAINING PROGRAM

## 2023-02-11 PROCEDURE — 700111 HCHG RX REV CODE 636 W/ 250 OVERRIDE (IP)

## 2023-02-11 RX ORDER — TORSEMIDE 20 MG/1
20 TABLET ORAL DAILY
Qty: 30 TABLET | Refills: 3 | Status: SHIPPED | OUTPATIENT
Start: 2023-02-12 | End: 2023-02-26

## 2023-02-11 RX ADMIN — AMPICILLIN AND SULBACTAM 3 G: 1; 2 INJECTION, POWDER, FOR SOLUTION INTRAMUSCULAR; INTRAVENOUS at 16:11

## 2023-02-11 RX ADMIN — SIMVASTATIN 10 MG: 10 TABLET, FILM COATED ORAL at 16:11

## 2023-02-11 RX ADMIN — METOPROLOL TARTRATE 12.5 MG: 25 TABLET, FILM COATED ORAL at 08:40

## 2023-02-11 RX ADMIN — LEVOTHYROXINE SODIUM 50 MCG: 0.05 TABLET ORAL at 05:19

## 2023-02-11 RX ADMIN — AMPICILLIN AND SULBACTAM 3 G: 1; 2 INJECTION, POWDER, FOR SOLUTION INTRAMUSCULAR; INTRAVENOUS at 05:28

## 2023-02-11 RX ADMIN — HEPARIN SODIUM 5000 UNITS: 5000 INJECTION INTRAVENOUS; SUBCUTANEOUS at 05:20

## 2023-02-11 RX ADMIN — FOLIC ACID 1 MG: 1 TABLET ORAL at 05:19

## 2023-02-11 RX ADMIN — FERROUS SULFATE TAB 325 MG (65 MG ELEMENTAL FE) 325 MG: 325 (65 FE) TAB at 05:20

## 2023-02-11 RX ADMIN — TORSEMIDE 20 MG: 20 TABLET ORAL at 05:19

## 2023-02-11 ASSESSMENT — ENCOUNTER SYMPTOMS
WEAKNESS: 0
NERVOUS/ANXIOUS: 0
SHORTNESS OF BREATH: 1
BRUISES/BLEEDS EASILY: 0
SPUTUM PRODUCTION: 0
CHILLS: 0
FATIGUE: 0
ABDOMINAL DISTENTION: 0
COUGH: 0
WHEEZING: 0
BACK PAIN: 0
ACTIVITY CHANGE: 0
CONFUSION: 0
CLAUDICATION: 0
FEVER: 0
VOMITING: 0
DIARRHEA: 0
DIZZINESS: 0
NAUSEA: 0
PALPITATIONS: 0
BLOOD IN STOOL: 0
HEADACHES: 0
CHEST TIGHTNESS: 0
LIGHT-HEADEDNESS: 0
SHORTNESS OF BREATH: 0
PND: 1
BLURRED VISION: 0
CONSTIPATION: 0
DIAPHORESIS: 0
ABDOMINAL PAIN: 0
LOSS OF CONSCIOUSNESS: 0
ORTHOPNEA: 1

## 2023-02-11 ASSESSMENT — FIBROSIS 4 INDEX: FIB4 SCORE: 0.88

## 2023-02-11 NOTE — CARE PLAN
The patient is Stable - Low risk of patient condition declining or worsening    Shift Goals  Clinical Goals: Hemodynamically stable, Abx  Patient Goals: Abx,Comfort, Rest, Hospital transfer  Family Goals: Hospital transfer, pt comfort    Progress made toward(s) clinical / shift goals:    Problem: Knowledge Deficit - Standard  Goal: Patient and family/care givers will demonstrate understanding of plan of care, disease process/condition, diagnostic tests and medications  Outcome: Progressing  Note: POC reviewed with pt who verbalizes understanding and is agreeable with plan.      Problem: Fall Risk  Goal: Patient will remain free from falls  Outcome: Progressing  Note: Pt remains free from falls during shift, and uses call light appropriately to call for staff assist before getting out of bed.      Problem: Hemodynamics  Goal: Patient's hemodynamics, fluid balance and neurologic status will be stable or improve  Outcome: Progressing  Note: VS and mental status remain stable during shift, fluid intake monitored, DBP soft but Mds aware and stable at this time.      Problem: Fluid Volume  Goal: Fluid volume balance will be maintained  Outcome: Progressing  Note: Fluid intake monitored throughout shift, pt voiding without issue.      Problem: Infection - Standard  Goal: Patient will remain free from infection  Outcome: Progressing  Note: S/S of infection improving, IV abx administered per the MAR.        Patient is not progressing towards the following goals:

## 2023-02-11 NOTE — PROGRESS NOTES
MONITOR SUMMARY    Rhythm: SR  Rate: 64-72  Ectopy: r PVC, r PAC, r Coup  Measurements: .19/.08/.40

## 2023-02-11 NOTE — CARE PLAN
The patient is Watcher - Medium risk of patient condition declining or worsening    Shift Goals  Clinical Goals: infx workup, VR surg tx  Patient Goals: infx workup, tx  Family Goals: na    Progress made toward(s) clinical / shift goals:    Problem: Knowledge Deficit - Standard  Goal: Patient and family/care givers will demonstrate understanding of plan of care, disease process/condition, diagnostic tests and medications  Outcome: Progressing     Problem: Care Map:  Admission Optimal Outcome for the Heart Failure Patient  Goal: Admission:  Optimal Care of the heart failure patient  Outcome: Progressing     Problem: Care Map:  Day 1 Optimal Outcome for the Heart Failure Patient  Goal: Day 1:  Optimal Care of the heart failure patient  Outcome: Progressing       Patient is not progressing towards the following goals:

## 2023-02-11 NOTE — DISCHARGE SUMMARY
Discharge Summary    CHIEF COMPLAINT ON ADMISSION  Chief Complaint   Patient presents with    Shortness of Breath     Pt seen recently in ED & admitted from CHF & pneumonia. Pt feels shortness of breath is worsening, cannot make it to bathroom. Ongoing x weeks. Saw cardiologist on Tuesday, given another diuretic & ordered additional labs re:anemia, per pt.        Reason for Admission  Difficulty Breathing     Admission Date  2/6/2023     CODE STATUS  Full Code    HPI & HOSPITAL COURSE  This is a 82 y.o. female  PMHX of grade 2 diastolic dysfunction congestive heart failure with an EF of 65%, aortic insufficiency   hypertension, iron deficiency anemia, hyperlipidemia, hypothyroidism, history of superficial venous thrombosis and previously on chronic anticoagulation who was admitted on 2/6/23 after presenting to ER complaining of 1 month history of dyspnea with minimal exertion associated with orthopnea and paroxysmal nocturnal dyspnea and on and off substernal chest pain that self resolves that feels like a burning sensation  She was hospitalized at the beginning of January for pneumonia and completed antibiotic regimen for continued to have her dyspneic symptoms.  She then was hospitalized again later for concerns of diastolic heart failure exacerbation and was diuresed and discharged home with close follow-up with cardiology.  Her last echocardiogram was done in January with preserved systolic function but grade 2 diastolic dysfunction.  Her symptoms despite continuing to uptitrate her Lasix have not changed  She was also additionally started on spironolactone for concerns of ongoing fluid overload and pending further anemic work-up and outpatient establishment with hematology.  The symptoms have been so debilitating that she ultimately came into the emergency room.  Cardiology was also consulted and patient admitted for further work up and treatment   Patient was started on abx for possible pneumonia she was also  seen by cardiology had a KYRA done showing severe aortic insufficiency and cardiology has started process for transfer to [facility] for evaluation of percutaneous versus surgical valve replacement  Patient has been accepted there and will be transferred today     The patient met 2-midnight criteria for an inpatient stay at the time of discharge.      FOLLOW UP ITEMS POST DISCHARGE  Cardiology     DISCHARGE DIAGNOSES  Principal Problem:    Diastolic CHF, acute on chronic (HCC) POA: Yes  Active Problems:    Essential hypertension POA: Yes    Acquired hypothyroidism POA: Yes    Mixed hyperlipidemia POA: Yes    Acute renal failure superimposed on stage 3 chronic kidney disease (HCC) POA: Yes    Iron deficiency anemia POA: Yes    Elevated troponin POA: Yes    Aortic insufficiency POA: Yes    Superficial thrombosis of leg, right POA: Yes    Dyspnea on minimal exertion POA: Yes    Secondary renal hyperparathyroidism (HCC) POA: Unknown    Pneumonia POA: Unknown  Resolved Problems:    * No resolved hospital problems. *      FOLLOW UP  Future Appointments   Date Time Provider Department Center   3/17/2023  4:00 PM Álvaro Resendez M.D. [facility] None   3/24/2023 11:30 AM Oc Luke M.D. [facility] None     No follow-up provider specified.    MEDICATIONS ON DISCHARGE     Medication List        START taking these medications        Instructions   NS SOLN 100 mL with ampicillin/sulbactam 3 (2-1) g SOLR 3 g   Infuse 3 g into a venous catheter every 12 hours.  Dose: 3 g     torsemide 20 MG Tabs  Start taking on: February 12, 2023  Commonly known as: DEMADEX   Take 1 Tablet by mouth every day.  Dose: 20 mg            CHANGE how you take these medications        Instructions   metoprolol tartrate 25 MG Tabs  What changed: how much to take  Commonly known as: LOPRESSOR   Take 0.5 Tablets by mouth 2 times a day.  Dose: 12.5 mg            CONTINUE taking these medications        Instructions   acetaminophen 500 MG Tabs  Commonly  known as: TYLENOL   Take 1,000 mg by mouth every 6 hours as needed for Mild Pain. 2 tablets = 1,000 mg.  Dose: 1,000 mg     ferrous sulfate 325 (65 Fe) MG tablet   TAKE 1 TABLET BY MOUTH EVERY DAY  Dose: 325 mg     folic acid 1 MG Tabs  Commonly known as: FOLVITE   Take 1 Tablet by mouth every day.  Dose: 1 mg     levothyroxine 50 MCG Tabs  Commonly known as: SYNTHROID   Take 1 Tab by mouth Every morning on an empty stomach.  Dose: 50 mcg     simvastatin 10 MG Tabs  Commonly known as: ZOCOR   Take 1 Tablet by mouth every evening.  Dose: 10 mg     spironolactone 25 MG Tabs  Commonly known as: ALDACTONE   Take 1 Tablet by mouth every day.  Dose: 25 mg            STOP taking these medications      amoxicillin-clavulanate 500-125 MG Tabs  Commonly known as: AUGMENTIN     diazepam 10 MG tablet  Commonly known as: VALIUM     furosemide 40 MG Tabs  Commonly known as: LASIX     loperamide 2 MG Caps  Commonly known as: IMODIUM     losartan 50 MG Tabs  Commonly known as: COZAAR     Zithromax Z-Chidi 250 MG Tabs  Generic drug: azithromycin              Allergies  No Known Allergies    DIET  Orders Placed This Encounter   Procedures    Diet Order Diet: Cardiac     Standing Status:   Standing     Number of Occurrences:   1     Order Specific Question:   Diet:     Answer:   Cardiac [6]       ACTIVITY  As tolerated.  Weight bearing as tolerated    LINES, DRAINS, AND WOUNDS  This is an automated list. Peripheral IVs will be removed prior to discharge.  Peripheral IV 02/06/23 20 G Left Antecubital (Active)   Site Assessment Clean;Dry;Intact 02/11/23 0800   Dressing Type Transparent 02/11/23 0800   Line Status Saline locked 02/11/23 0800   Dressing Status Clean;Dry;Intact 02/11/23 0800   Dressing Intervention N/A 02/11/23 0800   Date Primary Tubing Changed 02/08/23 02/08/23 0733   Date Secondary Tubing Changed 02/08/23 02/08/23 0733   Infiltration Grading ([facility], [facility]) 0 02/11/23 0800   Phlebitis Scale ([facility] Only) 0 02/11/23 0800           Peripheral IV 02/06/23 20 G Left Antecubital (Active)   Site Assessment Clean;Dry;Intact 02/11/23 0800   Dressing Type Transparent 02/11/23 0800   Line Status Saline locked 02/11/23 0800   Dressing Status Clean;Dry;Intact 02/11/23 0800   Dressing Intervention N/A 02/11/23 0800   Date Primary Tubing Changed 02/08/23 02/08/23 0733   Date Secondary Tubing Changed 02/08/23 02/08/23 0733   [facility]fil[facility] Grading ([facility]) 0 02/11/23 0800   Phlebitis Scale ([facility] Only) 0 02/11/23 0800               MENTAL STATUS ON TRANSFER             CONSULTATIONS  Cardiology     PROCEDURES  KYRA     LABORATORY  Lab Results   Component Value Date    SODIUM 133 (L) 02/11/2023    POTASSIUM 4.4 02/11/2023    CHLORIDE 100 02/11/2023    CO2 20 02/11/2023    GLUCOSE 102 (H) 02/11/2023    BUN 65 (H) 02/11/2023    CREATININE 2.35 (H) 02/11/2023        Lab Results   Component Value Date    WBC 15.0 (H) 02/11/2023    HEMOGLOBIN 9.4 (L) 02/11/2023    HEMATOCRIT 30.8 (L) 02/11/2023    PLATELETCT 241 02/11/2023        Total time of the discharge process exceeds 42 minutes.

## 2023-02-11 NOTE — PROGRESS NOTES
Report received from Rojas MAXWELL, assumed care of patient. Pt A&O x 4. VS stable, DBP soft but baseline for pt. Scheduled beta blocker administered per MD order during shift. Pt's dtr in room, family and Pt updated on POC with verbalized agreement. Giraldo removed during previous shifting,  pt voiding to bathroom without issue at this time. Fall precautions in place, call light and belongings within reach, bed in lowest position. All concerns addressed, no signs of distress.

## 2023-02-11 NOTE — PROGRESS NOTES
"[facility] -  PROGRESS NOTE               Author: Horacio Fulton M.D. Date & Time: 2/11/2023  10:41 AM     HPI:  83 yo F PMH CKD Stage 3a, HTN, HLD, and chronic diastolic HF who presents to ED with CC as above.  She endorses a 1 month hx of worsening dyspnea with off and on substernal CP on admit on 2/6.  The pain resolves on its own, feels like a burning sensation, and has no radiation.  No aggravating or alleviating factors.  [place] seeing a Nephrologist but has not since being here.  Follows with [facility] for her HF.  Before admit she had been started on spironolactone to further help with volume overload issues but her sx's were just so debilitating that she came to ED instead.  IV diuresis was initiated and she had adequate response to that and feels better from then to now.  Unfortunately she came in with TIFFANI SCr ~ 1.5 and has continued to worsen daily.  She has pretty severe AR and so for that Cardiology is planning a KYRA tomorrow.  Otherwise denies any recent F/C/N/V.  No melena, hematochezia, hematemesis.  No HA, visual changes, or abdominal pain.    DAILY NEPHROLOGY SUMMARY:  2/08: Consult done  2/09: NAEO, feels good, SOB/CP better, SCr jumped up still  2/10: NAEO, no complaints, feels better, daughter at bedside  2/11: NAEO, no complaints, sitting in bedside chair    REVIEW OF SYSTEMS:    10 point ROS reviewed and is as per HPI/daily summary or otherwise negative    PMH/PSH/SH/FH:   Reviewed and unchanged since admission note    CURRENT MEDICATIONS:   Reviewed from admission to present day    VS:  BP (!) 117/30   Pulse 75   Temp 36.8 °C (98.2 °F) (Temporal)   Resp 16   Ht 1.575 m (5' 2\")   Wt 64.4 kg (142 lb)   SpO2 91%   BMI 25.97 kg/m²     Physical Exam  Nursing note reviewed.   Constitutional:       Appearance: Normal appearance.   Eyes:      General: No scleral icterus.  Cardiovascular:      Comments: BLE " edema  Pulmonary:      Effort: Pulmonary effort is normal.   Abdominal:      General: There is no distension.   Musculoskeletal:         General: No deformity.   Skin:     Findings: No rash.   Neurological:      Mental Status: She is alert.   Psychiatric:         Behavior: Behavior is cooperative.     Fluids:  In: 940 [P.O.:940]  Out: 300     LABS:  Recent Labs     02/09/23  0220 02/10/23  0213 02/11/23  0522   SODIUM 131* 131* 133*   POTASSIUM 4.4 4.4 4.4   CHLORIDE 97 97 100   CO2 20 20 20   GLUCOSE 93 109* 102*   BUN 58* 59* 65*   CREATININE 2.54* 2.62* 2.35*   CALCIUM 8.7 9.0 8.8       IMAGING:   All imaging reviewed from admission to present day    IMPRESSION:  # TIFFANI  - Etiology likely 2/2 CRS  - Non-oliguric  - SCr finally down trended  - No uremia  # CKD Stage 3a  - Etiology likely 2/2 CRS/HTN  - BCr ~ 1.1-1.3  # Acute on chronic diastolic HF  - EF ~ 65%  - Moderate-severe AR also noted  - KYRA severe AI  - [facility] transfer pending due to high risk surgery needed  # HTN  - Goal BP < 140/90  - At goal  # HLD     PLAN:  - No compelling indication for KRT at this time  - Continue diuresis  - Monitor I/O  - Dose all meds per eGFR < 15  - Daily labs to trend SCr  - May have more advanced CKD than labs suggest  - Will follow until transferred to [facility]  - Will establish in our CKD clinic post DC and return to OP setting  - Will follow while inpatient

## 2023-02-11 NOTE — PROGRESS NOTES
Hospital Medicine Daily Progress Note    Date of Service  2/11/2023    Chief Complaint  Mariely Easley is a 82 y.o. female admitted 2/6/2023 with shortness of breath     Hospital Course  This is a 81 y/o F with PMHX of grade 2 diastolic dysfunction congestive heart failure with an EF of 65% with moderate aortic insufficiency, hypertension, iron deficiency anemia, hyperlipidemia, hypothyroidism, history of superficial venous thrombosis and previously on chronic anticoagulation who was admitted on 2/6/23 after presenting to ER complaining of 1 month history of dyspnea with minimal exertion associated with orthopnea and paroxysmal nocturnal dyspnea and on and off substernal chest pain that self resolves that feels like a burning sensation  She was hospitalized at the beginning of January for pneumonia and completed antibiotic regimen for continued to have her dyspneic symptoms.  She then was hospitalized again later for concerns of diastolic heart failure exacerbation and was diuresed and discharged home with close follow-up with cardiology.  Her last echocardiogram was done in January with preserved systolic function but grade 2 diastolic dysfunction.  Her symptoms despite continuing to uptitrate her Lasix have not changed  She was also additionally started on spironolactone for concerns of ongoing fluid overload and pending further anemic work-up and outpatient establishment with hematology.  The symptoms have been so debilitating that she ultimately came into the emergency room.  Cardiology was also consulted and patient admitted for further work up and treatment     Interval Problem Update  Patient resting in bed  still with some intermittent chest pain,   Still feels SOB,   Cardiology following and for KYRA tomorrow   Cont on diuresis as per cardiology rec. Now switched to torsemide   Monitor on telemetry   Kidney function worsening so I have consulted nephrology and discussed case appreciate rec.   2/9:  Patient seen and examined going for KYRA today   Cardiology following cont on diuresis,   Nephrology also following regarding her TIFFANI  Appreciate rec.    2/10: Patient seen and examined, started on abx for possible pneumonia, CXR shows infiltrate, procalcitonin elevated   Started on IV unasyn   Follow up cultures  Regarding her sever AS awaiting transfer to [facility] Cardiology following appreciate rec.  Regardign her Acute on chronic renal failure nephrology following appreciate rec.   2/11: Patient seen and examined now on abx for her pneumonia,   Cardiology following regarding her HF and also severe AS awaiting transfer to [facility]  Regarding her TIFFANI nephrology following appreciate rec.   I have discussed this patient's plan of care and discharge plan at IDT rounds today with Case Management, Nursing, Nursing leadership, and other members of the IDT team.    Consultants/Specialty  cardiology    Code Status  Full Code    Disposition  Patient is not medically cleared for discharge.   Anticipate discharge to  TBD .  I have placed the appropriate orders for post-discharge needs.    Review of Systems  Review of Systems   Constitutional:  Positive for malaise/fatigue. Negative for chills, diaphoresis and fever.   HENT:  Negative for ear pain and tinnitus.    Eyes:  Negative for blurred vision.   Respiratory:  Positive for shortness of breath. Negative for cough, sputum production and wheezing.    Cardiovascular:  Positive for chest pain, orthopnea and PND. Negative for palpitations, claudication and leg swelling.   Gastrointestinal:  Negative for abdominal pain, blood in stool, constipation, diarrhea, melena, nausea and vomiting.   Genitourinary:  Negative for dysuria and hematuria.   Musculoskeletal:  Negative for back pain.   Neurological:  Negative for dizziness, loss of consciousness, weakness and headaches.   All other systems reviewed and are negative.     Physical Exam  Temp:  [36.4 °C (97.5 °F)-37.1 °C (98.8 °F)] 36.8 °C  (98.2 °F)  Pulse:  [65-75] 71  Resp:  [16] 16  BP: (115-123)/(30-33) 115/33  SpO2:  [91 %-95 %] 94 %    Physical Exam  Vitals and nursing note reviewed.   Constitutional:       General: She is not in acute distress.     Appearance: She is not diaphoretic.   HENT:      Head: Normocephalic and atraumatic.      Mouth/Throat:      Mouth: Mucous membranes are moist.   Eyes:      General: No scleral icterus.        Right eye: No discharge.         Left eye: No discharge.      Conjunctiva/sclera: Conjunctivae normal.      Pupils: Pupils are equal, round, and reactive to light.   Cardiovascular:      Rate and Rhythm: Normal rate and regular rhythm.      Pulses: Normal pulses.      Heart sounds: No murmur heard.  Pulmonary:      Effort: Pulmonary effort is normal. No respiratory distress.      Breath sounds: No wheezing, rhonchi or rales.   Abdominal:      General: Abdomen is flat. Bowel sounds are normal. There is no distension.      Palpations: Abdomen is soft.      Tenderness: There is no abdominal tenderness. There is no guarding or rebound.   Musculoskeletal:      Cervical back: Neck supple.      Right lower leg: No edema.      Left lower leg: No edema.   Skin:     General: Skin is warm and dry.      Capillary Refill: Capillary refill takes less than 2 seconds.   Neurological:      Mental Status: She is alert and oriented to person, place, and time. Mental status is at baseline.   Psychiatric:         Mood and Affect: Mood normal.       Fluids    Intake/Output Summary (Last 24 hours) at 2/11/2023 1327  Last data filed at 2/11/2023 1305  Gross per 24 hour   Intake 560 ml   Output 100 ml   Net 460 ml         Laboratory  Recent Labs     02/09/23  0220 02/10/23  0213   WBC 11.0* 23.1*   RBC 3.18* 3.24*   HEMOGLOBIN 9.2* 9.4*   HEMATOCRIT 30.0* 30.5*   MCV 94.3 94.1   MCH 28.9 29.0   MCHC 30.7* 30.8*   RDW 56.9* 55.8*   PLATELETCT 248 248   MPV 12.6 12.1       Recent Labs     02/09/23  0220 02/10/23  0213 02/11/23  0522    SODIUM 131* 131* 133*   POTASSIUM 4.4 4.4 4.4   CHLORIDE 97 97 100   CO2 20 20 20   GLUCOSE 93 109* 102*   BUN 58* 59* 65*   CREATININE 2.54* 2.62* 2.35*   CALCIUM 8.7 9.0 8.8                     Imaging  DX-CHEST-PORTABLE (1 VIEW)   Final Result         1.  Right midlung and lower lobe infiltrates, new since prior study.   2.  Cardiomegaly   3.  Atherosclerosis      EC-KYRA W/O CONT   Final Result      EC-ECHOCARDIOGRAM COMPLETE W/O CONT   Final Result      DX-CHEST-PORTABLE (1 VIEW)   Final Result      Cardiomegaly and mild central perivascular and interstitial pulmonary edema consistent with CHF.             Assessment/Plan  * Diastolic CHF, acute on chronic (HCC)- (present on admission)  Assessment & Plan  Elevated BNP, elevated troponin in setting of recurrent substernal chest discomfort as well as severe dyspnea on exertion, orthopnea, paroxysmal nocturnal dyspnea  Chest x-ray concerning for bilateral pulmonary edema  Last echocardiogram showing grade 2 diastolic dysfunction in the setting of moderate aortic insufficiency and increased RVSP  Continuing diuresis  Holding home losartan and home spironolactone in the setting of rising creatinine as well as potassium at 5.0 after starting spironolactone 1 week ago  Continue home beta-blocker  2 g sodium diet as well as 2 L fluid restriction  Strict I's and O's  Daily weights  Continuous pulse oximetry as well as telemetry monitoring  Cardiology consulted and following appreciate rec.     Pneumonia  Assessment & Plan  Starting on IV abc Unasyn  procalcitonin elevated   Follow up cultures     Dyspnea on minimal exertion- (present on admission)  Assessment & Plan  About 1 month history of severe dyspnea on minimal exertion  Hospitalized at the beginning a month for concerns of pneumonia status post antibiotic treatment with no improvement in symptoms  Also recently hospitalized for concerns of fluid overload in the setting of diastolic heart failure and was  diuresed, with last echo showing EF of 65% with grade 2 diastolic dysfunction moderate aortic insufficiency and mild mitral regurgitation and mild tricuspid regurgitation with an RVSP of 50 mmHg  She followed up with cardiology outpatient, despite ongoing diuretics patient's symptoms have not improved.  Dr. Glover with cardiology presumed that this may be high-output heart failure in the setting of severe anemia  Patient is pending outpatient establishment with hematologist  She does also endorse orthopnea paroxysmal nocturnal dyspnea as well as multiple episodes of burning substernal chest discomfort that occur at rest and self resolve over the last month  Initial troponin 43, BNP greater than 35,000  Chest x-ray consistent with bilateral interstitial pulmonary edema  Received 1 dose of Lasix in the emergency room  Continue with IV diuresis  Repeat echocardiogram  Trend troponins.  EKG with any recurrence of chest pain    Superficial thrombosis of leg, right- (present on admission)  Assessment & Plan  History of superficial venous thrombosis in use to be on Xarelto for chronic anticoagulation however this was discontinued at last cardiology appointment also in the setting of severe anemia    Aortic insufficiency- (present on admission)  Assessment & Plan  Last echocardiogram in 1/2023 showing moderate aortic insufficiency, could be contributing in the setting of diastolic dysfunction to patient's clinical dyspnea on exertion  Cardiology following and plan for KYRA tomorrow  Appreciate rec.     Elevated troponin- (present on admission)  Assessment & Plan  Troponin elevated to 43 in the setting of 1 month history of on and off substernal chest discomfort  Last echo a few weeks ago did not show any regional wall motion abnormalities  EKG on admission showing mild ST depressions in leads V5 and V6  Continue to trend troponins  Stat EKG with any symptoms of chest pain  Appreciate cardiology rec.     Iron deficiency  anemia- (present on admission)  Assessment & Plan  History of iron deficiency anemia last iron studies completed in 9/2022  B12 levels and folic acid levels have been adequate in the past  No active signs or symptoms of bleeding  T. bili normal, not concerning for underlying hemolytic process  Hemoglobin appears to be improved compared to 1 week ago  Continue iron supplementation  Reticulocyte count  Iron studies, B12, folic acid levels  Thyroid studies    Acute renal failure superimposed on stage 3 chronic kidney disease (HCC)- (present on admission)  Assessment & Plan  Baseline serum creatinine likely around 1.1, has been steadily rising in the setting of up titration of patient's diuretics  Recent initiation of spironolactone 1 week ago, and creatinine continues to rise as well as potassium is now up to 5  Hold home spironolactone and losartan  Avoid nephrotoxins  I have consulted nephrology and discussed the case appreciate rec.     Mixed hyperlipidemia- (present on admission)  Assessment & Plan  Continue home statin    Acquired hypothyroidism- (present on admission)  Assessment & Plan  Continue home levothyroxine  Check thyroid studies       Essential hypertension- (present on admission)  Assessment & Plan  Continue home beta-blocker  Holding home losartan and spironolactone in setting of acute on chronic renal failure  IV as needed antihypertensives ordered         VTE prophylaxis: heparin ppx    I have performed a physical exam and reviewed and updated ROS and Plan today (2/11/2023). In review of yesterday's note (2/10/2023), there are no changes except as documented above.

## 2023-02-11 NOTE — PROGRESS NOTES
"Cardiology Follow Up Progress Note    Date of Service  2/11/2023    Attending Physician  Argelia Johnson M.D.    Chief Complaint   Shortness of Breath    HPI  Mariely Easley is a 82 y.o. female admitted 2/6/2023 with per Dr. Tripathi, \"a past medical history of congestive heart failure, hypertension and hyperlipidemia, anemia, chronic kidney disease, previous tobacco abuse who presented 2/6/2023 with worsening fatigue, weakness, shortness of breath, dyspnea on exertion. She is followed by [facility] and was seen for initial encounter with Tommie Carter on 01/31/23 following discharge on 01/26/23. Her heart failure was attributed to high output state secondary to her anemia. She was started on aldactone, referred to hematology and is scheduled to follow up with Rojas Kaye in March, however, due to her worsening condition she was brought back to emergency room.\"     Interim Events  2/9/2023: No overnight cardiac events. Patient reports Tele monitoring personally interpreted by me shows SR. COFFEY; NOE; Daily weight not completed, discussed with nursing. Giraldo UOP -325 mg yesterday; neph following. Labs reviewed. NPO for KYRA today.  2/10/2023: No overnight cardiac events. Tele monitoring personally interpreted by me shows . ERLINDA; NOE; Daily weight 64.4 kg-bedscale; - 1000 ml UOP last 24 hours. Labs reviewed, notable for increased procal and new infiltrates on cxr. Awaiting bed availability at [facility].  2/11/2023: No overnight cardiac events. OOB this AM. Tele monitoring personally interpreted by me shows . ERLINDA; NOE; Daily weight not completed-discussed with nursing; - 300 UOP last 24 hours. Labs reviewed, notable for crt 2.62-->2.35. RTOC to reach out to [facility] regarding bed availability.    Review of Systems  Review of Systems   Constitutional:  Negative for activity change, fatigue and fever.   Respiratory:  Negative for cough, chest tightness and shortness of breath.  "   Cardiovascular:  Negative for chest pain, palpitations and leg swelling.   Gastrointestinal:  Negative for abdominal distention and abdominal pain.   Genitourinary:  Negative for hematuria.   Skin:  Negative for pallor and rash.   Neurological:  Negative for dizziness and light-headedness.   Hematological:  Does not bruise/bleed easily.   Psychiatric/Behavioral:  Negative for confusion. The patient is not nervous/anxious.      Vital signs in last 24 hours  Temp:  [36.4 °C (97.5 °F)-37.1 °C (98.8 °F)] 37.1 °C (98.8 °F)  Pulse:  [65-84] 71  Resp:  [16] 16  BP: (115-123)/(32-33) 118/32  SpO2:  [92 %-95 %] 95 %    Physical Exam  Physical Exam  Constitutional:       General: She is not in acute distress.     Appearance: Normal appearance.   HENT:      Head: Normocephalic and atraumatic.   Eyes:      Extraocular Movements: Extraocular movements intact.      Conjunctiva/sclera: Conjunctivae normal.   Cardiovascular:      Rate and Rhythm: Normal rate and regular rhythm.      Pulses: Normal pulses.      Heart sounds: Normal heart sounds.   Pulmonary:      Effort: Pulmonary effort is normal.      Breath sounds: Normal breath sounds.   Musculoskeletal:      Right lower leg: No edema.      Left lower leg: No edema.   Skin:     General: Skin is warm and dry.      Findings: No rash.   Neurological:      Mental Status: She is alert.       Lab Review  Lab Results   Component Value Date/Time    WBC 23.1 (H) 02/10/2023 02:13 AM    RBC 3.24 (L) 02/10/2023 02:13 AM    HEMOGLOBIN 9.4 (L) 02/10/2023 02:13 AM    HEMATOCRIT 30.5 (L) 02/10/2023 02:13 AM    MCV 94.1 02/10/2023 02:13 AM    MCH 29.0 02/10/2023 02:13 AM    MCHC 30.8 (L) 02/10/2023 02:13 AM    MPV 12.1 02/10/2023 02:13 AM      Lab Results   Component Value Date/Time    SODIUM 133 (L) 02/11/2023 05:22 AM    POTASSIUM 4.4 02/11/2023 05:22 AM    CHLORIDE 100 02/11/2023 05:22 AM    CO2 20 02/11/2023 05:22 AM    GLUCOSE 102 (H) 02/11/2023 05:22 AM    BUN 65 (H) 02/11/2023 05:22 AM     CREATININE 2.35 (H) 02/11/2023 05:22 AM      Lab Results   Component Value Date/Time    ASTSGOT 9 (L) 02/07/2023 12:44 AM    ALTSGPT 11 02/07/2023 12:44 AM     Lab Results   Component Value Date/Time    CHOLSTRLTOT 101 06/28/2022 08:58 AM    LDL 25 06/28/2022 08:58 AM    HDL 59 06/28/2022 08:58 AM    TRIGLYCERIDE 86 06/28/2022 08:58 AM    TROPONINT 51 (H) 02/07/2023 12:44 AM       No results for input(s): NTPROBNP in the last 72 hours.      Cardiac Imaging and Procedures Review  Echocardiogram (2/7/2023):  Compared to the prior study on 1/9/23, aortic regurgitation is now   severe.  The left ventricular ejection fraction is visually estimated to be 60%.  Moderately dilated left atrium.  Severe aortic insufficiency. Vena contracta is .7 cm.  ms.  Estimated right ventricular systolic pressure is 40 mmHg.    Imaging  Chest X-Ray (2/6/2023):  Cardiomegaly and mild central perivascular and interstitial pulmonary edema consistent with CHF.     Assessment/Plan  Valvular cardiomyopathy; Severe AS; Acute on CKD  -Severe AS on KYRA  -High risk and needs surgical intervention, [facility] agreeable and accepted patient for intervention. Currently, awaiting available bed.  -Euvolemic on exam. Requesting dry weight to be documented. Appreciate additional recs from neph  -Continue strict I+Os and Daily weights.  -Continue PO torsemide, metop, and statin    Thank you for allowing me to participate in the care of this patient. Discussed with Dr. Johnson.  I will continue to follow this patient    Please contact me with any questions.    Please see Dr. Sarmiento's attestation for further details and MDM.       MEREDITH BarbaRTRENA.   [facility]  [phone]

## 2023-02-11 NOTE — DISCHARGE PLANNING
"RNCM received Voalte communication of bed acceptance. RNCM forwarded msg to bedside team:    \"[id number]  JONES BERMUDEZ  : [birth year]  Outgoing Transfer Details for CM    Patient Name: Jones Bermudez  MRN: [id number]  Receiving Facility:  [facility]   Receiving Facility Address: [address]  [facility] [address]  Accepting Physician: Dr. Lovett  Bed Assignment:   RN to RN Report Phone #: [phone]  Transport Date:   Transport Time: tbd   Transport Vendor: [employer]   I'm unsure if a disc is at bedside or not, I see in a note back on  that a disc was requested to be made\"    RNCM spoke to pt and daughter bedside to update. Other daughter, Madelyn Pérez will be passenger; height and wieght acquired and provided to RTOC.     COBRA packet with DISC handed to Charge RN.    Awaiting travel details from RTOC  "

## 2023-02-11 NOTE — CARE PLAN
The patient is Stable - Low risk of patient condition declining or worsening    Shift Goals  Clinical Goals: Pending bed placement to [place]  Patient Goals: Abx,Comfort, Rest, Hospital transfer  Family Goals: Hospital transfer, pt comfort    Problem: Knowledge Deficit - Standard  Goal: Patient and family/care givers will demonstrate understanding of plan of care, disease process/condition, diagnostic tests and medications  Outcome: Progressing     Problem: Respiratory  Goal: Patient will achieve/maintain optimum respiratory ventilation and gas exchange  Outcome: Progressing

## 2023-02-23 ENCOUNTER — HOME HEALTH ADMISSION (OUTPATIENT)
Dept: HOME HEALTH SERVICES | Facility: HOME HEALTHCARE | Age: 83
End: 2023-02-23
Payer: MEDICARE

## 2023-02-25 ENCOUNTER — HOME CARE VISIT (OUTPATIENT)
Dept: HOME HEALTH SERVICES | Facility: HOME HEALTHCARE | Age: 83
End: 2023-02-25
Payer: MEDICARE

## 2023-02-25 VITALS
BODY MASS INDEX: 24.29 KG/M2 | OXYGEN SATURATION: 94 % | WEIGHT: 132 LBS | HEIGHT: 62 IN | TEMPERATURE: 97.4 F | DIASTOLIC BLOOD PRESSURE: 72 MMHG | RESPIRATION RATE: 18 BRPM | HEART RATE: 80 BPM | SYSTOLIC BLOOD PRESSURE: 128 MMHG

## 2023-02-25 PROCEDURE — 665001 SOC-HOME HEALTH

## 2023-02-25 PROCEDURE — G0493 RN CARE EA 15 MIN HH/HOSPICE: HCPCS

## 2023-02-25 ASSESSMENT — ENCOUNTER SYMPTOMS
SUBJECTIVE PAIN PROGRESSION: GRADUALLY IMPROVING
VOMITING: DENIES
PERSON REPORTING PAIN: PATIENT
LAST BOWEL MOVEMENT: 66529
NAUSEA: DENIES
STOOL FREQUENCY: DAILY
LOWEST PAIN SEVERITY IN PAST 24 HOURS: 0/10
DENIES PAIN: 1
HIGHEST PAIN SEVERITY IN PAST 24 HOURS: 6/10
PAIN SEVERITY GOAL: 0/10
BOWEL PATTERN NORMAL: 1

## 2023-02-25 ASSESSMENT — FIBROSIS 4 INDEX: FIB4 SCORE: 0.9

## 2023-02-25 ASSESSMENT — ACTIVITIES OF DAILY LIVING (ADL): OASIS_M1830: 02

## 2023-02-27 ENCOUNTER — DOCUMENTATION (OUTPATIENT)
Dept: MEDICAL GROUP | Facility: PHYSICIAN GROUP | Age: 83
End: 2023-02-27
Payer: MEDICARE

## 2023-02-27 NOTE — PROGRESS NOTES
Medication chart review for [facility] services    Received referral from [facility].   Medications reviewed  compared with discharge summary if available.    Current medication list per [facility]     Current Outpatient Medications:     aspirin, 81 mg, Oral, DAILY    furosemide, 40 mg, Oral, DAILY    potassium chloride SA, 10 mEq, Oral, DAILY    TRAMADOL HCL 25 MG SPLIT TABS, 12.5 mg, Oral, Q4HRS PRN    losartan, 25 mg, Oral, DAILY    Lidocaine, 1 Patch, Topical, Q12HRS PRN    cetirizine, 10 mg, Oral, QDAY PRN    diazePAM, 10 mg, Oral, Q6HRS PRN    Loperamide HCl, 2 mg, Oral, QDAY PRN    metoprolol tartrate, 12.5 mg, Oral, BID    ferrous sulfate, 325 mg, Oral, DAILY    acetaminophen, 1,000 mg, Oral, Q6HRS PRN    simvastatin, 10 mg, Oral, Q EVENING    folic acid, 1 mg, Oral, DAILY    levothyroxine, 50 mcg, Oral, AM ES    Location of hospital, and discharge summary date, if applicable:   2/23  Discharge medication summary, per discharge summary:    START taking these medications         Instructions   NS SOLN 100 mL with ampicillin/sulbactam 3 (2-1) g SOLR 3 g    Infuse 3 g into a venous catheter every 12 hours.  Dose: 3 g      torsemide 20 MG Tabs  Start taking on: February 12, 2023  Commonly known as: DEMADEX    Take 1 Tablet by mouth every day.  Dose: 20 mg                CHANGE how you take these medications         Instructions   metoprolol tartrate 25 MG Tabs  What changed: how much to take  Commonly known as: LOPRESSOR    Take 0.5 Tablets by mouth 2 times a day.  Dose: 12.5 mg                CONTINUE taking these medications         Instructions   acetaminophen 500 MG Tabs  Commonly known as: TYLENOL    Take 1,000 mg by mouth every 6 hours as needed for Mild Pain. 2 tablets = 1,000 mg.  Dose: 1,000 mg      ferrous sulfate 325 (65 Fe) MG tablet    TAKE 1 TABLET BY MOUTH EVERY DAY  Dose: 325 mg      folic acid 1 MG Tabs  Commonly known as: FOLVITE    Take 1 Tablet by mouth every day.  Dose: 1 mg       levothyroxine 50 MCG Tabs  Commonly known as: SYNTHROID    Take 1 Tab by mouth Every morning on an empty stomach.  Dose: 50 mcg      simvastatin 10 MG Tabs  Commonly known as: ZOCOR    Take 1 Tablet by mouth every evening.  Dose: 10 mg      spironolactone 25 MG Tabs  Commonly known as: ALDACTONE    Take 1 Tablet by mouth every day.  Dose: 25 mg                STOP taking these medications       amoxicillin-clavulanate 500-125 MG Tabs  Commonly known as: AUGMENTIN      diazepam 10 MG tablet  Commonly known as: VALIUM      furosemide 40 MG Tabs  Commonly known as: LASIX      loperamide 2 MG Caps  Commonly known as: IMODIUM      losartan 50 MG Tabs  Commonly known as: COZAAR      Zithromax Z-Chidi 250 MG Tabs  Generic drug: azithromycin           No Known Allergies    Labs     Lab Results   Component Value Date/Time    SODIUM 133 (L) 02/11/2023 05:22 AM    POTASSIUM 4.4 02/11/2023 05:22 AM    CHLORIDE 100 02/11/2023 05:22 AM    CO2 20 02/11/2023 05:22 AM    GLUCOSE 102 (H) 02/11/2023 05:22 AM    BUN 65 (H) 02/11/2023 05:22 AM    CREATININE 2.35 (H) 02/11/2023 05:22 AM     Lab Results   Component Value Date/Time    ALKPHOSPHAT 110 (H) 02/07/2023 12:44 AM    ASTSGOT 9 (L) 02/07/2023 12:44 AM    ALTSGPT 11 02/07/2023 12:44 AM    TBILIRUBIN 0.4 02/07/2023 12:44 AM    INR 1.53 (H) 01/26/2023 11:09 AM    ALBUMIN 4.0 02/07/2023 12:44 AM        Assessment for clinically significant drug interactions, drug omissions/additions, duplicative therapies.            CC   Luz Vieira D.O.  [address]  [place] [address]  Fax: [phone]    [facility]  Phone [phone] fax [phone]    This note was created using voice recognition software (Dragon). The accuracy of the dictation is limited by the abilities of the software. I have reviewed the note prior to signing, however some errors in grammar and context are still possible. If you have any questions related to this  note please do not hesitate to contact our office.

## 2023-02-28 ENCOUNTER — APPOINTMENT (OUTPATIENT)
Dept: CARDIOLOGY | Facility: MEDICAL CENTER | Age: 83
End: 2023-02-28
Attending: STUDENT IN AN ORGANIZED HEALTH CARE EDUCATION/TRAINING PROGRAM
Payer: MEDICARE

## 2023-02-28 ENCOUNTER — HOME CARE VISIT (OUTPATIENT)
Dept: HOME HEALTH SERVICES | Facility: HOME HEALTHCARE | Age: 83
End: 2023-02-28
Payer: MEDICARE

## 2023-02-28 VITALS
DIASTOLIC BLOOD PRESSURE: 62 MMHG | HEIGHT: 62 IN | WEIGHT: 121.2 LBS | SYSTOLIC BLOOD PRESSURE: 144 MMHG | OXYGEN SATURATION: 98 % | RESPIRATION RATE: 14 BRPM | HEART RATE: 82 BPM | BODY MASS INDEX: 22.31 KG/M2

## 2023-02-28 DIAGNOSIS — N18.4 STAGE 4 CHRONIC KIDNEY DISEASE (HCC): ICD-10-CM

## 2023-02-28 DIAGNOSIS — R06.02 SOB (SHORTNESS OF BREATH): ICD-10-CM

## 2023-02-28 DIAGNOSIS — Z95.2 S/P AVR (AORTIC VALVE REPLACEMENT): ICD-10-CM

## 2023-02-28 DIAGNOSIS — I10 HTN (HYPERTENSION), MALIGNANT: ICD-10-CM

## 2023-02-28 DIAGNOSIS — I35.1 AORTIC VALVE INSUFFICIENCY, ETIOLOGY OF CARDIAC VALVE DISEASE UNSPECIFIED: ICD-10-CM

## 2023-02-28 DIAGNOSIS — E78.5 DYSLIPIDEMIA: ICD-10-CM

## 2023-02-28 DIAGNOSIS — I38 VALVULAR HEART DISEASE: ICD-10-CM

## 2023-02-28 DIAGNOSIS — I50.33 DIASTOLIC CHF, ACUTE ON CHRONIC (HCC): ICD-10-CM

## 2023-02-28 PROCEDURE — 99214 OFFICE O/P EST MOD 30 MIN: CPT | Performed by: INTERNAL MEDICINE

## 2023-02-28 RX ORDER — METOPROLOL TARTRATE 50 MG/1
50 TABLET, FILM COATED ORAL 2 TIMES DAILY
Qty: 180 TABLET | Refills: 4 | Status: SHIPPED | OUTPATIENT
Start: 2023-02-28 | End: 2023-07-25 | Stop reason: SDUPTHER

## 2023-02-28 RX ORDER — FUROSEMIDE 20 MG/1
20 TABLET ORAL DAILY
Qty: 30 TABLET | Refills: 3 | Status: SHIPPED | OUTPATIENT
Start: 2023-02-28 | End: 2023-03-08

## 2023-02-28 ASSESSMENT — ENCOUNTER SYMPTOMS
ABDOMINAL PAIN: 0
BLOOD IN STOOL: 0
LOSS OF CONSCIOUSNESS: 0
SHORTNESS OF BREATH: 0
BRUISES/BLEEDS EASILY: 0
BLURRED VISION: 0
HALLUCINATIONS: 0
WEIGHT LOSS: 0
SENSORY CHANGE: 0
PALPITATIONS: 0
CHILLS: 0
COUGH: 0
SPEECH CHANGE: 0
ORTHOPNEA: 0
CLAUDICATION: 0
HEADACHES: 0
DEPRESSION: 0
FEVER: 0
FALLS: 0
DIZZINESS: 0
EYE DISCHARGE: 0
NAUSEA: 0
DOUBLE VISION: 0
PND: 0
MYALGIAS: 0
VOMITING: 0
EYE PAIN: 0

## 2023-02-28 ASSESSMENT — FIBROSIS 4 INDEX: FIB4 SCORE: 0.9

## 2023-02-28 NOTE — PATIENT INSTRUCTIONS
Will increase Metoprolol to 50 mg BID for better BP control. Continue Losartan 25 mg daily.    Will change Furosemide to 20 mg daily.

## 2023-02-28 NOTE — PROGRESS NOTES
Chief Complaint   Patient presents with    CHF (Diastolic)     F/V Dx: Diastolic CHF, acute on chronic (HCC)      Hyperlipidemia     F/V Dx: Mixed hyperlipidemia      Hypertension       Subjective     Mariely Easley is an 82 y.o. female who presents today for cardiac care and management after recent hospitalization due to heart failure secondary to valvular disease of severe aortic regurgitation.  I personally interpreted her transthoracic echocardiogram images, transesophageal echocardiogram images.  Patient also has renal failure with GFR of 20.  I personally interpreted the blood test results.     Patient was eventually transferred to [facility] and underwent bioprosthetic valve replacement at the aortic area.  She did have coronary angiogram which did not show evidence of obstructive coronary artery disease.    She is doing well post surgery.  No bleeding.  Her wound is healing well.  She is participating in cardiac rehab.  She does not have a lot of shortness of breath at this time.    Past Medical History:   Diagnosis Date    Abnormal albumin 2022    Hypermagnesemia 2022    Hypertension     Hyponatremia 2022    Pain and swelling of left lower extremity 2022    Superficial thrombosis of lower extremity, right 2022     History reviewed. No pertinent surgical history.  History reviewed. No pertinent family history.  Social History     Socioeconomic History    Marital status:      Spouse name: Not on file    Number of children: Not on file    Years of education: Not on file    Highest education level: Not on file   Occupational History    Not on file   Tobacco Use    Smoking status: Former     Types: Cigarettes     Quit date: 1979     Years since quittin.6    Smokeless tobacco: Never   Vaping Use    Vaping Use: Never used   Substance and Sexual Activity    Alcohol use: Yes     Alcohol/week: 8.4 oz     Types: 14 Glasses of wine per week    Drug use: No    Sexual  activity: Not on file   Other Topics Concern    Not on file   Social History Narrative    Not on file     Social Determinants of Health     Financial Resource Strain: Low Risk     Difficulty of Paying Living Expenses: Not hard at all   Food Insecurity: No Food Insecurity    Worried About Running Out of Food in the Last Year: Never true    Ran Out of Food in the Last Year: Never true   Transportation Needs: No Transportation Needs    Lack of Transportation (Medical): No    Lack of Transportation (Non-Medical): No   Physical Activity: Inactive    Days of Exercise per Week: 0 days    Minutes of Exercise per Session: 0 min   Stress: No Stress Concern Present    Feeling of Stress : Not at all   Social Connections: Unknown    Frequency of Communication with Friends and Family: More than three times a week    Frequency of Social Gatherings with Friends and Family: More than three times a week    Attends Mandaeism Services: Not on file    Active Member of Clubs or Organizations: Not on file    Attends Club or Organization Meetings: Not on file    Marital Status: Not on file   Intimate Partner Violence: Not At Risk    Fear of Current or Ex-Partner: No    Emotionally Abused: No    Physically Abused: No    Sexually Abused: No   Housing Stability: Low Risk     Unable to Pay for Housing in the Last Year: No    Number of Places Lived in the Last Year: 1    Unstable Housing in the Last Year: No     No Known Allergies  Outpatient Encounter Medications as of 2/28/2023   Medication Sig Dispense Refill    metoprolol tartrate (LOPRESSOR) 50 MG Tab Take 1 Tablet by mouth 2 times a day. 180 Tablet 4    furosemide (LASIX) 20 MG Tab Take 1 Tablet by mouth every day. take 40 mg for 7 days then 20 mg daily for 7 days  Indications: Cardiac Failure 30 Tablet 3    aspirin (ASA) 325 MG Tab Take 81 mg by mouth every day. Indications: blood thinner      potassium chloride SA (K-DUR) 10 MEQ Tab CR Take 10 mEq by mouth every day. Indications:  potassium replacement for lasix      TRAMADOL HCL 25 MG SPLIT TABS Take 12.5 mg by mouth every four hours as needed (moderate pain). Indications: pain      losartan (COZAAR) 50 MG Tab Take 25 mg by mouth every day. Indications: High Blood Pressure Disorder      Lidocaine 4 % Patch Apply 1 Patch topically every 12 hours as needed (pain). Indications: pain      cetirizine (ZYRTEC) 10 MG Tab Take 10 mg by mouth 1 time a day as needed for Allergies. Indications: Hayfever      diazePAM (VALIUM) 5 MG Tab Take 10 mg by mouth every 6 hours as needed for Anxiety. Indications: Muscle Spasm      Loperamide HCl 1 MG/7.5ML Liquid Take 2 mg by mouth 1 time a day as needed (diarrhea). Indications: Diarrhea      ferrous sulfate 325 (65 Fe) MG tablet TAKE 1 TABLET BY MOUTH EVERY DAY 90 Tablet 0    acetaminophen (TYLENOL) 500 MG Tab Take 1,000 mg by mouth every 6 hours as needed for Mild Pain. 2 tablets = 1,000 mg.  Indications: Fever, Pain      simvastatin (ZOCOR) 10 MG Tab Take 1 Tablet by mouth every evening. 100 Tablet 3    folic acid (FOLVITE) 1 MG Tab Take 1 Tablet by mouth every day. 90 Tablet 3    levothyroxine (SYNTHROID) 50 MCG Tab Take 1 Tab by mouth Every morning on an empty stomach. 90 Tab 3    [DISCONTINUED] furosemide (LASIX) 20 MG Tab Take 40 mg by mouth every day. take 40 mg for 7 days then 20 mg daily for 7 days  Indications: Cardiac Failure      [DISCONTINUED] metoprolol tartrate (LOPRESSOR) 25 MG Tab Take 0.5 Tablets by mouth 2 times a day. 60 Tablet      No facility-administered encounter medications on file as of 2/28/2023.     Review of Systems   Constitutional:  Negative for chills, fever, malaise/fatigue and weight loss.   HENT:  Negative for ear discharge, ear pain, hearing loss and nosebleeds.    Eyes:  Negative for blurred vision, double vision, pain and discharge.   Respiratory:  Negative for cough and shortness of breath.    Cardiovascular:  Negative for chest pain, palpitations, orthopnea, claudication,  "leg swelling and PND.   Gastrointestinal:  Negative for abdominal pain, blood in stool, melena, nausea and vomiting.   Genitourinary:  Negative for dysuria and hematuria.   Musculoskeletal:  Negative for falls, joint pain and myalgias.   Skin:  Negative for itching and rash.   Neurological:  Negative for dizziness, sensory change, speech change, loss of consciousness and headaches.   Endo/Heme/Allergies:  Negative for environmental allergies. Does not bruise/bleed easily.   Psychiatric/Behavioral:  Negative for depression, hallucinations and suicidal ideas.             Objective     BP (!) 144/62 (BP Location: Left arm, Patient Position: Sitting, BP Cuff Size: Adult)   Pulse 82   Resp 14   Ht 1.575 m (5' 2\")   Wt 55 kg (121 lb 3.2 oz)   SpO2 98%   BMI 22.17 kg/m²     Physical Exam  Vitals and nursing note reviewed.   Constitutional:       General: She is not in acute distress.     Appearance: She is not diaphoretic.   HENT:      Head: Normocephalic and atraumatic.      Right Ear: External ear normal.      Left Ear: External ear normal.      Nose: No congestion or rhinorrhea.   Eyes:      General:         Right eye: No discharge.         Left eye: No discharge.   Neck:      Thyroid: No thyromegaly.      Vascular: No JVD.   Cardiovascular:      Rate and Rhythm: Normal rate and regular rhythm.      Pulses: Normal pulses.      Comments: Well healed scare at the sternal wound.  Pulmonary:      Effort: No respiratory distress.   Abdominal:      General: There is no distension.      Tenderness: There is no abdominal tenderness.   Musculoskeletal:         General: No swelling or tenderness.      Right lower leg: No edema.      Left lower leg: No edema.   Skin:     General: Skin is warm and dry.   Neurological:      Mental Status: She is alert and oriented to person, place, and time.      Cranial Nerves: No cranial nerve deficit.   Psychiatric:         Behavior: Behavior normal.              Assessment & Plan     1. " S/P AVR (aortic valve replacement)        2. Valvular heart disease        3. Aortic valve insufficiency, etiology of cardiac valve disease unspecified        4. Stage 4 chronic kidney disease (HCC)  Basic Metabolic Panel      5. SOB (shortness of breath)        6. HTN (hypertension), malignant        7. Dyslipidemia        8. Diastolic CHF, acute on chronic (HCC)            Medical Decision Making: Today's Assessment/Status/Plan:   Today, based on physical examination findings, patient is euvolemic. No JVD, lungs are clear to auscultation, no pitting edema in bilateral lower extremities, no ascites.    Dry weight is 121 lbs.    BP is high. Will increase Metoprolol to 50 mg BID for better BP control. Continue Losartan 25 mg daily.    Will change Furosemide to 20 mg daily due to low GFR.

## 2023-03-01 ENCOUNTER — PATIENT OUTREACH (OUTPATIENT)
Dept: HEALTH INFORMATION MANAGEMENT | Facility: OTHER | Age: 83
End: 2023-03-01
Payer: MEDICARE

## 2023-03-01 DIAGNOSIS — I50.43 CHF (CONGESTIVE HEART FAILURE), NYHA CLASS I, ACUTE ON CHRONIC, COMBINED (HCC): ICD-10-CM

## 2023-03-01 DIAGNOSIS — I10 ESSENTIAL HYPERTENSION: ICD-10-CM

## 2023-03-01 DIAGNOSIS — N18.31 STAGE 3A CHRONIC KIDNEY DISEASE: ICD-10-CM

## 2023-03-01 NOTE — PROGRESS NOTES
Mariely is receiving home health services at this time. Defer outreach this month. Next CCM outreach 3/31/2023.

## 2023-03-02 ENCOUNTER — HOME CARE VISIT (OUTPATIENT)
Dept: HOME HEALTH SERVICES | Facility: HOME HEALTHCARE | Age: 83
End: 2023-03-02
Payer: MEDICARE

## 2023-03-04 ENCOUNTER — HOME CARE VISIT (OUTPATIENT)
Dept: HOME HEALTH SERVICES | Facility: HOME HEALTHCARE | Age: 83
End: 2023-03-04
Payer: MEDICARE

## 2023-03-06 ENCOUNTER — HOSPITAL ENCOUNTER (OUTPATIENT)
Dept: LAB | Facility: MEDICAL CENTER | Age: 83
End: 2023-03-06
Payer: MEDICARE

## 2023-03-06 ENCOUNTER — HOME CARE VISIT (OUTPATIENT)
Dept: HOME HEALTH SERVICES | Facility: HOME HEALTHCARE | Age: 83
End: 2023-03-06
Payer: MEDICARE

## 2023-03-06 LAB
ANION GAP SERPL CALC-SCNC: 13 MMOL/L (ref 7–16)
BASOPHILS # BLD AUTO: 1.1 % (ref 0–1.8)
BASOPHILS # BLD: 0.17 K/UL (ref 0–0.12)
BUN SERPL-MCNC: 25 MG/DL (ref 8–22)
CALCIUM SERPL-MCNC: 9 MG/DL (ref 8.5–10.5)
CHLORIDE SERPL-SCNC: 106 MMOL/L (ref 96–112)
CO2 SERPL-SCNC: 17 MMOL/L (ref 20–33)
CREAT SERPL-MCNC: 1.43 MG/DL (ref 0.5–1.4)
EOSINOPHIL # BLD AUTO: 0.89 K/UL (ref 0–0.51)
EOSINOPHIL NFR BLD: 6 % (ref 0–6.9)
ERYTHROCYTE [DISTWIDTH] IN BLOOD BY AUTOMATED COUNT: 56.4 FL (ref 35.9–50)
GFR SERPLBLD CREATININE-BSD FMLA CKD-EPI: 37 ML/MIN/1.73 M 2
GLUCOSE SERPL-MCNC: 94 MG/DL (ref 65–99)
HCT VFR BLD AUTO: 36.9 % (ref 37–47)
HGB BLD-MCNC: 11.2 G/DL (ref 12–16)
IMM GRANULOCYTES # BLD AUTO: 0.32 K/UL (ref 0–0.11)
IMM GRANULOCYTES NFR BLD AUTO: 2.2 % (ref 0–0.9)
LYMPHOCYTES # BLD AUTO: 1.5 K/UL (ref 1–4.8)
LYMPHOCYTES NFR BLD: 10.1 % (ref 22–41)
MCH RBC QN AUTO: 29.1 PG (ref 27–33)
MCHC RBC AUTO-ENTMCNC: 30.4 G/DL (ref 33.6–35)
MCV RBC AUTO: 95.8 FL (ref 81.4–97.8)
MONOCYTES # BLD AUTO: 0.76 K/UL (ref 0–0.85)
MONOCYTES NFR BLD AUTO: 5.1 % (ref 0–13.4)
NEUTROPHILS # BLD AUTO: 11.24 K/UL (ref 2–7.15)
NEUTROPHILS NFR BLD: 75.5 % (ref 44–72)
NRBC # BLD AUTO: 0 K/UL
NRBC BLD-RTO: 0 /100 WBC
PLATELET # BLD AUTO: 435 K/UL (ref 164–446)
PMV BLD AUTO: 12.1 FL (ref 9–12.9)
POTASSIUM SERPL-SCNC: 5.5 MMOL/L (ref 3.6–5.5)
RBC # BLD AUTO: 3.85 M/UL (ref 4.2–5.4)
SODIUM SERPL-SCNC: 136 MMOL/L (ref 135–145)
WBC # BLD AUTO: 14.9 K/UL (ref 4.8–10.8)

## 2023-03-06 PROCEDURE — 80048 BASIC METABOLIC PNL TOTAL CA: CPT

## 2023-03-06 PROCEDURE — 36415 COLL VENOUS BLD VENIPUNCTURE: CPT

## 2023-03-06 PROCEDURE — 85025 COMPLETE CBC W/AUTO DIFF WBC: CPT

## 2023-03-06 NOTE — CASE COMMUNICATION
I agree with these changes.  ----- Message -----  From: Beverly Mccurdy R.N.  Sent: 3/6/2023  10:53 AM PST  To: Kaila Mata R.N.      Quality Review for SOC OASIS by GERRY Mccurdy, HANS on  March 6, 2023     Edits for Kaila to complete:     Edits completed by GERRY Mccurdy RN:  1.  and  diagnosis coding updated per chart review.   2. Per narrative that patient needs min assistance for dressing and supervision for bath ing, , , ,  are 2; [id number] C,E,  F, G, H are 4  3.  Per narrative that patient needs supervision for ambulation for safety,  is 1;  is 1;  is 3; [id number] D and I are 4  4.   checked antiplatelet (aspirin) with indications noted per MAR  5.   is 3 per ambulation status  6.  is 0 per care plan therapy sets.

## 2023-03-06 NOTE — Clinical Note
Quality Review for SOC OASIS by GERRY Mccurdy RN on  March 6, 2023     Edits for Kaila to complete:     Edits completed by GERRY Mccurdy RN:  1.  and  diagnosis coding updated per chart review.   2. Per narrative that patient needs min assistance for dressing and supervision for bathing, , , ,  are 2; [id number] C,E,  F, G, H are 4  3.  Per narrative that patient needs supervision for ambulation for safety,  is 1;  is 1;  is 3; [id number] D and I are 4  4.   checked antiplatelet (aspirin) with indications noted per MAR  5.   is 3 per ambulation status  6.  is 0 per care plan therapy sets.

## 2023-03-07 ENCOUNTER — HOSPITAL ENCOUNTER (OUTPATIENT)
Dept: RADIOLOGY | Facility: MEDICAL CENTER | Age: 83
End: 2023-03-07
Payer: MEDICARE

## 2023-03-07 ENCOUNTER — HOSPITAL ENCOUNTER (OUTPATIENT)
Dept: CARDIOLOGY | Facility: MEDICAL CENTER | Age: 83
End: 2023-03-07
Attending: NURSE PRACTITIONER
Payer: MEDICARE

## 2023-03-07 DIAGNOSIS — Z95.2 HEART VALVE REPLACED BY TRANSPLANT: ICD-10-CM

## 2023-03-07 PROCEDURE — G0180 MD CERTIFICATION HHA PATIENT: HCPCS | Performed by: STUDENT IN AN ORGANIZED HEALTH CARE EDUCATION/TRAINING PROGRAM

## 2023-03-07 PROCEDURE — 93005 ELECTROCARDIOGRAM TRACING: CPT | Performed by: NURSE PRACTITIONER

## 2023-03-07 PROCEDURE — 71046 X-RAY EXAM CHEST 2 VIEWS: CPT

## 2023-03-08 ENCOUNTER — HOME CARE VISIT (OUTPATIENT)
Dept: HOME HEALTH SERVICES | Facility: HOME HEALTHCARE | Age: 83
End: 2023-03-08
Payer: MEDICARE

## 2023-03-08 ENCOUNTER — HOSPITAL ENCOUNTER (OUTPATIENT)
Facility: MEDICAL CENTER | Age: 83
End: 2023-03-08
Attending: STUDENT IN AN ORGANIZED HEALTH CARE EDUCATION/TRAINING PROGRAM
Payer: MEDICARE

## 2023-03-08 ENCOUNTER — OFFICE VISIT (OUTPATIENT)
Dept: MEDICAL GROUP | Facility: PHYSICIAN GROUP | Age: 83
End: 2023-03-08
Payer: MEDICARE

## 2023-03-08 VITALS
TEMPERATURE: 98.4 F | HEIGHT: 62 IN | SYSTOLIC BLOOD PRESSURE: 120 MMHG | WEIGHT: 120 LBS | HEART RATE: 76 BPM | OXYGEN SATURATION: 98 % | DIASTOLIC BLOOD PRESSURE: 54 MMHG | BODY MASS INDEX: 22.08 KG/M2

## 2023-03-08 DIAGNOSIS — D64.9 ANEMIA, UNSPECIFIED TYPE: ICD-10-CM

## 2023-03-08 DIAGNOSIS — I10 ESSENTIAL HYPERTENSION: ICD-10-CM

## 2023-03-08 DIAGNOSIS — I50.33 DIASTOLIC CHF, ACUTE ON CHRONIC (HCC): ICD-10-CM

## 2023-03-08 DIAGNOSIS — D72.829 LEUKOCYTOSIS, UNSPECIFIED TYPE: ICD-10-CM

## 2023-03-08 DIAGNOSIS — Z95.2 S/P AORTIC VALVE REPLACEMENT: ICD-10-CM

## 2023-03-08 DIAGNOSIS — E03.9 ACQUIRED HYPOTHYROIDISM: ICD-10-CM

## 2023-03-08 DIAGNOSIS — N18.32 STAGE 3B CHRONIC KIDNEY DISEASE: ICD-10-CM

## 2023-03-08 PROBLEM — R06.09 DYSPNEA ON MINIMAL EXERTION: Status: RESOLVED | Noted: 2023-02-06 | Resolved: 2023-03-08

## 2023-03-08 PROBLEM — I35.1 SEVERE AORTIC VALVE REGURGITATION: Status: RESOLVED | Noted: 2023-02-09 | Resolved: 2023-03-08

## 2023-03-08 PROBLEM — N17.9 ACUTE RENAL FAILURE SUPERIMPOSED ON STAGE 3 CHRONIC KIDNEY DISEASE (HCC): Status: RESOLVED | Noted: 2019-07-24 | Resolved: 2023-03-08

## 2023-03-08 PROBLEM — N18.30 ACUTE RENAL FAILURE SUPERIMPOSED ON STAGE 3 CHRONIC KIDNEY DISEASE (HCC): Status: RESOLVED | Noted: 2019-07-24 | Resolved: 2023-03-08

## 2023-03-08 PROBLEM — R06.02 SOB (SHORTNESS OF BREATH): Status: RESOLVED | Noted: 2023-01-08 | Resolved: 2023-03-08

## 2023-03-08 PROBLEM — I35.1 SEVERE AORTIC VALVE REGURGITATION: Status: ACTIVE | Noted: 2023-02-09

## 2023-03-08 PROBLEM — I70.0 AORTIC ATHEROSCLEROSIS (HCC): Status: ACTIVE | Noted: 2023-03-08

## 2023-03-08 LAB — EKG IMPRESSION: NORMAL

## 2023-03-08 PROCEDURE — 81003 URINALYSIS AUTO W/O SCOPE: CPT

## 2023-03-08 PROCEDURE — 99215 OFFICE O/P EST HI 40 MIN: CPT | Performed by: STUDENT IN AN ORGANIZED HEALTH CARE EDUCATION/TRAINING PROGRAM

## 2023-03-08 PROCEDURE — 93010 ELECTROCARDIOGRAM REPORT: CPT | Performed by: INTERNAL MEDICINE

## 2023-03-08 PROCEDURE — G0299 HHS/HOSPICE OF RN EA 15 MIN: HCPCS

## 2023-03-08 RX ORDER — LOSARTAN POTASSIUM 25 MG/1
25 TABLET ORAL DAILY
COMMUNITY
End: 2023-03-28 | Stop reason: SDUPTHER

## 2023-03-08 RX ORDER — ASPIRIN 81 MG/1
81 TABLET ORAL DAILY
COMMUNITY

## 2023-03-08 ASSESSMENT — FIBROSIS 4 INDEX
FIB4 SCORE: 0.5
FIB4 SCORE: 0.5

## 2023-03-08 NOTE — Clinical Note
Good afternoon, the patient told me that she was taking 325 mg of aspirin but I think based on chart review that she can be on 81 mg aspirin.  I asked that she requested clarification with cardiology but I do not know if she may need some help with this.  Thank you.

## 2023-03-09 VITALS
TEMPERATURE: 97.6 F | HEART RATE: 66 BPM | WEIGHT: 118 LBS | BODY MASS INDEX: 21.58 KG/M2 | DIASTOLIC BLOOD PRESSURE: 64 MMHG | RESPIRATION RATE: 20 BRPM | OXYGEN SATURATION: 96 % | SYSTOLIC BLOOD PRESSURE: 130 MMHG

## 2023-03-09 LAB
APPEARANCE UR: CLEAR
BILIRUB UR QL STRIP.AUTO: NEGATIVE
COLOR UR: YELLOW
GLUCOSE UR STRIP.AUTO-MCNC: NEGATIVE MG/DL
KETONES UR STRIP.AUTO-MCNC: NEGATIVE MG/DL
LEUKOCYTE ESTERASE UR QL STRIP.AUTO: NEGATIVE
MICRO URNS: NORMAL
NITRITE UR QL STRIP.AUTO: NEGATIVE
PH UR STRIP.AUTO: 5.5 [PH] (ref 5–8)
PROT UR QL STRIP: NEGATIVE MG/DL
RBC UR QL AUTO: NEGATIVE
SP GR UR STRIP.AUTO: 1.01
UROBILINOGEN UR STRIP.AUTO-MCNC: 0.2 MG/DL

## 2023-03-09 ASSESSMENT — ENCOUNTER SYMPTOMS
VOMITING: DENIES
PAIN: PT CLAIMS NO PAIN TODAY.
NAUSEA: DENIES
STOOL FREQUENCY: LESS THAN DAILY
DENIES PAIN: 1
PERSON REPORTING PAIN: PATIENT
LOWEST PAIN SEVERITY IN PAST 24 HOURS: 0/10
PAIN SEVERITY GOAL: 0/10
LAST BOWEL MOVEMENT: 66540
MUSCLE WEAKNESS: 1
HIGHEST PAIN SEVERITY IN PAST 24 HOURS: 0/10
BOWEL PATTERN NORMAL: 1
FATIGUES EASILY: 1

## 2023-03-09 NOTE — PATIENT INSTRUCTIONS
Ideal blood pressure <130/80 and at least <140/90.    Ask about dose of aspirin (325mg vs 81mg daily).

## 2023-03-09 NOTE — PROGRESS NOTES
Subjective:     Chief Complaint   Patient presents with    Follow-Up     HPI:   Mariely presents today for hospital follow-up.    Patient presented to the emergency department for persistent dyspnea and was diagnosed with severe aortic insufficiency.  She was transferred to [place] and underwent aortic valve replacement.    Patient happy to report that she is doing quite well.  Reports that her weight has been stable, lower extremity edema resolving and denies any shortness of breath.  No chest pain or palpitations.    Had a follow-up with her surgeon yesterday and took her last dose of furosemide this morning and will stop her potassium with that.  She continues with 325 mg daily aspirin.    She just recently increased her metoprolol to 50 mg twice a day and continues with losartan 25 mg daily as well.    She will start cardiac rehab soon.  States that she was supposed to get a urine analysis completed due to we will go cytosis.  Denies any urinary symptoms.    Her sternotomy wound is healing quite well and denies any pain.    Current Outpatient Medications Ordered in Epic   Medication Sig Dispense Refill    aspirin (ASA) 325 MG Tab Take 325 mg by mouth every day. Indications: blood thinner      losartan (COZAAR) 25 MG Tab Take 25 mg by mouth every day. Indications: High Blood Pressure Disorder      metoprolol tartrate (LOPRESSOR) 50 MG Tab Take 1 Tablet by mouth 2 times a day. 180 Tablet 4    diazePAM (VALIUM) 5 MG Tab Take 10 mg by mouth every 6 hours as needed for Anxiety. Indications: Muscle Spasm      Loperamide HCl 1 MG/7.5ML Liquid Take 2 mg by mouth 1 time a day as needed (diarrhea). Indications: Diarrhea      ferrous sulfate 325 (65 Fe) MG tablet TAKE 1 TABLET BY MOUTH EVERY DAY 90 Tablet 0    acetaminophen (TYLENOL) 500 MG Tab Take 1,000 mg by mouth every 6 hours as needed for Mild Pain. 2 tablets = 1,000 mg.  Indications: Fever, Pain      simvastatin (ZOCOR) 10 MG Tab Take 1 Tablet by mouth every evening. 100  "Tablet 3    folic acid (FOLVITE) 1 MG Tab Take 1 Tablet by mouth every day. 90 Tablet 3    levothyroxine (SYNTHROID) 50 MCG Tab Take 1 Tab by mouth Every morning on an empty stomach. 90 Tab 3    cetirizine (ZYRTEC) 10 MG Tab Take 10 mg by mouth 1 time a day as needed for Allergies. Indications: Hayfever       No current Epic-ordered facility-administered medications on file.     ROS:  Gen: no fevers/chills, no changes in weight  Eyes: no changes in vision  ENT: no sore throat,  Pulm: no sob, no cough  CV: no chest pain, no palpitations  GI: no nausea/vomiting, no diarrhea  MSk: no myalgias  Skin: no rash  Neuro: no headaches, no numbness/tingling  Heme/Lymph: no easy bruising    Objective:     Exam:  /54 (BP Location: Left arm, Patient Position: Sitting, BP Cuff Size: Small adult)   Pulse 76   Temp 36.9 °C (98.4 °F) (Temporal)   Ht 1.575 m (5' 2\")   Wt 54.4 kg (120 lb)   SpO2 98%   BMI 21.95 kg/m²  Body mass index is 21.95 kg/m².    Physical Exam:  Constitutional: Well-developed and well-nourished. No acute distress.   Skin: Skin is warm and dry. No rash noted.  Healing midline sternotomy scar with Steri-Strips in place without discharge or erythema.  Head: Atraumatic without lesions.  Eyes: Conjunctivae and extraocular motions are normal. Pupils are equal, round. No scleral icterus.   Nose: Nares patent.  No discharge.  Mouth/Throat: Oropharynx is moist.  Neck: Supple, trachea midline.  No JVD.  Cardiovascular: Regular rate and rhythm, S1 and S2 without murmur, rubs, or gallops.  Lungs: Normal inspiratory effort, CTA bilaterally, no wheezes/rhonchi/rales  Abdomen: Soft, non tender, and without distention. No rebound, guarding, masses or HSM.  Extremities: No cyanosis, clubbing, erythema.  Trace pitting edema on the left distal lower extremity.  Neurological: Alert and oriented x 3. No gross/focal deficits.  Psychiatric:  Behavior, mood, and affect are appropriate.    Labs: Reviewed from " [id number]  Imaging: Reviewed from CXR 3/7/2023    Assessment & Plan:     82 y.o. female with the following -     1. S/P aortic valve replacement  2. Diastolic CHF, acute on chronic (HCC)  Patient doing quite well after aortic valve replacement and appears euvolemic aside from minimal chronic venous stasis edema noted in the left lower extremity.  Continue care with cardiology-patient to clarify whether she should be on 81 mg or 325 mg aspirin daily, not mentioned in cardiology encounters but it seems that she could resume 81 mg daily.  Messaged her care manager to see about getting this clarified with her surgeon as well.  Patient took her last dose of furosemide as directed this morning and is aware of heart failure precautions.  Patient is due to start cardiac rehabilitation.    3. Essential hypertension  Chronic, well controlled. Continue losartan 25 mg daily and metoprolol 50 mg twice daily. Discussed ideal ranges/return precautions.    4. Leukocytosis, unspecified type  Ongoing since September of last year but continues to improve.  Urine analysis obtained and was normal.  Will monitor clinically and trend with next labs.  - URINALYSIS,CULTURE IF INDICATED; Future    5. Anemia, unspecified type  Chronic, improving.  Has labs ordered from prior to trend.  Will monitor.    6. Stage 3b chronic kidney disease (HCC)  Chronic with resolved TIFFANI.  Will trend prior to follow-up.  Continue ARB.  Avoid nephrotoxic medication.    7. Acquired hypothyroidism  Chronic, TSH and T4 elevated in February, will trend with next labs and adjust pending results.   - TSH WITH REFLEX TO FT4; Future    I spent a total of 42 minutes with record review, exam, communication with the patient, and documentation of this encounter.    Return in about 3 months (around 6/8/2023), or if symptoms worsen or fail to improve.    Please note that this dictation was created using voice recognition software. I have made every reasonable attempt to correct  obvious errors, but I expect that there are errors of grammar and possibly content that I did not discover before finalizing the note.

## 2023-03-10 ENCOUNTER — HOME CARE VISIT (OUTPATIENT)
Dept: HOME HEALTH SERVICES | Facility: HOME HEALTHCARE | Age: 83
End: 2023-03-10
Payer: MEDICARE

## 2023-03-10 VITALS
BODY MASS INDEX: 21.95 KG/M2 | OXYGEN SATURATION: 99 % | DIASTOLIC BLOOD PRESSURE: 62 MMHG | SYSTOLIC BLOOD PRESSURE: 126 MMHG | WEIGHT: 120 LBS | RESPIRATION RATE: 18 BRPM | HEART RATE: 70 BPM | TEMPERATURE: 97.6 F

## 2023-03-10 PROCEDURE — G0299 HHS/HOSPICE OF RN EA 15 MIN: HCPCS

## 2023-03-10 ASSESSMENT — ENCOUNTER SYMPTOMS
FATIGUES EASILY: 1
BOWEL PATTERN NORMAL: 1
DENIES PAIN: 1
NAUSEA: DENIES
VOMITING: DENIES
PAIN: PATIENT DENIES ANY PAIN AT THIS TIME.
STOOL FREQUENCY: DAILY
LIMITED RANGE OF MOTION: 1
MUSCLE WEAKNESS: 1
LAST BOWEL MOVEMENT: 66543

## 2023-03-10 ASSESSMENT — FIBROSIS 4 INDEX: FIB4 SCORE: 0.5

## 2023-03-13 ENCOUNTER — PATIENT MESSAGE (OUTPATIENT)
Dept: HEALTH INFORMATION MANAGEMENT | Facility: OTHER | Age: 83
End: 2023-03-13

## 2023-03-15 ENCOUNTER — HOME CARE VISIT (OUTPATIENT)
Dept: HOME HEALTH SERVICES | Facility: HOME HEALTHCARE | Age: 83
End: 2023-03-15
Payer: MEDICARE

## 2023-03-15 DIAGNOSIS — Z86.718 HISTORY OF DVT (DEEP VEIN THROMBOSIS): ICD-10-CM

## 2023-03-15 PROCEDURE — G0299 HHS/HOSPICE OF RN EA 15 MIN: HCPCS

## 2023-03-15 ASSESSMENT — ENCOUNTER SYMPTOMS
LOWER EXTREMITY EDEMA: 1
VOMITING: DENIES
PERSON REPORTING PAIN: PATIENT
PAIN SEVERITY GOAL: 0/10
NAUSEA: DENIES
HIGHEST PAIN SEVERITY IN PAST 24 HOURS: 0/10
SUBJECTIVE PAIN PROGRESSION: UNCHANGED
DENIES PAIN: 1
STOOL FREQUENCY: DAILY
LAST BOWEL MOVEMENT: 66548
BOWEL PATTERN NORMAL: 1
MUSCLE WEAKNESS: 1
LOWEST PAIN SEVERITY IN PAST 24 HOURS: 0/10

## 2023-03-15 ASSESSMENT — FIBROSIS 4 INDEX: FIB4 SCORE: 0.51

## 2023-03-16 ENCOUNTER — HOSPITAL ENCOUNTER (OUTPATIENT)
Dept: LAB | Facility: MEDICAL CENTER | Age: 83
End: 2023-03-16
Attending: STUDENT IN AN ORGANIZED HEALTH CARE EDUCATION/TRAINING PROGRAM
Payer: MEDICARE

## 2023-03-16 ENCOUNTER — HOSPITAL ENCOUNTER (OUTPATIENT)
Dept: LAB | Facility: MEDICAL CENTER | Age: 83
End: 2023-03-16
Attending: INTERNAL MEDICINE
Payer: MEDICARE

## 2023-03-16 VITALS
RESPIRATION RATE: 18 BRPM | WEIGHT: 120 LBS | OXYGEN SATURATION: 99 % | BODY MASS INDEX: 21.95 KG/M2 | DIASTOLIC BLOOD PRESSURE: 64 MMHG | HEART RATE: 64 BPM | SYSTOLIC BLOOD PRESSURE: 122 MMHG | TEMPERATURE: 98.3 F

## 2023-03-16 DIAGNOSIS — E03.9 ACQUIRED HYPOTHYROIDISM: ICD-10-CM

## 2023-03-16 DIAGNOSIS — Z86.718 HISTORY OF DVT (DEEP VEIN THROMBOSIS): ICD-10-CM

## 2023-03-16 LAB — TSH SERPL DL<=0.005 MIU/L-ACNC: 1.82 UIU/ML (ref 0.38–5.33)

## 2023-03-16 PROCEDURE — 84443 ASSAY THYROID STIM HORMONE: CPT

## 2023-03-16 PROCEDURE — 85730 THROMBOPLASTIN TIME PARTIAL: CPT

## 2023-03-16 PROCEDURE — 36415 COLL VENOUS BLD VENIPUNCTURE: CPT

## 2023-03-16 PROCEDURE — 85520 HEPARIN ASSAY: CPT

## 2023-03-16 PROCEDURE — 85306 CLOT INHIBIT PROT S FREE: CPT

## 2023-03-16 PROCEDURE — 85300 ANTITHROMBIN III ACTIVITY: CPT

## 2023-03-16 PROCEDURE — 86147 CARDIOLIPIN ANTIBODY EA IG: CPT

## 2023-03-16 PROCEDURE — 85303 CLOT INHIBIT PROT C ACTIVITY: CPT

## 2023-03-16 PROCEDURE — 85613 RUSSELL VIPER VENOM DILUTED: CPT

## 2023-03-16 PROCEDURE — 85301 ANTITHROMBIN III ANTIGEN: CPT

## 2023-03-16 PROCEDURE — 85610 PROTHROMBIN TIME: CPT

## 2023-03-16 NOTE — HOME HEALTH
Pt sitting up eating her lunch with her Dtr. VS WNL, afebrile. Weight stable at 120 lbs. Sternal incision C/D/I with steri-strips. No outward signs/symptoms of infection noted. Pt instructed to contact home care or PCP for any signs/symptoms of infection (e.g., redness, dehiscence, drainage). Trace edema noted to bilateral LE.  Medication reconciliation done. No new medications at this time. All questions and concerns addressed. Tolerated all procedures well. Voices understanding of all instructions.

## 2023-03-17 ENCOUNTER — PATIENT OUTREACH (OUTPATIENT)
Dept: HEALTH INFORMATION MANAGEMENT | Facility: OTHER | Age: 83
End: 2023-03-17

## 2023-03-17 ENCOUNTER — APPOINTMENT (OUTPATIENT)
Dept: CARDIOLOGY | Facility: MEDICAL CENTER | Age: 83
End: 2023-03-17
Attending: STUDENT IN AN ORGANIZED HEALTH CARE EDUCATION/TRAINING PROGRAM
Payer: MEDICARE

## 2023-03-17 ENCOUNTER — HOME CARE VISIT (OUTPATIENT)
Dept: HOME HEALTH SERVICES | Facility: HOME HEALTHCARE | Age: 83
End: 2023-03-17
Payer: MEDICARE

## 2023-03-17 DIAGNOSIS — I10 ESSENTIAL HYPERTENSION: ICD-10-CM

## 2023-03-17 LAB
APTT PPP: 33 SEC (ref 24.7–36)
INR PPP: 1.04 (ref 0.87–1.13)
LA PPP-IMP: NORMAL
LMWH PPP CHRO-ACNC: <0.1 U/ML
PROTHROMBIN TIME: 13.5 SEC (ref 12–14.6)
SCREEN DRVVT: 41.4 SEC (ref 28–48)

## 2023-03-17 PROCEDURE — G0299 HHS/HOSPICE OF RN EA 15 MIN: HCPCS

## 2023-03-17 ASSESSMENT — FIBROSIS 4 INDEX: FIB4 SCORE: 0.51

## 2023-03-17 NOTE — PROGRESS NOTES
"3/1/2023  This CHW spoke with Dr. Maurice's office to clarify the amount of asprin the pt should be taking daily. Dr. Maurice's office stated 81 mg. This CHW reached out to Mariely and let her know this information. Pt stated, \"yeah that's what I've been taking. 81 mg.\" CHW let pt know that I was just double checking.   "

## 2023-03-17 NOTE — PROGRESS NOTES
03/24/23    Subjective    Chief Complaint:  Follow up from consultation for DVT    HPI:  83 female seen in consultation 1/16/23 with h/o recurring RLE DVT. She was on Xarelto at that time and the plan was to discontinue it in early March and complete a thrombophilia w/u. ATIII levels are low. Since last seen had aortic valve replaced in [place].     ROS:    Constitutional: No weight loss  Skin: No rash or jaundice  HENT: No change in eyesight or hearing  Cardiovascular:No chest pain or arrythmia  Respiratory:No cough or SOB  GI:No nausea, vomiting, diarrhea, constipation  :No dysuria or frequency  Musculoskeletal:No bone or joint pain  Neuro:No sx's of neuropathy  Psych: No complaints    PMH:      No Known Allergies    Past Medical History:   Diagnosis Date    Abnormal albumin 9/16/2022    Acute renal failure superimposed on stage 3 chronic kidney disease (HCC) 7/24/2019    Dyspnea on minimal exertion 2/6/2023    Hypermagnesemia 9/16/2022    Hypertension     Hyponatremia 6/28/2022    Pain and swelling of left lower extremity 5/18/2022    Severe aortic valve regurgitation 2/9/2023    SOB (shortness of breath) 1/8/2023    Superficial thrombosis of lower extremity, right 7/5/2022        History reviewed. No pertinent surgical history.     Medications:    Current Outpatient Medications on File Prior to Encounter   Medication Sig Dispense Refill    aspirin (ASA) 325 MG Tab Take 81 mg by mouth every day. Indications: blood thinner      losartan (COZAAR) 25 MG Tab Take 25 mg by mouth every day. Indications: High Blood Pressure Disorder      metoprolol tartrate (LOPRESSOR) 50 MG Tab Take 1 Tablet by mouth 2 times a day. 180 Tablet 4    cetirizine (ZYRTEC) 10 MG Tab Take 10 mg by mouth 1 time a day as needed for Allergies. Indications: Hayfever      diazePAM (VALIUM) 5 MG Tab Take 10 mg by mouth every 6 hours as needed for Anxiety. Indications: Muscle Spasm      ferrous sulfate 325 (65 Fe) MG tablet TAKE 1 TABLET BY MOUTH  "EVERY DAY 90 Tablet 0    acetaminophen (TYLENOL) 500 MG Tab Take 1,000 mg by mouth every 6 hours as needed for Mild Pain. 2 tablets = 1,000 mg.  Indications: Fever, Pain      simvastatin (ZOCOR) 10 MG Tab Take 1 Tablet by mouth every evening. 100 Tablet 3    folic acid (FOLVITE) 1 MG Tab Take 1 Tablet by mouth every day. 90 Tablet 3    levothyroxine (SYNTHROID) 50 MCG Tab Take 1 Tab by mouth Every morning on an empty stomach. 90 Tab 3    Loperamide HCl 1 MG/7.5ML Liquid Take 2 mg by mouth 1 time a day as needed (diarrhea). Indications: Diarrhea (Patient not taking: Reported on 3/24/2023)       No current facility-administered medications on file prior to encounter.       Social History     Tobacco Use    Smoking status: Former     Types: Cigarettes     Quit date: 1979     Years since quittin.6    Smokeless tobacco: Never   Substance Use Topics    Alcohol use: Yes     Alcohol/week: 8.4 oz     Types: 14 Glasses of wine per week        History reviewed. No pertinent family history.     Objective    Vitals:    /70 (BP Location: Right arm, Patient Position: Sitting, BP Cuff Size: Small adult)   Pulse (!) 56   Temp 36.6 °C (97.8 °F) (Temporal)   Resp 16   Ht 1.575 m (5' 2\")   Wt 56.9 kg (125 lb 6 oz)   SpO2 98%   BMI 22.93 kg/m²     Physical Exam:    Appears well-developed and well-nourished. No distress.    Head -  Normocephalic .   Eyes - Pupils are equal. Conjunctivae normal. No scleral icterus.   Ears - normal hearing  COR - soft 1-2/6 pansystolic murmur  Neurological -   Alert and oriented.  Skin - Skin is warm and dry. No rash noted. Not diaphoretic. No erythema. No pallor. No jaundice   Psychiatric -  Normal mood and affect.    Labs:     23 11:20   Lupus Anticoagulant Not Present      Latest Reference Range & Units 23 11:20   Protein C Functional 83 - 168 % 93   Protein S-Functional 57 - 131 % 63   Antithrombin III Ag Immuno 82 - 136 % 63 (L)   Antithrombin III, Functional 76 - " 128 % 71 (L)     Assessment    Imp:    Visit Diagnosis:    1. History of DVT (deep vein thrombosis)  ANTITHROMBIN PANEL (ARBILL)      2. Varicose veins of both lower extremities with pain          Plan:  Repeat the ATIII level - if still low  - Xarelto 10 mg   Daughter will MyChart message me when lab comes in     Oc Luke M.D.

## 2023-03-18 LAB
AT III ACT/NOR PPP CHRO: 71 % (ref 76–128)
AT III AG ACT/NOR PPP IA: 63 % (ref 82–136)
CARDIOLIPIN IGG SER IA-ACNC: <10 GPL
CARDIOLIPIN IGM SER IA-ACNC: <10 MPL
PROT C ACT/NOR PPP: 93 % (ref 83–168)
PROT S ACT/NOR PPP: 63 % (ref 57–131)

## 2023-03-19 VITALS
DIASTOLIC BLOOD PRESSURE: 60 MMHG | SYSTOLIC BLOOD PRESSURE: 122 MMHG | TEMPERATURE: 97.7 F | HEART RATE: 60 BPM | RESPIRATION RATE: 16 BRPM | BODY MASS INDEX: 22.13 KG/M2 | WEIGHT: 121 LBS | OXYGEN SATURATION: 97 %

## 2023-03-19 ASSESSMENT — ENCOUNTER SYMPTOMS
STOOL FREQUENCY: DAILY
DENIES PAIN: 1
NAUSEA: DENIES
LAST BOWEL MOVEMENT: 66550
LOWER EXTREMITY EDEMA: 1
VOMITING: DENIES
PAIN: PATIENT DENIES ANY PAIN AT THIS TIME.
BOWEL PATTERN NORMAL: 1
MUSCLE WEAKNESS: 1
LIMITED RANGE OF MOTION: 1

## 2023-03-22 ENCOUNTER — HOME CARE VISIT (OUTPATIENT)
Dept: HOME HEALTH SERVICES | Facility: HOME HEALTHCARE | Age: 83
End: 2023-03-22
Payer: MEDICARE

## 2023-03-22 VITALS
BODY MASS INDEX: 22.31 KG/M2 | OXYGEN SATURATION: 98 % | SYSTOLIC BLOOD PRESSURE: 126 MMHG | WEIGHT: 122 LBS | HEART RATE: 53 BPM | DIASTOLIC BLOOD PRESSURE: 54 MMHG | RESPIRATION RATE: 16 BRPM | TEMPERATURE: 97.7 F

## 2023-03-22 PROCEDURE — G0299 HHS/HOSPICE OF RN EA 15 MIN: HCPCS

## 2023-03-22 ASSESSMENT — ENCOUNTER SYMPTOMS
PAIN: PATIENT DENIES ANY PAIN AT THIS TIME.
DENIES PAIN: 1

## 2023-03-22 ASSESSMENT — FIBROSIS 4 INDEX: FIB4 SCORE: 0.51

## 2023-03-23 ASSESSMENT — ENCOUNTER SYMPTOMS
STOOL FREQUENCY: DAILY
NAUSEA: DENIES
LOWER EXTREMITY EDEMA: 1
BOWEL PATTERN NORMAL: 1
LIMITED RANGE OF MOTION: 1
VOMITING: DENIES
LAST BOWEL MOVEMENT: 66555
MUSCLE WEAKNESS: 1

## 2023-03-24 ENCOUNTER — HOME CARE VISIT (OUTPATIENT)
Dept: HOME HEALTH SERVICES | Facility: HOME HEALTHCARE | Age: 83
End: 2023-03-24
Payer: MEDICARE

## 2023-03-24 ENCOUNTER — HOSPITAL ENCOUNTER (OUTPATIENT)
Dept: HEMATOLOGY ONCOLOGY | Facility: MEDICAL CENTER | Age: 83
End: 2023-03-24
Attending: INTERNAL MEDICINE
Payer: MEDICARE

## 2023-03-24 VITALS
BODY MASS INDEX: 23.07 KG/M2 | WEIGHT: 125.38 LBS | DIASTOLIC BLOOD PRESSURE: 70 MMHG | SYSTOLIC BLOOD PRESSURE: 100 MMHG | RESPIRATION RATE: 16 BRPM | TEMPERATURE: 97.8 F | OXYGEN SATURATION: 98 % | HEIGHT: 62 IN | HEART RATE: 56 BPM

## 2023-03-24 VITALS
OXYGEN SATURATION: 95 % | BODY MASS INDEX: 22.5 KG/M2 | WEIGHT: 123 LBS | DIASTOLIC BLOOD PRESSURE: 58 MMHG | TEMPERATURE: 97.9 F | SYSTOLIC BLOOD PRESSURE: 142 MMHG | HEART RATE: 55 BPM | RESPIRATION RATE: 16 BRPM

## 2023-03-24 DIAGNOSIS — I83.813 VARICOSE VEINS OF BOTH LOWER EXTREMITIES WITH PAIN: ICD-10-CM

## 2023-03-24 DIAGNOSIS — Z86.718 HISTORY OF DVT (DEEP VEIN THROMBOSIS): ICD-10-CM

## 2023-03-24 PROCEDURE — 99212 OFFICE O/P EST SF 10 MIN: CPT | Performed by: INTERNAL MEDICINE

## 2023-03-24 PROCEDURE — 99213 OFFICE O/P EST LOW 20 MIN: CPT | Performed by: INTERNAL MEDICINE

## 2023-03-24 PROCEDURE — G0299 HHS/HOSPICE OF RN EA 15 MIN: HCPCS

## 2023-03-24 ASSESSMENT — ENCOUNTER SYMPTOMS
LAST BOWEL MOVEMENT: 66557
MUSCLE WEAKNESS: 1
LOWER EXTREMITY EDEMA: 1
VOMITING: DENIES
DENIES PAIN: 1
STOOL FREQUENCY: DAILY
PAIN: PATIENT DENIES ANY PAIN AT THIS TIME.
BOWEL PATTERN NORMAL: 1
LIMITED RANGE OF MOTION: 1
NAUSEA: DENIES

## 2023-03-24 ASSESSMENT — FIBROSIS 4 INDEX
FIB4 SCORE: 0.51
FIB4 SCORE: 0.51

## 2023-03-24 ASSESSMENT — PAIN SCALES - GENERAL: PAINLEVEL: NO PAIN

## 2023-03-28 ENCOUNTER — HOME CARE VISIT (OUTPATIENT)
Dept: HOME HEALTH SERVICES | Facility: HOME HEALTHCARE | Age: 83
End: 2023-03-28
Payer: MEDICARE

## 2023-03-28 ENCOUNTER — HOSPITAL ENCOUNTER (OUTPATIENT)
Facility: MEDICAL CENTER | Age: 83
End: 2023-03-28
Attending: INTERNAL MEDICINE
Payer: MEDICARE

## 2023-03-28 VITALS
HEART RATE: 68 BPM | SYSTOLIC BLOOD PRESSURE: 124 MMHG | WEIGHT: 125.5 LBS | BODY MASS INDEX: 22.95 KG/M2 | TEMPERATURE: 98.3 F | RESPIRATION RATE: 16 BRPM | OXYGEN SATURATION: 97 % | DIASTOLIC BLOOD PRESSURE: 60 MMHG

## 2023-03-28 DIAGNOSIS — Z86.718 HISTORY OF DVT (DEEP VEIN THROMBOSIS): ICD-10-CM

## 2023-03-28 PROCEDURE — 665001 SOC-HOME HEALTH

## 2023-03-28 PROCEDURE — 85300 ANTITHROMBIN III ACTIVITY: CPT

## 2023-03-28 PROCEDURE — G0493 RN CARE EA 15 MIN HH/HOSPICE: HCPCS

## 2023-03-28 PROCEDURE — 85301 ANTITHROMBIN III ANTIGEN: CPT

## 2023-03-28 RX ORDER — LOSARTAN POTASSIUM 25 MG/1
25 TABLET ORAL DAILY
Qty: 90 TABLET | Refills: 3 | OUTPATIENT
Start: 2023-03-28

## 2023-03-28 RX ORDER — LOSARTAN POTASSIUM 25 MG/1
25 TABLET ORAL DAILY
Qty: 90 TABLET | Refills: 3 | Status: SHIPPED | OUTPATIENT
Start: 2023-03-28 | End: 2023-07-25 | Stop reason: SDUPTHER

## 2023-03-28 ASSESSMENT — ENCOUNTER SYMPTOMS
PAIN SEVERITY GOAL: 0/10
HIGHEST PAIN SEVERITY IN PAST 24 HOURS: 0/10
DENIES PAIN: 1
LOWEST PAIN SEVERITY IN PAST 24 HOURS: 0/10
SUBJECTIVE PAIN PROGRESSION: UNCHANGED
PERSON REPORTING PAIN: PATIENT

## 2023-03-28 ASSESSMENT — ACTIVITIES OF DAILY LIVING (ADL)
HOME_HEALTH_OASIS: 00
OASIS_M1830: 00

## 2023-03-28 ASSESSMENT — FIBROSIS 4 INDEX: FIB4 SCORE: 0.51

## 2023-03-29 ENCOUNTER — HOME CARE VISIT (OUTPATIENT)
Dept: HOME HEALTH SERVICES | Facility: HOME HEALTHCARE | Age: 83
End: 2023-03-29
Payer: MEDICARE

## 2023-03-30 LAB
AT III ACT/NOR PPP CHRO: 99 % (ref 76–128)
AT III AG ACT/NOR PPP IA: 82 % (ref 82–136)

## 2023-03-30 NOTE — CASE COMMUNICATION
"I agree with these changes.  Thank you, Maribel Curry RN,   ----- Message -----  From: Jo Fontenot R.N.  Sent: 3/29/2023   8:56 PM PDT  To: Maribel Curry R.N.         Quality Review for 3.28.23 [place] [facility]IS performed on by ROSALIO Fontenot RN on 3.29.2023:     Edits completed by ROSALIO Fontenot RN:  1. Changed  B to no and D to yes. WE are not part of a health info exchange. Pt has paper copy in the home  2. Changed  D 4,5 to yes. At this  point the guidance states \"include all nutritional approaches that are part of the patient's current care/treatment plan, even if not used during the time period under consideration\". Pt has HF and cardiac diet on hr POC             "

## 2023-03-30 NOTE — CASE COMMUNICATION
"   Quality Review for 3.28.23 [place] [facility]IS performed on by ROSALIO Fontenot RN on 3.29.2023:     Edits completed by ROSALIO Fontenot RN:  1. Changed  B to no and D to yes. WE are not part of a health info exchange. Pt has paper copy in the home  2. Changed  D 4,5 to yes. At this point the guidance states \"include all nutritional approaches that are part of the patient's current care/treatment plan, even if not used during the time period under  consideration\". Pt has HF and cardiac diet on hr POC             "

## 2023-04-07 ENCOUNTER — PATIENT OUTREACH (OUTPATIENT)
Dept: HEALTH INFORMATION MANAGEMENT | Facility: OTHER | Age: 83
End: 2023-04-07
Payer: MEDICARE

## 2023-04-07 DIAGNOSIS — I50.43 CHF (CONGESTIVE HEART FAILURE), NYHA CLASS I, ACUTE ON CHRONIC, COMBINED (HCC): ICD-10-CM

## 2023-04-07 DIAGNOSIS — N18.31 STAGE 3A CHRONIC KIDNEY DISEASE: ICD-10-CM

## 2023-04-07 DIAGNOSIS — I10 ESSENTIAL HYPERTENSION: ICD-10-CM

## 2023-04-07 PROCEDURE — 99999 PR NO CHARGE: CPT | Performed by: STUDENT IN AN ORGANIZED HEALTH CARE EDUCATION/TRAINING PROGRAM

## 2023-04-07 NOTE — PROGRESS NOTES
Called Mariely for monthly CCM outreach. Mariely had her aortic valve replaced, then received  services, so it has been awhile sine we have spoken. Mariely stated she is doing really weel, no sob, she is able to go for walks, go to the grocery store, etc... Discussed taking daily weights, Mariely stated she has always weighed herself daily. Mariely verbalized an understanding that if she gains 3 lbs in one day or 5 lbs in a week she needs to contact the office. Her last few blood pressures from  were 124/60 (3/28/23), 142/58 (3/24/23), 126/54 (3/22/23) and 122/60 (3/17/23). Her last GFR was 37 on 3/6/23 and her last HgbA1c was 5.3 on 2/15/23. She has upcoming appts with Cardiology on 6/2/23 and with her pcp on 6/13/23. No other questions or concerns. Risk score increased to 8 due to recent hospitalization. Mariely would benefit with continued monthly CCM calls to ensure she progressing/ healing well. Next CCM outreach 5/5/2023.

## 2023-05-12 ENCOUNTER — PATIENT OUTREACH (OUTPATIENT)
Dept: HEALTH INFORMATION MANAGEMENT | Facility: OTHER | Age: 83
End: 2023-05-12
Payer: MEDICARE

## 2023-05-12 DIAGNOSIS — I50.43 CHF (CONGESTIVE HEART FAILURE), NYHA CLASS I, ACUTE ON CHRONIC, COMBINED (HCC): ICD-10-CM

## 2023-05-12 DIAGNOSIS — I10 ESSENTIAL HYPERTENSION: ICD-10-CM

## 2023-05-12 DIAGNOSIS — N18.31 STAGE 3A CHRONIC KIDNEY DISEASE: ICD-10-CM

## 2023-05-12 PROCEDURE — 99490 CHRNC CARE MGMT STAFF 1ST 20: CPT | Performed by: STUDENT IN AN ORGANIZED HEALTH CARE EDUCATION/TRAINING PROGRAM

## 2023-05-12 NOTE — PROGRESS NOTES
Called and spoke with Mariely for monthly CCM outreach. Mariely said she has been doing very well. She said her activity level has increased, she is very pleased that she can walk around a store now without getting winded or sob. Mariely also denies any falls, reports she is very careful to use handrails when walking upa flight of stairs, etc.. She did admit that she is experiencing some RLS. I asked if the RLS was keeping her awake at night? Mariely said she takes 2 Tylenol every evening and that curbs the discomfort. Recommended if the RLS worsens to discuss it with Dr. Vieira, Mariely agreed. Reminded Mariely of upcoming appts w/ Cardiology (6/2/23) and her pcp (6/13/23).   Mariely was enrolled the CCM program last August, at the time her risk score was a 3. A month later (9/5/22) she was admitted for septic shock. Then in January 2023 Mariely was admitted again, c/o sob and needing to diurese. In February 2023 Mariely was still c/o sob when she admitted and discovered she needed an aortic vavle replacement. Mariely was sent to [facility] to receive the operation. Mariely said she is still weighing herself daily. She reports she had to take a diuretic approx 3   Days ago for a slight increase in weight. Due to recent hospitalizations, Mariely's risk score has increased to an 8. Mariely would benefit from continued CCM enrollment as she navigates her recovery. Next CCM outreach 6/12/2023.

## 2023-06-02 ENCOUNTER — OFFICE VISIT (OUTPATIENT)
Dept: CARDIOLOGY | Facility: MEDICAL CENTER | Age: 83
End: 2023-06-02
Attending: INTERNAL MEDICINE
Payer: MEDICARE

## 2023-06-02 VITALS
HEIGHT: 62 IN | BODY MASS INDEX: 23.74 KG/M2 | RESPIRATION RATE: 12 BRPM | SYSTOLIC BLOOD PRESSURE: 126 MMHG | WEIGHT: 129 LBS | HEART RATE: 60 BPM | DIASTOLIC BLOOD PRESSURE: 70 MMHG | OXYGEN SATURATION: 99 %

## 2023-06-02 DIAGNOSIS — R06.02 SOB (SHORTNESS OF BREATH): ICD-10-CM

## 2023-06-02 DIAGNOSIS — I83.813 VARICOSE VEINS OF BOTH LOWER EXTREMITIES WITH PAIN: ICD-10-CM

## 2023-06-02 DIAGNOSIS — Z95.2 S/P AVR (AORTIC VALVE REPLACEMENT): ICD-10-CM

## 2023-06-02 DIAGNOSIS — I10 HTN (HYPERTENSION), MALIGNANT: ICD-10-CM

## 2023-06-02 DIAGNOSIS — I38 VALVULAR HEART DISEASE: ICD-10-CM

## 2023-06-02 DIAGNOSIS — N18.4 STAGE 4 CHRONIC KIDNEY DISEASE (HCC): ICD-10-CM

## 2023-06-02 DIAGNOSIS — E78.5 DYSLIPIDEMIA: ICD-10-CM

## 2023-06-02 PROBLEM — I70.0 AORTIC ATHEROSCLEROSIS (HCC): Status: RESOLVED | Noted: 2023-03-08 | Resolved: 2023-06-02

## 2023-06-02 PROCEDURE — 99213 OFFICE O/P EST LOW 20 MIN: CPT | Performed by: INTERNAL MEDICINE

## 2023-06-02 PROCEDURE — 99212 OFFICE O/P EST SF 10 MIN: CPT | Performed by: INTERNAL MEDICINE

## 2023-06-02 PROCEDURE — 3074F SYST BP LT 130 MM HG: CPT | Performed by: INTERNAL MEDICINE

## 2023-06-02 PROCEDURE — 3078F DIAST BP <80 MM HG: CPT | Performed by: INTERNAL MEDICINE

## 2023-06-02 PROCEDURE — 99214 OFFICE O/P EST MOD 30 MIN: CPT | Performed by: INTERNAL MEDICINE

## 2023-06-02 RX ORDER — MECOBALAMIN 5000 MCG
TABLET,DISINTEGRATING ORAL
COMMUNITY
End: 2023-11-10

## 2023-06-02 RX ORDER — VITAMIN B COMPLEX
1000 TABLET ORAL DAILY
COMMUNITY
End: 2023-11-10

## 2023-06-02 RX ORDER — FUROSEMIDE 20 MG/1
20 TABLET ORAL PRN
COMMUNITY
End: 2023-06-05

## 2023-06-02 ASSESSMENT — ENCOUNTER SYMPTOMS
PALPITATIONS: 0
WEIGHT LOSS: 0
VOMITING: 0
FALLS: 0
SPEECH CHANGE: 0
CHILLS: 0
FEVER: 0
NAUSEA: 0
ABDOMINAL PAIN: 0
EYE DISCHARGE: 0
BLURRED VISION: 0
SHORTNESS OF BREATH: 0
SENSORY CHANGE: 0
COUGH: 0
PND: 0
BRUISES/BLEEDS EASILY: 0
CLAUDICATION: 0
BLOOD IN STOOL: 0
HEADACHES: 0
HALLUCINATIONS: 0
DEPRESSION: 0
MYALGIAS: 0
DOUBLE VISION: 0
EYE PAIN: 0
DIZZINESS: 0
ORTHOPNEA: 0
LOSS OF CONSCIOUSNESS: 0

## 2023-06-02 ASSESSMENT — FIBROSIS 4 INDEX: FIB4 SCORE: 0.51

## 2023-06-02 NOTE — PROGRESS NOTES
Chief Complaint   Patient presents with    Follow-Up     FV Dx: S/P AVR (aortic valve replacement)    HTN (Controlled)       Subjective     Mariely Esaley is an 83 y.o. female who presents today for cardiac care and management after recent hospitalization due to heart failure secondary to valvular disease of severe aortic regurgitation.  I personally interpreted her transthoracic echocardiogram images, transesophageal echocardiogram images.  Patient also has renal failure with GFR of 37.  I personally interpreted the blood test results.     Patient was eventually transferred to [facility] and underwent bioprosthetic valve replacement at the aortic area.  She did have coronary angiogram which did not show evidence of obstructive coronary artery disease.    She is doing well post surgery.  No bleeding.  Her wound is healing well.  She is participating in cardiac rehab.  She does not have a lot of shortness of breath at this time.    She does report of having legs swelling at the ankles now.    Past Medical History:   Diagnosis Date    Abnormal albumin 2022    Acute renal failure superimposed on stage 3 chronic kidney disease (HCC) 2019    Dyspnea on minimal exertion 2023    Hypermagnesemia 2022    Hypertension     Hyponatremia 2022    Pain and swelling of left lower extremity 2022    Severe aortic valve regurgitation 2023    SOB (shortness of breath) 2023    Superficial thrombosis of lower extremity, right 2022     History reviewed. No pertinent surgical history.  History reviewed. No pertinent family history.  Social History     Socioeconomic History    Marital status:      Spouse name: Not on file    Number of children: Not on file    Years of education: Not on file    Highest education level: Not on file   Occupational History    Not on file   Tobacco Use    Smoking status: Former     Types: Cigarettes     Quit date: 1979     Years since quittin.8     Smokeless tobacco: Never   Vaping Use    Vaping Use: Never used   Substance and Sexual Activity    Alcohol use: Yes     Alcohol/week: 8.4 oz     Types: 14 Glasses of wine per week     Comment: 2-3 glasses wine daily    Drug use: No    Sexual activity: Not on file   Other Topics Concern    Not on file   Social History Narrative    Not on file     Social Determinants of Health     Financial Resource Strain: Low Risk  (8/9/2022)    Overall Financial Resource Strain (CARDIA)     Difficulty of Paying Living Expenses: Not hard at all   Food Insecurity: No Food Insecurity (8/9/2022)    Hunger Vital Sign     Worried About Running Out of Food in the Last Year: Never true     Ran Out of Food in the Last Year: Never true   Transportation Needs: No Transportation Needs (8/9/2022)    PRAPARE - Transportation     Lack of Transportation (Medical): No     Lack of Transportation (Non-Medical): No   Physical Activity: Inactive (8/9/2022)    Exercise Vital Sign     Days of Exercise per Week: 0 days     Minutes of Exercise per Session: 0 min   Stress: No Stress Concern Present (8/9/2022)    [employer] [place] [employer] - Occupational Stress Questionnaire     Feeling of Stress : Not at all   Social Connections: Unknown (8/9/2022)    Social Connection and Isolation Panel [NHANES]     Frequency of Communication with Friends and Family: More than three times a week     Frequency of Social Gatherings with Friends and Family: More than three times a week     Attends Nondenominational Services: Not on file     Active Member of Clubs or Organizations: Not on file     Attends Club or Organization Meetings: Not on file     Marital Status: Not on file   Intimate Partner Violence: Not At Risk (8/9/2022)    Humiliation, Afraid, Rape, and Kick questionnaire     Fear of Current or Ex-Partner: No     Emotionally Abused: No     Physically Abused: No     Sexually Abused: No   Housing Stability: Low Risk  (8/9/2022)    Housing Stability Vital Sign      Unable to Pay for Housing in the Last Year: No     Number of Places Lived in the Last Year: 1     Unstable Housing in the Last Year: No     No Known Allergies  Outpatient Encounter Medications as of 6/2/2023   Medication Sig Dispense Refill    vitamin D3 (CHOLECALCIFEROL) 1000 Unit (25 mcg) Tab Take 1,000 Units by mouth every day.      Methylcobalamin (B-12) 5000 MCG TABLET DISPERSIBLE Take  by mouth.      furosemide (LASIX) 20 MG Tab Take 20 mg by mouth as needed.      Loperamide HCl (IMODIUM PO) Take  by mouth as needed.      losartan (COZAAR) 25 MG Tab Take 1 Tablet by mouth every day. Indications: High Blood Pressure Disorder 90 Tablet 3    aspirin 81 MG EC tablet Take 81 mg by mouth every day. Indications: blood thinner      metoprolol tartrate (LOPRESSOR) 50 MG Tab Take 1 Tablet by mouth 2 times a day. 180 Tablet 4    diazePAM (VALIUM) 5 MG Tab Take 10 mg by mouth every 6 hours as needed for Anxiety. Indications: Muscle Spasm      ferrous sulfate 325 (65 Fe) MG tablet TAKE 1 TABLET BY MOUTH EVERY DAY 90 Tablet 0    acetaminophen (TYLENOL) 500 MG Tab Take 1,000 mg by mouth every 6 hours as needed for Mild Pain. 2 tablets = 1,000 mg.  Indications: Fever, Pain      simvastatin (ZOCOR) 10 MG Tab Take 1 Tablet by mouth every evening. 100 Tablet 3    folic acid (FOLVITE) 1 MG Tab Take 1 Tablet by mouth every day. 90 Tablet 3    levothyroxine (SYNTHROID) 50 MCG Tab Take 1 Tab by mouth Every morning on an empty stomach. 90 Tab 3    [DISCONTINUED] cetirizine (ZYRTEC) 10 MG Tab Take 10 mg by mouth 1 time a day as needed for Allergies. Indications: Hayfever (Patient not taking: Reported on 6/2/2023)      [DISCONTINUED] Loperamide HCl 1 MG/7.5ML Liquid Take 2 mg by mouth 1 time a day as needed (diarrhea). Indications: Diarrhea (Patient not taking: Reported on 3/24/2023)       No facility-administered encounter medications on file as of 6/2/2023.     Review of Systems   Constitutional:  Negative for chills, fever,  "malaise/fatigue and weight loss.   HENT:  Negative for ear discharge, ear pain, hearing loss and nosebleeds.    Eyes:  Negative for blurred vision, double vision, pain and discharge.   Respiratory:  Negative for cough and shortness of breath.    Cardiovascular:  Positive for leg swelling. Negative for chest pain, palpitations, orthopnea, claudication and PND.   Gastrointestinal:  Negative for abdominal pain, blood in stool, melena, nausea and vomiting.   Genitourinary:  Negative for dysuria and hematuria.   Musculoskeletal:  Negative for falls, joint pain and myalgias.   Skin:  Negative for itching and rash.   Neurological:  Negative for dizziness, sensory change, speech change, loss of consciousness and headaches.   Endo/Heme/Allergies:  Negative for environmental allergies. Does not bruise/bleed easily.   Psychiatric/Behavioral:  Negative for depression, hallucinations and suicidal ideas.               Objective     /70 (BP Location: Left arm, Patient Position: Sitting, BP Cuff Size: Adult)   Pulse 60   Resp 12   Ht 1.575 m (5' 2\")   Wt 58.5 kg (129 lb)   SpO2 99%   BMI 23.59 kg/m²     Physical Exam  Vitals and nursing note reviewed.   Constitutional:       General: She is not in acute distress.     Appearance: She is not diaphoretic.   HENT:      Head: Normocephalic and atraumatic.      Right Ear: External ear normal.      Left Ear: External ear normal.      Nose: No congestion or rhinorrhea.   Eyes:      General:         Right eye: No discharge.         Left eye: No discharge.   Neck:      Thyroid: No thyromegaly.      Vascular: No JVD.   Cardiovascular:      Rate and Rhythm: Normal rate and regular rhythm.      Pulses: Normal pulses.      Comments: Well healed scare at the sternal wound.  Pulmonary:      Effort: No respiratory distress.   Abdominal:      General: There is no distension.      Tenderness: There is no abdominal tenderness.   Musculoskeletal:         General: No swelling or tenderness. "      Right lower leg: No edema.      Left lower leg: No edema.      Comments: + varicose veins without evidence of ulcer, + discoloration.     Skin:     General: Skin is warm and dry.   Neurological:      Mental Status: She is alert and oriented to person, place, and time.      Cranial Nerves: No cranial nerve deficit.   Psychiatric:         Behavior: Behavior normal.                Assessment & Plan     1. S/P AVR (aortic valve replacement)        2. Valvular heart disease        3. Stage 4 chronic kidney disease (HCC)        4. SOB (shortness of breath)        5. HTN (hypertension), malignant        6. Dyslipidemia        7. 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 Will continue Metoprolol at 50 mg BID for better BP control. Continue Losartan 25 mg daily.    Will change Furosemide to 20 mg daily for 7 days duet to legs swelling.     We will also check lower extremities venous duplex to assess the anatomy for her lower extremities venous system.

## 2023-06-05 ENCOUNTER — TELEPHONE (OUTPATIENT)
Dept: HEALTH INFORMATION MANAGEMENT | Facility: OTHER | Age: 83
End: 2023-06-05
Payer: MEDICARE

## 2023-06-05 DIAGNOSIS — R60.0 BILATERAL EDEMA OF LOWER EXTREMITY: ICD-10-CM

## 2023-06-05 RX ORDER — FUROSEMIDE 20 MG/1
TABLET ORAL
Qty: 90 TABLET | Refills: 1 | Status: SHIPPED | OUTPATIENT
Start: 2023-06-05 | End: 2023-06-26 | Stop reason: SDUPTHER

## 2023-06-13 ENCOUNTER — OFFICE VISIT (OUTPATIENT)
Dept: MEDICAL GROUP | Facility: PHYSICIAN GROUP | Age: 83
End: 2023-06-13
Payer: MEDICARE

## 2023-06-13 VITALS
HEART RATE: 62 BPM | WEIGHT: 128 LBS | RESPIRATION RATE: 20 BRPM | BODY MASS INDEX: 23.55 KG/M2 | OXYGEN SATURATION: 97 % | HEIGHT: 62 IN | SYSTOLIC BLOOD PRESSURE: 150 MMHG | DIASTOLIC BLOOD PRESSURE: 68 MMHG | TEMPERATURE: 98.1 F

## 2023-06-13 DIAGNOSIS — Z20.822 CLOSE EXPOSURE TO COVID-19 VIRUS: ICD-10-CM

## 2023-06-13 DIAGNOSIS — M25.50 POLYARTHRALGIA: ICD-10-CM

## 2023-06-13 DIAGNOSIS — D50.9 IRON DEFICIENCY ANEMIA, UNSPECIFIED IRON DEFICIENCY ANEMIA TYPE: ICD-10-CM

## 2023-06-13 DIAGNOSIS — D72.829 LEUKOCYTOSIS, UNSPECIFIED TYPE: ICD-10-CM

## 2023-06-13 DIAGNOSIS — M79.2 NEUROPATHIC PAIN OF LOWER EXTREMITY, UNSPECIFIED LATERALITY: ICD-10-CM

## 2023-06-13 DIAGNOSIS — I10 ESSENTIAL HYPERTENSION: ICD-10-CM

## 2023-06-13 DIAGNOSIS — E78.2 MIXED HYPERLIPIDEMIA: ICD-10-CM

## 2023-06-13 DIAGNOSIS — R76.8 ELEVATED RHEUMATOID FACTOR: ICD-10-CM

## 2023-06-13 PROBLEM — M79.671 BILATERAL FOOT PAIN: Status: ACTIVE | Noted: 2023-06-13

## 2023-06-13 PROBLEM — M79.672 BILATERAL FOOT PAIN: Status: ACTIVE | Noted: 2023-06-13

## 2023-06-13 PROCEDURE — 99215 OFFICE O/P EST HI 40 MIN: CPT | Performed by: STUDENT IN AN ORGANIZED HEALTH CARE EDUCATION/TRAINING PROGRAM

## 2023-06-13 PROCEDURE — 3078F DIAST BP <80 MM HG: CPT | Performed by: STUDENT IN AN ORGANIZED HEALTH CARE EDUCATION/TRAINING PROGRAM

## 2023-06-13 PROCEDURE — 3077F SYST BP >= 140 MM HG: CPT | Performed by: STUDENT IN AN ORGANIZED HEALTH CARE EDUCATION/TRAINING PROGRAM

## 2023-06-13 RX ORDER — GABAPENTIN 100 MG/1
100 CAPSULE ORAL 2 TIMES DAILY
Qty: 60 CAPSULE | Refills: 2 | Status: SHIPPED | OUTPATIENT
Start: 2023-06-13 | End: 2023-08-02 | Stop reason: SDUPTHER

## 2023-06-13 ASSESSMENT — ENCOUNTER SYMPTOMS
WEAKNESS: 0
SINUS PAIN: 0
COUGH: 0
ABDOMINAL PAIN: 0
SHORTNESS OF BREATH: 0
CHILLS: 0
FOCAL WEAKNESS: 0
DIARRHEA: 0
BACK PAIN: 0
TINGLING: 0
NAUSEA: 0
SENSORY CHANGE: 0
VOMITING: 0
PALPITATIONS: 0
FEVER: 0
WEIGHT LOSS: 0
SORE THROAT: 0

## 2023-06-13 ASSESSMENT — FIBROSIS 4 INDEX: FIB4 SCORE: 0.51

## 2023-06-13 NOTE — PATIENT INSTRUCTIONS
Ideal blood pressure <130/80 and at least <140/90.  If <100 for the top number we are over treating.  Cuff size 20-26cm    Start with gabapentin 100 mg at night, increase to 100 mg twice a day.  If this is effective for your foot pain  No changes are needed.  If not you can increase by 100 mg per dose starting at nighttime to a maximum of 300 mg twice a day.    fasting labs due ASAP

## 2023-06-13 NOTE — PROGRESS NOTES
"Subjective:     Chief Complaint   Patient presents with    Follow-Up     HPI:   Mariely presents today for follow up.    Hasn't completed repeat labs.    Reports in home COVID contact (daughter, lives with her). Patient feels fine today other than stress from this recent news.    She does report uncontrolled pain in her feet and ankles.  States that she met with her surgeon in April via tele medicine who told her this is not uncommon issue after valve replacement.  States that she had pain in her bilateral wrists as well as her knees but only reports in the foot now.  States the pain is burning.  Tylenol helps as well as IcyHot but at times the pain in her feet will wake her at night.  States that it is constant and not worse with exertion.  Denies claudication or back pain. Has not had pain like this before.    Continues with Metoprolol at 50 mg BID and Losartan 25 mg daily. Took lasix 20mg daily for 7 days for leg swelling-helped the edema but no change in the pain. Has a venous US ordered by cardiology scheduled for August.     Review of Systems   Constitutional:  Negative for chills, fever, malaise/fatigue and weight loss.   HENT:  Negative for congestion, sinus pain and sore throat.    Respiratory:  Negative for cough and shortness of breath.    Cardiovascular:  Positive for leg swelling. Negative for chest pain and palpitations.   Gastrointestinal:  Negative for abdominal pain, diarrhea, nausea and vomiting.   Musculoskeletal:  Positive for joint pain. Negative for back pain.   Neurological:  Negative for tingling, sensory change, focal weakness and weakness.     Objective:     Exam:  BP (!) 150/68 (BP Location: Right arm, Patient Position: Sitting, BP Cuff Size: Small adult)   Pulse 62   Temp 36.7 °C (98.1 °F) (Temporal)   Resp 20   Ht 1.575 m (5' 2\")   Wt 58.1 kg (128 lb)   SpO2 97%   BMI 23.41 kg/m²  Body mass index is 23.41 kg/m².    Physical Exam  Vitals reviewed.   Constitutional:       General: She " is not in acute distress.     Appearance: Normal appearance. She is not ill-appearing.   HENT:      Head: Normocephalic and atraumatic.      Nose: Nose normal.      Mouth/Throat:      Mouth: Mucous membranes are moist.   Eyes:      Conjunctiva/sclera: Conjunctivae normal.   Cardiovascular:      Rate and Rhythm: Normal rate and regular rhythm.      Pulses:           Dorsalis pedis pulses are 2+ on the right side and 2+ on the left side.      Heart sounds: Normal heart sounds. No murmur heard.  Pulmonary:      Effort: Pulmonary effort is normal. No respiratory distress.      Breath sounds: Normal breath sounds. No stridor. No wheezing, rhonchi or rales.   Musculoskeletal:         General: Tenderness present.      Cervical back: Normal range of motion and neck supple. No rigidity or tenderness.      Right lower leg: No edema.      Left lower le+ Pitting Edema present.      Comments: Generalized diffuse tenderness in the feet bilaterally and left ankle without other findings.   Feet:      Left foot:      Skin integrity: No warmth.   Skin:     Coloration: Skin is cyanotic.      Comments: Toes are cool and purplish distally.  Left ankle with mild erythema medially without increased warmth or focal edema.   Neurological:      General: No focal deficit present.      Mental Status: She is alert and oriented to person, place, and time.      Sensory: Sensation is intact. No sensory deficit.      Gait: Gait normal.      Deep Tendon Reflexes:      Reflex Scores:       Patellar reflexes are 2+ on the right side and 2+ on the left side.       Achilles reflexes are 2+ on the right side and 2+ on the left side.  Psychiatric:         Mood and Affect: Mood normal.         Behavior: Behavior normal.         Thought Content: Thought content normal.       Labs: Reviewed from 3/6/2023    Assessment & Plan:     83 y.o. female with the following -     1. Neuropathic pain of lower extremity, unspecified laterality  On going since at  least April. Pain in wrists/knees resolved. Described neuropathic pain, so start gabapentin as below. Obtain imaging as below. Gave return precautions.   - DX-FOOT-COMPLETE 3+ RIGHT; Future  - DX-FOOT-COMPLETE 3+ LEFT; Future  - US-CHERRI MULTI LEVEL BILAT; Future  - gabapentin (NEURONTIN) 100 MG Cap; Take 1 Capsule by mouth 2 times a day. May increase to max of 300mg twice a day if needed.  Dispense: 60 Capsule; Refill: 2  - DX-ANKLE 3+ VIEWS LEFT; Future    2. Mixed hyperlipidemia  Chronic, controlled. Continue medication(s) as below, due now to trend.  - Lipid Profile; Future  - Comp Metabolic Panel; Future  - TSH WITH REFLEX TO FT4; Future    simvastatin (ZOCOR) 10 MG Tab, Take 1 Tablet by mouth every evening., Disp: 100 Tablet, Rfl: 3    3. Essential hypertension  This is a chronic condition, well controlled just recently with her cardiologist but admits to some stress given #4 below.  Patient to continue with her current medication and monitor at home, discussed ideal ranges and return precautions.    losartan (COZAAR) 25 MG Tab, Take 1 Tablet by mouth every day. Indications: High Blood Pressure Disorder, Disp: 90 Tablet, Rfl: 3    metoprolol tartrate (LOPRESSOR) 50 MG Tab, Take 1 Tablet by mouth 2 times a day., Disp: 180 Tablet, Rfl: 4    4. Close exposure to COVID-19 virus  Patient with close in-home contact with COVID-19.  Feels well without symptoms.  Gave return precautions and stressed importance of preventative measures to avoid illness.    5. Polyarthralgia  New, resolved in wrists/knees but present in feet/left ankle. Evaluate with imaging as above and labs below.  Plan pending results.  - URIC ACID; Future  - CCP ANTIBODY; Future  - RHEUMATOID ARTHRITIS FACTOR; Future  - BLANCHE ANTIBODY WITH REFLEX; Future  - Sed Rate; Future  - CRP QUANTITIVE (NON-CARDIAC); Future    6. Leukocytosis, unspecified type  - CBC WITH DIFFERENTIAL; Future  - Sed Rate; Future  - CRP QUANTITIVE (NON-CARDIAC); Future  7. Iron  deficiency anemia, unspecified iron deficiency anemia type  Over due to trend, labs reordered. Plan pending results.   - CBC WITH DIFFERENTIAL; Future  - FERRITIN; Future  - FOLATE; Future  - IRON/TOTAL IRON BIND; Future  - RETICULOCYTES COUNT; Future  - VITAMIN B12; Future  - LDH; Future    I spent a total of 43 minutes with record review, exam, communication with the patient, and documentation of this encounter.    Return in about 2 months (around 8/13/2023), or if symptoms worsen or fail to improve.    Please note that this dictation was created using voice recognition software. I have made every reasonable attempt to correct obvious errors, but I expect that there are errors of grammar and possibly content that I did not discover before finalizing the note.

## 2023-06-16 ENCOUNTER — HOSPITAL ENCOUNTER (OUTPATIENT)
Dept: RADIOLOGY | Facility: MEDICAL CENTER | Age: 83
End: 2023-06-16
Attending: STUDENT IN AN ORGANIZED HEALTH CARE EDUCATION/TRAINING PROGRAM
Payer: MEDICARE

## 2023-06-16 ENCOUNTER — TELEPHONE (OUTPATIENT)
Dept: CARDIOLOGY | Facility: MEDICAL CENTER | Age: 83
End: 2023-06-16
Payer: MEDICARE

## 2023-06-16 DIAGNOSIS — M79.605 PAIN IN BOTH LOWER EXTREMITIES: ICD-10-CM

## 2023-06-16 DIAGNOSIS — I83.813 VARICOSE VEINS OF BOTH LOWER EXTREMITIES WITH PAIN: ICD-10-CM

## 2023-06-16 DIAGNOSIS — M79.2 NEUROPATHIC PAIN OF LOWER EXTREMITY, UNSPECIFIED LATERALITY: ICD-10-CM

## 2023-06-16 DIAGNOSIS — M79.604 PAIN IN BOTH LOWER EXTREMITIES: ICD-10-CM

## 2023-06-16 PROCEDURE — 73610 X-RAY EXAM OF ANKLE: CPT | Mod: LT

## 2023-06-16 PROCEDURE — 73630 X-RAY EXAM OF FOOT: CPT | Mod: LT

## 2023-06-16 PROCEDURE — 73630 X-RAY EXAM OF FOOT: CPT | Mod: RT

## 2023-06-16 NOTE — TELEPHONE ENCOUNTER
Spoke w/ pt and informed her that her US- EXTREMITY VENOUS LOWER BILAT order had been changed to stat and that she could call to schedule. Pt said she would call and schedule, she had imaging phone number. /cg 06/16/23

## 2023-06-16 NOTE — TELEPHONE ENCOUNTER
TT    Caller: Mariely Easley    Topic/issue: Patient is calling in regards to [id number] - US-EXTREMITY VENOUS LOWER BILAT ordered by  and is currently scheduled for 8/15/23. Patient is concerned that the first available appointment is in the month of August and would like to speak with a nurse. Please advise.     Callback Number: [phone]    Thank you,   Kerry SOSA

## 2023-06-20 ENCOUNTER — HOSPITAL ENCOUNTER (OUTPATIENT)
Dept: LAB | Facility: MEDICAL CENTER | Age: 83
End: 2023-06-20
Attending: STUDENT IN AN ORGANIZED HEALTH CARE EDUCATION/TRAINING PROGRAM
Payer: MEDICARE

## 2023-06-20 DIAGNOSIS — M25.50 POLYARTHRALGIA: ICD-10-CM

## 2023-06-20 DIAGNOSIS — D72.829 LEUKOCYTOSIS, UNSPECIFIED TYPE: ICD-10-CM

## 2023-06-20 DIAGNOSIS — D50.9 IRON DEFICIENCY ANEMIA, UNSPECIFIED IRON DEFICIENCY ANEMIA TYPE: ICD-10-CM

## 2023-06-20 DIAGNOSIS — E78.2 MIXED HYPERLIPIDEMIA: ICD-10-CM

## 2023-06-20 LAB
ALBUMIN SERPL BCP-MCNC: 4.1 G/DL (ref 3.2–4.9)
ALBUMIN/GLOB SERPL: 1.5 G/DL
ALP SERPL-CCNC: 193 U/L (ref 30–99)
ALT SERPL-CCNC: 30 U/L (ref 2–50)
ANION GAP SERPL CALC-SCNC: 10 MMOL/L (ref 7–16)
AST SERPL-CCNC: 31 U/L (ref 12–45)
BASOPHILS # BLD AUTO: 0.7 % (ref 0–1.8)
BASOPHILS # BLD: 0.08 K/UL (ref 0–0.12)
BILIRUB SERPL-MCNC: 0.3 MG/DL (ref 0.1–1.5)
BUN SERPL-MCNC: 31 MG/DL (ref 8–22)
CALCIUM ALBUM COR SERPL-MCNC: 9.2 MG/DL (ref 8.5–10.5)
CALCIUM SERPL-MCNC: 9.3 MG/DL (ref 8.5–10.5)
CHLORIDE SERPL-SCNC: 101 MMOL/L (ref 96–112)
CHOLEST SERPL-MCNC: 112 MG/DL (ref 100–199)
CO2 SERPL-SCNC: 20 MMOL/L (ref 20–33)
CREAT SERPL-MCNC: 1.14 MG/DL (ref 0.5–1.4)
CRP SERPL HS-MCNC: 8.36 MG/DL (ref 0–0.75)
EOSINOPHIL # BLD AUTO: 0.46 K/UL (ref 0–0.51)
EOSINOPHIL NFR BLD: 4 % (ref 0–6.9)
ERYTHROCYTE [DISTWIDTH] IN BLOOD BY AUTOMATED COUNT: 58.7 FL (ref 35.9–50)
ERYTHROCYTE [SEDIMENTATION RATE] IN BLOOD BY WESTERGREN METHOD: 34 MM/HOUR (ref 0–25)
FASTING STATUS PATIENT QL REPORTED: NORMAL
FERRITIN SERPL-MCNC: 245 NG/ML (ref 10–291)
FOLATE SERPL-MCNC: >40 NG/ML
GFR SERPLBLD CREATININE-BSD FMLA CKD-EPI: 48 ML/MIN/1.73 M 2
GLOBULIN SER CALC-MCNC: 2.7 G/DL (ref 1.9–3.5)
GLUCOSE SERPL-MCNC: 106 MG/DL (ref 65–99)
HCT VFR BLD AUTO: 34.6 % (ref 37–47)
HDLC SERPL-MCNC: 67 MG/DL
HGB BLD-MCNC: 10.9 G/DL (ref 12–16)
HGB RETIC QN AUTO: 29.8 PG/CELL (ref 29–35)
IMM GRANULOCYTES # BLD AUTO: 0.11 K/UL (ref 0–0.11)
IMM GRANULOCYTES NFR BLD AUTO: 1 % (ref 0–0.9)
IMM RETICS NFR: 24.3 % (ref 2.6–16.1)
IRON SATN MFR SERPL: 7 % (ref 15–55)
IRON SERPL-MCNC: 17 UG/DL (ref 40–170)
LDH SERPL L TO P-CCNC: 224 U/L (ref 107–266)
LDLC SERPL CALC-MCNC: 30 MG/DL
LYMPHOCYTES # BLD AUTO: 0.93 K/UL (ref 1–4.8)
LYMPHOCYTES NFR BLD: 8.1 % (ref 22–41)
MCH RBC QN AUTO: 31.2 PG (ref 27–33)
MCHC RBC AUTO-ENTMCNC: 31.5 G/DL (ref 32.2–35.5)
MCV RBC AUTO: 99.1 FL (ref 81.4–97.8)
MONOCYTES # BLD AUTO: 0.57 K/UL (ref 0–0.85)
MONOCYTES NFR BLD AUTO: 5 % (ref 0–13.4)
NEUTROPHILS # BLD AUTO: 9.31 K/UL (ref 1.82–7.42)
NEUTROPHILS NFR BLD: 81.2 % (ref 44–72)
NRBC # BLD AUTO: 0 K/UL
NRBC BLD-RTO: 0 /100 WBC (ref 0–0.2)
PLATELET # BLD AUTO: 309 K/UL (ref 164–446)
PMV BLD AUTO: 11.6 FL (ref 9–12.9)
POTASSIUM SERPL-SCNC: 5 MMOL/L (ref 3.6–5.5)
PROT SERPL-MCNC: 6.8 G/DL (ref 6–8.2)
RBC # BLD AUTO: 3.49 M/UL (ref 4.2–5.4)
RETICS # AUTO: 0.08 M/UL (ref 0.04–0.12)
RETICS/RBC NFR: 2.4 % (ref 0.8–2.6)
RHEUMATOID FACT SER IA-ACNC: 35 IU/ML (ref 0–14)
SODIUM SERPL-SCNC: 131 MMOL/L (ref 135–145)
TIBC SERPL-MCNC: 240 UG/DL (ref 250–450)
TRIGL SERPL-MCNC: 73 MG/DL (ref 0–149)
TSH SERPL DL<=0.005 MIU/L-ACNC: 2.17 UIU/ML (ref 0.38–5.33)
UIBC SERPL-MCNC: 223 UG/DL (ref 110–370)
URATE SERPL-MCNC: 9.9 MG/DL (ref 1.9–8.2)
VIT B12 SERPL-MCNC: 3617 PG/ML (ref 211–911)
WBC # BLD AUTO: 11.5 K/UL (ref 4.8–10.8)

## 2023-06-20 PROCEDURE — 83615 LACTATE (LD) (LDH) ENZYME: CPT

## 2023-06-20 PROCEDURE — 85652 RBC SED RATE AUTOMATED: CPT

## 2023-06-20 PROCEDURE — 82728 ASSAY OF FERRITIN: CPT

## 2023-06-20 PROCEDURE — 86431 RHEUMATOID FACTOR QUANT: CPT

## 2023-06-20 PROCEDURE — 80053 COMPREHEN METABOLIC PANEL: CPT

## 2023-06-20 PROCEDURE — 85046 RETICYTE/HGB CONCENTRATE: CPT

## 2023-06-20 PROCEDURE — 82746 ASSAY OF FOLIC ACID SERUM: CPT

## 2023-06-20 PROCEDURE — 85025 COMPLETE CBC W/AUTO DIFF WBC: CPT

## 2023-06-20 PROCEDURE — 80061 LIPID PANEL: CPT

## 2023-06-20 PROCEDURE — 84443 ASSAY THYROID STIM HORMONE: CPT

## 2023-06-20 PROCEDURE — 82607 VITAMIN B-12: CPT

## 2023-06-20 PROCEDURE — 83540 ASSAY OF IRON: CPT

## 2023-06-20 PROCEDURE — 84550 ASSAY OF BLOOD/URIC ACID: CPT

## 2023-06-20 PROCEDURE — 83550 IRON BINDING TEST: CPT

## 2023-06-20 PROCEDURE — 36415 COLL VENOUS BLD VENIPUNCTURE: CPT

## 2023-06-20 PROCEDURE — 86200 CCP ANTIBODY: CPT

## 2023-06-20 PROCEDURE — 86038 ANTINUCLEAR ANTIBODIES: CPT

## 2023-06-20 PROCEDURE — 86140 C-REACTIVE PROTEIN: CPT

## 2023-06-21 DIAGNOSIS — E53.8 FOLATE DEFICIENCY: ICD-10-CM

## 2023-06-21 RX ORDER — FOLIC ACID 1 MG/1
1 TABLET ORAL DAILY
Qty: 90 TABLET | Refills: 3 | Status: SHIPPED | OUTPATIENT
Start: 2023-06-21

## 2023-06-22 DIAGNOSIS — D50.9 IRON DEFICIENCY ANEMIA, UNSPECIFIED IRON DEFICIENCY ANEMIA TYPE: ICD-10-CM

## 2023-06-22 LAB
CCP IGG SERPL-ACNC: 2 UNITS (ref 0–19)
NUCLEAR IGG SER QL IA: NORMAL

## 2023-06-23 RX ORDER — FERROUS SULFATE 325(65) MG
325 TABLET ORAL DAILY
Qty: 90 TABLET | Refills: 0 | Status: SHIPPED | OUTPATIENT
Start: 2023-06-23 | End: 2023-09-07

## 2023-06-26 ENCOUNTER — OFFICE VISIT (OUTPATIENT)
Dept: CARDIOLOGY | Facility: MEDICAL CENTER | Age: 83
End: 2023-06-26
Attending: NURSE PRACTITIONER
Payer: MEDICARE

## 2023-06-26 VITALS
OXYGEN SATURATION: 96 % | WEIGHT: 130 LBS | SYSTOLIC BLOOD PRESSURE: 122 MMHG | HEART RATE: 65 BPM | HEIGHT: 62 IN | RESPIRATION RATE: 16 BRPM | DIASTOLIC BLOOD PRESSURE: 66 MMHG | BODY MASS INDEX: 23.92 KG/M2

## 2023-06-26 DIAGNOSIS — M79.605 PAIN IN BOTH LOWER EXTREMITIES: ICD-10-CM

## 2023-06-26 DIAGNOSIS — Z79.899 HIGH RISK MEDICATION USE: ICD-10-CM

## 2023-06-26 DIAGNOSIS — I83.813 VARICOSE VEINS OF BOTH LOWER EXTREMITIES WITH PAIN: ICD-10-CM

## 2023-06-26 DIAGNOSIS — N18.4 STAGE 4 CHRONIC KIDNEY DISEASE (HCC): ICD-10-CM

## 2023-06-26 DIAGNOSIS — E78.5 DYSLIPIDEMIA: ICD-10-CM

## 2023-06-26 DIAGNOSIS — M79.604 PAIN IN BOTH LOWER EXTREMITIES: ICD-10-CM

## 2023-06-26 DIAGNOSIS — R60.0 BILATERAL EDEMA OF LOWER EXTREMITY: ICD-10-CM

## 2023-06-26 DIAGNOSIS — Z95.2 S/P AVR (AORTIC VALVE REPLACEMENT): ICD-10-CM

## 2023-06-26 DIAGNOSIS — I10 HTN (HYPERTENSION), MALIGNANT: ICD-10-CM

## 2023-06-26 DIAGNOSIS — I38 VALVULAR HEART DISEASE: ICD-10-CM

## 2023-06-26 PROCEDURE — 3078F DIAST BP <80 MM HG: CPT | Performed by: NURSE PRACTITIONER

## 2023-06-26 PROCEDURE — 99212 OFFICE O/P EST SF 10 MIN: CPT | Performed by: NURSE PRACTITIONER

## 2023-06-26 PROCEDURE — 99214 OFFICE O/P EST MOD 30 MIN: CPT | Performed by: NURSE PRACTITIONER

## 2023-06-26 PROCEDURE — 99213 OFFICE O/P EST LOW 20 MIN: CPT | Performed by: NURSE PRACTITIONER

## 2023-06-26 PROCEDURE — 3074F SYST BP LT 130 MM HG: CPT | Performed by: NURSE PRACTITIONER

## 2023-06-26 RX ORDER — FUROSEMIDE 20 MG/1
20 TABLET ORAL 2 TIMES DAILY
Qty: 180 TABLET | Refills: 1 | Status: SHIPPED | OUTPATIENT
Start: 2023-06-26 | End: 2023-07-25 | Stop reason: SDUPTHER

## 2023-06-26 ASSESSMENT — ENCOUNTER SYMPTOMS
ABDOMINAL PAIN: 0
CLAUDICATION: 0
FEVER: 0
MYALGIAS: 0
SHORTNESS OF BREATH: 0
COUGH: 0
ORTHOPNEA: 0
PALPITATIONS: 0
DIZZINESS: 0
PND: 0

## 2023-06-26 ASSESSMENT — FIBROSIS 4 INDEX: FIB4 SCORE: 1.48

## 2023-06-26 NOTE — PROGRESS NOTES
Chief Complaint   Patient presents with    Other     F/V DX : S/P AVR (aortic valve replacement)    Hypertension     F/V DX: Essential hypertension       Subjective     Mariely Easley is a 83 y.o. female who presents today for follow up on her LE edema and pain with her daughter, Natasha.    Patient of Dr. Resendez.  He was last seen in clinic on 6/2/2023 with Dr. Resendez.  During that visit, patient was recommended to start her diuretic for 7 days.  Patient was also sent for venous duplex.    Patient reports some improvement of her symptoms with the diuretic, but continues have a burning sensation and swelling of her lower extremities.  She states her swelling has improved.    Patient denies chest pain, palpitations, orthopnea, PND, shortness of breath or dizziness/lightheadedness.    Patient reports she did see her primary care provider and was started on gabapentin for possible neuropathy.    Additionally, patient has the following medical problems:     -Heart failure secondary to valvular disease of severe aortic regurgitation, patient underwent a bioprosthetic aortic valve replacement on 2/17/2023 at the [facility]    -No CAD on angiogram    -Hypertension    -Hyperlipidemia    -Anemia    -Hypothyroidism    Past Medical History:   Diagnosis Date    Abnormal albumin 9/16/2022    Acute renal failure superimposed on stage 3 chronic kidney disease (HCC) 7/24/2019    Dyspnea on minimal exertion 2/6/2023    Hypermagnesemia 9/16/2022    Hypertension     Hyponatremia 6/28/2022    Pain and swelling of left lower extremity 5/18/2022    Severe aortic valve regurgitation 2/9/2023    SOB (shortness of breath) 1/8/2023    Superficial thrombosis of lower extremity, right 7/5/2022     History reviewed. No pertinent surgical history.  History reviewed. No pertinent family history.  Social History     Socioeconomic History    Marital status:      Spouse name: Not on file    Number of children: Not on file    Years of  education: Not on file    Highest education level: Not on file   Occupational History    Not on file   Tobacco Use    Smoking status: Former     Types: Cigarettes     Quit date: 1979     Years since quittin.9    Smokeless tobacco: Never   Vaping Use    Vaping Use: Never used   Substance and Sexual Activity    Alcohol use: Yes     Alcohol/week: 8.4 oz     Types: 14 Glasses of wine per week     Comment: 2-3 glasses wine daily    Drug use: No    Sexual activity: Not on file   Other Topics Concern    Not on file   Social History Narrative    Not on file     Social Determinants of Health     Financial Resource Strain: Low Risk  (2022)    Overall Financial Resource Strain (CARDIA)     Difficulty of Paying Living Expenses: Not hard at all   Food Insecurity: No Food Insecurity (2022)    Hunger Vital Sign     Worried About Running Out of Food in the Last Year: Never true     Ran Out of Food in the Last Year: Never true   Transportation Needs: No Transportation Needs (2022)    PRAPARE - Transportation     Lack of Transportation (Medical): No     Lack of Transportation (Non-Medical): No   Physical Activity: Inactive (2022)    Exercise Vital Sign     Days of Exercise per Week: 0 days     Minutes of Exercise per Session: 0 min   Stress: No Stress Concern Present (2022)    [employer] [place] [employer] - Occupational Stress Questionnaire     Feeling of Stress : Not at all   Social Connections: Unknown (2022)    Social Connection and Isolation Panel [NHANES]     Frequency of Communication with Friends and Family: More than three times a week     Frequency of Social Gatherings with Friends and Family: More than three times a week     Attends Protestant Services: Not on file     Active Member of Clubs or Organizations: Not on file     Attends Club or Organization Meetings: Not on file     Marital Status: Not on file   Intimate Partner Violence: Not At Risk (2022)    Humiliation,  Afraid, Rape, and Kick questionnaire     Fear of Current or Ex-Partner: No     Emotionally Abused: No     Physically Abused: No     Sexually Abused: No   Housing Stability: Low Risk  (8/9/2022)    Housing Stability Vital Sign     Unable to Pay for Housing in the Last Year: No     Number of Places Lived in the Last Year: 1     Unstable Housing in the Last Year: No     No Known Allergies  Outpatient Encounter Medications as of 6/26/2023   Medication Sig Dispense Refill    furosemide (LASIX) 20 MG Tab Take 1 Tablet by mouth 2 times a day. 180 Tablet 1    ferrous sulfate 325 (65 Fe) MG tablet TAKE 1 TABLET BY MOUTH EVERY DAY 90 Tablet 0    folic acid (FOLVITE) 1 MG Tab TAKE 1 TABLET BY MOUTH EVERY DAY 90 Tablet 3    gabapentin (NEURONTIN) 100 MG Cap Take 1 Capsule by mouth 2 times a day. May increase to max of 300mg twice a day if needed. 60 Capsule 2    vitamin D3 (CHOLECALCIFEROL) 1000 Unit (25 mcg) Tab Take 1,000 Units by mouth every day.      Methylcobalamin (B-12) 5000 MCG TABLET DISPERSIBLE Take  by mouth.      Loperamide HCl (IMODIUM PO) Take  by mouth as needed.      losartan (COZAAR) 25 MG Tab Take 1 Tablet by mouth every day. Indications: High Blood Pressure Disorder 90 Tablet 3    aspirin 81 MG EC tablet Take 81 mg by mouth every day. Indications: blood thinner      metoprolol tartrate (LOPRESSOR) 50 MG Tab Take 1 Tablet by mouth 2 times a day. 180 Tablet 4    diazePAM (VALIUM) 5 MG Tab Take 10 mg by mouth every 6 hours as needed for Anxiety. Indications: Muscle Spasm      acetaminophen (TYLENOL) 500 MG Tab Take 1,000 mg by mouth every 6 hours as needed for Mild Pain. 2 tablets = 1,000 mg.  Indications: Fever, Pain      simvastatin (ZOCOR) 10 MG Tab Take 1 Tablet by mouth every evening. 100 Tablet 3    levothyroxine (SYNTHROID) 50 MCG Tab Take 1 Tab by mouth Every morning on an empty stomach. 90 Tab 3    [DISCONTINUED] furosemide (LASIX) 20 MG Tab TAKE 1 TABLET BY MOUTH EVERY DAY. TAKE 40 MG FOR 7 DAYS THEN  "20 MG DAILY FOR 7 DAYS INDICATIONS: CARDIAC FAILURE 90 Tablet 1     No facility-administered encounter medications on file as of 6/26/2023.     Review of Systems   Constitutional:  Negative for fever and malaise/fatigue.   Respiratory:  Negative for cough and shortness of breath.    Cardiovascular:  Positive for leg swelling. Negative for chest pain, palpitations, orthopnea, claudication and PND.   Gastrointestinal:  Negative for abdominal pain.   Musculoskeletal:  Negative for myalgias.   Neurological:  Negative for dizziness.   All other systems reviewed and are negative.             Objective     /66 (BP Location: Left arm, Patient Position: Sitting, BP Cuff Size: Adult)   Pulse 65   Resp 16   Ht 1.575 m (5' 2\")   Wt 59 kg (130 lb)   SpO2 96%   BMI 23.78 kg/m²     Physical Exam  Vitals reviewed.   Constitutional:       Appearance: She is well-developed.   HENT:      Head: Normocephalic and atraumatic.   Eyes:      Pupils: Pupils are equal, round, and reactive to light.   Neck:      Vascular: No JVD.   Cardiovascular:      Rate and Rhythm: Normal rate and regular rhythm.      Heart sounds: Normal heart sounds.   Pulmonary:      Effort: Pulmonary effort is normal. No respiratory distress.      Breath sounds: Normal breath sounds. No wheezing or rales.   Abdominal:      General: Bowel sounds are normal.      Palpations: Abdomen is soft.   Musculoskeletal:         General: Normal range of motion.      Cervical back: Normal range of motion and neck supple.      Right lower leg: Edema present.      Left lower leg: Edema present.   Skin:     General: Skin is cool and dry.   Neurological:      General: No focal deficit present.      Mental Status: She is alert and oriented to person, place, and time.   Psychiatric:         Behavior: Behavior normal.       Lab Results   Component Value Date/Time    CHOLSTRLTOT 112 06/20/2023 11:45 AM    LDL 30 06/20/2023 11:45 AM    HDL 67 06/20/2023 11:45 AM    TRIGLYCERIDE " 73 06/20/2023 11:45 AM       Lab Results   Component Value Date/Time    SODIUM 131 (L) 06/20/2023 11:45 AM    POTASSIUM 5.0 06/20/2023 11:45 AM    CHLORIDE 101 06/20/2023 11:45 AM    CO2 20 06/20/2023 11:45 AM    GLUCOSE 106 (H) 06/20/2023 11:45 AM    BUN 31 (H) 06/20/2023 11:45 AM    CREATININE 1.14 06/20/2023 11:45 AM     Lab Results   Component Value Date/Time    ALKPHOSPHAT 193 (H) 06/20/2023 11:45 AM    ASTSGOT 31 06/20/2023 11:45 AM    ALTSGPT 30 06/20/2023 11:45 AM    TBILIRUBIN 0.3 06/20/2023 11:45 AM      Other records in care everywhere    Transthoracic Echo Report 9/6/2022  No prior study is available for comparison.   Normal left ventricular systolic function.  Moderate aortic insufficiency.  Mild tricuspid regurgitation.  Estimated right ventricular systolic pressure is 45 mmHg.  Mild pulmonic insufficiency.     Transthoracic Echo Report 1/9/2023  Compared to the images of the prior study 9/6/22, there has been no significant change.      Normal left ventricular systolic function.  The left ventricular ejection fraction is visually estimated to be 65%.  Grade II diastolic dysfunction.  The right ventricle is normal in size and systolic function.  Mild mitral regurgitation.  Moderate aortic insufficiency.  Mild tricuspid regurgitation.  Right ventricular systolic pressure is estimated to be 50 mmHg.     Transthoracic Echo Report 2/7/2023  Compared to the prior study on 1/9/23, aortic regurgitation is now severe.  The left ventricular ejection fraction is visually estimated to be 60%.  Moderately dilated left atrium.  Severe aortic insufficiency. Vena contracta is .7 cm.  ms.  Estimated right ventricular systolic pressure is 40 mmHg         Assessment & Plan     1. Bilateral edema of lower extremity  furosemide (LASIX) 20 MG Tab    Basic Metabolic Panel      2. High risk medication use  Basic Metabolic Panel      3. Dyslipidemia        4. HTN (hypertension), malignant        5. S/P AVR (aortic  valve replacement)        6. Valvular heart disease        7. Stage 4 chronic kidney disease (HCC)        8. Pain in both lower extremities        9. Varicose veins of both lower extremities with pain            Medical Decision Making: Today's Assessment/Status/Plan:        Lower extremity pain and swelling:  -Patient's ultrasound is now scheduled in July  -Pending results of ultrasound, patient may need to have venous ablation  -Patient to follow back up with Dr. Resendez after ultrasound to review results  -At this time, we will increase her furosemide to 20 mg twice a day and see if it helps with her swelling  -BMP in 1 to 2 weeks  -Patient will continue to use gabapentin to see if her pain is due to neuropathy  -Also, patient instructed to decrease purine intake in her diet as her uric acid levels were elevated on recent lab testing, but patient has been taking furosemide    Aortic regurgitation, status post AVR at [facility] on 2/17/2023:  -will follow    HTN:  -Stable  -Continue losartan 25 mg daily  -Continue metoprolol tartrate 50 mg twice a day    Dyslipidemia:  -Continue simvastatin 10 mg daily  -Continue aspirin 81 mg daily    CKD, stage 3-4:  -Will follow kidney function, recent testing showed improvement of kidney function    FU in clinic in 4 weeks with lower extremity venous duplex results. Sooner if needed.    Patient verbalizes understanding and agrees with the plan of care.     PLEASE NOTE: This Note was created using voice recognition Software. I have made every reasonable attempt to correct obvious errors, but I expect that there are errors of grammar and possibly content that I did not discover before finalizing the note

## 2023-06-27 ENCOUNTER — PATIENT OUTREACH (OUTPATIENT)
Dept: HEALTH INFORMATION MANAGEMENT | Facility: OTHER | Age: 83
End: 2023-06-27
Payer: MEDICARE

## 2023-06-27 DIAGNOSIS — I50.43 CHF (CONGESTIVE HEART FAILURE), NYHA CLASS I, ACUTE ON CHRONIC, COMBINED (HCC): ICD-10-CM

## 2023-06-27 DIAGNOSIS — I10 ESSENTIAL HYPERTENSION: ICD-10-CM

## 2023-06-27 DIAGNOSIS — N18.31 STAGE 3A CHRONIC KIDNEY DISEASE: ICD-10-CM

## 2023-06-27 PROCEDURE — 99999 PR NO CHARGE: CPT | Performed by: STUDENT IN AN ORGANIZED HEALTH CARE EDUCATION/TRAINING PROGRAM

## 2023-06-28 NOTE — PROGRESS NOTES
Assessment: Spoke with Mariely today for monthly [facility] outreach. Mariely stated she was doing ok. We discussed her recent lab work, which revealed a higher than normal Uric Acid level. Discussed recent increase of furosemide to 20mg bid, Mariely is starting that dosage today. She is aware she needs to complete the BMP ordered yesterday after taking the increased Furosemide dose for 2 weeks. Discussed referral to Rheumatology, it has not completed processing yet though. Mariely is very happy she was able to get her US scheduled earlier, scheduled for 7/14/23. Results of US may dictate an ablation.   Mariely has upcoming appts scheduled for Cardiology (7/25/23) and pcp (8/25/23). Mariely's last few blood pressure readings in clinic: 122/66 (6/26/23), 150/68 (6/13/23) and 126/70 (6/2/2023). Her last GFR lab was 48 on 6/20/23 and her Lipid Panel that day was WNL. Mariely had no other concerns or questions.     Education: [employer] Low-Purine/ Purine-Restricted Nutrition Therapy and the [employer], High and Low Uric Acid Symptoms: How to Stay in a Safe Range in today's outgoing mail for Mariely. Encouraged her to call me after she receives the resources if she has questions.     Plan of Care and Goals: Continue with current goals    Barriers: Age, frailty, medical conditions,     Progress: Progressing    Next outreach: 7/27/2023

## 2023-07-10 ENCOUNTER — HOSPITAL ENCOUNTER (OUTPATIENT)
Dept: LAB | Facility: MEDICAL CENTER | Age: 83
End: 2023-07-10
Attending: NURSE PRACTITIONER
Payer: MEDICARE

## 2023-07-10 DIAGNOSIS — R60.0 BILATERAL EDEMA OF LOWER EXTREMITY: ICD-10-CM

## 2023-07-10 DIAGNOSIS — Z79.899 HIGH RISK MEDICATION USE: ICD-10-CM

## 2023-07-10 LAB
ANION GAP SERPL CALC-SCNC: 13 MMOL/L (ref 7–16)
BUN SERPL-MCNC: 34 MG/DL (ref 8–22)
CALCIUM SERPL-MCNC: 9 MG/DL (ref 8.5–10.5)
CHLORIDE SERPL-SCNC: 95 MMOL/L (ref 96–112)
CO2 SERPL-SCNC: 22 MMOL/L (ref 20–33)
CREAT SERPL-MCNC: 1.32 MG/DL (ref 0.5–1.4)
GFR SERPLBLD CREATININE-BSD FMLA CKD-EPI: 40 ML/MIN/1.73 M 2
GLUCOSE SERPL-MCNC: 100 MG/DL (ref 65–99)
POTASSIUM SERPL-SCNC: 5.3 MMOL/L (ref 3.6–5.5)
SODIUM SERPL-SCNC: 130 MMOL/L (ref 135–145)

## 2023-07-10 PROCEDURE — 80048 BASIC METABOLIC PNL TOTAL CA: CPT

## 2023-07-10 PROCEDURE — 36415 COLL VENOUS BLD VENIPUNCTURE: CPT

## 2023-07-11 ENCOUNTER — TELEPHONE (OUTPATIENT)
Dept: CARDIOLOGY | Facility: MEDICAL CENTER | Age: 83
End: 2023-07-11
Payer: MEDICARE

## 2023-07-11 NOTE — RESULT ENCOUNTER NOTE
Could you follow-up with patient and see how she is doing on the increased dose of furosemide.  If she reports it is not helping, please have her reduce her furosemide back down to daily or as needed.  Her sodium level on her labs is low, she having any symptoms?

## 2023-07-11 NOTE — TELEPHONE ENCOUNTER
Message  Received: Today  DREAD Kemp R.N.  Could you follow-up with patient and see how she is doing on the increased dose of furosemide.  If she reports it is not helping, please have her reduce her furosemide back down to daily or as needed.  Her sodium level on her labs is low, she having any symptoms?    Associated Results     Contains abnormal data Basic Metabolic Panel  -----------------------------------------------------------------------    Attempted to call pt, LVM asking pt to check MyChart or call back.

## 2023-07-14 ENCOUNTER — HOSPITAL ENCOUNTER (OUTPATIENT)
Dept: RADIOLOGY | Facility: MEDICAL CENTER | Age: 83
End: 2023-07-14
Attending: INTERNAL MEDICINE
Payer: MEDICARE

## 2023-07-14 DIAGNOSIS — I83.813 VARICOSE VEINS OF BOTH LOWER EXTREMITIES WITH PAIN: ICD-10-CM

## 2023-07-14 DIAGNOSIS — M79.604 PAIN IN BOTH LOWER EXTREMITIES: ICD-10-CM

## 2023-07-14 DIAGNOSIS — M79.605 PAIN IN BOTH LOWER EXTREMITIES: ICD-10-CM

## 2023-07-14 PROCEDURE — 93970 EXTREMITY STUDY: CPT | Mod: 26 | Performed by: INTERNAL MEDICINE

## 2023-07-14 PROCEDURE — 93970 EXTREMITY STUDY: CPT

## 2023-07-19 ENCOUNTER — APPOINTMENT (OUTPATIENT)
Dept: RADIOLOGY | Facility: IMAGING CENTER | Age: 83
End: 2023-07-19
Payer: MEDICARE

## 2023-07-19 ENCOUNTER — OFFICE VISIT (OUTPATIENT)
Dept: URGENT CARE | Facility: PHYSICIAN GROUP | Age: 83
End: 2023-07-19
Payer: MEDICARE

## 2023-07-19 VITALS
SYSTOLIC BLOOD PRESSURE: 156 MMHG | HEART RATE: 62 BPM | DIASTOLIC BLOOD PRESSURE: 68 MMHG | TEMPERATURE: 98.7 F | WEIGHT: 132 LBS | HEIGHT: 62 IN | RESPIRATION RATE: 16 BRPM | BODY MASS INDEX: 24.29 KG/M2 | OXYGEN SATURATION: 95 %

## 2023-07-19 DIAGNOSIS — R07.89 TENDERNESS OF CHEST WALL: ICD-10-CM

## 2023-07-19 DIAGNOSIS — R10.9 FLANK PAIN: ICD-10-CM

## 2023-07-19 LAB
APPEARANCE UR: CLEAR
BILIRUB UR STRIP-MCNC: NORMAL MG/DL
COLOR UR AUTO: NORMAL
GLUCOSE UR STRIP.AUTO-MCNC: NORMAL MG/DL
KETONES UR STRIP.AUTO-MCNC: NORMAL MG/DL
LEUKOCYTE ESTERASE UR QL STRIP.AUTO: NORMAL
NITRITE UR QL STRIP.AUTO: NORMAL
PH UR STRIP.AUTO: 7 [PH] (ref 5–8)
PROT UR QL STRIP: NORMAL MG/DL
RBC UR QL AUTO: NORMAL
SP GR UR STRIP.AUTO: 1.01
UROBILINOGEN UR STRIP-MCNC: 0.2 MG/DL

## 2023-07-19 PROCEDURE — 81002 URINALYSIS NONAUTO W/O SCOPE: CPT

## 2023-07-19 PROCEDURE — 71046 X-RAY EXAM CHEST 2 VIEWS: CPT | Mod: TC | Performed by: FAMILY MEDICINE

## 2023-07-19 PROCEDURE — 3078F DIAST BP <80 MM HG: CPT

## 2023-07-19 PROCEDURE — 99213 OFFICE O/P EST LOW 20 MIN: CPT

## 2023-07-19 PROCEDURE — 3077F SYST BP >= 140 MM HG: CPT

## 2023-07-19 RX ORDER — CYCLOBENZAPRINE HCL 5 MG
5 TABLET ORAL
Qty: 5 TABLET | Refills: 0 | Status: SHIPPED | OUTPATIENT
Start: 2023-07-19 | End: 2023-08-02 | Stop reason: SDUPTHER

## 2023-07-19 ASSESSMENT — ENCOUNTER SYMPTOMS
VOMITING: 0
FLANK PAIN: 1
FEVER: 0
NAUSEA: 0
CHILLS: 0
SHORTNESS OF BREATH: 0
MYALGIAS: 0
HEADACHES: 0
BACK PAIN: 1
SORE THROAT: 0

## 2023-07-19 ASSESSMENT — FIBROSIS 4 INDEX: FIB4 SCORE: 1.48

## 2023-07-19 NOTE — PROGRESS NOTES
Subjective:     CHIEF COMPLAINT  Chief Complaint   Patient presents with    Flank Pain     L sided flank pain, constant pain, sharp pain, x2 days        HPI  Mariely Easley is a very pleasant 83 y.o. female who presents with left-sided flank pain that spreads up into her rib cage.  She has had pain with deep inhalations, but has not had shortness of breath.  She has a history of muscle spasms that the patient reports feel very similar to this pain.  She has had pain with deep inhalation and states that the posterior surface of her chest wall is tender to palpation.  She has not had any cardiac chest pain.  She has not had any recent illnesses, injuries, or falls.  She does have a history of DVTs in the past and just got results from a venous duplex ultrasound performed 7/14/23 without any evidence of DVTs.  She lives at home with her daughter and states that she has been given muscle relaxers in the past and has tolerated them well for muscle spasms. She has otherwise been feeling well.     REVIEW OF SYSTEMS  Review of Systems   Constitutional:  Negative for chills, fever and malaise/fatigue.   HENT:  Negative for congestion and sore throat.    Respiratory:  Negative for shortness of breath.    Cardiovascular:  Negative for chest pain.   Gastrointestinal:  Negative for nausea and vomiting.   Genitourinary:  Positive for flank pain (L flank pain). Negative for dysuria, frequency, hematuria and urgency.   Musculoskeletal:  Positive for back pain (L sided back/chest wall pain). Negative for myalgias.   Skin:  Negative for rash.   Neurological:  Negative for headaches.       PAST MEDICAL HISTORY  Patient Active Problem List    Diagnosis Date Noted    Stage 4 chronic kidney disease (HCC) 06/26/2023    Neuropathic pain of foot 06/13/2023    S/P aortic valve replacement 03/08/2023    Stage 3b chronic kidney disease (HCC) 03/08/2023    History of DVT (deep vein thrombosis) 02/03/2023    Dyslipidemia 01/26/2023     Diastolic CHF, acute on chronic (HCC) 01/26/2023    Chronic anticoagulation 01/26/2023    Anemia 01/08/2023    Superficial thrombosis of leg, right 10/13/2022    Chronic use of benzodiazepine for therapeutic purpose 10/13/2022    Leukocytosis 10/04/2022    Alkaline phosphatase elevation 10/04/2022    Bilateral edema of lower extremity 10/04/2022    Elevated troponin 09/06/2022    Renal cyst, left 09/06/2022    Aortic insufficiency 09/06/2022    Muscle cramping 08/09/2022    Stress incontinence of urine 08/09/2022    Folate deficiency 07/05/2022    Varicose veins of both lower extremities with pain 06/28/2022    Iron deficiency anemia 06/28/2022    Muscle spasm of back 04/12/2022    Vitamin B12 deficiency 12/16/2019    Essential hypertension 07/24/2019    Acquired hypothyroidism 07/24/2019    Mixed hyperlipidemia 07/24/2019    Anxiety 07/24/2019       SURGICAL HISTORY  patient denies any surgical history    ALLERGIES  No Known Allergies    CURRENT MEDICATIONS  Home Medications       Reviewed by Criss Nicole PClintonA.-CClinton (Physician Assistant) on 07/19/23 at 1236  Med List Status: <None>     Medication Last Dose Status   acetaminophen (TYLENOL) 500 MG Tab Taking Active   aspirin 81 MG EC tablet Taking Active   diazePAM (VALIUM) 5 MG Tab Taking Active   ferrous sulfate 325 (65 Fe) MG tablet Taking Active   folic acid (FOLVITE) 1 MG Tab Taking Active   furosemide (LASIX) 20 MG Tab Taking Active   gabapentin (NEURONTIN) 100 MG Cap Taking Active   levothyroxine (SYNTHROID) 50 MCG Tab Taking Active   Loperamide HCl (IMODIUM PO) Taking Active   losartan (COZAAR) 25 MG Tab Taking Active   Methylcobalamin (B-12) 5000 MCG TABLET DISPERSIBLE Taking Active   metoprolol tartrate (LOPRESSOR) 50 MG Tab Taking Active   simvastatin (ZOCOR) 10 MG Tab Taking Active   vitamin D3 (CHOLECALCIFEROL) 1000 Unit (25 mcg) Tab Taking Active                    SOCIAL HISTORY  Social History     Tobacco Use    Smoking status: Former     Types:  "Cigarettes     Quit date: 1979     Years since quittin.0    Smokeless tobacco: Never   Vaping Use    Vaping Use: Never used   Substance and Sexual Activity    Alcohol use: Yes     Alcohol/week: 8.4 oz     Types: 14 Glasses of wine per week     Comment: 2-3 glasses wine daily    Drug use: No    Sexual activity: Not on file       FAMILY HISTORY  History reviewed. No pertinent family history.       Objective:     VITAL SIGNS: BP (!) 156/68 (BP Location: Right arm, Patient Position: Sitting, BP Cuff Size: Adult)   Pulse 62   Temp 37.1 °C (98.7 °F) (Temporal)   Resp 16   Ht 1.575 m (5' 2\")   Wt 59.9 kg (132 lb)   SpO2 95%   BMI 24.14 kg/m²     PHYSICAL EXAM  Physical Exam  Vitals reviewed.   Constitutional:       General: She is not in acute distress.     Appearance: Normal appearance. She is normal weight. She is not ill-appearing or toxic-appearing.   HENT:      Head: Normocephalic and atraumatic.      Nose: Nose normal.      Mouth/Throat:      Mouth: Mucous membranes are moist.      Pharynx: Oropharynx is clear.   Eyes:      Conjunctiva/sclera: Conjunctivae normal.      Pupils: Pupils are equal, round, and reactive to light.   Cardiovascular:      Rate and Rhythm: Normal rate.      Heart sounds: Murmur (Known history of murmur) heard.   Pulmonary:      Effort: Pulmonary effort is normal. No respiratory distress.      Breath sounds: Normal breath sounds. No stridor. No wheezing, rhonchi or rales.   Chest:      Chest wall: Tenderness (Posterior L side of ribcage) present.   Abdominal:      Tenderness: There is no right CVA tenderness or left CVA tenderness.   Skin:     General: Skin is warm and dry.      Findings: No bruising, erythema or rash.   Neurological:      General: No focal deficit present.      Mental Status: She is alert and oriented to person, place, and time.   Psychiatric:         Mood and Affect: Mood normal.         Assessment/Plan:     1. Flank pain  - POCT Urinalysis    2. Tenderness " of chest wall  - DX-CHEST-2 VIEWS; Future  - cyclobenzaprine (FLEXERIL) 5 mg tablet; Take 1 Tablet by mouth 1 time a day as needed for Muscle Spasms (To be taken at night.) for up to 5 days.  Dispense: 5 Tablet; Refill: 0  -Apply ice/heat to affected area  -Maintain adequate hydration  -Be seen at ER immediately if chest pain or shortness of breath occurs or if symptoms worsen    MDM/Comments:  I have prepared for this visit by personally reviewing the patient's prevous medical records, vitals, and labs including: most recent GFR of 40, CMP, and CBC. Patient has stable vital signs and is non-toxic appearing. Discussed supportive care with rest, ice. Use of tylenol as needed for pain. I personally reviewed X-ray images and agree with radiology's interpretation. Per my personal interpretation of films, I see no evidence of pneumonia. Patient had a CXR performed 5 days ago showing R basilar infiltrates which seem to be improving.  Urinalysis obtained in office without blood present in urine sample.  Very low index of suspicion for kidney stone.  Patient does not have any symptoms of a UTI at this time.  She does not have any chest pain or shortness of breath and is not tachycardic.  Very low index of suspicion for a pulmonary embolism at this time, especially given her venous duplex ultrasound performed earlier in the week showing no evidence of blood clots in legs. [facility] Criteria rates patient as low risk for PE at this time.  Strict ER precautions put in place.  Discussed risk of taking Flexeril given the patient's age and the increased risk of falls.  Patient and daughters demonstrated understanding of treatment plan and will RTC if symptoms worsen or fail to resolve.       Differential diagnosis, natural history, supportive care, and indications for immediate follow-up discussed. All questions answered. Patient agrees with the plan of care.    Follow-up as needed if symptoms worsen or fail to improve to PCP, Urgent  care or Emergency Room.    I have personally reviewed prior external notes and test results pertinent to today's visit.  I have independently reviewed and interpreted all diagnostics ordered during this urgent care acute visit.   Discussed management options (risks,benefits, and alternatives to treatment). Pt expresses understanding and the treatment plan was agreed upon. Questions were encouraged and answered to pt's satisfaction.    Please note that this dictation was created using voice recognition software. I have made a reasonable attempt to correct obvious errors, but I expect that there are errors of grammar and possibly content that I did not discover before finalizing the note.

## 2023-07-25 ENCOUNTER — OFFICE VISIT (OUTPATIENT)
Dept: CARDIOLOGY | Facility: MEDICAL CENTER | Age: 83
End: 2023-07-25
Attending: INTERNAL MEDICINE
Payer: MEDICARE

## 2023-07-25 VITALS
RESPIRATION RATE: 16 BRPM | OXYGEN SATURATION: 97 % | HEART RATE: 61 BPM | HEIGHT: 62 IN | WEIGHT: 134 LBS | SYSTOLIC BLOOD PRESSURE: 138 MMHG | BODY MASS INDEX: 24.66 KG/M2 | DIASTOLIC BLOOD PRESSURE: 62 MMHG

## 2023-07-25 DIAGNOSIS — E78.5 DYSLIPIDEMIA: ICD-10-CM

## 2023-07-25 DIAGNOSIS — I10 HTN (HYPERTENSION), MALIGNANT: ICD-10-CM

## 2023-07-25 DIAGNOSIS — N18.4 STAGE 4 CHRONIC KIDNEY DISEASE (HCC): ICD-10-CM

## 2023-07-25 DIAGNOSIS — R60.0 BILATERAL EDEMA OF LOWER EXTREMITY: ICD-10-CM

## 2023-07-25 DIAGNOSIS — E78.2 MIXED HYPERLIPIDEMIA: ICD-10-CM

## 2023-07-25 DIAGNOSIS — Z95.2 S/P AVR (AORTIC VALVE REPLACEMENT): ICD-10-CM

## 2023-07-25 PROCEDURE — 3078F DIAST BP <80 MM HG: CPT | Performed by: INTERNAL MEDICINE

## 2023-07-25 PROCEDURE — 99212 OFFICE O/P EST SF 10 MIN: CPT | Performed by: INTERNAL MEDICINE

## 2023-07-25 PROCEDURE — 99214 OFFICE O/P EST MOD 30 MIN: CPT | Performed by: INTERNAL MEDICINE

## 2023-07-25 PROCEDURE — 3075F SYST BP GE 130 - 139MM HG: CPT | Performed by: INTERNAL MEDICINE

## 2023-07-25 RX ORDER — FUROSEMIDE 20 MG/1
20 TABLET ORAL DAILY
Qty: 100 TABLET | Refills: 4 | Status: SHIPPED | OUTPATIENT
Start: 2023-07-25

## 2023-07-25 RX ORDER — SIMVASTATIN 10 MG
10 TABLET ORAL EVERY EVENING
Qty: 100 TABLET | Refills: 3 | Status: SHIPPED | OUTPATIENT
Start: 2023-07-25

## 2023-07-25 RX ORDER — LOSARTAN POTASSIUM 25 MG/1
25 TABLET ORAL DAILY
Qty: 90 TABLET | Refills: 3 | Status: SHIPPED | OUTPATIENT
Start: 2023-07-25 | End: 2023-09-12 | Stop reason: SDUPTHER

## 2023-07-25 RX ORDER — METOPROLOL TARTRATE 50 MG/1
50 TABLET, FILM COATED ORAL 2 TIMES DAILY
Qty: 180 TABLET | Refills: 4 | Status: SHIPPED | OUTPATIENT
Start: 2023-07-25

## 2023-07-25 ASSESSMENT — ENCOUNTER SYMPTOMS
WEIGHT LOSS: 0
EYE DISCHARGE: 0
FEVER: 0
FALLS: 0
ORTHOPNEA: 0
ABDOMINAL PAIN: 0
EYE PAIN: 0
BLURRED VISION: 0
SHORTNESS OF BREATH: 0
HALLUCINATIONS: 0
VOMITING: 0
DEPRESSION: 0
SENSORY CHANGE: 0
LOSS OF CONSCIOUSNESS: 0
BRUISES/BLEEDS EASILY: 0
HEADACHES: 0
PALPITATIONS: 0
PND: 0
MYALGIAS: 0
DIZZINESS: 0
CHILLS: 0
SPEECH CHANGE: 0
CLAUDICATION: 0
COUGH: 0
NAUSEA: 0
DOUBLE VISION: 0
BLOOD IN STOOL: 0

## 2023-07-25 ASSESSMENT — FIBROSIS 4 INDEX: FIB4 SCORE: 1.48

## 2023-07-25 NOTE — PROGRESS NOTES
Chief Complaint   Patient presents with    Other     F/V Dx: S/P AVR (aortic valve replacement)    CHF (Diastolic)    Dyslipidemia       Subjective     Mariely Easley is an 83 y.o. female who presents today for cardiac care and management after recent hospitalization due to heart failure secondary to valvular disease of severe aortic regurgitation.  I personally interpreted her transthoracic echocardiogram images, transesophageal echocardiogram images.  Patient also has renal failure with GFR of 40.  I personally interpreted the blood test results.     Patient was eventually transferred to [facility] and underwent bioprosthetic valve replacement at the aortic area.  She did have coronary angiogram which did not show evidence of obstructive coronary artery disease.    She is doing well post surgery.  No bleeding.  Her wound is healing well.  She is participating in cardiac rehab.  She does not have a lot of shortness of breath at this time.    She does report of having legs swelling at the ankles now.  I personally interpreted the images of her venous duplex of her bilateral lower extremities.  No evidence of blood clots.  There is evidence of venous reflux in left lower calf.    I have independently interpreted and reviewed blood tests results with patient in clinic which shows LDL level of 30, triglycerides level of 73, GFR of 40, K of 5.3.      Past Medical History:   Diagnosis Date    Abnormal albumin 9/16/2022    Acute renal failure superimposed on stage 3 chronic kidney disease (HCC) 7/24/2019    Dyspnea on minimal exertion 2/6/2023    Hypermagnesemia 9/16/2022    Hypertension     Hyponatremia 6/28/2022    Pain and swelling of left lower extremity 5/18/2022    Severe aortic valve regurgitation 2/9/2023    SOB (shortness of breath) 1/8/2023    Superficial thrombosis of lower extremity, right 7/5/2022     History reviewed. No pertinent surgical history.  History reviewed. No pertinent family  history.  Social History     Socioeconomic History    Marital status:      Spouse name: Not on file    Number of children: Not on file    Years of education: Not on file    Highest education level: Not on file   Occupational History    Not on file   Tobacco Use    Smoking status: Former     Types: Cigarettes     Quit date: 1979     Years since quittin.0    Smokeless tobacco: Never   Vaping Use    Vaping Use: Never used   Substance and Sexual Activity    Alcohol use: Yes     Alcohol/week: 8.4 oz     Types: 14 Glasses of wine per week     Comment: 2-3 glasses wine daily    Drug use: No    Sexual activity: Not on file   Other Topics Concern    Not on file   Social History Narrative    Not on file     Social Determinants of Health     Financial Resource Strain: Low Risk  (2022)    Overall Financial Resource Strain (CARDIA)     Difficulty of Paying Living Expenses: Not hard at all   Food Insecurity: No Food Insecurity (2022)    Hunger Vital Sign     Worried About Running Out of Food in the Last Year: Never true     Ran Out of Food in the Last Year: Never true   Transportation Needs: No Transportation Needs (2022)    PRAPARE - Transportation     Lack of Transportation (Medical): No     Lack of Transportation (Non-Medical): No   Physical Activity: Inactive (2022)    Exercise Vital Sign     Days of Exercise per Week: 0 days     Minutes of Exercise per Session: 0 min   Stress: No Stress Concern Present (2022)    [employer] [place] [employer] - Occupational Stress Questionnaire     Feeling of Stress : Not at all   Social Connections: Unknown (2022)    Social Connection and Isolation Panel [NHANES]     Frequency of Communication with Friends and Family: More than three times a week     Frequency of Social Gatherings with Friends and Family: More than three times a week     Attends Muslim Services: Not on file     Active Member of Clubs or Organizations: Not on file      Attends Club or Organization Meetings: Not on file     Marital Status: Not on file   Intimate Partner Violence: Not At Risk (8/9/2022)    Humiliation, Afraid, Rape, and Kick questionnaire     Fear of Current or Ex-Partner: No     Emotionally Abused: No     Physically Abused: No     Sexually Abused: No   Housing Stability: Low Risk  (8/9/2022)    Housing Stability Vital Sign     Unable to Pay for Housing in the Last Year: No     Number of Places Lived in the Last Year: 1     Unstable Housing in the Last Year: No     No Known Allergies  Outpatient Encounter Medications as of 7/25/2023   Medication Sig Dispense Refill    furosemide (LASIX) 20 MG Tab Take 1 Tablet by mouth every day. 100 Tablet 4    ferrous sulfate 325 (65 Fe) MG tablet TAKE 1 TABLET BY MOUTH EVERY DAY 90 Tablet 0    folic acid (FOLVITE) 1 MG Tab TAKE 1 TABLET BY MOUTH EVERY DAY 90 Tablet 3    gabapentin (NEURONTIN) 100 MG Cap Take 1 Capsule by mouth 2 times a day. May increase to max of 300mg twice a day if needed. 60 Capsule 2    vitamin D3 (CHOLECALCIFEROL) 1000 Unit (25 mcg) Tab Take 1,000 Units by mouth every day.      Methylcobalamin (B-12) 5000 MCG TABLET DISPERSIBLE Take  by mouth.      Loperamide HCl (IMODIUM PO) Take  by mouth as needed.      losartan (COZAAR) 25 MG Tab Take 1 Tablet by mouth every day. Indications: High Blood Pressure Disorder 90 Tablet 3    aspirin 81 MG EC tablet Take 81 mg by mouth every day. Indications: blood thinner      metoprolol tartrate (LOPRESSOR) 50 MG Tab Take 1 Tablet by mouth 2 times a day. 180 Tablet 4    diazePAM (VALIUM) 5 MG Tab Take 10 mg by mouth every 6 hours as needed for Anxiety. Indications: Muscle Spasm      acetaminophen (TYLENOL) 500 MG Tab Take 1,000 mg by mouth every 6 hours as needed for Mild Pain. 2 tablets = 1,000 mg.  Indications: Fever, Pain      simvastatin (ZOCOR) 10 MG Tab Take 1 Tablet by mouth every evening. 100 Tablet 3    levothyroxine (SYNTHROID) 50 MCG Tab Take 1 Tab by mouth Every  "morning on an empty stomach. 90 Tab 3    [DISCONTINUED] furosemide (LASIX) 20 MG Tab Take 1 Tablet by mouth 2 times a day. 180 Tablet 1     No facility-administered encounter medications on file as of 7/25/2023.     Review of Systems   Constitutional:  Negative for chills, fever, malaise/fatigue and weight loss.   HENT:  Negative for ear discharge, ear pain, hearing loss and nosebleeds.    Eyes:  Negative for blurred vision, double vision, pain and discharge.   Respiratory:  Negative for cough and shortness of breath.    Cardiovascular:  Positive for leg swelling. Negative for chest pain, palpitations, orthopnea, claudication and PND.   Gastrointestinal:  Negative for abdominal pain, blood in stool, melena, nausea and vomiting.   Genitourinary:  Negative for dysuria and hematuria.   Musculoskeletal:  Negative for falls, joint pain and myalgias.   Skin:  Negative for itching and rash.   Neurological:  Negative for dizziness, sensory change, speech change, loss of consciousness and headaches.   Endo/Heme/Allergies:  Negative for environmental allergies. Does not bruise/bleed easily.   Psychiatric/Behavioral:  Negative for depression, hallucinations and suicidal ideas.               Objective     /62 (BP Location: Left arm, Patient Position: Sitting, BP Cuff Size: Adult)   Pulse 61   Resp 16   Ht 1.575 m (5' 2\")   Wt 60.8 kg (134 lb)   SpO2 97%   BMI 24.51 kg/m²     Physical Exam  Vitals and nursing note reviewed.   Constitutional:       General: She is not in acute distress.     Appearance: She is not diaphoretic.   HENT:      Head: Normocephalic and atraumatic.      Right Ear: External ear normal.      Left Ear: External ear normal.      Nose: No congestion or rhinorrhea.   Eyes:      General:         Right eye: No discharge.         Left eye: No discharge.   Neck:      Thyroid: No thyromegaly.      Vascular: No JVD.   Cardiovascular:      Rate and Rhythm: Normal rate and regular rhythm.      Pulses: " Normal pulses.      Comments: Well healed scare at the sternal wound.  Pulmonary:      Effort: No respiratory distress.   Abdominal:      General: There is no distension.      Tenderness: There is no abdominal tenderness.   Musculoskeletal:         General: No swelling or tenderness.      Right lower leg: No edema.      Left lower leg: No edema.      Comments: + varicose veins without evidence of ulcer, + discoloration.     Skin:     General: Skin is warm and dry.   Neurological:      Mental Status: She is alert and oriented to person, place, and time.      Cranial Nerves: No cranial nerve deficit.   Psychiatric:         Behavior: Behavior normal.                Assessment & Plan     1. S/P AVR (aortic valve replacement)        2. Bilateral edema of lower extremity  furosemide (LASIX) 20 MG Tab      3. Stage 4 chronic kidney disease (HCC)        4. HTN (hypertension), malignant        5. Dyslipidemia            Medical Decision Making: Today's Assessment/Status/Plan:   No indication for vein treatment.    BP is not controlled. Will continue Metoprolol at 50 mg BID for better BP control. Continue Losartan 25 mg daily.    Will change Furosemide to 20 mg daily

## 2023-08-02 ENCOUNTER — OFFICE VISIT (OUTPATIENT)
Dept: MEDICAL GROUP | Facility: PHYSICIAN GROUP | Age: 83
End: 2023-08-02
Payer: MEDICARE

## 2023-08-02 ENCOUNTER — HOSPITAL ENCOUNTER (OUTPATIENT)
Facility: MEDICAL CENTER | Age: 83
End: 2023-08-02
Attending: STUDENT IN AN ORGANIZED HEALTH CARE EDUCATION/TRAINING PROGRAM
Payer: MEDICARE

## 2023-08-02 VITALS
WEIGHT: 132.5 LBS | BODY MASS INDEX: 24.38 KG/M2 | RESPIRATION RATE: 16 BRPM | TEMPERATURE: 98.4 F | HEART RATE: 63 BPM | DIASTOLIC BLOOD PRESSURE: 78 MMHG | HEIGHT: 62 IN | SYSTOLIC BLOOD PRESSURE: 128 MMHG | OXYGEN SATURATION: 96 %

## 2023-08-02 DIAGNOSIS — R25.2 MUSCLE CRAMPING: ICD-10-CM

## 2023-08-02 DIAGNOSIS — Z13.820 ENCOUNTER FOR OSTEOPOROSIS SCREENING IN ASYMPTOMATIC POSTMENOPAUSAL PATIENT: ICD-10-CM

## 2023-08-02 DIAGNOSIS — M79.2 NEUROPATHIC PAIN OF LOWER EXTREMITY, UNSPECIFIED LATERALITY: ICD-10-CM

## 2023-08-02 DIAGNOSIS — Z78.0 ENCOUNTER FOR OSTEOPOROSIS SCREENING IN ASYMPTOMATIC POSTMENOPAUSAL PATIENT: ICD-10-CM

## 2023-08-02 DIAGNOSIS — N39.0 URINARY TRACT INFECTION WITHOUT HEMATURIA, SITE UNSPECIFIED: ICD-10-CM

## 2023-08-02 DIAGNOSIS — N18.32 STAGE 3B CHRONIC KIDNEY DISEASE: ICD-10-CM

## 2023-08-02 DIAGNOSIS — E87.1 HYPONATREMIA: ICD-10-CM

## 2023-08-02 PROBLEM — N18.4 STAGE 4 CHRONIC KIDNEY DISEASE (HCC): Status: RESOLVED | Noted: 2023-06-26 | Resolved: 2023-08-02

## 2023-08-02 LAB
APPEARANCE UR: ABNORMAL
BILIRUB UR STRIP-MCNC: NEGATIVE MG/DL
COLOR UR AUTO: YELLOW
GLUCOSE UR STRIP.AUTO-MCNC: NEGATIVE MG/DL
KETONES UR STRIP.AUTO-MCNC: NEGATIVE MG/DL
LEUKOCYTE ESTERASE UR QL STRIP.AUTO: ABNORMAL
NITRITE UR QL STRIP.AUTO: NEGATIVE
PH UR STRIP.AUTO: 6 [PH] (ref 5–8)
PROT UR QL STRIP: NEGATIVE MG/DL
RBC UR QL AUTO: ABNORMAL
SP GR UR STRIP.AUTO: 1.01
UROBILINOGEN UR STRIP-MCNC: 0.2 MG/DL

## 2023-08-02 PROCEDURE — 87086 URINE CULTURE/COLONY COUNT: CPT

## 2023-08-02 PROCEDURE — 87077 CULTURE AEROBIC IDENTIFY: CPT

## 2023-08-02 PROCEDURE — 3078F DIAST BP <80 MM HG: CPT | Performed by: STUDENT IN AN ORGANIZED HEALTH CARE EDUCATION/TRAINING PROGRAM

## 2023-08-02 PROCEDURE — 3074F SYST BP LT 130 MM HG: CPT | Performed by: STUDENT IN AN ORGANIZED HEALTH CARE EDUCATION/TRAINING PROGRAM

## 2023-08-02 PROCEDURE — 99214 OFFICE O/P EST MOD 30 MIN: CPT | Performed by: STUDENT IN AN ORGANIZED HEALTH CARE EDUCATION/TRAINING PROGRAM

## 2023-08-02 PROCEDURE — 87186 SC STD MICRODIL/AGAR DIL: CPT

## 2023-08-02 PROCEDURE — 81002 URINALYSIS NONAUTO W/O SCOPE: CPT | Performed by: STUDENT IN AN ORGANIZED HEALTH CARE EDUCATION/TRAINING PROGRAM

## 2023-08-02 RX ORDER — CYCLOBENZAPRINE HCL 5 MG
5 TABLET ORAL 3 TIMES DAILY PRN
Qty: 90 TABLET | Refills: 3 | Status: SHIPPED | OUTPATIENT
Start: 2023-08-02 | End: 2023-12-12

## 2023-08-02 RX ORDER — GABAPENTIN 100 MG/1
200 CAPSULE ORAL 2 TIMES DAILY
Qty: 360 CAPSULE | Refills: 3 | Status: SHIPPED | OUTPATIENT
Start: 2023-08-02

## 2023-08-02 RX ORDER — CIPROFLOXACIN 500 MG/1
500 TABLET, FILM COATED ORAL DAILY
Qty: 5 TABLET | Refills: 0 | Status: SHIPPED | OUTPATIENT
Start: 2023-08-02 | End: 2023-08-07

## 2023-08-02 ASSESSMENT — ENCOUNTER SYMPTOMS
NAUSEA: 0
FEVER: 0
VOMITING: 0
CHILLS: 1
ABDOMINAL PAIN: 1
DIARRHEA: 0
FLANK PAIN: 0

## 2023-08-02 ASSESSMENT — PAIN SCALES - GENERAL: PAINLEVEL: NO PAIN

## 2023-08-02 ASSESSMENT — FIBROSIS 4 INDEX: FIB4 SCORE: 1.48

## 2023-08-02 NOTE — PROGRESS NOTES
"Subjective:     Chief Complaint   Patient presents with    Urinary Frequency     Burning, painful and chills.     HPI:   Mariely presents today with concern for UTI.    Patient states that she had some frequency/urgency for the past week and today developed dysuria so wanted to come in to evaluate for UTI.  Reports she has been feeling a bit lightheaded for the past 3 days and had some chills but no fever.  Denies any flank pain or hematuria.  No GI complaints.  States that she has lower abdominal pressure.  Patient does not have frequent UTI, last was almost a year ago.    Patient also hoping to refill Flexeril.  Patient went to urgent care for back pain and was prescribed 5 mg Flexeril which had been very effective for that pain which she no longer has but found that it was also helpful for bilateral leg cramps and hoping for refill.  States that diazepam has not been taken as it has not seemed to be working.    Also needs a refill of her gabapentin.    Review of Systems   Constitutional:  Positive for chills. Negative for fever, malaise/fatigue and weight loss.   HENT:  Negative for sore throat.    Respiratory:  Negative for cough and shortness of breath.    Cardiovascular:  Negative for chest pain, palpitations, orthopnea, leg swelling and PND.   Gastrointestinal:  Positive for abdominal pain. Negative for diarrhea, nausea and vomiting.   Genitourinary:  Positive for dysuria, frequency and urgency. Negative for flank pain and hematuria.     Objective:     Exam:  /78 (BP Location: Left arm, Patient Position: Sitting, BP Cuff Size: Adult)   Pulse 63   Temp 36.9 °C (98.4 °F) (Temporal)   Resp 16   Ht 1.575 m (5' 2\")   Wt 60.1 kg (132 lb 8 oz)   SpO2 96%   BMI 24.23 kg/m²  Body mass index is 24.23 kg/m².    Physical Exam  Vitals reviewed.   Constitutional:       General: She is not in acute distress.     Appearance: Normal appearance. She is not ill-appearing.   HENT:      Head: Normocephalic and " atraumatic.      Mouth/Throat:      Mouth: Mucous membranes are moist.   Eyes:      General: No scleral icterus.  Cardiovascular:      Rate and Rhythm: Normal rate and regular rhythm.      Heart sounds: Normal heart sounds.   Pulmonary:      Effort: Pulmonary effort is normal.      Breath sounds: Normal breath sounds.   Abdominal:      General: Abdomen is flat. Bowel sounds are normal. There is no distension.      Palpations: Abdomen is soft. There is no mass.      Tenderness: There is no abdominal tenderness. There is no right CVA tenderness, left CVA tenderness, guarding or rebound.   Musculoskeletal:      Right lower leg: No edema.      Left lower leg: No edema.   Skin:     General: Skin is warm and dry.      Coloration: Skin is not jaundiced.   Neurological:      General: No focal deficit present.      Mental Status: She is alert and oriented to person, place, and time.   Psychiatric:         Mood and Affect: Mood normal.         Behavior: Behavior normal.         Thought Content: Thought content normal.       Labs: Reviewed from 6/20/2023, 7/10/2023    Assessment & Plan:     83 y.o. female with the following -     1. Urinary tract infection without hematuria, site unspecified  This is an acute condition, given reported chills with this patient with history of septic shock last year for UTI will treat with ciprofloxacin as below (renally dosed).  Gave return precautions, follow urine culture and will change antibiotics if needed.  - POCT Urinalysis  - URINE CULTURE(NEW); Future  - ciprofloxacin (CIPRO) 500 MG Tab; Take 1 Tablet by mouth every day for 5 days.  Dispense: 5 Tablet; Refill: 0    2. Hyponatremia  3. Stage 3b chronic kidney disease (HCC)  Chronic conditions, euvolemic on today's exam and has repeat BMP ordered by her cardiologist who reduced her furosemide from 20 mg twice daily to once a day.  Advised completion of lab work to trend.  Blood pressure well controlled today, continue losartan 25 mg  daily.    5. Muscle cramping  This is a chronic condition, patient was recently prescribed Flexeril for muscle spasm of the back and found that this also was very effective in controlling her lower extremity cramping as well.  This is well-tolerated will refill as below.  Patient understands to use this instead of diazepam.  - cyclobenzaprine (FLEXERIL) 5 mg tablet; Take 1 Tablet by mouth 3 times a day as needed for Muscle Spasms.  Dispense: 90 Tablet; Refill: 3    6. Neuropathic pain of lower extremity, unspecified laterality  Chronic, controlled. Continue medication(s) as below.  - gabapentin (NEURONTIN) 100 MG Cap; Take 2 Capsules by mouth 2 times a day.  Dispense: 360 Capsule; Refill: 3    7. Encounter for osteoporosis screening in asymptomatic postmenopausal patient  - DS-BONE DENSITY STUDY (DEXA); Future    I spent a total of 34 minutes with record review, exam, communication with the patient, and documentation of this encounter.    Return in about 23 days (around 8/25/2023), or if symptoms worsen or fail to improve.    Please note that this dictation was created using voice recognition software. I have made every reasonable attempt to correct obvious errors, but I expect that there are errors of grammar and possibly content that I did not discover before finalizing the note.

## 2023-08-03 ENCOUNTER — HOSPITAL ENCOUNTER (OUTPATIENT)
Dept: LAB | Facility: MEDICAL CENTER | Age: 83
End: 2023-08-03
Attending: INTERNAL MEDICINE
Payer: MEDICARE

## 2023-08-04 ASSESSMENT — ENCOUNTER SYMPTOMS
ORTHOPNEA: 0
WEIGHT LOSS: 0
PALPITATIONS: 0
PND: 0
COUGH: 0
SHORTNESS OF BREATH: 0
SORE THROAT: 0

## 2023-08-05 LAB
BACTERIA UR CULT: ABNORMAL
BACTERIA UR CULT: ABNORMAL
SIGNIFICANT IND 70042: ABNORMAL
SITE SITE: ABNORMAL
SOURCE SOURCE: ABNORMAL

## 2023-08-17 ENCOUNTER — PATIENT OUTREACH (OUTPATIENT)
Dept: HEALTH INFORMATION MANAGEMENT | Facility: OTHER | Age: 83
End: 2023-08-17
Payer: MEDICARE

## 2023-08-17 DIAGNOSIS — I10 ESSENTIAL HYPERTENSION: ICD-10-CM

## 2023-08-17 DIAGNOSIS — I50.43 CHF (CONGESTIVE HEART FAILURE), NYHA CLASS I, ACUTE ON CHRONIC, COMBINED (HCC): ICD-10-CM

## 2023-08-17 DIAGNOSIS — N18.31 STAGE 3A CHRONIC KIDNEY DISEASE: ICD-10-CM

## 2023-08-17 DIAGNOSIS — E78.2 MIXED HYPERLIPIDEMIA: ICD-10-CM

## 2023-08-17 NOTE — PROGRESS NOTES
Assessment/ Education: Spoke with Mariely today, she reports her energy level is starting to increase, she feels better. Discussed her upcoming pcp appt (8/25/23). Discussed lab work from Dr. Resendez that needs to be completed, Mariely is aware the ab work is fasting. Светлана saw cardiology on 7/25/23, advised to RTC in one year. Mariely also has a DEXA scan on 9/20/23 and a new pt Rheumatology appt on 10/3/23. Her blood pressure readings in clinic have been: 128/78 (8/2/23), 138/62 (7/25/23) and 156/68 (7/19/23). Her last GFR lab on 7/10/23 was 40. No other concerns or questions, Mariely's current risk score is an eight (8), she would benefit from continued participation in the CCM program.     Plan of Care and Goals: Continue with current    Barriers: reliance on transportation from others    Progress: Progressing slowly, current risk score is 8.     Next outreach: 9/15/2023

## 2023-08-22 ENCOUNTER — HOSPITAL ENCOUNTER (OUTPATIENT)
Dept: LAB | Facility: MEDICAL CENTER | Age: 83
End: 2023-08-22
Attending: INTERNAL MEDICINE
Payer: MEDICARE

## 2023-08-22 DIAGNOSIS — R60.0 BILATERAL EDEMA OF LOWER EXTREMITY: ICD-10-CM

## 2023-08-22 DIAGNOSIS — R06.02 SOB (SHORTNESS OF BREATH): ICD-10-CM

## 2023-08-22 DIAGNOSIS — E78.5 DYSLIPIDEMIA: ICD-10-CM

## 2023-08-22 DIAGNOSIS — R79.89 ELEVATED TROPONIN: ICD-10-CM

## 2023-08-22 DIAGNOSIS — I35.1 AORTIC VALVE INSUFFICIENCY, ETIOLOGY OF CARDIAC VALVE DISEASE UNSPECIFIED: ICD-10-CM

## 2023-08-22 DIAGNOSIS — D50.0 IRON DEFICIENCY ANEMIA DUE TO CHRONIC BLOOD LOSS: ICD-10-CM

## 2023-08-22 DIAGNOSIS — Z79.01 CHRONIC ANTICOAGULATION: ICD-10-CM

## 2023-08-22 DIAGNOSIS — N18.4 STAGE 4 CHRONIC KIDNEY DISEASE (HCC): ICD-10-CM

## 2023-08-22 DIAGNOSIS — I50.33 DIASTOLIC CHF, ACUTE ON CHRONIC (HCC): ICD-10-CM

## 2023-08-22 LAB
ALBUMIN SERPL BCP-MCNC: 3.9 G/DL (ref 3.2–4.9)
ALBUMIN/GLOB SERPL: 1.4 G/DL
ALP SERPL-CCNC: 148 U/L (ref 30–99)
ALT SERPL-CCNC: 16 U/L (ref 2–50)
ANION GAP SERPL CALC-SCNC: 12 MMOL/L (ref 7–16)
ANION GAP SERPL CALC-SCNC: 13 MMOL/L (ref 7–16)
AST SERPL-CCNC: 18 U/L (ref 12–45)
BASOPHILS # BLD AUTO: 0.8 % (ref 0–1.8)
BASOPHILS # BLD: 0.09 K/UL (ref 0–0.12)
BILIRUB SERPL-MCNC: 0.4 MG/DL (ref 0.1–1.5)
BUN SERPL-MCNC: 35 MG/DL (ref 8–22)
BUN SERPL-MCNC: 36 MG/DL (ref 8–22)
CALCIUM ALBUM COR SERPL-MCNC: 9 MG/DL (ref 8.5–10.5)
CALCIUM SERPL-MCNC: 8.8 MG/DL (ref 8.5–10.5)
CALCIUM SERPL-MCNC: 8.9 MG/DL (ref 8.5–10.5)
CHLORIDE SERPL-SCNC: 100 MMOL/L (ref 96–112)
CHLORIDE SERPL-SCNC: 100 MMOL/L (ref 96–112)
CHOLEST SERPL-MCNC: 103 MG/DL (ref 100–199)
CO2 SERPL-SCNC: 20 MMOL/L (ref 20–33)
CO2 SERPL-SCNC: 21 MMOL/L (ref 20–33)
CREAT SERPL-MCNC: 1.32 MG/DL (ref 0.5–1.4)
CREAT SERPL-MCNC: 1.34 MG/DL (ref 0.5–1.4)
EOSINOPHIL # BLD AUTO: 0.5 K/UL (ref 0–0.51)
EOSINOPHIL NFR BLD: 4.2 % (ref 0–6.9)
ERYTHROCYTE [DISTWIDTH] IN BLOOD BY AUTOMATED COUNT: 56.1 FL (ref 35.9–50)
FASTING STATUS PATIENT QL REPORTED: NORMAL
FERRITIN SERPL-MCNC: 256 NG/ML (ref 10–291)
GFR SERPLBLD CREATININE-BSD FMLA CKD-EPI: 40 ML/MIN/1.73 M 2
GFR SERPLBLD CREATININE-BSD FMLA CKD-EPI: 40 ML/MIN/1.73 M 2
GLOBULIN SER CALC-MCNC: 2.7 G/DL (ref 1.9–3.5)
GLUCOSE SERPL-MCNC: 100 MG/DL (ref 65–99)
GLUCOSE SERPL-MCNC: 94 MG/DL (ref 65–99)
HCT VFR BLD AUTO: 33.5 % (ref 37–47)
HDLC SERPL-MCNC: 56 MG/DL
HGB BLD-MCNC: 10.2 G/DL (ref 12–16)
IMM GRANULOCYTES # BLD AUTO: 0.15 K/UL (ref 0–0.11)
IMM GRANULOCYTES NFR BLD AUTO: 1.3 % (ref 0–0.9)
IRON SATN MFR SERPL: 10 % (ref 15–55)
IRON SERPL-MCNC: 23 UG/DL (ref 40–170)
LDLC SERPL CALC-MCNC: 29 MG/DL
LYMPHOCYTES # BLD AUTO: 1.01 K/UL (ref 1–4.8)
LYMPHOCYTES NFR BLD: 8.5 % (ref 22–41)
MCH RBC QN AUTO: 30 PG (ref 27–33)
MCHC RBC AUTO-ENTMCNC: 30.4 G/DL (ref 32.2–35.5)
MCV RBC AUTO: 98.5 FL (ref 81.4–97.8)
MONOCYTES # BLD AUTO: 0.62 K/UL (ref 0–0.85)
MONOCYTES NFR BLD AUTO: 5.2 % (ref 0–13.4)
NEUTROPHILS # BLD AUTO: 9.49 K/UL (ref 1.82–7.42)
NEUTROPHILS NFR BLD: 80 % (ref 44–72)
NRBC # BLD AUTO: 0 K/UL
NRBC BLD-RTO: 0 /100 WBC (ref 0–0.2)
PLATELET # BLD AUTO: 290 K/UL (ref 164–446)
PMV BLD AUTO: 12 FL (ref 9–12.9)
POTASSIUM SERPL-SCNC: 5 MMOL/L (ref 3.6–5.5)
POTASSIUM SERPL-SCNC: 5 MMOL/L (ref 3.6–5.5)
PROT SERPL-MCNC: 6.6 G/DL (ref 6–8.2)
RBC # BLD AUTO: 3.4 M/UL (ref 4.2–5.4)
SODIUM SERPL-SCNC: 133 MMOL/L (ref 135–145)
SODIUM SERPL-SCNC: 133 MMOL/L (ref 135–145)
TIBC SERPL-MCNC: 228 UG/DL (ref 250–450)
TRIGL SERPL-MCNC: 90 MG/DL (ref 0–149)
UIBC SERPL-MCNC: 205 UG/DL (ref 110–370)
WBC # BLD AUTO: 11.9 K/UL (ref 4.8–10.8)

## 2023-08-22 PROCEDURE — 80061 LIPID PANEL: CPT

## 2023-08-22 PROCEDURE — 80048 BASIC METABOLIC PNL TOTAL CA: CPT

## 2023-08-22 PROCEDURE — 83540 ASSAY OF IRON: CPT

## 2023-08-22 PROCEDURE — 36415 COLL VENOUS BLD VENIPUNCTURE: CPT

## 2023-08-22 PROCEDURE — 80053 COMPREHEN METABOLIC PANEL: CPT

## 2023-08-22 PROCEDURE — 82728 ASSAY OF FERRITIN: CPT

## 2023-08-22 PROCEDURE — 85025 COMPLETE CBC W/AUTO DIFF WBC: CPT

## 2023-08-22 PROCEDURE — 83550 IRON BINDING TEST: CPT

## 2023-08-25 ENCOUNTER — OFFICE VISIT (OUTPATIENT)
Dept: MEDICAL GROUP | Facility: PHYSICIAN GROUP | Age: 83
End: 2023-08-25
Payer: MEDICARE

## 2023-08-25 VITALS
HEART RATE: 59 BPM | TEMPERATURE: 97.7 F | OXYGEN SATURATION: 98 % | WEIGHT: 130 LBS | SYSTOLIC BLOOD PRESSURE: 108 MMHG | RESPIRATION RATE: 20 BRPM | BODY MASS INDEX: 23.92 KG/M2 | DIASTOLIC BLOOD PRESSURE: 60 MMHG | HEIGHT: 62 IN

## 2023-08-25 DIAGNOSIS — N18.32 STAGE 3B CHRONIC KIDNEY DISEASE: ICD-10-CM

## 2023-08-25 DIAGNOSIS — D50.9 IRON DEFICIENCY ANEMIA, UNSPECIFIED IRON DEFICIENCY ANEMIA TYPE: ICD-10-CM

## 2023-08-25 DIAGNOSIS — R74.8 ALKALINE PHOSPHATASE ELEVATION: ICD-10-CM

## 2023-08-25 DIAGNOSIS — E78.2 MIXED HYPERLIPIDEMIA: ICD-10-CM

## 2023-08-25 DIAGNOSIS — E87.1 HYPONATREMIA: ICD-10-CM

## 2023-08-25 DIAGNOSIS — E26.1 SECONDARY HYPERALDOSTERONISM (HCC): ICD-10-CM

## 2023-08-25 PROCEDURE — 99214 OFFICE O/P EST MOD 30 MIN: CPT | Performed by: STUDENT IN AN ORGANIZED HEALTH CARE EDUCATION/TRAINING PROGRAM

## 2023-08-25 PROCEDURE — 3074F SYST BP LT 130 MM HG: CPT | Performed by: STUDENT IN AN ORGANIZED HEALTH CARE EDUCATION/TRAINING PROGRAM

## 2023-08-25 PROCEDURE — 3078F DIAST BP <80 MM HG: CPT | Performed by: STUDENT IN AN ORGANIZED HEALTH CARE EDUCATION/TRAINING PROGRAM

## 2023-08-25 ASSESSMENT — FIBROSIS 4 INDEX: FIB4 SCORE: 1.26

## 2023-08-25 NOTE — PATIENT INSTRUCTIONS
Stool test for blood ASAP, non fasting labs 3m    I will be transferring to the clinic below 10/10/2023.  [facility] [place]  [address] [place] [address]     I will be happy to continue to be your primary care provider if you'd like to follow me. If not please call [phone] to establish care with new provider

## 2023-08-25 NOTE — PROGRESS NOTES
"Subjective:     Chief Complaint   Patient presents with    Follow-Up     HPI:   Mariely presents today for follow-up and lab review.    Patient reports she has been feeling well without any complaints.  She does endorse chronically not having the best appetite and potentially under eating.  This has not changed.  Denies any abdominal complaints or blood in the stool.  States that she had a colonoscopy approximately 3 years ago and was told she had polyps at that time, unsure when they wanted to repeat.  Continues with folic acid, iron and B12 supplements.    Review of Systems   Constitutional:  Negative for chills, fever, malaise/fatigue and weight loss.   Respiratory:  Negative for cough and shortness of breath.    Cardiovascular:  Negative for chest pain, palpitations and leg swelling.   Gastrointestinal:  Negative for abdominal pain, blood in stool, constipation, diarrhea, heartburn, nausea and vomiting.   Genitourinary:  Negative for dysuria, frequency, hematuria and urgency.   Neurological:  Negative for dizziness and headaches.     Objective:     Exam:  /60 (BP Location: Right arm, Patient Position: Sitting, BP Cuff Size: Adult)   Pulse (!) 59   Temp 36.5 °C (97.7 °F) (Temporal)   Resp 20   Ht 1.575 m (5' 2\")   Wt 59 kg (130 lb)   SpO2 98%   BMI 23.78 kg/m²  Body mass index is 23.78 kg/m².    Physical Exam  Vitals reviewed.   Constitutional:       General: She is not in acute distress.     Appearance: Normal appearance. She is not ill-appearing.   HENT:      Head: Normocephalic and atraumatic.      Mouth/Throat:      Mouth: Mucous membranes are moist.   Eyes:      General: No scleral icterus.  Cardiovascular:      Rate and Rhythm: Normal rate and regular rhythm.      Heart sounds: Normal heart sounds.   Pulmonary:      Effort: Pulmonary effort is normal.      Breath sounds: Normal breath sounds.   Abdominal:      General: Abdomen is flat. Bowel sounds are normal. There is no distension.      " Palpations: Abdomen is soft. There is no mass.      Tenderness: There is no abdominal tenderness. There is no guarding or rebound.   Musculoskeletal:      Right lower leg: No edema.      Left lower leg: No edema.   Skin:     General: Skin is warm and dry.      Coloration: Skin is not jaundiced.   Neurological:      General: No focal deficit present.      Mental Status: She is alert and oriented to person, place, and time.   Psychiatric:         Mood and Affect: Mood normal.         Behavior: Behavior normal.         Thought Content: Thought content normal.       Labs: Reviewed from 8/22/2023    Assessment & Plan:     83 y.o. female with the following -     1. Iron deficiency anemia, unspecified iron deficiency anemia type  Chronic anemia with this patient history of B12 and folate deficiency suspected related to poor diet with continued iron deficiency despite supplementation.  Patient will continue with iron, folate and B12 as below.  Denies symptoms concerning for occult GI bleeding, check for occult blood and consider referral to GI pending results.  Discussed we could consider nutrition referral as these nutritional deficiencies may be intake related-patient declines for now.  Potentially also anemia of chronic disease.  Repeat her lab work in 3 months to monitor, aware of return precautions.  - OCCULT BLOOD FECES IMMUNOASSAY; Future  - CBC WITH DIFFERENTIAL; Future  - FERRITIN; Future  - FOLATE; Future  - VITAMIN B12; Future  - IRON/TOTAL IRON BIND; Future    ferrous sulfate 325 (65 Fe) MG tablet, TAKE 1 TABLET BY MOUTH EVERY DAY, Disp: 90 Tablet, Rfl: 0    folic acid (FOLVITE) 1 MG Tab, TAKE 1 TABLET BY MOUTH EVERY DAY, Disp: 90 Tablet, Rfl: 3    Methylcobalamin (B-12) 5000 MCG TABLET DISPERSIBLE, Take  by mouth., Disp: , Rfl:     2. Stage 3b chronic kidney disease (HCC)  Chronic condition, stable. BP well controlled, continue ARB. Avoid nephrotoxic medications when able. Trend in 3m.  - Comp Metabolic Panel;  Future  - MICROALBUMIN CREAT RATIO URINE; Future    3. Hyponatremia  Chronic, improved from prior.  Potentially side effect of gabapentin or diuretic.  - Comp Metabolic Panel; Future    4. Mixed hyperlipidemia  Chronic, controlled. Continue medication(s) as below.    simvastatin (ZOCOR) 10 MG Tab, Take 1 Tablet by mouth every evening. Indications: Ischemic Heart Disease, Disp: 100 Tablet, Rfl: 3    5. Alkaline phosphatase elevation  Chronic, improved from prior.  Patient has DEXA to complete.  Plan pending results, trend with next labs.  - Comp Metabolic Panel; Future  - VITAMIN D,25 HYDROXY (DEFICIENCY); Future  - ALKALINE PHOSPHATASE ISOENZYMES; Future    6. Secondary hyperaldosteronism (HCC)  Chronic in relation to diastolic heart failure, euvolemic with controlled blood pressure on today's exam.  Continue with medications as below and care with cardiology.    furosemide (LASIX) 20 MG Tab, Take 1 Tablet by mouth every day., Disp: 100 Tablet, Rfl: 4    losartan (COZAAR) 25 MG Tab, Take 1 Tablet by mouth every day. Indications: High Blood Pressure Disorder, Disp: 90 Tablet, Rfl: 3    metoprolol tartrate (LOPRESSOR) 50 MG Tab, Take 1 Tablet by mouth 2 times a day. Indications: High Blood Pressure Disorder, Disp: 180 Tablet, Rfl: 4    Return in about 3 months (around 11/25/2023), or if symptoms worsen or fail to improve, for Annual Medicare Visit.    Please note that this dictation was created using voice recognition software. I have made every reasonable attempt to correct obvious errors, but I expect that there are errors of grammar and possibly content that I did not discover before finalizing the note.

## 2023-08-28 PROBLEM — E26.1 SECONDARY HYPERALDOSTERONISM (HCC): Status: ACTIVE | Noted: 2023-08-28

## 2023-08-28 ASSESSMENT — ENCOUNTER SYMPTOMS
WEIGHT LOSS: 0
HEARTBURN: 0
DIZZINESS: 0
CONSTIPATION: 0
SHORTNESS OF BREATH: 0
CHILLS: 0
ABDOMINAL PAIN: 0
BLOOD IN STOOL: 0
FEVER: 0
HEADACHES: 0
DIARRHEA: 0
NAUSEA: 0
PALPITATIONS: 0
COUGH: 0
VOMITING: 0

## 2023-09-07 DIAGNOSIS — D50.9 IRON DEFICIENCY ANEMIA, UNSPECIFIED IRON DEFICIENCY ANEMIA TYPE: ICD-10-CM

## 2023-09-07 RX ORDER — FERROUS SULFATE 325(65) MG
325 TABLET ORAL DAILY
Qty: 90 TABLET | Refills: 0 | Status: SHIPPED | OUTPATIENT
Start: 2023-09-07 | End: 2023-11-06

## 2023-09-12 DIAGNOSIS — I10 HTN (HYPERTENSION), MALIGNANT: ICD-10-CM

## 2023-09-13 RX ORDER — LOSARTAN POTASSIUM 25 MG/1
25 TABLET ORAL DAILY
Qty: 90 TABLET | Refills: 1 | Status: SHIPPED | OUTPATIENT
Start: 2023-09-13 | End: 2023-12-12 | Stop reason: SDUPTHER

## 2023-09-20 ENCOUNTER — HOSPITAL ENCOUNTER (OUTPATIENT)
Dept: RADIOLOGY | Facility: MEDICAL CENTER | Age: 83
End: 2023-09-20
Attending: STUDENT IN AN ORGANIZED HEALTH CARE EDUCATION/TRAINING PROGRAM
Payer: MEDICARE

## 2023-09-20 DIAGNOSIS — Z78.0 ENCOUNTER FOR OSTEOPOROSIS SCREENING IN ASYMPTOMATIC POSTMENOPAUSAL PATIENT: ICD-10-CM

## 2023-09-20 DIAGNOSIS — Z13.820 ENCOUNTER FOR OSTEOPOROSIS SCREENING IN ASYMPTOMATIC POSTMENOPAUSAL PATIENT: ICD-10-CM

## 2023-09-20 PROCEDURE — 77080 DXA BONE DENSITY AXIAL: CPT

## 2023-10-03 ENCOUNTER — OFFICE VISIT (OUTPATIENT)
Dept: RHEUMATOLOGY | Facility: MEDICAL CENTER | Age: 83
End: 2023-10-03
Attending: INTERNAL MEDICINE
Payer: MEDICARE

## 2023-10-03 VITALS
TEMPERATURE: 97.6 F | OXYGEN SATURATION: 98 % | HEART RATE: 54 BPM | RESPIRATION RATE: 14 BRPM | SYSTOLIC BLOOD PRESSURE: 130 MMHG | DIASTOLIC BLOOD PRESSURE: 62 MMHG | HEIGHT: 62 IN | WEIGHT: 133 LBS | BODY MASS INDEX: 24.48 KG/M2

## 2023-10-03 DIAGNOSIS — M81.0 OSTEOPOROSIS, UNSPECIFIED OSTEOPOROSIS TYPE, UNSPECIFIED PATHOLOGICAL FRACTURE PRESENCE: ICD-10-CM

## 2023-10-03 DIAGNOSIS — Z79.899 LONG-TERM USE OF PLAQUENIL: ICD-10-CM

## 2023-10-03 DIAGNOSIS — I10 ESSENTIAL HYPERTENSION: ICD-10-CM

## 2023-10-03 DIAGNOSIS — E03.9 ACQUIRED HYPOTHYROIDISM: ICD-10-CM

## 2023-10-03 DIAGNOSIS — M05.79 RHEUMATOID ARTHRITIS INVOLVING MULTIPLE SITES WITH POSITIVE RHEUMATOID FACTOR (HCC): ICD-10-CM

## 2023-10-03 DIAGNOSIS — I50.33 DIASTOLIC CHF, ACUTE ON CHRONIC (HCC): ICD-10-CM

## 2023-10-03 DIAGNOSIS — E79.0 HYPERURICEMIA: ICD-10-CM

## 2023-10-03 DIAGNOSIS — I73.9 PVD (PERIPHERAL VASCULAR DISEASE) (HCC): ICD-10-CM

## 2023-10-03 PROCEDURE — 99205 OFFICE O/P NEW HI 60 MIN: CPT | Performed by: INTERNAL MEDICINE

## 2023-10-03 PROCEDURE — 3078F DIAST BP <80 MM HG: CPT | Performed by: INTERNAL MEDICINE

## 2023-10-03 PROCEDURE — 99212 OFFICE O/P EST SF 10 MIN: CPT | Performed by: INTERNAL MEDICINE

## 2023-10-03 PROCEDURE — 3075F SYST BP GE 130 - 139MM HG: CPT | Performed by: INTERNAL MEDICINE

## 2023-10-03 RX ORDER — HYDROXYCHLOROQUINE SULFATE 200 MG/1
TABLET, FILM COATED ORAL
Qty: 135 TABLET | Refills: 0 | Status: SHIPPED | OUTPATIENT
Start: 2023-10-03 | End: 2023-11-06

## 2023-10-03 ASSESSMENT — FIBROSIS 4 INDEX: FIB4 SCORE: 1.26

## 2023-10-03 ASSESSMENT — JOINT PAIN
TOTAL NUMBER OF SWOLLEN JOINTS: 0
TOTAL NUMBER OF TENDER JOINTS: 1

## 2023-10-03 NOTE — ASSESSMENT & PLAN NOTE
Patient here for eval of RA with positive RF  Superficial thrombophlebitis  Left wrist pain 2/2023    murmrt RSB2/6        PMHx  Diastolic CHF  HTN  Hypothyroid  Aortic insufficiency/aortic valve replacement  Iron def anemia  Neuropathy feet   PVD  Hx of DVT      Fam Hx  M-passed lung cancer  F-passed throat cancer  Bp2gwwgrc CAD  [id number] lung cancer    Soc Hx  Pos tobacco quit 1979  ETOH wine QHS  No blood tx  No tattoos      Uric acid 9.9 6/2023  CCP neg 6/2023  RF 35 6/2023  BLANCHE neg 6/2023  Lupus anticoagulant neg 3/2023  apl neg 3/2023

## 2023-10-03 NOTE — PROGRESS NOTES
Chief Complaint-joint pain    Chief Complaint   Patient presents with    New Patient     Mariely Easley is a 83 y.o. female here as a new Rheumatology Consult referred by Luz Vieria D.O.    This is a new patient evaluation      HPI:   This is a new patient evaluation, of note patient is a poor historian.    Patient referred for evaluation for rheumatoid arthritis because of the positive rheumatoid factor.  Patient comes in today stating that her primary issue is superficial thrombophlebitis with bumps in the veins in her legs already status post extensive work-up by hematology felt nothing to be done per patient report.. Of note, last eye exam 9/2023 [facility]    Patient denies any swelling of any joints, states that she does have some achiness in her left wrist from time to time, was told secondary to postprocedural complications for her heart surgery in February 2023 for an aortic valve replacement.  Patient denies any brinda joint swelling.    Records also indicate an elevated uric acid level at 9.9 June 2023, patient does admit to alcohol use nightly, patient drinks wine coolers nightly per patient report.  Patient denies ever having a gout attack, denies any family history of gout.    PMHx  Diastolic CHF  HTN  Hypothyroid  Aortic insufficiency/aortic valve replacement  Iron def anemia  Neuropathy feet   PVD  Hx of DVT      Fam Hx  M-passed lung cancer  F-passed throat cancer  [id number] CAD  [id number] lung cancer    Soc Hx  Pos tobacco quit 1979  ETOH wine QHS  No blood tx  No tattoos    Addendum 11/6/2023  Uric acid 8.7 11/2023 (on no treatment) no hx of gout flares    Addendum 11/7/2023  G6PD 22.1 adequate 11/2023      Uric acid 9.9 6/2023  CCP neg 6/2023  RF 35 6/2023  BLANCHE neg 6/2023  Lupus anticoagulant neg 3/2023  apl neg 3/2023    Bilateral LE doppler ultrasound 7/2023-CONCLUSIONS   Bilateral lower extremities. All veins demonstrate complete color filling    and compressibility with  normal venous flow dynamics including spontaneous    flow and respiratory phasicity. No superficial or deep venous thrombosis.    Right lower extremity. No significant reflux of the superficial or deep    veins. The great saphenous vein is patent and competent throughout its    length. The small saphenous vein is patent and competent throughout its    length.    Left lower extremity. No significant deep venous reflux. The great    saphenous vein is patent and demonstrates significant reflux from the prox    calf to the mid calf.  The small saphenous vein is patent and competent    throughout its length.      Current Outpatient Medications   Medication Sig Dispense Refill    hydroxychloroquine (PLAQUENIL) 200 MG Tab 1 1/2 tabs po qday 135 Tablet 0    losartan (COZAAR) 25 MG Tab Take 1 Tablet by mouth every day. Indications: High Blood Pressure Disorder 90 Tablet 1    ferrous sulfate 325 (65 Fe) MG tablet TAKE 1 TABLET BY MOUTH EVERY DAY 90 Tablet 0    cyclobenzaprine (FLEXERIL) 5 mg tablet Take 1 Tablet by mouth 3 times a day as needed for Muscle Spasms. 90 Tablet 3    gabapentin (NEURONTIN) 100 MG Cap Take 2 Capsules by mouth 2 times a day. 360 Capsule 3    furosemide (LASIX) 20 MG Tab Take 1 Tablet by mouth every day. 100 Tablet 4    metoprolol tartrate (LOPRESSOR) 50 MG Tab Take 1 Tablet by mouth 2 times a day. Indications: High Blood Pressure Disorder 180 Tablet 4    simvastatin (ZOCOR) 10 MG Tab Take 1 Tablet by mouth every evening. Indications: Ischemic Heart Disease 100 Tablet 3    folic acid (FOLVITE) 1 MG Tab TAKE 1 TABLET BY MOUTH EVERY DAY 90 Tablet 3    Loperamide HCl (IMODIUM PO) Take  by mouth as needed.      aspirin 81 MG EC tablet Take 81 mg by mouth every day. Indications: blood thinner      acetaminophen (TYLENOL) 500 MG Tab Take 1,000 mg by mouth every 6 hours as needed for Mild Pain. 2 tablets = 1,000 mg.  Indications: Fever, Pain      levothyroxine (SYNTHROID) 50 MCG Tab Take 1 Tab by mouth Every  morning on an empty stomach. 90 Tab 3    vitamin D3 (CHOLECALCIFEROL) 1000 Unit (25 mcg) Tab Take 1,000 Units by mouth every day. (Patient not taking: Reported on 10/3/2023)      Methylcobalamin (B-12) 5000 MCG TABLET DISPERSIBLE Take  by mouth. (Patient not taking: Reported on 10/3/2023)       No current facility-administered medications for this visit.     She  has a past medical history of Abnormal albumin (2022), Acute renal failure superimposed on stage 3 chronic kidney disease (HCC) (2019), Dyspnea on minimal exertion (2023), Hypermagnesemia (2022), Hypertension, Hyponatremia (2022), Pain and swelling of left lower extremity (2022), Severe aortic valve regurgitation (2023), SOB (shortness of breath) (2023), Stage 4 chronic kidney disease (HCC) (2023), and Superficial thrombosis of lower extremity, right (2022).  She  has no past surgical history on file.  History reviewed. No pertinent family history.  No family status information on file.     Social History     Tobacco Use    Smoking status: Former     Current packs/day: 0.00     Types: Cigarettes     Quit date: 1979     Years since quittin.2    Smokeless tobacco: Never   Vaping Use    Vaping Use: Never used   Substance Use Topics    Alcohol use: Yes     Alcohol/week: 8.4 oz     Types: 14 Glasses of wine per week     Comment: 2-3 glasses wine daily    Drug use: No     Social History     Social History Narrative    Not on file       ROS   Other than what is mentioned in HPI or physical exam, there is no history of headaches, double vision or blurred vision. No temporal tenderness or jaw claudication.  No trouble swallowing difficulties or sore throats.  No chest complaints including chest pain, cough or sputum production. No GI complaints including nausea, vomiting, change in bowel habits, or past peptic ulcer disease. No history of blood in the stools. No urinary complaints including dysuria or  "frequency. No history of alopecia, photosensitivity, oral ulcerations, Raynaud's phenomena.       Objective:     /62   Pulse (!) 54   Temp 36.4 °C (97.6 °F) (Temporal)   Resp 14   Ht 1.575 m (5' 2\")   Wt 60.3 kg (133 lb)   SpO2 98%  Body mass index is 24.33 kg/m².  Physical Exam:    Constitutional: Alert and oriented X3, no distress.Skin: Warm, dry, good turgor, no rashes in visible areas.  Eye: Equal, round and reactive, conjunctiva clear, lids normal EOM intactENMT: Lips without lesions, good dentition, no oropharyngeal ulcers, moist buccal mucosa, pinna without deformityNeck: Trachea midline, no masses, no thyromegaly.Lymph:  No cervical lymphadenopathy, no axillary lymphadenopathy, no supraclavicular lymphadenopathyRespiratory: Unlabored respiratory effort, lungs clear to auscultation, no wheezes, no ronchi.Cardiovascular: Normal S1, S2, 2 out of 6 systolic murmur heard at the left sternal border abdomen: Soft, non-tender, no masses, no hepatosplenomegaly.Psych: Alert and oriented x3, normal affect and mood.Neuro Cranial nerves 2-12 are grossly intact, no loss of sensation LEExt:no joint laxity noted in bilateral arms, no joint laxity noted in bilateral legs, no brinda swan-neck or boutonniere deformities, there is squaring of the thumb CMC joints bilaterally, also Heberden's nodes on index DIP joints bilaterally, elbows without flexion contractures no nodules no tophi, shoulders full range of motion without painful arc, there is poor muscular architecture of the shoulders, knees with crepitus but no synovitis, there is 1-2+ pitting edema by lower extremities with postinflammatory hyperpigmentation changes bilateral feet toes without crossover toes and without splay toes      Lab Results   Component Value Date/Time    SODIUM 133 (L) 08/22/2023 11:33 AM    POTASSIUM 5.0 08/22/2023 11:33 AM    CHLORIDE 100 08/22/2023 11:33 AM    CO2 21 08/22/2023 11:33 AM    GLUCOSE 100 (H) 08/22/2023 11:33 AM    BUN 36 " (H) 08/22/2023 11:33 AM    CREATININE 1.34 08/22/2023 11:33 AM      Lab Results   Component Value Date/Time    WBC 11.9 (H) 08/22/2023 11:33 AM    RBC 3.40 (L) 08/22/2023 11:33 AM    HEMOGLOBIN 10.2 (L) 08/22/2023 11:33 AM    HEMATOCRIT 33.5 (L) 08/22/2023 11:33 AM    MCV 98.5 (H) 08/22/2023 11:33 AM    MCH 30.0 08/22/2023 11:33 AM    MCHC 30.4 (L) 08/22/2023 11:33 AM    MPV 12.0 08/22/2023 11:33 AM    NEUTSPOLYS 80.00 (H) 08/22/2023 11:33 AM    LYMPHOCYTES 8.50 (L) 08/22/2023 11:33 AM    MONOCYTES 5.20 08/22/2023 11:33 AM    EOSINOPHILS 4.20 08/22/2023 11:33 AM    BASOPHILS 0.80 08/22/2023 11:33 AM    ANISOCYTOSIS 1+ 02/06/2023 04:19 PM      Lab Results   Component Value Date/Time    CALCIUM 8.9 08/22/2023 11:33 AM    ASTSGOT 18 08/22/2023 11:33 AM    ALTSGPT 16 08/22/2023 11:33 AM    ALKPHOSPHAT 148 (H) 08/22/2023 11:33 AM    TBILIRUBIN 0.4 08/22/2023 11:33 AM    ALBUMIN 3.9 08/22/2023 11:33 AM    TOTPROTEIN 6.6 08/22/2023 11:33 AM     Lab Results   Component Value Date/Time    URICACID 9.9 (H) 06/20/2023 11:45 AM    RHEUMFACTN 35 (H) 06/20/2023 11:45 AM    CCPANTIBODY 2 06/20/2023 11:45 AM    ANTINUCAB None Detected 06/20/2023 11:45 AM     Lab Results   Component Value Date/Time    SEDRATEWES 34 (H) 06/20/2023 11:45 AM    CREACTPROT 8.36 (H) 06/20/2023 11:45 AM     Lab Results   Component Value Date/Time    RUSSELVIPER 41.4 03/16/2023 11:20 AM    DRVVTINTERP Not Present 03/16/2023 11:20 AM     Lab Results   Component Value Date/Time    IGGCARDIOLI <10 03/16/2023 11:20 AM    IGMCARDIOLI <10 03/16/2023 11:20 AM     Lab Results   Component Value Date/Time    COLORURINE Yellow 03/08/2023 05:41 PM    SPECGRAVITY 1.015 03/08/2023 05:41 PM    PHURINE 5.5 03/08/2023 05:41 PM    GLUCOSEUR Negative 03/08/2023 05:41 PM    KETONES Negative 03/08/2023 05:41 PM    PROTEINURIN Negative 03/08/2023 05:41 PM     Lab Results   Component Value Date/Time    HBA1C 5.3 02/15/2023 02:08 PM    HBA1C 5.6 11/10/2021 10:49 AM     Lab  Results   Component Value Date/Time    CPKTOTAL 29 09/05/2022 10:11 PM     Lab Results   Component Value Date/Time    25HYDROXY 47 08/20/2021 11:16 AM     Lab Results   Component Value Date/Time    TSHULTRASEN 2.170 06/20/2023 11:45 AM    FREET4 1.71 (H) 02/06/2023 04:19 PM     Lab Results   Component Value Date/Time    PTHINTACT 21.8 02/06/2023 04:19 PM     Results for orders placed during the hospital encounter of 09/20/23    DS-BONE DENSITY STUDY (DEXA)    Impression  According to the World Health Organization classification, bone mineral density of this patient is normal in the lumbar spine and osteopenic in the left proximal femur.    10-year Probability of Fracture:  Major Osteoporotic     16.2%  Hip     5.1%  Population      [place] ()    Based on left femur neck BMD    INTERPRETING LOCATION: [address] [place] [address], [facility]        Results for orders placed during the hospital encounter of 06/16/23    DX-FOOT-COMPLETE 3+ RIGHT    Impression  1.  No acute fracture identified.    2.  Soft tissue swelling with no cortical bone destruction identified.      Results for orders placed during the hospital encounter of 06/16/23    DX-ANKLE 3+ VIEWS LEFT    Impression  Soft tissue swelling with no acute fracture identified.    Results for orders placed during the hospital encounter of 07/24/19    DX-CERVICAL SPINE-2 OR 3 VIEWS    Impression  Degenerative change as described above.    Results for orders placed during the hospital encounter of 09/05/22    US-RENAL    Impression  1.  No hydronephrosis. No renal calculus.  2.  A 1.3 cm left renal cyst.      Assessment and Plan:  1. 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HCC)  Throughout the course of the interview, patient does continue to come back to pain in her left wrist, but denies any brinda swelling, may be early rheumatoid arthritis Long discussion with patient we opted to do a trial of hydroxychloroquine 300 mg p.o.  daily with dose based on patient's weight we will reevaluate in about 3 months  Today do bilateral hand x-rays and left wrist x-ray for evaluation of any erosive/inflammatory arthritis  - DX-JOINT SURVEY-HANDS SINGLE VIEW; Future  - DX-WRIST-COMPLETE 3+ LEFT; Future  - G6PD QUANT + RBC; Future  - CBC WITH DIFFERENTIAL; Future  - Comp Metabolic Panel; Future  - URIC ACID, SERUM  - hydroxychloroquine (PLAQUENIL) 200 MG Tab; 1 1/2 tabs po qday  Dispense: 135 Tablet; Refill: 0    2. Long-term use of Plaquenil  We will do a trial of hydroxychloroquine 30 mg p.o. daily, today check G6PD levels, patient only monitoring labs every 6 months, labs ordered for patient if we continue the Plaquenil then we will have patient do eye exam every year  - DX-JOINT SURVEY-HANDS SINGLE VIEW; Future  - DX-WRIST-COMPLETE 3+ LEFT; Future  - G6PD QUANT + RBC; Future  - CBC WITH DIFFERENTIAL; Future  - Comp Metabolic Panel; Future  - URIC ACID, SERUM  - hydroxychloroquine (PLAQUENIL) 200 MG Tab; 1 1/2 tabs po qday  Dispense: 135 Tablet; Refill: 0    3. Hyperuricemia  Last uric acid level at 9.9 June 2023, patient does drink wine nightly, advised patient to hold off on alcohol and we will recheck uric acid level with next labs in about 3 months.  Patient states has not had any gout flares, will hold off any treatment for now.  - CBC WITH DIFFERENTIAL; Future  - Comp Metabolic Panel; Future  - URIC ACID, SERUM  - hydroxychloroquine (PLAQUENIL) 200 MG Tab; 1 1/2 tabs po qday  Dispense: 135 Tablet; Refill: 0    4. Osteoporosis, unspecified osteoporosis type, unspecified pathological fracture presence  Last DEXA September 2023 indicating osteoporosis by FRAX score, managed by patient's PCP Dr. Luz Vieira.    5. Acquired hypothyroidism  Followed by patients PCP, can exacerbate joint pain, I recommend monitoring thyroid on a regular basis to assure it is not contributing to the patient's joint pain    6. Essential hypertension  May impact the  type of medications we can use for this patient's arthritis. We will have to keep this under advisement    7. Diastolic CHF, acute on chronic ([facility])  Followed by cardiology    8. PVD (peripheral vascular disease) ([facility])      Records requested.  Followup: Return in about 3 months (around 1/3/2024). or sooner prn  Patient was seen 60 minutes face-to-face (excluding time for procedures)  of which more than 50% the time was spent counseling the patient regarding  rheumatological conditions and care. Therapy was discussed in detail.  Thank you for this referral.    Please note that this dictation was created using voice recognition software. I have made every reasonable attempt to correct obvious errors, but I expect that there are errors of grammar and possibly content that I did not discover before finalizing the note.

## 2023-10-06 ENCOUNTER — PATIENT OUTREACH (OUTPATIENT)
Dept: HEALTH INFORMATION MANAGEMENT | Facility: OTHER | Age: 83
End: 2023-10-06
Payer: MEDICARE

## 2023-10-06 DIAGNOSIS — I50.43 CHF (CONGESTIVE HEART FAILURE), NYHA CLASS I, ACUTE ON CHRONIC, COMBINED (HCC): ICD-10-CM

## 2023-10-06 DIAGNOSIS — I10 ESSENTIAL HYPERTENSION: ICD-10-CM

## 2023-10-06 DIAGNOSIS — N18.31 STAGE 3A CHRONIC KIDNEY DISEASE: ICD-10-CM

## 2023-10-11 NOTE — PROGRESS NOTES
Assessment/ Education: Called and LVM X2. Mariely has some Care Gaps to address. She does have upcoming appts scheduled with her pcp (11/10/23) and Rheumatology (1/9/23). Her blood pressure has been under control with readings in office: 130/62 (10/3/23), 108/60 (8/25/23) and 128/78 (8/2/23). Her last Cardiology visit was on 7/25/23, no follow-up appt scheduled, but she was told to RTC in one year. Closing this months encounter as unable to reach.     Plan of Care and Goals: continue current    Barriers: age, comorbidities,     Progress: progressing    Next outreach: 11/6/2023

## 2023-11-04 DIAGNOSIS — D50.9 IRON DEFICIENCY ANEMIA, UNSPECIFIED IRON DEFICIENCY ANEMIA TYPE: ICD-10-CM

## 2023-11-06 ENCOUNTER — HOSPITAL ENCOUNTER (OUTPATIENT)
Facility: MEDICAL CENTER | Age: 83
End: 2023-11-06
Attending: STUDENT IN AN ORGANIZED HEALTH CARE EDUCATION/TRAINING PROGRAM
Payer: MEDICARE

## 2023-11-06 ENCOUNTER — HOSPITAL ENCOUNTER (OUTPATIENT)
Dept: LAB | Facility: MEDICAL CENTER | Age: 83
End: 2023-11-06
Attending: INTERNAL MEDICINE
Payer: MEDICARE

## 2023-11-06 ENCOUNTER — HOSPITAL ENCOUNTER (OUTPATIENT)
Dept: LAB | Facility: MEDICAL CENTER | Age: 83
End: 2023-11-06
Attending: STUDENT IN AN ORGANIZED HEALTH CARE EDUCATION/TRAINING PROGRAM
Payer: MEDICARE

## 2023-11-06 DIAGNOSIS — N18.32 STAGE 3B CHRONIC KIDNEY DISEASE: ICD-10-CM

## 2023-11-06 DIAGNOSIS — Z79.899 LONG-TERM USE OF PLAQUENIL: ICD-10-CM

## 2023-11-06 DIAGNOSIS — M05.79 RHEUMATOID ARTHRITIS INVOLVING MULTIPLE SITES WITH POSITIVE RHEUMATOID FACTOR (HCC): ICD-10-CM

## 2023-11-06 DIAGNOSIS — E87.1 HYPONATREMIA: ICD-10-CM

## 2023-11-06 DIAGNOSIS — D50.9 IRON DEFICIENCY ANEMIA, UNSPECIFIED IRON DEFICIENCY ANEMIA TYPE: ICD-10-CM

## 2023-11-06 DIAGNOSIS — R74.8 ALKALINE PHOSPHATASE ELEVATION: ICD-10-CM

## 2023-11-06 DIAGNOSIS — E79.0 HYPERURICEMIA: ICD-10-CM

## 2023-11-06 LAB
25(OH)D3 SERPL-MCNC: 22 NG/ML (ref 30–100)
ALBUMIN SERPL BCP-MCNC: 4.1 G/DL (ref 3.2–4.9)
ALBUMIN SERPL BCP-MCNC: 4.2 G/DL (ref 3.2–4.9)
ALBUMIN/GLOB SERPL: 1.4 G/DL
ALBUMIN/GLOB SERPL: 1.6 G/DL
ALP SERPL-CCNC: 151 U/L (ref 30–99)
ALP SERPL-CCNC: 152 U/L (ref 30–99)
ALT SERPL-CCNC: 17 U/L (ref 2–50)
ALT SERPL-CCNC: 18 U/L (ref 2–50)
ANION GAP SERPL CALC-SCNC: 10 MMOL/L (ref 7–16)
ANION GAP SERPL CALC-SCNC: 11 MMOL/L (ref 7–16)
AST SERPL-CCNC: 21 U/L (ref 12–45)
AST SERPL-CCNC: 23 U/L (ref 12–45)
BASOPHILS # BLD AUTO: 0.7 % (ref 0–1.8)
BASOPHILS # BLD AUTO: 0.7 % (ref 0–1.8)
BASOPHILS # BLD: 0.09 K/UL (ref 0–0.12)
BASOPHILS # BLD: 0.09 K/UL (ref 0–0.12)
BILIRUB SERPL-MCNC: 0.3 MG/DL (ref 0.1–1.5)
BILIRUB SERPL-MCNC: 0.3 MG/DL (ref 0.1–1.5)
BUN SERPL-MCNC: 17 MG/DL (ref 8–22)
BUN SERPL-MCNC: 18 MG/DL (ref 8–22)
CALCIUM ALBUM COR SERPL-MCNC: 8.9 MG/DL (ref 8.5–10.5)
CALCIUM ALBUM COR SERPL-MCNC: 9 MG/DL (ref 8.5–10.5)
CALCIUM SERPL-MCNC: 9.1 MG/DL (ref 8.5–10.5)
CALCIUM SERPL-MCNC: 9.1 MG/DL (ref 8.5–10.5)
CHLORIDE SERPL-SCNC: 100 MMOL/L (ref 96–112)
CHLORIDE SERPL-SCNC: 101 MMOL/L (ref 96–112)
CO2 SERPL-SCNC: 21 MMOL/L (ref 20–33)
CO2 SERPL-SCNC: 21 MMOL/L (ref 20–33)
CREAT SERPL-MCNC: 1 MG/DL (ref 0.5–1.4)
CREAT SERPL-MCNC: 1.03 MG/DL (ref 0.5–1.4)
CREAT UR-MCNC: 69.28 MG/DL
EOSINOPHIL # BLD AUTO: 0.54 K/UL (ref 0–0.51)
EOSINOPHIL # BLD AUTO: 0.55 K/UL (ref 0–0.51)
EOSINOPHIL NFR BLD: 4.4 % (ref 0–6.9)
EOSINOPHIL NFR BLD: 4.5 % (ref 0–6.9)
ERYTHROCYTE [DISTWIDTH] IN BLOOD BY AUTOMATED COUNT: 55.7 FL (ref 35.9–50)
ERYTHROCYTE [DISTWIDTH] IN BLOOD BY AUTOMATED COUNT: 55.8 FL (ref 35.9–50)
FERRITIN SERPL-MCNC: 271 NG/ML (ref 10–291)
FOLATE SERPL-MCNC: >40 NG/ML
GFR SERPLBLD CREATININE-BSD FMLA CKD-EPI: 54 ML/MIN/1.73 M 2
GFR SERPLBLD CREATININE-BSD FMLA CKD-EPI: 56 ML/MIN/1.73 M 2
GLOBULIN SER CALC-MCNC: 2.7 G/DL (ref 1.9–3.5)
GLOBULIN SER CALC-MCNC: 2.9 G/DL (ref 1.9–3.5)
GLUCOSE SERPL-MCNC: 92 MG/DL (ref 65–99)
GLUCOSE SERPL-MCNC: 93 MG/DL (ref 65–99)
HCT VFR BLD AUTO: 33.9 % (ref 37–47)
HCT VFR BLD AUTO: 34.2 % (ref 37–47)
HGB BLD-MCNC: 10.2 G/DL (ref 12–16)
HGB BLD-MCNC: 10.3 G/DL (ref 12–16)
IMM GRANULOCYTES # BLD AUTO: 0.15 K/UL (ref 0–0.11)
IMM GRANULOCYTES # BLD AUTO: 0.17 K/UL (ref 0–0.11)
IMM GRANULOCYTES NFR BLD AUTO: 1.2 % (ref 0–0.9)
IMM GRANULOCYTES NFR BLD AUTO: 1.4 % (ref 0–0.9)
IRON SATN MFR SERPL: 14 % (ref 15–55)
IRON SERPL-MCNC: 30 UG/DL (ref 40–170)
LYMPHOCYTES # BLD AUTO: 0.99 K/UL (ref 1–4.8)
LYMPHOCYTES # BLD AUTO: 1 K/UL (ref 1–4.8)
LYMPHOCYTES NFR BLD: 8.1 % (ref 22–41)
LYMPHOCYTES NFR BLD: 8.2 % (ref 22–41)
MCH RBC QN AUTO: 28.8 PG (ref 27–33)
MCH RBC QN AUTO: 29 PG (ref 27–33)
MCHC RBC AUTO-ENTMCNC: 30.1 G/DL (ref 32.2–35.5)
MCHC RBC AUTO-ENTMCNC: 30.1 G/DL (ref 32.2–35.5)
MCV RBC AUTO: 95.5 FL (ref 81.4–97.8)
MCV RBC AUTO: 96.3 FL (ref 81.4–97.8)
MICROALBUMIN UR-MCNC: 2.5 MG/DL
MICROALBUMIN/CREAT UR: 36 MG/G (ref 0–30)
MONOCYTES # BLD AUTO: 0.5 K/UL (ref 0–0.85)
MONOCYTES # BLD AUTO: 0.75 K/UL (ref 0–0.85)
MONOCYTES NFR BLD AUTO: 4.1 % (ref 0–13.4)
MONOCYTES NFR BLD AUTO: 6.1 % (ref 0–13.4)
NEUTROPHILS # BLD AUTO: 9.69 K/UL (ref 1.82–7.42)
NEUTROPHILS # BLD AUTO: 9.87 K/UL (ref 1.82–7.42)
NEUTROPHILS NFR BLD: 79.5 % (ref 44–72)
NEUTROPHILS NFR BLD: 81.1 % (ref 44–72)
NRBC # BLD AUTO: 0 K/UL
NRBC # BLD AUTO: 0 K/UL
NRBC BLD-RTO: 0 /100 WBC (ref 0–0.2)
NRBC BLD-RTO: 0 /100 WBC (ref 0–0.2)
PLATELET # BLD AUTO: 286 K/UL (ref 164–446)
PLATELET # BLD AUTO: 290 K/UL (ref 164–446)
PMV BLD AUTO: 11.7 FL (ref 9–12.9)
PMV BLD AUTO: 11.9 FL (ref 9–12.9)
POTASSIUM SERPL-SCNC: 4.9 MMOL/L (ref 3.6–5.5)
POTASSIUM SERPL-SCNC: 5 MMOL/L (ref 3.6–5.5)
PROT SERPL-MCNC: 6.9 G/DL (ref 6–8.2)
PROT SERPL-MCNC: 7 G/DL (ref 6–8.2)
RBC # BLD AUTO: 3.52 M/UL (ref 4.2–5.4)
RBC # BLD AUTO: 3.58 M/UL (ref 4.2–5.4)
SODIUM SERPL-SCNC: 132 MMOL/L (ref 135–145)
SODIUM SERPL-SCNC: 132 MMOL/L (ref 135–145)
TIBC SERPL-MCNC: 211 UG/DL (ref 250–450)
UIBC SERPL-MCNC: 181 UG/DL (ref 110–370)
URATE SERPL-MCNC: 8.7 MG/DL (ref 1.9–8.2)
VIT B12 SERPL-MCNC: 3540 PG/ML (ref 211–911)
WBC # BLD AUTO: 12.2 K/UL (ref 4.8–10.8)
WBC # BLD AUTO: 12.2 K/UL (ref 4.8–10.8)

## 2023-11-06 PROCEDURE — 82043 UR ALBUMIN QUANTITATIVE: CPT

## 2023-11-06 PROCEDURE — 82607 VITAMIN B-12: CPT

## 2023-11-06 PROCEDURE — 82746 ASSAY OF FOLIC ACID SERUM: CPT

## 2023-11-06 PROCEDURE — 82570 ASSAY OF URINE CREATININE: CPT

## 2023-11-06 PROCEDURE — 83540 ASSAY OF IRON: CPT

## 2023-11-06 PROCEDURE — 80053 COMPREHEN METABOLIC PANEL: CPT | Mod: 91

## 2023-11-06 PROCEDURE — 83550 IRON BINDING TEST: CPT

## 2023-11-06 PROCEDURE — 80053 COMPREHEN METABOLIC PANEL: CPT

## 2023-11-06 PROCEDURE — 85025 COMPLETE CBC W/AUTO DIFF WBC: CPT

## 2023-11-06 PROCEDURE — 82728 ASSAY OF FERRITIN: CPT

## 2023-11-06 PROCEDURE — 82955 ASSAY OF G6PD ENZYME: CPT

## 2023-11-06 PROCEDURE — 82306 VITAMIN D 25 HYDROXY: CPT

## 2023-11-06 PROCEDURE — 84080 ASSAY ALKALINE PHOSPHATASES: CPT

## 2023-11-06 PROCEDURE — 84075 ASSAY ALKALINE PHOSPHATASE: CPT

## 2023-11-06 PROCEDURE — 85025 COMPLETE CBC W/AUTO DIFF WBC: CPT | Mod: 91

## 2023-11-06 PROCEDURE — 82274 ASSAY TEST FOR BLOOD FECAL: CPT

## 2023-11-06 PROCEDURE — 84550 ASSAY OF BLOOD/URIC ACID: CPT

## 2023-11-06 PROCEDURE — 36415 COLL VENOUS BLD VENIPUNCTURE: CPT

## 2023-11-06 RX ORDER — FERROUS SULFATE 325(65) MG
325 TABLET ORAL DAILY
Qty: 90 TABLET | Refills: 0 | Status: SHIPPED | OUTPATIENT
Start: 2023-11-06 | End: 2023-11-10 | Stop reason: SDUPTHER

## 2023-11-08 LAB
ALP BONE SERPL-CCNC: 31 U/L (ref 0–55)
ALP ISOS SERPL HS-CCNC: 0 U/L
ALP LIVER SERPL-CCNC: 132 U/L (ref 0–94)
ALP SERPL-CCNC: 163 U/L (ref 40–120)
G6PD RBC-CCNC: 22.1 U/G HB (ref 9.9–16.6)

## 2023-11-10 ENCOUNTER — OFFICE VISIT (OUTPATIENT)
Dept: MEDICAL GROUP | Facility: LAB | Age: 83
End: 2023-11-10
Payer: MEDICARE

## 2023-11-10 VITALS
WEIGHT: 132 LBS | BODY MASS INDEX: 24.29 KG/M2 | HEIGHT: 62 IN | HEART RATE: 62 BPM | DIASTOLIC BLOOD PRESSURE: 60 MMHG | OXYGEN SATURATION: 99 % | SYSTOLIC BLOOD PRESSURE: 138 MMHG | RESPIRATION RATE: 16 BRPM | TEMPERATURE: 97.3 F

## 2023-11-10 DIAGNOSIS — Z00.00 MEDICARE ANNUAL WELLNESS VISIT, SUBSEQUENT: Primary | ICD-10-CM

## 2023-11-10 DIAGNOSIS — N18.31 STAGE 3A CHRONIC KIDNEY DISEASE: ICD-10-CM

## 2023-11-10 DIAGNOSIS — D72.829 LEUKOCYTOSIS, UNSPECIFIED TYPE: ICD-10-CM

## 2023-11-10 DIAGNOSIS — D50.9 IRON DEFICIENCY ANEMIA, UNSPECIFIED IRON DEFICIENCY ANEMIA TYPE: ICD-10-CM

## 2023-11-10 DIAGNOSIS — R74.8 ALKALINE PHOSPHATASE ELEVATION: ICD-10-CM

## 2023-11-10 DIAGNOSIS — E55.9 VITAMIN D DEFICIENCY: ICD-10-CM

## 2023-11-10 DIAGNOSIS — D64.9 NORMOCYTIC ANEMIA: ICD-10-CM

## 2023-11-10 DIAGNOSIS — R80.9 MICROALBUMINURIA: ICD-10-CM

## 2023-11-10 DIAGNOSIS — E87.1 HYPONATREMIA: ICD-10-CM

## 2023-11-10 PROCEDURE — 3078F DIAST BP <80 MM HG: CPT | Performed by: STUDENT IN AN ORGANIZED HEALTH CARE EDUCATION/TRAINING PROGRAM

## 2023-11-10 PROCEDURE — 3075F SYST BP GE 130 - 139MM HG: CPT | Performed by: STUDENT IN AN ORGANIZED HEALTH CARE EDUCATION/TRAINING PROGRAM

## 2023-11-10 PROCEDURE — G0439 PPPS, SUBSEQ VISIT: HCPCS | Performed by: STUDENT IN AN ORGANIZED HEALTH CARE EDUCATION/TRAINING PROGRAM

## 2023-11-10 PROCEDURE — 99214 OFFICE O/P EST MOD 30 MIN: CPT | Mod: 25 | Performed by: STUDENT IN AN ORGANIZED HEALTH CARE EDUCATION/TRAINING PROGRAM

## 2023-11-10 RX ORDER — FERROUS SULFATE 325(65) MG
325 TABLET ORAL DAILY
Qty: 90 TABLET | Refills: 1 | Status: SHIPPED | OUTPATIENT
Start: 2023-11-10

## 2023-11-10 ASSESSMENT — ENCOUNTER SYMPTOMS: GENERAL WELL-BEING: FAIR

## 2023-11-10 ASSESSMENT — FIBROSIS 4 INDEX: FIB4 SCORE: 1.56

## 2023-11-10 ASSESSMENT — PATIENT HEALTH QUESTIONNAIRE - PHQ9: CLINICAL INTERPRETATION OF PHQ2 SCORE: 0

## 2023-11-10 ASSESSMENT — ACTIVITIES OF DAILY LIVING (ADL): BATHING_REQUIRES_ASSISTANCE: 0

## 2023-11-10 NOTE — PATIENT INSTRUCTIONS
Vaccines due that can be received at the pharmacy:  RSV (respiratory syncytial virus)  Shingrix series (shingles)    Vitamin D3 1000IU/25mcg daily    Ideal blood pressure <130/80 and at least <140/90.  If <100 for the top number we are over treating.

## 2023-11-10 NOTE — PROGRESS NOTES
Chief Complaint   Patient presents with    Medicare Annual Wellness    Lab Results     HPI:  Mariely Easley is a 83 y.o. here for Medicare Annual Wellness Visit     Patient Active Problem List    Diagnosis Date Noted    Microalbuminuria 11/10/2023    Vitamin D deficiency 11/10/2023    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[facility]) 10/03/2023    Secondary hyperaldosteronism ([facility]) 08/28/2023    Neuropathic pain of foot 06/13/2023    S/P aortic valve replacement 03/08/2023    Stage 3a chronic kidney disease (HCC) 03/08/2023    History of DVT (deep vein thrombosis) 02/03/2023    Diastolic CHF, acute on chronic (HCC) 01/26/2023    Chronic anticoagulation 01/26/2023    Superficial thrombosis of leg, right 10/13/2022    Chronic use of benzodiazepine for therapeutic purpose 10/13/2022    Leukocytosis 10/04/2022    Alkaline phosphatase elevation 10/04/2022    Bilateral edema of lower extremity 10/04/2022    Elevated troponin 09/06/2022    Renal cyst, left 09/06/2022    Aortic insufficiency 09/06/2022    Muscle cramping 08/09/2022    Stress incontinence of urine 08/09/2022    Folate deficiency 07/05/2022    Varicose veins of both lower extremities with pain 06/28/2022    Hyponatremia 06/28/2022    Iron deficiency anemia 06/28/2022    Muscle spasm of back 04/12/2022    Vitamin B12 deficiency 12/16/2019    Essential hypertension 07/24/2019    Acquired hypothyroidism 07/24/2019    Mixed hyperlipidemia 07/24/2019    Anxiety 07/24/2019     Current Outpatient Medications   Medication Sig Dispense Refill    ferrous sulfate 325 (65 Fe) MG tablet Take 1 Tablet by mouth every day. Indications: Iron Deficiency 90 Tablet 1    hydroxychloroquine (PLAQUENIL) 200 MG Tab TAKE ONE AND A HALF TABLETS BY MOUTH DAILY 135 Tablet 0    losartan (COZAAR) 25 MG Tab Take 1 Tablet by mouth every day. Indications: High Blood Pressure Disorder 90 Tablet 1    cyclobenzaprine (FLEXERIL) 5 mg tablet Take 1 Tablet  by mouth 3 times a day as needed for Muscle Spasms. 90 Tablet 3    gabapentin (NEURONTIN) 100 MG Cap Take 2 Capsules by mouth 2 times a day. 360 Capsule 3    furosemide (LASIX) 20 MG Tab Take 1 Tablet by mouth every day. 100 Tablet 4    metoprolol tartrate (LOPRESSOR) 50 MG Tab Take 1 Tablet by mouth 2 times a day. Indications: High Blood Pressure Disorder 180 Tablet 4    simvastatin (ZOCOR) 10 MG Tab Take 1 Tablet by mouth every evening. Indications: Ischemic Heart Disease 100 Tablet 3    folic acid (FOLVITE) 1 MG Tab TAKE 1 TABLET BY MOUTH EVERY DAY 90 Tablet 3    Loperamide HCl (IMODIUM PO) Take  by mouth as needed.      aspirin 81 MG EC tablet Take 81 mg by mouth every day. Indications: blood thinner      acetaminophen (TYLENOL) 500 MG Tab Take 1,000 mg by mouth every 6 hours as needed for Mild Pain. 2 tablets = 1,000 mg.  Indications: Fever, Pain      levothyroxine (SYNTHROID) 50 MCG Tab Take 1 Tab by mouth Every morning on an empty stomach. 90 Tab 3     No current facility-administered medications for this visit.          Current supplements as per medication list.     Allergies: Patient has no known allergies.    Current social contact/activities: Spending time with family     She  reports that she quit smoking about 44 years ago. Her smoking use included cigarettes. She has never used smokeless tobacco. She reports current alcohol use of about 8.4 oz of alcohol per week. She reports that she does not use drugs.  Counseling given: Not Answered    ROS:    Gait: Uses no assistive device  Ostomy: No  Other tubes: No  Amputations: No  Chronic oxygen use: No  Last eye exam: 2 months ago  Wears hearing aids: No   : Denies any urinary leakage during the last 6 months    Screening:    Depression Screening  Little interest or pleasure in doing things?  0 - not at all  Feeling down, depressed , or hopeless? 0 - not at all  Trouble falling or staying asleep, or sleeping too much?     Feeling tired or having little  energy?     Poor appetite or overeating?     Feeling bad about yourself - or that you are a failure or have let yourself or your family down?    Trouble concentrating on things, such as reading the newspaper or watching television?    Moving or speaking so slowly that other people could have noticed.  Or the opposite - being so fidgety or restless that you have been moving around a lot more than usual?     Thoughts that you would be better off dead, or of hurting yourself?     Patient Health Questionnaire Score:      If depressive symptoms identified deferred to follow up visit unless specifically addressed in assessment and plan.    Interpretation of PHQ-9 Total Score   Score Severity   1-4 No Depression   5-9 Mild Depression   10-14 Moderate Depression   15-19 Moderately Severe Depression   20-27 Severe Depression    Screening for Cognitive Impairment  Do you or any of your friends or family members have any concern about your memory? No  Three Minute Recall (Banana, Sunrise, Chair) 1/3  Patient denies memory concerns. Family member with her has no concerns.  Nathan clock face with all 12 numbers and set the hands to show 20 past 8.  Yes    Cognitive concerns identified deferred for follow up unless specifically addressed in assessment and plan.    Fall Risk Assessment  Has the patient had two or more falls in the last year or any fall with injury in the last year?  No    Safety Assessment  Do you always wear your seatbelt?  Yes  Any changes to home needed to function safely? No  Difficulty hearing.  No  Patient counseled about all safety risks that were identified.    Functional Assessment ADLs  Are there any barriers preventing you from cooking for yourself or meeting nutritional needs?  No.    Are there any barriers preventing you from driving safely or obtaining transportation?  No.    Are there any barriers preventing you from using a telephone or calling for help?  No    Are there any barriers preventing you  from shopping?  No.    Are there any barriers preventing you from taking care of your own finances?  No    Are there any barriers preventing you from managing your medications?  No    Are there any barriers preventing you from showering, bathing or dressing yourself? No    Are there any barriers preventing you from doing housework or laundry? No    Are there any barriers preventing you from using the toilet?No    Are you currently engaging in any exercise or physical activity?  Yes.      Self-Assessment of Health  What is your perception of your health? Fair    Do you sleep more than six hours a night? Yes    In the past 7 days, how much did pain keep you from doing your normal work? A lot     Do you spend quality time with family or friends (virtually or in person)? Yes    Do you usually eat a heart healthy diet that constists of a variety of fruits, vegetables, whole grains and fiber? Yes    Do you eat foods high in fat and/or Fast Food more than three times per week? No    How concerned are you that your medical conditions are not being well managed? a little      Advance Care Planning  Do you have an Advance Directive, Living Will, Durable Power of , or POLST? No-discussed/advised.                Health Maintenance Summary            Overdue - Urine Drug Screening (Every 360 Days) Overdue since 10/8/2023      10/13/2022  URINE DRUG SCREEN    09/05/2022  URINE DRUG SCREEN              Postponed - Zoster (Shingles) Vaccines (1 of 2) Postponed until 4/10/2024      No completion history exists for this topic.              Postponed - IMM DTaP/Tdap/Td Vaccine (1 - Tdap) Postponed until 11/10/2024      No completion history exists for this topic.              Annual Wellness Visit (Every 366 Days) Next due on 11/10/2024      11/10/2023  Level of Service: ANNUAL WELLNESS VISIT-INCLUDES PPPS SUBSEQUE*    11/10/2023  Visit Dx: Medicare annual wellness visit, subsequent    08/09/2022  Visit Dx: Medicare annual  wellness visit, subsequent    08/09/2022  Level of Service: [place] ANNUAL WELLNESS VISIT-INCLUDES PPPS SUBSEQUE*    07/16/2021  Level of Service: [place] ANNUAL WELLNESS VISIT-INCLUDES PPPS SUBSEQUE*    Only the first 5 history entries have been loaded, but more history exists.              Bone Density Scan (Every 2 Years) Next due on 9/20/2025 09/20/2023  DS-BONE DENSITY STUDY (DEXA)              Pneumococcal Vaccine: 65+ Years (Series Information) Completed      12/16/2019  Imm Admin: Pneumococcal polysaccharide vaccine (PPSV-23)    12/15/2019  Imm Admin: Pneumococcal polysaccharide vaccine (PPSV-23)    07/28/2015  Imm Admin: Pneumococcal Conjugate Vaccine (Prevnar/PCV-13)    07/27/2015  Imm Admin: Pneumococcal Conjugate Vaccine (Prevnar/PCV-13)              Influenza Vaccine (Series Information) Completed      10/25/2023  Imm Admin: Influenza Vaccine, Quadrivalent, Adjuvanted (Pf)    12/23/2022  Imm Admin: Influenza, Unspecified - HISTORICAL DATA    12/22/2022  Imm Admin: Influenza, Unspecified - HISTORICAL DATA    11/11/2021  Imm Admin: Influenza Vaccine Adult HD    11/10/2021  Imm Admin: Influenza Vaccine Adult HD    Only the first 5 history entries have been loaded, but more history exists.              COVID-19 Vaccine (Series Information) Completed      10/25/2023  Imm Admin: Covid-19 Mrna (Spikevax) Moderna 12+ Years    12/23/2022  Imm Admin: MODERNA BIVALENT BOOSTER SARS-COV-2 VACCINE (6+)    12/22/2022  Imm Admin: MODERNA BIVALENT BOOSTER SARS-COV-2 VACCINE (6+)    04/14/2022  Imm Admin: MODERNA SARS-COV-2 VACCINE (12+)    04/13/2022  Imm Admin: MODERNA SARS-COV-2 VACCINE (12+)    Only the first 5 history entries have been loaded, but more history exists.              Hepatitis A Vaccine (Hep A) (Series Information) Aged Out      No completion history exists for this topic.              Hepatitis B Vaccine (Hep B) (Series Information) Aged Out      No completion history exists for this topic.               "HPV Vaccines (Series Information) Aged Out      No completion history exists for this topic.              Polio Vaccine (Inactivated Polio) (Series Information) Aged Out      No completion history exists for this topic.              Meningococcal Immunization (Series Information) Aged Out      No completion history exists for this topic.              Discontinued - Colorectal Cancer Screening  Discontinued        Frequency changed to Never automatically (Topic No Longer Applies)    2023  OCCULT BLOOD FECES IMMUNOASSAY                  Patient Care Team:  Luz Vieira D.O. as PCP - General (Family Medicine)  Dulce Doe R.N. as RN Care Coordinator   [facility] as Home Health Provider  Lyndsey Mei M.D. (Rheumatology)  Álvaro Resendez M.D. (Cardiovascular Disease (Cardiology))    Social History     Tobacco Use    Smoking status: Former     Current packs/day: 0.00     Types: Cigarettes     Quit date: 1979     Years since quittin.3    Smokeless tobacco: Never   Vaping Use    Vaping Use: Never used   Substance Use Topics    Alcohol use: Yes     Alcohol/week: 8.4 oz     Types: 14 Glasses of wine per week     Comment: 2 glasses wine daily    Drug use: No     History reviewed. No pertinent family history.  She  has a past medical history of Abnormal albumin (2022), Acute renal failure superimposed on stage 3 chronic kidney disease (HCC) (2019), Dyspnea on minimal exertion (2023), Hypermagnesemia (2022), Hypertension, Hyponatremia (2022), Pain and swelling of left lower extremity (2022), Severe aortic valve regurgitation (2023), SOB (shortness of breath) (2023), Stage 4 chronic kidney disease (HCC) (2023), and Superficial thrombosis of lower extremity, right (2022).   History reviewed. No pertinent surgical history.    Exam:   /60   Pulse 62   Temp 36.3 °C (97.3 °F)   Resp 16   Ht 1.575 m (5' 2\")   Wt 59.9 kg (132 lb)   SpO2 " 99%  Body mass index is 24.14 kg/m².    Physical Exam  Vitals reviewed.   Constitutional:       General: She is not in acute distress.     Appearance: Normal appearance. She is not ill-appearing.   HENT:      Head: Normocephalic and atraumatic.      Nose: Nose normal.      Mouth/Throat:      Mouth: Mucous membranes are moist.      Comments: Dentition good  Eyes:      Conjunctiva/sclera: Conjunctivae normal.   Cardiovascular:      Rate and Rhythm: Normal rate and regular rhythm.      Heart sounds: Normal heart sounds. No murmur heard.  Pulmonary:      Effort: Pulmonary effort is normal. No respiratory distress.      Breath sounds: Normal breath sounds. No stridor. No wheezing, rhonchi or rales.   Musculoskeletal:         General: Tenderness (generalized bilateral LE (calf) with firm muscular without palpable cord) present.      Cervical back: Normal range of motion and neck supple. No rigidity or tenderness.      Right lower leg: Edema (trace pitting, hyperpigmentation) present.      Left lower leg: Edema (trace pitting, hyperpigmentation) present.   Neurological:      General: No focal deficit present.      Mental Status: She is alert and oriented to person, place, and time.      Comments: Hearing good   Psychiatric:         Mood and Affect: Mood normal.         Behavior: Behavior normal.         Thought Content: Thought content normal.       Labs: Reviewed from 11/6/2023, occult blood test in progress    Assessment and Plan. The following treatment and monitoring plan is recommended:      1. Medicare annual wellness visit, subsequent  Services suggested: No services needed at this time  Health Care Screening: Age-appropriate preventive services recommended by USPTF and ACIP covered by Medicare were discussed today. Services ordered if indicated and agreed upon by the patient.  Referrals offered: Community-based lifestyle interventions to reduce health risks and promote self-management and wellness, fall prevention,  nutrition, physical activity, tobacco-use cessation, weight loss, and mental health services as per orders if indicated.    Discussion today about general wellness and lifestyle habits:    Prevent falls and reduce trip hazards; Cautioned about securing or removing rugs.  Have a working fire alarm and carbon monoxide detector;   Engage in regular physical activity and social activities     ACUTE VISIT  **Patient was informed the annual wellness visit does not include a discussion of acute/new concerns. She was informed that this portion of the visit will be coded separately and She will receive a separate bill later. Patient expressed understanding.    We review her labs. She has continued with oral iron and folate. Forgetting B12. Hasn't had any benefit from plaquenil, no joint edema or swelling. Still with LE pain bilaterally with cramping. Completed labs for rheum, but doesn't follow up until 1/2024. Taking lasix only prn, not in a week or so. No LE edema. Denies SOB or chest pain. No GI compaints or hematochezia.     2. Iron deficiency anemia, unspecified iron deficiency anemia type  3. Normocytic anemia  4. Leukocytosis, unspecified type  Chronic, improved iron deficiency and resolved folate deficiency. Anemia and leukocytosis stable in this patient with RA. Continue medication(s) as below. Labs in 3m, FIT pending.  - FERRITIN; Future  - FOLATE; Future  - VITAMIN B12; Future  - IRON/TOTAL IRON BIND; Future  - RETICULOCYTES COUNT; Future  - Comp Metabolic Panel; Future  - SERUM PROTEIN ELECTROPHORESIS; Future  - IMMUNOFIXATION ELECTROPHORESIS (QUENTIN), SERUM; Future  - LDH; Future  - HAPTOGLOBIN; Future  - ferrous sulfate 325 (65 Fe) MG tablet; Take 1 Tablet by mouth every day. Indications: Iron Deficiency  Dispense: 90 Tablet; Refill: 1    5. Hyponatremia  Chronic, stable. Possible SE to gabapentin, but she'd like to continue without changes. Trend with labs in 3m, sooner if indicated.    6. Stage 3a chronic kidney  disease (HCC)  7. Microalbuminuria  Chronic condition, stable with mild microalbuminuria. BP well controlled, continue ARB. Avoid nephrotoxic medications. Trend in 3m with other labs.    8. Alkaline phosphatase elevation  Chronic, with elevated liver enzymes. Patient to reduce ETOH intake (2 servings a day currently). Prior imaging of live normal. #9 as below may be contributing.     9. Vitamin D deficiency  Chronic, uncontrolled. Stressed daily 1000IU D3.  - VITAMIN D,25 HYDROXY (DEFICIENCY); Future    Follow-up: Return in about 3 months (around 2/10/2024), or if symptoms worsen or fail to improve, for labs.

## 2023-11-12 LAB — IMM ASSAY OCC BLD FITOB: NEGATIVE

## 2023-11-17 ENCOUNTER — PATIENT OUTREACH (OUTPATIENT)
Dept: HEALTH INFORMATION MANAGEMENT | Facility: OTHER | Age: 83
End: 2023-11-17
Payer: MEDICARE

## 2023-11-17 DIAGNOSIS — N18.31 STAGE 3A CHRONIC KIDNEY DISEASE: ICD-10-CM

## 2023-11-17 DIAGNOSIS — I10 ESSENTIAL HYPERTENSION: ICD-10-CM

## 2023-11-17 DIAGNOSIS — I50.43 CHF (CONGESTIVE HEART FAILURE), NYHA CLASS I, ACUTE ON CHRONIC, COMBINED (HCC): ICD-10-CM

## 2023-11-20 NOTE — PROGRESS NOTES
"Spoke with Mariely today for monthly CCM outreach. Mariely reports things are \"going good\". Asked if she had a chance to start the recommended Vit D supplement? Mariely stated she has not bought the supplement yet, but she will, she verbalized the dosage her pcp wanted her to start. Asked if she had started the Hydroxychloroquine Rheumatology had recommended? Mariely has started the med, but reports she is unsure if it has helped with her pain or not. Mariely denies any falls. She has upcoming appts with Rheumatology (1/9/24) and pcp (2/9/24). Her blood pressure in office: 138/60 (11/13/23), 130/62 (10/3/23) and 108/60 (8/25/23).  Next CCM outreach 12/15/23.          "

## 2023-12-09 DIAGNOSIS — R25.2 MUSCLE CRAMPING: ICD-10-CM

## 2023-12-12 DIAGNOSIS — I10 HTN (HYPERTENSION), MALIGNANT: ICD-10-CM

## 2023-12-12 RX ORDER — LOSARTAN POTASSIUM 25 MG/1
25 TABLET ORAL DAILY
Qty: 100 TABLET | Refills: 3 | Status: SHIPPED | OUTPATIENT
Start: 2023-12-12

## 2023-12-12 RX ORDER — CYCLOBENZAPRINE HCL 5 MG
5 TABLET ORAL 3 TIMES DAILY PRN
Qty: 90 TABLET | Refills: 3 | Status: SHIPPED | OUTPATIENT
Start: 2023-12-12

## 2023-12-12 NOTE — TELEPHONE ENCOUNTER
Received request via: Pharmacy    Was the patient seen in the last year in this department? Yes  11/10/23  Does the patient have an active prescription (recently filled or refills available) for medication(s) requested? No    Does the patient have long-term Plus and need 100 day supply (blood pressure, diabetes and cholesterol meds only)? Yes, quantity updated to 100 days

## 2024-01-01 ENCOUNTER — TELEPHONE (OUTPATIENT)
Dept: HEMATOLOGY ONCOLOGY | Facility: MEDICAL CENTER | Age: 84
End: 2024-01-01

## 2024-01-01 ENCOUNTER — HOSPITAL ENCOUNTER (OUTPATIENT)
Dept: HEMATOLOGY ONCOLOGY | Facility: MEDICAL CENTER | Age: 84
End: 2024-12-03
Attending: NURSE PRACTITIONER
Payer: MEDICARE

## 2024-01-01 ENCOUNTER — TELEPHONE (OUTPATIENT)
Dept: HEMATOLOGY ONCOLOGY | Facility: MEDICAL CENTER | Age: 84
End: 2024-01-01
Payer: MEDICARE

## 2024-01-01 ENCOUNTER — APPOINTMENT (OUTPATIENT)
Dept: RADIOLOGY | Facility: MEDICAL CENTER | Age: 84
DRG: 871 | End: 2024-01-01
Payer: MEDICARE

## 2024-01-01 ENCOUNTER — HOSPITAL ENCOUNTER (OUTPATIENT)
Facility: MEDICAL CENTER | Age: 84
End: 2024-12-03
Attending: NURSE PRACTITIONER
Payer: MEDICARE

## 2024-01-01 ENCOUNTER — HOSPITAL ENCOUNTER (INPATIENT)
Facility: MEDICAL CENTER | Age: 84
LOS: 3 days | DRG: 871 | End: 2024-12-20
Attending: STUDENT IN AN ORGANIZED HEALTH CARE EDUCATION/TRAINING PROGRAM | Admitting: STUDENT IN AN ORGANIZED HEALTH CARE EDUCATION/TRAINING PROGRAM
Payer: MEDICARE

## 2024-01-01 ENCOUNTER — APPOINTMENT (OUTPATIENT)
Dept: RADIOLOGY | Facility: MEDICAL CENTER | Age: 84
DRG: 871 | End: 2024-01-01
Attending: STUDENT IN AN ORGANIZED HEALTH CARE EDUCATION/TRAINING PROGRAM
Payer: MEDICARE

## 2024-01-01 VITALS
BODY MASS INDEX: 23.68 KG/M2 | HEIGHT: 61 IN | DIASTOLIC BLOOD PRESSURE: 71 MMHG | TEMPERATURE: 100.1 F | SYSTOLIC BLOOD PRESSURE: 127 MMHG | OXYGEN SATURATION: 97 % | HEART RATE: 85 BPM | WEIGHT: 125.44 LBS | RESPIRATION RATE: 28 BRPM

## 2024-01-01 VITALS
OXYGEN SATURATION: 97 % | DIASTOLIC BLOOD PRESSURE: 50 MMHG | SYSTOLIC BLOOD PRESSURE: 108 MMHG | TEMPERATURE: 98.1 F | HEART RATE: 99 BPM

## 2024-01-01 DIAGNOSIS — J10.1 INFLUENZA A: ICD-10-CM

## 2024-01-01 DIAGNOSIS — M89.9 BONE LESION: ICD-10-CM

## 2024-01-01 DIAGNOSIS — R41.82 ALTERED MENTAL STATUS, UNSPECIFIED ALTERED MENTAL STATUS TYPE: ICD-10-CM

## 2024-01-01 DIAGNOSIS — N39.0 ACUTE UTI: ICD-10-CM

## 2024-01-01 LAB
ABO + RH BLD: NORMAL
ABO GROUP BLD: NORMAL
ALBUMIN SERPL BCP-MCNC: 2.1 G/DL (ref 3.2–4.9)
ALBUMIN SERPL BCP-MCNC: 2.8 G/DL (ref 3.2–4.9)
ALBUMIN SERPL BCP-MCNC: 2.8 G/DL (ref 3.2–4.9)
ALBUMIN SERPL ELPH-MCNC: 2.45 G/DL (ref 3.75–5.01)
ALBUMIN/GLOB SERPL: 0.8 G/DL
ALBUMIN/GLOB SERPL: 0.8 G/DL
ALBUMIN/GLOB SERPL: 1 G/DL
ALP SERPL-CCNC: 70 U/L (ref 30–99)
ALP SERPL-CCNC: 70 U/L (ref 30–99)
ALP SERPL-CCNC: 75 U/L (ref 30–99)
ALPHA1 GLOB SERPL ELPH-MCNC: 0.57 G/DL (ref 0.19–0.46)
ALPHA2 GLOB SERPL ELPH-MCNC: 0.92 G/DL (ref 0.48–1.05)
ALT SERPL-CCNC: 11 U/L (ref 2–50)
ALT SERPL-CCNC: 11 U/L (ref 2–50)
ALT SERPL-CCNC: 6 U/L (ref 2–50)
ANION GAP SERPL CALC-SCNC: 12 MMOL/L (ref 7–16)
ANION GAP SERPL CALC-SCNC: 13 MMOL/L (ref 7–16)
ANION GAP SERPL CALC-SCNC: 15 MMOL/L (ref 7–16)
ANION GAP SERPL CALC-SCNC: 15 MMOL/L (ref 7–16)
ANION GAP SERPL CALC-SCNC: 16 MMOL/L (ref 7–16)
ANISOCYTOSIS BLD QL SMEAR: ABNORMAL
APPEARANCE UR: ABNORMAL
AST SERPL-CCNC: 17 U/L (ref 12–45)
AST SERPL-CCNC: 22 U/L (ref 12–45)
AST SERPL-CCNC: 39 U/L (ref 12–45)
B-GLOBULIN SERPL ELPH-MCNC: 0.72 G/DL (ref 0.48–1.1)
BACTERIA #/AREA URNS HPF: ABNORMAL /HPF
BACTERIA UR CULT: ABNORMAL
BARCODED ABORH UBTYP: 9500
BARCODED PRD CODE UBPRD: NORMAL
BARCODED UNIT NUM UBUNT: NORMAL
BASOPHILS # BLD AUTO: 0 % (ref 0–1.8)
BASOPHILS # BLD AUTO: 0 % (ref 0–1.8)
BASOPHILS # BLD AUTO: 0.3 % (ref 0–1.8)
BASOPHILS # BLD AUTO: 0.8 % (ref 0–1.8)
BASOPHILS # BLD: 0 K/UL (ref 0–0.12)
BASOPHILS # BLD: 0 K/UL (ref 0–0.12)
BASOPHILS # BLD: 0.06 K/UL (ref 0–0.12)
BASOPHILS # BLD: 0.22 K/UL (ref 0–0.12)
BILIRUB SERPL-MCNC: 0.3 MG/DL (ref 0.1–1.5)
BILIRUB SERPL-MCNC: 0.5 MG/DL (ref 0.1–1.5)
BILIRUB SERPL-MCNC: 0.9 MG/DL (ref 0.1–1.5)
BILIRUB UR QL STRIP.AUTO: NEGATIVE
BLD GP AB SCN SERPL QL: NORMAL
BUN SERPL-MCNC: 43 MG/DL (ref 8–22)
BUN SERPL-MCNC: 44 MG/DL (ref 8–22)
BUN SERPL-MCNC: 50 MG/DL (ref 8–22)
BUN SERPL-MCNC: 51 MG/DL (ref 8–22)
BUN SERPL-MCNC: 52 MG/DL (ref 8–22)
BURR CELLS BLD QL SMEAR: NORMAL
BURR CELLS BLD QL SMEAR: NORMAL
CALCIUM ALBUM COR SERPL-MCNC: 9.3 MG/DL (ref 8.5–10.5)
CALCIUM ALBUM COR SERPL-MCNC: 9.3 MG/DL (ref 8.5–10.5)
CALCIUM ALBUM COR SERPL-MCNC: 9.5 MG/DL (ref 8.5–10.5)
CALCIUM SERPL-MCNC: 7.8 MG/DL (ref 8.5–10.5)
CALCIUM SERPL-MCNC: 7.9 MG/DL (ref 8.5–10.5)
CALCIUM SERPL-MCNC: 8.3 MG/DL (ref 8.5–10.5)
CALCIUM SERPL-MCNC: 8.5 MG/DL (ref 8.5–10.5)
CALCIUM SERPL-MCNC: 8.5 MG/DL (ref 8.5–10.5)
CASTS URNS QL MICRO: ABNORMAL /LPF (ref 0–2)
CHLORIDE SERPL-SCNC: 113 MMOL/L (ref 96–112)
CHLORIDE SERPL-SCNC: 115 MMOL/L (ref 96–112)
CHLORIDE SERPL-SCNC: 115 MMOL/L (ref 96–112)
CHLORIDE SERPL-SCNC: 116 MMOL/L (ref 96–112)
CHLORIDE SERPL-SCNC: 119 MMOL/L (ref 96–112)
CK SERPL-CCNC: 78 U/L (ref 0–154)
CO2 SERPL-SCNC: 15 MMOL/L (ref 20–33)
CO2 SERPL-SCNC: 15 MMOL/L (ref 20–33)
CO2 SERPL-SCNC: 16 MMOL/L (ref 20–33)
CO2 SERPL-SCNC: 17 MMOL/L (ref 20–33)
CO2 SERPL-SCNC: 18 MMOL/L (ref 20–33)
COLOR UR: YELLOW
COMPONENT R 8504R: NORMAL
CREAT SERPL-MCNC: 1.46 MG/DL (ref 0.5–1.4)
CREAT SERPL-MCNC: 1.51 MG/DL (ref 0.5–1.4)
CREAT SERPL-MCNC: 2.08 MG/DL (ref 0.5–1.4)
CREAT SERPL-MCNC: 2.16 MG/DL (ref 0.5–1.4)
CREAT SERPL-MCNC: 2.39 MG/DL (ref 0.5–1.4)
CREAT UR-MCNC: 60 MG/DL
CRP SERPL HS-MCNC: 12.43 MG/DL (ref 0–0.75)
CRP SERPL HS-MCNC: 23.2 MG/DL (ref 0–0.75)
DACRYOCYTES BLD QL SMEAR: NORMAL
EER MONOCLONAL PROTEIN AND FLC, SERUM Q5224: ABNORMAL
EKG IMPRESSION: NORMAL
EOSINOPHIL # BLD AUTO: 0.18 K/UL (ref 0–0.51)
EOSINOPHIL # BLD AUTO: 0.51 K/UL (ref 0–0.51)
EOSINOPHIL # BLD AUTO: 0.51 K/UL (ref 0–0.51)
EOSINOPHIL # BLD AUTO: 0.93 K/UL (ref 0–0.51)
EOSINOPHIL NFR BLD: 0.8 % (ref 0–6.9)
EOSINOPHIL NFR BLD: 2.9 % (ref 0–6.9)
EOSINOPHIL NFR BLD: 3.4 % (ref 0–6.9)
EOSINOPHIL NFR BLD: 3.5 % (ref 0–6.9)
EPITHELIAL CELLS 1715: ABNORMAL /HPF (ref 0–5)
ERYTHROCYTE [DISTWIDTH] IN BLOOD BY AUTOMATED COUNT: 59.2 FL (ref 35.9–50)
ERYTHROCYTE [DISTWIDTH] IN BLOOD BY AUTOMATED COUNT: 61.1 FL (ref 35.9–50)
ERYTHROCYTE [DISTWIDTH] IN BLOOD BY AUTOMATED COUNT: 61.5 FL (ref 35.9–50)
ERYTHROCYTE [DISTWIDTH] IN BLOOD BY AUTOMATED COUNT: 65.1 FL (ref 35.9–50)
FERRITIN SERPL-MCNC: 6246 NG/ML (ref 10–291)
FLUAV RNA SPEC QL NAA+PROBE: POSITIVE
FLUBV RNA SPEC QL NAA+PROBE: NEGATIVE
GAMMA GLOB SERPL ELPH-MCNC: 0.84 G/DL (ref 0.62–1.51)
GFR SERPLBLD CREATININE-BSD FMLA CKD-EPI: 20 ML/MIN/1.73 M 2
GFR SERPLBLD CREATININE-BSD FMLA CKD-EPI: 22 ML/MIN/1.73 M 2
GFR SERPLBLD CREATININE-BSD FMLA CKD-EPI: 23 ML/MIN/1.73 M 2
GFR SERPLBLD CREATININE-BSD FMLA CKD-EPI: 34 ML/MIN/1.73 M 2
GFR SERPLBLD CREATININE-BSD FMLA CKD-EPI: 36 ML/MIN/1.73 M 2
GLOBULIN SER CALC-MCNC: 2.7 G/DL (ref 1.9–3.5)
GLOBULIN SER CALC-MCNC: 2.8 G/DL (ref 1.9–3.5)
GLOBULIN SER CALC-MCNC: 3.6 G/DL (ref 1.9–3.5)
GLUCOSE BLD STRIP.AUTO-MCNC: 113 MG/DL (ref 65–99)
GLUCOSE BLD STRIP.AUTO-MCNC: 71 MG/DL (ref 65–99)
GLUCOSE BLD STRIP.AUTO-MCNC: 91 MG/DL (ref 65–99)
GLUCOSE SERPL-MCNC: 108 MG/DL (ref 65–99)
GLUCOSE SERPL-MCNC: 113 MG/DL (ref 65–99)
GLUCOSE SERPL-MCNC: 114 MG/DL (ref 65–99)
GLUCOSE SERPL-MCNC: 115 MG/DL (ref 65–99)
GLUCOSE SERPL-MCNC: 90 MG/DL (ref 65–99)
GLUCOSE UR STRIP.AUTO-MCNC: NEGATIVE MG/DL
HCT VFR BLD AUTO: 24.3 % (ref 37–47)
HCT VFR BLD AUTO: 24.7 % (ref 37–47)
HCT VFR BLD AUTO: 27.6 % (ref 37–47)
HCT VFR BLD AUTO: 30.6 % (ref 37–47)
HCT VFR BLD AUTO: 32.6 % (ref 37–47)
HCT VFR BLD AUTO: 39.1 % (ref 37–47)
HGB BLD-MCNC: 10.1 G/DL (ref 12–16)
HGB BLD-MCNC: 11.6 G/DL (ref 12–16)
HGB BLD-MCNC: 6.8 G/DL (ref 12–16)
HGB BLD-MCNC: 7.1 G/DL (ref 12–16)
HGB BLD-MCNC: 8.4 G/DL (ref 12–16)
HGB BLD-MCNC: 9.5 G/DL (ref 12–16)
IGA SERPL-MCNC: 301 MG/DL (ref 68–408)
IGG SERPL-MCNC: 815 MG/DL (ref 768–1632)
IGM SERPL-MCNC: 52 MG/DL (ref 35–263)
IMM GRANULOCYTES # BLD AUTO: 0.49 K/UL (ref 0–0.11)
IMM GRANULOCYTES NFR BLD AUTO: 2.7 % (ref 0–0.9)
INTERPRETATION SERPL IFE-IMP: ABNORMAL
INTERPRETATION SERPL IFE-IMP: ABNORMAL
IRON SATN MFR SERPL: 13 % (ref 15–55)
IRON SERPL-MCNC: 18 UG/DL (ref 40–170)
KAPPA LC FREE SER-MCNC: 49.73 MG/L (ref 3.3–19.4)
KAPPA LC FREE/LAMBDA FREE SER NEPH: 0.56 {RATIO} (ref 0.26–1.65)
KETONES UR STRIP.AUTO-MCNC: NEGATIVE MG/DL
LACTATE SERPL-SCNC: 1 MMOL/L (ref 0.5–2)
LAMBDA LC FREE SERPL-MCNC: 88.19 MG/L (ref 5.71–26.3)
LEUKOCYTE ESTERASE UR QL STRIP.AUTO: ABNORMAL
LYMPHOCYTES # BLD AUTO: 0.2 K/UL (ref 1–4.8)
LYMPHOCYTES # BLD AUTO: 0.76 K/UL (ref 1–4.8)
LYMPHOCYTES # BLD AUTO: 0.93 K/UL (ref 1–4.8)
LYMPHOCYTES # BLD AUTO: 2.11 K/UL (ref 1–4.8)
LYMPHOCYTES NFR BLD: 0.9 % (ref 22–41)
LYMPHOCYTES NFR BLD: 5.2 % (ref 22–41)
LYMPHOCYTES NFR BLD: 5.2 % (ref 22–41)
LYMPHOCYTES NFR BLD: 7.7 % (ref 22–41)
MAGNESIUM SERPL-MCNC: 1.9 MG/DL (ref 1.5–2.5)
MAGNESIUM SERPL-MCNC: 2 MG/DL (ref 1.5–2.5)
MAGNESIUM SERPL-MCNC: 2.2 MG/DL (ref 1.5–2.5)
MANUAL DIFF BLD: NORMAL
MCH RBC QN AUTO: 28 PG (ref 27–33)
MCH RBC QN AUTO: 28.6 PG (ref 27–33)
MCH RBC QN AUTO: 28.7 PG (ref 27–33)
MCH RBC QN AUTO: 28.9 PG (ref 27–33)
MCHC RBC AUTO-ENTMCNC: 28 G/DL (ref 32.2–35.5)
MCHC RBC AUTO-ENTMCNC: 28.7 G/DL (ref 32.2–35.5)
MCHC RBC AUTO-ENTMCNC: 29.7 G/DL (ref 32.2–35.5)
MCHC RBC AUTO-ENTMCNC: 30.4 G/DL (ref 32.2–35.5)
MCV RBC AUTO: 102.1 FL (ref 81.4–97.8)
MCV RBC AUTO: 94.8 FL (ref 81.4–97.8)
MCV RBC AUTO: 96.8 FL (ref 81.4–97.8)
MCV RBC AUTO: 97.2 FL (ref 81.4–97.8)
METAMYELOCYTES NFR BLD MANUAL: 0.9 %
METAMYELOCYTES NFR BLD MANUAL: 2.6 %
MICRO URNS: ABNORMAL
MICROCYTES BLD QL SMEAR: ABNORMAL
MONOCLONAL PROTEIN NL11656: ABNORMAL G/DL
MONOCYTES # BLD AUTO: 0 K/UL (ref 0–0.85)
MONOCYTES # BLD AUTO: 0 K/UL (ref 0–0.85)
MONOCYTES # BLD AUTO: 0.38 K/UL (ref 0–0.85)
MONOCYTES # BLD AUTO: 0.68 K/UL (ref 0–0.85)
MONOCYTES NFR BLD AUTO: 0 % (ref 0–13.4)
MONOCYTES NFR BLD AUTO: 0 % (ref 0–13.4)
MONOCYTES NFR BLD AUTO: 2.6 % (ref 0–13.4)
MONOCYTES NFR BLD AUTO: 3.8 % (ref 0–13.4)
MORPHOLOGY BLD-IMP: NORMAL
MYELOCYTES NFR BLD MANUAL: 0.9 %
NEUTROPHILS # BLD AUTO: 12.95 K/UL (ref 1.82–7.42)
NEUTROPHILS # BLD AUTO: 15.2 K/UL (ref 1.82–7.42)
NEUTROPHILS # BLD AUTO: 21.33 K/UL (ref 1.82–7.42)
NEUTROPHILS # BLD AUTO: 23.43 K/UL (ref 1.82–7.42)
NEUTROPHILS NFR BLD: 79.5 % (ref 44–72)
NEUTROPHILS NFR BLD: 85.1 % (ref 44–72)
NEUTROPHILS NFR BLD: 87.8 % (ref 44–72)
NEUTROPHILS NFR BLD: 93.1 % (ref 44–72)
NEUTS BAND NFR BLD MANUAL: 0.9 % (ref 0–10)
NEUTS BAND NFR BLD MANUAL: 3.4 % (ref 0–10)
NEUTS BAND NFR BLD MANUAL: 6 % (ref 0–10)
NITRITE UR QL STRIP.AUTO: POSITIVE
NRBC # BLD AUTO: 0.03 K/UL
NRBC # BLD AUTO: 0.04 K/UL
NRBC # BLD AUTO: 0.05 K/UL
NRBC # BLD AUTO: 0.05 K/UL
NRBC BLD-RTO: 0.2 /100 WBC (ref 0–0.2)
NRBC BLD-RTO: 0.3 /100 WBC (ref 0–0.2)
NT-PROBNP SERPL IA-MCNC: 6533 PG/ML (ref 0–125)
OVALOCYTES BLD QL SMEAR: NORMAL
PH UR STRIP.AUTO: 6 [PH] (ref 5–8)
PHOSPHATE SERPL-MCNC: 4.4 MG/DL (ref 2.5–4.5)
PHOSPHATE SERPL-MCNC: 4.6 MG/DL (ref 2.5–4.5)
PHOSPHATE SERPL-MCNC: 5.8 MG/DL (ref 2.5–4.5)
PLATELET # BLD AUTO: 303 K/UL (ref 164–446)
PLATELET # BLD AUTO: 412 K/UL (ref 164–446)
PLATELET # BLD AUTO: 415 K/UL (ref 164–446)
PLATELET # BLD AUTO: 425 K/UL (ref 164–446)
PLATELET BLD QL SMEAR: NORMAL
PMV BLD AUTO: 11.5 FL (ref 9–12.9)
PMV BLD AUTO: 11.6 FL (ref 9–12.9)
PMV BLD AUTO: 11.7 FL (ref 9–12.9)
PMV BLD AUTO: 12.1 FL (ref 9–12.9)
POIKILOCYTOSIS BLD QL SMEAR: NORMAL
POTASSIUM SERPL-SCNC: 3.9 MMOL/L (ref 3.6–5.5)
POTASSIUM SERPL-SCNC: 4 MMOL/L (ref 3.6–5.5)
POTASSIUM SERPL-SCNC: 4.1 MMOL/L (ref 3.6–5.5)
POTASSIUM SERPL-SCNC: 4.2 MMOL/L (ref 3.6–5.5)
POTASSIUM SERPL-SCNC: 4.7 MMOL/L (ref 3.6–5.5)
PREALB SERPL-MCNC: <3 MG/DL (ref 18–38)
PROCALCITONIN SERPL-MCNC: 0.31 NG/ML
PROCALCITONIN SERPL-MCNC: 5.6 NG/ML
PRODUCT TYPE UPROD: NORMAL
PROT SERPL-MCNC: 4.8 G/DL (ref 6–8.2)
PROT SERPL-MCNC: 5.5 G/DL (ref 6.3–8.2)
PROT SERPL-MCNC: 5.6 G/DL (ref 6–8.2)
PROT SERPL-MCNC: 6.4 G/DL (ref 6–8.2)
PROT UR QL STRIP: 100 MG/DL
PROT UR-MCNC: 85.2 MG/DL (ref 0–15)
RBC # BLD AUTO: 2.38 M/UL (ref 4.2–5.4)
RBC # BLD AUTO: 2.54 M/UL (ref 4.2–5.4)
RBC # BLD AUTO: 2.91 M/UL (ref 4.2–5.4)
RBC # BLD AUTO: 4.04 M/UL (ref 4.2–5.4)
RBC # URNS HPF: ABNORMAL /HPF (ref 0–2)
RBC BLD AUTO: PRESENT
RBC UR QL AUTO: ABNORMAL
RH BLD: NORMAL
RSV RNA SPEC QL NAA+PROBE: NEGATIVE
SARS-COV-2 RNA RESP QL NAA+PROBE: NOTDETECTED
SCHISTOCYTES BLD QL SMEAR: NORMAL
SIGNIFICANT IND 70042: ABNORMAL
SITE SITE: ABNORMAL
SODIUM SERPL-SCNC: 144 MMOL/L (ref 135–145)
SODIUM SERPL-SCNC: 145 MMOL/L (ref 135–145)
SODIUM SERPL-SCNC: 145 MMOL/L (ref 135–145)
SODIUM SERPL-SCNC: 146 MMOL/L (ref 135–145)
SODIUM SERPL-SCNC: 148 MMOL/L (ref 135–145)
SODIUM SERPL-SCNC: 150 MMOL/L (ref 135–145)
SODIUM UR-SCNC: 45 MMOL/L
SOURCE SOURCE: ABNORMAL
SP GR UR STRIP.AUTO: 1.01
T4 FREE SERPL-MCNC: 1.21 NG/DL (ref 0.93–1.7)
TIBC SERPL-MCNC: 135 UG/DL (ref 250–450)
TSH SERPL DL<=0.005 MIU/L-ACNC: 5.51 UIU/ML (ref 0.38–5.33)
UIBC SERPL-MCNC: 117 UG/DL (ref 110–370)
UNIT STATUS USTAT: NORMAL
UROBILINOGEN UR STRIP.AUTO-MCNC: 0.2 EU/DL
WBC # BLD AUTO: 14.6 K/UL (ref 4.8–10.8)
WBC # BLD AUTO: 17.9 K/UL (ref 4.8–10.8)
WBC # BLD AUTO: 22.1 K/UL (ref 4.8–10.8)
WBC # BLD AUTO: 27.4 K/UL (ref 4.8–10.8)
WBC #/AREA URNS HPF: >100 /HPF
WBC TOXIC VACUOLES BLD QL SMEAR: NORMAL

## 2024-01-01 PROCEDURE — 700105 HCHG RX REV CODE 258: Performed by: INTERNAL MEDICINE

## 2024-01-01 PROCEDURE — 99233 SBSQ HOSP IP/OBS HIGH 50: CPT | Performed by: STUDENT IN AN ORGANIZED HEALTH CARE EDUCATION/TRAINING PROGRAM

## 2024-01-01 PROCEDURE — 71045 X-RAY EXAM CHEST 1 VIEW: CPT

## 2024-01-01 PROCEDURE — 700102 HCHG RX REV CODE 250 W/ 637 OVERRIDE(OP): Performed by: STUDENT IN AN ORGANIZED HEALTH CARE EDUCATION/TRAINING PROGRAM

## 2024-01-01 PROCEDURE — 700105 HCHG RX REV CODE 258: Performed by: STUDENT IN AN ORGANIZED HEALTH CARE EDUCATION/TRAINING PROGRAM

## 2024-01-01 PROCEDURE — 700111 HCHG RX REV CODE 636 W/ 250 OVERRIDE (IP)

## 2024-01-01 PROCEDURE — 99497 ADVNCD CARE PLAN 30 MIN: CPT | Mod: 25 | Performed by: NURSE PRACTITIONER

## 2024-01-01 PROCEDURE — 85007 BL SMEAR W/DIFF WBC COUNT: CPT

## 2024-01-01 PROCEDURE — 86901 BLOOD TYPING SEROLOGIC RH(D): CPT

## 2024-01-01 PROCEDURE — 700111 HCHG RX REV CODE 636 W/ 250 OVERRIDE (IP): Performed by: INTERNAL MEDICINE

## 2024-01-01 PROCEDURE — 85027 COMPLETE CBC AUTOMATED: CPT

## 2024-01-01 PROCEDURE — 99223 1ST HOSP IP/OBS HIGH 75: CPT | Performed by: NURSE PRACTITIONER

## 2024-01-01 PROCEDURE — 84145 PROCALCITONIN (PCT): CPT

## 2024-01-01 PROCEDURE — 0241U HCHG SARS-COV-2 COVID-19 NFCT DS RESP RNA 4 TRGT ED POC: CPT

## 2024-01-01 PROCEDURE — 700111 HCHG RX REV CODE 636 W/ 250 OVERRIDE (IP): Performed by: STUDENT IN AN ORGANIZED HEALTH CARE EDUCATION/TRAINING PROGRAM

## 2024-01-01 PROCEDURE — 87086 URINE CULTURE/COLONY COUNT: CPT

## 2024-01-01 PROCEDURE — 87186 SC STD MICRODIL/AGAR DIL: CPT

## 2024-01-01 PROCEDURE — 99497 ADVNCD CARE PLAN 30 MIN: CPT | Performed by: STUDENT IN AN ORGANIZED HEALTH CARE EDUCATION/TRAINING PROGRAM

## 2024-01-01 PROCEDURE — 83880 ASSAY OF NATRIURETIC PEPTIDE: CPT

## 2024-01-01 PROCEDURE — 84100 ASSAY OF PHOSPHORUS: CPT

## 2024-01-01 PROCEDURE — 76775 US EXAM ABDO BACK WALL LIM: CPT

## 2024-01-01 PROCEDURE — 82962 GLUCOSE BLOOD TEST: CPT

## 2024-01-01 PROCEDURE — 80053 COMPREHEN METABOLIC PANEL: CPT

## 2024-01-01 PROCEDURE — 82784 ASSAY IGA/IGD/IGG/IGM EACH: CPT

## 2024-01-01 PROCEDURE — 85025 COMPLETE CBC W/AUTO DIFF WBC: CPT

## 2024-01-01 PROCEDURE — 99212 OFFICE O/P EST SF 10 MIN: CPT | Performed by: NURSE PRACTITIONER

## 2024-01-01 PROCEDURE — 83521 IG LIGHT CHAINS FREE EACH: CPT | Mod: 91

## 2024-01-01 PROCEDURE — 85018 HEMOGLOBIN: CPT

## 2024-01-01 PROCEDURE — 83735 ASSAY OF MAGNESIUM: CPT

## 2024-01-01 PROCEDURE — 86850 RBC ANTIBODY SCREEN: CPT

## 2024-01-01 PROCEDURE — 700111 HCHG RX REV CODE 636 W/ 250 OVERRIDE (IP): Mod: JZ | Performed by: STUDENT IN AN ORGANIZED HEALTH CARE EDUCATION/TRAINING PROGRAM

## 2024-01-01 PROCEDURE — 82728 ASSAY OF FERRITIN: CPT

## 2024-01-01 PROCEDURE — 86900 BLOOD TYPING SEROLOGIC ABO: CPT

## 2024-01-01 PROCEDURE — 84156 ASSAY OF PROTEIN URINE: CPT

## 2024-01-01 PROCEDURE — 302136 NUTRITION PUMP: Performed by: STUDENT IN AN ORGANIZED HEALTH CARE EDUCATION/TRAINING PROGRAM

## 2024-01-01 PROCEDURE — 36415 COLL VENOUS BLD VENIPUNCTURE: CPT

## 2024-01-01 PROCEDURE — 8E0ZXY6 ISOLATION: ICD-10-PCS

## 2024-01-01 PROCEDURE — 30233N1 TRANSFUSION OF NONAUTOLOGOUS RED BLOOD CELLS INTO PERIPHERAL VEIN, PERCUTANEOUS APPROACH: ICD-10-PCS

## 2024-01-01 PROCEDURE — A9270 NON-COVERED ITEM OR SERVICE: HCPCS | Performed by: STUDENT IN AN ORGANIZED HEALTH CARE EDUCATION/TRAINING PROGRAM

## 2024-01-01 PROCEDURE — 86140 C-REACTIVE PROTEIN: CPT

## 2024-01-01 PROCEDURE — 84439 ASSAY OF FREE THYROXINE: CPT

## 2024-01-01 PROCEDURE — 770020 HCHG ROOM/CARE - TELE (206)

## 2024-01-01 PROCEDURE — 36430 TRANSFUSION BLD/BLD COMPNT: CPT

## 2024-01-01 PROCEDURE — 31720 CLEARANCE OF AIRWAYS: CPT

## 2024-01-01 PROCEDURE — 86923 COMPATIBILITY TEST ELECTRIC: CPT

## 2024-01-01 PROCEDURE — 770001 HCHG ROOM/CARE - MED/SURG/GYN PRIV*

## 2024-01-01 PROCEDURE — 97167 OT EVAL HIGH COMPLEX 60 MIN: CPT

## 2024-01-01 PROCEDURE — 84295 ASSAY OF SERUM SODIUM: CPT

## 2024-01-01 PROCEDURE — 83540 ASSAY OF IRON: CPT

## 2024-01-01 PROCEDURE — 700101 HCHG RX REV CODE 250: Performed by: STUDENT IN AN ORGANIZED HEALTH CARE EDUCATION/TRAINING PROGRAM

## 2024-01-01 PROCEDURE — 97602 WOUND(S) CARE NON-SELECTIVE: CPT

## 2024-01-01 PROCEDURE — 87077 CULTURE AEROBIC IDENTIFY: CPT | Mod: 91

## 2024-01-01 PROCEDURE — 84155 ASSAY OF PROTEIN SERUM: CPT

## 2024-01-01 PROCEDURE — 99497 ADVNCD CARE PLAN 30 MIN: CPT | Mod: 25 | Performed by: STUDENT IN AN ORGANIZED HEALTH CARE EDUCATION/TRAINING PROGRAM

## 2024-01-01 PROCEDURE — 83605 ASSAY OF LACTIC ACID: CPT

## 2024-01-01 PROCEDURE — 87040 BLOOD CULTURE FOR BACTERIA: CPT | Mod: 91

## 2024-01-01 PROCEDURE — 96374 THER/PROPH/DIAG INJ IV PUSH: CPT

## 2024-01-01 PROCEDURE — 99285 EMERGENCY DEPT VISIT HI MDM: CPT

## 2024-01-01 PROCEDURE — 83550 IRON BINDING TEST: CPT

## 2024-01-01 PROCEDURE — 99223 1ST HOSP IP/OBS HIGH 75: CPT | Mod: AI | Performed by: STUDENT IN AN ORGANIZED HEALTH CARE EDUCATION/TRAINING PROGRAM

## 2024-01-01 PROCEDURE — 84443 ASSAY THYROID STIM HORMONE: CPT

## 2024-01-01 PROCEDURE — 81001 URINALYSIS AUTO W/SCOPE: CPT

## 2024-01-01 PROCEDURE — 85014 HEMATOCRIT: CPT

## 2024-01-01 PROCEDURE — 700105 HCHG RX REV CODE 258

## 2024-01-01 PROCEDURE — 84300 ASSAY OF URINE SODIUM: CPT

## 2024-01-01 PROCEDURE — 97163 PT EVAL HIGH COMPLEX 45 MIN: CPT

## 2024-01-01 PROCEDURE — 80048 BASIC METABOLIC PNL TOTAL CA: CPT

## 2024-01-01 PROCEDURE — 84134 ASSAY OF PREALBUMIN: CPT

## 2024-01-01 PROCEDURE — 99204 OFFICE O/P NEW MOD 45 MIN: CPT | Performed by: NURSE PRACTITIONER

## 2024-01-01 PROCEDURE — 93005 ELECTROCARDIOGRAM TRACING: CPT | Mod: TC | Performed by: STUDENT IN AN ORGANIZED HEALTH CARE EDUCATION/TRAINING PROGRAM

## 2024-01-01 PROCEDURE — 82570 ASSAY OF URINE CREATININE: CPT

## 2024-01-01 PROCEDURE — 82550 ASSAY OF CK (CPK): CPT

## 2024-01-01 PROCEDURE — P9016 RBC LEUKOCYTES REDUCED: HCPCS

## 2024-01-01 PROCEDURE — 84165 PROTEIN E-PHORESIS SERUM: CPT

## 2024-01-01 PROCEDURE — 86334 IMMUNOFIX E-PHORESIS SERUM: CPT

## 2024-01-01 RX ORDER — SODIUM CHLORIDE 9 MG/ML
INJECTION, SOLUTION INTRAVENOUS CONTINUOUS
Status: DISCONTINUED | OUTPATIENT
Start: 2024-01-01 | End: 2024-01-01

## 2024-01-01 RX ORDER — ATORVASTATIN CALCIUM 40 MG/1
40 TABLET, FILM COATED ORAL EVERY EVENING
Status: DISCONTINUED | OUTPATIENT
Start: 2024-01-01 | End: 2024-01-01

## 2024-01-01 RX ORDER — ACETAMINOPHEN 325 MG/1
650 TABLET ORAL EVERY 6 HOURS PRN
Status: DISCONTINUED | OUTPATIENT
Start: 2024-01-01 | End: 2024-01-01

## 2024-01-01 RX ORDER — OXYCODONE HYDROCHLORIDE 5 MG/1
5 TABLET ORAL EVERY 6 HOURS PRN
Status: DISCONTINUED | OUTPATIENT
Start: 2024-01-01 | End: 2024-01-01 | Stop reason: HOSPADM

## 2024-01-01 RX ORDER — CEFTRIAXONE 1 G/1
1000 INJECTION, POWDER, FOR SOLUTION INTRAMUSCULAR; INTRAVENOUS ONCE
Status: COMPLETED | OUTPATIENT
Start: 2024-01-01 | End: 2024-01-01

## 2024-01-01 RX ORDER — SENNOSIDES A AND B 8.6 MG/1
8.6 TABLET, FILM COATED ORAL
Status: DISCONTINUED | OUTPATIENT
Start: 2024-01-01 | End: 2024-01-01

## 2024-01-01 RX ORDER — SENNOSIDES A AND B 8.6 MG/1
8.6 TABLET, FILM COATED ORAL
Status: DISCONTINUED | OUTPATIENT
Start: 2024-01-01 | End: 2024-01-01 | Stop reason: HOSPADM

## 2024-01-01 RX ORDER — SODIUM PHOSPHATE,MONO-DIBASIC 19G-7G/118
1 ENEMA (ML) RECTAL
COMMUNITY

## 2024-01-01 RX ORDER — ACETAMINOPHEN 325 MG/1
650 TABLET ORAL EVERY 6 HOURS PRN
Status: DISCONTINUED | OUTPATIENT
Start: 2024-01-01 | End: 2024-01-01 | Stop reason: HOSPADM

## 2024-01-01 RX ORDER — IPRATROPIUM BROMIDE AND ALBUTEROL SULFATE 2.5; .5 MG/3ML; MG/3ML
3 SOLUTION RESPIRATORY (INHALATION)
Status: DISCONTINUED | OUTPATIENT
Start: 2024-01-01 | End: 2024-01-01 | Stop reason: HOSPADM

## 2024-01-01 RX ORDER — DEXTROSE MONOHYDRATE 50 MG/ML
INJECTION, SOLUTION INTRAVENOUS CONTINUOUS
Status: DISCONTINUED | OUTPATIENT
Start: 2024-01-01 | End: 2024-01-01

## 2024-01-01 RX ORDER — SODIUM BICARBONATE 650 MG/1
650 TABLET ORAL 2 TIMES DAILY
Status: DISCONTINUED | OUTPATIENT
Start: 2024-01-01 | End: 2024-01-01 | Stop reason: HOSPADM

## 2024-01-01 RX ORDER — OSELTAMIVIR PHOSPHATE 75 MG/1
75 CAPSULE ORAL ONCE
Status: ACTIVE | OUTPATIENT
Start: 2024-01-01 | End: 2024-01-01

## 2024-01-01 RX ORDER — LEVOTHYROXINE SODIUM 50 UG/1
50 TABLET ORAL
Status: DISCONTINUED | OUTPATIENT
Start: 2024-01-01 | End: 2024-01-01

## 2024-01-01 RX ORDER — LEVOTHYROXINE SODIUM 50 UG/1
50 TABLET ORAL
Status: DISCONTINUED | OUTPATIENT
Start: 2024-01-01 | End: 2024-01-01 | Stop reason: HOSPADM

## 2024-01-01 RX ORDER — POLYMYXIN B SULFATE AND TRIMETHOPRIM 1; 10000 MG/ML; [USP'U]/ML
1 SOLUTION OPHTHALMIC
COMMUNITY
Start: 2024-01-01

## 2024-01-01 RX ORDER — FOLIC ACID 1 MG/1
1 TABLET ORAL DAILY
Status: DISCONTINUED | OUTPATIENT
Start: 2024-01-01 | End: 2024-01-01

## 2024-01-01 RX ORDER — SODIUM CHLORIDE 9 MG/ML
1000 INJECTION, SOLUTION INTRAVENOUS ONCE
Status: COMPLETED | OUTPATIENT
Start: 2024-01-01 | End: 2024-01-01

## 2024-01-01 RX ORDER — PANTOPRAZOLE SODIUM 40 MG/10ML
40 INJECTION, POWDER, LYOPHILIZED, FOR SOLUTION INTRAVENOUS 2 TIMES DAILY
Status: DISCONTINUED | OUTPATIENT
Start: 2024-01-01 | End: 2024-01-01 | Stop reason: HOSPADM

## 2024-01-01 RX ORDER — MIRTAZAPINE 15 MG/1
7.5 TABLET, FILM COATED ORAL NIGHTLY
Status: DISCONTINUED | OUTPATIENT
Start: 2024-01-01 | End: 2024-01-01

## 2024-01-01 RX ORDER — ACETAMINOPHEN 10 MG/ML
1000 INJECTION, SOLUTION INTRAVENOUS EVERY 6 HOURS PRN
Status: DISPENSED | OUTPATIENT
Start: 2024-01-01 | End: 2024-01-01

## 2024-01-01 RX ORDER — ONDANSETRON 4 MG/1
4 TABLET, ORALLY DISINTEGRATING ORAL EVERY 4 HOURS PRN
Status: DISCONTINUED | OUTPATIENT
Start: 2024-01-01 | End: 2024-01-01 | Stop reason: HOSPADM

## 2024-01-01 RX ORDER — SODIUM BICARBONATE 650 MG/1
650 TABLET ORAL 2 TIMES DAILY
Status: DISCONTINUED | OUTPATIENT
Start: 2024-01-01 | End: 2024-01-01

## 2024-01-01 RX ORDER — ONDANSETRON 4 MG/1
4 TABLET, ORALLY DISINTEGRATING ORAL EVERY 4 HOURS PRN
Status: DISCONTINUED | OUTPATIENT
Start: 2024-01-01 | End: 2024-01-01

## 2024-01-01 RX ORDER — LABETALOL HYDROCHLORIDE 5 MG/ML
10 INJECTION, SOLUTION INTRAVENOUS EVERY 4 HOURS PRN
Status: DISCONTINUED | OUTPATIENT
Start: 2024-01-01 | End: 2024-01-01 | Stop reason: HOSPADM

## 2024-01-01 RX ORDER — OSELTAMIVIR PHOSPHATE 30 MG/1
30 CAPSULE ORAL DAILY
Status: DISCONTINUED | OUTPATIENT
Start: 2024-01-01 | End: 2024-01-01 | Stop reason: HOSPADM

## 2024-01-01 RX ORDER — METOPROLOL TARTRATE 50 MG
50 TABLET ORAL TWICE DAILY
Status: DISCONTINUED | OUTPATIENT
Start: 2024-01-01 | End: 2024-01-01 | Stop reason: HOSPADM

## 2024-01-01 RX ORDER — OXYCODONE HYDROCHLORIDE 5 MG/1
2.5 TABLET ORAL EVERY 6 HOURS PRN
COMMUNITY

## 2024-01-01 RX ORDER — SODIUM BICARBONATE IN D5W 150/1000ML
PLASTIC BAG, INJECTION (ML) INTRAVENOUS CONTINUOUS
Status: DISCONTINUED | OUTPATIENT
Start: 2024-01-01 | End: 2024-01-01

## 2024-01-01 RX ORDER — FOLIC ACID 1 MG/1
1 TABLET ORAL DAILY
Status: DISCONTINUED | OUTPATIENT
Start: 2024-01-01 | End: 2024-01-01 | Stop reason: HOSPADM

## 2024-01-01 RX ORDER — SODIUM CHLORIDE 9 MG/ML
500 INJECTION, SOLUTION INTRAVENOUS ONCE
Status: COMPLETED | OUTPATIENT
Start: 2024-01-01 | End: 2024-01-01

## 2024-01-01 RX ORDER — AZITHROMYCIN 250 MG/1
500 TABLET, FILM COATED ORAL DAILY
Status: DISCONTINUED | OUTPATIENT
Start: 2024-01-01 | End: 2024-01-01 | Stop reason: HOSPADM

## 2024-01-01 RX ORDER — FUROSEMIDE 10 MG/ML
40 INJECTION INTRAMUSCULAR; INTRAVENOUS
Status: DISCONTINUED | OUTPATIENT
Start: 2024-01-01 | End: 2024-01-01 | Stop reason: HOSPADM

## 2024-01-01 RX ORDER — THIAMINE HYDROCHLORIDE 100 MG/ML
100 INJECTION, SOLUTION INTRAMUSCULAR; INTRAVENOUS DAILY
Status: DISCONTINUED | OUTPATIENT
Start: 2024-01-01 | End: 2024-01-01 | Stop reason: HOSPADM

## 2024-01-01 RX ORDER — ATORVASTATIN CALCIUM 40 MG/1
40 TABLET, FILM COATED ORAL EVERY EVENING
Status: DISCONTINUED | OUTPATIENT
Start: 2024-01-01 | End: 2024-01-01 | Stop reason: HOSPADM

## 2024-01-01 RX ORDER — ACETAMINOPHEN 650 MG/1
650 SUPPOSITORY RECTAL EVERY 4 HOURS PRN
Status: DISCONTINUED | OUTPATIENT
Start: 2024-01-01 | End: 2024-01-01 | Stop reason: HOSPADM

## 2024-01-01 RX ORDER — MIRTAZAPINE 7.5 MG/1
7.5 TABLET, FILM COATED ORAL NIGHTLY
COMMUNITY

## 2024-01-01 RX ORDER — METOPROLOL TARTRATE 50 MG
50 TABLET ORAL TWICE DAILY
Status: DISCONTINUED | OUTPATIENT
Start: 2024-01-01 | End: 2024-01-01

## 2024-01-01 RX ORDER — SENNOSIDES A AND B 8.6 MG/1
8.6 TABLET, FILM COATED ORAL
COMMUNITY
End: 2024-01-01

## 2024-01-01 RX ORDER — CEFTRIAXONE 1 G/1
1000 INJECTION, POWDER, FOR SOLUTION INTRAMUSCULAR; INTRAVENOUS ONCE
Status: DISCONTINUED | OUTPATIENT
Start: 2024-01-01 | End: 2024-01-01

## 2024-01-01 RX ORDER — FERROUS SULFATE 325(65) MG
325 TABLET ORAL DAILY
Status: DISCONTINUED | OUTPATIENT
Start: 2024-01-01 | End: 2024-01-01 | Stop reason: HOSPADM

## 2024-01-01 RX ORDER — MIRTAZAPINE 15 MG/1
7.5 TABLET, FILM COATED ORAL NIGHTLY
Status: DISCONTINUED | OUTPATIENT
Start: 2024-01-01 | End: 2024-01-01 | Stop reason: HOSPADM

## 2024-01-01 RX ORDER — BISACODYL 10 MG
10 SUPPOSITORY, RECTAL RECTAL PRN
COMMUNITY

## 2024-01-01 RX ORDER — METOPROLOL TARTRATE 1 MG/ML
5 INJECTION, SOLUTION INTRAVENOUS
Status: DISCONTINUED | OUTPATIENT
Start: 2024-01-01 | End: 2024-01-01 | Stop reason: HOSPADM

## 2024-01-01 RX ORDER — SODIUM HYPOCHLORITE 1.25 MG/ML
SOLUTION TOPICAL 2 TIMES DAILY
Status: DISCONTINUED | OUTPATIENT
Start: 2024-01-01 | End: 2024-01-01 | Stop reason: HOSPADM

## 2024-01-01 RX ORDER — ONDANSETRON 2 MG/ML
4 INJECTION INTRAMUSCULAR; INTRAVENOUS EVERY 4 HOURS PRN
Status: DISCONTINUED | OUTPATIENT
Start: 2024-01-01 | End: 2024-01-01 | Stop reason: HOSPADM

## 2024-01-01 RX ORDER — ASPIRIN 81 MG/1
81 TABLET ORAL DAILY
Status: DISCONTINUED | OUTPATIENT
Start: 2024-01-01 | End: 2024-01-01

## 2024-01-01 RX ORDER — OXYCODONE HYDROCHLORIDE 5 MG/1
5 TABLET ORAL EVERY 6 HOURS PRN
Status: DISCONTINUED | OUTPATIENT
Start: 2024-01-01 | End: 2024-01-01

## 2024-01-01 RX ORDER — ACETAMINOPHEN 10 MG/ML
1000 INJECTION, SOLUTION INTRAVENOUS ONCE
Status: COMPLETED | OUTPATIENT
Start: 2024-01-01 | End: 2024-01-01

## 2024-01-01 RX ORDER — MORPHINE SULFATE 4 MG/ML
4 INJECTION INTRAVENOUS EVERY 4 HOURS PRN
Status: DISCONTINUED | OUTPATIENT
Start: 2024-01-01 | End: 2024-01-01

## 2024-01-01 RX ORDER — ASPIRIN 81 MG/1
81 TABLET, CHEWABLE ORAL DAILY
Status: DISCONTINUED | OUTPATIENT
Start: 2024-01-01 | End: 2024-01-01 | Stop reason: HOSPADM

## 2024-01-01 RX ORDER — AMOXICILLIN 250 MG
2 CAPSULE ORAL NIGHTLY
COMMUNITY

## 2024-01-01 RX ADMIN — ASPIRIN 81 MG: 81 TABLET, CHEWABLE ORAL at 06:13

## 2024-01-01 RX ADMIN — THIAMINE HYDROCHLORIDE 100 MG: 100 INJECTION, SOLUTION INTRAMUSCULAR; INTRAVENOUS at 06:14

## 2024-01-01 RX ADMIN — FOLIC ACID 1 MG: 1 TABLET ORAL at 06:00

## 2024-01-01 RX ADMIN — SODIUM HYPOCHLORITE 473 ML: 1.25 SOLUTION TOPICAL at 06:22

## 2024-01-01 RX ADMIN — SODIUM BICARBONATE 650 MG: 650 TABLET ORAL at 17:14

## 2024-01-01 RX ADMIN — CEFTRIAXONE SODIUM 1000 MG: 1 INJECTION, POWDER, FOR SOLUTION INTRAMUSCULAR; INTRAVENOUS at 18:49

## 2024-01-01 RX ADMIN — PIPERACILLIN AND TAZOBACTAM 4.5 G: 4; .5 INJECTION, POWDER, FOR SOLUTION INTRAVENOUS at 17:12

## 2024-01-01 RX ADMIN — MORPHINE SULFATE 4 MG: 4 INJECTION INTRAVENOUS at 01:10

## 2024-01-01 RX ADMIN — PIPERACILLIN AND TAZOBACTAM 4.5 G: 4; .5 INJECTION, POWDER, FOR SOLUTION INTRAVENOUS at 21:40

## 2024-01-01 RX ADMIN — DEXTROSE MONOHYDRATE: 50 INJECTION, SOLUTION INTRAVENOUS at 10:09

## 2024-01-01 RX ADMIN — PIPERACILLIN AND TAZOBACTAM 4.5 G: 4; .5 INJECTION, POWDER, FOR SOLUTION INTRAVENOUS at 06:15

## 2024-01-01 RX ADMIN — ACETAMINOPHEN 650 MG: 325 TABLET ORAL at 22:16

## 2024-01-01 RX ADMIN — THIAMINE HYDROCHLORIDE 100 MG: 100 INJECTION, SOLUTION INTRAMUSCULAR; INTRAVENOUS at 15:00

## 2024-01-01 RX ADMIN — PIPERACILLIN AND TAZOBACTAM 4.5 G: 4; .5 INJECTION, POWDER, FOR SOLUTION INTRAVENOUS at 19:02

## 2024-01-01 RX ADMIN — SODIUM CHLORIDE 1000 ML: 9 INJECTION, SOLUTION INTRAVENOUS at 18:39

## 2024-01-01 RX ADMIN — SODIUM BICARBONATE 650 MG: 650 TABLET ORAL at 06:13

## 2024-01-01 RX ADMIN — ATORVASTATIN CALCIUM 40 MG: 40 TABLET, FILM COATED ORAL at 17:14

## 2024-01-01 RX ADMIN — LEVOTHYROXINE SODIUM 50 MCG: 0.05 TABLET ORAL at 06:13

## 2024-01-01 RX ADMIN — SODIUM CHLORIDE: 9 INJECTION, SOLUTION INTRAVENOUS at 03:00

## 2024-01-01 RX ADMIN — SODIUM CHLORIDE: 9 INJECTION, SOLUTION INTRAVENOUS at 13:15

## 2024-01-01 RX ADMIN — SODIUM CHLORIDE 500 ML: 9 INJECTION, SOLUTION INTRAVENOUS at 11:08

## 2024-01-01 RX ADMIN — MORPHINE SULFATE 4 MG: 4 INJECTION INTRAVENOUS at 01:44

## 2024-01-01 RX ADMIN — SODIUM CHLORIDE 500 ML: 9 INJECTION, SOLUTION INTRAVENOUS at 18:12

## 2024-01-01 RX ADMIN — OXYCODONE 5 MG: 5 TABLET ORAL at 06:00

## 2024-01-01 RX ADMIN — ASPIRIN 81 MG: 81 TABLET, COATED ORAL at 05:51

## 2024-01-01 RX ADMIN — METOPROLOL TARTRATE 50 MG: 50 TABLET, FILM COATED ORAL at 06:12

## 2024-01-01 RX ADMIN — PIPERACILLIN AND TAZOBACTAM 4.5 G: 4; .5 INJECTION, POWDER, FOR SOLUTION INTRAVENOUS at 05:50

## 2024-01-01 RX ADMIN — OSELTAMIVIR PHOSPHATE 30 MG: 30 CAPSULE ORAL at 17:14

## 2024-01-01 RX ADMIN — MIRTAZAPINE 7.5 MG: 30 TABLET, FILM COATED ORAL at 22:17

## 2024-01-01 RX ADMIN — SODIUM BICARBONATE 75 MEQ: 84 INJECTION, SOLUTION INTRAVENOUS at 15:10

## 2024-01-01 RX ADMIN — SODIUM CHLORIDE: 9 INJECTION, SOLUTION INTRAVENOUS at 17:26

## 2024-01-01 RX ADMIN — ATORVASTATIN CALCIUM 40 MG: 40 TABLET, FILM COATED ORAL at 22:16

## 2024-01-01 RX ADMIN — FERROUS SULFATE TAB 325 MG (65 MG ELEMENTAL FE) 325 MG: 325 (65 FE) TAB at 05:52

## 2024-01-01 RX ADMIN — PIPERACILLIN AND TAZOBACTAM 4.5 G: 4; .5 INJECTION, POWDER, FOR SOLUTION INTRAVENOUS at 12:18

## 2024-01-01 RX ADMIN — OXYCODONE 5 MG: 5 TABLET ORAL at 23:43

## 2024-01-01 RX ADMIN — SODIUM BICARBONATE 75 MEQ: 84 INJECTION, SOLUTION INTRAVENOUS at 02:00

## 2024-01-01 RX ADMIN — AZITHROMYCIN 500 MG: 250 TABLET, FILM COATED ORAL at 06:12

## 2024-01-01 RX ADMIN — ACETAMINOPHEN 1000 MG: 1000 INJECTION INTRAVENOUS at 17:39

## 2024-01-01 RX ADMIN — LEVOTHYROXINE SODIUM 50 MCG: 0.05 TABLET ORAL at 05:51

## 2024-01-01 RX ADMIN — SODIUM HYPOCHLORITE 5 ML: 1.25 SOLUTION TOPICAL at 06:00

## 2024-01-01 RX ADMIN — ASPIRIN 81 MG: 81 TABLET, COATED ORAL at 06:00

## 2024-01-01 RX ADMIN — ACETAMINOPHEN 650 MG: 650 SUPPOSITORY RECTAL at 15:28

## 2024-01-01 RX ADMIN — FOLIC ACID 1 MG: 1 TABLET ORAL at 05:52

## 2024-01-01 RX ADMIN — AZITHROMYCIN 500 MG: 250 TABLET, FILM COATED ORAL at 09:03

## 2024-01-01 RX ADMIN — FOLIC ACID 1 MG: 1 TABLET ORAL at 06:13

## 2024-01-01 RX ADMIN — FERROUS SULFATE TAB 325 MG (65 MG ELEMENTAL FE) 325 MG: 325 (65 FE) TAB at 06:12

## 2024-01-01 RX ADMIN — LEVOTHYROXINE SODIUM 50 MCG: 0.05 TABLET ORAL at 06:00

## 2024-01-01 RX ADMIN — PANTOPRAZOLE SODIUM 40 MG: 40 INJECTION, POWDER, FOR SOLUTION INTRAVENOUS at 06:14

## 2024-01-01 RX ADMIN — METOPROLOL TARTRATE 50 MG: 50 TABLET, FILM COATED ORAL at 05:51

## 2024-01-01 RX ADMIN — METOPROLOL TARTRATE 50 MG: 50 TABLET, FILM COATED ORAL at 17:14

## 2024-01-01 RX ADMIN — FERROUS SULFATE TAB 325 MG (65 MG ELEMENTAL FE) 325 MG: 325 (65 FE) TAB at 06:00

## 2024-01-01 RX ADMIN — SODIUM HYPOCHLORITE 5 ML: 1.25 SOLUTION TOPICAL at 18:41

## 2024-01-01 ASSESSMENT — COGNITIVE AND FUNCTIONAL STATUS - GENERAL
MOVING TO AND FROM BED TO CHAIR: A LOT
EATING MEALS: A LOT
MOVING FROM LYING ON BACK TO SITTING ON SIDE OF FLAT BED: TOTAL
TOILETING: TOTAL
TURNING FROM BACK TO SIDE WHILE IN FLAT BAD: A LOT
DRESSING REGULAR UPPER BODY CLOTHING: TOTAL
SUGGESTED CMS G CODE MODIFIER DAILY ACTIVITY: CM
CLIMB 3 TO 5 STEPS WITH RAILING: TOTAL
DRESSING REGULAR LOWER BODY CLOTHING: TOTAL
STANDING UP FROM CHAIR USING ARMS: TOTAL
TURNING FROM BACK TO SIDE WHILE IN FLAT BAD: TOTAL
TURNING FROM BACK TO SIDE WHILE IN FLAT BAD: A LITTLE
TOILETING: TOTAL
SUGGESTED CMS G CODE MODIFIER DAILY ACTIVITY: CN
DRESSING REGULAR LOWER BODY CLOTHING: A LOT
WALKING IN HOSPITAL ROOM: TOTAL
DAILY ACTIVITIY SCORE: 6
SUGGESTED CMS G CODE MODIFIER MOBILITY: CN
MOBILITY SCORE: 9
SUGGESTED CMS G CODE MODIFIER DAILY ACTIVITY: CM
PERSONAL GROOMING: TOTAL
EATING MEALS: A LOT
HELP NEEDED FOR BATHING: TOTAL
STANDING UP FROM CHAIR USING ARMS: TOTAL
WALKING IN HOSPITAL ROOM: TOTAL
WALKING IN HOSPITAL ROOM: TOTAL
HELP NEEDED FOR BATHING: TOTAL
DRESSING REGULAR LOWER BODY CLOTHING: TOTAL
MOBILITY SCORE: 10
MOVING FROM LYING ON BACK TO SITTING ON SIDE OF FLAT BED: A LOT
SUGGESTED CMS G CODE MODIFIER MOBILITY: CM
DAILY ACTIVITIY SCORE: 8
PERSONAL GROOMING: TOTAL
CLIMB 3 TO 5 STEPS WITH RAILING: TOTAL
SUGGESTED CMS G CODE MODIFIER MOBILITY: CL
CLIMB 3 TO 5 STEPS WITH RAILING: TOTAL
DAILY ACTIVITIY SCORE: 7
MOVING TO AND FROM BED TO CHAIR: A LOT
DRESSING REGULAR UPPER BODY CLOTHING: TOTAL
DRESSING REGULAR UPPER BODY CLOTHING: TOTAL
MOVING FROM LYING ON BACK TO SITTING ON SIDE OF FLAT BED: A LOT
TOILETING: TOTAL
HELP NEEDED FOR BATHING: TOTAL
PERSONAL GROOMING: TOTAL
MOVING TO AND FROM BED TO CHAIR: TOTAL
MOBILITY SCORE: 6
EATING MEALS: TOTAL
STANDING UP FROM CHAIR USING ARMS: TOTAL

## 2024-01-01 ASSESSMENT — ENCOUNTER SYMPTOMS
COUGH: 1
SHORTNESS OF BREATH: 1
WEAKNESS: 1
MYALGIAS: 1

## 2024-01-01 ASSESSMENT — LIFESTYLE VARIABLES
CONSUMPTION TOTAL: NEGATIVE
HOW MANY TIMES IN THE PAST YEAR HAVE YOU HAD 5 OR MORE DRINKS IN A DAY: 0
HAVE YOU EVER FELT YOU SHOULD CUT DOWN ON YOUR DRINKING: NO
EVER HAD A DRINK FIRST THING IN THE MORNING TO STEADY YOUR NERVES TO GET RID OF A HANGOVER: NO
TOTAL SCORE: 0
TOTAL SCORE: 0
EVER FELT BAD OR GUILTY ABOUT YOUR DRINKING: NO
AVERAGE NUMBER OF DAYS PER WEEK YOU HAVE A DRINK CONTAINING ALCOHOL: 0
ALCOHOL_USE: NO
ON A TYPICAL DAY WHEN YOU DRINK ALCOHOL HOW MANY DRINKS DO YOU HAVE: 0
HAVE PEOPLE ANNOYED YOU BY CRITICIZING YOUR DRINKING: NO
TOTAL SCORE: 0

## 2024-01-01 ASSESSMENT — PAIN SCALES - GENERAL: PAINLEVEL_OUTOF10: 10=SEVERE PAIN

## 2024-01-01 ASSESSMENT — PAIN DESCRIPTION - PAIN TYPE
TYPE: ACUTE PAIN
TYPE: ACUTE PAIN
TYPE: ACUTE PAIN;CHRONIC PAIN
TYPE: ACUTE PAIN

## 2024-01-01 ASSESSMENT — FIBROSIS 4 INDEX
FIB4 SCORE: 2.34
FIB4 SCORE: 2.43
FIB4 SCORE: 0.99
FIB4 SCORE: 1.01

## 2024-01-01 ASSESSMENT — GAIT ASSESSMENTS: GAIT LEVEL OF ASSIST: UNABLE TO PARTICIPATE

## 2024-01-01 ASSESSMENT — ACTIVITIES OF DAILY LIVING (ADL): TOILETING: UNABLE TO DETERMINE AT THIS TIME

## 2024-01-05 ENCOUNTER — PATIENT OUTREACH (OUTPATIENT)
Dept: HEALTH INFORMATION MANAGEMENT | Facility: OTHER | Age: 84
End: 2024-01-05
Payer: MEDICARE

## 2024-01-05 DIAGNOSIS — I50.43 CHF (CONGESTIVE HEART FAILURE), NYHA CLASS I, ACUTE ON CHRONIC, COMBINED (HCC): ICD-10-CM

## 2024-01-05 DIAGNOSIS — N18.31 STAGE 3A CHRONIC KIDNEY DISEASE: ICD-10-CM

## 2024-01-05 DIAGNOSIS — I10 ESSENTIAL HYPERTENSION: ICD-10-CM

## 2024-01-09 ENCOUNTER — APPOINTMENT (OUTPATIENT)
Dept: RHEUMATOLOGY | Facility: MEDICAL CENTER | Age: 84
End: 2024-01-09
Attending: INTERNAL MEDICINE
Payer: MEDICARE

## 2024-01-26 NOTE — PROGRESS NOTES
"Called Mariely, Mariely reports things are \"going good\". No concerns or complaints. Mariley's Bp in OV: 138/60 (11/10/23), 130/62 (10/3/23) and 108/60 (8/25/23). Discussed upcoming pcp appt (Mariely is aware she needs lab work completed prior) and need to schedule a Rheumatology follow-up appt. Current risk score is a seven (7). Only Care Gap is a urine drug screening.   "

## 2024-01-31 ENCOUNTER — HOSPITAL ENCOUNTER (OUTPATIENT)
Dept: LAB | Facility: MEDICAL CENTER | Age: 84
End: 2024-01-31
Attending: STUDENT IN AN ORGANIZED HEALTH CARE EDUCATION/TRAINING PROGRAM
Payer: MEDICARE

## 2024-01-31 DIAGNOSIS — E55.9 VITAMIN D DEFICIENCY: ICD-10-CM

## 2024-01-31 DIAGNOSIS — D64.9 NORMOCYTIC ANEMIA: ICD-10-CM

## 2024-01-31 LAB
25(OH)D3 SERPL-MCNC: 32 NG/ML (ref 30–100)
ALBUMIN SERPL BCP-MCNC: 3.5 G/DL (ref 3.2–4.9)
ALBUMIN/GLOB SERPL: 1.1 G/DL
ALP SERPL-CCNC: 145 U/L (ref 30–99)
ALT SERPL-CCNC: 9 U/L (ref 2–50)
ANION GAP SERPL CALC-SCNC: 14 MMOL/L (ref 7–16)
AST SERPL-CCNC: 9 U/L (ref 12–45)
BILIRUB SERPL-MCNC: 0.3 MG/DL (ref 0.1–1.5)
BUN SERPL-MCNC: 19 MG/DL (ref 8–22)
CALCIUM ALBUM COR SERPL-MCNC: 9.7 MG/DL (ref 8.5–10.5)
CALCIUM SERPL-MCNC: 9.3 MG/DL (ref 8.5–10.5)
CHLORIDE SERPL-SCNC: 102 MMOL/L (ref 96–112)
CO2 SERPL-SCNC: 20 MMOL/L (ref 20–33)
CREAT SERPL-MCNC: 1.05 MG/DL (ref 0.5–1.4)
FERRITIN SERPL-MCNC: 273 NG/ML (ref 10–291)
FOLATE SERPL-MCNC: >40 NG/ML
GFR SERPLBLD CREATININE-BSD FMLA CKD-EPI: 53 ML/MIN/1.73 M 2
GLOBULIN SER CALC-MCNC: 3.3 G/DL (ref 1.9–3.5)
GLUCOSE SERPL-MCNC: 87 MG/DL (ref 65–99)
HGB RETIC QN AUTO: 31.2 PG/CELL (ref 29–35)
IMM RETICS NFR: 25 % (ref 2.6–16.1)
IRON SATN MFR SERPL: 14 % (ref 15–55)
IRON SERPL-MCNC: 26 UG/DL (ref 40–170)
LDH SERPL L TO P-CCNC: 221 U/L (ref 107–266)
POTASSIUM SERPL-SCNC: 5 MMOL/L (ref 3.6–5.5)
PROT SERPL-MCNC: 6.8 G/DL (ref 6–8.2)
RETICS # AUTO: 0.08 M/UL (ref 0.04–0.12)
RETICS/RBC NFR: 2.2 % (ref 0.8–2.6)
SODIUM SERPL-SCNC: 136 MMOL/L (ref 135–145)
TIBC SERPL-MCNC: 190 UG/DL (ref 250–450)
UIBC SERPL-MCNC: 164 UG/DL (ref 110–370)
VIT B12 SERPL-MCNC: 2984 PG/ML (ref 211–911)

## 2024-01-31 PROCEDURE — 82728 ASSAY OF FERRITIN: CPT

## 2024-01-31 PROCEDURE — 83615 LACTATE (LD) (LDH) ENZYME: CPT

## 2024-01-31 PROCEDURE — 85046 RETICYTE/HGB CONCENTRATE: CPT

## 2024-01-31 PROCEDURE — 82306 VITAMIN D 25 HYDROXY: CPT

## 2024-01-31 PROCEDURE — 86334 IMMUNOFIX E-PHORESIS SERUM: CPT

## 2024-01-31 PROCEDURE — 36415 COLL VENOUS BLD VENIPUNCTURE: CPT

## 2024-01-31 PROCEDURE — 82607 VITAMIN B-12: CPT

## 2024-01-31 PROCEDURE — 84165 PROTEIN E-PHORESIS SERUM: CPT

## 2024-01-31 PROCEDURE — 80053 COMPREHEN METABOLIC PANEL: CPT

## 2024-01-31 PROCEDURE — 83550 IRON BINDING TEST: CPT

## 2024-01-31 PROCEDURE — 84155 ASSAY OF PROTEIN SERUM: CPT | Mod: XU

## 2024-01-31 PROCEDURE — 83540 ASSAY OF IRON: CPT

## 2024-01-31 PROCEDURE — 82746 ASSAY OF FOLIC ACID SERUM: CPT

## 2024-01-31 PROCEDURE — 83010 ASSAY OF HAPTOGLOBIN QUANT: CPT

## 2024-02-02 LAB — HAPTOGLOB SERPL-MCNC: 297 MG/DL (ref 30–200)

## 2024-02-03 LAB
ALBUMIN SERPL ELPH-MCNC: 3.17 G/DL (ref 3.75–5.01)
ALPHA1 GLOB SERPL ELPH-MCNC: 0.58 G/DL (ref 0.19–0.46)
ALPHA2 GLOB SERPL ELPH-MCNC: 1.09 G/DL (ref 0.48–1.05)
B-GLOBULIN SERPL ELPH-MCNC: 0.79 G/DL (ref 0.48–1.1)
EER PROT ELECT SER Q1092: ABNORMAL
GAMMA GLOB SERPL ELPH-MCNC: 0.86 G/DL (ref 0.62–1.51)
INTERPRETATION SERPL IFE-IMP: ABNORMAL
INTERPRETATION SERPL IFE-IMP: NORMAL
MONOCLONAL PROTEIN NL11656: ABNORMAL G/DL
PATHOLOGY STUDY: NORMAL
PROT SERPL-MCNC: 6.5 G/DL (ref 6.3–8.2)

## 2024-02-09 ENCOUNTER — HOSPITAL ENCOUNTER (OUTPATIENT)
Dept: LAB | Facility: MEDICAL CENTER | Age: 84
End: 2024-02-09
Attending: STUDENT IN AN ORGANIZED HEALTH CARE EDUCATION/TRAINING PROGRAM
Payer: MEDICARE

## 2024-02-09 ENCOUNTER — OFFICE VISIT (OUTPATIENT)
Dept: MEDICAL GROUP | Facility: LAB | Age: 84
End: 2024-02-09
Payer: MEDICARE

## 2024-02-09 VITALS
SYSTOLIC BLOOD PRESSURE: 118 MMHG | BODY MASS INDEX: 24.73 KG/M2 | RESPIRATION RATE: 20 BRPM | HEART RATE: 60 BPM | TEMPERATURE: 97.1 F | WEIGHT: 131 LBS | DIASTOLIC BLOOD PRESSURE: 62 MMHG | OXYGEN SATURATION: 98 % | HEIGHT: 61 IN

## 2024-02-09 DIAGNOSIS — R74.8 ALKALINE PHOSPHATASE ELEVATION: ICD-10-CM

## 2024-02-09 DIAGNOSIS — R80.9 MICROALBUMINURIA: ICD-10-CM

## 2024-02-09 DIAGNOSIS — D50.9 IRON DEFICIENCY ANEMIA, UNSPECIFIED IRON DEFICIENCY ANEMIA TYPE: ICD-10-CM

## 2024-02-09 DIAGNOSIS — N18.31 STAGE 3A CHRONIC KIDNEY DISEASE: ICD-10-CM

## 2024-02-09 DIAGNOSIS — R10.31 RLQ ABDOMINAL PAIN: ICD-10-CM

## 2024-02-09 DIAGNOSIS — E78.2 MIXED HYPERLIPIDEMIA: ICD-10-CM

## 2024-02-09 PROBLEM — E87.1 HYPONATREMIA: Status: RESOLVED | Noted: 2022-06-28 | Resolved: 2024-02-09

## 2024-02-09 PROBLEM — E55.9 VITAMIN D DEFICIENCY: Status: RESOLVED | Noted: 2023-11-10 | Resolved: 2024-02-09

## 2024-02-09 LAB
BASOPHILS # BLD AUTO: 0.7 % (ref 0–1.8)
BASOPHILS # BLD: 0.11 K/UL (ref 0–0.12)
EOSINOPHIL # BLD AUTO: 0.65 K/UL (ref 0–0.51)
EOSINOPHIL NFR BLD: 4.1 % (ref 0–6.9)
ERYTHROCYTE [DISTWIDTH] IN BLOOD BY AUTOMATED COUNT: 55.8 FL (ref 35.9–50)
HCT VFR BLD AUTO: 34.6 % (ref 37–47)
HGB BLD-MCNC: 10.7 G/DL (ref 12–16)
IMM GRANULOCYTES # BLD AUTO: 0.22 K/UL (ref 0–0.11)
IMM GRANULOCYTES NFR BLD AUTO: 1.4 % (ref 0–0.9)
LYMPHOCYTES # BLD AUTO: 1.33 K/UL (ref 1–4.8)
LYMPHOCYTES NFR BLD: 8.4 % (ref 22–41)
MCH RBC QN AUTO: 29.9 PG (ref 27–33)
MCHC RBC AUTO-ENTMCNC: 30.9 G/DL (ref 32.2–35.5)
MCV RBC AUTO: 96.6 FL (ref 81.4–97.8)
MONOCYTES # BLD AUTO: 0.73 K/UL (ref 0–0.85)
MONOCYTES NFR BLD AUTO: 4.6 % (ref 0–13.4)
NEUTROPHILS # BLD AUTO: 12.84 K/UL (ref 1.82–7.42)
NEUTROPHILS NFR BLD: 80.8 % (ref 44–72)
NRBC # BLD AUTO: 0 K/UL
NRBC BLD-RTO: 0 /100 WBC (ref 0–0.2)
PLATELET # BLD AUTO: 341 K/UL (ref 164–446)
PMV BLD AUTO: 11.4 FL (ref 9–12.9)
RBC # BLD AUTO: 3.58 M/UL (ref 4.2–5.4)
WBC # BLD AUTO: 15.9 K/UL (ref 4.8–10.8)

## 2024-02-09 PROCEDURE — 3078F DIAST BP <80 MM HG: CPT | Performed by: STUDENT IN AN ORGANIZED HEALTH CARE EDUCATION/TRAINING PROGRAM

## 2024-02-09 PROCEDURE — 85025 COMPLETE CBC W/AUTO DIFF WBC: CPT

## 2024-02-09 PROCEDURE — 99214 OFFICE O/P EST MOD 30 MIN: CPT | Performed by: STUDENT IN AN ORGANIZED HEALTH CARE EDUCATION/TRAINING PROGRAM

## 2024-02-09 PROCEDURE — 3074F SYST BP LT 130 MM HG: CPT | Performed by: STUDENT IN AN ORGANIZED HEALTH CARE EDUCATION/TRAINING PROGRAM

## 2024-02-09 PROCEDURE — 36415 COLL VENOUS BLD VENIPUNCTURE: CPT

## 2024-02-09 ASSESSMENT — FIBROSIS 4 INDEX: FIB4 SCORE: 0.84

## 2024-02-09 ASSESSMENT — PATIENT HEALTH QUESTIONNAIRE - PHQ9: CLINICAL INTERPRETATION OF PHQ2 SCORE: 0

## 2024-02-09 NOTE — PROGRESS NOTES
"Subjective:     Chief Complaint   Patient presents with    Follow-Up     HPI:   Mariely presents today for follow up and labs review.    Continues with oral iron daily. Denies diarrhea/constipation, frequent GERD symptoms or GERD symptoms not resolved with tums. No blood in the stool. Still without much appetite as prior (life long), but not skipping meals and trying to get adequate nutrition even if not hungry.     She has a small cocktail nightly which is mostly ice and tonic water.     She does endorse new occasional RLQ pain x 2 months. Twice this month. Last episode yesterday. Random without known trigger. Patient thought it may be gas. Starts mild and builds, lasts approx 15min. Pain can be severe which it occurs and has been associated with nausea and at times vomiting. No associated with any other symptoms. No fevers/chills. Has occurred 3-4 times.    Review of Systems   Constitutional:  Negative for chills, fever, malaise/fatigue and weight loss.   Respiratory:  Negative for cough and shortness of breath.    Cardiovascular:  Negative for chest pain, palpitations and leg swelling (has not needed lasix).   Gastrointestinal:  Positive for heartburn, nausea and vomiting. Negative for abdominal pain, blood in stool, constipation and diarrhea.   Genitourinary:  Negative for dysuria, flank pain, frequency, hematuria and urgency.   Skin:  Negative for rash.   Neurological:  Negative for dizziness and headaches.       Objective:     Exam:  /62 (BP Location: Left arm, Patient Position: Sitting, BP Cuff Size: Adult)   Pulse 60   Temp 36.2 °C (97.1 °F) (Temporal)   Resp 20   Ht 1.549 m (5' 1\")   Wt 59.4 kg (131 lb)   SpO2 98%   BMI 24.75 kg/m²  Body mass index is 24.75 kg/m².    Physical Exam  Vitals reviewed.   Constitutional:       General: She is not in acute distress.     Appearance: Normal appearance. She is not ill-appearing.   HENT:      Head: Normocephalic and atraumatic.      Mouth/Throat:      " Mouth: Mucous membranes are moist.   Eyes:      General: No scleral icterus.  Cardiovascular:      Rate and Rhythm: Normal rate and regular rhythm.      Heart sounds: Normal heart sounds.   Pulmonary:      Effort: Pulmonary effort is normal.      Breath sounds: Normal breath sounds.   Abdominal:      General: Abdomen is flat. Bowel sounds are normal. There is no distension.      Palpations: Abdomen is soft. There is no mass.      Tenderness: There is no abdominal tenderness. There is no guarding or rebound.   Skin:     General: Skin is warm and dry.      Coloration: Skin is not jaundiced.   Neurological:      General: No focal deficit present.      Mental Status: She is alert and oriented to person, place, and time.   Psychiatric:         Mood and Affect: Mood normal.         Behavior: Behavior normal.         Thought Content: Thought content normal.       Labs: Reviewed from 11/6/2023, 1/31/2024     Assessment & Plan:     83 y.o. female with the following -     1. Iron deficiency anemia, unspecified iron deficiency anemia type  Chronic, stable iron deficiency and resolved folate deficiency.  Anemia stable as well, potentially anemia of chronic disease.  She will hold off on tonic water/quinine for now, no evidence of hemolytic anemia.  Occult stool negative x 1 11/2023. Continue oral iron supplementation and will trend with next labs in 6 months or sooner if indicated.  - CBC WITH DIFFERENTIAL; Future  - CBC WITH DIFFERENTIAL; Future  - FERRITIN; Future  - FOLATE; Future  - VITAMIN B12; Future  - IRON/TOTAL IRON BIND; Future    ferrous sulfate 325 (65 Fe) MG tablet, Take 1 Tablet by mouth every day. Indications: Iron Deficiency, Disp: 90 Tablet, Rfl: 1    folic acid (FOLVITE) 1 MG Tab, TAKE 1 TABLET BY MOUTH EVERY DAY, Disp: 90 Tablet, Rfl: 3    2. Stage 3a chronic kidney disease (HCC)  3. Microalbuminuria  Chronic condition, stable with mild microalbuminuria. BP well controlled, continue ARB. Avoid nephrotoxic  medications. Trend in 3m with other labs.   - Comp Metabolic Panel; Future  - VITAMIN D,25 HYDROXY (DEFICIENCY); Future  - MICROALBUMIN CREAT RATIO URINE; Future  - PTH INTACT (PTH ONLY); Future  - PHOSPHORUS; Future    losartan (COZAAR) 25 MG Tab, Take 1 Tablet by mouth every day. Indications: High Blood Pressure Disorder, Disp: 100 Tablet, Rfl: 3    4. Alkaline phosphatase elevation  Mild, slightly improved from prior. Elevated liver fractions prior. Vitamin D def resolved. Prior imaging of liver normal.  She has reduced her alcohol intake.  Trend in 6m.   - Comp Metabolic Panel; Future  - VITAMIN D,25 HYDROXY (DEFICIENCY); Future  - ALKALINE PHOSPHATASE ISOENZYMES; Future    5. RLQ abdominal pain  Acute, intermittent, unclear etiology.  Patient given instructions to schedule CT as below or present to the emergency department if indicated.  - CT-ABDOMEN-PELVIS WITH; Future    6. Mixed hyperlipidemia  Chronic, controlled. Continue medication(s) as below.  - Comp Metabolic Panel; Future  - TSH WITH REFLEX TO FT4; Future  - Lipid Profile; Future    simvastatin (ZOCOR) 10 MG Tab, Take 1 Tablet by mouth every evening. Indications: Ischemic Heart Disease, Disp: 100 Tablet, Rfl: 3    Return in about 6 months (around 8/9/2024), or if symptoms worsen or fail to improve.    Please note that this dictation was created using voice recognition software. I have made every reasonable attempt to correct obvious errors, but I expect that there are errors of grammar and possibly content that I did not discover before finalizing the note.

## 2024-02-12 ASSESSMENT — ENCOUNTER SYMPTOMS
PALPITATIONS: 0
COUGH: 0
NAUSEA: 1
DIARRHEA: 0
SHORTNESS OF BREATH: 0
WEIGHT LOSS: 0
CHILLS: 0
ABDOMINAL PAIN: 0
BLOOD IN STOOL: 0
DIZZINESS: 0
CONSTIPATION: 0
HEARTBURN: 1
FLANK PAIN: 0
FEVER: 0
VOMITING: 1
HEADACHES: 0

## 2024-02-22 ENCOUNTER — PATIENT OUTREACH (OUTPATIENT)
Dept: HEALTH INFORMATION MANAGEMENT | Facility: OTHER | Age: 84
End: 2024-02-22
Payer: MEDICARE

## 2024-02-22 DIAGNOSIS — I10 ESSENTIAL HYPERTENSION: ICD-10-CM

## 2024-02-22 DIAGNOSIS — N18.31 STAGE 3A CHRONIC KIDNEY DISEASE: ICD-10-CM

## 2024-02-22 DIAGNOSIS — I50.43 CHF (CONGESTIVE HEART FAILURE), NYHA CLASS I, ACUTE ON CHRONIC, COMBINED (HCC): ICD-10-CM

## 2024-02-22 NOTE — PROGRESS NOTES
Called pt, Mariely was shopping. Mariely's blood pressure in office: 118/62 (2/9/24), 130/62 (10/3/23) and 138/60 (11/10/23). She last saw her pcp on 2/9/24 and has her next appt scheduled for 8/9/24. She has a Ct (ordered by pcp) scheduled for 3/13/24, Mariely has been experiencing some intermittent abdominal pain. Mariely last saw Rheumatology on 10/3/23 and was due to RTC in 3 months- no appt has been scheduled. Outstanding lab work ordered by pcp, Mariely needs to obtain those labs before her 8/9/24 appt. Only Care Gap is a UDS, Mariely was taking Diazepam in previous years- no current Diazepam order.

## 2024-03-13 ENCOUNTER — HOSPITAL ENCOUNTER (OUTPATIENT)
Dept: RADIOLOGY | Facility: MEDICAL CENTER | Age: 84
End: 2024-03-13
Attending: STUDENT IN AN ORGANIZED HEALTH CARE EDUCATION/TRAINING PROGRAM
Payer: MEDICARE

## 2024-03-13 DIAGNOSIS — R10.31 RLQ ABDOMINAL PAIN: ICD-10-CM

## 2024-03-13 PROCEDURE — 700117 HCHG RX CONTRAST REV CODE 255: Performed by: STUDENT IN AN ORGANIZED HEALTH CARE EDUCATION/TRAINING PROGRAM

## 2024-03-13 PROCEDURE — 74177 CT ABD & PELVIS W/CONTRAST: CPT

## 2024-03-13 RX ADMIN — IOHEXOL 100 ML: 350 INJECTION, SOLUTION INTRAVENOUS at 16:00

## 2024-03-15 DIAGNOSIS — R93.41 ABNORMAL CT SCAN, BLADDER: ICD-10-CM

## 2024-03-15 DIAGNOSIS — R10.31 RLQ ABDOMINAL PAIN: ICD-10-CM

## 2024-03-20 ENCOUNTER — HOSPITAL ENCOUNTER (OUTPATIENT)
Facility: MEDICAL CENTER | Age: 84
End: 2024-03-20
Attending: STUDENT IN AN ORGANIZED HEALTH CARE EDUCATION/TRAINING PROGRAM
Payer: MEDICARE

## 2024-03-20 DIAGNOSIS — R93.41 ABNORMAL CT SCAN, BLADDER: ICD-10-CM

## 2024-03-20 DIAGNOSIS — R10.31 RLQ ABDOMINAL PAIN: ICD-10-CM

## 2024-03-20 LAB
APPEARANCE UR: ABNORMAL
BACTERIA #/AREA URNS HPF: ABNORMAL /HPF
BILIRUB UR QL STRIP.AUTO: NEGATIVE
COLOR UR: YELLOW
EPI CELLS #/AREA URNS HPF: ABNORMAL /HPF
GLUCOSE UR STRIP.AUTO-MCNC: NEGATIVE MG/DL
HYALINE CASTS #/AREA URNS LPF: ABNORMAL /LPF
KETONES UR STRIP.AUTO-MCNC: NEGATIVE MG/DL
LEUKOCYTE ESTERASE UR QL STRIP.AUTO: ABNORMAL
MICRO URNS: ABNORMAL
NITRITE UR QL STRIP.AUTO: POSITIVE
PH UR STRIP.AUTO: 6 [PH] (ref 5–8)
PROT UR QL STRIP: 30 MG/DL
RBC # URNS HPF: ABNORMAL /HPF
RBC UR QL AUTO: ABNORMAL
SP GR UR STRIP.AUTO: 1.02
UROBILINOGEN UR STRIP.AUTO-MCNC: 0.2 MG/DL
WBC #/AREA URNS HPF: ABNORMAL /HPF

## 2024-03-20 PROCEDURE — 81001 URINALYSIS AUTO W/SCOPE: CPT

## 2024-03-20 PROCEDURE — 87186 SC STD MICRODIL/AGAR DIL: CPT

## 2024-03-20 PROCEDURE — 87077 CULTURE AEROBIC IDENTIFY: CPT

## 2024-03-20 PROCEDURE — 87086 URINE CULTURE/COLONY COUNT: CPT

## 2024-03-21 ENCOUNTER — OFFICE VISIT (OUTPATIENT)
Dept: MEDICAL GROUP | Facility: LAB | Age: 84
End: 2024-03-21
Payer: MEDICARE

## 2024-03-21 ENCOUNTER — TELEPHONE (OUTPATIENT)
Dept: CARDIOLOGY | Facility: MEDICAL CENTER | Age: 84
End: 2024-03-21

## 2024-03-21 VITALS
HEART RATE: 56 BPM | BODY MASS INDEX: 24.54 KG/M2 | TEMPERATURE: 97.4 F | HEIGHT: 62 IN | DIASTOLIC BLOOD PRESSURE: 68 MMHG | SYSTOLIC BLOOD PRESSURE: 132 MMHG | WEIGHT: 133.38 LBS | OXYGEN SATURATION: 95 %

## 2024-03-21 DIAGNOSIS — M79.644 PAIN OF RIGHT THUMB: ICD-10-CM

## 2024-03-21 DIAGNOSIS — R11.2 NAUSEA AND VOMITING, UNSPECIFIED VOMITING TYPE: ICD-10-CM

## 2024-03-21 DIAGNOSIS — R93.2 ABNORMAL CT SCAN, LIVER: ICD-10-CM

## 2024-03-21 DIAGNOSIS — N39.0 URINARY TRACT INFECTION WITHOUT HEMATURIA, SITE UNSPECIFIED: ICD-10-CM

## 2024-03-21 DIAGNOSIS — R10.30 LOWER ABDOMINAL PAIN: ICD-10-CM

## 2024-03-21 DIAGNOSIS — D50.9 IRON DEFICIENCY ANEMIA, UNSPECIFIED IRON DEFICIENCY ANEMIA TYPE: ICD-10-CM

## 2024-03-21 DIAGNOSIS — R12 HEARTBURN: ICD-10-CM

## 2024-03-21 RX ORDER — CEPHALEXIN 500 MG/1
500 CAPSULE ORAL EVERY 12 HOURS
Qty: 10 CAPSULE | Refills: 0 | Status: SHIPPED | OUTPATIENT
Start: 2024-03-21 | End: 2024-03-26

## 2024-03-21 ASSESSMENT — ENCOUNTER SYMPTOMS
DIZZINESS: 0
VOMITING: 0
DIARRHEA: 0
HEARTBURN: 1
CHILLS: 0
NAUSEA: 0
FEVER: 0
ABDOMINAL PAIN: 1
SHORTNESS OF BREATH: 0
COUGH: 0
WEIGHT LOSS: 0

## 2024-03-21 ASSESSMENT — FIBROSIS 4 INDEX: FIB4 SCORE: 0.72

## 2024-03-21 NOTE — PROGRESS NOTES
Subjective:     Chief Complaint   Patient presents with    Follow-Up     Imaging       HPI:   Mariely presents today for follow up of abdominal pain.    Verbal consent was acquired by the patient to use RV ID ambient listening note generation during this visit Yes     History of Present Illness  The patient is an 84-year-old female coming in today to talk about her CT scan results. She is accompanied by her daughter.    CT reviewed. See note dated 2/9/2024 for details.    Patient states the abdominal pain in across the lower abdomen when it occurs. She never gets pain in the upper abdomen. Last episode was yesterday. The pain is always in the lower abdomen without radiation. Gradual but fairly quick in onset and feels like severe gas/labor pains. Doesn't last long, max 15min. Nausea typically comes first prior to pain. Mostly dry heaves, but does vomit bile at times. Doesn't happen often, has had this happen now about 4-5 times since onset of first episode late last year.    She has been having a lot more gas lately. The nausea comes first and it warns her that the severe pain is coming. Nothing is associated with it. She can eat or not eat. Sometimes, she will have a small bowel movement with it and sometimes, she will not have any. She does not have a bowel movement typically after pain starts. She had not had one for about a month. 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 She thought it was the medication that the doctor gave her for arthritis (RA), so she quit taking it. She denies any fevers or chills. She gets heartburn if she eats too late, but otherwise denies. She takes Tums which helps. She takes Imodium for irregular bowel syndrome, doesn't relate taking to abdominal pain. She took Imodium yesterday. Stopped drinking tonic water.    The urinary symptoms started about a week ago. She goes to urinate but only urinates  "a small amount at a time, but this has been her only symptom in regard to UTI.    She hurt her right thumb while cutting cabbage etc, not from trauma but instead from over use. It has been giving her pain. It was swollen, but it has gone down. She took Tylenol for the pain, which helped.    Review of Systems   Constitutional:  Negative for chills, fever, malaise/fatigue and weight loss.   Respiratory:  Negative for cough and shortness of breath.    Cardiovascular:  Negative for chest pain and leg swelling.   Gastrointestinal:  Positive for abdominal pain and heartburn. Negative for diarrhea, nausea and vomiting.   Genitourinary:  Negative for dysuria and hematuria.   Musculoskeletal:  Positive for joint pain.   Skin:  Negative for rash.   Neurological:  Negative for dizziness.     Objective:     Exam:  /68 (BP Location: Right arm, Patient Position: Sitting, BP Cuff Size: Adult)   Pulse (!) 56   Temp 36.3 °C (97.4 °F) (Temporal)   Ht 1.575 m (5' 2\")   Wt 60.5 kg (133 lb 6.1 oz)   SpO2 95%   BMI 24.40 kg/m²  Body mass index is 24.4 kg/m².    Physical Exam  Vitals reviewed.   Constitutional:       General: She is not in acute distress.     Appearance: Normal appearance. She is not ill-appearing.   HENT:      Head: Normocephalic and atraumatic.      Mouth/Throat:      Mouth: Mucous membranes are moist.   Eyes:      General: No scleral icterus.  Cardiovascular:      Rate and Rhythm: Normal rate and regular rhythm.      Heart sounds: Normal heart sounds.   Pulmonary:      Effort: Pulmonary effort is normal.      Breath sounds: Normal breath sounds.   Abdominal:      General: Abdomen is flat. Bowel sounds are normal. There is no distension.      Palpations: Abdomen is soft. There is no mass.      Tenderness: There is abdominal tenderness (Minimal nonreproducible epigastric). There is no right CVA tenderness, left CVA tenderness, guarding or rebound.   Musculoskeletal:      Right hand: Swelling (Bossing of first " DIP (thumb) with mild edema, no erythema and mild ttp), tenderness and bony tenderness present.      Right lower leg: No edema.      Left lower leg: No edema.   Skin:     General: Skin is warm and dry.      Coloration: Skin is not jaundiced.   Neurological:      General: No focal deficit present.      Mental Status: She is alert and oriented to person, place, and time.   Psychiatric:         Mood and Affect: Mood normal.         Behavior: Behavior normal.         Thought Content: Thought content normal.       Labs: Reviewed from 1/31/2024, 2/9/2024  Imaging: Reviewed from 3/13/2024  IMPRESSION:  1.  Question cystitis, correlate with urinalysis.  2.  Mildly prominent mesenteric lymph nodes, nonspecific.  3.  Intra and extra hepatic bowel duct dilation. This may be further assessed with MRCP.  4.  Few small hepatic hypodense foci which are too small for characterization. This may be assessed with abdominal ultrasound or if the patient does have MRCP they could also be assessed with that.    Assessment & Plan:     84 y.o. female with the following -     1. Urinary tract infection without hematuria, site unspecified  Acute symptoms x 1 week, treat with keflex below.  - cephALEXin (KEFLEX) 500 MG Cap; Take 1 Capsule by mouth every 12 hours for 5 days.  Dispense: 10 Capsule; Refill: 0    2. Abnormal CT scan, liver  Evaluate further with MRCP.  - [id number]-A/O; Future  - Referral to Gastroenterology    3. Lower abdominal pain  4. Nausea and vomiting, unspecified vomiting type  Chronic, intermittent of unclear etiology. Urine symptoms new x 1 week. CT reviewed, plan on MRCP. Imodium use seems to be preventive rather than to treat diarrhea-recommend she monitor symptoms to avoid triggers and not use imodium unless indicated for diarrhea, constipation may be contributing but given degree of symptoms seems unlikely sole cause. Referred to GI.  - [id number]-O/O; Future  - Referral to Gastroenterology    5. Iron deficiency  anemia, unspecified iron deficiency anemia type  Chronic, stable iron deficiency and resolved folate deficiency.  Anemia stable as well, potentially anemia of chronic disease.  Stopped tonic water/quinine, no evidence of hemolytic anemia.  Occult stool negative x 1 11/2023. She continues with oral iron supplementation and has lab orders to trend. Included this in GI referral.  - Referral to Gastroenterology    6. Heartburn  Mild, included in GI referral given exam findings as above.   - Referral to Gastroenterology    7. Pain of right thumb  Acute and suspect due to overuse, improving with rest and tylenol. No history of trauma. Consider imaging if worsening or follow up with rheumatology.     I spent a total of 43 minutes with record review, exam, communication with the patient, and documentation of this encounter.    Return if symptoms worsen or fail to improve.    Please note that this dictation was created using voice recognition software. I have made every reasonable attempt to correct obvious errors, but I expect that there are errors of grammar and possibly content that I did not discover before finalizing the note.

## 2024-03-21 NOTE — TELEPHONE ENCOUNTER
TT  Caller: Natasha - Daughter     Topic/issue: Patient needs her information regarding her heart valve. Patient needs to know the valve that she has as she needs an MRI and they cannot determine whether she is able to schedule one or not since she does not know what valve she has. Please call back with the information.    Callback Number: [phone]    Thank you,  Raquel NOEL

## 2024-03-22 NOTE — TELEPHONE ENCOUNTER
Can we please send a fax to [phone] requesting records from February 2023.    [facility]!. Thank you

## 2024-03-22 NOTE — TELEPHONE ENCOUNTER
Called and spoke with patient's daughter.  They are requesting information including a serial number for patients valve.  They also want to know if patient has sternal wires.    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

## 2024-03-25 ENCOUNTER — TELEPHONE (OUTPATIENT)
Dept: CARDIOLOGY | Facility: MEDICAL CENTER | Age: 84
End: 2024-03-25
Payer: MEDICARE

## 2024-03-25 NOTE — TELEPHONE ENCOUNTER
Records had been request to [facility].     Phone Number : [phone]  Fax Number: [phone]    Fax conformation has been received and scanned in.

## 2024-03-26 NOTE — TELEPHONE ENCOUNTER
Caller:  Natasha (daughter)    Topic/issue: Natasha is returning call, has some questions.     Callback Number: [phone]    Thank you,   Trish SANCHEZ

## 2024-03-26 NOTE — TELEPHONE ENCOUNTER
Phone number called: [phone] (home)       Call outcome: pt's PCP requesting MRI of abdomen. Pt had a valve replacement in [facility].  Pt wants to know if the valve is MRI compatible. Records request already sent to [facility].

## 2024-03-26 NOTE — TELEPHONE ENCOUNTER
TT        Caller: Natasha(pt's daughter)    Topic/issue: Patient's daughter was returning our call for her mother and she was asking for a call back    Callback Number: [phone]    Thank you    -Luis M PENA

## 2024-03-27 NOTE — TELEPHONE ENCOUNTER
TT        Caller: Natasha(pt's daughter)    Topic/issue: Patient's daughter was calling to follow up on her call from yesterday about her mother being able to do the MRI and she was asking for a callback      Callback Number: [phone]       Thank you      -Luis M PENA

## 2024-04-02 ENCOUNTER — HOSPITAL ENCOUNTER (OUTPATIENT)
Dept: RADIOLOGY | Facility: MEDICAL CENTER | Age: 84
End: 2024-04-02
Attending: STUDENT IN AN ORGANIZED HEALTH CARE EDUCATION/TRAINING PROGRAM
Payer: MEDICARE

## 2024-04-02 DIAGNOSIS — R93.2 ABNORMAL CT SCAN, LIVER: ICD-10-CM

## 2024-04-02 DIAGNOSIS — R11.2 NAUSEA AND VOMITING, UNSPECIFIED VOMITING TYPE: ICD-10-CM

## 2024-04-02 DIAGNOSIS — R10.30 LOWER ABDOMINAL PAIN: ICD-10-CM

## 2024-04-02 PROCEDURE — 74181 MRI ABDOMEN W/O CONTRAST: CPT

## 2024-04-15 ENCOUNTER — TELEPHONE (OUTPATIENT)
Dept: HEALTH INFORMATION MANAGEMENT | Facility: OTHER | Age: 84
End: 2024-04-15

## 2024-04-15 ENCOUNTER — PATIENT OUTREACH (OUTPATIENT)
Dept: HEALTH INFORMATION MANAGEMENT | Facility: OTHER | Age: 84
End: 2024-04-15
Payer: MEDICARE

## 2024-04-15 DIAGNOSIS — N18.31 STAGE 3A CHRONIC KIDNEY DISEASE: ICD-10-CM

## 2024-04-15 DIAGNOSIS — I50.43 CHF (CONGESTIVE HEART FAILURE), NYHA CLASS I, ACUTE ON CHRONIC, COMBINED (HCC): ICD-10-CM

## 2024-04-15 DIAGNOSIS — I10 ESSENTIAL HYPERTENSION: ICD-10-CM

## 2024-04-15 NOTE — PROGRESS NOTES
Spoke with Mariely this afternoon for monthly CCM out reach.  Mariely had no complaints or questions.  Mariely has been on my caseload, having been diagnosed with chronic kidney disease, hypertension and congestive heart failure.  In addition to her primary care physician Mariely also sees rheumatology and cardiology.  Her blood pressure in office visits has been 132/68 on 3/21/2024, 118/62 on 2/9/2024 and 138/60 on 11/10/2023.  Mariely has had no emergency department visits or hospitalizations in the last 12 months.  Her current risk score is a 2,  which is her baseline score, she gets 1 point for her age and 1 point for having congestive heart failure.  Discussed removal from chronic care management program, Mariely in agreement.  No further monthly calls, graduated from program.

## 2024-04-29 ENCOUNTER — HOSPITAL ENCOUNTER (OUTPATIENT)
Dept: LAB | Facility: MEDICAL CENTER | Age: 84
End: 2024-04-29
Payer: MEDICARE

## 2024-04-29 LAB
ALBUMIN SERPL BCP-MCNC: 3.9 G/DL (ref 3.2–4.9)
ALBUMIN/GLOB SERPL: 1.4 G/DL
ALP SERPL-CCNC: 107 U/L (ref 30–99)
ALT SERPL-CCNC: <5 U/L (ref 2–50)
ANION GAP SERPL CALC-SCNC: 14 MMOL/L (ref 7–16)
ANISOCYTOSIS BLD QL SMEAR: ABNORMAL
AST SERPL-CCNC: 7 U/L (ref 12–45)
BASOPHILS # BLD AUTO: 0.6 % (ref 0–1.8)
BASOPHILS # BLD: 0.09 K/UL (ref 0–0.12)
BILIRUB SERPL-MCNC: 0.3 MG/DL (ref 0.1–1.5)
BUN SERPL-MCNC: 15 MG/DL (ref 8–22)
BURR CELLS BLD QL SMEAR: NORMAL
CALCIUM ALBUM COR SERPL-MCNC: 8.9 MG/DL (ref 8.5–10.5)
CALCIUM SERPL-MCNC: 8.8 MG/DL (ref 8.5–10.5)
CHLORIDE SERPL-SCNC: 101 MMOL/L (ref 96–112)
CO2 SERPL-SCNC: 20 MMOL/L (ref 20–33)
COMMENT 1642: NORMAL
CREAT SERPL-MCNC: 1.01 MG/DL (ref 0.5–1.4)
DACRYOCYTES BLD QL SMEAR: NORMAL
EOSINOPHIL # BLD AUTO: 0.52 K/UL (ref 0–0.51)
EOSINOPHIL NFR BLD: 3.5 % (ref 0–6.9)
ERYTHROCYTE [DISTWIDTH] IN BLOOD BY AUTOMATED COUNT: 59.2 FL (ref 35.9–50)
FERRITIN SERPL-MCNC: 251 NG/ML (ref 10–291)
GFR SERPLBLD CREATININE-BSD FMLA CKD-EPI: 56 ML/MIN/1.73 M 2
GLOBULIN SER CALC-MCNC: 2.7 G/DL (ref 1.9–3.5)
GLUCOSE SERPL-MCNC: 116 MG/DL (ref 65–99)
HCT VFR BLD AUTO: 34 % (ref 37–47)
HGB BLD-MCNC: 10 G/DL (ref 12–16)
HYPOCHROMIA BLD QL SMEAR: ABNORMAL
IMM GRANULOCYTES # BLD AUTO: 0.24 K/UL (ref 0–0.11)
IMM GRANULOCYTES NFR BLD AUTO: 1.6 % (ref 0–0.9)
IRON SATN MFR SERPL: 9 % (ref 15–55)
IRON SERPL-MCNC: 16 UG/DL (ref 40–170)
LYMPHOCYTES # BLD AUTO: 1.09 K/UL (ref 1–4.8)
LYMPHOCYTES NFR BLD: 7.3 % (ref 22–41)
MACROCYTES BLD QL SMEAR: ABNORMAL
MCH RBC QN AUTO: 29 PG (ref 27–33)
MCHC RBC AUTO-ENTMCNC: 29.4 G/DL (ref 32.2–35.5)
MCV RBC AUTO: 98.6 FL (ref 81.4–97.8)
MICROCYTES BLD QL SMEAR: ABNORMAL
MONOCYTES # BLD AUTO: 0.79 K/UL (ref 0–0.85)
MONOCYTES NFR BLD AUTO: 5.3 % (ref 0–13.4)
MORPHOLOGY BLD-IMP: NORMAL
NEUTROPHILS # BLD AUTO: 12.29 K/UL (ref 1.82–7.42)
NEUTROPHILS NFR BLD: 81.7 % (ref 44–72)
NRBC # BLD AUTO: 0 K/UL
NRBC BLD-RTO: 0 /100 WBC (ref 0–0.2)
OVALOCYTES BLD QL SMEAR: NORMAL
PLATELET # BLD AUTO: 301 K/UL (ref 164–446)
PLATELET BLD QL SMEAR: NORMAL
PMV BLD AUTO: 12.7 FL (ref 9–12.9)
POIKILOCYTOSIS BLD QL SMEAR: NORMAL
POTASSIUM SERPL-SCNC: 4.9 MMOL/L (ref 3.6–5.5)
PROT SERPL-MCNC: 6.6 G/DL (ref 6–8.2)
RBC # BLD AUTO: 3.45 M/UL (ref 4.2–5.4)
RBC BLD AUTO: PRESENT
SODIUM SERPL-SCNC: 135 MMOL/L (ref 135–145)
TIBC SERPL-MCNC: 179 UG/DL (ref 250–450)
UIBC SERPL-MCNC: 163 UG/DL (ref 110–370)
WBC # BLD AUTO: 15 K/UL (ref 4.8–10.8)

## 2024-04-29 PROCEDURE — 80053 COMPREHEN METABOLIC PANEL: CPT

## 2024-04-29 PROCEDURE — 83550 IRON BINDING TEST: CPT

## 2024-04-29 PROCEDURE — 82728 ASSAY OF FERRITIN: CPT

## 2024-04-29 PROCEDURE — 85025 COMPLETE CBC W/AUTO DIFF WBC: CPT

## 2024-04-29 PROCEDURE — 36415 COLL VENOUS BLD VENIPUNCTURE: CPT

## 2024-04-29 PROCEDURE — 83540 ASSAY OF IRON: CPT

## 2024-05-03 ENCOUNTER — TELEPHONE (OUTPATIENT)
Dept: CARDIOLOGY | Facility: MEDICAL CENTER | Age: 84
End: 2024-05-03
Payer: MEDICARE

## 2024-05-03 NOTE — TELEPHONE ENCOUNTER
Last OV: 07/25/2023  Proposed Surgery: Colonoscopy and EGD/ERCP/EUS  Surgery Date: Colonoscopy - 6/6/2024 & EGD - TBD   Requesting Office Name: [facility]   Fax Number: [phone]  Preference of Location (default is surgery center unless specified by Cardiologist or [facility])  Prior Clearance Addressed: No      Anticoags/Antiplatelets: Aspirin  Anticoags/Antiplatelet managed by Cardiology? YES    Outstanding Cardiac Imaging : No  Stent, Cardiac Devices, or Catheterization: No  Ablation, TAVR/Valve (including open heart), Cardioversion: Yes  Date: 02/17/2023  >3 months post procedure for dental work request OR >6 months post procedure, send clearance letter  Recent Cardiac Hospitalization: No            When: N/A  History (cardiac history):   Past Medical History:   Diagnosis Date    Abnormal albumin 09/16/2022    Acute renal failure superimposed on stage 3 chronic kidney disease (HCC) 07/24/2019    Dyspnea on minimal exertion 02/06/2023    Hypermagnesemia 09/16/2022    Hypertension     Hyponatremia 06/28/2022    Pain and swelling of left lower extremity 05/18/2022    Severe aortic valve regurgitation 02/09/2023    SOB (shortness of breath) 01/08/2023    Stage 4 chronic kidney disease (HCC) 06/26/2023    Superficial thrombosis of lower extremity, right 07/05/2022    Vitamin D deficiency 11/10/2023             Surgical Clearance Letter Sent: YES   **Scan clearance request letter into [facility].**

## 2024-05-03 NOTE — LETTER
PROCEDURE/SURGERY CLEARANCE FORM      Encounter Date: 5/3/2024    Patient: Mariely Easley  YOB: 1940    CARDIOLOGIST:  Álvaro Resendez M.D.    REFERRING DOCTOR:  No ref. provider found    The following procedure/surgery:  Colonoscopy and EGD/ERCP/EUS                                            PROCEDURE/SURGERY CLEARANCE FORM    Date: 5/3/2024   Patient Name: Mariely Easley    Dear Surgeon or Proceduralist,      Thank you for your request for cardiac stratification of our mutual patient Mariely Easley 1940. We have reviewed their [facility] records; and to the best of our understanding this patient has not had stenting, ablation, cardiothoracic surgery or hospitalization for cardiovascular reasons in the past 6 months.  Mariely Easley has been seen within the past 18 months and is considered to have non-modifiable cardiac risk for this low-risk procedure/surgery. They may proceed from a cardiovascular standpoint and may hold their antiplatelet/anticoagulation as briefly as possible. Please have patient resume this medication when hemodynamically stable to do so.     Aspirin or Prasugrel   - hold 7 days prior to procedure/surgery, resume when hemodynamically stable      Clopidrogrel or Ticagrelor  - hold 7 days for all neurological procedures, hold 5 days prior to all other procedure/surgery,  resume when hemodynamically stable     Warfarin - hold 7 days for all neurological procedures, hold 5 days prior to all other procedure/surgery and coordinate with [facility] ([phone]) INR testing and dose management.      Pradaxa/Xarelto/Eliquis/Savesya - hold 1 day prior to procedure for low bleeding risk procedure, 2 days for high bleeding risk procedure, or consider holding 3 days or longer for patients with reduced kidney function (CrCl <30mL/min) or spinal/cranial surgeries/procedures.      If they have a mechanical heart valve, please coordinate with  [facility] ([phone]) the proper management of their anticoagulant in the periprocedural or perioperative period.      Some patients have higher risk for cardiovascular complications or holding medication. If our patient has had prior complications of holding antiplatelet or anticoagulants in the past and we have seen them after these events, we have addressed these concerns with the patient. They are at an unknown degree of increased risk for recurrent complication.  You may hold anticoagulation/antiplatelets for the procedure or surgery if the benefits of the procedure or surgery outweigh this nonmodifiable risk.      If Mariely Easley 1940 has new symptoms of heart failure decompensation, unstable arrythmia, or angina please reach out and we will assess the patient.      If you have other patient-specific concerns, please feel free to reach out to the patient's cardiologist directly at [phone].     Thank you,       [facility]

## 2024-05-07 ENCOUNTER — OFFICE VISIT (OUTPATIENT)
Dept: URGENT CARE | Facility: PHYSICIAN GROUP | Age: 84
End: 2024-05-07
Payer: MEDICARE

## 2024-05-07 ENCOUNTER — HOSPITAL ENCOUNTER (OUTPATIENT)
Facility: MEDICAL CENTER | Age: 84
End: 2024-05-07
Attending: PHYSICIAN ASSISTANT
Payer: MEDICARE

## 2024-05-07 VITALS
WEIGHT: 129 LBS | DIASTOLIC BLOOD PRESSURE: 60 MMHG | HEART RATE: 60 BPM | SYSTOLIC BLOOD PRESSURE: 142 MMHG | BODY MASS INDEX: 23.74 KG/M2 | HEIGHT: 62 IN | TEMPERATURE: 97.4 F | OXYGEN SATURATION: 98 % | RESPIRATION RATE: 14 BRPM

## 2024-05-07 DIAGNOSIS — N30.00 ACUTE CYSTITIS WITHOUT HEMATURIA: ICD-10-CM

## 2024-05-07 DIAGNOSIS — R30.0 DYSURIA: ICD-10-CM

## 2024-05-07 DIAGNOSIS — R91.8 OPACITIES OF BOTH LUNGS PRESENT ON CHEST X-RAY: ICD-10-CM

## 2024-05-07 LAB
APPEARANCE UR: NORMAL
BILIRUB UR STRIP-MCNC: NEGATIVE MG/DL
COLOR UR AUTO: YELLOW
GLUCOSE UR STRIP.AUTO-MCNC: NEGATIVE MG/DL
KETONES UR STRIP.AUTO-MCNC: NEGATIVE MG/DL
LEUKOCYTE ESTERASE UR QL STRIP.AUTO: NORMAL
NITRITE UR QL STRIP.AUTO: NEGATIVE
PH UR STRIP.AUTO: 6 [PH] (ref 5–8)
PROT UR QL STRIP: NORMAL MG/DL
RBC UR QL AUTO: NORMAL
SP GR UR STRIP.AUTO: 1.01
UROBILINOGEN UR STRIP-MCNC: 0.2 MG/DL

## 2024-05-07 PROCEDURE — 3077F SYST BP >= 140 MM HG: CPT | Performed by: PHYSICIAN ASSISTANT

## 2024-05-07 PROCEDURE — 99214 OFFICE O/P EST MOD 30 MIN: CPT | Performed by: PHYSICIAN ASSISTANT

## 2024-05-07 PROCEDURE — 81002 URINALYSIS NONAUTO W/O SCOPE: CPT | Performed by: PHYSICIAN ASSISTANT

## 2024-05-07 PROCEDURE — 3078F DIAST BP <80 MM HG: CPT | Performed by: PHYSICIAN ASSISTANT

## 2024-05-07 RX ORDER — DOXYCYCLINE 100 MG/1
100 CAPSULE ORAL 2 TIMES DAILY
Qty: 10 CAPSULE | Refills: 0 | Status: SHIPPED | OUTPATIENT
Start: 2024-05-07 | End: 2024-05-12

## 2024-05-07 RX ORDER — CEFUROXIME AXETIL 500 MG/1
500 TABLET ORAL 2 TIMES DAILY
Qty: 10 TABLET | Refills: 0 | Status: SHIPPED | OUTPATIENT
Start: 2024-05-07 | End: 2024-05-12

## 2024-05-07 ASSESSMENT — ENCOUNTER SYMPTOMS
HEMOPTYSIS: 0
FEVER: 0
SPUTUM PRODUCTION: 0
CHILLS: 1
PALPITATIONS: 0
SHORTNESS OF BREATH: 0
COUGH: 1
WHEEZING: 0

## 2024-05-07 ASSESSMENT — FIBROSIS 4 INDEX: FIB4 SCORE: 0.9

## 2024-05-07 NOTE — PROGRESS NOTES
Subjective:   Mariely Easley is a 84 y.o. female who presents today with   Chief Complaint   Patient presents with    UTI     Burning urination, frequent urination, chills, x2 weeks        UTI  This is a new problem. The current episode started 1 to 4 weeks ago. The problem occurs constantly. The problem has been unchanged. Associated symptoms include chills, coughing and urinary symptoms. Pertinent negatives include no chest pain or fever.     Reviewed patient's previous urine culture was positive for Klebsiella pneumonia on March 20 and was sensitive to all antibiotics.  Patient has history of possible findings of pneumonia from CT that was completed in March and she states she is still having some mild cough but no new symptoms such as fever.    PMH:  has a past medical history of Abnormal albumin (09/16/2022), Acute renal failure superimposed on stage 3 chronic kidney disease (HCC) (07/24/2019), Dyspnea on minimal exertion (02/06/2023), Hypermagnesemia (09/16/2022), Hypertension, Hyponatremia (06/28/2022), Pain and swelling of left lower extremity (05/18/2022), Severe aortic valve regurgitation (02/09/2023), SOB (shortness of breath) (01/08/2023), Stage 4 chronic kidney disease (HCC) (06/26/2023), Superficial thrombosis of lower extremity, right (07/05/2022), and Vitamin D deficiency (11/10/2023).  MEDS:   Current Outpatient Medications:     cefUROXime (CEFTIN) 500 MG Tab, Take 1 Tablet by mouth 2 times a day for 5 days., Disp: 10 Tablet, Rfl: 0    doxycycline (MONODOX) 100 MG capsule, Take 1 Capsule by mouth 2 times a day for 5 days., Disp: 10 Capsule, Rfl: 0    cyclobenzaprine (FLEXERIL) 5 mg tablet, TAKE 1 TABLET BY MOUTH THREE TIMES A DAY AS NEEDED FOR MUSCLE SPASM, Disp: 90 Tablet, Rfl: 3    losartan (COZAAR) 25 MG Tab, Take 1 Tablet by mouth every day. Indications: High Blood Pressure Disorder, Disp: 100 Tablet, Rfl: 3    ferrous sulfate 325 (65 Fe) MG tablet, Take 1 Tablet by mouth every day.  Indications: Iron Deficiency, Disp: 90 Tablet, Rfl: 1    gabapentin (NEURONTIN) 100 MG Cap, Take 2 Capsules by mouth 2 times a day., Disp: 360 Capsule, Rfl: 3    furosemide (LASIX) 20 MG Tab, Take 1 Tablet by mouth every day., Disp: 100 Tablet, Rfl: 4    metoprolol tartrate (LOPRESSOR) 50 MG Tab, Take 1 Tablet by mouth 2 times a day. Indications: High Blood Pressure Disorder, Disp: 180 Tablet, Rfl: 4    simvastatin (ZOCOR) 10 MG Tab, Take 1 Tablet by mouth every evening. Indications: Ischemic Heart Disease, Disp: 100 Tablet, Rfl: 3    folic acid (FOLVITE) 1 MG Tab, TAKE 1 TABLET BY MOUTH EVERY DAY, Disp: 90 Tablet, Rfl: 3    Loperamide HCl (IMODIUM PO), Take  by mouth as needed., Disp: , Rfl:     aspirin 81 MG EC tablet, Take 81 mg by mouth every day. Indications: blood thinner, Disp: , Rfl:     acetaminophen (TYLENOL) 500 MG Tab, Take 1,000 mg by mouth every 6 hours as needed for Mild Pain. 2 tablets = 1,000 mg.  Indications: Fever, Pain, Disp: , Rfl:     levothyroxine (SYNTHROID) 50 MCG Tab, Take 1 Tab by mouth Every morning on an empty stomach., Disp: 90 Tab, Rfl: 3  ALLERGIES: No Known Allergies  SURGHX: History reviewed. No pertinent surgical history.  SOCHX:  reports that she quit smoking about 44 years ago. Her smoking use included cigarettes. She has never used smokeless tobacco. She reports current alcohol use of about 8.4 oz of alcohol per week. She reports that she does not use drugs.  FH: Reviewed with patient, not pertinent to this visit.       Review of Systems   Constitutional:  Positive for chills. Negative for fever.   Respiratory:  Positive for cough. Negative for hemoptysis, sputum production, shortness of breath and wheezing.    Cardiovascular:  Negative for chest pain and palpitations.   Genitourinary:  Positive for dysuria and urgency.        Objective:   BP (!) 142/60 (BP Location: Right arm, Patient Position: Sitting, BP Cuff Size: Adult)   Pulse 60   Temp 36.3 °C (97.4 °F) (Temporal)    "Resp 14   Ht 1.575 m (5' 2\")   Wt 58.5 kg (129 lb)   SpO2 98%   BMI 23.59 kg/m²   Physical Exam  Vitals and nursing note reviewed.   Constitutional:       General: She is not in acute distress.     Appearance: Normal appearance. She is well-developed. She is not ill-appearing or toxic-appearing.   HENT:      Head: Normocephalic and atraumatic.      Right Ear: Hearing normal.      Left Ear: Hearing normal.   Cardiovascular:      Rate and Rhythm: Normal rate and regular rhythm.      Heart sounds: Normal heart sounds.   Pulmonary:      Effort: Pulmonary effort is normal. No respiratory distress.      Breath sounds: Normal breath sounds. No stridor. No wheezing, rhonchi or rales.   Abdominal:      General: There is no distension.      Palpations: Abdomen is soft.      Tenderness: There is abdominal tenderness in the suprapubic area. There is no right CVA tenderness, left CVA tenderness or guarding.   Genitourinary:     Comments: Patient deferred  exam  Musculoskeletal:      Comments: Normal movement in all 4 extremities   Skin:     General: Skin is warm and dry.   Neurological:      Mental Status: She is alert.      Coordination: Coordination normal.   Psychiatric:         Mood and Affect: Mood normal.       UA consistent with infection      Assessment/Plan:   Assessment    1. Dysuria  - POCT Urinalysis    2. Acute cystitis without hematuria  - URINE CULTURE(NEW); Future    3. Opacities of both lungs present on chest x-ray  - cefUROXime (CEFTIN) 500 MG Tab; Take 1 Tablet by mouth 2 times a day for 5 days.  Dispense: 10 Tablet; Refill: 0  - doxycycline (MONODOX) 100 MG capsule; Take 1 Capsule by mouth 2 times a day for 5 days.  Dispense: 10 Capsule; Refill: 0  - Referral back to PCP    Symptoms and presentation appear consistent with urinary tract infection we will treat accordingly with antibiotics.  Patient concerned about possible pneumonia and would like to be treated for that today.  She states she has had " some mild cough but no other fever associated symptoms.  No concerns found on the respiratory exam today for any findings of pneumonia but given that she did have some opacities found on CT scan from March.  This will help treat for urinary tract infection as well as possible pneumonia.  Recommend following up with primary care regarding opacity in the lungs for possible need of repeat imaging for resolution of symptoms.  Patient's daughter is understanding and will make an appointment for her to follow-up with her primary care for potential need of repeat imaging at that time to further identify the opacities.    Differential diagnosis, natural history, supportive care, and indications for immediate follow-up discussed.   Patient given instructions and understanding of medications and treatment.    If not improving in 3-5 days, F/U with PCP or return to UC if symptoms worsen.    Patient agreeable to plan.      Please note that this dictation was created using voice recognition software. I have made every reasonable attempt to correct obvious errors, but I expect that there are errors of grammar and possibly content that I did not discover before finalizing the note.    Esvin Renae PA-C

## 2024-05-10 ENCOUNTER — TELEPHONE (OUTPATIENT)
Dept: ONCOLOGY | Facility: MEDICAL CENTER | Age: 84
End: 2024-05-10
Payer: MEDICARE

## 2024-05-10 NOTE — TELEPHONE ENCOUNTER
Daughter, Natasha, was sent to cancer navigation line with questions on infusion scheduling.  She reported that order for iron infusion was sent on 5/7 and initially they were told infusion would call them.  Natasha stating then they were told they had to call.  Let her know she had reached cancer navigation line, but would try and assist with concern.  Navigator called infusion.  They did receive order and currently pending review with pharmacist.  Once order is reviewed and no additional information is needed infusion will reach out to schedule.  This can take a little bit of time, so looking at next week for call to even get scheduled.    Return call to daughter, Natasha, with update.  She was grateful for clarification and assistance.

## 2024-05-17 RX ORDER — METHYLPREDNISOLONE SODIUM SUCCINATE 125 MG/2ML
125 INJECTION, POWDER, LYOPHILIZED, FOR SOLUTION INTRAMUSCULAR; INTRAVENOUS PRN
Status: CANCELLED | OUTPATIENT
Start: 2024-05-17

## 2024-05-17 RX ORDER — DIPHENHYDRAMINE HYDROCHLORIDE 50 MG/ML
50 INJECTION INTRAMUSCULAR; INTRAVENOUS PRN
Status: CANCELLED | OUTPATIENT
Start: 2024-05-17

## 2024-05-17 RX ORDER — EPINEPHRINE 1 MG/ML(1)
0.5 AMPUL (ML) INJECTION PRN
Status: CANCELLED | OUTPATIENT
Start: 2024-05-17

## 2024-05-17 RX ORDER — SODIUM CHLORIDE 9 MG/ML
INJECTION, SOLUTION INTRAVENOUS CONTINUOUS
Status: CANCELLED | OUTPATIENT
Start: 2024-05-17

## 2024-05-29 ENCOUNTER — OUTPATIENT INFUSION SERVICES (OUTPATIENT)
Dept: ONCOLOGY | Facility: MEDICAL CENTER | Age: 84
End: 2024-05-29
Payer: MEDICARE

## 2024-05-29 VITALS
OXYGEN SATURATION: 96 % | DIASTOLIC BLOOD PRESSURE: 53 MMHG | HEART RATE: 64 BPM | BODY MASS INDEX: 23.77 KG/M2 | HEIGHT: 61 IN | SYSTOLIC BLOOD PRESSURE: 143 MMHG | TEMPERATURE: 98 F | RESPIRATION RATE: 16 BRPM | WEIGHT: 125.88 LBS

## 2024-05-29 DIAGNOSIS — D50.9 IRON DEFICIENCY ANEMIA, UNSPECIFIED IRON DEFICIENCY ANEMIA TYPE: ICD-10-CM

## 2024-05-29 RX ORDER — DIPHENHYDRAMINE HYDROCHLORIDE 50 MG/ML
50 INJECTION INTRAMUSCULAR; INTRAVENOUS PRN
OUTPATIENT
Start: 2024-05-29

## 2024-05-29 RX ORDER — SODIUM CHLORIDE 9 MG/ML
INJECTION, SOLUTION INTRAVENOUS CONTINUOUS
OUTPATIENT
Start: 2024-05-29

## 2024-05-29 RX ORDER — METHYLPREDNISOLONE SODIUM SUCCINATE 125 MG/2ML
125 INJECTION, POWDER, LYOPHILIZED, FOR SOLUTION INTRAMUSCULAR; INTRAVENOUS PRN
OUTPATIENT
Start: 2024-05-29

## 2024-05-29 RX ORDER — EPINEPHRINE 1 MG/ML(1)
0.5 AMPUL (ML) INJECTION PRN
OUTPATIENT
Start: 2024-05-29

## 2024-05-29 RX ADMIN — SODIUM CHLORIDE 1000 MG: 9 INJECTION, SOLUTION INTRAVENOUS at 16:27

## 2024-05-29 ASSESSMENT — FIBROSIS 4 INDEX: FIB4 SCORE: 0.9

## 2024-06-19 ENCOUNTER — HOSPITAL ENCOUNTER (OUTPATIENT)
Dept: LAB | Facility: MEDICAL CENTER | Age: 84
End: 2024-06-19
Attending: INTERNAL MEDICINE
Payer: MEDICARE

## 2024-06-19 ENCOUNTER — HOSPITAL ENCOUNTER (OUTPATIENT)
Dept: RADIOLOGY | Facility: MEDICAL CENTER | Age: 84
End: 2024-06-19
Attending: INTERNAL MEDICINE
Payer: MEDICARE

## 2024-06-19 ENCOUNTER — OFFICE VISIT (OUTPATIENT)
Dept: MEDICAL GROUP | Facility: LAB | Age: 84
End: 2024-06-19
Payer: MEDICARE

## 2024-06-19 VITALS
TEMPERATURE: 98.7 F | WEIGHT: 122 LBS | BODY MASS INDEX: 22.45 KG/M2 | RESPIRATION RATE: 18 BRPM | HEIGHT: 62 IN | HEART RATE: 63 BPM | OXYGEN SATURATION: 99 % | DIASTOLIC BLOOD PRESSURE: 74 MMHG | SYSTOLIC BLOOD PRESSURE: 132 MMHG

## 2024-06-19 DIAGNOSIS — C24.0 MALIGNANT NEOPLASM OF EXTRAHEPATIC BILE DUCT (HCC): ICD-10-CM

## 2024-06-19 DIAGNOSIS — N30.00 ACUTE CYSTITIS WITHOUT HEMATURIA: ICD-10-CM

## 2024-06-19 DIAGNOSIS — I82.90 THROMBOSIS: ICD-10-CM

## 2024-06-19 LAB
ALBUMIN SERPL BCP-MCNC: 3.5 G/DL (ref 3.2–4.9)
ALBUMIN/GLOB SERPL: 1.2 G/DL
ALP SERPL-CCNC: 113 U/L (ref 30–99)
ALT SERPL-CCNC: <5 U/L (ref 2–50)
ANION GAP SERPL CALC-SCNC: 14 MMOL/L (ref 7–16)
APPEARANCE UR: ABNORMAL
AST SERPL-CCNC: 10 U/L (ref 12–45)
BACTERIA #/AREA URNS HPF: ABNORMAL /HPF
BASOPHILS # BLD AUTO: 0.6 % (ref 0–1.8)
BASOPHILS # BLD: 0.09 K/UL (ref 0–0.12)
BILIRUB SERPL-MCNC: 0.3 MG/DL (ref 0.1–1.5)
BILIRUB UR QL STRIP.AUTO: NEGATIVE
BUN SERPL-MCNC: 18 MG/DL (ref 8–22)
CALCIUM ALBUM COR SERPL-MCNC: 9.1 MG/DL (ref 8.5–10.5)
CALCIUM SERPL-MCNC: 8.7 MG/DL (ref 8.5–10.5)
CANCER AG19-9 SERPL-ACNC: 9 U/ML (ref 0–35)
CHLORIDE SERPL-SCNC: 103 MMOL/L (ref 96–112)
CO2 SERPL-SCNC: 18 MMOL/L (ref 20–33)
COLOR UR: YELLOW
CREAT SERPL-MCNC: 0.93 MG/DL (ref 0.5–1.4)
CRP SERPL HS-MCNC: 6.87 MG/DL (ref 0–0.75)
D DIMER PPP IA.FEU-MCNC: 0.75 UG/ML (FEU) (ref 0–0.5)
EOSINOPHIL # BLD AUTO: 0.41 K/UL (ref 0–0.51)
EOSINOPHIL NFR BLD: 2.8 % (ref 0–6.9)
EPI CELLS #/AREA URNS HPF: ABNORMAL /HPF
ERYTHROCYTE [DISTWIDTH] IN BLOOD BY AUTOMATED COUNT: 61.1 FL (ref 35.9–50)
ERYTHROCYTE [SEDIMENTATION RATE] IN BLOOD BY WESTERGREN METHOD: 26 MM/HOUR (ref 0–25)
GFR SERPLBLD CREATININE-BSD FMLA CKD-EPI: 61 ML/MIN/1.73 M 2
GLOBULIN SER CALC-MCNC: 2.9 G/DL (ref 1.9–3.5)
GLUCOSE SERPL-MCNC: 92 MG/DL (ref 65–99)
GLUCOSE UR STRIP.AUTO-MCNC: NEGATIVE MG/DL
HCT VFR BLD AUTO: 33.8 % (ref 37–47)
HGB BLD-MCNC: 10.2 G/DL (ref 12–16)
HYALINE CASTS #/AREA URNS LPF: ABNORMAL /LPF
IMM GRANULOCYTES # BLD AUTO: 0.24 K/UL (ref 0–0.11)
IMM GRANULOCYTES NFR BLD AUTO: 1.6 % (ref 0–0.9)
KETONES UR STRIP.AUTO-MCNC: NEGATIVE MG/DL
LEUKOCYTE ESTERASE UR QL STRIP.AUTO: ABNORMAL
LYMPHOCYTES # BLD AUTO: 1.16 K/UL (ref 1–4.8)
LYMPHOCYTES NFR BLD: 7.8 % (ref 22–41)
MCH RBC QN AUTO: 29.8 PG (ref 27–33)
MCHC RBC AUTO-ENTMCNC: 30.2 G/DL (ref 32.2–35.5)
MCV RBC AUTO: 98.8 FL (ref 81.4–97.8)
MICRO URNS: ABNORMAL
MONOCYTES # BLD AUTO: 0.69 K/UL (ref 0–0.85)
MONOCYTES NFR BLD AUTO: 4.7 % (ref 0–13.4)
NEUTROPHILS # BLD AUTO: 12.21 K/UL (ref 1.82–7.42)
NEUTROPHILS NFR BLD: 82.5 % (ref 44–72)
NITRITE UR QL STRIP.AUTO: NEGATIVE
NRBC # BLD AUTO: 0 K/UL
NRBC BLD-RTO: 0 /100 WBC (ref 0–0.2)
PH UR STRIP.AUTO: 6.5 [PH] (ref 5–8)
PLATELET # BLD AUTO: 276 K/UL (ref 164–446)
PMV BLD AUTO: 12.3 FL (ref 9–12.9)
POTASSIUM SERPL-SCNC: 4.9 MMOL/L (ref 3.6–5.5)
PROT SERPL-MCNC: 6.4 G/DL (ref 6–8.2)
PROT UR QL STRIP: 30 MG/DL
RBC # BLD AUTO: 3.42 M/UL (ref 4.2–5.4)
RBC # URNS HPF: ABNORMAL /HPF
RBC UR QL AUTO: ABNORMAL
SODIUM SERPL-SCNC: 135 MMOL/L (ref 135–145)
SP GR UR STRIP.AUTO: 1.02
UROBILINOGEN UR STRIP.AUTO-MCNC: 0.2 MG/DL
WBC # BLD AUTO: 14.8 K/UL (ref 4.8–10.8)
WBC #/AREA URNS HPF: ABNORMAL /HPF

## 2024-06-19 PROCEDURE — 3075F SYST BP GE 130 - 139MM HG: CPT | Performed by: INTERNAL MEDICINE

## 2024-06-19 PROCEDURE — 36415 COLL VENOUS BLD VENIPUNCTURE: CPT

## 2024-06-19 PROCEDURE — 86140 C-REACTIVE PROTEIN: CPT

## 2024-06-19 PROCEDURE — 85379 FIBRIN DEGRADATION QUANT: CPT

## 2024-06-19 PROCEDURE — 80053 COMPREHEN METABOLIC PANEL: CPT

## 2024-06-19 PROCEDURE — 85025 COMPLETE CBC W/AUTO DIFF WBC: CPT

## 2024-06-19 PROCEDURE — 86301 IMMUNOASSAY TUMOR CA 19-9: CPT

## 2024-06-19 PROCEDURE — 87086 URINE CULTURE/COLONY COUNT: CPT

## 2024-06-19 PROCEDURE — 99213 OFFICE O/P EST LOW 20 MIN: CPT | Performed by: INTERNAL MEDICINE

## 2024-06-19 PROCEDURE — 3078F DIAST BP <80 MM HG: CPT | Performed by: INTERNAL MEDICINE

## 2024-06-19 PROCEDURE — 87186 SC STD MICRODIL/AGAR DIL: CPT

## 2024-06-19 PROCEDURE — 93971 EXTREMITY STUDY: CPT | Mod: RT

## 2024-06-19 PROCEDURE — 85652 RBC SED RATE AUTOMATED: CPT

## 2024-06-19 PROCEDURE — 81001 URINALYSIS AUTO W/SCOPE: CPT

## 2024-06-19 PROCEDURE — 87077 CULTURE AEROBIC IDENTIFY: CPT

## 2024-06-19 ASSESSMENT — FIBROSIS 4 INDEX: FIB4 SCORE: 0.9

## 2024-06-19 NOTE — PROGRESS NOTES
CC: Mariely Easley is a 84 y.o. female is suffering from   Chief Complaint   Patient presents with    Rash     On arms and legs x 1 mo/poss UTI/stomach pain x 4 days         SUBJECTIVE:  1. Thrombosis  Patient is here for follow-up, there is a concern about possible superficial thrombosis, stat ultrasound was ordered today which did not show any evidence of venous thrombosis    2. 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  Patient with a 7 months history of left to right pelvic pain buring sensation at the same time dry heaving. Two to three time a week. MRI reviewed shows a dilated common bile duct with cut off.  Patient to undergo July eleven evaluation by GI with ERCP done in the hospital    3. Acute cystitis without hematuria  Recheck urinalysis    History of Present Illness      Past social, family, history: Accompanied by her daughter  Social History     Tobacco Use    Smoking status: Former     Current packs/day: 0.00     Types: Cigarettes     Quit date: 1979     Years since quittin.9    Smokeless tobacco: Never   Vaping Use    Vaping status: Never Used   Substance Use Topics    Alcohol use: Yes     Alcohol/week: 8.4 oz     Types: 14 Glasses of wine per week     Comment: 2 glasses wine daily    Drug use: No         MEDICATIONS:    Current Outpatient Medications:     cyclobenzaprine (FLEXERIL) 5 mg tablet, TAKE 1 TABLET BY MOUTH THREE TIMES A DAY AS NEEDED FOR MUSCLE SPASM, Disp: 90 Tablet, Rfl: 3    losartan (COZAAR) 25 MG Tab, Take 1 Tablet by mouth every day. Indications: High Blood Pressure Disorder, Disp: 100 Tablet, Rfl: 3    ferrous sulfate 325 (65 Fe) MG tablet, Take 1 Tablet by mouth every day. Indications: Iron Deficiency, Disp: 90 Tablet, Rfl: 1    gabapentin (NEURONTIN) 100 MG Cap, Take 2 Capsules by mouth 2 times a day., Disp: 360 Capsule, Rfl: 3    furosemide (LASIX) 20 MG Tab, Take 1 Tablet by mouth every  day., Disp: 100 Tablet, Rfl: 4    metoprolol tartrate (LOPRESSOR) 50 MG Tab, Take 1 Tablet by mouth 2 times a day. Indications: High Blood Pressure Disorder, Disp: 180 Tablet, Rfl: 4    simvastatin (ZOCOR) 10 MG Tab, Take 1 Tablet by mouth every evening. Indications: Ischemic Heart Disease, Disp: 100 Tablet, Rfl: 3    folic acid (FOLVITE) 1 MG Tab, TAKE 1 TABLET BY MOUTH EVERY DAY, Disp: 90 Tablet, Rfl: 3    Loperamide HCl (IMODIUM PO), Take  by mouth as needed., Disp: , Rfl:     aspirin 81 MG EC tablet, Take 81 mg by mouth every day. Indications: blood thinner, Disp: , Rfl:     acetaminophen (TYLENOL) 500 MG Tab, Take 1,000 mg by mouth every 6 hours as needed for Mild Pain. 2 tablets = 1,000 mg.  Indications: Fever, Pain, Disp: , Rfl:     levothyroxine (SYNTHROID) 50 MCG Tab, Take 1 Tab by mouth Every morning on an empty stomach., Disp: 90 Tab, Rfl: 3    PROBLEMS:  Patient Active Problem List    Diagnosis Date Noted    Abnormal CT scan, liver 03/21/2024    Nausea and vomiting 03/21/2024    Lower abdominal pain 03/21/2024    Heartburn 03/21/2024    Microalbuminuria 11/10/2023    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HCC) 10/03/2023    Secondary hyperaldosteronism (HCC) 08/28/2023    Neuropathic pain of foot 06/13/2023    S/P aortic valve replacement 03/08/2023    Stage 3a chronic kidney disease 03/08/2023    History of DVT (deep vein thrombosis) 02/03/2023    Diastolic CHF, acute on chronic (HCC) 01/26/2023    Chronic anticoagulation 01/26/2023    Superficial thrombosis of leg, right 10/13/2022    Chronic use of benzodiazepine for therapeutic purpose 10/13/2022    Leukocytosis 10/04/2022    Alkaline phosphatase elevation 10/04/2022    Bilateral edema of lower extremity 10/04/2022    Elevated troponin 09/06/2022    Renal cyst, left 09/06/2022    Aortic insufficiency 09/06/2022    Muscle cramping 08/09/2022    Stress incontinence of urine 08/09/2022    Folate deficiency  "07/05/2022    Varicose veins of both lower extremities with pain 06/28/2022    Iron deficiency anemia 06/28/2022    Muscle spasm of back 04/12/2022    Vitamin B12 deficiency 12/16/2019    Essential hypertension 07/24/2019    Acquired hypothyroidism 07/24/2019    Mixed hyperlipidemia 07/24/2019    Anxiety 07/24/2019       REVIEW OF SYSTEMS:  Gen.:  No Nausea, Vomiting, fever, Chills.  Heart: No chest pain.  Lungs:  No shortness of Breath.  Psychological: Kareem unusual Anxiety depression     PHYSICAL EXAM   Physical Exam       Constitutional: Alert, cooperative, not in acute distress.  Cardiovascular:  Rate Rhythm is regular without murmurs rubs clicks.     Thorax & Lungs: Clear to auscultation, no wheezing, rhonchi, or rales  HENT: Normocephalic, Atraumatic.  Eyes: PERRLA, EOMI, Conjunctiva normal.   Neck: Trachia is midline no swelling of the thyroid.   Lymphatic: No lymphadenopathy noted.   Abdomin: Soft tender exam except for right upper quadrant and epigastric regions  Skin: Warm, Dry, No erythema, superficial skin lesions right arm left arm also left leg  Extremities: Atraumatic with symmetric distal pulses, No edema, No tenderness, No cyanosis, No clubbing.     Neurologic: Alert & oriented x 3, cranial nerves II through XII are intact, Normal motor function, Normal sensory function, No focal deficits noted.   Psychiatric: Affect normal, Judgment normal, Mood normal.     VITAL SIGNS:/74 (BP Location: Left arm, Patient Position: Sitting, BP Cuff Size: Adult)   Pulse 63   Temp 37.1 °C (98.7 °F)   Resp 18   Ht 1.575 m (5' 2\")   Wt 55.3 kg (122 lb)   SpO2 99%   BMI 22.31 kg/m²     Labs: Reviewed    Assessment:                                                     Plan:  Assessment & Plan       1. Thrombosis  Questionable send thrombosis check D-dimer check also CA 19-9  - CA 19-9; Future  - D-DIMER; Future  - US-EXTREMITY VENOUS UPPER UNILAT RIGHT; Future  - Comp Metabolic Panel; Future  - CBC WITH " DIFFERENTIAL; Future  - CRP QUANTITIVE (NON-CARDIAC); Future  - Sed Rate; Future    2. 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 Acute cystitis without hematuria  Check complete urinalysis  - URINALYSIS,CULTURE IF INDICATED; Future

## 2024-06-20 DIAGNOSIS — N39.0 URINARY TRACT INFECTION WITHOUT HEMATURIA, SITE UNSPECIFIED: ICD-10-CM

## 2024-06-20 RX ORDER — NITROFURANTOIN 25; 75 MG/1; MG/1
100 CAPSULE ORAL 2 TIMES DAILY
Qty: 10 CAPSULE | Refills: 0 | Status: ON HOLD | OUTPATIENT
Start: 2024-06-20 | End: 2024-06-30

## 2024-06-27 ENCOUNTER — APPOINTMENT (OUTPATIENT)
Dept: RADIOLOGY | Facility: MEDICAL CENTER | Age: 84
DRG: 194 | End: 2024-06-27
Attending: EMERGENCY MEDICINE
Payer: MEDICARE

## 2024-06-27 ENCOUNTER — HOSPITAL ENCOUNTER (INPATIENT)
Facility: MEDICAL CENTER | Age: 84
LOS: 2 days | DRG: 194 | End: 2024-06-30
Attending: EMERGENCY MEDICINE | Admitting: FAMILY MEDICINE
Payer: MEDICARE

## 2024-06-27 DIAGNOSIS — J18.9 COMMUNITY ACQUIRED PNEUMONIA, UNSPECIFIED LATERALITY: ICD-10-CM

## 2024-06-27 DIAGNOSIS — D72.819 LEUKOPENIA, UNSPECIFIED TYPE: ICD-10-CM

## 2024-06-27 DIAGNOSIS — J98.4 PNEUMONITIS: ICD-10-CM

## 2024-06-27 PROBLEM — R68.89 FLU-LIKE SYMPTOMS: Status: ACTIVE | Noted: 2024-06-27

## 2024-06-27 LAB
ALBUMIN SERPL BCP-MCNC: 3.8 G/DL (ref 3.2–4.9)
ALBUMIN/GLOB SERPL: 1.2 G/DL
ALP SERPL-CCNC: 124 U/L (ref 30–99)
ALT SERPL-CCNC: <5 U/L (ref 2–50)
ANION GAP SERPL CALC-SCNC: 16 MMOL/L (ref 7–16)
ANISOCYTOSIS BLD QL SMEAR: ABNORMAL
APPEARANCE UR: ABNORMAL
AST SERPL-CCNC: 5 U/L (ref 12–45)
BACTERIA #/AREA URNS HPF: ABNORMAL /HPF
BASOPHILS # BLD AUTO: 0 % (ref 0–1.8)
BASOPHILS # BLD: 0 K/UL (ref 0–0.12)
BILIRUB SERPL-MCNC: 0.7 MG/DL (ref 0.1–1.5)
BILIRUB UR QL STRIP.AUTO: NEGATIVE
BUN SERPL-MCNC: 16 MG/DL (ref 8–22)
CALCIUM ALBUM COR SERPL-MCNC: 9 MG/DL (ref 8.5–10.5)
CALCIUM SERPL-MCNC: 8.8 MG/DL (ref 8.5–10.5)
CHLORIDE SERPL-SCNC: 97 MMOL/L (ref 96–112)
CO2 SERPL-SCNC: 17 MMOL/L (ref 20–33)
COLOR UR: YELLOW
COMMENT NL1176: NORMAL
CREAT SERPL-MCNC: 1.21 MG/DL (ref 0.5–1.4)
EOSINOPHIL # BLD AUTO: 0.25 K/UL (ref 0–0.51)
EOSINOPHIL NFR BLD: 0.9 % (ref 0–6.9)
EPI CELLS #/AREA URNS HPF: ABNORMAL /HPF
ERYTHROCYTE [DISTWIDTH] IN BLOOD BY AUTOMATED COUNT: 58.8 FL (ref 35.9–50)
FLUAV RNA SPEC QL NAA+PROBE: NEGATIVE
FLUBV RNA SPEC QL NAA+PROBE: NEGATIVE
GFR SERPLBLD CREATININE-BSD FMLA CKD-EPI: 45 ML/MIN/1.73 M 2
GLOBULIN SER CALC-MCNC: 3.1 G/DL (ref 1.9–3.5)
GLUCOSE SERPL-MCNC: 108 MG/DL (ref 65–99)
GLUCOSE UR STRIP.AUTO-MCNC: NEGATIVE MG/DL
HCT VFR BLD AUTO: 37.4 % (ref 37–47)
HGB BLD-MCNC: 11.7 G/DL (ref 12–16)
HYALINE CASTS #/AREA URNS LPF: ABNORMAL /LPF
KETONES UR STRIP.AUTO-MCNC: 15 MG/DL
LACTATE SERPL-SCNC: 1.4 MMOL/L (ref 0.5–2)
LEUKOCYTE ESTERASE UR QL STRIP.AUTO: ABNORMAL
LIPASE SERPL-CCNC: 10 U/L (ref 11–82)
LYMPHOCYTES # BLD AUTO: 0.23 K/UL (ref 1–4.8)
LYMPHOCYTES NFR BLD: 0.8 % (ref 22–41)
MACROCYTES BLD QL SMEAR: ABNORMAL
MANUAL DIFF BLD: NORMAL
MCH RBC QN AUTO: 30.4 PG (ref 27–33)
MCHC RBC AUTO-ENTMCNC: 31.3 G/DL (ref 32.2–35.5)
MCV RBC AUTO: 97.1 FL (ref 81.4–97.8)
MICRO URNS: ABNORMAL
MONOCYTES # BLD AUTO: 0.48 K/UL (ref 0–0.85)
MONOCYTES NFR BLD AUTO: 1.7 % (ref 0–13.4)
MORPHOLOGY BLD-IMP: NORMAL
NEUTROPHILS # BLD AUTO: 27.24 K/UL (ref 1.82–7.42)
NEUTROPHILS NFR BLD: 96.6 % (ref 44–72)
NITRITE UR QL STRIP.AUTO: NEGATIVE
NRBC # BLD AUTO: 0 K/UL
NRBC BLD-RTO: 0 /100 WBC (ref 0–0.2)
OVALOCYTES BLD QL SMEAR: NORMAL
PH UR STRIP.AUTO: 5 [PH] (ref 5–8)
PLATELET # BLD AUTO: 342 K/UL (ref 164–446)
PLATELET BLD QL SMEAR: NORMAL
PMV BLD AUTO: 11.4 FL (ref 9–12.9)
POIKILOCYTOSIS BLD QL SMEAR: NORMAL
POTASSIUM SERPL-SCNC: 5.3 MMOL/L (ref 3.6–5.5)
PROCALCITONIN SERPL-MCNC: 0.44 NG/ML
PROT SERPL-MCNC: 6.9 G/DL (ref 6–8.2)
PROT UR QL STRIP: 30 MG/DL
RBC # BLD AUTO: 3.85 M/UL (ref 4.2–5.4)
RBC # URNS HPF: ABNORMAL /HPF
RBC BLD AUTO: PRESENT
RBC UR QL AUTO: NEGATIVE
RSV RNA SPEC QL NAA+PROBE: NEGATIVE
SARS-COV-2 RNA RESP QL NAA+PROBE: NOTDETECTED
SODIUM SERPL-SCNC: 130 MMOL/L (ref 135–145)
SP GR UR STRIP.AUTO: 1.02
UROBILINOGEN UR STRIP.AUTO-MCNC: 1 MG/DL
WBC # BLD AUTO: 28.2 K/UL (ref 4.8–10.8)
WBC #/AREA URNS HPF: ABNORMAL /HPF

## 2024-06-27 PROCEDURE — 700102 HCHG RX REV CODE 250 W/ 637 OVERRIDE(OP)

## 2024-06-27 PROCEDURE — 80053 COMPREHEN METABOLIC PANEL: CPT

## 2024-06-27 PROCEDURE — A9270 NON-COVERED ITEM OR SERVICE: HCPCS

## 2024-06-27 PROCEDURE — 83690 ASSAY OF LIPASE: CPT

## 2024-06-27 PROCEDURE — 700102 HCHG RX REV CODE 250 W/ 637 OVERRIDE(OP): Performed by: EMERGENCY MEDICINE

## 2024-06-27 PROCEDURE — 700111 HCHG RX REV CODE 636 W/ 250 OVERRIDE (IP): Mod: JZ

## 2024-06-27 PROCEDURE — 700117 HCHG RX CONTRAST REV CODE 255: Performed by: EMERGENCY MEDICINE

## 2024-06-27 PROCEDURE — 74177 CT ABD & PELVIS W/CONTRAST: CPT

## 2024-06-27 PROCEDURE — 83605 ASSAY OF LACTIC ACID: CPT

## 2024-06-27 PROCEDURE — 87040 BLOOD CULTURE FOR BACTERIA: CPT

## 2024-06-27 PROCEDURE — 99285 EMERGENCY DEPT VISIT HI MDM: CPT

## 2024-06-27 PROCEDURE — 96365 THER/PROPH/DIAG IV INF INIT: CPT

## 2024-06-27 PROCEDURE — 96372 THER/PROPH/DIAG INJ SC/IM: CPT

## 2024-06-27 PROCEDURE — 81001 URINALYSIS AUTO W/SCOPE: CPT

## 2024-06-27 PROCEDURE — 84145 PROCALCITONIN (PCT): CPT

## 2024-06-27 PROCEDURE — 36415 COLL VENOUS BLD VENIPUNCTURE: CPT

## 2024-06-27 PROCEDURE — A9270 NON-COVERED ITEM OR SERVICE: HCPCS | Performed by: EMERGENCY MEDICINE

## 2024-06-27 PROCEDURE — 99223 1ST HOSP IP/OBS HIGH 75: CPT

## 2024-06-27 PROCEDURE — G0378 HOSPITAL OBSERVATION PER HR: HCPCS

## 2024-06-27 PROCEDURE — 85027 COMPLETE CBC AUTOMATED: CPT

## 2024-06-27 PROCEDURE — 0241U HCHG SARS-COV-2 COVID-19 NFCT DS RESP RNA 4 TRGT ED POC: CPT

## 2024-06-27 PROCEDURE — 700105 HCHG RX REV CODE 258

## 2024-06-27 PROCEDURE — 96367 TX/PROPH/DG ADDL SEQ IV INF: CPT

## 2024-06-27 PROCEDURE — 71045 X-RAY EXAM CHEST 1 VIEW: CPT

## 2024-06-27 PROCEDURE — 85007 BL SMEAR W/DIFF WBC COUNT: CPT

## 2024-06-27 RX ORDER — ACETAMINOPHEN 325 MG/1
650 TABLET ORAL EVERY 6 HOURS PRN
Status: DISCONTINUED | OUTPATIENT
Start: 2024-06-27 | End: 2024-06-30 | Stop reason: HOSPADM

## 2024-06-27 RX ORDER — OXYCODONE HYDROCHLORIDE 5 MG/1
5 TABLET ORAL
Status: DISCONTINUED | OUTPATIENT
Start: 2024-06-27 | End: 2024-06-30 | Stop reason: HOSPADM

## 2024-06-27 RX ORDER — ASPIRIN 81 MG/1
81 TABLET ORAL DAILY
Status: DISCONTINUED | OUTPATIENT
Start: 2024-06-28 | End: 2024-06-30 | Stop reason: HOSPADM

## 2024-06-27 RX ORDER — FUROSEMIDE 20 MG/1
20 TABLET ORAL DAILY
Status: DISCONTINUED | OUTPATIENT
Start: 2024-06-28 | End: 2024-06-30 | Stop reason: HOSPADM

## 2024-06-27 RX ORDER — AZITHROMYCIN 250 MG/1
500 TABLET, FILM COATED ORAL ONCE
Status: COMPLETED | OUTPATIENT
Start: 2024-06-27 | End: 2024-06-27

## 2024-06-27 RX ORDER — SODIUM CHLORIDE 9 MG/ML
INJECTION, SOLUTION INTRAVENOUS CONTINUOUS
Status: DISCONTINUED | OUTPATIENT
Start: 2024-06-27 | End: 2024-06-27

## 2024-06-27 RX ORDER — LEVOTHYROXINE SODIUM 0.05 MG/1
50 TABLET ORAL
Status: DISCONTINUED | OUTPATIENT
Start: 2024-06-28 | End: 2024-06-30 | Stop reason: HOSPADM

## 2024-06-27 RX ORDER — LOPERAMIDE HYDROCHLORIDE 2 MG/1
2 CAPSULE ORAL 4 TIMES DAILY PRN
COMMUNITY

## 2024-06-27 RX ORDER — OXYCODONE HYDROCHLORIDE 5 MG/1
2.5 TABLET ORAL
Status: DISCONTINUED | OUTPATIENT
Start: 2024-06-27 | End: 2024-06-30 | Stop reason: HOSPADM

## 2024-06-27 RX ORDER — LOSARTAN POTASSIUM 25 MG/1
25 TABLET ORAL DAILY
Status: DISCONTINUED | OUTPATIENT
Start: 2024-06-28 | End: 2024-06-30 | Stop reason: HOSPADM

## 2024-06-27 RX ORDER — ONDANSETRON 4 MG/1
4 TABLET, ORALLY DISINTEGRATING ORAL EVERY 4 HOURS PRN
Status: DISCONTINUED | OUTPATIENT
Start: 2024-06-27 | End: 2024-06-30 | Stop reason: HOSPADM

## 2024-06-27 RX ORDER — ONDANSETRON 2 MG/ML
4 INJECTION INTRAMUSCULAR; INTRAVENOUS EVERY 4 HOURS PRN
Status: DISCONTINUED | OUTPATIENT
Start: 2024-06-27 | End: 2024-06-30 | Stop reason: HOSPADM

## 2024-06-27 RX ORDER — HYDROMORPHONE HYDROCHLORIDE 1 MG/ML
0.25 INJECTION, SOLUTION INTRAMUSCULAR; INTRAVENOUS; SUBCUTANEOUS
Status: DISCONTINUED | OUTPATIENT
Start: 2024-06-27 | End: 2024-06-30 | Stop reason: HOSPADM

## 2024-06-27 RX ORDER — METOPROLOL TARTRATE 50 MG/1
50 TABLET, FILM COATED ORAL 2 TIMES DAILY
Status: DISCONTINUED | OUTPATIENT
Start: 2024-06-27 | End: 2024-06-30 | Stop reason: HOSPADM

## 2024-06-27 RX ORDER — HYDRALAZINE HYDROCHLORIDE 20 MG/ML
10 INJECTION INTRAMUSCULAR; INTRAVENOUS EVERY 4 HOURS PRN
Status: DISCONTINUED | OUTPATIENT
Start: 2024-06-27 | End: 2024-06-30 | Stop reason: HOSPADM

## 2024-06-27 RX ORDER — SIMVASTATIN 20 MG
10 TABLET ORAL EVERY EVENING
Status: DISCONTINUED | OUTPATIENT
Start: 2024-06-27 | End: 2024-06-30 | Stop reason: HOSPADM

## 2024-06-27 RX ORDER — ENOXAPARIN SODIUM 100 MG/ML
40 INJECTION SUBCUTANEOUS DAILY
Status: DISCONTINUED | OUTPATIENT
Start: 2024-06-27 | End: 2024-06-30 | Stop reason: HOSPADM

## 2024-06-27 RX ADMIN — AMPICILLIN AND SULBACTAM 3 G: 1; 2 INJECTION, POWDER, FOR SOLUTION INTRAMUSCULAR; INTRAVENOUS at 16:28

## 2024-06-27 RX ADMIN — ENOXAPARIN SODIUM 40 MG: 100 INJECTION SUBCUTANEOUS at 17:46

## 2024-06-27 RX ADMIN — SIMVASTATIN 10 MG: 20 TABLET, FILM COATED ORAL at 17:46

## 2024-06-27 RX ADMIN — ACETAMINOPHEN 650 MG: 325 TABLET ORAL at 23:58

## 2024-06-27 RX ADMIN — METOPROLOL TARTRATE 50 MG: 50 TABLET, FILM COATED ORAL at 17:46

## 2024-06-27 RX ADMIN — AZITHROMYCIN DIHYDRATE 500 MG: 250 TABLET, FILM COATED ORAL at 16:28

## 2024-06-27 RX ADMIN — IOHEXOL 100 ML: 300 INJECTION, SOLUTION INTRAVENOUS at 15:45

## 2024-06-27 SDOH — ECONOMIC STABILITY: TRANSPORTATION INSECURITY
IN THE PAST 12 MONTHS, HAS LACK OF RELIABLE TRANSPORTATION KEPT YOU FROM MEDICAL APPOINTMENTS, MEETINGS, WORK OR FROM GETTING THINGS NEEDED FOR DAILY LIVING?: NO

## 2024-06-27 ASSESSMENT — ENCOUNTER SYMPTOMS
DIARRHEA: 0
SHORTNESS OF BREATH: 0
NAUSEA: 1
COUGH: 1
PALPITATIONS: 0
ABDOMINAL PAIN: 1
HEADACHES: 0
CHILLS: 0
VOMITING: 0
HEARTBURN: 0
FEVER: 0
DIZZINESS: 0

## 2024-06-27 ASSESSMENT — COGNITIVE AND FUNCTIONAL STATUS - GENERAL
SUGGESTED CMS G CODE MODIFIER MOBILITY: CH
MOBILITY SCORE: 24
SUGGESTED CMS G CODE MODIFIER MOBILITY: CH
SUGGESTED CMS G CODE MODIFIER DAILY ACTIVITY: CH
MOBILITY SCORE: 24
DAILY ACTIVITIY SCORE: 24

## 2024-06-27 ASSESSMENT — PATIENT HEALTH QUESTIONNAIRE - PHQ9
5. POOR APPETITE OR OVEREATING: SEVERAL DAYS
6. FEELING BAD ABOUT YOURSELF - OR THAT YOU ARE A FAILURE OR HAVE LET YOURSELF OR YOUR FAMILY DOWN: NOT AL ALL
3. TROUBLE FALLING OR STAYING ASLEEP OR SLEEPING TOO MUCH: SEVERAL DAYS
SUM OF ALL RESPONSES TO PHQ9 QUESTIONS 1 AND 2: 1
8. MOVING OR SPEAKING SO SLOWLY THAT OTHER PEOPLE COULD HAVE NOTICED. OR THE OPPOSITE, BEING SO FIGETY OR RESTLESS THAT YOU HAVE BEEN MOVING AROUND A LOT MORE THAN USUAL: NOT AT ALL
1. LITTLE INTEREST OR PLEASURE IN DOING THINGS: SEVERAL DAYS
7. TROUBLE CONCENTRATING ON THINGS, SUCH AS READING THE NEWSPAPER OR WATCHING TELEVISION: NOT AT ALL
SUM OF ALL RESPONSES TO PHQ QUESTIONS 1-9: 6
9. THOUGHTS THAT YOU WOULD BE BETTER OFF DEAD, OR OF HURTING YOURSELF: NOT AT ALL
4. FEELING TIRED OR HAVING LITTLE ENERGY: NEARLY EVERY DAY
2. FEELING DOWN, DEPRESSED, IRRITABLE, OR HOPELESS: NOT AT ALL

## 2024-06-27 ASSESSMENT — PAIN DESCRIPTION - PAIN TYPE
TYPE: ACUTE PAIN

## 2024-06-27 ASSESSMENT — FIBROSIS 4 INDEX
FIB4 SCORE: 1.4
FIB4 SCORE: 0.57
FIB4 SCORE: 0.57

## 2024-06-27 ASSESSMENT — SOCIAL DETERMINANTS OF HEALTH (SDOH)
WITHIN THE LAST YEAR, HAVE YOU BEEN AFRAID OF YOUR PARTNER OR EX-PARTNER?: NO
WITHIN THE LAST YEAR, HAVE YOU BEEN HUMILIATED OR EMOTIONALLY ABUSED IN OTHER WAYS BY YOUR PARTNER OR EX-PARTNER?: NO
WITHIN THE LAST YEAR, HAVE TO BEEN RAPED OR FORCED TO HAVE ANY KIND OF SEXUAL ACTIVITY BY YOUR PARTNER OR EX-PARTNER?: NO
WITHIN THE PAST 12 MONTHS, YOU WORRIED THAT YOUR FOOD WOULD RUN OUT BEFORE YOU GOT THE MONEY TO BUY MORE: NEVER TRUE
WITHIN THE LAST YEAR, HAVE YOU BEEN KICKED, HIT, SLAPPED, OR OTHERWISE PHYSICALLY HURT BY YOUR PARTNER OR EX-PARTNER?: NO
WITHIN THE PAST 12 MONTHS, THE FOOD YOU BOUGHT JUST DIDN'T LAST AND YOU DIDN'T HAVE MONEY TO GET MORE: NEVER TRUE
IN THE PAST 12 MONTHS, HAS THE ELECTRIC, GAS, OIL, OR WATER COMPANY THREATENED TO SHUT OFF SERVICE IN YOUR HOME?: NO

## 2024-06-27 ASSESSMENT — LIFESTYLE VARIABLES
CONSUMPTION TOTAL: NEGATIVE
AVERAGE NUMBER OF DAYS PER WEEK YOU HAVE A DRINK CONTAINING ALCOHOL: 0
HOW MANY TIMES IN THE PAST YEAR HAVE YOU HAD 5 OR MORE DRINKS IN A DAY: 0
DOES PATIENT WANT TO STOP DRINKING: NO
TOTAL SCORE: 0
HAVE PEOPLE ANNOYED YOU BY CRITICIZING YOUR DRINKING: NO
EVER FELT BAD OR GUILTY ABOUT YOUR DRINKING: NO
ON A TYPICAL DAY WHEN YOU DRINK ALCOHOL HOW MANY DRINKS DO YOU HAVE: 0
ALCOHOL_USE: NO
TOTAL SCORE: 0
HAVE YOU EVER FELT YOU SHOULD CUT DOWN ON YOUR DRINKING: NO
EVER HAD A DRINK FIRST THING IN THE MORNING TO STEADY YOUR NERVES TO GET RID OF A HANGOVER: NO
TOTAL SCORE: 0

## 2024-06-27 NOTE — ED PROVIDER NOTES
"ED Provider Note    CHIEF COMPLAINT  Chief Complaint   Patient presents with    Flu Like Symptoms     Congestion, weakness, poor appetite, chills, cough, nausea.     Abdominal Pain     X months       EXTERNAL RECORDS REVIEWED  Outpatient Notes   and Outpatient labs & studies patient was worked up by her primary for belly pain, laboratories, she had recent leukocytosis, recent treated for urinary tract infection finishing his antibiotics    HPI/ROS  LIMITATION TO HISTORY   Select: : None  OUTSIDE HISTORIAN(S):  Family 2 daughters are at the bedside.  They point out that her to want to come to the hospital is illustrative of significant pain.    Mariely Easley is a 84 y.o. female who presents complaining of not feeling well.  He states that she has been feeling poorly since this past November.  No one can figure it out.  She has seen GI, had a colonoscopy which apparently was unremarkable.  She is scheduled in 2 weeks for EGD.  She has had some intermittent abdominal pain.  Its across the lower abdomen, she describes it as \"spells\" as they only last about 15 minutes.  She has had 1 of those today and 1 yesterday, this is increasing in terms of frequency.  She denies any urinary changes.  She has had intermittent loose stools for some time.  However today no diarrhea, yesterday a normal bowel movement.  There is been no fever.  She has had cough and congestion for the last few days as well.  She generally just does not feel well.    PAST MEDICAL HISTORY   has a past medical history of Abnormal albumin (09/16/2022), Acute renal failure superimposed on stage 3 chronic kidney disease (HCC) (07/24/2019), Dyspnea on minimal exertion (02/06/2023), Hypermagnesemia (09/16/2022), Hypertension, Hyponatremia (06/28/2022), Pain and swelling of left lower extremity (05/18/2022), Severe aortic valve regurgitation (02/09/2023), SOB (shortness of breath) (01/08/2023), Stage 4 chronic kidney disease (HCC) (06/26/2023), " "Superficial thrombosis of lower extremity, right (2022), and Vitamin D deficiency (11/10/2023).    SURGICAL HISTORY  patient denies any surgical history    FAMILY HISTORY  History reviewed. No pertinent family history.    SOCIAL HISTORY  Social History     Tobacco Use    Smoking status: Former     Current packs/day: 0.00     Types: Cigarettes     Quit date: 1979     Years since quittin.9    Smokeless tobacco: Never   Vaping Use    Vaping status: Never Used   Substance and Sexual Activity    Alcohol use: Yes     Alcohol/week: 8.4 oz     Types: 14 Glasses of wine per week     Comment: 2 glasses wine daily    Drug use: No    Sexual activity: Not on file       CURRENT MEDICATIONS  Home Medications       Reviewed by Peggy Peguero R.N. (Registered Nurse) on 24 at 1157  Med List Status: Partial     Medication Last Dose Status   acetaminophen (TYLENOL) 500 MG Tab  Active   aspirin 81 MG EC tablet  Active   cyclobenzaprine (FLEXERIL) 5 mg tablet  Active   ferrous sulfate 325 (65 Fe) MG tablet  Active   folic acid (FOLVITE) 1 MG Tab  Active   furosemide (LASIX) 20 MG Tab  Active   gabapentin (NEURONTIN) 100 MG Cap  Active   levothyroxine (SYNTHROID) 50 MCG Tab  Active   Loperamide HCl (IMODIUM PO)  Active   losartan (COZAAR) 25 MG Tab  Active   metoprolol tartrate (LOPRESSOR) 50 MG Tab  Active   nitrofurantoin (MACROBID) 100 MG Cap  Active   simvastatin (ZOCOR) 10 MG Tab  Active                    ALLERGIES  No Known Allergies    PHYSICAL EXAM  VITAL SIGNS: BP (!) 142/59   Pulse 76   Temp 36.5 °C (97.7 °F) (Temporal)   Resp 16   Ht 1.575 m (5' 2\")   Wt 54 kg (119 lb)   SpO2 95%   BMI 21.77 kg/m²    Constitutional: Well appearing patient in no acute distress.  HENT: Head is without trauma.  Oropharynx is clear.  Mucous membranes are moist.  Eyes: Sclerae are nonicteric, pupils are equally round.  Neck: Supple with grossly normal range of motion.  Cardiovascular: Heart is regular rate and rhythm " without murmur rub or gallop.  Peripheral pulses are intact and symmetric throughout.  Thorax & Lungs: Breathing easily.  Good air movement.  There is no wheeze, rhonchi or rales.  Abdomen: Bowel sounds normal, soft, non-distended, nontender, no mass nor pulsatile mass. I do not appreciate hepatosplenomegaly.  Skin: No apparent rash.  I do not see petechiae or purpura.  Extremities: No evidence of acute trauma.  No clubbing, cyanosis, edema, no Homans or cords.  Neurologic: Alert. Moving all extremities.  Intact sensation and strength throughout.  Gait is normal.  Psychiatric: Normal for situation      EKG/LABS  Labs Reviewed   CBC WITH DIFFERENTIAL - Abnormal; Notable for the following components:       Result Value    WBC 28.2 (*)     RBC 3.85 (*)     Hemoglobin 11.7 (*)     MCHC 31.3 (*)     RDW 58.8 (*)     Neutrophils-Polys 96.60 (*)     Lymphocytes 0.80 (*)     Neutrophils (Absolute) 27.24 (*)     Lymphs (Absolute) 0.23 (*)     All other components within normal limits   COMP METABOLIC PANEL - Abnormal; Notable for the following components:    Sodium 130 (*)     Co2 17 (*)     Glucose 108 (*)     AST(SGOT) 5 (*)     Alkaline Phosphatase 124 (*)     All other components within normal limits   LIPASE - Abnormal; Notable for the following components:    Lipase 10 (*)     All other components within normal limits   URINALYSIS - Abnormal; Notable for the following components:    Character Cloudy (*)     Ketones 15 (*)     Protein 30 (*)     Leukocyte Esterase Moderate (*)     All other components within normal limits   ESTIMATED GFR - Abnormal; Notable for the following components:    GFR (CKD-EPI) 45 (*)     All other components within normal limits   URINE MICROSCOPIC (W/UA) - Abnormal; Notable for the following components:    WBC 10-20 (*)     Bacteria Few (*)     Epithelial Cells Moderate (*)     All other components within normal limits   DIFFERENTIAL MANUAL   PERIPHERAL SMEAR REVIEW   PLATELET ESTIMATE    MORPHOLOGY   LACTIC ACID   BLOOD CULTURE   BLOOD CULTURE   POCT COV-2, FLU A/B, RSV BY PCR   POC COV-2, FLU A/B, RSV BY PCR         RADIOLOGY/PROCEDURES   I have independently interpreted the diagnostic imaging associated with this visit and am waiting the final reading from the radiologist.   My preliminary interpretation is as follows: Pneumonitis    Radiologist interpretation:  CT-ABDOMEN-PELVIS WITH   Final Result      1.  No acute inflammation in the abdomen or pelvis.   2.  Chronic interstitial fibrotic changes with superimposed interstitial infiltrates and/or edema   3.  Small, right greater than left pleural effusions.   4.  Mild intrahepatic biliary duct dilatation, similar.   5.  Splenomegaly.   6.  2.5 x 1.4 cm left adnexal cyst. Consider nonemergent ultrasound for further evaluation.   7.  Distal colonic diverticulosis.   8.  Severe atherosclerotic changes.      DX-CHEST-PORTABLE (1 VIEW)   Final Result      1.  Hazy interstitial infiltrates and/or edema, greater on the right.          COURSE & MEDICAL DECISION MAKING    ASSESSMENT, COURSE AND PLAN  Care Narrative: This patient presents with longstanding episodic abdominal pain, she is being worked up by her primary.  She has had recent cough and congestion.  Her laboratories obtained in triage show a marked leukocytosis of 20,000 with a preponderance of neutrophils.  Consideration for C. difficile given that recent antibiotics for her UTI, however she has had no diarrhea recently although it has been a problem for her in the past.  X-ray and CT scan did not show any evidence of clear intra-abdominal issues but she does have the chronic on top of interstitial infiltrates.  At this point, I am going to cover her for community-acquired pneumonia.  Her viral panel was negative.  I think it is worthwhile to put her in the hospital for observation and to trend that leukocytosis.  Patient and the family are comfortable with this.  I spoke with Kodi Garsia,  nurse practitioner up in the [facility], she will be admitting her.    DISPOSITION AND DISCUSSIONS  I have discussed management of the patient with the following physicians and SATURNINO's: Hospitalist    FINAL DIAGNOSIS  1. Pneumonitis    2. Leukopenia, unspecified type           Electronically signed by: Ravi Durbin M.D., 6/27/2024 1:17 PM

## 2024-06-27 NOTE — ED TRIAGE NOTES
".  Chief Complaint   Patient presents with    Flu Like Symptoms     Congestion, weakness, poor appetite, chills, cough, nausea.     Abdominal Pain     X months         83 yo female wheeled to triage for above complaint. COVID swabbed and processed, Abdominal pain protocol placed.     Pt is alert and oriented, speaking in full sentences, follows commands and responds appropriately to questions.      Patient placed back for labs and educated on triage process. Asked to inform RN of any changes or concerns.     BP (!) 142/59   Pulse 76   Temp 36.5 °C (97.7 °F) (Temporal)   Resp 16   Ht 1.575 m (5' 2\")   Wt 54 kg (119 lb)   SpO2 95%   BMI 21.77 kg/m²     "

## 2024-06-27 NOTE — ED NOTES
Family to room with pt.  Pt. Reports abd pain, n/v since November last year.  Recent UTI diagnosis and finished abx for that, denies urinary symptoms at this time.  Aware of need for urine sample when able.

## 2024-06-27 NOTE — ED NOTES
Pharmacy Medication Reconciliation    ~Med rec updated and complete per patient at bedside   ~Allergies have been verified  ~Pt home pharmacy: [facility]      ~Patient reports that she completed a 5 day course of Macrobid that was started on 6/20/2024      ~Anticoagulants (rivaroxaban, apixaban, edoxaban, dabigatran, warfarin, enoxaparin) taken in the last 14 days? No

## 2024-06-27 NOTE — H&P
"Hospital Medicine History & Physical Note    Date of Service  6/27/2024    Primary Care Physician  Luz Vieira D.O.    Consultants  none    Specialist Names: n/a    Code Status  Full Code    Chief Complaint  Chief Complaint   Patient presents with    Flu Like Symptoms     Congestion, weakness, poor appetite, chills, cough, nausea.     Abdominal Pain     X months       History of Presenting Illness  Mariely Easley is a 84 y.o. female who presented 6/27/2024 with flu like symptoms and abd pain for past several months. She states that she has been feeling poorly since this past November.  No one can figure it out.  She has seen GI, had a colonoscopy which apparently was unremarkable.  She is scheduled in 2 weeks for EGD.  She has had some intermittent abdominal pain.  Its across the lower abdomen, she describes it as \"spells\" as they only last about 15 minutes.  She has had 1 of those today and 1 yesterday, this is increasing in terms of frequency.  She denies any urinary changes.  She has had intermittent loose stools for some time.  However today no diarrhea, yesterday a normal bowel movement.  There is been no fever.  She has had cough and congestion for the last few days as well.  She generally just does not feel well.     In the ED, she is afebrile, her vitals are stable and she is on room air.  Her labs will reveal an elevated white count of 28.2 with a left shift.  She is also hyponatremic at 130 and her alkaline phosphate is slightly elevated at 124.  Lactic acid is negative at 1.4.  Her flu panel is negative.  Chest x-ray is showing infiltrates and/or edema.  CT abdomen pelvis chronic interstitial fibrotic changes with superimposed interstitial infiltrates and/or edema, however no acute inflammation.  She will be admitted for treatment of community acquired pneumonia.    I discussed the plan of care with patient and ERP .    Review of Systems  Review of Systems   Constitutional:  Negative for " chills, fever and malaise/fatigue.   Respiratory:  Positive for cough. Negative for shortness of breath.    Cardiovascular:  Negative for chest pain, palpitations and leg swelling.   Gastrointestinal:  Positive for abdominal pain and nausea. Negative for diarrhea, heartburn and vomiting.   Genitourinary:  Negative for dysuria, frequency and urgency.   Neurological:  Negative for dizziness and headaches.   All other systems reviewed and are negative.      Past Medical History   has a past medical history of Abnormal albumin (09/16/2022), Acute renal failure superimposed on stage 3 chronic kidney disease (HCC) (07/24/2019), Dyspnea on minimal exertion (02/06/2023), Hypermagnesemia (09/16/2022), Hypertension, Hyponatremia (06/28/2022), Pain and swelling of left lower extremity (05/18/2022), Severe aortic valve regurgitation (02/09/2023), SOB (shortness of breath) (01/08/2023), Stage 4 chronic kidney disease (HCC) (06/26/2023), Superficial thrombosis of lower extremity, right (07/05/2022), and Vitamin D deficiency (11/10/2023).    Surgical History   has no past surgical history on file.     Family History  family history is not on file.   Family history reviewed with patient. There is no family history that is pertinent to the chief complaint.     Social History   reports that she quit smoking about 44 years ago. Her smoking use included cigarettes. She has never used smokeless tobacco. She reports current alcohol use of about 8.4 oz of alcohol per week. She reports that she does not use drugs.    Allergies  No Known Allergies    Medications  Prior to Admission Medications   Prescriptions Last Dose Informant Patient Reported? Taking?   Loperamide HCl (IMODIUM PO)   Yes No   Sig: Take  by mouth as needed.   acetaminophen (TYLENOL) 500 MG Tab  Patient Yes No   Sig: Take 1,000 mg by mouth every 6 hours as needed for Mild Pain. 2 tablets = 1,000 mg.  Indications: Fever, Pain   aspirin 81 MG EC tablet   Yes No   Sig: Take 81  mg by mouth every day. Indications: blood thinner   cyclobenzaprine (FLEXERIL) 5 mg tablet   No No   Sig: TAKE 1 TABLET BY MOUTH THREE TIMES A DAY AS NEEDED FOR MUSCLE SPASM   ferrous sulfate 325 (65 Fe) MG tablet   No No   Sig: Take 1 Tablet by mouth every day. Indications: Iron Deficiency   folic acid (FOLVITE) 1 MG Tab   No No   Sig: TAKE 1 TABLET BY MOUTH EVERY DAY   furosemide (LASIX) 20 MG Tab   No No   Sig: Take 1 Tablet by mouth every day.   gabapentin (NEURONTIN) 100 MG Cap   No No   Sig: Take 2 Capsules by mouth 2 times a day.   levothyroxine (SYNTHROID) 50 MCG Tab  Patient No No   Sig: Take 1 Tab by mouth Every morning on an empty stomach.   losartan (COZAAR) 25 MG Tab   No No   Sig: Take 1 Tablet by mouth every day. Indications: High Blood Pressure Disorder   metoprolol tartrate (LOPRESSOR) 50 MG Tab   No No   Sig: Take 1 Tablet by mouth 2 times a day. Indications: High Blood Pressure Disorder   nitrofurantoin (MACROBID) 100 MG Cap   No No   Sig: Take 1 Capsule by mouth 2 times a day.   simvastatin (ZOCOR) 10 MG Tab   No No   Sig: Take 1 Tablet by mouth every evening. Indications: Ischemic Heart Disease      Facility-Administered Medications: None       Physical Exam  Temp:  [36.5 °C (97.7 °F)] 36.5 °C (97.7 °F)  Pulse:  [70-91] 83  Resp:  [16-18] 18  BP: (113-157)/(55-70) 157/69  SpO2:  [93 %-96 %] 94 %  Blood Pressure : (!) 142/59   Temperature: 36.5 °C (97.7 °F)   Pulse: 76   Respiration: 16   Pulse Oximetry: 95 %       Physical Exam  Vitals and nursing note reviewed.   Constitutional:       General: She is awake.      Appearance: Normal appearance. She is not ill-appearing.   HENT:      Head: Normocephalic and atraumatic.      Jaw: There is normal jaw occlusion.      Right Ear: Hearing normal.      Left Ear: Hearing normal.      Nose: Nose normal.      Mouth/Throat:      Lips: Pink.      Mouth: Mucous membranes are moist.   Eyes:      Extraocular Movements: Extraocular movements intact.       Conjunctiva/sclera: Conjunctivae normal.      Pupils: Pupils are equal, round, and reactive to light.   Neck:      Vascular: No carotid bruit.   Cardiovascular:      Rate and Rhythm: Normal rate and regular rhythm.      Pulses: Normal pulses.      Heart sounds: Normal heart sounds, S1 normal and S2 normal.   Pulmonary:      Effort: Pulmonary effort is normal.      Breath sounds: Normal air entry. No stridor. Examination of the right-upper field reveals decreased breath sounds. Examination of the left-upper field reveals decreased breath sounds. Examination of the right-middle field reveals decreased breath sounds. Examination of the left-middle field reveals decreased breath sounds. Examination of the right-lower field reveals decreased breath sounds. Examination of the left-lower field reveals decreased breath sounds. Decreased breath sounds present.   Abdominal:      General: Bowel sounds are normal.      Palpations: Abdomen is soft.      Tenderness: There is abdominal tenderness in the right lower quadrant and left lower quadrant.   Musculoskeletal:      Cervical back: Normal range of motion and neck supple.      Right lower leg: No edema.      Left lower leg: No edema.   Skin:     General: Skin is warm and dry.      Capillary Refill: Capillary refill takes less than 2 seconds.   Neurological:      General: No focal deficit present.      Mental Status: She is alert and oriented to person, place, and time. Mental status is at baseline.      Sensory: Sensation is intact.      Motor: Motor function is intact.   Psychiatric:         Attention and Perception: Attention and perception normal.         Mood and Affect: Mood and affect normal.         Speech: Speech normal.         Behavior: Behavior normal. Behavior is cooperative.         Laboratory:  Recent Labs     06/27/24  1213   WBC 28.2*   RBC 3.85*   HEMOGLOBIN 11.7*   HEMATOCRIT 37.4   MCV 97.1   MCH 30.4   MCHC 31.3*   RDW 58.8*   PLATELETCT 342   MPV 11.4  "    Recent Labs     06/27/24  1213   SODIUM 130*   POTASSIUM 5.3   CHLORIDE 97   CO2 17*   GLUCOSE 108*   BUN 16   CREATININE 1.21   CALCIUM 8.8     Recent Labs     06/27/24  1213   ALTSGPT <5   ASTSGOT 5*   ALKPHOSPHAT 124*   TBILIRUBIN 0.7   LIPASE 10*   GLUCOSE 108*         No results for input(s): \"NTPROBNP\" in the last 72 hours.      No results for input(s): \"TROPONINT\" in the last 72 hours.    Imaging:  CT-ABDOMEN-PELVIS WITH   Final Result      1.  No acute inflammation in the abdomen or pelvis.   2.  Chronic interstitial fibrotic changes with superimposed interstitial infiltrates and/or edema   3.  Small, right greater than left pleural effusions.   4.  Mild intrahepatic biliary duct dilatation, similar.   5.  Splenomegaly.   6.  2.5 x 1.4 cm left adnexal cyst. Consider nonemergent ultrasound for further evaluation.   7.  Distal colonic diverticulosis.   8.  Severe atherosclerotic changes.      DX-CHEST-PORTABLE (1 VIEW)   Final Result      1.  Hazy interstitial infiltrates and/or edema, greater on the right.          X-Ray:  I have personally reviewed the images and compared with prior images.    Assessment/Plan:  Justification for Admission Status  I anticipate this patient is appropriate for observation status at this time because IV antibiotics overnight and monitor white count    Patient will need a Med/Surg bed on EMERGENCY service .  The need is secondary to IV antibiotics overnight and monitor white count.    * Community acquired pneumonia- (present on admission)  Assessment & Plan  - Patient complaining of flulike symptoms including cough, congestion and malaise  - Chest x-ray shows infiltrates and/or/edema  - White count is elevated to 28,000 with a left shift  - Lactic acid is negative  - No fever  - IV Unasyn and p.o. azithromycin for treatment of community-acquired pneumonia    Lower abdominal pain- (present on admission)  Assessment & Plan  - Patient has been complaining of abdominal pain for " almost the past year  - CT abdomen pelvis shows chronic interstitial fibrotic changes with superimposed interstitial infiltrates and/or edema, but no acute inflammation  - She follows with GI outpatient and is scheduled for an EGD next week  - Follow-up outpatient    Essential hypertension- (present on admission)  Assessment & Plan  - Continue home losartan, metoprolol, Lasix  - IV hydralazine available as needed  - Vitals every 4 hours    Mixed hyperlipidemia- (present on admission)  Assessment & Plan  - Continue home Zocor    Acquired hypothyroidism- (present on admission)  Assessment & Plan  - Continue home Synthroid        VTE prophylaxis: SCDs/TEDs and enoxaparin ppx

## 2024-06-28 PROBLEM — Z95.3 HISTORY OF AORTIC VALVE REPLACEMENT WITH BIOPROSTHETIC VALVE: Status: ACTIVE | Noted: 2024-06-28

## 2024-06-28 PROBLEM — G89.29 CHRONIC ABDOMINAL PAIN: Status: ACTIVE | Noted: 2024-03-21

## 2024-06-28 PROBLEM — R10.9 CHRONIC ABDOMINAL PAIN: Status: ACTIVE | Noted: 2024-03-21

## 2024-06-28 LAB
ALBUMIN SERPL BCP-MCNC: 3 G/DL (ref 3.2–4.9)
ALBUMIN/GLOB SERPL: 1 G/DL
ALP SERPL-CCNC: 106 U/L (ref 30–99)
ALT SERPL-CCNC: <5 U/L (ref 2–50)
ANION GAP SERPL CALC-SCNC: 16 MMOL/L (ref 7–16)
AST SERPL-CCNC: 6 U/L (ref 12–45)
BASOPHILS # BLD AUTO: 0.3 % (ref 0–1.8)
BASOPHILS # BLD: 0.05 K/UL (ref 0–0.12)
BILIRUB SERPL-MCNC: 0.5 MG/DL (ref 0.1–1.5)
BUN SERPL-MCNC: 18 MG/DL (ref 8–22)
CALCIUM ALBUM COR SERPL-MCNC: 9 MG/DL (ref 8.5–10.5)
CALCIUM SERPL-MCNC: 8.2 MG/DL (ref 8.5–10.5)
CHLORIDE SERPL-SCNC: 98 MMOL/L (ref 96–112)
CO2 SERPL-SCNC: 15 MMOL/L (ref 20–33)
CREAT SERPL-MCNC: 0.97 MG/DL (ref 0.5–1.4)
EOSINOPHIL # BLD AUTO: 0.5 K/UL (ref 0–0.51)
EOSINOPHIL NFR BLD: 2.5 % (ref 0–6.9)
ERYTHROCYTE [DISTWIDTH] IN BLOOD BY AUTOMATED COUNT: 59.7 FL (ref 35.9–50)
GFR SERPLBLD CREATININE-BSD FMLA CKD-EPI: 58 ML/MIN/1.73 M 2
GLOBULIN SER CALC-MCNC: 3 G/DL (ref 1.9–3.5)
GLUCOSE SERPL-MCNC: 77 MG/DL (ref 65–99)
HCT VFR BLD AUTO: 32.4 % (ref 37–47)
HGB BLD-MCNC: 9.8 G/DL (ref 12–16)
IMM GRANULOCYTES # BLD AUTO: 0.27 K/UL (ref 0–0.11)
IMM GRANULOCYTES NFR BLD AUTO: 1.4 % (ref 0–0.9)
LYMPHOCYTES # BLD AUTO: 0.64 K/UL (ref 1–4.8)
LYMPHOCYTES NFR BLD: 3.2 % (ref 22–41)
MCH RBC QN AUTO: 29.4 PG (ref 27–33)
MCHC RBC AUTO-ENTMCNC: 30.2 G/DL (ref 32.2–35.5)
MCV RBC AUTO: 97.3 FL (ref 81.4–97.8)
MONOCYTES # BLD AUTO: 0.58 K/UL (ref 0–0.85)
MONOCYTES NFR BLD AUTO: 2.9 % (ref 0–13.4)
NEUTROPHILS # BLD AUTO: 17.82 K/UL (ref 1.82–7.42)
NEUTROPHILS NFR BLD: 89.7 % (ref 44–72)
NRBC # BLD AUTO: 0 K/UL
NRBC BLD-RTO: 0 /100 WBC (ref 0–0.2)
PLATELET # BLD AUTO: 216 K/UL (ref 164–446)
PMV BLD AUTO: 11.8 FL (ref 9–12.9)
POTASSIUM SERPL-SCNC: 4.6 MMOL/L (ref 3.6–5.5)
PROT SERPL-MCNC: 6 G/DL (ref 6–8.2)
RBC # BLD AUTO: 3.33 M/UL (ref 4.2–5.4)
SODIUM SERPL-SCNC: 129 MMOL/L (ref 135–145)
WBC # BLD AUTO: 19.9 K/UL (ref 4.8–10.8)

## 2024-06-28 PROCEDURE — 770006 HCHG ROOM/CARE - MED/SURG/GYN SEMI*

## 2024-06-28 PROCEDURE — 94669 MECHANICAL CHEST WALL OSCILL: CPT

## 2024-06-28 PROCEDURE — 85025 COMPLETE CBC W/AUTO DIFF WBC: CPT

## 2024-06-28 PROCEDURE — 700111 HCHG RX REV CODE 636 W/ 250 OVERRIDE (IP): Mod: JZ

## 2024-06-28 PROCEDURE — 700105 HCHG RX REV CODE 258

## 2024-06-28 PROCEDURE — A9270 NON-COVERED ITEM OR SERVICE: HCPCS

## 2024-06-28 PROCEDURE — 700102 HCHG RX REV CODE 250 W/ 637 OVERRIDE(OP)

## 2024-06-28 PROCEDURE — 700111 HCHG RX REV CODE 636 W/ 250 OVERRIDE (IP): Mod: JZ | Performed by: FAMILY MEDICINE

## 2024-06-28 PROCEDURE — 80053 COMPREHEN METABOLIC PANEL: CPT

## 2024-06-28 PROCEDURE — 99232 SBSQ HOSP IP/OBS MODERATE 35: CPT | Performed by: FAMILY MEDICINE

## 2024-06-28 PROCEDURE — 700105 HCHG RX REV CODE 258: Performed by: FAMILY MEDICINE

## 2024-06-28 PROCEDURE — 36415 COLL VENOUS BLD VENIPUNCTURE: CPT

## 2024-06-28 RX ADMIN — AMPICILLIN AND SULBACTAM 3 G: 1; 2 INJECTION, POWDER, FOR SOLUTION INTRAMUSCULAR; INTRAVENOUS at 12:49

## 2024-06-28 RX ADMIN — ASPIRIN 81 MG: 81 TABLET, COATED ORAL at 05:39

## 2024-06-28 RX ADMIN — AMPICILLIN AND SULBACTAM 3 G: 1; 2 INJECTION, POWDER, FOR SOLUTION INTRAMUSCULAR; INTRAVENOUS at 22:57

## 2024-06-28 RX ADMIN — METOPROLOL TARTRATE 50 MG: 50 TABLET, FILM COATED ORAL at 18:00

## 2024-06-28 RX ADMIN — LEVOTHYROXINE SODIUM 50 MCG: 0.05 TABLET ORAL at 05:39

## 2024-06-28 RX ADMIN — AMPICILLIN AND SULBACTAM 3 G: 1; 2 INJECTION, POWDER, FOR SOLUTION INTRAMUSCULAR; INTRAVENOUS at 18:04

## 2024-06-28 RX ADMIN — ENOXAPARIN SODIUM 40 MG: 100 INJECTION SUBCUTANEOUS at 18:00

## 2024-06-28 RX ADMIN — SIMVASTATIN 10 MG: 20 TABLET, FILM COATED ORAL at 18:01

## 2024-06-28 RX ADMIN — AMPICILLIN AND SULBACTAM 3 G: 1; 2 INJECTION, POWDER, FOR SOLUTION INTRAMUSCULAR; INTRAVENOUS at 05:43

## 2024-06-28 ASSESSMENT — ENCOUNTER SYMPTOMS
SHORTNESS OF BREATH: 0
HEADACHES: 0
COUGH: 1
DIAPHORESIS: 0
FOCAL WEAKNESS: 0
NECK PAIN: 0
SENSORY CHANGE: 0
FEVER: 0
BACK PAIN: 0
CHILLS: 0
PALPITATIONS: 0
WHEEZING: 0
SPEECH CHANGE: 0
NERVOUS/ANXIOUS: 0
DIZZINESS: 0
ABDOMINAL PAIN: 0
FLANK PAIN: 0
NAUSEA: 0
VOMITING: 0
SORE THROAT: 0
HEARTBURN: 0
DIARRHEA: 0
WEAKNESS: 1
MYALGIAS: 0
BLURRED VISION: 0

## 2024-06-28 ASSESSMENT — PAIN DESCRIPTION - PAIN TYPE: TYPE: ACUTE PAIN

## 2024-06-28 ASSESSMENT — COPD QUESTIONNAIRES
HAVE YOU SMOKED AT LEAST 100 CIGARETTES IN YOUR ENTIRE LIFE: YES
DO YOU EVER COUGH UP ANY MUCUS OR PHLEGM?: NO/ONLY WITH OCCASIONAL COLDS OR INFECTIONS
DURING THE PAST 4 WEEKS HOW MUCH DID YOU FEEL SHORT OF BREATH: SOME OF THE TIME
COPD SCREENING SCORE: 6

## 2024-06-28 NOTE — CARE PLAN
The patient is Stable - Low risk of patient condition declining or worsening    Shift Goals  Clinical Goals: maintain O2 sat >90% throughout shift  Patient Goals: Rest, eat  Family Goals: ISI    Progress made toward(s) clinical / shift goals:  Pt alert and oriented, makes needs known. Maintained O2 sat above 90% throughout shift on RA. No sputum produced by pt at this time, pt is aware of need for collection. Other needs met at this time. Bed locked in lowest position, call light and personal belongings in reach.  Problem: Pain - Standard  Goal: Alleviation of pain or a reduction in pain to the patient’s comfort goal  Outcome: Progressing     Problem: Respiratory  Goal: Patient will achieve/maintain optimum respiratory ventilation and gas exchange  Outcome: Progressing     Problem: Self Care  Goal: Patient will have the ability to perform ADLs independently or with assistance (bathe, groom, dress, toilet and feed)  Outcome: Progressing     Problem: Knowledge Deficit - Standard  Goal: Patient and family/care givers will demonstrate understanding of plan of care, disease process/condition, diagnostic tests and medications  Outcome: Progressing       Patient is not progressing towards the following goals:

## 2024-06-28 NOTE — PROGRESS NOTES
4 Eyes Skin Assessment Completed by HANS Krishna and HANS Sandoval.    Head WDL  Ears WDL  Nose WDL  Mouth WDL  Neck WDL  Breast/Chest WDL  Shoulder Blades WDL  Spine WDL  (R) Arm/Elbow/Hand WDL  (L) Arm/Elbow/Hand WDL  Abdomen WDL  Groin WDL  Scrotum/Coccyx/Buttocks Redness and Blanching  (R) Leg Redness, Blanching, and Bruising  (L) Leg Redness, Blanching, and Bruising  (R) Heel/Foot/Toe Redness and Blanching  (L) Heel/Foot/Toe Redness and Blanching          Devices In Places Blood Pressure Cuff      Interventions In Place Pillows    Possible Skin Injury No    Pictures Uploaded Into Epic Yes  Wound Consult Placed N/A  RN Wound Prevention Protocol Ordered No

## 2024-06-28 NOTE — PROGRESS NOTES
Hospital Medicine Daily Progress Note    Date of Service  6/28/2024    Chief Complaint  Mariely Easley is a 84 y.o. female admitted 6/27/2024 with generalized weakness    Hospital Course  Admitted with generalized weakness, nausea, cough and congestion.  Workup showed bilateral lower lobe infiltrates.  She was started empiric coverage with IV Unasyn.    Interval Problem Update  Pneumonia - feels better, wbc 33808  HTN - sbp 108-157  Hyponatremia - 129    Updates given and plan of care discussed with patient's daughter.    I have discussed this patient's plan of care and discharge plan at IDT rounds today with Case Management, Nursing, Nursing leadership, and other members of the IDT team.    Consultants/Specialty  None    Code Status  Full Code    Disposition  The patient is not medically cleared for discharge to home or a post-acute facility.  Anticipate discharge to: home with close outpatient follow-up    I have placed the appropriate orders for post-discharge needs.    Review of Systems  Review of Systems   Constitutional:  Positive for malaise/fatigue. Negative for chills, diaphoresis and fever.   HENT:  Negative for congestion, hearing loss and sore throat.    Eyes:  Negative for blurred vision.   Respiratory:  Positive for cough. Negative for shortness of breath and wheezing.    Cardiovascular:  Negative for chest pain, palpitations and leg swelling.   Gastrointestinal:  Negative for abdominal pain, diarrhea, heartburn, nausea and vomiting.   Genitourinary:  Negative for dysuria, flank pain and hematuria.   Musculoskeletal:  Negative for back pain, joint pain, myalgias and neck pain.   Skin:  Negative for rash.   Neurological:  Positive for weakness. Negative for dizziness, sensory change, speech change, focal weakness and headaches.   Psychiatric/Behavioral:  The patient is not nervous/anxious.         Physical Exam  Temp:  [36.5 °C (97.7 °F)-37 °C (98.6 °F)] 36.8 °C (98.2 °F)  Pulse:  []  91  Resp:  [16-18] 16  BP: (108-157)/(43-70) 117/60  SpO2:  [93 %-96 %] 94 %    Physical Exam  Vitals and nursing note reviewed.   HENT:      Head: Normocephalic and atraumatic.      Nose: No congestion.      Mouth/Throat:      Mouth: Mucous membranes are moist.   Eyes:      Extraocular Movements: Extraocular movements intact.      Conjunctiva/sclera: Conjunctivae normal.   Cardiovascular:      Rate and Rhythm: Normal rate and regular rhythm.      Heart sounds: Murmur heard.   Pulmonary:      Effort: Pulmonary effort is normal.      Breath sounds: Rales present.   Abdominal:      General: There is no distension.      Tenderness: There is no abdominal tenderness. There is no guarding or rebound.   Musculoskeletal:      Cervical back: No tenderness.      Right lower leg: No edema.      Left lower leg: No edema.   Skin:     General: Skin is warm and dry.   Neurological:      General: No focal deficit present.      Mental Status: She is alert and oriented to person, place, and time.      Cranial Nerves: No cranial nerve deficit.         Fluids    Intake/Output Summary (Last 24 hours) at 6/28/2024 1126  Last data filed at 6/28/2024 0930  Gross per 24 hour   Intake 120 ml   Output --   Net 120 ml       Laboratory  Recent Labs     06/27/24  1213 06/28/24  0449   WBC 28.2* 19.9*   RBC 3.85* 3.33*   HEMOGLOBIN 11.7* 9.8*   HEMATOCRIT 37.4 32.4*   MCV 97.1 97.3   MCH 30.4 29.4   MCHC 31.3* 30.2*   RDW 58.8* 59.7*   PLATELETCT 342 216   MPV 11.4 11.8     Recent Labs     06/27/24  1213 06/28/24  0449   SODIUM 130* 129*   POTASSIUM 5.3 4.6   CHLORIDE 97 98   CO2 17* 15*   GLUCOSE 108* 77   BUN 16 18   CREATININE 1.21 0.97   CALCIUM 8.8 8.2*                   Imaging  CT-ABDOMEN-PELVIS WITH   Final Result      1.  No acute inflammation in the abdomen or pelvis.   2.  Chronic interstitial fibrotic changes with superimposed interstitial infiltrates and/or edema   3.  Small, right greater than left pleural effusions.   4.  Mild  intrahepatic biliary duct dilatation, similar.   5.  Splenomegaly.   6.  2.5 x 1.4 cm left adnexal cyst. Consider nonemergent ultrasound for further evaluation.   7.  Distal colonic diverticulosis.   8.  Severe atherosclerotic changes.      DX-CHEST-PORTABLE (1 VIEW)   Final Result      1.  Hazy interstitial infiltrates and/or edema, greater on the right.           Assessment/Plan  * Community acquired pneumonia- (present on admission)  Assessment & Plan  IV Unasyn    History of aortic valve replacement with bioprosthetic valve- (present on admission)  Assessment & Plan  Monitor volume status    Chronic abdominal pain- (present on admission)  Assessment & Plan  Scheduled follow up with Gastroenterology for EGD and EUS on 7/11/2024    Stage 3a chronic kidney disease- (present on admission)  Assessment & Plan  Follow bmp    Bilateral edema of lower extremity- (present on admission)  Assessment & Plan  Lasix    Hyponatremia- (present on admission)  Assessment & Plan  Follow bmp  Free water restriction    Essential hypertension- (present on admission)  Assessment & Plan  Metoprolol, Losartan    Mixed hyperlipidemia- (present on admission)  Assessment & Plan  Zocor    Acquired hypothyroidism- (present on admission)  Assessment & Plan  Synthroid         VTE prophylaxis:    enoxaparin ppx      I have performed a physical exam and reviewed and updated ROS and Plan today (6/28/2024). In review of yesterday's note (6/27/2024), there are no changes except as documented above.

## 2024-06-28 NOTE — DISCHARGE PLANNING
HTH/SCP TCN chart review completed. Collaborated with GHAZALA Wu prior to meeting with the pt. The most current review of medical record, knowledge of pt's PLOF and social support, LACE+ score of 69, 6 clicks scores of 24 mobility were considered.      Pt seen at bedside. Introduced TCN program. Provided education regarding post acute levels of care. Education provided regarding case management policy for blanket SNF referrals. Discussed HTH/SCP plan benefits. Pt verbalizes understanding.     Pt reports she is anticipating she will be functionally capable of dc to home once medically cleared. Note that per pt report and chart review, pt remains ambulatory with no AD. Pt reports she will have transport at time of medical clearance for home as well. She reports she has a FWW as well if indicated. She reports if indicated, she would be agreeable to HH services and has had [facility] in past.     No additional provider requests at this time. Choice proactively obtained for HH, faxed to [facility] and CM aware.  In collaboration with CM, current discharge considerations are noted to be dc to home with outpatient v HH services if indicated by providers. TCN will continue to follow and collaborate with discharge planning team as additional post acute needs arise. Thank you.     Completed today:  Choice obtained: HH (1) [facility] 2)[facility])   SCP with [facility] PCP. discussed possible outpatient transitional PCP follow up if indicated and pt in agreement; sent information to assist in scheduling

## 2024-06-28 NOTE — ED NOTES
Snacks provided to pt.  Family has remained at bedside for support.  Pt. Continues to deny any needs.  No distress noted.  Call light remains in reach.

## 2024-06-28 NOTE — CARE PLAN
Problem: Hyperinflation  Goal: Prevent or improve atelectasis  Description: Target End Date:  3 to 4 days    1. Instruct incentive spirometry usage  2.  Perform hyperinflation therapy as indicated  Outcome: Progressing    PEP - QID (pneumonia)

## 2024-06-28 NOTE — DISCHARGE PLANNING
Received choice form @: [id number]  Agency/Facility name: [facility]  Sent referral per choice form @: No referral sent pending orders.

## 2024-06-29 LAB
ANION GAP SERPL CALC-SCNC: 14 MMOL/L (ref 7–16)
BACTERIA BLD CULT: NORMAL
BACTERIA BLD CULT: NORMAL
BUN SERPL-MCNC: 24 MG/DL (ref 8–22)
CALCIUM SERPL-MCNC: 8 MG/DL (ref 8.5–10.5)
CHLORIDE SERPL-SCNC: 101 MMOL/L (ref 96–112)
CO2 SERPL-SCNC: 17 MMOL/L (ref 20–33)
CREAT SERPL-MCNC: 1.01 MG/DL (ref 0.5–1.4)
ERYTHROCYTE [DISTWIDTH] IN BLOOD BY AUTOMATED COUNT: 57.8 FL (ref 35.9–50)
GFR SERPLBLD CREATININE-BSD FMLA CKD-EPI: 55 ML/MIN/1.73 M 2
GLUCOSE SERPL-MCNC: 88 MG/DL (ref 65–99)
HCT VFR BLD AUTO: 30 % (ref 37–47)
HGB BLD-MCNC: 9.5 G/DL (ref 12–16)
MCH RBC QN AUTO: 30 PG (ref 27–33)
MCHC RBC AUTO-ENTMCNC: 31.7 G/DL (ref 32.2–35.5)
MCV RBC AUTO: 94.6 FL (ref 81.4–97.8)
NT-PROBNP SERPL IA-MCNC: 9584 PG/ML (ref 0–125)
PLATELET # BLD AUTO: 240 K/UL (ref 164–446)
PMV BLD AUTO: 11.6 FL (ref 9–12.9)
POTASSIUM SERPL-SCNC: 4 MMOL/L (ref 3.6–5.5)
RBC # BLD AUTO: 3.17 M/UL (ref 4.2–5.4)
SIGNIFICANT IND 70042: NORMAL
SIGNIFICANT IND 70042: NORMAL
SITE SITE: NORMAL
SITE SITE: NORMAL
SODIUM SERPL-SCNC: 132 MMOL/L (ref 135–145)
SOURCE SOURCE: NORMAL
SOURCE SOURCE: NORMAL
WBC # BLD AUTO: 15.8 K/UL (ref 4.8–10.8)

## 2024-06-29 PROCEDURE — 770006 HCHG ROOM/CARE - MED/SURG/GYN SEMI*

## 2024-06-29 PROCEDURE — 700102 HCHG RX REV CODE 250 W/ 637 OVERRIDE(OP)

## 2024-06-29 PROCEDURE — 36415 COLL VENOUS BLD VENIPUNCTURE: CPT

## 2024-06-29 PROCEDURE — 700105 HCHG RX REV CODE 258: Performed by: FAMILY MEDICINE

## 2024-06-29 PROCEDURE — 85027 COMPLETE CBC AUTOMATED: CPT

## 2024-06-29 PROCEDURE — 80048 BASIC METABOLIC PNL TOTAL CA: CPT

## 2024-06-29 PROCEDURE — 94669 MECHANICAL CHEST WALL OSCILL: CPT

## 2024-06-29 PROCEDURE — 700111 HCHG RX REV CODE 636 W/ 250 OVERRIDE (IP): Mod: JZ | Performed by: FAMILY MEDICINE

## 2024-06-29 PROCEDURE — 83880 ASSAY OF NATRIURETIC PEPTIDE: CPT

## 2024-06-29 PROCEDURE — 94760 N-INVAS EAR/PLS OXIMETRY 1: CPT

## 2024-06-29 PROCEDURE — 99232 SBSQ HOSP IP/OBS MODERATE 35: CPT | Performed by: FAMILY MEDICINE

## 2024-06-29 PROCEDURE — A9270 NON-COVERED ITEM OR SERVICE: HCPCS

## 2024-06-29 PROCEDURE — 700111 HCHG RX REV CODE 636 W/ 250 OVERRIDE (IP): Mod: JZ

## 2024-06-29 RX ADMIN — LEVOTHYROXINE SODIUM 50 MCG: 0.05 TABLET ORAL at 04:16

## 2024-06-29 RX ADMIN — AMPICILLIN AND SULBACTAM 3 G: 1; 2 INJECTION, POWDER, FOR SOLUTION INTRAMUSCULAR; INTRAVENOUS at 12:19

## 2024-06-29 RX ADMIN — ACETAMINOPHEN 650 MG: 325 TABLET ORAL at 04:16

## 2024-06-29 RX ADMIN — AMPICILLIN AND SULBACTAM 3 G: 1; 2 INJECTION, POWDER, FOR SOLUTION INTRAMUSCULAR; INTRAVENOUS at 17:42

## 2024-06-29 RX ADMIN — AMPICILLIN AND SULBACTAM 3 G: 1; 2 INJECTION, POWDER, FOR SOLUTION INTRAMUSCULAR; INTRAVENOUS at 04:19

## 2024-06-29 RX ADMIN — LOSARTAN POTASSIUM 25 MG: 25 TABLET, FILM COATED ORAL at 04:16

## 2024-06-29 RX ADMIN — METOPROLOL TARTRATE 50 MG: 50 TABLET, FILM COATED ORAL at 04:16

## 2024-06-29 RX ADMIN — ONDANSETRON 4 MG: 2 INJECTION INTRAMUSCULAR; INTRAVENOUS at 20:24

## 2024-06-29 RX ADMIN — METOPROLOL TARTRATE 50 MG: 50 TABLET, FILM COATED ORAL at 17:43

## 2024-06-29 RX ADMIN — SIMVASTATIN 10 MG: 20 TABLET, FILM COATED ORAL at 17:43

## 2024-06-29 RX ADMIN — ASPIRIN 81 MG: 81 TABLET, COATED ORAL at 04:16

## 2024-06-29 RX ADMIN — FUROSEMIDE 20 MG: 20 TABLET ORAL at 04:16

## 2024-06-29 RX ADMIN — ENOXAPARIN SODIUM 40 MG: 100 INJECTION SUBCUTANEOUS at 17:42

## 2024-06-29 RX ADMIN — OXYCODONE 5 MG: 5 TABLET ORAL at 20:31

## 2024-06-29 ASSESSMENT — ENCOUNTER SYMPTOMS
NECK PAIN: 0
DIARRHEA: 0
DIAPHORESIS: 0
VOMITING: 0
PALPITATIONS: 0
NAUSEA: 0
MYALGIAS: 0
HEARTBURN: 0
ABDOMINAL PAIN: 0
FEVER: 0
WEAKNESS: 1
SPEECH CHANGE: 0
SORE THROAT: 0
BLURRED VISION: 0
WHEEZING: 0
DIZZINESS: 0
HEADACHES: 0
CHILLS: 0
FLANK PAIN: 0
SENSORY CHANGE: 0
BACK PAIN: 0
SHORTNESS OF BREATH: 0
NERVOUS/ANXIOUS: 0
COUGH: 1
FOCAL WEAKNESS: 0

## 2024-06-29 ASSESSMENT — PATIENT HEALTH QUESTIONNAIRE - PHQ9
4. FEELING TIRED OR HAVING LITTLE ENERGY: SEVERAL DAYS
8. MOVING OR SPEAKING SO SLOWLY THAT OTHER PEOPLE COULD HAVE NOTICED. OR THE OPPOSITE, BEING SO FIGETY OR RESTLESS THAT YOU HAVE BEEN MOVING AROUND A LOT MORE THAN USUAL: NOT AT ALL
2. FEELING DOWN, DEPRESSED, IRRITABLE, OR HOPELESS: NOT AT ALL
1. LITTLE INTEREST OR PLEASURE IN DOING THINGS: NOT AT ALL
9. THOUGHTS THAT YOU WOULD BE BETTER OFF DEAD, OR OF HURTING YOURSELF: NOT AT ALL
6. FEELING BAD ABOUT YOURSELF - OR THAT YOU ARE A FAILURE OR HAVE LET YOURSELF OR YOUR FAMILY DOWN: NOT AL ALL
SUM OF ALL RESPONSES TO PHQ9 QUESTIONS 1 AND 2: 0
SUM OF ALL RESPONSES TO PHQ QUESTIONS 1-9: 4
7. TROUBLE CONCENTRATING ON THINGS, SUCH AS READING THE NEWSPAPER OR WATCHING TELEVISION: NOT AT ALL
5. POOR APPETITE OR OVEREATING: MORE THAN HALF THE DAYS
3. TROUBLE FALLING OR STAYING ASLEEP OR SLEEPING TOO MUCH: SEVERAL DAYS

## 2024-06-29 ASSESSMENT — PAIN DESCRIPTION - PAIN TYPE
TYPE: ACUTE PAIN
TYPE: ACUTE PAIN

## 2024-06-29 ASSESSMENT — FIBROSIS 4 INDEX: FIB4 SCORE: 0.97

## 2024-06-29 NOTE — CARE PLAN
The patient is Watcher - Medium risk of patient condition declining or worsening    Shift Goals  Clinical Goals: Pt will maintain or improve skin integrity by the end of shift  Patient Goals: DC home with daughter  Family Goals: ISI, not at bedside    Progress made toward(s) clinical / shift goals:  Pt with encouragement of positioning changes from RN/CNA throughout shift. MD states possible DC tomorrow home with daughter, daughter and pt notified.     Patient is not progressing towards the following goals:

## 2024-06-29 NOTE — CARE PLAN
The patient is Stable - Low risk of patient condition declining or worsening    Shift Goals  Clinical Goals: Mobilize the patient  Patient Goals: rest,sleep  Family Goals: ISI    Progress made toward(s) clinical / shift goals:  Patient is alert and oriented, needs met at this time. Patient agreeable to strip bed alarm.  Fall precautions in place. Hourly rounding in place. Call light within reach.    Patient is not progressing towards the following goals:

## 2024-06-29 NOTE — PROGRESS NOTES
Hospital Medicine Daily Progress Note    Date of Service  6/29/2024    Chief Complaint  Mariely Easley is a 84 y.o. female admitted 6/27/2024 with generalized weakness    Hospital Course  Admitted with generalized weakness, nausea, cough and congestion.  Workup showed bilateral lower lobe infiltrates.  She was started empiric coverage with IV Unasyn.    Interval Problem Update  Pneumonia - feels better, wbc 08244  HTN - sbp 104-130  Hyponatremia - 132    Updates given and plan of care discussed with patient's daughter.    I have discussed this patient's plan of care and discharge plan at IDT rounds today with Case Management, Nursing, Nursing leadership, and other members of the IDT team.    Consultants/Specialty  None    Code Status  Full Code    Disposition  The patient is not medically cleared for discharge to home or a post-acute facility.  Anticipate discharge to: home with close outpatient follow-up    I have placed the appropriate orders for post-discharge needs.    Review of Systems  Review of Systems   Constitutional:  Positive for malaise/fatigue. Negative for chills, diaphoresis and fever.   HENT:  Negative for congestion, hearing loss and sore throat.    Eyes:  Negative for blurred vision.   Respiratory:  Positive for cough. Negative for shortness of breath and wheezing.    Cardiovascular:  Negative for chest pain, palpitations and leg swelling.   Gastrointestinal:  Negative for abdominal pain, diarrhea, heartburn, nausea and vomiting.   Genitourinary:  Negative for dysuria, flank pain and hematuria.   Musculoskeletal:  Negative for back pain, joint pain, myalgias and neck pain.   Skin:  Negative for rash.   Neurological:  Positive for weakness. Negative for dizziness, sensory change, speech change, focal weakness and headaches.   Psychiatric/Behavioral:  The patient is not nervous/anxious.         Physical Exam  Temp:  [36.3 °C (97.4 °F)-36.9 °C (98.4 °F)] 36.3 °C (97.4 °F)  Pulse:  [74-99]  74  Resp:  [16-18] 16  BP: (104-130)/(44-55) 104/51  SpO2:  [93 %-98 %] 94 %    Physical Exam  Vitals and nursing note reviewed.   HENT:      Head: Normocephalic and atraumatic.      Nose: No congestion.      Mouth/Throat:      Mouth: Mucous membranes are moist.   Eyes:      Extraocular Movements: Extraocular movements intact.      Conjunctiva/sclera: Conjunctivae normal.   Cardiovascular:      Rate and Rhythm: Normal rate and regular rhythm.      Heart sounds: Murmur heard.   Pulmonary:      Effort: Pulmonary effort is normal.      Breath sounds: Rales present.   Abdominal:      General: There is no distension.      Tenderness: There is no abdominal tenderness. There is no guarding or rebound.   Musculoskeletal:      Cervical back: No tenderness.      Right lower leg: No edema.      Left lower leg: No edema.   Skin:     General: Skin is warm and dry.   Neurological:      General: No focal deficit present.      Mental Status: She is alert and oriented to person, place, and time.      Cranial Nerves: No cranial nerve deficit.         Fluids    Intake/Output Summary (Last 24 hours) at 6/29/2024 1321  Last data filed at 6/28/2024 1330  Gross per 24 hour   Intake 120 ml   Output --   Net 120 ml       Laboratory  Recent Labs     06/27/24  1213 06/28/24  0449 06/29/24  0058   WBC 28.2* 19.9* 15.8*   RBC 3.85* 3.33* 3.17*   HEMOGLOBIN 11.7* 9.8* 9.5*   HEMATOCRIT 37.4 32.4* 30.0*   MCV 97.1 97.3 94.6   MCH 30.4 29.4 30.0   MCHC 31.3* 30.2* 31.7*   RDW 58.8* 59.7* 57.8*   PLATELETCT 342 216 240   MPV 11.4 11.8 11.6     Recent Labs     06/27/24  1213 06/28/24  0449 06/29/24  0058   SODIUM 130* 129* 132*   POTASSIUM 5.3 4.6 4.0   CHLORIDE 97 98 101   CO2 17* 15* 17*   GLUCOSE 108* 77 88   BUN 16 18 24*   CREATININE 1.21 0.97 1.01   CALCIUM 8.8 8.2* 8.0*                   Imaging  CT-ABDOMEN-PELVIS WITH   Final Result      1.  No acute inflammation in the abdomen or pelvis.   2.  Chronic interstitial fibrotic changes with  superimposed interstitial infiltrates and/or edema   3.  Small, right greater than left pleural effusions.   4.  Mild intrahepatic biliary duct dilatation, similar.   5.  Splenomegaly.   6.  2.5 x 1.4 cm left adnexal cyst. Consider nonemergent ultrasound for further evaluation.   7.  Distal colonic diverticulosis.   8.  Severe atherosclerotic changes.      DX-CHEST-PORTABLE (1 VIEW)   Final Result      1.  Hazy interstitial infiltrates and/or edema, greater on the right.           Assessment/Plan  * Community acquired pneumonia- (present on admission)  Assessment & Plan  IV Unasyn    History of aortic valve replacement with bioprosthetic valve- (present on admission)  Assessment & Plan  Monitor volume status    Chronic abdominal pain- (present on admission)  Assessment & Plan  Scheduled follow up with Gastroenterology for EGD and EUS on 7/11/2024    Stage 3a chronic kidney disease- (present on admission)  Assessment & Plan  Follow bmp    Bilateral edema of lower extremity- (present on admission)  Assessment & Plan  Lasix    Hyponatremia- (present on admission)  Assessment & Plan  Follow bmp  Free water restriction    Essential hypertension- (present on admission)  Assessment & Plan  Metoprolol, Losartan    Mixed hyperlipidemia- (present on admission)  Assessment & Plan  Zocor    Acquired hypothyroidism- (present on admission)  Assessment & Plan  Synthroid         VTE prophylaxis:    enoxaparin ppx      I have performed a physical exam and reviewed and updated ROS and Plan today (6/29/2024). In review of yesterday's note (6/28/2024), there are no changes except as documented above.

## 2024-06-29 NOTE — DIETARY
"Nutrition services: Day 1 of admit.  Mariely Easley is a 84 y.o. female with admitting DX of  Flu-like symptom     Consult received for unexplained weight loss and MST of 2 on nutrition screen w/ report of 2-13 lb wt loss x 1 month and poor PO PTA.      Assessment:  Height: 157.5 cm (5' 2.01\")  Weight: 54 kg (119 lb)  Body mass index is 21.76 kg/m²., BMI classification: underweight based on pt's age  Diet/Intake: regular w/ free water restriction    PO 25-50% x 2 meals    Evaluation:   DX includes pneumonia, stage 3 CKD, BLE edema, and hyponatremia  Labs: 6/29/24: sodium=132, Bun=24, creatinine=1.01  Meds: Lasix  Pt reports significant 10 lb (7.8%)  wt loss x 1 month do to less intake due to poor appetite. Pt said that she would eat a few bites and the food tasted blah. PO intake most likely 1/2 of normal. PO intake continues to be poor here, also.  She did not drink supplements PTA. She is willing to drink strawberry Ensure with meals here    Malnutrition Risk: pt meets ASPEN criteria for severe malnutrition in the context of acute illness r/t poor PO intake due to current illness AEB 7.8% wt loss x 1 month and PO 50% of normal x 1 month    Recommendations/Plan:  Add Ensure supplement TID   Encourage intake of >50%  Document intake of all meals  as % taken in ADL's to provide interdisciplinary communication across all shifts.   Monitor weight.  Nutrition rep will continue to see patient for ongoing meal and snack preferences.       RD will follow.  "

## 2024-06-30 ENCOUNTER — PHARMACY VISIT (OUTPATIENT)
Dept: PHARMACY | Facility: MEDICAL CENTER | Age: 84
End: 2024-06-30
Payer: COMMERCIAL

## 2024-06-30 VITALS
TEMPERATURE: 98.2 F | HEIGHT: 62 IN | RESPIRATION RATE: 20 BRPM | SYSTOLIC BLOOD PRESSURE: 143 MMHG | HEART RATE: 100 BPM | DIASTOLIC BLOOD PRESSURE: 79 MMHG | BODY MASS INDEX: 21.91 KG/M2 | WEIGHT: 119.05 LBS | OXYGEN SATURATION: 96 %

## 2024-06-30 LAB
ANION GAP SERPL CALC-SCNC: 16 MMOL/L (ref 7–16)
BUN SERPL-MCNC: 29 MG/DL (ref 8–22)
CALCIUM SERPL-MCNC: 8 MG/DL (ref 8.5–10.5)
CHLORIDE SERPL-SCNC: 104 MMOL/L (ref 96–112)
CO2 SERPL-SCNC: 16 MMOL/L (ref 20–33)
CREAT SERPL-MCNC: 1.26 MG/DL (ref 0.5–1.4)
ERYTHROCYTE [DISTWIDTH] IN BLOOD BY AUTOMATED COUNT: 58.3 FL (ref 35.9–50)
GFR SERPLBLD CREATININE-BSD FMLA CKD-EPI: 43 ML/MIN/1.73 M 2
GLUCOSE SERPL-MCNC: 91 MG/DL (ref 65–99)
HCT VFR BLD AUTO: 30.1 % (ref 37–47)
HGB BLD-MCNC: 9.3 G/DL (ref 12–16)
MCH RBC QN AUTO: 29.6 PG (ref 27–33)
MCHC RBC AUTO-ENTMCNC: 30.9 G/DL (ref 32.2–35.5)
MCV RBC AUTO: 95.9 FL (ref 81.4–97.8)
NT-PROBNP SERPL IA-MCNC: 9062 PG/ML (ref 0–125)
PLATELET # BLD AUTO: 231 K/UL (ref 164–446)
PMV BLD AUTO: 11.4 FL (ref 9–12.9)
POTASSIUM SERPL-SCNC: 3.7 MMOL/L (ref 3.6–5.5)
RBC # BLD AUTO: 3.14 M/UL (ref 4.2–5.4)
SODIUM SERPL-SCNC: 136 MMOL/L (ref 135–145)
WBC # BLD AUTO: 12.5 K/UL (ref 4.8–10.8)

## 2024-06-30 PROCEDURE — 700105 HCHG RX REV CODE 258: Performed by: FAMILY MEDICINE

## 2024-06-30 PROCEDURE — 94669 MECHANICAL CHEST WALL OSCILL: CPT

## 2024-06-30 PROCEDURE — 700102 HCHG RX REV CODE 250 W/ 637 OVERRIDE(OP)

## 2024-06-30 PROCEDURE — A9270 NON-COVERED ITEM OR SERVICE: HCPCS

## 2024-06-30 PROCEDURE — 85027 COMPLETE CBC AUTOMATED: CPT

## 2024-06-30 PROCEDURE — 99239 HOSP IP/OBS DSCHRG MGMT >30: CPT | Performed by: FAMILY MEDICINE

## 2024-06-30 PROCEDURE — 36415 COLL VENOUS BLD VENIPUNCTURE: CPT

## 2024-06-30 PROCEDURE — 80048 BASIC METABOLIC PNL TOTAL CA: CPT

## 2024-06-30 PROCEDURE — 94760 N-INVAS EAR/PLS OXIMETRY 1: CPT

## 2024-06-30 PROCEDURE — RXMED WILLOW AMBULATORY MEDICATION CHARGE: Performed by: FAMILY MEDICINE

## 2024-06-30 PROCEDURE — 700111 HCHG RX REV CODE 636 W/ 250 OVERRIDE (IP): Mod: JZ | Performed by: FAMILY MEDICINE

## 2024-06-30 PROCEDURE — 83880 ASSAY OF NATRIURETIC PEPTIDE: CPT

## 2024-06-30 RX ORDER — AMOXICILLIN AND CLAVULANATE POTASSIUM 875; 125 MG/1; MG/1
1 TABLET, FILM COATED ORAL 2 TIMES DAILY
Qty: 10 TABLET | Refills: 0 | Status: ACTIVE | OUTPATIENT
Start: 2024-06-30 | End: 2024-07-05

## 2024-06-30 RX ADMIN — ASPIRIN 81 MG: 81 TABLET, COATED ORAL at 05:29

## 2024-06-30 RX ADMIN — LEVOTHYROXINE SODIUM 50 MCG: 0.05 TABLET ORAL at 05:29

## 2024-06-30 RX ADMIN — AMPICILLIN AND SULBACTAM 3 G: 1; 2 INJECTION, POWDER, FOR SOLUTION INTRAMUSCULAR; INTRAVENOUS at 00:19

## 2024-06-30 RX ADMIN — AMPICILLIN AND SULBACTAM 3 G: 1; 2 INJECTION, POWDER, FOR SOLUTION INTRAMUSCULAR; INTRAVENOUS at 05:32

## 2024-06-30 ASSESSMENT — PATIENT HEALTH QUESTIONNAIRE - PHQ9
9. THOUGHTS THAT YOU WOULD BE BETTER OFF DEAD, OR OF HURTING YOURSELF: NOT AT ALL
3. TROUBLE FALLING OR STAYING ASLEEP OR SLEEPING TOO MUCH: SEVERAL DAYS
2. FEELING DOWN, DEPRESSED, IRRITABLE, OR HOPELESS: NOT AT ALL
4. FEELING TIRED OR HAVING LITTLE ENERGY: SEVERAL DAYS
8. MOVING OR SPEAKING SO SLOWLY THAT OTHER PEOPLE COULD HAVE NOTICED. OR THE OPPOSITE, BEING SO FIGETY OR RESTLESS THAT YOU HAVE BEEN MOVING AROUND A LOT MORE THAN USUAL: NOT AT ALL
6. FEELING BAD ABOUT YOURSELF - OR THAT YOU ARE A FAILURE OR HAVE LET YOURSELF OR YOUR FAMILY DOWN: NOT AL ALL
1. LITTLE INTEREST OR PLEASURE IN DOING THINGS: NOT AT ALL
SUM OF ALL RESPONSES TO PHQ QUESTIONS 1-9: 4
5. POOR APPETITE OR OVEREATING: MORE THAN HALF THE DAYS
SUM OF ALL RESPONSES TO PHQ9 QUESTIONS 1 AND 2: 0
7. TROUBLE CONCENTRATING ON THINGS, SUCH AS READING THE NEWSPAPER OR WATCHING TELEVISION: NOT AT ALL

## 2024-06-30 NOTE — DISCHARGE PLANNING
Pt is anticipated to be medically cleared for discharge, pt was given IMM. Per Mariely she has family to provide ride home today. This RN CM to assist with any barriers to discharge home today.

## 2024-06-30 NOTE — FACE TO FACE
Face to Face Supporting Documentation - Home Health    The encounter with this patient was in whole or in part the primary reason for home health admission.    Date of encounter:   Patient:                    MRN:                       YOB: 2022  Mariely Easley  [id number]  1940     Home health to see patient for:  Skilled Nursing care for assessment, interventions & education, Physical Therapy evaluation and treatment, and Occupational therapy evaluation and treatment    Skilled need for:  New Onset Medical Diagnosis Pneumonia    Skilled nursing interventions to include:  Comment: PT/OT    Homebound status evidenced by:  Needs the assistance of another person in order to leave the home. Leaving home requires a considerable and taxing effort. There is a normal inability to leave the home.    Community Physician to provide follow up care: Luz Vieira D.O.     Optional Interventions? No      I certify the face to face encounter for this home health care referral meets the CMS requirements and the encounter/clinical assessment with the patient was, in whole, or in part, for the medical condition(s) listed above, which is the primary reason for home health care. Based on my clinical findings: the service(s) are medically necessary, support the need for home health care, and the homebound criteria are met.  I certify that this patient has had a face to face encounter by myself.  Lyndon Fuentes M.D. - NPI: [id number]

## 2024-06-30 NOTE — DISCHARGE SUMMARY
Discharge Summary    CHIEF COMPLAINT ON ADMISSION  Chief Complaint   Patient presents with    Flu Like Symptoms     Congestion, weakness, poor appetite, chills, cough, nausea.     Abdominal Pain     X months       Reason for Admission  Flu like Symptoms     Admission Date  6/27/2024    CODE STATUS  Prior    HPI & HOSPITAL COURSE  This is a 84 y.o. female here with  generalized weakness, nausea, cough and congestion. Workup showed bilateral lower lobe infiltrates. She was started on empiric coverage with IV Unasyn.  Her leukocytosis trended down, her symptoms improved.  Patient also with known history of chronic abdominal pain, already has scheduled follow-up with gastroenterology on 7/11/2024 for an EGD and EUS.      Therefore, she is discharged in good and stable condition to home with organized home healthcare and close outpatient follow-up.    The patient met 2-midnight criteria for an inpatient stay at the time of discharge.    Discharge Date  6/30/2024    FOLLOW UP ITEMS POST DISCHARGE  Follow up as below    DISCHARGE DIAGNOSES  Principal Problem:    Community acquired pneumonia (POA: Yes)  Active Problems:    Essential hypertension (POA: Yes)    Hyponatremia (POA: Yes)    Bilateral edema of lower extremity (POA: Yes)    Stage 3a chronic kidney disease (POA: Yes)    Chronic abdominal pain (POA: Yes)    History of aortic valve replacement with bioprosthetic valve (POA: Yes)    Acquired hypothyroidism (POA: Yes)    Mixed hyperlipidemia (POA: Yes)  Resolved Problems:    * No resolved hospital problems. *      FOLLOW UP  Future Appointments   Date Time Provider Department Center   7/5/2024  2:00 PM Luz Vieira D.O. [facility]   8/9/2024 11:20 AM Luz Vieira D.O. [facility]     Luz Vieira D.O.  [address]  [place] [id number] [phone]    Follow up      Swapnil Chandler M.D.  [address] [place] [address]  [phone]    Follow up        MEDICATIONS ON  DISCHARGE     Medication List        START taking these medications        Instructions   amoxicillin-clavulanate 875-125 MG Tabs  Commonly known as: Augmentin   Take 1 Tablet by mouth 2 times a day for 5 days.  Dose: 1 Tablet            CONTINUE taking these medications        Instructions   acetaminophen 500 MG Tabs  Commonly known as: Tylenol   Take 1,000 mg by mouth every 6 hours as needed for Mild Pain. 2 tablets = 1,000 mg.  Indications: Fever, Pain  Dose: 1,000 mg     aspirin 81 MG EC tablet   Take 81 mg by mouth every day. Indications: blood thinner  Dose: 81 mg     cyclobenzaprine 5 mg tablet  Commonly known as: Flexeril   TAKE 1 TABLET BY MOUTH THREE TIMES A DAY AS NEEDED FOR MUSCLE SPASM  Dose: 5 mg     ferrous sulfate 325 (65 Fe) MG tablet   Doctor's comments: DX Code Needed  .  Take 1 Tablet by mouth every day. Indications: Iron Deficiency  Dose: 325 mg     folic acid 1 MG Tabs  Commonly known as: Folvite   TAKE 1 TABLET BY MOUTH EVERY DAY  Dose: 1 mg     furosemide 20 MG Tabs  Commonly known as: Lasix   Take 1 Tablet by mouth every day.  Dose: 20 mg     levothyroxine 50 MCG Tabs  Commonly known as: Synthroid   Take 1 Tab by mouth Every morning on an empty stomach.  Dose: 50 mcg     loperamide 2 MG Caps  Commonly known as: Imodium   Take 2 mg by mouth 4 times a day as needed for Diarrhea.  Dose: 2 mg     losartan 25 MG Tabs  Commonly known as: Cozaar   Take 1 Tablet by mouth every day. Indications: High Blood Pressure Disorder  Dose: 25 mg     metoprolol tartrate 50 MG Tabs  Commonly known as: Lopressor   Take 1 Tablet by mouth 2 times a day. Indications: High Blood Pressure Disorder  Dose: 50 mg     simvastatin 10 MG Tabs  Commonly known as: Zocor   Take 1 Tablet by mouth every evening. Indications: Ischemic Heart Disease  Dose: 10 mg     VITAMIN D PO   Take 1 Tablet by mouth every morning.  Dose: 1 Tablet            STOP taking these medications      nitrofurantoin 100 MG Caps  Commonly known as:  Macrobid              Allergies  No Known Allergies    DIET  No orders of the defined types were placed in this encounter.      ACTIVITY  As tolerated.  Weight bearing as tolerated    CONSULTATIONS  None    PROCEDURES  None    LABORATORY  Lab Results   Component Value Date    SODIUM 136 06/30/2024    POTASSIUM 3.7 06/30/2024    CHLORIDE 104 06/30/2024    CO2 16 (L) 06/30/2024    GLUCOSE 91 06/30/2024    BUN 29 (H) 06/30/2024    CREATININE 1.26 06/30/2024        Lab Results   Component Value Date    WBC 12.5 (H) 06/30/2024    HEMOGLOBIN 9.3 (L) 06/30/2024    HEMATOCRIT 30.1 (L) 06/30/2024    PLATELETCT 231 06/30/2024        Total time of the discharge process exceeds 40 minutes.

## 2024-06-30 NOTE — CARE PLAN
The patient is Watcher - Medium risk of patient condition declining or worsening    Shift Goals  Clinical Goals: Pain mgt. and relieve from nausea  Patient Goals: feel better  Family Goals: none present    Progress made toward(s) clinical / shift goals: Ondansetron 4mg IVP and Oxycodone 5mg tab. PO given (see MAR). Patient reported relieved from pain and nausea 2hrs post meds.    Patient is not progressing towards the following goals: n/a

## 2024-07-01 ENCOUNTER — APPOINTMENT (OUTPATIENT)
Dept: RADIOLOGY | Facility: MEDICAL CENTER | Age: 84
DRG: 064 | End: 2024-07-01
Attending: EMERGENCY MEDICINE
Payer: MEDICARE

## 2024-07-01 ENCOUNTER — APPOINTMENT (OUTPATIENT)
Dept: RADIOLOGY | Facility: MEDICAL CENTER | Age: 84
DRG: 064 | End: 2024-07-01
Payer: MEDICARE

## 2024-07-01 ENCOUNTER — PATIENT OUTREACH (OUTPATIENT)
Dept: MEDICAL GROUP | Facility: LAB | Age: 84
End: 2024-07-01
Payer: MEDICARE

## 2024-07-01 ENCOUNTER — HOME HEALTH ADMISSION (OUTPATIENT)
Dept: HOME HEALTH SERVICES | Facility: HOME HEALTHCARE | Age: 84
End: 2024-07-01
Payer: MEDICARE

## 2024-07-01 ENCOUNTER — HOSPITAL ENCOUNTER (INPATIENT)
Facility: MEDICAL CENTER | Age: 84
LOS: 1 days | DRG: 064 | End: 2024-07-03
Attending: EMERGENCY MEDICINE | Admitting: STUDENT IN AN ORGANIZED HEALTH CARE EDUCATION/TRAINING PROGRAM
Payer: MEDICARE

## 2024-07-01 DIAGNOSIS — R53.1 WEAKNESS: ICD-10-CM

## 2024-07-01 DIAGNOSIS — R47.89 WORD FINDING DIFFICULTY: ICD-10-CM

## 2024-07-01 DIAGNOSIS — E86.0 DEHYDRATION: ICD-10-CM

## 2024-07-01 DIAGNOSIS — I63.9 ACUTE CVA (CEREBROVASCULAR ACCIDENT) (HCC): ICD-10-CM

## 2024-07-01 PROBLEM — D64.9 ANEMIA: Status: ACTIVE | Noted: 2024-07-01

## 2024-07-01 PROBLEM — R53.83 FATIGUE: Status: ACTIVE | Noted: 2024-07-01

## 2024-07-01 PROBLEM — R41.89 COGNITIVE AND BEHAVIORAL CHANGES: Status: ACTIVE | Noted: 2024-07-01

## 2024-07-01 PROBLEM — R46.89 COGNITIVE AND BEHAVIORAL CHANGES: Status: ACTIVE | Noted: 2024-07-01

## 2024-07-01 LAB
ALBUMIN SERPL BCP-MCNC: 3.5 G/DL (ref 3.2–4.9)
ALBUMIN/GLOB SERPL: 1.2 G/DL
ALP SERPL-CCNC: 114 U/L (ref 30–99)
ALT SERPL-CCNC: 8 U/L (ref 2–50)
ANION GAP SERPL CALC-SCNC: 18 MMOL/L (ref 7–16)
AST SERPL-CCNC: 10 U/L (ref 12–45)
BASOPHILS # BLD AUTO: 0.6 % (ref 0–1.8)
BASOPHILS # BLD: 0.08 K/UL (ref 0–0.12)
BILIRUB SERPL-MCNC: 0.3 MG/DL (ref 0.1–1.5)
BUN SERPL-MCNC: 27 MG/DL (ref 8–22)
CALCIUM ALBUM COR SERPL-MCNC: 9.3 MG/DL (ref 8.5–10.5)
CALCIUM SERPL-MCNC: 8.9 MG/DL (ref 8.5–10.5)
CHLORIDE SERPL-SCNC: 100 MMOL/L (ref 96–112)
CK SERPL-CCNC: 12 U/L (ref 0–154)
CO2 SERPL-SCNC: 17 MMOL/L (ref 20–33)
CREAT SERPL-MCNC: 1.23 MG/DL (ref 0.5–1.4)
EKG IMPRESSION: NORMAL
EOSINOPHIL # BLD AUTO: 0.52 K/UL (ref 0–0.51)
EOSINOPHIL NFR BLD: 3.6 % (ref 0–6.9)
ERYTHROCYTE [DISTWIDTH] IN BLOOD BY AUTOMATED COUNT: 58.3 FL (ref 35.9–50)
GFR SERPLBLD CREATININE-BSD FMLA CKD-EPI: 44 ML/MIN/1.73 M 2
GLOBULIN SER CALC-MCNC: 3 G/DL (ref 1.9–3.5)
GLUCOSE SERPL-MCNC: 101 MG/DL (ref 65–99)
HCT VFR BLD AUTO: 35.2 % (ref 37–47)
HGB BLD-MCNC: 10.9 G/DL (ref 12–16)
IMM GRANULOCYTES # BLD AUTO: 0.24 K/UL (ref 0–0.11)
IMM GRANULOCYTES NFR BLD AUTO: 1.7 % (ref 0–0.9)
LACTATE SERPL-SCNC: 1.1 MMOL/L (ref 0.5–2)
LYMPHOCYTES # BLD AUTO: 1.27 K/UL (ref 1–4.8)
LYMPHOCYTES NFR BLD: 8.8 % (ref 22–41)
MAGNESIUM SERPL-MCNC: 1.9 MG/DL (ref 1.5–2.5)
MAGNESIUM SERPL-MCNC: 2 MG/DL (ref 1.5–2.5)
MCH RBC QN AUTO: 29.9 PG (ref 27–33)
MCHC RBC AUTO-ENTMCNC: 31 G/DL (ref 32.2–35.5)
MCV RBC AUTO: 96.7 FL (ref 81.4–97.8)
MONOCYTES # BLD AUTO: 0.59 K/UL (ref 0–0.85)
MONOCYTES NFR BLD AUTO: 4.1 % (ref 0–13.4)
NEUTROPHILS # BLD AUTO: 11.71 K/UL (ref 1.82–7.42)
NEUTROPHILS NFR BLD: 81.2 % (ref 44–72)
NRBC # BLD AUTO: 0 K/UL
NRBC BLD-RTO: 0 /100 WBC (ref 0–0.2)
PHOSPHATE SERPL-MCNC: 3.7 MG/DL (ref 2.5–4.5)
PLATELET # BLD AUTO: 269 K/UL (ref 164–446)
PMV BLD AUTO: 11.7 FL (ref 9–12.9)
POTASSIUM SERPL-SCNC: 4.1 MMOL/L (ref 3.6–5.5)
PROCALCITONIN SERPL-MCNC: 0.53 NG/ML
PROT SERPL-MCNC: 6.5 G/DL (ref 6–8.2)
RBC # BLD AUTO: 3.64 M/UL (ref 4.2–5.4)
SODIUM SERPL-SCNC: 135 MMOL/L (ref 135–145)
TROPONIN T SERPL-MCNC: 23 NG/L (ref 6–19)
TSH SERPL DL<=0.005 MIU/L-ACNC: 2.65 UIU/ML (ref 0.38–5.33)
VIT B12 SERPL-MCNC: 3288 PG/ML (ref 211–911)
WBC # BLD AUTO: 14.4 K/UL (ref 4.8–10.8)

## 2024-07-01 PROCEDURE — A9270 NON-COVERED ITEM OR SERVICE: HCPCS

## 2024-07-01 PROCEDURE — 99285 EMERGENCY DEPT VISIT HI MDM: CPT

## 2024-07-01 PROCEDURE — 700105 HCHG RX REV CODE 258

## 2024-07-01 PROCEDURE — G0378 HOSPITAL OBSERVATION PER HR: HCPCS

## 2024-07-01 PROCEDURE — 93005 ELECTROCARDIOGRAM TRACING: CPT | Performed by: EMERGENCY MEDICINE

## 2024-07-01 PROCEDURE — 96372 THER/PROPH/DIAG INJ SC/IM: CPT

## 2024-07-01 PROCEDURE — 82607 VITAMIN B-12: CPT

## 2024-07-01 PROCEDURE — 71045 X-RAY EXAM CHEST 1 VIEW: CPT

## 2024-07-01 PROCEDURE — 80053 COMPREHEN METABOLIC PANEL: CPT

## 2024-07-01 PROCEDURE — 93005 ELECTROCARDIOGRAM TRACING: CPT

## 2024-07-01 PROCEDURE — 700102 HCHG RX REV CODE 250 W/ 637 OVERRIDE(OP): Performed by: STUDENT IN AN ORGANIZED HEALTH CARE EDUCATION/TRAINING PROGRAM

## 2024-07-01 PROCEDURE — 85025 COMPLETE CBC W/AUTO DIFF WBC: CPT

## 2024-07-01 PROCEDURE — 82550 ASSAY OF CK (CPK): CPT

## 2024-07-01 PROCEDURE — 700111 HCHG RX REV CODE 636 W/ 250 OVERRIDE (IP): Performed by: STUDENT IN AN ORGANIZED HEALTH CARE EDUCATION/TRAINING PROGRAM

## 2024-07-01 PROCEDURE — 83605 ASSAY OF LACTIC ACID: CPT

## 2024-07-01 PROCEDURE — 36415 COLL VENOUS BLD VENIPUNCTURE: CPT

## 2024-07-01 PROCEDURE — 84100 ASSAY OF PHOSPHORUS: CPT

## 2024-07-01 PROCEDURE — 87040 BLOOD CULTURE FOR BACTERIA: CPT

## 2024-07-01 PROCEDURE — 84484 ASSAY OF TROPONIN QUANT: CPT

## 2024-07-01 PROCEDURE — 84443 ASSAY THYROID STIM HORMONE: CPT

## 2024-07-01 PROCEDURE — 83735 ASSAY OF MAGNESIUM: CPT

## 2024-07-01 PROCEDURE — 700102 HCHG RX REV CODE 250 W/ 637 OVERRIDE(OP)

## 2024-07-01 PROCEDURE — A9270 NON-COVERED ITEM OR SERVICE: HCPCS | Performed by: STUDENT IN AN ORGANIZED HEALTH CARE EDUCATION/TRAINING PROGRAM

## 2024-07-01 PROCEDURE — 99223 1ST HOSP IP/OBS HIGH 75: CPT | Mod: GC | Performed by: STUDENT IN AN ORGANIZED HEALTH CARE EDUCATION/TRAINING PROGRAM

## 2024-07-01 PROCEDURE — 70450 CT HEAD/BRAIN W/O DYE: CPT

## 2024-07-01 PROCEDURE — 84145 PROCALCITONIN (PCT): CPT

## 2024-07-01 RX ORDER — POLYETHYLENE GLYCOL 3350 17 G/17G
1 POWDER, FOR SOLUTION ORAL
Status: DISCONTINUED | OUTPATIENT
Start: 2024-07-01 | End: 2024-07-03 | Stop reason: HOSPADM

## 2024-07-01 RX ORDER — LOSARTAN POTASSIUM 50 MG/1
25 TABLET ORAL DAILY
Status: DISCONTINUED | OUTPATIENT
Start: 2024-07-02 | End: 2024-07-03 | Stop reason: HOSPADM

## 2024-07-01 RX ORDER — UREA 10 %
1000 LOTION (ML) TOPICAL DAILY
Status: DISCONTINUED | OUTPATIENT
Start: 2024-07-02 | End: 2024-07-03 | Stop reason: HOSPADM

## 2024-07-01 RX ORDER — AMOXICILLIN 250 MG
2 CAPSULE ORAL EVERY EVENING
Status: DISCONTINUED | OUTPATIENT
Start: 2024-07-01 | End: 2024-07-03 | Stop reason: HOSPADM

## 2024-07-01 RX ORDER — FUROSEMIDE 20 MG/1
20 TABLET ORAL DAILY
Status: DISCONTINUED | OUTPATIENT
Start: 2024-07-02 | End: 2024-07-02

## 2024-07-01 RX ORDER — FOLIC ACID 1 MG/1
1 TABLET ORAL DAILY
Status: DISCONTINUED | OUTPATIENT
Start: 2024-07-02 | End: 2024-07-03 | Stop reason: HOSPADM

## 2024-07-01 RX ORDER — AMOXICILLIN AND CLAVULANATE POTASSIUM 875; 125 MG/1; MG/1
1 TABLET, FILM COATED ORAL EVERY 12 HOURS
Status: DISCONTINUED | OUTPATIENT
Start: 2024-07-01 | End: 2024-07-02

## 2024-07-01 RX ORDER — GAUZE BANDAGE 2" X 2"
100 BANDAGE TOPICAL DAILY
Status: DISCONTINUED | OUTPATIENT
Start: 2024-07-02 | End: 2024-07-03 | Stop reason: HOSPADM

## 2024-07-01 RX ORDER — HEPARIN SODIUM 5000 [USP'U]/ML
5000 INJECTION, SOLUTION INTRAVENOUS; SUBCUTANEOUS EVERY 8 HOURS
Status: DISCONTINUED | OUTPATIENT
Start: 2024-07-01 | End: 2024-07-03 | Stop reason: HOSPADM

## 2024-07-01 RX ORDER — LEVOTHYROXINE SODIUM 0.05 MG/1
50 TABLET ORAL
Status: DISCONTINUED | OUTPATIENT
Start: 2024-07-02 | End: 2024-07-03 | Stop reason: HOSPADM

## 2024-07-01 RX ORDER — SIMVASTATIN 20 MG
10 TABLET ORAL EVERY EVENING
Status: DISCONTINUED | OUTPATIENT
Start: 2024-07-01 | End: 2024-07-03 | Stop reason: HOSPADM

## 2024-07-01 RX ORDER — ACETAMINOPHEN 325 MG/1
650 TABLET ORAL EVERY 6 HOURS PRN
Status: DISCONTINUED | OUTPATIENT
Start: 2024-07-01 | End: 2024-07-03 | Stop reason: HOSPADM

## 2024-07-01 RX ORDER — LORAZEPAM 2 MG/ML
0.5 INJECTION INTRAMUSCULAR ONCE
Status: COMPLETED | OUTPATIENT
Start: 2024-07-01 | End: 2024-07-02

## 2024-07-01 RX ORDER — SODIUM CHLORIDE, SODIUM LACTATE, POTASSIUM CHLORIDE, AND CALCIUM CHLORIDE .6; .31; .03; .02 G/100ML; G/100ML; G/100ML; G/100ML
500 INJECTION, SOLUTION INTRAVENOUS ONCE
Status: COMPLETED | OUTPATIENT
Start: 2024-07-01 | End: 2024-07-01

## 2024-07-01 RX ORDER — HYDRALAZINE HYDROCHLORIDE 20 MG/ML
10 INJECTION INTRAMUSCULAR; INTRAVENOUS EVERY 4 HOURS PRN
Status: DISCONTINUED | OUTPATIENT
Start: 2024-07-01 | End: 2024-07-03 | Stop reason: HOSPADM

## 2024-07-01 RX ADMIN — SENNOSIDES AND DOCUSATE SODIUM 2 TABLET: 50; 8.6 TABLET ORAL at 21:29

## 2024-07-01 RX ADMIN — AMOXICILLIN AND CLAVULANATE POTASSIUM 1 TABLET: 875; 125 TABLET, FILM COATED ORAL at 21:29

## 2024-07-01 RX ADMIN — SIMVASTATIN 10 MG: 20 TABLET, FILM COATED ORAL at 23:50

## 2024-07-01 RX ADMIN — SODIUM CHLORIDE, POTASSIUM CHLORIDE, SODIUM LACTATE AND CALCIUM CHLORIDE 500 ML: 600; 310; 30; 20 INJECTION, SOLUTION INTRAVENOUS at 23:50

## 2024-07-01 RX ADMIN — METOPROLOL TARTRATE 50 MG: 50 TABLET, FILM COATED ORAL at 21:29

## 2024-07-01 RX ADMIN — HEPARIN SODIUM 5000 UNITS: 5000 INJECTION, SOLUTION INTRAVENOUS; SUBCUTANEOUS at 23:50

## 2024-07-01 ASSESSMENT — LIFESTYLE VARIABLES
AVERAGE NUMBER OF DAYS PER WEEK YOU HAVE A DRINK CONTAINING ALCOHOL: 0
ON A TYPICAL DAY WHEN YOU DRINK ALCOHOL HOW MANY DRINKS DO YOU HAVE: 0
EVER FELT BAD OR GUILTY ABOUT YOUR DRINKING: NO
HAVE PEOPLE ANNOYED YOU BY CRITICIZING YOUR DRINKING: NO
TOTAL SCORE: 0
EVER HAD A DRINK FIRST THING IN THE MORNING TO STEADY YOUR NERVES TO GET RID OF A HANGOVER: NO
HOW MANY TIMES IN THE PAST YEAR HAVE YOU HAD 5 OR MORE DRINKS IN A DAY: 0
HAVE YOU EVER FELT YOU SHOULD CUT DOWN ON YOUR DRINKING: NO
TOTAL SCORE: 0
CONSUMPTION TOTAL: NEGATIVE
ALCOHOL_USE: NO
TOTAL SCORE: 0

## 2024-07-01 ASSESSMENT — COGNITIVE AND FUNCTIONAL STATUS - GENERAL
MOBILITY SCORE: 22
SUGGESTED CMS G CODE MODIFIER DAILY ACTIVITY: CK
DRESSING REGULAR LOWER BODY CLOTHING: A LITTLE
WALKING IN HOSPITAL ROOM: A LITTLE
DRESSING REGULAR UPPER BODY CLOTHING: A LITTLE
CLIMB 3 TO 5 STEPS WITH RAILING: A LITTLE
TOILETING: A LITTLE
DAILY ACTIVITIY SCORE: 19
SUGGESTED CMS G CODE MODIFIER MOBILITY: CJ
HELP NEEDED FOR BATHING: A LITTLE
PERSONAL GROOMING: A LITTLE

## 2024-07-01 ASSESSMENT — PATIENT HEALTH QUESTIONNAIRE - PHQ9
7. TROUBLE CONCENTRATING ON THINGS, SUCH AS READING THE NEWSPAPER OR WATCHING TELEVISION: SEVERAL DAYS
3. TROUBLE FALLING OR STAYING ASLEEP OR SLEEPING TOO MUCH: NOT AT ALL
2. FEELING DOWN, DEPRESSED, IRRITABLE, OR HOPELESS: NOT AT ALL
6. FEELING BAD ABOUT YOURSELF - OR THAT YOU ARE A FAILURE OR HAVE LET YOURSELF OR YOUR FAMILY DOWN: NOT AL ALL
4. FEELING TIRED OR HAVING LITTLE ENERGY: SEVERAL DAYS
8. MOVING OR SPEAKING SO SLOWLY THAT OTHER PEOPLE COULD HAVE NOTICED. OR THE OPPOSITE, BEING SO FIGETY OR RESTLESS THAT YOU HAVE BEEN MOVING AROUND A LOT MORE THAN USUAL: NOT AT ALL
1. LITTLE INTEREST OR PLEASURE IN DOING THINGS: NOT AT ALL
5. POOR APPETITE OR OVEREATING: SEVERAL DAYS
SUM OF ALL RESPONSES TO PHQ QUESTIONS 1-9: 3
9. THOUGHTS THAT YOU WOULD BE BETTER OFF DEAD, OR OF HURTING YOURSELF: NOT AT ALL
SUM OF ALL RESPONSES TO PHQ9 QUESTIONS 1 AND 2: 0

## 2024-07-01 ASSESSMENT — FIBROSIS 4 INDEX
FIB4 SCORE: 1.08
FIB4 SCORE: 1

## 2024-07-01 ASSESSMENT — PAIN DESCRIPTION - PAIN TYPE: TYPE: ACUTE PAIN

## 2024-07-01 ASSESSMENT — SOCIAL DETERMINANTS OF HEALTH (SDOH)
WITHIN THE PAST 12 MONTHS, THE FOOD YOU BOUGHT JUST DIDN'T LAST AND YOU DIDN'T HAVE MONEY TO GET MORE: NEVER TRUE
WITHIN THE PAST 12 MONTHS, YOU WORRIED THAT YOUR FOOD WOULD RUN OUT BEFORE YOU GOT THE MONEY TO BUY MORE: NEVER TRUE
IN THE PAST 12 MONTHS, HAS THE ELECTRIC, GAS, OIL, OR WATER COMPANY THREATENED TO SHUT OFF SERVICE IN YOUR HOME?: NO

## 2024-07-01 ASSESSMENT — ENCOUNTER SYMPTOMS: MEMORY LOSS: 1

## 2024-07-01 NOTE — DISCHARGE PLANNING
ATTN: Case Management  RE: Referral for Home Health    As of 7/1/24, we have accepted the Home Health referral for the patient listed above.    A [facility] clinician will be out to see the patient within 48 hours. If you have any questions or concerns regarding the patient’s transition to Home Health, please do not hesitate to contact us at [phone].      We look forward to collaborating with you,  [facility] Home Health Team

## 2024-07-02 ENCOUNTER — APPOINTMENT (OUTPATIENT)
Dept: RADIOLOGY | Facility: MEDICAL CENTER | Age: 84
DRG: 064 | End: 2024-07-02
Payer: MEDICARE

## 2024-07-02 PROBLEM — E43 SEVERE PROTEIN-CALORIE MALNUTRITION (HCC): Status: ACTIVE | Noted: 2024-07-02

## 2024-07-02 PROBLEM — I63.9 ACUTE CVA (CEREBROVASCULAR ACCIDENT) (HCC): Status: ACTIVE | Noted: 2024-07-02

## 2024-07-02 LAB
ALBUMIN SERPL BCP-MCNC: 3.1 G/DL (ref 3.2–4.9)
ALBUMIN/GLOB SERPL: 1.3 G/DL
ALP SERPL-CCNC: 97 U/L (ref 30–99)
ALT SERPL-CCNC: 8 U/L (ref 2–50)
AMPHET UR QL SCN: NEGATIVE
ANION GAP SERPL CALC-SCNC: 17 MMOL/L (ref 7–16)
AST SERPL-CCNC: 12 U/L (ref 12–45)
BACTERIA BLD CULT: NORMAL
BACTERIA BLD CULT: NORMAL
BARBITURATES UR QL SCN: NEGATIVE
BASOPHILS # BLD AUTO: 0.4 % (ref 0–1.8)
BASOPHILS # BLD: 0.05 K/UL (ref 0–0.12)
BENZODIAZ UR QL SCN: NEGATIVE
BILIRUB SERPL-MCNC: 0.3 MG/DL (ref 0.1–1.5)
BUN SERPL-MCNC: 26 MG/DL (ref 8–22)
BZE UR QL SCN: NEGATIVE
CALCIUM ALBUM COR SERPL-MCNC: 9.2 MG/DL (ref 8.5–10.5)
CALCIUM SERPL-MCNC: 8.5 MG/DL (ref 8.5–10.5)
CANNABINOIDS UR QL SCN: NEGATIVE
CHLORIDE SERPL-SCNC: 100 MMOL/L (ref 96–112)
CHOLEST SERPL-MCNC: 91 MG/DL (ref 100–199)
CO2 SERPL-SCNC: 17 MMOL/L (ref 20–33)
CREAT SERPL-MCNC: 1.05 MG/DL (ref 0.5–1.4)
EOSINOPHIL # BLD AUTO: 0.58 K/UL (ref 0–0.51)
EOSINOPHIL NFR BLD: 4.6 % (ref 0–6.9)
ERYTHROCYTE [DISTWIDTH] IN BLOOD BY AUTOMATED COUNT: 56.6 FL (ref 35.9–50)
FENTANYL UR QL: NEGATIVE
GFR SERPLBLD CREATININE-BSD FMLA CKD-EPI: 53 ML/MIN/1.73 M 2
GLOBULIN SER CALC-MCNC: 2.4 G/DL (ref 1.9–3.5)
GLUCOSE SERPL-MCNC: 102 MG/DL (ref 65–99)
HCT VFR BLD AUTO: 29.3 % (ref 37–47)
HDLC SERPL-MCNC: 38 MG/DL
HGB BLD-MCNC: 9.1 G/DL (ref 12–16)
IMM GRANULOCYTES # BLD AUTO: 0.29 K/UL (ref 0–0.11)
IMM GRANULOCYTES NFR BLD AUTO: 2.3 % (ref 0–0.9)
LDLC SERPL CALC-MCNC: 22 MG/DL
LYMPHOCYTES # BLD AUTO: 1.58 K/UL (ref 1–4.8)
LYMPHOCYTES NFR BLD: 12.7 % (ref 22–41)
MCH RBC QN AUTO: 29.6 PG (ref 27–33)
MCHC RBC AUTO-ENTMCNC: 31.1 G/DL (ref 32.2–35.5)
MCV RBC AUTO: 95.4 FL (ref 81.4–97.8)
METHADONE UR QL SCN: NEGATIVE
MONOCYTES # BLD AUTO: 0.54 K/UL (ref 0–0.85)
MONOCYTES NFR BLD AUTO: 4.3 % (ref 0–13.4)
NEUTROPHILS # BLD AUTO: 9.45 K/UL (ref 1.82–7.42)
NEUTROPHILS NFR BLD: 75.7 % (ref 44–72)
NRBC # BLD AUTO: 0 K/UL
NRBC BLD-RTO: 0 /100 WBC (ref 0–0.2)
OPIATES UR QL SCN: NEGATIVE
OXYCODONE UR QL SCN: POSITIVE
PCP UR QL SCN: NEGATIVE
PLATELET # BLD AUTO: 219 K/UL (ref 164–446)
PMV BLD AUTO: 11.7 FL (ref 9–12.9)
POTASSIUM SERPL-SCNC: 3.3 MMOL/L (ref 3.6–5.5)
PROPOXYPH UR QL SCN: NEGATIVE
PROT SERPL-MCNC: 5.5 G/DL (ref 6–8.2)
RBC # BLD AUTO: 3.07 M/UL (ref 4.2–5.4)
SIGNIFICANT IND 70042: NORMAL
SIGNIFICANT IND 70042: NORMAL
SITE SITE: NORMAL
SITE SITE: NORMAL
SODIUM SERPL-SCNC: 134 MMOL/L (ref 135–145)
SOURCE SOURCE: NORMAL
SOURCE SOURCE: NORMAL
TRIGL SERPL-MCNC: 153 MG/DL (ref 0–149)
WBC # BLD AUTO: 12.5 K/UL (ref 4.8–10.8)

## 2024-07-02 PROCEDURE — 700101 HCHG RX REV CODE 250

## 2024-07-02 PROCEDURE — 770020 HCHG ROOM/CARE - TELE (206)

## 2024-07-02 PROCEDURE — 97165 OT EVAL LOW COMPLEX 30 MIN: CPT

## 2024-07-02 PROCEDURE — 96372 THER/PROPH/DIAG INJ SC/IM: CPT

## 2024-07-02 PROCEDURE — 700105 HCHG RX REV CODE 258

## 2024-07-02 PROCEDURE — 96367 TX/PROPH/DG ADDL SEQ IV INF: CPT

## 2024-07-02 PROCEDURE — 85025 COMPLETE CBC W/AUTO DIFF WBC: CPT

## 2024-07-02 PROCEDURE — 700117 HCHG RX CONTRAST REV CODE 255

## 2024-07-02 PROCEDURE — A9270 NON-COVERED ITEM OR SERVICE: HCPCS | Performed by: INTERNAL MEDICINE

## 2024-07-02 PROCEDURE — A9270 NON-COVERED ITEM OR SERVICE: HCPCS | Performed by: PSYCHIATRY & NEUROLOGY

## 2024-07-02 PROCEDURE — 700111 HCHG RX REV CODE 636 W/ 250 OVERRIDE (IP): Performed by: STUDENT IN AN ORGANIZED HEALTH CARE EDUCATION/TRAINING PROGRAM

## 2024-07-02 PROCEDURE — 96365 THER/PROPH/DIAG IV INF INIT: CPT

## 2024-07-02 PROCEDURE — 80307 DRUG TEST PRSMV CHEM ANLYZR: CPT

## 2024-07-02 PROCEDURE — 80053 COMPREHEN METABOLIC PANEL: CPT

## 2024-07-02 PROCEDURE — 700111 HCHG RX REV CODE 636 W/ 250 OVERRIDE (IP): Mod: JZ

## 2024-07-02 PROCEDURE — A9579 GAD-BASE MR CONTRAST NOS,1ML: HCPCS | Mod: JZ

## 2024-07-02 PROCEDURE — A9270 NON-COVERED ITEM OR SERVICE: HCPCS | Performed by: STUDENT IN AN ORGANIZED HEALTH CARE EDUCATION/TRAINING PROGRAM

## 2024-07-02 PROCEDURE — 700117 HCHG RX CONTRAST REV CODE 255: Mod: JZ

## 2024-07-02 PROCEDURE — 700105 HCHG RX REV CODE 258: Performed by: INTERNAL MEDICINE

## 2024-07-02 PROCEDURE — A9270 NON-COVERED ITEM OR SERVICE: HCPCS

## 2024-07-02 PROCEDURE — 99291 CRITICAL CARE FIRST HOUR: CPT | Performed by: INTERNAL MEDICINE

## 2024-07-02 PROCEDURE — 700102 HCHG RX REV CODE 250 W/ 637 OVERRIDE(OP): Performed by: INTERNAL MEDICINE

## 2024-07-02 PROCEDURE — 700102 HCHG RX REV CODE 250 W/ 637 OVERRIDE(OP): Performed by: STUDENT IN AN ORGANIZED HEALTH CARE EDUCATION/TRAINING PROGRAM

## 2024-07-02 PROCEDURE — 80061 LIPID PANEL: CPT

## 2024-07-02 PROCEDURE — 36415 COLL VENOUS BLD VENIPUNCTURE: CPT

## 2024-07-02 PROCEDURE — 99223 1ST HOSP IP/OBS HIGH 75: CPT

## 2024-07-02 PROCEDURE — 70498 CT ANGIOGRAPHY NECK: CPT

## 2024-07-02 PROCEDURE — 700102 HCHG RX REV CODE 250 W/ 637 OVERRIDE(OP): Performed by: PSYCHIATRY & NEUROLOGY

## 2024-07-02 PROCEDURE — 700102 HCHG RX REV CODE 250 W/ 637 OVERRIDE(OP)

## 2024-07-02 PROCEDURE — 70553 MRI BRAIN STEM W/O & W/DYE: CPT

## 2024-07-02 PROCEDURE — 70496 CT ANGIOGRAPHY HEAD: CPT

## 2024-07-02 RX ORDER — AZITHROMYCIN 500 MG/5ML
500 INJECTION, POWDER, LYOPHILIZED, FOR SOLUTION INTRAVENOUS EVERY 24 HOURS
Status: DISCONTINUED | OUTPATIENT
Start: 2024-07-02 | End: 2024-07-03

## 2024-07-02 RX ORDER — SODIUM CHLORIDE, SODIUM LACTATE, POTASSIUM CHLORIDE, CALCIUM CHLORIDE 600; 310; 30; 20 MG/100ML; MG/100ML; MG/100ML; MG/100ML
INJECTION, SOLUTION INTRAVENOUS CONTINUOUS
Status: DISCONTINUED | OUTPATIENT
Start: 2024-07-02 | End: 2024-07-03 | Stop reason: HOSPADM

## 2024-07-02 RX ORDER — AMOXICILLIN AND CLAVULANATE POTASSIUM 875; 125 MG/1; MG/1
1 TABLET, FILM COATED ORAL EVERY 12 HOURS
Status: DISCONTINUED | OUTPATIENT
Start: 2024-07-02 | End: 2024-07-03 | Stop reason: HOSPADM

## 2024-07-02 RX ORDER — CLOPIDOGREL BISULFATE 75 MG/1
75 TABLET ORAL DAILY
Status: DISCONTINUED | OUTPATIENT
Start: 2024-07-03 | End: 2024-07-03 | Stop reason: HOSPADM

## 2024-07-02 RX ORDER — CLOPIDOGREL BISULFATE 75 MG/1
300 TABLET ORAL ONCE
Status: COMPLETED | OUTPATIENT
Start: 2024-07-02 | End: 2024-07-02

## 2024-07-02 RX ORDER — SODIUM CHLORIDE FOR INHALATION 3 %
3 VIAL, NEBULIZER (ML) INHALATION
Status: DISPENSED | OUTPATIENT
Start: 2024-07-02 | End: 2024-07-02

## 2024-07-02 RX ORDER — ASPIRIN 81 MG/1
81 TABLET ORAL DAILY
Status: DISCONTINUED | OUTPATIENT
Start: 2024-07-02 | End: 2024-07-03 | Stop reason: HOSPADM

## 2024-07-02 RX ADMIN — POTASSIUM BICARBONATE 25 MEQ: 978 TABLET, EFFERVESCENT ORAL at 18:28

## 2024-07-02 RX ADMIN — Medication 100 MG: at 06:16

## 2024-07-02 RX ADMIN — HEPARIN SODIUM 5000 UNITS: 5000 INJECTION, SOLUTION INTRAVENOUS; SUBCUTANEOUS at 14:01

## 2024-07-02 RX ADMIN — AZITHROMYCIN 500 MG: 500 INJECTION, POWDER, LYOPHILIZED, FOR SOLUTION INTRAVENOUS at 06:19

## 2024-07-02 RX ADMIN — SODIUM CHLORIDE, POTASSIUM CHLORIDE, SODIUM LACTATE AND CALCIUM CHLORIDE: 600; 310; 30; 20 INJECTION, SOLUTION INTRAVENOUS at 22:06

## 2024-07-02 RX ADMIN — SENNOSIDES AND DOCUSATE SODIUM 2 TABLET: 50; 8.6 TABLET ORAL at 17:44

## 2024-07-02 RX ADMIN — CYANOCOBALAMIN TAB 500 MCG 1000 MCG: 500 TAB at 06:16

## 2024-07-02 RX ADMIN — AMOXICILLIN AND CLAVULANATE POTASSIUM 1 TABLET: 875; 125 TABLET, FILM COATED ORAL at 17:43

## 2024-07-02 RX ADMIN — METOPROLOL TARTRATE 50 MG: 50 TABLET, FILM COATED ORAL at 17:43

## 2024-07-02 RX ADMIN — HEPARIN SODIUM 5000 UNITS: 5000 INJECTION, SOLUTION INTRAVENOUS; SUBCUTANEOUS at 06:16

## 2024-07-02 RX ADMIN — CLOPIDOGREL BISULFATE 300 MG: 75 TABLET ORAL at 18:29

## 2024-07-02 RX ADMIN — HEPARIN SODIUM 5000 UNITS: 5000 INJECTION, SOLUTION INTRAVENOUS; SUBCUTANEOUS at 20:54

## 2024-07-02 RX ADMIN — SODIUM CHLORIDE, POTASSIUM CHLORIDE, SODIUM LACTATE AND CALCIUM CHLORIDE: 600; 310; 30; 20 INJECTION, SOLUTION INTRAVENOUS at 07:57

## 2024-07-02 RX ADMIN — FOLIC ACID 1 MG: 1 TABLET ORAL at 06:15

## 2024-07-02 RX ADMIN — POTASSIUM BICARBONATE 25 MEQ: 978 TABLET, EFFERVESCENT ORAL at 12:36

## 2024-07-02 RX ADMIN — POTASSIUM BICARBONATE 25 MEQ: 978 TABLET, EFFERVESCENT ORAL at 09:40

## 2024-07-02 RX ADMIN — METOPROLOL TARTRATE 50 MG: 50 TABLET, FILM COATED ORAL at 06:15

## 2024-07-02 RX ADMIN — IOHEXOL 80 ML: 350 INJECTION, SOLUTION INTRAVENOUS at 17:03

## 2024-07-02 RX ADMIN — AMPICILLIN AND SULBACTAM 3 G: 1; 2 INJECTION, POWDER, FOR SOLUTION INTRAMUSCULAR; INTRAVENOUS at 12:38

## 2024-07-02 RX ADMIN — ASPIRIN 81 MG: 81 TABLET, COATED ORAL at 17:44

## 2024-07-02 RX ADMIN — LOSARTAN POTASSIUM 25 MG: 50 TABLET, FILM COATED ORAL at 06:15

## 2024-07-02 RX ADMIN — SIMVASTATIN 10 MG: 20 TABLET, FILM COATED ORAL at 17:43

## 2024-07-02 RX ADMIN — FUROSEMIDE 20 MG: 20 TABLET ORAL at 06:15

## 2024-07-02 RX ADMIN — AMPICILLIN AND SULBACTAM 3 G: 1; 2 INJECTION, POWDER, FOR SOLUTION INTRAMUSCULAR; INTRAVENOUS at 01:40

## 2024-07-02 RX ADMIN — LEVOTHYROXINE SODIUM 50 MCG: 0.05 TABLET ORAL at 06:15

## 2024-07-02 RX ADMIN — GADOTERIDOL 10 ML: 279.3 INJECTION, SOLUTION INTRAVENOUS at 08:45

## 2024-07-02 ASSESSMENT — COGNITIVE AND FUNCTIONAL STATUS - GENERAL
HELP NEEDED FOR BATHING: A LITTLE
SUGGESTED CMS G CODE MODIFIER DAILY ACTIVITY: CI
DAILY ACTIVITIY SCORE: 23

## 2024-07-02 ASSESSMENT — PAIN DESCRIPTION - PAIN TYPE
TYPE: ACUTE PAIN
TYPE: ACUTE PAIN

## 2024-07-02 ASSESSMENT — ENCOUNTER SYMPTOMS
SPEECH CHANGE: 1
SHORTNESS OF BREATH: 1

## 2024-07-02 ASSESSMENT — ACTIVITIES OF DAILY LIVING (ADL): TOILETING: INDEPENDENT

## 2024-07-03 ENCOUNTER — APPOINTMENT (OUTPATIENT)
Dept: CARDIOLOGY | Facility: MEDICAL CENTER | Age: 84
DRG: 064 | End: 2024-07-03
Payer: MEDICARE

## 2024-07-03 VITALS
RESPIRATION RATE: 19 BRPM | HEART RATE: 64 BPM | WEIGHT: 112.21 LBS | DIASTOLIC BLOOD PRESSURE: 61 MMHG | BODY MASS INDEX: 20.65 KG/M2 | OXYGEN SATURATION: 98 % | HEIGHT: 62 IN | SYSTOLIC BLOOD PRESSURE: 125 MMHG | TEMPERATURE: 98.4 F

## 2024-07-03 DIAGNOSIS — I63.9 CEREBROVASCULAR ACCIDENT (CVA), UNSPECIFIED MECHANISM (HCC): ICD-10-CM

## 2024-07-03 LAB
ALBUMIN SERPL BCP-MCNC: 2.9 G/DL (ref 3.2–4.9)
BUN SERPL-MCNC: 14 MG/DL (ref 8–22)
CALCIUM ALBUM COR SERPL-MCNC: 9.1 MG/DL (ref 8.5–10.5)
CALCIUM SERPL-MCNC: 8.2 MG/DL (ref 8.5–10.5)
CHLORIDE SERPL-SCNC: 102 MMOL/L (ref 96–112)
CO2 SERPL-SCNC: 21 MMOL/L (ref 20–33)
CREAT SERPL-MCNC: 0.88 MG/DL (ref 0.5–1.4)
ERYTHROCYTE [DISTWIDTH] IN BLOOD BY AUTOMATED COUNT: 55.8 FL (ref 35.9–50)
EST. AVERAGE GLUCOSE BLD GHB EST-MCNC: 100 MG/DL
FERRITIN SERPL-MCNC: 508 NG/ML (ref 10–291)
GFR SERPLBLD CREATININE-BSD FMLA CKD-EPI: 65 ML/MIN/1.73 M 2
GLUCOSE SERPL-MCNC: 115 MG/DL (ref 65–99)
HBA1C MFR BLD: 5.1 % (ref 4–5.6)
HCT VFR BLD AUTO: 28.4 % (ref 37–47)
HGB BLD-MCNC: 8.9 G/DL (ref 12–16)
IRON SATN MFR SERPL: 34 % (ref 15–55)
IRON SERPL-MCNC: 44 UG/DL (ref 40–170)
IRON SERPL-MCNC: 45 UG/DL (ref 40–170)
LV EJECT FRACT MOD 2C 99903: 62.51
LV EJECT FRACT MOD 4C 99902: 73.04
LV EJECT FRACT MOD BP 99901: 67.84
MAGNESIUM SERPL-MCNC: 1.6 MG/DL (ref 1.5–2.5)
MCH RBC QN AUTO: 29.5 PG (ref 27–33)
MCHC RBC AUTO-ENTMCNC: 31.3 G/DL (ref 32.2–35.5)
MCV RBC AUTO: 94 FL (ref 81.4–97.8)
PHOSPHATE SERPL-MCNC: 3.1 MG/DL (ref 2.5–4.5)
PLATELET # BLD AUTO: 211 K/UL (ref 164–446)
PMV BLD AUTO: 11.6 FL (ref 9–12.9)
POTASSIUM SERPL-SCNC: 3.4 MMOL/L (ref 3.6–5.5)
PROCALCITONIN SERPL-MCNC: 0.24 NG/ML
RBC # BLD AUTO: 3.02 M/UL (ref 4.2–5.4)
SODIUM SERPL-SCNC: 135 MMOL/L (ref 135–145)
TIBC SERPL-MCNC: 134 UG/DL (ref 250–450)
TRANSFERRIN SERPL-MCNC: 113 MG/DL (ref 200–370)
UIBC SERPL-MCNC: 89 UG/DL (ref 110–370)
WBC # BLD AUTO: 15 K/UL (ref 4.8–10.8)

## 2024-07-03 PROCEDURE — 700111 HCHG RX REV CODE 636 W/ 250 OVERRIDE (IP): Performed by: STUDENT IN AN ORGANIZED HEALTH CARE EDUCATION/TRAINING PROGRAM

## 2024-07-03 PROCEDURE — 700102 HCHG RX REV CODE 250 W/ 637 OVERRIDE(OP): Performed by: INTERNAL MEDICINE

## 2024-07-03 PROCEDURE — A9270 NON-COVERED ITEM OR SERVICE: HCPCS | Performed by: INTERNAL MEDICINE

## 2024-07-03 PROCEDURE — 83735 ASSAY OF MAGNESIUM: CPT

## 2024-07-03 PROCEDURE — 83550 IRON BINDING TEST: CPT

## 2024-07-03 PROCEDURE — 84145 PROCALCITONIN (PCT): CPT

## 2024-07-03 PROCEDURE — 97116 GAIT TRAINING THERAPY: CPT

## 2024-07-03 PROCEDURE — 93306 TTE W/DOPPLER COMPLETE: CPT

## 2024-07-03 PROCEDURE — 99239 HOSP IP/OBS DSCHRG MGMT >30: CPT | Performed by: INTERNAL MEDICINE

## 2024-07-03 PROCEDURE — 700105 HCHG RX REV CODE 258

## 2024-07-03 PROCEDURE — A9270 NON-COVERED ITEM OR SERVICE: HCPCS

## 2024-07-03 PROCEDURE — 700102 HCHG RX REV CODE 250 W/ 637 OVERRIDE(OP)

## 2024-07-03 PROCEDURE — 700111 HCHG RX REV CODE 636 W/ 250 OVERRIDE (IP): Mod: JZ

## 2024-07-03 PROCEDURE — 82728 ASSAY OF FERRITIN: CPT

## 2024-07-03 PROCEDURE — 84466 ASSAY OF TRANSFERRIN: CPT

## 2024-07-03 PROCEDURE — A9270 NON-COVERED ITEM OR SERVICE: HCPCS | Performed by: PSYCHIATRY & NEUROLOGY

## 2024-07-03 PROCEDURE — 93306 TTE W/DOPPLER COMPLETE: CPT | Mod: 26 | Performed by: STUDENT IN AN ORGANIZED HEALTH CARE EDUCATION/TRAINING PROGRAM

## 2024-07-03 PROCEDURE — 700101 HCHG RX REV CODE 250

## 2024-07-03 PROCEDURE — 83540 ASSAY OF IRON: CPT

## 2024-07-03 PROCEDURE — 80069 RENAL FUNCTION PANEL: CPT

## 2024-07-03 PROCEDURE — 97162 PT EVAL MOD COMPLEX 30 MIN: CPT

## 2024-07-03 PROCEDURE — 36415 COLL VENOUS BLD VENIPUNCTURE: CPT

## 2024-07-03 PROCEDURE — 83036 HEMOGLOBIN GLYCOSYLATED A1C: CPT

## 2024-07-03 PROCEDURE — 700102 HCHG RX REV CODE 250 W/ 637 OVERRIDE(OP): Performed by: PSYCHIATRY & NEUROLOGY

## 2024-07-03 PROCEDURE — 99232 SBSQ HOSP IP/OBS MODERATE 35: CPT

## 2024-07-03 PROCEDURE — 85027 COMPLETE CBC AUTOMATED: CPT

## 2024-07-03 RX ORDER — LOPERAMIDE HYDROCHLORIDE 2 MG/1
2 CAPSULE ORAL 4 TIMES DAILY PRN
Status: DISCONTINUED | OUTPATIENT
Start: 2024-07-03 | End: 2024-07-03 | Stop reason: HOSPADM

## 2024-07-03 RX ORDER — AZITHROMYCIN 250 MG/1
500 TABLET, FILM COATED ORAL DAILY
Status: DISCONTINUED | OUTPATIENT
Start: 2024-07-04 | End: 2024-07-03 | Stop reason: HOSPADM

## 2024-07-03 RX ORDER — CLOPIDOGREL BISULFATE 75 MG/1
75 TABLET ORAL DAILY
Qty: 90 TABLET | Refills: 0 | Status: SHIPPED | OUTPATIENT
Start: 2024-07-04

## 2024-07-03 RX ADMIN — CYANOCOBALAMIN TAB 500 MCG 1000 MCG: 500 TAB at 05:51

## 2024-07-03 RX ADMIN — METOPROLOL TARTRATE 50 MG: 50 TABLET, FILM COATED ORAL at 05:49

## 2024-07-03 RX ADMIN — CLOPIDOGREL BISULFATE 75 MG: 75 TABLET ORAL at 05:51

## 2024-07-03 RX ADMIN — ASPIRIN 81 MG: 81 TABLET, COATED ORAL at 05:50

## 2024-07-03 RX ADMIN — AZITHROMYCIN 500 MG: 500 INJECTION, POWDER, LYOPHILIZED, FOR SOLUTION INTRAVENOUS at 05:52

## 2024-07-03 RX ADMIN — Medication 100 MG: at 05:51

## 2024-07-03 RX ADMIN — LEVOTHYROXINE SODIUM 50 MCG: 0.05 TABLET ORAL at 05:51

## 2024-07-03 RX ADMIN — LOPERAMIDE HYDROCHLORIDE 2 MG: 2 CAPSULE ORAL at 12:53

## 2024-07-03 RX ADMIN — FOLIC ACID 1 MG: 1 TABLET ORAL at 05:51

## 2024-07-03 RX ADMIN — AMOXICILLIN AND CLAVULANATE POTASSIUM 1 TABLET: 875; 125 TABLET, FILM COATED ORAL at 05:50

## 2024-07-03 RX ADMIN — LOSARTAN POTASSIUM 25 MG: 50 TABLET, FILM COATED ORAL at 05:50

## 2024-07-03 RX ADMIN — HEPARIN SODIUM 5000 UNITS: 5000 INJECTION, SOLUTION INTRAVENOUS; SUBCUTANEOUS at 05:50

## 2024-07-03 ASSESSMENT — COGNITIVE AND FUNCTIONAL STATUS - GENERAL
MOBILITY SCORE: 22
WALKING IN HOSPITAL ROOM: A LITTLE
CLIMB 3 TO 5 STEPS WITH RAILING: A LITTLE
SUGGESTED CMS G CODE MODIFIER MOBILITY: CJ

## 2024-07-03 ASSESSMENT — PAIN DESCRIPTION - PAIN TYPE: TYPE: ACUTE PAIN

## 2024-07-03 ASSESSMENT — FIBROSIS 4 INDEX: FIB4 SCORE: 1.59

## 2024-07-03 ASSESSMENT — GAIT ASSESSMENTS
DEVIATION: BRADYKINETIC;DECREASED HEEL STRIKE;DECREASED TOE OFF
DISTANCE (FEET): 100
GAIT LEVEL OF ASSIST: STANDBY ASSIST

## 2024-07-05 ENCOUNTER — APPOINTMENT (OUTPATIENT)
Dept: MEDICAL GROUP | Facility: LAB | Age: 84
End: 2024-07-05
Payer: MEDICARE

## 2024-07-05 ENCOUNTER — PATIENT OUTREACH (OUTPATIENT)
Dept: MEDICAL GROUP | Facility: LAB | Age: 84
End: 2024-07-05

## 2024-07-05 ENCOUNTER — HOME HEALTH ADMISSION (OUTPATIENT)
Dept: HOME HEALTH SERVICES | Facility: HOME HEALTHCARE | Age: 84
End: 2024-07-05
Payer: MEDICARE

## 2024-07-12 ENCOUNTER — OFFICE VISIT (OUTPATIENT)
Dept: MEDICAL GROUP | Facility: LAB | Age: 84
End: 2024-07-12
Payer: MEDICARE

## 2024-07-12 VITALS
HEART RATE: 66 BPM | WEIGHT: 116 LBS | RESPIRATION RATE: 16 BRPM | BODY MASS INDEX: 21.35 KG/M2 | DIASTOLIC BLOOD PRESSURE: 70 MMHG | TEMPERATURE: 97.7 F | HEIGHT: 62 IN | OXYGEN SATURATION: 98 % | SYSTOLIC BLOOD PRESSURE: 128 MMHG

## 2024-07-12 DIAGNOSIS — N39.0 RECURRENT UTI: ICD-10-CM

## 2024-07-12 DIAGNOSIS — M05.79 RHEUMATOID ARTHRITIS INVOLVING MULTIPLE SITES WITH POSITIVE RHEUMATOID FACTOR (HCC): ICD-10-CM

## 2024-07-12 DIAGNOSIS — E87.6 HYPOKALEMIA: ICD-10-CM

## 2024-07-12 DIAGNOSIS — I27.20 PULMONARY HYPERTENSION (HCC): ICD-10-CM

## 2024-07-12 DIAGNOSIS — E26.1 SECONDARY HYPERALDOSTERONISM (HCC): ICD-10-CM

## 2024-07-12 DIAGNOSIS — D72.829 LEUKOCYTOSIS, UNSPECIFIED TYPE: ICD-10-CM

## 2024-07-12 DIAGNOSIS — I05.0 MITRAL VALVE STENOSIS, UNSPECIFIED ETIOLOGY: ICD-10-CM

## 2024-07-12 DIAGNOSIS — D50.9 IRON DEFICIENCY ANEMIA, UNSPECIFIED IRON DEFICIENCY ANEMIA TYPE: ICD-10-CM

## 2024-07-12 DIAGNOSIS — Z09 HOSPITAL DISCHARGE FOLLOW-UP: Primary | ICD-10-CM

## 2024-07-12 DIAGNOSIS — Z74.09 IMPAIRED FUNCTIONAL MOBILITY, BALANCE, GAIT, AND ENDURANCE: ICD-10-CM

## 2024-07-12 PROBLEM — E87.1 HYPONATREMIA: Status: RESOLVED | Noted: 2022-06-28 | Resolved: 2024-07-12

## 2024-07-12 ASSESSMENT — FIBROSIS 4 INDEX: FIB4 SCORE: 1.65

## 2024-07-14 ASSESSMENT — ENCOUNTER SYMPTOMS
FALLS: 0
VOMITING: 0
SPEECH CHANGE: 0
CHILLS: 0
PALPITATIONS: 0
SHORTNESS OF BREATH: 0
FOCAL WEAKNESS: 0
CONSTIPATION: 0
DIARRHEA: 1
ABDOMINAL PAIN: 0
WEAKNESS: 1
FEVER: 0
WEIGHT LOSS: 0
HEARTBURN: 0
NAUSEA: 0
COUGH: 0
BLOOD IN STOOL: 0

## 2024-07-15 ENCOUNTER — HOSPITAL ENCOUNTER (OUTPATIENT)
Dept: LAB | Facility: MEDICAL CENTER | Age: 84
End: 2024-07-15
Attending: STUDENT IN AN ORGANIZED HEALTH CARE EDUCATION/TRAINING PROGRAM
Payer: MEDICARE

## 2024-07-15 ENCOUNTER — HOME CARE VISIT (OUTPATIENT)
Dept: HOME HEALTH SERVICES | Facility: HOME HEALTHCARE | Age: 84
End: 2024-07-15

## 2024-07-15 DIAGNOSIS — D72.829 LEUKOCYTOSIS, UNSPECIFIED TYPE: ICD-10-CM

## 2024-07-15 DIAGNOSIS — E87.6 HYPOKALEMIA: ICD-10-CM

## 2024-07-15 DIAGNOSIS — N39.0 RECURRENT UTI: ICD-10-CM

## 2024-07-15 DIAGNOSIS — D50.9 IRON DEFICIENCY ANEMIA, UNSPECIFIED IRON DEFICIENCY ANEMIA TYPE: ICD-10-CM

## 2024-07-15 PROBLEM — R11.2 NAUSEA AND VOMITING: Status: RESOLVED | Noted: 2024-03-21 | Resolved: 2024-07-15

## 2024-07-15 PROBLEM — J18.9 PNEUMONIA: Status: RESOLVED | Noted: 2024-06-27 | Resolved: 2024-07-15

## 2024-07-15 PROBLEM — G89.29 CHRONIC ABDOMINAL PAIN: Status: RESOLVED | Noted: 2024-03-21 | Resolved: 2024-07-15

## 2024-07-15 PROBLEM — R10.9 CHRONIC ABDOMINAL PAIN: Status: RESOLVED | Noted: 2024-03-21 | Resolved: 2024-07-15

## 2024-07-15 PROBLEM — R46.89 COGNITIVE AND BEHAVIORAL CHANGES: Status: RESOLVED | Noted: 2024-07-01 | Resolved: 2024-07-15

## 2024-07-15 PROBLEM — R41.89 COGNITIVE AND BEHAVIORAL CHANGES: Status: RESOLVED | Noted: 2024-07-01 | Resolved: 2024-07-15

## 2024-07-15 LAB
ALBUMIN SERPL BCP-MCNC: 3.2 G/DL (ref 3.2–4.9)
ALBUMIN/GLOB SERPL: 1.1 G/DL
ALP SERPL-CCNC: 87 U/L (ref 30–99)
ALT SERPL-CCNC: <5 U/L (ref 2–50)
ANION GAP SERPL CALC-SCNC: 12 MMOL/L (ref 7–16)
ANISOCYTOSIS BLD QL SMEAR: ABNORMAL
APPEARANCE UR: CLEAR
AST SERPL-CCNC: 6 U/L (ref 12–45)
BACTERIA #/AREA URNS HPF: NEGATIVE /HPF
BASOPHILS # BLD AUTO: 0 % (ref 0–1.8)
BASOPHILS # BLD: 0 K/UL (ref 0–0.12)
BILIRUB SERPL-MCNC: 0.3 MG/DL (ref 0.1–1.5)
BILIRUB UR QL STRIP.AUTO: NEGATIVE
BUN SERPL-MCNC: 13 MG/DL (ref 8–22)
BURR CELLS BLD QL SMEAR: NORMAL
CALCIUM ALBUM COR SERPL-MCNC: 8.9 MG/DL (ref 8.5–10.5)
CALCIUM SERPL-MCNC: 8.3 MG/DL (ref 8.5–10.5)
CHLORIDE SERPL-SCNC: 105 MMOL/L (ref 96–112)
CO2 SERPL-SCNC: 20 MMOL/L (ref 20–33)
COLOR UR: YELLOW
CREAT SERPL-MCNC: 0.95 MG/DL (ref 0.5–1.4)
DACRYOCYTES BLD QL SMEAR: NORMAL
EOSINOPHIL # BLD AUTO: 0.11 K/UL (ref 0–0.51)
EOSINOPHIL NFR BLD: 0.8 % (ref 0–6.9)
EPI CELLS #/AREA URNS HPF: NORMAL /HPF
ERYTHROCYTE [DISTWIDTH] IN BLOOD BY AUTOMATED COUNT: 66 FL (ref 35.9–50)
FERRITIN SERPL-MCNC: 576 NG/ML (ref 10–291)
FOLATE SERPL-MCNC: 27.3 NG/ML
GFR SERPLBLD CREATININE-BSD FMLA CKD-EPI: 60 ML/MIN/1.73 M 2
GLOBULIN SER CALC-MCNC: 2.8 G/DL (ref 1.9–3.5)
GLUCOSE SERPL-MCNC: 102 MG/DL (ref 65–99)
GLUCOSE UR STRIP.AUTO-MCNC: NEGATIVE MG/DL
HCT VFR BLD AUTO: 34.5 % (ref 37–47)
HGB BLD-MCNC: 10.1 G/DL (ref 12–16)
HYALINE CASTS #/AREA URNS LPF: NORMAL /LPF
IRON SATN MFR SERPL: 12 % (ref 15–55)
IRON SERPL-MCNC: 15 UG/DL (ref 40–170)
KETONES UR STRIP.AUTO-MCNC: ABNORMAL MG/DL
LEUKOCYTE ESTERASE UR QL STRIP.AUTO: ABNORMAL
LYMPHOCYTES # BLD AUTO: 1.75 K/UL (ref 1–4.8)
LYMPHOCYTES NFR BLD: 12.6 % (ref 22–41)
MACROCYTES BLD QL SMEAR: ABNORMAL
MAGNESIUM SERPL-MCNC: 1.9 MG/DL (ref 1.5–2.5)
MANUAL DIFF BLD: NORMAL
MCH RBC QN AUTO: 29.4 PG (ref 27–33)
MCHC RBC AUTO-ENTMCNC: 29.3 G/DL (ref 32.2–35.5)
MCV RBC AUTO: 100.6 FL (ref 81.4–97.8)
MICRO URNS: ABNORMAL
MONOCYTES # BLD AUTO: 0.58 K/UL (ref 0–0.85)
MONOCYTES NFR BLD AUTO: 4.2 % (ref 0–13.4)
MORPHOLOGY BLD-IMP: NORMAL
NEUTROPHILS # BLD AUTO: 11.45 K/UL (ref 1.82–7.42)
NEUTROPHILS NFR BLD: 82.4 % (ref 44–72)
NITRITE UR QL STRIP.AUTO: NEGATIVE
NRBC # BLD AUTO: 0 K/UL
NRBC BLD-RTO: 0 /100 WBC (ref 0–0.2)
OVALOCYTES BLD QL SMEAR: NORMAL
PH UR STRIP.AUTO: 6 [PH] (ref 5–8)
PLATELET # BLD AUTO: 290 K/UL (ref 164–446)
PLATELET BLD QL SMEAR: NORMAL
PMV BLD AUTO: 12.7 FL (ref 9–12.9)
POIKILOCYTOSIS BLD QL SMEAR: NORMAL
POTASSIUM SERPL-SCNC: 4.2 MMOL/L (ref 3.6–5.5)
PROT SERPL-MCNC: 6 G/DL (ref 6–8.2)
PROT UR QL STRIP: 30 MG/DL
RBC # BLD AUTO: 3.43 M/UL (ref 4.2–5.4)
RBC # URNS HPF: NORMAL /HPF
RBC BLD AUTO: PRESENT
RBC UR QL AUTO: NEGATIVE
SODIUM SERPL-SCNC: 137 MMOL/L (ref 135–145)
SP GR UR STRIP.AUTO: 1.02
TIBC SERPL-MCNC: 123 UG/DL (ref 250–450)
UIBC SERPL-MCNC: 108 UG/DL (ref 110–370)
UROBILINOGEN UR STRIP.AUTO-MCNC: 0.2 MG/DL
VIT B12 SERPL-MCNC: >4000 PG/ML (ref 211–911)
WBC # BLD AUTO: 13.9 K/UL (ref 4.8–10.8)
WBC #/AREA URNS HPF: NORMAL /HPF

## 2024-07-15 PROCEDURE — 82728 ASSAY OF FERRITIN: CPT

## 2024-07-15 PROCEDURE — 87086 URINE CULTURE/COLONY COUNT: CPT

## 2024-07-15 PROCEDURE — 87077 CULTURE AEROBIC IDENTIFY: CPT

## 2024-07-15 PROCEDURE — 80053 COMPREHEN METABOLIC PANEL: CPT

## 2024-07-15 PROCEDURE — 83540 ASSAY OF IRON: CPT

## 2024-07-15 PROCEDURE — 36415 COLL VENOUS BLD VENIPUNCTURE: CPT

## 2024-07-15 PROCEDURE — 82746 ASSAY OF FOLIC ACID SERUM: CPT

## 2024-07-15 PROCEDURE — 85007 BL SMEAR W/DIFF WBC COUNT: CPT

## 2024-07-15 PROCEDURE — 81001 URINALYSIS AUTO W/SCOPE: CPT

## 2024-07-15 PROCEDURE — 85027 COMPLETE CBC AUTOMATED: CPT

## 2024-07-15 PROCEDURE — 83550 IRON BINDING TEST: CPT

## 2024-07-15 PROCEDURE — 87186 SC STD MICRODIL/AGAR DIL: CPT

## 2024-07-15 PROCEDURE — 83735 ASSAY OF MAGNESIUM: CPT

## 2024-07-15 PROCEDURE — 82607 VITAMIN B-12: CPT

## 2024-07-18 ENCOUNTER — OFFICE VISIT (OUTPATIENT)
Dept: NEUROLOGY | Facility: MEDICAL CENTER | Age: 84
End: 2024-07-18
Attending: PSYCHIATRY & NEUROLOGY
Payer: MEDICARE

## 2024-07-18 VITALS
DIASTOLIC BLOOD PRESSURE: 58 MMHG | WEIGHT: 114.64 LBS | SYSTOLIC BLOOD PRESSURE: 122 MMHG | OXYGEN SATURATION: 97 % | TEMPERATURE: 97.4 F | HEIGHT: 62 IN | BODY MASS INDEX: 21.1 KG/M2 | HEART RATE: 78 BPM

## 2024-07-18 DIAGNOSIS — Z86.73 HISTORY OF LEFT ICA STROKE: ICD-10-CM

## 2024-07-18 PROCEDURE — 99212 OFFICE O/P EST SF 10 MIN: CPT | Performed by: PSYCHIATRY & NEUROLOGY

## 2024-07-18 PROCEDURE — 99214 OFFICE O/P EST MOD 30 MIN: CPT | Performed by: PSYCHIATRY & NEUROLOGY

## 2024-07-18 PROCEDURE — 3078F DIAST BP <80 MM HG: CPT | Performed by: PSYCHIATRY & NEUROLOGY

## 2024-07-18 PROCEDURE — 3074F SYST BP LT 130 MM HG: CPT | Performed by: PSYCHIATRY & NEUROLOGY

## 2024-07-18 ASSESSMENT — FIBROSIS 4 INDEX: FIB4 SCORE: 0.8

## 2024-07-23 ENCOUNTER — TELEPHONE (OUTPATIENT)
Dept: CARDIOLOGY | Facility: MEDICAL CENTER | Age: 84
End: 2024-07-23

## 2024-07-24 ENCOUNTER — OFFICE VISIT (OUTPATIENT)
Dept: URGENT CARE | Facility: PHYSICIAN GROUP | Age: 84
End: 2024-07-24
Payer: MEDICARE

## 2024-07-24 ENCOUNTER — TELEPHONE (OUTPATIENT)
Dept: NEUROLOGY | Facility: MEDICAL CENTER | Age: 84
End: 2024-07-24

## 2024-07-24 ENCOUNTER — TELEPHONE (OUTPATIENT)
Dept: SCHEDULING | Facility: IMAGING CENTER | Age: 84
End: 2024-07-24

## 2024-07-24 VITALS
OXYGEN SATURATION: 97 % | WEIGHT: 114 LBS | DIASTOLIC BLOOD PRESSURE: 56 MMHG | HEIGHT: 62 IN | BODY MASS INDEX: 20.98 KG/M2 | HEART RATE: 65 BPM | RESPIRATION RATE: 12 BRPM | SYSTOLIC BLOOD PRESSURE: 124 MMHG | TEMPERATURE: 97.8 F

## 2024-07-24 DIAGNOSIS — Z86.73 HISTORY OF STROKE: ICD-10-CM

## 2024-07-24 DIAGNOSIS — H43.393 FLOATERS IN VISUAL FIELD, BILATERAL: ICD-10-CM

## 2024-07-24 PROCEDURE — 99214 OFFICE O/P EST MOD 30 MIN: CPT | Performed by: STUDENT IN AN ORGANIZED HEALTH CARE EDUCATION/TRAINING PROGRAM

## 2024-07-24 PROCEDURE — 3074F SYST BP LT 130 MM HG: CPT | Performed by: STUDENT IN AN ORGANIZED HEALTH CARE EDUCATION/TRAINING PROGRAM

## 2024-07-24 PROCEDURE — 3078F DIAST BP <80 MM HG: CPT | Performed by: STUDENT IN AN ORGANIZED HEALTH CARE EDUCATION/TRAINING PROGRAM

## 2024-07-24 ASSESSMENT — VISUAL ACUITY: OU: 1

## 2024-07-24 ASSESSMENT — FIBROSIS 4 INDEX: FIB4 SCORE: 0.8

## 2024-07-31 ENCOUNTER — OFFICE VISIT (OUTPATIENT)
Dept: MEDICAL GROUP | Facility: LAB | Age: 84
End: 2024-07-31
Payer: MEDICARE

## 2024-07-31 VITALS
DIASTOLIC BLOOD PRESSURE: 82 MMHG | SYSTOLIC BLOOD PRESSURE: 128 MMHG | TEMPERATURE: 97.3 F | BODY MASS INDEX: 20.04 KG/M2 | OXYGEN SATURATION: 97 % | HEIGHT: 62 IN | WEIGHT: 108.91 LBS | HEART RATE: 63 BPM | RESPIRATION RATE: 16 BRPM

## 2024-07-31 DIAGNOSIS — R09.81 NASAL CONGESTION: ICD-10-CM

## 2024-07-31 DIAGNOSIS — N18.2 STAGE 2 CHRONIC KIDNEY DISEASE: ICD-10-CM

## 2024-07-31 DIAGNOSIS — R63.0 POOR APPETITE: ICD-10-CM

## 2024-07-31 DIAGNOSIS — D72.829 LEUKOCYTOSIS, UNSPECIFIED TYPE: ICD-10-CM

## 2024-07-31 DIAGNOSIS — H43.392 VITREOUS FLOATERS OF LEFT EYE: ICD-10-CM

## 2024-07-31 DIAGNOSIS — D50.9 IRON DEFICIENCY ANEMIA, UNSPECIFIED IRON DEFICIENCY ANEMIA TYPE: ICD-10-CM

## 2024-07-31 DIAGNOSIS — R82.71 BACTERIA IN URINE: ICD-10-CM

## 2024-07-31 PROBLEM — E53.8 VITAMIN B12 DEFICIENCY: Status: RESOLVED | Noted: 2019-12-16 | Resolved: 2024-07-31

## 2024-07-31 PROBLEM — E87.6 HYPOKALEMIA: Status: RESOLVED | Noted: 2024-07-12 | Resolved: 2024-07-31

## 2024-07-31 RX ORDER — FLUTICASONE PROPIONATE 50 MCG
1-2 SPRAY, SUSPENSION (ML) NASAL DAILY
Qty: 16 G | Refills: 3 | Status: SHIPPED | OUTPATIENT
Start: 2024-07-31

## 2024-07-31 ASSESSMENT — ENCOUNTER SYMPTOMS
HEADACHES: 0
EYE DISCHARGE: 0
BLOOD IN STOOL: 0
SHORTNESS OF BREATH: 0
SORE THROAT: 0
DOUBLE VISION: 0
FEVER: 0
CHILLS: 0
WEIGHT LOSS: 1
BLURRED VISION: 0
NAUSEA: 0
CONSTIPATION: 0
DIARRHEA: 0
DIZZINESS: 0
COUGH: 0
EYE PAIN: 0
EYE REDNESS: 0
ABDOMINAL PAIN: 1
PHOTOPHOBIA: 0
VOMITING: 1
SPEECH CHANGE: 0

## 2024-07-31 ASSESSMENT — FIBROSIS 4 INDEX: FIB4 SCORE: 0.8

## 2024-08-01 DIAGNOSIS — R60.0 BILATERAL EDEMA OF LOWER EXTREMITY: ICD-10-CM

## 2024-08-01 RX ORDER — FUROSEMIDE 20 MG
20 TABLET ORAL 2 TIMES DAILY
Qty: 180 TABLET | Refills: 0 | Status: SHIPPED | OUTPATIENT
Start: 2024-08-01

## 2024-08-12 ENCOUNTER — APPOINTMENT (OUTPATIENT)
Dept: CARDIOLOGY | Facility: MEDICAL CENTER | Age: 84
End: 2024-08-12
Attending: NURSE PRACTITIONER
Payer: MEDICARE

## 2024-08-14 DIAGNOSIS — I10 HTN (HYPERTENSION), MALIGNANT: ICD-10-CM

## 2024-08-14 RX ORDER — METOPROLOL TARTRATE 50 MG
TABLET ORAL
Qty: 200 TABLET | Refills: 0 | Status: SHIPPED | OUTPATIENT
Start: 2024-08-14

## 2024-08-14 NOTE — TELEPHONE ENCOUNTER
Is the patient due for a refill? Yes    Was the patient seen the past year? No    Date of last office visit: 7/25/23    Does the patient have an upcoming appointment?  No   If yes, When?     Provider to refill:TT    Does the patient have [facility] and need 100-day supply? (This applies to ALL medications) Yes, quantity updated to 100 days

## 2024-09-17 ENCOUNTER — APPOINTMENT (OUTPATIENT)
Dept: SPEECH THERAPY | Facility: REHABILITATION | Age: 84
End: 2024-09-17
Attending: PSYCHIATRY & NEUROLOGY
Payer: MEDICARE

## 2024-09-19 ENCOUNTER — APPOINTMENT (OUTPATIENT)
Dept: SPEECH THERAPY | Facility: REHABILITATION | Age: 84
End: 2024-09-19
Attending: PSYCHIATRY & NEUROLOGY
Payer: MEDICARE

## 2024-09-22 DIAGNOSIS — E78.2 MIXED HYPERLIPIDEMIA: ICD-10-CM

## 2024-09-23 ENCOUNTER — APPOINTMENT (OUTPATIENT)
Dept: SPEECH THERAPY | Facility: REHABILITATION | Age: 84
End: 2024-09-23
Attending: PSYCHIATRY & NEUROLOGY
Payer: MEDICARE

## 2024-09-23 RX ORDER — SIMVASTATIN 10 MG
10 TABLET ORAL EVERY EVENING
Qty: 100 TABLET | Refills: 0 | Status: SHIPPED | OUTPATIENT
Start: 2024-09-23

## 2024-09-23 NOTE — TELEPHONE ENCOUNTER
Is the patient due for a refill? Yes    Was the patient seen the past year? No    Date of last office visit: 7/25/23    Does the patient have an upcoming appointment?  No   If yes, When?     Provider to refill:TT    Does the patient have [facility] and need 100-day supply? (This applies to ALL medications) Yes, quantity updated to 100 days

## 2024-09-26 ENCOUNTER — APPOINTMENT (OUTPATIENT)
Dept: SPEECH THERAPY | Facility: REHABILITATION | Age: 84
End: 2024-09-26
Attending: PSYCHIATRY & NEUROLOGY
Payer: MEDICARE

## 2024-10-01 ENCOUNTER — APPOINTMENT (OUTPATIENT)
Dept: SPEECH THERAPY | Facility: REHABILITATION | Age: 84
End: 2024-10-01
Attending: PSYCHIATRY & NEUROLOGY
Payer: MEDICARE

## 2024-10-03 ENCOUNTER — APPOINTMENT (OUTPATIENT)
Dept: SPEECH THERAPY | Facility: REHABILITATION | Age: 84
End: 2024-10-03
Attending: PSYCHIATRY & NEUROLOGY
Payer: MEDICARE

## 2024-10-08 ENCOUNTER — APPOINTMENT (OUTPATIENT)
Dept: SPEECH THERAPY | Facility: REHABILITATION | Age: 84
End: 2024-10-08
Attending: PSYCHIATRY & NEUROLOGY
Payer: MEDICARE

## 2024-10-10 ENCOUNTER — APPOINTMENT (OUTPATIENT)
Dept: SPEECH THERAPY | Facility: REHABILITATION | Age: 84
End: 2024-10-10
Attending: PSYCHIATRY & NEUROLOGY
Payer: MEDICARE

## 2024-10-15 ENCOUNTER — APPOINTMENT (OUTPATIENT)
Dept: SPEECH THERAPY | Facility: REHABILITATION | Age: 84
End: 2024-10-15
Attending: PSYCHIATRY & NEUROLOGY
Payer: MEDICARE

## 2024-10-22 ENCOUNTER — APPOINTMENT (OUTPATIENT)
Dept: RADIOLOGY | Facility: MEDICAL CENTER | Age: 84
DRG: 689 | End: 2024-10-22
Attending: STUDENT IN AN ORGANIZED HEALTH CARE EDUCATION/TRAINING PROGRAM
Payer: MEDICARE

## 2024-10-22 ENCOUNTER — HOSPITAL ENCOUNTER (INPATIENT)
Facility: MEDICAL CENTER | Age: 84
LOS: 2 days | DRG: 689 | End: 2024-10-25
Attending: STUDENT IN AN ORGANIZED HEALTH CARE EDUCATION/TRAINING PROGRAM | Admitting: STUDENT IN AN ORGANIZED HEALTH CARE EDUCATION/TRAINING PROGRAM
Payer: MEDICARE

## 2024-10-22 DIAGNOSIS — N12 PYELONEPHRITIS: ICD-10-CM

## 2024-10-22 DIAGNOSIS — I63.9 CEREBROVASCULAR ACCIDENT (CVA), UNSPECIFIED MECHANISM (HCC): ICD-10-CM

## 2024-10-22 DIAGNOSIS — N39.0 RECURRENT UTI: ICD-10-CM

## 2024-10-22 DIAGNOSIS — G93.41 METABOLIC ENCEPHALOPATHY: ICD-10-CM

## 2024-10-22 DIAGNOSIS — N39.0 ACUTE UTI: ICD-10-CM

## 2024-10-22 DIAGNOSIS — M25.571 ACUTE RIGHT ANKLE PAIN: ICD-10-CM

## 2024-10-22 LAB
ALBUMIN SERPL BCP-MCNC: 3.5 G/DL (ref 3.2–4.9)
ALBUMIN/GLOB SERPL: 1.3 G/DL
ALP SERPL-CCNC: 93 U/L (ref 30–99)
ALT SERPL-CCNC: 9 U/L (ref 2–50)
ANION GAP SERPL CALC-SCNC: 16 MMOL/L (ref 7–16)
ANISOCYTOSIS BLD QL SMEAR: ABNORMAL
AST SERPL-CCNC: 6 U/L (ref 12–45)
BASOPHILS # BLD AUTO: 0 % (ref 0–1.8)
BASOPHILS # BLD: 0 K/UL (ref 0–0.12)
BILIRUB SERPL-MCNC: 0.3 MG/DL (ref 0.1–1.5)
BUN SERPL-MCNC: 21 MG/DL (ref 8–22)
CALCIUM ALBUM COR SERPL-MCNC: 9.8 MG/DL (ref 8.5–10.5)
CALCIUM SERPL-MCNC: 9.4 MG/DL (ref 8.5–10.5)
CHLORIDE SERPL-SCNC: 101 MMOL/L (ref 96–112)
CO2 SERPL-SCNC: 18 MMOL/L (ref 20–33)
CREAT SERPL-MCNC: 1.25 MG/DL (ref 0.5–1.4)
DACRYOCYTES BLD QL SMEAR: NORMAL
EOSINOPHIL # BLD AUTO: 0.14 K/UL (ref 0–0.51)
EOSINOPHIL NFR BLD: 0.8 % (ref 0–6.9)
ERYTHROCYTE [DISTWIDTH] IN BLOOD BY AUTOMATED COUNT: 59.5 FL (ref 35.9–50)
GFR SERPLBLD CREATININE-BSD FMLA CKD-EPI: 43 ML/MIN/1.73 M 2
GLOBULIN SER CALC-MCNC: 2.7 G/DL (ref 1.9–3.5)
GLUCOSE SERPL-MCNC: 87 MG/DL (ref 65–99)
HCT VFR BLD AUTO: 28.4 % (ref 37–47)
HGB BLD-MCNC: 8.9 G/DL (ref 12–16)
LYMPHOCYTES # BLD AUTO: 1.26 K/UL (ref 1–4.8)
LYMPHOCYTES NFR BLD: 7.4 % (ref 22–41)
MACROCYTES BLD QL SMEAR: ABNORMAL
MANUAL DIFF BLD: NORMAL
MCH RBC QN AUTO: 31.3 PG (ref 27–33)
MCHC RBC AUTO-ENTMCNC: 31.3 G/DL (ref 32.2–35.5)
MCV RBC AUTO: 100 FL (ref 81.4–97.8)
METAMYELOCYTES NFR BLD MANUAL: 3.3 %
MICROCYTES BLD QL SMEAR: ABNORMAL
MONOCYTES # BLD AUTO: 0.85 K/UL (ref 0–0.85)
MONOCYTES NFR BLD AUTO: 5 % (ref 0–13.4)
MORPHOLOGY BLD-IMP: NORMAL
NEUTROPHILS # BLD AUTO: 14.2 K/UL (ref 1.82–7.42)
NEUTROPHILS NFR BLD: 82.7 % (ref 44–72)
NEUTS BAND NFR BLD MANUAL: 0.8 % (ref 0–10)
NRBC # BLD AUTO: 0 K/UL
NRBC BLD-RTO: 0 /100 WBC (ref 0–0.2)
OVALOCYTES BLD QL SMEAR: NORMAL
PLATELET # BLD AUTO: 301 K/UL (ref 164–446)
PLATELET BLD QL SMEAR: NORMAL
PMV BLD AUTO: 11 FL (ref 9–12.9)
POIKILOCYTOSIS BLD QL SMEAR: NORMAL
POLYCHROMASIA BLD QL SMEAR: NORMAL
POTASSIUM SERPL-SCNC: 4 MMOL/L (ref 3.6–5.5)
PROT SERPL-MCNC: 6.2 G/DL (ref 6–8.2)
RBC # BLD AUTO: 2.84 M/UL (ref 4.2–5.4)
RBC BLD AUTO: PRESENT
SODIUM SERPL-SCNC: 135 MMOL/L (ref 135–145)
TROPONIN T SERPL-MCNC: 20 NG/L (ref 6–19)
TSH SERPL DL<=0.005 MIU/L-ACNC: 3 UIU/ML (ref 0.38–5.33)
WBC # BLD AUTO: 17 K/UL (ref 4.8–10.8)

## 2024-10-22 PROCEDURE — 84443 ASSAY THYROID STIM HORMONE: CPT

## 2024-10-22 PROCEDURE — 96374 THER/PROPH/DIAG INJ IV PUSH: CPT

## 2024-10-22 PROCEDURE — 73700 CT LOWER EXTREMITY W/O DYE: CPT | Mod: RT

## 2024-10-22 PROCEDURE — 87086 URINE CULTURE/COLONY COUNT: CPT

## 2024-10-22 PROCEDURE — 87186 SC STD MICRODIL/AGAR DIL: CPT

## 2024-10-22 PROCEDURE — A9270 NON-COVERED ITEM OR SERVICE: HCPCS | Performed by: STUDENT IN AN ORGANIZED HEALTH CARE EDUCATION/TRAINING PROGRAM

## 2024-10-22 PROCEDURE — 73502 X-RAY EXAM HIP UNI 2-3 VIEWS: CPT | Mod: RT

## 2024-10-22 PROCEDURE — 73100 X-RAY EXAM OF WRIST: CPT | Mod: RT

## 2024-10-22 PROCEDURE — 700102 HCHG RX REV CODE 250 W/ 637 OVERRIDE(OP): Performed by: STUDENT IN AN ORGANIZED HEALTH CARE EDUCATION/TRAINING PROGRAM

## 2024-10-22 PROCEDURE — 80053 COMPREHEN METABOLIC PANEL: CPT

## 2024-10-22 PROCEDURE — 700111 HCHG RX REV CODE 636 W/ 250 OVERRIDE (IP): Mod: JZ | Performed by: STUDENT IN AN ORGANIZED HEALTH CARE EDUCATION/TRAINING PROGRAM

## 2024-10-22 PROCEDURE — 85007 BL SMEAR W/DIFF WBC COUNT: CPT

## 2024-10-22 PROCEDURE — 87077 CULTURE AEROBIC IDENTIFY: CPT | Mod: 91

## 2024-10-22 PROCEDURE — 36415 COLL VENOUS BLD VENIPUNCTURE: CPT

## 2024-10-22 PROCEDURE — 70450 CT HEAD/BRAIN W/O DYE: CPT

## 2024-10-22 PROCEDURE — 84484 ASSAY OF TROPONIN QUANT: CPT

## 2024-10-22 PROCEDURE — 81001 URINALYSIS AUTO W/SCOPE: CPT

## 2024-10-22 PROCEDURE — 93005 ELECTROCARDIOGRAM TRACING: CPT | Performed by: STUDENT IN AN ORGANIZED HEALTH CARE EDUCATION/TRAINING PROGRAM

## 2024-10-22 PROCEDURE — 99285 EMERGENCY DEPT VISIT HI MDM: CPT

## 2024-10-22 PROCEDURE — 73560 X-RAY EXAM OF KNEE 1 OR 2: CPT | Mod: RT

## 2024-10-22 PROCEDURE — 85027 COMPLETE CBC AUTOMATED: CPT

## 2024-10-22 RX ORDER — MORPHINE SULFATE 4 MG/ML
2 INJECTION INTRAVENOUS ONCE
Status: COMPLETED | OUTPATIENT
Start: 2024-10-22 | End: 2024-10-22

## 2024-10-22 RX ORDER — ACETAMINOPHEN 500 MG
1000 TABLET ORAL ONCE
Status: COMPLETED | OUTPATIENT
Start: 2024-10-22 | End: 2024-10-22

## 2024-10-22 RX ORDER — OXYCODONE HYDROCHLORIDE 5 MG/1
5 TABLET ORAL ONCE
Status: DISCONTINUED | OUTPATIENT
Start: 2024-10-22 | End: 2024-10-22

## 2024-10-22 RX ADMIN — MORPHINE SULFATE 2 MG: 4 INJECTION, SOLUTION INTRAMUSCULAR; INTRAVENOUS at 20:32

## 2024-10-22 RX ADMIN — ACETAMINOPHEN 1000 MG: 500 TABLET ORAL at 20:32

## 2024-10-22 ASSESSMENT — FIBROSIS 4 INDEX: FIB4 SCORE: 0.8

## 2024-10-23 ENCOUNTER — APPOINTMENT (OUTPATIENT)
Dept: RADIOLOGY | Facility: MEDICAL CENTER | Age: 84
DRG: 689 | End: 2024-10-23
Attending: STUDENT IN AN ORGANIZED HEALTH CARE EDUCATION/TRAINING PROGRAM
Payer: MEDICARE

## 2024-10-23 PROBLEM — N39.0 ACUTE UTI: Status: ACTIVE | Noted: 2024-10-23

## 2024-10-23 PROBLEM — Z71.89 ACP (ADVANCE CARE PLANNING): Status: ACTIVE | Noted: 2024-10-23

## 2024-10-23 LAB
ALBUMIN SERPL BCP-MCNC: 3.2 G/DL (ref 3.2–4.9)
APPEARANCE UR: CLEAR
BACTERIA #/AREA URNS HPF: ABNORMAL /HPF
BASOPHILS # BLD AUTO: 0.4 % (ref 0–1.8)
BASOPHILS # BLD: 0.06 K/UL (ref 0–0.12)
BILIRUB UR QL STRIP.AUTO: NEGATIVE
BUN SERPL-MCNC: 18 MG/DL (ref 8–22)
CALCIUM ALBUM COR SERPL-MCNC: 9.8 MG/DL (ref 8.5–10.5)
CALCIUM SERPL-MCNC: 9.2 MG/DL (ref 8.5–10.5)
CHLORIDE SERPL-SCNC: 106 MMOL/L (ref 96–112)
CK SERPL-CCNC: 74 U/L (ref 0–154)
CO2 SERPL-SCNC: 15 MMOL/L (ref 20–33)
COLOR UR: YELLOW
CREAT SERPL-MCNC: 0.99 MG/DL (ref 0.5–1.4)
CRP SERPL HS-MCNC: 6.75 MG/DL (ref 0–0.75)
EKG IMPRESSION: NORMAL
EOSINOPHIL # BLD AUTO: 0.46 K/UL (ref 0–0.51)
EOSINOPHIL NFR BLD: 3 % (ref 0–6.9)
EPI CELLS #/AREA URNS HPF: ABNORMAL /HPF
ERYTHROCYTE [DISTWIDTH] IN BLOOD BY AUTOMATED COUNT: 58.5 FL (ref 35.9–50)
ERYTHROCYTE [SEDIMENTATION RATE] IN BLOOD BY WESTERGREN METHOD: 120 MM/HOUR (ref 0–25)
FERRITIN SERPL-MCNC: 463 NG/ML (ref 10–291)
GFR SERPLBLD CREATININE-BSD FMLA CKD-EPI: 57 ML/MIN/1.73 M 2
GLUCOSE SERPL-MCNC: 80 MG/DL (ref 65–99)
GLUCOSE UR STRIP.AUTO-MCNC: NEGATIVE MG/DL
HCT VFR BLD AUTO: 25.8 % (ref 37–47)
HGB BLD-MCNC: 8.3 G/DL (ref 12–16)
HGB RETIC QN AUTO: 30.7 PG/CELL (ref 29–35)
IMM GRANULOCYTES # BLD AUTO: 0.38 K/UL (ref 0–0.11)
IMM GRANULOCYTES NFR BLD AUTO: 2.5 % (ref 0–0.9)
IMM RETICS NFR: 30.5 % (ref 2.6–16.1)
IRON SATN MFR SERPL: 13 % (ref 15–55)
IRON SERPL-MCNC: 19 UG/DL (ref 40–170)
KETONES UR STRIP.AUTO-MCNC: NEGATIVE MG/DL
LDH SERPL L TO P-CCNC: 401 U/L (ref 107–266)
LEUKOCYTE ESTERASE UR QL STRIP.AUTO: ABNORMAL
LYMPHOCYTES # BLD AUTO: 1.41 K/UL (ref 1–4.8)
LYMPHOCYTES NFR BLD: 9.1 % (ref 22–41)
MAGNESIUM SERPL-MCNC: 1.7 MG/DL (ref 1.5–2.5)
MCH RBC QN AUTO: 31.9 PG (ref 27–33)
MCHC RBC AUTO-ENTMCNC: 32.2 G/DL (ref 32.2–35.5)
MCV RBC AUTO: 99.2 FL (ref 81.4–97.8)
MICRO URNS: ABNORMAL
MONOCYTES # BLD AUTO: 0.63 K/UL (ref 0–0.85)
MONOCYTES NFR BLD AUTO: 4.1 % (ref 0–13.4)
NEUTROPHILS # BLD AUTO: 12.56 K/UL (ref 1.82–7.42)
NEUTROPHILS NFR BLD: 80.9 % (ref 44–72)
NITRITE UR QL STRIP.AUTO: NEGATIVE
NRBC # BLD AUTO: 0 K/UL
NRBC BLD-RTO: 0 /100 WBC (ref 0–0.2)
PH UR STRIP.AUTO: 6 [PH] (ref 5–8)
PHOSPHATE SERPL-MCNC: 3.6 MG/DL (ref 2.5–4.5)
PLATELET # BLD AUTO: 290 K/UL (ref 164–446)
PMV BLD AUTO: 11.6 FL (ref 9–12.9)
POTASSIUM SERPL-SCNC: 4.4 MMOL/L (ref 3.6–5.5)
PROT UR QL STRIP: NEGATIVE MG/DL
RBC # BLD AUTO: 2.6 M/UL (ref 4.2–5.4)
RBC # URNS HPF: ABNORMAL /HPF
RBC UR QL AUTO: NEGATIVE
RETICS # AUTO: 0.07 M/UL (ref 0.04–0.12)
RETICS/RBC NFR: 2.6 % (ref 0.8–2.6)
SODIUM SERPL-SCNC: 136 MMOL/L (ref 135–145)
SP GR UR STRIP.AUTO: 1.01
TIBC SERPL-MCNC: 152 UG/DL (ref 250–450)
TRANSFERRIN SERPL-MCNC: 118 MG/DL (ref 200–370)
TROPONIN T SERPL-MCNC: 29 NG/L (ref 6–19)
UIBC SERPL-MCNC: 133 UG/DL (ref 110–370)
UROBILINOGEN UR STRIP.AUTO-MCNC: 0.2 MG/DL
WBC # BLD AUTO: 15.5 K/UL (ref 4.8–10.8)
WBC #/AREA URNS HPF: ABNORMAL /HPF

## 2024-10-23 PROCEDURE — A9270 NON-COVERED ITEM OR SERVICE: HCPCS | Performed by: STUDENT IN AN ORGANIZED HEALTH CARE EDUCATION/TRAINING PROGRAM

## 2024-10-23 PROCEDURE — 86140 C-REACTIVE PROTEIN: CPT

## 2024-10-23 PROCEDURE — 36415 COLL VENOUS BLD VENIPUNCTURE: CPT

## 2024-10-23 PROCEDURE — 700111 HCHG RX REV CODE 636 W/ 250 OVERRIDE (IP): Mod: JZ | Performed by: STUDENT IN AN ORGANIZED HEALTH CARE EDUCATION/TRAINING PROGRAM

## 2024-10-23 PROCEDURE — 83615 LACTATE (LD) (LDH) ENZYME: CPT

## 2024-10-23 PROCEDURE — 700105 HCHG RX REV CODE 258: Performed by: STUDENT IN AN ORGANIZED HEALTH CARE EDUCATION/TRAINING PROGRAM

## 2024-10-23 PROCEDURE — 770006 HCHG ROOM/CARE - MED/SURG/GYN SEMI*

## 2024-10-23 PROCEDURE — 83540 ASSAY OF IRON: CPT

## 2024-10-23 PROCEDURE — 82550 ASSAY OF CK (CPK): CPT

## 2024-10-23 PROCEDURE — 80069 RENAL FUNCTION PANEL: CPT

## 2024-10-23 PROCEDURE — 85025 COMPLETE CBC W/AUTO DIFF WBC: CPT

## 2024-10-23 PROCEDURE — 97162 PT EVAL MOD COMPLEX 30 MIN: CPT

## 2024-10-23 PROCEDURE — 92610 EVALUATE SWALLOWING FUNCTION: CPT

## 2024-10-23 PROCEDURE — 84484 ASSAY OF TROPONIN QUANT: CPT

## 2024-10-23 PROCEDURE — 84466 ASSAY OF TRANSFERRIN: CPT

## 2024-10-23 PROCEDURE — 73620 X-RAY EXAM OF FOOT: CPT | Mod: RT

## 2024-10-23 PROCEDURE — 87040 BLOOD CULTURE FOR BACTERIA: CPT

## 2024-10-23 PROCEDURE — 85046 RETICYTE/HGB CONCENTRATE: CPT

## 2024-10-23 PROCEDURE — 83550 IRON BINDING TEST: CPT

## 2024-10-23 PROCEDURE — 82728 ASSAY OF FERRITIN: CPT

## 2024-10-23 PROCEDURE — 700102 HCHG RX REV CODE 250 W/ 637 OVERRIDE(OP): Performed by: STUDENT IN AN ORGANIZED HEALTH CARE EDUCATION/TRAINING PROGRAM

## 2024-10-23 PROCEDURE — 73610 X-RAY EXAM OF ANKLE: CPT | Mod: RT

## 2024-10-23 PROCEDURE — 99497 ADVNCD CARE PLAN 30 MIN: CPT | Performed by: STUDENT IN AN ORGANIZED HEALTH CARE EDUCATION/TRAINING PROGRAM

## 2024-10-23 PROCEDURE — 85652 RBC SED RATE AUTOMATED: CPT

## 2024-10-23 PROCEDURE — 83735 ASSAY OF MAGNESIUM: CPT

## 2024-10-23 PROCEDURE — 99223 1ST HOSP IP/OBS HIGH 75: CPT | Mod: 25 | Performed by: STUDENT IN AN ORGANIZED HEALTH CARE EDUCATION/TRAINING PROGRAM

## 2024-10-23 PROCEDURE — 97166 OT EVAL MOD COMPLEX 45 MIN: CPT

## 2024-10-23 RX ORDER — ACETAMINOPHEN 325 MG/1
650 TABLET ORAL EVERY 6 HOURS PRN
Status: DISCONTINUED | OUTPATIENT
Start: 2024-10-23 | End: 2024-10-23

## 2024-10-23 RX ORDER — OXYCODONE HYDROCHLORIDE 5 MG/1
2.5 TABLET ORAL
Status: DISCONTINUED | OUTPATIENT
Start: 2024-10-23 | End: 2024-10-25 | Stop reason: HOSPADM

## 2024-10-23 RX ORDER — HYDROMORPHONE HYDROCHLORIDE 1 MG/ML
0.25 INJECTION, SOLUTION INTRAMUSCULAR; INTRAVENOUS; SUBCUTANEOUS
Status: DISCONTINUED | OUTPATIENT
Start: 2024-10-23 | End: 2024-10-25 | Stop reason: HOSPADM

## 2024-10-23 RX ORDER — ACETAMINOPHEN 325 MG/1
650 TABLET ORAL EVERY 6 HOURS PRN
Status: DISCONTINUED | OUTPATIENT
Start: 2024-10-28 | End: 2024-10-25 | Stop reason: HOSPADM

## 2024-10-23 RX ORDER — CEFDINIR 300 MG/1
300 CAPSULE ORAL ONCE
Status: DISCONTINUED | OUTPATIENT
Start: 2024-10-23 | End: 2024-10-23

## 2024-10-23 RX ORDER — CLOPIDOGREL BISULFATE 75 MG/1
75 TABLET ORAL DAILY
Status: DISCONTINUED | OUTPATIENT
Start: 2024-10-23 | End: 2024-10-23

## 2024-10-23 RX ORDER — FOLIC ACID 1 MG/1
1 TABLET ORAL DAILY
Status: DISCONTINUED | OUTPATIENT
Start: 2024-10-23 | End: 2024-10-25 | Stop reason: HOSPADM

## 2024-10-23 RX ORDER — METOPROLOL TARTRATE 50 MG
50 TABLET ORAL TWICE DAILY
Status: DISCONTINUED | OUTPATIENT
Start: 2024-10-23 | End: 2024-10-25 | Stop reason: HOSPADM

## 2024-10-23 RX ORDER — CEFAZOLIN SODIUM 1 G/50ML
1 INJECTION, SOLUTION INTRAVENOUS EVERY 8 HOURS
Status: DISCONTINUED | OUTPATIENT
Start: 2024-10-24 | End: 2024-10-25 | Stop reason: HOSPADM

## 2024-10-23 RX ORDER — SODIUM CHLORIDE 9 MG/ML
1000 INJECTION, SOLUTION INTRAVENOUS CONTINUOUS
Status: DISCONTINUED | OUTPATIENT
Start: 2024-10-23 | End: 2024-10-23

## 2024-10-23 RX ORDER — LEVOTHYROXINE SODIUM 50 UG/1
50 TABLET ORAL
Status: DISCONTINUED | OUTPATIENT
Start: 2024-10-23 | End: 2024-10-25 | Stop reason: HOSPADM

## 2024-10-23 RX ORDER — ONDANSETRON 2 MG/ML
4 INJECTION INTRAMUSCULAR; INTRAVENOUS EVERY 4 HOURS PRN
Status: DISCONTINUED | OUTPATIENT
Start: 2024-10-23 | End: 2024-10-25 | Stop reason: HOSPADM

## 2024-10-23 RX ORDER — HEPARIN SODIUM 5000 [USP'U]/ML
5000 INJECTION, SOLUTION INTRAVENOUS; SUBCUTANEOUS EVERY 8 HOURS
Status: DISCONTINUED | OUTPATIENT
Start: 2024-10-23 | End: 2024-10-25 | Stop reason: HOSPADM

## 2024-10-23 RX ORDER — FLUTICASONE PROPIONATE 50 MCG
1-2 SPRAY, SUSPENSION (ML) NASAL DAILY
Status: DISCONTINUED | OUTPATIENT
Start: 2024-10-23 | End: 2024-10-23

## 2024-10-23 RX ORDER — ACETAMINOPHEN 325 MG/1
650 TABLET ORAL EVERY 6 HOURS
Status: DISCONTINUED | OUTPATIENT
Start: 2024-10-23 | End: 2024-10-25 | Stop reason: HOSPADM

## 2024-10-23 RX ORDER — LOSARTAN POTASSIUM 25 MG/1
25 TABLET ORAL DAILY
Status: DISCONTINUED | OUTPATIENT
Start: 2024-10-23 | End: 2024-10-25 | Stop reason: HOSPADM

## 2024-10-23 RX ORDER — ATORVASTATIN CALCIUM 40 MG/1
40 TABLET, FILM COATED ORAL EVERY EVENING
Status: DISCONTINUED | OUTPATIENT
Start: 2024-10-23 | End: 2024-10-25 | Stop reason: HOSPADM

## 2024-10-23 RX ORDER — LABETALOL HYDROCHLORIDE 5 MG/ML
10 INJECTION, SOLUTION INTRAVENOUS EVERY 4 HOURS PRN
Status: DISCONTINUED | OUTPATIENT
Start: 2024-10-23 | End: 2024-10-25 | Stop reason: HOSPADM

## 2024-10-23 RX ORDER — SODIUM CHLORIDE 9 MG/ML
1000 INJECTION, SOLUTION INTRAVENOUS CONTINUOUS
Status: DISCONTINUED | OUTPATIENT
Start: 2024-10-23 | End: 2024-10-24

## 2024-10-23 RX ORDER — SODIUM CHLORIDE 9 MG/ML
500 INJECTION, SOLUTION INTRAVENOUS
Status: DISCONTINUED | OUTPATIENT
Start: 2024-10-23 | End: 2024-10-25 | Stop reason: HOSPADM

## 2024-10-23 RX ORDER — CEFTRIAXONE 1 G/1
1000 INJECTION, POWDER, FOR SOLUTION INTRAMUSCULAR; INTRAVENOUS ONCE
Status: COMPLETED | OUTPATIENT
Start: 2024-10-23 | End: 2024-10-23

## 2024-10-23 RX ORDER — ASPIRIN 81 MG/1
81 TABLET ORAL DAILY
Status: DISCONTINUED | OUTPATIENT
Start: 2024-10-23 | End: 2024-10-25 | Stop reason: HOSPADM

## 2024-10-23 RX ORDER — OXYCODONE HYDROCHLORIDE 5 MG/1
5 TABLET ORAL
Status: DISCONTINUED | OUTPATIENT
Start: 2024-10-23 | End: 2024-10-25 | Stop reason: HOSPADM

## 2024-10-23 RX ORDER — ONDANSETRON 4 MG/1
4 TABLET, ORALLY DISINTEGRATING ORAL EVERY 4 HOURS PRN
Status: DISCONTINUED | OUTPATIENT
Start: 2024-10-23 | End: 2024-10-25 | Stop reason: HOSPADM

## 2024-10-23 RX ADMIN — ACETAMINOPHEN 650 MG: 325 TABLET ORAL at 05:21

## 2024-10-23 RX ADMIN — OXYCODONE 2.5 MG: 5 TABLET ORAL at 13:09

## 2024-10-23 RX ADMIN — METOPROLOL TARTRATE 50 MG: 50 TABLET, FILM COATED ORAL at 17:02

## 2024-10-23 RX ADMIN — LEVOTHYROXINE SODIUM 50 MCG: 0.05 TABLET ORAL at 05:08

## 2024-10-23 RX ADMIN — METOPROLOL TARTRATE 50 MG: 50 TABLET, FILM COATED ORAL at 03:16

## 2024-10-23 RX ADMIN — ACETAMINOPHEN 650 MG: 325 TABLET ORAL at 02:25

## 2024-10-23 RX ADMIN — ASPIRIN 81 MG: 81 TABLET, COATED ORAL at 05:08

## 2024-10-23 RX ADMIN — ACETAMINOPHEN 650 MG: 325 TABLET ORAL at 17:02

## 2024-10-23 RX ADMIN — ATORVASTATIN CALCIUM 40 MG: 40 TABLET, FILM COATED ORAL at 17:02

## 2024-10-23 RX ADMIN — ACETAMINOPHEN 650 MG: 325 TABLET ORAL at 13:09

## 2024-10-23 RX ADMIN — FOLIC ACID 1 MG: 1 TABLET ORAL at 05:08

## 2024-10-23 RX ADMIN — SODIUM CHLORIDE 1000 ML: 9 INJECTION, SOLUTION INTRAVENOUS at 02:31

## 2024-10-23 RX ADMIN — OXYCODONE 2.5 MG: 5 TABLET ORAL at 05:20

## 2024-10-23 RX ADMIN — CEFTRIAXONE SODIUM 1000 MG: 1 INJECTION, POWDER, FOR SOLUTION INTRAMUSCULAR; INTRAVENOUS at 05:08

## 2024-10-23 ASSESSMENT — COGNITIVE AND FUNCTIONAL STATUS - GENERAL
DRESSING REGULAR LOWER BODY CLOTHING: A LITTLE
DRESSING REGULAR LOWER BODY CLOTHING: A LITTLE
SUGGESTED CMS G CODE MODIFIER DAILY ACTIVITY: CK
WALKING IN HOSPITAL ROOM: A LOT
SUGGESTED CMS G CODE MODIFIER MOBILITY: CL
SUGGESTED CMS G CODE MODIFIER DAILY ACTIVITY: CK
DRESSING REGULAR UPPER BODY CLOTHING: A LITTLE
MOVING TO AND FROM BED TO CHAIR: A LOT
MOBILITY SCORE: 23
MOBILITY SCORE: 12
HELP NEEDED FOR BATHING: A LITTLE
STANDING UP FROM CHAIR USING ARMS: A LOT
PERSONAL GROOMING: A LITTLE
DAILY ACTIVITIY SCORE: 19
TOILETING: A LOT
MOVING FROM LYING ON BACK TO SITTING ON SIDE OF FLAT BED: A LOT
CLIMB 3 TO 5 STEPS WITH RAILING: TOTAL
HELP NEEDED FOR BATHING: A LITTLE
EATING MEALS: A LITTLE
DAILY ACTIVITIY SCORE: 17
DRESSING REGULAR UPPER BODY CLOTHING: A LITTLE
CLIMB 3 TO 5 STEPS WITH RAILING: A LITTLE
TOILETING: A LITTLE
SUGGESTED CMS G CODE MODIFIER MOBILITY: CI
TURNING FROM BACK TO SIDE WHILE IN FLAT BAD: A LITTLE
PERSONAL GROOMING: A LITTLE

## 2024-10-23 ASSESSMENT — LIFESTYLE VARIABLES
TOTAL SCORE: 0
HAVE PEOPLE ANNOYED YOU BY CRITICIZING YOUR DRINKING: NO
ON A TYPICAL DAY WHEN YOU DRINK ALCOHOL HOW MANY DRINKS DO YOU HAVE: 1
ALCOHOL_USE: YES
HAVE YOU EVER FELT YOU SHOULD CUT DOWN ON YOUR DRINKING: NO
DOES PATIENT WANT TO STOP DRINKING: NO
EVER FELT BAD OR GUILTY ABOUT YOUR DRINKING: NO
TOTAL SCORE: 0
AVERAGE NUMBER OF DAYS PER WEEK YOU HAVE A DRINK CONTAINING ALCOHOL: 1
EVER HAD A DRINK FIRST THING IN THE MORNING TO STEADY YOUR NERVES TO GET RID OF A HANGOVER: NO
TOTAL SCORE: 0
CONSUMPTION TOTAL: NEGATIVE
SUBSTANCE_ABUSE: 0
HOW MANY TIMES IN THE PAST YEAR HAVE YOU HAD 5 OR MORE DRINKS IN A DAY: 0

## 2024-10-23 ASSESSMENT — ENCOUNTER SYMPTOMS
HEADACHES: 0
DIZZINESS: 0
NAUSEA: 0
BRUISES/BLEEDS EASILY: 0
HEARTBURN: 0
FOCAL WEAKNESS: 1
FEVER: 0
DEPRESSION: 0
FALLS: 1
SHORTNESS OF BREATH: 0
NECK PAIN: 0
VOMITING: 0
DOUBLE VISION: 0
MYALGIAS: 1
BLURRED VISION: 0
CHILLS: 0
COUGH: 0
ABDOMINAL PAIN: 0
PALPITATIONS: 0

## 2024-10-23 ASSESSMENT — GAIT ASSESSMENTS
GAIT LEVEL OF ASSIST: SUPERVISED
DEVIATION: ANTALGIC
ASSISTIVE DEVICE: FRONT WHEEL WALKER
DISTANCE (FEET): 50

## 2024-10-23 ASSESSMENT — PAIN DESCRIPTION - PAIN TYPE
TYPE: ACUTE PAIN

## 2024-10-23 ASSESSMENT — FIBROSIS 4 INDEX: FIB4 SCORE: 0.54

## 2024-10-23 ASSESSMENT — SOCIAL DETERMINANTS OF HEALTH (SDOH)
WITHIN THE LAST YEAR, HAVE TO BEEN RAPED OR FORCED TO HAVE ANY KIND OF SEXUAL ACTIVITY BY YOUR PARTNER OR EX-PARTNER?: NO
WITHIN THE LAST YEAR, HAVE YOU BEEN AFRAID OF YOUR PARTNER OR EX-PARTNER?: NO
WITHIN THE LAST YEAR, HAVE YOU BEEN KICKED, HIT, SLAPPED, OR OTHERWISE PHYSICALLY HURT BY YOUR PARTNER OR EX-PARTNER?: NO
IN THE PAST 12 MONTHS, HAS THE ELECTRIC, GAS, OIL, OR WATER COMPANY THREATENED TO SHUT OFF SERVICE IN YOUR HOME?: NO
WITHIN THE PAST 12 MONTHS, THE FOOD YOU BOUGHT JUST DIDN'T LAST AND YOU DIDN'T HAVE MONEY TO GET MORE: NEVER TRUE
WITHIN THE PAST 12 MONTHS, YOU WORRIED THAT YOUR FOOD WOULD RUN OUT BEFORE YOU GOT THE MONEY TO BUY MORE: NEVER TRUE
WITHIN THE LAST YEAR, HAVE YOU BEEN HUMILIATED OR EMOTIONALLY ABUSED IN OTHER WAYS BY YOUR PARTNER OR EX-PARTNER?: NO

## 2024-10-23 ASSESSMENT — ACTIVITIES OF DAILY LIVING (ADL): TOILETING: INDEPENDENT

## 2024-10-24 ENCOUNTER — HOME HEALTH ADMISSION (OUTPATIENT)
Dept: HOME HEALTH SERVICES | Facility: HOME HEALTHCARE | Age: 84
End: 2024-10-24
Payer: MEDICARE

## 2024-10-24 PROBLEM — E87.20 ACIDOSIS, METABOLIC: Status: ACTIVE | Noted: 2024-10-24

## 2024-10-24 LAB
ANION GAP SERPL CALC-SCNC: 11 MMOL/L (ref 7–16)
BASOPHILS # BLD AUTO: 0.5 % (ref 0–1.8)
BASOPHILS # BLD: 0.08 K/UL (ref 0–0.12)
BUN SERPL-MCNC: 15 MG/DL (ref 8–22)
CALCIUM SERPL-MCNC: 8.4 MG/DL (ref 8.5–10.5)
CHLORIDE SERPL-SCNC: 106 MMOL/L (ref 96–112)
CO2 SERPL-SCNC: 15 MMOL/L (ref 20–33)
CREAT SERPL-MCNC: 1.04 MG/DL (ref 0.5–1.4)
EOSINOPHIL # BLD AUTO: 0.47 K/UL (ref 0–0.51)
EOSINOPHIL NFR BLD: 2.8 % (ref 0–6.9)
ERYTHROCYTE [DISTWIDTH] IN BLOOD BY AUTOMATED COUNT: 58.9 FL (ref 35.9–50)
GFR SERPLBLD CREATININE-BSD FMLA CKD-EPI: 53 ML/MIN/1.73 M 2
GLUCOSE SERPL-MCNC: 93 MG/DL (ref 65–99)
HCT VFR BLD AUTO: 26.9 % (ref 37–47)
HGB BLD-MCNC: 8.3 G/DL (ref 12–16)
IMM GRANULOCYTES # BLD AUTO: 0.27 K/UL (ref 0–0.11)
IMM GRANULOCYTES NFR BLD AUTO: 1.6 % (ref 0–0.9)
LYMPHOCYTES # BLD AUTO: 0.94 K/UL (ref 1–4.8)
LYMPHOCYTES NFR BLD: 5.7 % (ref 22–41)
MCH RBC QN AUTO: 30.6 PG (ref 27–33)
MCHC RBC AUTO-ENTMCNC: 30.9 G/DL (ref 32.2–35.5)
MCV RBC AUTO: 99.3 FL (ref 81.4–97.8)
MONOCYTES # BLD AUTO: 0.88 K/UL (ref 0–0.85)
MONOCYTES NFR BLD AUTO: 5.3 % (ref 0–13.4)
NEUTROPHILS # BLD AUTO: 13.92 K/UL (ref 1.82–7.42)
NEUTROPHILS NFR BLD: 84.1 % (ref 44–72)
NRBC # BLD AUTO: 0 K/UL
NRBC BLD-RTO: 0 /100 WBC (ref 0–0.2)
PLATELET # BLD AUTO: 313 K/UL (ref 164–446)
PMV BLD AUTO: 11.1 FL (ref 9–12.9)
POTASSIUM SERPL-SCNC: 4.2 MMOL/L (ref 3.6–5.5)
RBC # BLD AUTO: 2.71 M/UL (ref 4.2–5.4)
SODIUM SERPL-SCNC: 132 MMOL/L (ref 135–145)
WBC # BLD AUTO: 16.6 K/UL (ref 4.8–10.8)

## 2024-10-24 PROCEDURE — 36415 COLL VENOUS BLD VENIPUNCTURE: CPT

## 2024-10-24 PROCEDURE — 700111 HCHG RX REV CODE 636 W/ 250 OVERRIDE (IP): Mod: JZ,JG | Performed by: STUDENT IN AN ORGANIZED HEALTH CARE EDUCATION/TRAINING PROGRAM

## 2024-10-24 PROCEDURE — 700101 HCHG RX REV CODE 250: Performed by: HOSPITALIST

## 2024-10-24 PROCEDURE — 700102 HCHG RX REV CODE 250 W/ 637 OVERRIDE(OP): Performed by: STUDENT IN AN ORGANIZED HEALTH CARE EDUCATION/TRAINING PROGRAM

## 2024-10-24 PROCEDURE — A9270 NON-COVERED ITEM OR SERVICE: HCPCS | Performed by: STUDENT IN AN ORGANIZED HEALTH CARE EDUCATION/TRAINING PROGRAM

## 2024-10-24 PROCEDURE — 80048 BASIC METABOLIC PNL TOTAL CA: CPT

## 2024-10-24 PROCEDURE — 700102 HCHG RX REV CODE 250 W/ 637 OVERRIDE(OP): Performed by: HOSPITALIST

## 2024-10-24 PROCEDURE — 770006 HCHG ROOM/CARE - MED/SURG/GYN SEMI*

## 2024-10-24 PROCEDURE — 99233 SBSQ HOSP IP/OBS HIGH 50: CPT | Performed by: HOSPITALIST

## 2024-10-24 PROCEDURE — A9270 NON-COVERED ITEM OR SERVICE: HCPCS | Performed by: HOSPITALIST

## 2024-10-24 PROCEDURE — 85025 COMPLETE CBC W/AUTO DIFF WBC: CPT

## 2024-10-24 RX ORDER — LANOLIN ALCOHOL/MO/W.PET/CERES
400 CREAM (GRAM) TOPICAL DAILY
Status: DISCONTINUED | OUTPATIENT
Start: 2024-10-25 | End: 2024-10-25 | Stop reason: HOSPADM

## 2024-10-24 RX ORDER — FERROUS SULFATE 325(65) MG
325 TABLET ORAL
Status: DISCONTINUED | OUTPATIENT
Start: 2024-10-25 | End: 2024-10-25 | Stop reason: HOSPADM

## 2024-10-24 RX ORDER — SODIUM BICARBONATE 650 MG/1
650 TABLET ORAL 3 TIMES DAILY
Status: DISCONTINUED | OUTPATIENT
Start: 2024-10-24 | End: 2024-10-25 | Stop reason: HOSPADM

## 2024-10-24 RX ADMIN — ACETAMINOPHEN 650 MG: 325 TABLET ORAL at 23:58

## 2024-10-24 RX ADMIN — METOPROLOL TARTRATE 50 MG: 50 TABLET, FILM COATED ORAL at 04:33

## 2024-10-24 RX ADMIN — ACETAMINOPHEN 650 MG: 325 TABLET ORAL at 18:10

## 2024-10-24 RX ADMIN — CEFAZOLIN SODIUM 1 G: 1 INJECTION, SOLUTION INTRAVENOUS at 20:28

## 2024-10-24 RX ADMIN — ACETAMINOPHEN 650 MG: 325 TABLET ORAL at 05:45

## 2024-10-24 RX ADMIN — OXYCODONE 2.5 MG: 5 TABLET ORAL at 18:08

## 2024-10-24 RX ADMIN — ATORVASTATIN CALCIUM 40 MG: 40 TABLET, FILM COATED ORAL at 18:09

## 2024-10-24 RX ADMIN — METOPROLOL TARTRATE 50 MG: 50 TABLET, FILM COATED ORAL at 18:09

## 2024-10-24 RX ADMIN — SODIUM BICARBONATE 650 MG: 650 TABLET ORAL at 20:21

## 2024-10-24 RX ADMIN — ACETAMINOPHEN 650 MG: 325 TABLET ORAL at 11:02

## 2024-10-24 RX ADMIN — OXYCODONE 5 MG: 5 TABLET ORAL at 21:13

## 2024-10-24 RX ADMIN — ASPIRIN 81 MG: 81 TABLET, COATED ORAL at 04:33

## 2024-10-24 RX ADMIN — ACETAMINOPHEN 650 MG: 325 TABLET ORAL at 00:25

## 2024-10-24 RX ADMIN — FOLIC ACID 1 MG: 1 TABLET ORAL at 04:33

## 2024-10-24 RX ADMIN — LEVOTHYROXINE SODIUM 50 MCG: 0.05 TABLET ORAL at 04:33

## 2024-10-24 RX ADMIN — CEFAZOLIN SODIUM 1 G: 1 INJECTION, SOLUTION INTRAVENOUS at 14:31

## 2024-10-24 RX ADMIN — OXYCODONE 2.5 MG: 5 TABLET ORAL at 11:02

## 2024-10-24 RX ADMIN — CEFAZOLIN SODIUM 1 G: 1 INJECTION, SOLUTION INTRAVENOUS at 05:46

## 2024-10-24 RX ADMIN — OXYCODONE 2.5 MG: 5 TABLET ORAL at 04:32

## 2024-10-24 RX ADMIN — DICLOFENAC SODIUM 2 G: 10 GEL TOPICAL at 20:21

## 2024-10-24 ASSESSMENT — PAIN DESCRIPTION - PAIN TYPE
TYPE: ACUTE PAIN

## 2024-10-24 ASSESSMENT — ENCOUNTER SYMPTOMS
DOUBLE VISION: 0
BLURRED VISION: 0
PALPITATIONS: 0
HEARTBURN: 0
NAUSEA: 0
HEADACHES: 0
MYALGIAS: 0
DEPRESSION: 0
BRUISES/BLEEDS EASILY: 0
COUGH: 0
VOMITING: 0
CHILLS: 0
WHEEZING: 0
PND: 0
BACK PAIN: 0
FEVER: 0
DIZZINESS: 0
HEMOPTYSIS: 0
CLAUDICATION: 0

## 2024-10-25 VITALS
DIASTOLIC BLOOD PRESSURE: 45 MMHG | BODY MASS INDEX: 21.1 KG/M2 | HEIGHT: 61 IN | OXYGEN SATURATION: 98 % | SYSTOLIC BLOOD PRESSURE: 111 MMHG | TEMPERATURE: 97.6 F | RESPIRATION RATE: 17 BRPM | HEART RATE: 72 BPM | WEIGHT: 111.77 LBS

## 2024-10-25 PROBLEM — E83.42 HYPOMAGNESEMIA: Status: RESOLVED | Noted: 2022-09-06 | Resolved: 2024-10-25

## 2024-10-25 LAB
ANION GAP SERPL CALC-SCNC: 10 MMOL/L (ref 7–16)
BACTERIA UR CULT: ABNORMAL
BUN SERPL-MCNC: 16 MG/DL (ref 8–22)
CALCIUM SERPL-MCNC: 8.3 MG/DL (ref 8.5–10.5)
CHLORIDE SERPL-SCNC: 105 MMOL/L (ref 96–112)
CO2 SERPL-SCNC: 19 MMOL/L (ref 20–33)
CREAT SERPL-MCNC: 1.11 MG/DL (ref 0.5–1.4)
ERYTHROCYTE [DISTWIDTH] IN BLOOD BY AUTOMATED COUNT: 59.5 FL (ref 35.9–50)
GFR SERPLBLD CREATININE-BSD FMLA CKD-EPI: 49 ML/MIN/1.73 M 2
GLUCOSE SERPL-MCNC: 93 MG/DL (ref 65–99)
HCT VFR BLD AUTO: 27.1 % (ref 37–47)
HGB BLD-MCNC: 8.4 G/DL (ref 12–16)
MAGNESIUM SERPL-MCNC: 1.8 MG/DL (ref 1.5–2.5)
MCH RBC QN AUTO: 30.9 PG (ref 27–33)
MCHC RBC AUTO-ENTMCNC: 31 G/DL (ref 32.2–35.5)
MCV RBC AUTO: 99.6 FL (ref 81.4–97.8)
PLATELET # BLD AUTO: 302 K/UL (ref 164–446)
PMV BLD AUTO: 11.4 FL (ref 9–12.9)
POTASSIUM SERPL-SCNC: 4.1 MMOL/L (ref 3.6–5.5)
RBC # BLD AUTO: 2.72 M/UL (ref 4.2–5.4)
SIGNIFICANT IND 70042: ABNORMAL
SITE SITE: ABNORMAL
SODIUM SERPL-SCNC: 134 MMOL/L (ref 135–145)
SOURCE SOURCE: ABNORMAL
WBC # BLD AUTO: 13.7 K/UL (ref 4.8–10.8)

## 2024-10-25 PROCEDURE — A9270 NON-COVERED ITEM OR SERVICE: HCPCS | Performed by: HOSPITALIST

## 2024-10-25 PROCEDURE — 99239 HOSP IP/OBS DSCHRG MGMT >30: CPT | Performed by: INTERNAL MEDICINE

## 2024-10-25 PROCEDURE — 700102 HCHG RX REV CODE 250 W/ 637 OVERRIDE(OP): Performed by: HOSPITALIST

## 2024-10-25 PROCEDURE — A9270 NON-COVERED ITEM OR SERVICE: HCPCS | Performed by: STUDENT IN AN ORGANIZED HEALTH CARE EDUCATION/TRAINING PROGRAM

## 2024-10-25 PROCEDURE — 85027 COMPLETE CBC AUTOMATED: CPT

## 2024-10-25 PROCEDURE — 700111 HCHG RX REV CODE 636 W/ 250 OVERRIDE (IP): Mod: JZ,JG | Performed by: STUDENT IN AN ORGANIZED HEALTH CARE EDUCATION/TRAINING PROGRAM

## 2024-10-25 PROCEDURE — 700102 HCHG RX REV CODE 250 W/ 637 OVERRIDE(OP): Performed by: STUDENT IN AN ORGANIZED HEALTH CARE EDUCATION/TRAINING PROGRAM

## 2024-10-25 PROCEDURE — 80048 BASIC METABOLIC PNL TOTAL CA: CPT

## 2024-10-25 PROCEDURE — 36415 COLL VENOUS BLD VENIPUNCTURE: CPT

## 2024-10-25 PROCEDURE — 92526 ORAL FUNCTION THERAPY: CPT

## 2024-10-25 PROCEDURE — 83735 ASSAY OF MAGNESIUM: CPT

## 2024-10-25 RX ORDER — CEPHALEXIN 500 MG/1
500 CAPSULE ORAL 3 TIMES DAILY
Qty: 6 CAPSULE | Refills: 0 | Status: ACTIVE | DISCHARGE
Start: 2024-10-25 | End: 2024-10-27

## 2024-10-25 RX ORDER — OXYCODONE HYDROCHLORIDE 5 MG/1
2.5-5 TABLET ORAL EVERY 6 HOURS PRN
Qty: 10 TABLET | Refills: 0 | Status: SHIPPED | OUTPATIENT
Start: 2024-10-25 | End: 2024-10-30

## 2024-10-25 RX ORDER — ATORVASTATIN CALCIUM 40 MG/1
40 TABLET, FILM COATED ORAL EVERY EVENING
Status: SHIPPED
Start: 2024-10-25

## 2024-10-25 RX ADMIN — LEVOTHYROXINE SODIUM 50 MCG: 0.05 TABLET ORAL at 05:08

## 2024-10-25 RX ADMIN — CEFAZOLIN SODIUM 1 G: 1 INJECTION, SOLUTION INTRAVENOUS at 13:05

## 2024-10-25 RX ADMIN — OXYCODONE 5 MG: 5 TABLET ORAL at 13:54

## 2024-10-25 RX ADMIN — DICLOFENAC SODIUM 2 G: 10 GEL TOPICAL at 05:09

## 2024-10-25 RX ADMIN — ACETAMINOPHEN 650 MG: 325 TABLET ORAL at 05:08

## 2024-10-25 RX ADMIN — OXYCODONE 2.5 MG: 5 TABLET ORAL at 10:22

## 2024-10-25 RX ADMIN — ACETAMINOPHEN 650 MG: 325 TABLET ORAL at 12:17

## 2024-10-25 RX ADMIN — FOLIC ACID 1 MG: 1 TABLET ORAL at 05:08

## 2024-10-25 RX ADMIN — METOPROLOL TARTRATE 50 MG: 50 TABLET, FILM COATED ORAL at 05:09

## 2024-10-25 RX ADMIN — DICLOFENAC SODIUM 2 G: 10 GEL TOPICAL at 10:22

## 2024-10-25 RX ADMIN — ASPIRIN 81 MG: 81 TABLET, COATED ORAL at 05:09

## 2024-10-25 RX ADMIN — FERROUS SULFATE TAB 325 MG (65 MG ELEMENTAL FE) 325 MG: 325 (65 FE) TAB at 08:24

## 2024-10-25 RX ADMIN — Medication 400 MG: at 05:08

## 2024-10-25 RX ADMIN — SODIUM BICARBONATE 650 MG: 650 TABLET ORAL at 08:24

## 2024-10-25 RX ADMIN — CEFAZOLIN SODIUM 1 G: 1 INJECTION, SOLUTION INTRAVENOUS at 05:11

## 2024-10-25 ASSESSMENT — PAIN DESCRIPTION - PAIN TYPE
TYPE: ACUTE PAIN

## 2024-10-29 ENCOUNTER — TELEPHONE (OUTPATIENT)
Dept: MEDICAL GROUP | Facility: LAB | Age: 84
End: 2024-10-29
Payer: MEDICARE

## 2024-10-30 ENCOUNTER — APPOINTMENT (OUTPATIENT)
Dept: MEDICAL GROUP | Facility: LAB | Age: 84
End: 2024-10-30
Payer: MEDICARE

## 2024-10-31 ENCOUNTER — APPOINTMENT (OUTPATIENT)
Dept: MEDICAL GROUP | Facility: LAB | Age: 84
End: 2024-10-31
Payer: MEDICARE

## 2024-11-05 ENCOUNTER — TELEPHONE (OUTPATIENT)
Dept: MEDICAL GROUP | Facility: LAB | Age: 84
End: 2024-11-05
Payer: MEDICARE

## 2024-11-05 NOTE — TELEPHONE ENCOUNTER
Patient's daughter called and regarding patient.  Daughter stated that patient did have a fall and was transferred over to [facility].  They are inquiring/requesting a doctor's note so she can stay longer since symptoms of dizziness and fatigue are still there.  I did advise to the patient's daughter that I will be sending this message over to your primary care physician regarding this.  Patient also mention something about some home care stuff.  I did also let the patient's daughter know that when it comes down to any in-home care I would advise him to reach out to their insurance company to make sure that this type of services are available.

## 2024-12-03 NOTE — PROGRESS NOTES
Subjective     Mariely Easley is a 84 y.o. female who presents with New Patient (IOC/ Bone lesion/ YANIRA ESCALONA)          HPI    Patient referred to me, Intake Oncology Coordinator by Dr. Escalona for bone lesions.  Patient is accompanied by her daughter, Jam for today's visit.    Patient with multiple hospital admissions dating back to May 2024.  In May she did have pneumonia.  In July she presented for worsening clinical status, and after being discharged home, according to the daughter her mother declined significantly that they were unable to care for her.  She was brought back to the hospital and underwent multiple scans and found out that she had multiple strokes.  Was during this time where patient's clinical status did significantly decline.  She was discharged to a facility for short period of time and then back home.  However according to the daughter her mother never really went back to baseline.  And then in October 22, 2024 patient was brought back to the hospital after having a ground-level fall at home.  It was during that hospital admission where she had scans completed again, and among one of them was CT head without contrast.  The CT head showed subtle lytic areas in the skull bilaterally and recommended further workup.  This was the first time any abnormal finding within the bones were noted in the multiple hospital admissions which patient has had multiple imaging completed.  I did personally view the imaging reports in detail and reviewed the reports in detail with the patient's daughter today.  Much of the patient's history is from the daughter.    After discharge from the hospital patient was sent to [facility] where she currently is.  According to the daughter she continues to decline and is not able to communicate well.  The daughter states that she can tell that the mother wants to say something but is unable to find the words.  She is not eating very well or  "very much at this time.  She states that she is pocketing food in her mouth.  She cannot use her right arm or leg and daughter believes this might be related to deficits from the stroke.  She has been losing weight.  Per the daughter she is continuing to do therapy at the skilled facility including riding a bike, walking as well as speech therapy.  Today during my physical examination and review of systems, I was unable to get any significant response from the patient.  She did respond to some questions with a \"yes or no\" response.    Please see past medical and surgical history below.    Patient does have a family history of brain cancer in her mother as well as stomach cancer in her brother.    Patient does have a very remote former smoking history in which she quit in 1979.    No Known Allergies  Current Outpatient Medications on File Prior to Encounter   Medication Sig Dispense Refill    mirtazapine (REMERON) 15 MG Tab Take 7.5 mg by mouth every evening.      oxyCODONE immediate-release (ROXICODONE) 5 MG Tab Take 5 mg by mouth every 6 hours as needed for Severe Pain.      sennosides (SENOKOT) 8.6 MG Tab Take 8.6 mg by mouth 1 time a day as needed.      magnesium hydroxide (MILK OF MAGNESIA) 400 MG/5ML Suspension Take 30 mL by mouth 1 time a day as needed. 1200mg      sodium phosphate 7-19 GM/118ML Enema Insert 1 Enema into the rectum.      atorvastatin (LIPITOR) 40 MG Tab Take 1 Tablet by mouth every evening.      diclofenac sodium (VOLTAREN) 1 % Gel Apply 2 g topically 4 times a day as needed (right ankle pain).      metoprolol tartrate (LOPRESSOR) 50 MG Tab TAKE 1 TABLET BY MOUTH TWICE A DAY FOR HIGH BLOOD PRESSURE DISORDER 200 Tablet 0    losartan (COZAAR) 25 MG Tab Take 1 Tablet by mouth every day. Indications: High Blood Pressure Disorder 100 Tablet 3    ferrous sulfate 325 (65 Fe) MG tablet Take 1 Tablet by mouth every day. Indications: Iron Deficiency 90 Tablet 1    folic acid (FOLVITE) 1 MG Tab TAKE 1 " TABLET BY MOUTH EVERY DAY 90 Tablet 3    aspirin 81 MG EC tablet Take 81 mg by mouth every day. Indications: blood thinner      acetaminophen (TYLENOL) 500 MG Tab Take 1,000 mg by mouth every 6 hours as needed for Mild Pain. 2 tablets = 1,000 mg.  Indications: Fever, Pain      levothyroxine (SYNTHROID) 50 MCG Tab Take 1 Tab by mouth Every morning on an empty stomach. 90 Tab 3    loperamide (IMODIUM) 2 MG Cap Take 2 mg by mouth 4 times a day as needed for Diarrhea.      VITAMIN D PO Take 1 Tablet by mouth every morning.       No current facility-administered medications on file prior to encounter.     Past Medical History:   Diagnosis Date    Abnormal albumin 2022    Acute renal failure superimposed on stage 3 chronic kidney disease (HCC) 2019    Chronic abdominal pain 2024    Cognitive and behavioral changes 2024    Dyspnea on minimal exertion 2023    Hypermagnesemia 2022    Hypertension     Hypokalemia 2024    Hyponatremia 2022    Nausea and vomiting 2024    Pain and swelling of left lower extremity 2022    Pneumonia 2024    Severe aortic valve regurgitation 2023    SOB (shortness of breath) 2023    Stage 4 chronic kidney disease (HCC) 2023    Stroke ([facility])     Superficial thrombosis of lower extremity, right 2022    Vitamin B12 deficiency 2019    Vitamin D deficiency 11/10/2023     Past Surgical History:   Procedure Laterality Date    OTHER CARDIAC SURGERY      valve replacement - in [place]     Family History   Problem Relation Age of Onset    Cancer Mother         Brain    Cancer Brother         Stomach     Social History     Socioeconomic History    Marital status:    Tobacco Use    Smoking status: Former     Current packs/day: 0.00     Types: Cigarettes     Quit date: 1979     Years since quittin.3    Smokeless tobacco: Never   Vaping Use    Vaping status: Never Used   Substance and Sexual Activity     Alcohol use: Not Currently     Alcohol/week: 8.4 oz     Types: 14 Glasses of wine per week     Comment: 2 glasses wine daily    Drug use: No   Social History Narrative    Lives in a SNF ([facility])      Social Drivers of Health     Financial Resource Strain: Low Risk  (8/9/2022)    Overall Financial Resource Strain (CARDIA)     Difficulty of Paying Living Expenses: Not hard at all   Food Insecurity: No Food Insecurity (10/23/2024)    Hunger Vital Sign     Worried About Running Out of Food in the Last Year: Never true     Ran Out of Food in the Last Year: Never true   Transportation Needs: No Transportation Needs (10/23/2024)    PRAPARE - Transportation     Lack of Transportation (Medical): No     Lack of Transportation (Non-Medical): No   Physical Activity: Inactive (8/9/2022)    Exercise Vital Sign     Days of Exercise per Week: 0 days     Minutes of Exercise per Session: 0 min   Stress: No Stress Concern Present (8/9/2022)    [employer] [place] [employer] - Occupational Stress Questionnaire     Feeling of Stress : Not at all   Social Connections: Feeling Socially Integrated (3/28/2023)    OASIS : Social Isolation     Frequency of experiencing loneliness or isolation: Never   Intimate Partner Violence: Not At Risk (10/23/2024)    Humiliation, Afraid, Rape, and Kick questionnaire     Fear of Current or Ex-Partner: No     Emotionally Abused: No     Physically Abused: No     Sexually Abused: No   Housing Stability: Low Risk  (10/23/2024)    Housing Stability Vital Sign     Unable to Pay for Housing in the Last Year: No     Number of Times Moved in the Last Year: 0     Homeless in the Last Year: No         Review of Systems   Reason unable to perform ROS: Patient unable to provide information on ROS.              Objective     /50 (BP Location: Left arm, Patient Position: Sitting, BP Cuff Size: Small adult)   Pulse 99   Temp 36.7 °C (98.1 °F) (Temporal)   SpO2 97%      Physical Exam  Vitals  "reviewed.   Constitutional:       General: She is not in acute distress.     Appearance: She is not diaphoretic.   Cardiovascular:      Rate and Rhythm: Normal rate and regular rhythm.   Neurological:      Motor: Weakness present.            CT-HEAD W/O    IMPRESSION:  10/22/24   1.  No acute intracranial abnormality is identified, there are nonspecific white matter changes, commonly associated with small vessel ischemic disease.  Associated mild cerebral atrophy is noted.  2.  Subtle lytic areas in the skull bilaterally, recommend follow-up bone scan for determination of metabolic activity as clinically appropriate.  3.  Atherosclerosis.           Assessment & Plan     1. Bone lesion  Monoclonal Protein and FLC, Serum    CT-CHEST,ABDOMEN,PELVIS WITH              Patient with subtle lytic lesions in the skull bilaterally.  Discussed with daughter that malignancy should be ruled out.  Discussed multiple myeloma versus metastatic disease.  I will go ahead and request a monoclonal protein study and serum free light chains and I will draw that in the clinic today to get that processed.  I have also recommended moving forward with a CT chest, abdomen and pelvis to look for primary site.  If anything is abnormal then we did discuss biopsy would be warranted.  Daughter is very concerned about her mother's significant decline and questioning the reason for the decline.  I did discuss the recommendations in detail, and the patient was able to respond \"yes\" that she is willing to move forward with the recommendations as well as the daughter.  I will have the patient follow-up with me in the clinic after imaging and labs have been completed to discuss further recommendations.      Please note that this dictation was created using voice recognition software. I have made every reasonable attempt to correct obvious errors, but I expect that there are errors of grammar and possibly content that I did not discover before finalizing " the note.

## 2024-12-09 NOTE — TELEPHONE ENCOUNTER
Called patients daughter to inform her of future appointment for her mom Mariely. CT and Follow up. Pt verbalized understanding.

## 2024-12-10 NOTE — TELEPHONE ENCOUNTER
Spoke to Jam with regards to patient's lab results.  Jam was the daughter present at the initial consultation.  She was seen for bone lesions.  I did labs to rule out a multiple myeloma.  Her SPEP was completely normal with the immunofixation showing no evidence of a monoclonal protein.  Her light chain ratio was within normal limits.  Her immunoglobulins were all within normal limits.  Therefore at this time I am not concerned for multiple myeloma.    CT chest, abdomen and pelvis is currently scheduled for 1/8/2025 and patient is scheduled to see me in the clinic afterwards to review those results.  I did speak to Jam with regards to the results and she was pleased.         Latest Reference Range & Units 12/03/24 10:50   Albumin 3.75 - 5.01 g/dL 2.45 (L)   Gamma Globulin 0.62 - 1.51 g/dL 0.84   Alpha-1 Globulin 0.19 - 0.46 g/dL 0.57 (H)   Alpha-2 Globulin 0.48 - 1.05 g/dL 0.92   Beta Globulin 0.48 - 1.10 g/dL 0.72   Free Kappa Light Chains 3.30 - 19.40 mg/L 49.73 (H)   Free Lambda Light Chains 5.71 - 26.30 mg/L 88.19 (H)   Kappa-Lambda Ratio 0.26 - 1.65  0.56      Latest Reference Range & Units 12/03/24 10:50   Immunoglobulin A 68 - 408 mg/dL 301   Immunoglobulin G 768 - 1632 mg/dL 815   Immunoglobulin M 35 - 263 mg/dL 52

## 2024-12-17 PROBLEM — N17.9 AKI (ACUTE KIDNEY INJURY) (HCC): Status: ACTIVE | Noted: 2024-01-01

## 2024-12-18 PROBLEM — J10.1 INFLUENZA A: Status: ACTIVE | Noted: 2024-01-01

## 2024-12-18 PROBLEM — Z86.73 RECENT CEREBROVASCULAR ACCIDENT (CVA): Status: ACTIVE | Noted: 2024-01-01

## 2024-12-18 NOTE — CARE PLAN
The patient is Stable - Low risk of patient condition declining or worsening    Shift Goals  Clinical Goals: pain control, admit, wound care  Patient Goals: rest  Family Goals: POC    Progress made toward(s) clinical / shift goals:  Abd administered per MAR. Pain managed with prescribed medications. Patient admitted to floor. Wound sites assessed and pictured uploaded to [facility] wound consult ordered.       Problem: Knowledge Deficit - Standard  Goal: Patient and family/care givers will demonstrate understanding of plan of care, disease process/condition, diagnostic tests and medications  Outcome: Progressing     Problem: Pain - Standard  Goal: Alleviation of pain or a reduction in pain to the patient’s comfort goal  Outcome: Progressing     Problem: Skin Integrity  Goal: Skin integrity is maintained or improved  Outcome: Progressing

## 2024-12-18 NOTE — ASSESSMENT & PLAN NOTE
With ongoing right sided deficits, coming from [facility].  Deficits unchanged per family at bedside, also with speech and swallow deficits.   SLP/PT/OT ordered.  Continue aspirin and Lipitor 40 mg daily.  Nasogastric tube ordered for medications given lack of safe PO intake

## 2024-12-18 NOTE — WOUND TEAM
"[facility]  Inpatient Services  Initial Wound and Skin Care Evaluation    Admission Date: 2024     Last order of IP CONSULT TO WOUND CARE was found on 2024 from Hospital Encounter on 2024     HPI, PMH, SH: Reviewed    Past Surgical History:   Procedure Laterality Date    OTHER CARDIAC SURGERY      valve replacement - in [place]     Social History     Tobacco Use    Smoking status: Former     Current packs/day: 0.00     Types: Cigarettes     Quit date: 1979     Years since quittin.4    Smokeless tobacco: Never   Substance Use Topics    Alcohol use: Not Currently     Alcohol/week: 8.4 oz     Types: 14 Glasses of wine per week     Comment: 2 glasses wine daily     Chief Complaint   Patient presents with    ALOC     Diagnosis: TIFFANI (acute kidney injury) (HCC) [N17.9]    Unit where seen by Wound Team: T4     WOUND CONSULT RELATED TO:  R foot, sacrum, R elbow, R lower back, and R hip    WOUND TEAM PLAN OF CARE - Frequency of Follow-up:   Nursing to follow dressing orders written for wound care. Contact wound team if area fails to progress, deteriorates or with any questions/concerns if something comes up before next scheduled follow up (See below as to whether wound is following and frequency of wound follow up)   Weekly - R elbow & R heel  Bi-Monthly - Sacrum  Not following, consult as needed  - R medial & dorsal foot, R back, and R hip    WOUND HISTORY:   Per hospitalist H&P: \"Mariely Easley is a 84 y.o. 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     Patient is at [facility], they are currently having influenza outbreak.  Patient presented after noted to be more fatigued, cognitive deficits more pronounced than usual, dyspneic and dehydrated.  Per daughters at bedside they did not believe she has been having any significant p.o. intake " "while there, they note multiple times they arrived and there is no water in reach or in the room, or sometimes it is on her right side which she is suffering from some hemineglect and right-sided deficits though unable to retrieve.  Due to the above she presented for evaluation.\"       WOUND ASSESSMENT/LDA      Wound 12/17/24 Pressure Injury Hip;Ischium Posterior Right POA DTI (Active)   Wound Image    12/18/24 1500   Site Assessment Red;Pink;Fragile 12/18/24 1500   Periwound Assessment Pink;Red 12/18/24 1500   Margins Attached edges 12/18/24 1500   Closure Open to air 12/18/24 1500   Drainage Amount None 12/18/24 1500   Treatments Cleansed;Nonselective debridement;Site care;Offloading 12/18/24 1500   Wound Cleansing Soap and Water 10/23/24 0917   Periwound Protectant Barrier Paste 12/18/24 1500   Dressing Status Open to Air 12/18/24 1500   Dressing Options Silicone Adhesive Foam;Open to Air 12/17/24 2216   Dressing Change/Treatment Frequency Every Shift, and As Needed 12/18/24 1500   NEXT Dressing Change/Treatment Date 12/19/24 12/18/24 1500   NEXT Weekly Photo (Inpatient Only) 12/25/24 12/18/24 1500   Wound Team Following Not following 12/18/24 1500   WOUND NURSE ONLY - Pressure Injury Stage DTPI 12/18/24 1500       Wound 12/17/24 Pressure Injury Elbow Posterior Right POA Unstageable (Active)   Wound Image   12/18/24 1500   Site Assessment Eschar;Slough;Boggy 12/18/24 1500   Periwound Assessment Pink;Red 12/18/24 1500   Margins Attached edges;Defined edges 12/18/24 1500   Closure Secondary intention 12/18/24 1500   Drainage Amount None 12/18/24 1500   Treatments Cleansed;Nonselective debridement;Site care;Offloading 12/18/24 1500   Wound Cleansing Approved Wound Cleanser 12/18/24 1500   Periwound Protectant Barrier Paste 12/18/24 1500   Dressing Status Clean;Dry;Intact 12/18/24 1500   Dressing Changed Changed 12/18/24 1500   Dressing Cleansing/Solutions Normal Saline 12/18/24 1500   Dressing Options Offloading " Dressing - Heel 12/18/24 1500   Dressing Change/Treatment Frequency Every Shift, and As Needed 12/18/24 1500   NEXT Dressing Change/Treatment Date 12/19/24 12/18/24 1500   NEXT Weekly Photo (Inpatient Only) 12/25/24 12/18/24 1500   Wound Team Following Weekly 12/18/24 1500   WOUND NURSE ONLY - Pressure Injury Stage U 12/18/24 1500   Wound Length (cm) 3.2 cm 12/18/24 1500   Wound Width (cm) 1.4 cm 12/18/24 1500   Wound Surface Area (cm^2) 4.48 cm^2 12/18/24 1500       Wound 12/17/24 Pressure Injury Foot;Ankle Lateral Right POA Unstageable (Active)   Wound Image    12/18/24 1500   Site Assessment Red;Slough;Eschar 12/18/24 1500   Periwound Assessment Pink;Red;Purple 12/18/24 1500   Margins Attached edges;Defined edges 12/18/24 1500   Closure Secondary intention 12/18/24 1500   Drainage Amount Small 12/18/24 1500   Drainage Description Serosanguineous 12/18/24 1500   Treatments Cleansed;Nonselective debridement;Site care 12/18/24 1500   Wound Cleansing Approved Wound Cleanser 12/18/24 1500   Periwound Protectant No-sting Skin Prep 12/18/24 1500   Dressing Status Dry;Clean;Intact 12/18/24 1500   Dressing Changed Changed 12/18/24 1500   Dressing Cleansing/Solutions Not Applicable 12/18/24 1500   Dressing Options Hydrofiber Silver 12/18/24 1500   Dressing Change/Treatment Frequency Every 48 hrs, and As Needed 12/18/24 1500   NEXT Dressing Change/Treatment Date 12/20/24 12/18/24 1500   NEXT Weekly Photo (Inpatient Only) 12/25/24 12/18/24 1500   Wound Team Following Weekly 12/18/24 1500   WOUND NURSE ONLY - Pressure Injury Stage U 12/18/24 1500   Wound Length (cm) 2.1 cm 12/18/24 1500   Wound Width (cm) 3.5 cm 12/18/24 1500   Wound Surface Area (cm^2) 7.35 cm^2 12/18/24 1500   Shape Oval x2 12/18/24 1500   Wound Odor None 12/18/24 1500   Pulses Right;1+;DP 12/18/24 1500   Exposed Structures ISI 12/18/24 1500   WOUND NURSE ONLY - Time Spent with Patient (mins) 60 12/18/24 1500       Wound 12/17/24 Pressure Injury Sacrum POA  Evolving DTI (Active)   Wound Image   12/18/24 1500   Site Assessment Red;Pink;Fragile;Purple 12/18/24 1500   Periwound Assessment Pink;Red;Fragile 12/18/24 1500   Margins Attached edges 12/18/24 1500   Closure Secondary intention 12/18/24 1500   Drainage Amount Small 12/18/24 1500   Drainage Description Serosanguineous 12/18/24 1500   Treatments Cleansed;Nonselective debridement;Site care;Offloading 12/18/24 1500   Wound Cleansing Foam Cleanser/Washcloth 12/18/24 1500   Periwound Protectant Barrier Paste 12/18/24 1500   Dressing Status Open to Air 12/18/24 1500   Dressing Change/Treatment Frequency Every Shift, and As Needed 12/18/24 1500   NEXT Dressing Change/Treatment Date 12/19/24 12/18/24 1500   NEXT Weekly Photo (Inpatient Only) 12/25/24 12/18/24 1500   Wound Team Following Bi-Monthly 12/18/24 1500   WOUND NURSE ONLY - Pressure Injury Stage DTPI 12/18/24 1500   Wound Length (cm) 4.5 cm 12/18/24 1500   Wound Width (cm) 1.5 cm 12/18/24 1500   Wound Surface Area (cm^2) 6.75 cm^2 12/18/24 1500   Shape Irregular 12/18/24 1500   Exposed Structures None 12/18/24 1500       Wound 12/17/24 Pressure Injury Shoulder;Flank Right POA Unstageable (Active)   Wound Image   12/18/24 1500   Site Assessment Red;Brown;Slough 12/18/24 1500   Periwound Assessment Red 12/18/24 1500   Margins Attached edges;Defined edges 12/18/24 1500   Closure Secondary intention 12/18/24 1500   Drainage Amount Scant 12/18/24 1500   Drainage Description Serosanguineous 12/18/24 1500   Treatments Cleansed;Nonselective debridement;Site care;Autolytic debridement;Offloading 12/18/24 1500   Wound Cleansing Approved Wound Cleanser 12/18/24 1500   Periwound Protectant No-sting Skin Prep 12/18/24 1500   Dressing Status Clean;Dry;Intact 12/18/24 1500   Dressing Changed Changed 12/18/24 1500   Dressing Cleansing/Solutions Not Applicable 12/18/24 1500   Dressing Options Hydrocolloid Thin;Offloading Dressing - Heel 12/18/24 1500   Dressing Change/Treatment  Frequency Every 72 hrs, and As Needed 12/18/24 1500   NEXT Dressing Change/Treatment Date 12/21/24 12/18/24 1500   NEXT Weekly Photo (Inpatient Only) 12/25/24 12/18/24 1500   Wound Team Following Not following 12/18/24 1500   WOUND NURSE ONLY - Pressure Injury Stage U 12/18/24 1500       Vascular:    CHERRI:   No results found.    Lab Values:    Lab Results   Component Value Date/Time    WBC 17.9 (H) 12/18/2024 12:38 AM    RBC 2.38 (L) 12/18/2024 12:38 AM    HEMOGLOBIN 6.8 (L) 12/18/2024 12:38 AM    HEMATOCRIT 24.3 (L) 12/18/2024 12:38 AM    CREACTPROT 12.43 (H) 12/17/2024 05:04 PM    SEDRATEWES 120 (H) 10/23/2024 01:49 AM    HBA1C 5.1 07/03/2024 07:31 AM         Culture Results show:  No results found for this or any previous visit (from the past 720 hours).    Pain Level/Medicated:  None, Tolerated without pain medication       INTERVENTIONS BY WOUND TEAM:  Chart and images reviewed. Discussed with bedside RN. All areas of concern (based on picture review, LDA review and discussion with bedside RN) have been thoroughly assessed. Documentation of areas based on significant findings. This RN in to assess patient. Performed standard wound care which includes appropriate positioning, dressing removal and non-selective debridement. Pictures and measurements obtained weekly if/when required.    Wound:  R back  Preparation for Dressing removal: Removed without difficulty  Cleansed/Non-selectively Debrided with:  Wound cleanser and Gauze  Nenita wound: Cleansed with Wound cleanser and Gauze, Prepped with No Sting  Primary Dressing:  Hydrocolloid thin  Secondary (Outer) Dressing: Offloading adhesive foam     Wound:  Sacrum & R hip  Preparation for Dressing removal: Removed without difficulty  Cleansed/Non-selectively Debrided with:  Wound cleanser and Gauze  Primary Dressing:  Barrier paste, RASHARD     Wound:  R Heel  Preparation for Dressing removal: Removed without difficulty  Cleansed/Non-selectively Debrided with:  Wound  cleanser and Gauze  Nenita wound: Cleansed with Wound cleanser and Gauze, Prepped with No Sting  Primary Dressing:  Aquacel Ag  Secondary (Outer) Dressing: Offloading adhesive foam, heel float boot     Wound:  R Elbow  Preparation for Dressing removal: Open to air  Cleansed/Non-selectively Debrided with:  Wound cleanser and Gauze  Nenita wound: Cleansed with Wound cleanser and Gauze, Prepped with Barrier paste  Primary Dressing:  NS moistened roll gauze  Secondary (Outer) Dressing: Offloading adhesive foam    Advanced Wound Care Discharge Planning  Number of Clinicians necessary to complete wound care: 1  Is patient requiring IV pain medications for dressing changes:  No   Length of time for dressing change 20 min. (This does not include chart review, pre-medication time, set up, clean up or time spent charting.)    Interdisciplinary consultation: Patient, Bedside RN (Vanessa), Jazmin SIMON (Wound RN).     EVALUATION / RATIONALE FOR TREATMENT:     Date:  12/18/24  Wound Status:  Initial evaluation    Patient with numerous POA injuries.  R Back with two POA unstageable pressure injuries. Hydrocolloid thin applied for autolytic debridement of nonviable tissue.  Sacrum & R Hip with POA evolving DTI, barrier paste applied as patient is incontinent  R dorsal and medial ankle with POA DTIs, areas offloaded.  R Heel with POA unstageable pressure injury. Aquacel Ag Hydrofiber applied to manage bioburden, absorb exudate, and encourage formation of a stable eschar cap to protect heel adipose pad.  R Elbow with POA unstageable pressure injury. Wound bed is boggy with adhered slough, Dakins wet to dry ordered to chemically debride nonviable tissue, decrease bioburden and odor.          Goals: Steady decrease in wound area and depth weekly.    NURSING PLAN OF CARE ORDERS:  Dressing changes: See Dressing Care orders  RN Prevention Protocol    NUTRITION RECOMMENDATIONS   Wound Team Recommendations:  N/A    DIET ORDERS (From admission to  next 24h)       Start     Ordered    12/17/24 2242  Diet Order Diet: Level 4 - Pureed; Liquid level: Level 0 - Thin; Second Modifier: (optional): Cardiac; Additive: Other (specify additive comments in the field to the right) (Ensure); Additive Frequency: TID  ALL MEALS        Question Answer Comment   Diet: Level 4 - Pureed    Liquid level Level 0 - Thin    Second Modifier: (optional) Cardiac    Additive: Other (specify additive comments in the field to the right) Ensure   Additive Frequency TID        12/17/24 2242                    PREVENTATIVE INTERVENTIONS:    Q shift Salvatore - performed per nursing policy  Q shift pressure point assessments - performed per nursing policy    Surface/Positioning  Low Airloss - Ordered  Standard/trauma mattress - Currently in Place  Reposition q 2 hours - Currently in Place  TAPs Turning system - Currently in Place    Offloading/Redistribution  Heel offloading dressing (Silicone dressing) - Currently in Place  Heel float boots (Prevalon boot) - Currently in Place  Float Heels off Bed with Pillows - Currently in Place         Anticipated discharge plans:  TBD        Vac Discharge Needs:  Vac Discharge plan is purely a recommendation from wound team and not a requirement for discharge unless otherwise stated by physician.  Not Applicable Pt not on a wound vac

## 2024-12-18 NOTE — CONSULTS
MRN: [id number]  Date of palliative consult: 2024  Reason for consult: ACP/GOC  Referring provider: Dr. Peace  Location of consult: [facility]  Additional consulting services: hospital medicine    HPI:   Mariely Easley is a 84 y.o. 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 Patient noted to have UTI, influenza A and TIFFANI on CKD. PC was consulted to assist with GOC discussions.     ROS:    Review of Systems   Reason unable to perform ROS: acuity of condition.       PE:   Recent vital signs  BMI: Body mass index is 20.78 kg/m².    Temp (24hrs), Av.7 °C (98.1 °F), Min:36.2 °C (97.1 °F), Max:37.2 °C (99 °F)  Temperature: 37.1 °C (98.8 °F)  Pulse  Av.4  Min: 60  Max: 125   Blood Pressure : 117/56       Physical Exam  Constitutional:       General: She is not in acute distress.     Appearance: She is ill-appearing.   Neurological:      Mental Status: She is lethargic.      Comments: Patient did not attempt vocalization         ASSESSMENT/PLAN WITH SHARED DECISION MAKING:   PHYSICAL ASPECTS OF CARE  Palliative Performance Scale: 20%    # CVA with residual weakness and speech/swallow deficits  # TIFFANI on CKD  # Influenza A  # Recurrent UTIs    SOCIAL ASPECTS OF CARE  Patient has three daughters, Natasha, Jam, and Luisa. Patient had a CVA in 2024 and has residual aphasia and right sided weakness as a result of this. Prior to her stroke patient was living with her daughter Luisa and was independent in all ADLs.    SPIRITUAL ASPECTS OF CARE   Deferred during this encounter.    GOALS OF CARE/SERIOUS ILLNESS CONVERSATION  Consult received and EMR reviewed. Case discussed with Dr. Earl, updates appreciated. Discussed with primary RN. Met with patient at bedside, she did not attempt vocalization and is not able to participate in GOC discussion at this time.  "    Phone call placed to patient's daughter, Natasha, who is coming back up to the bedside now and is agreeable to meet to discuss GOC.    PC APRN met with Natasha in there conference room. Introduced self, role of palliative care, and reason for consultation. Natasha agreeable to consultation/ACP discussion at this time. She provided update on the social and medical history. Queried if pt has ever discussed healthcare wishes and values or completed an Advance Directive in the past. Natasha states that patient does have an AD naming herself as DPOA. Contact information provided and Natasha will provide a copy of this document when she is able.     Assessed Natasha's understanding of current medical status, overall health picture, and options for future care. Demonstrates a good understanding of the current clinical picture.    Explored patient's expressed values, beliefs, and preferences in order to identify GOC. Natasha shares that patient has always been focused on longevity. She states that patient's wishes have always been to \"be saved.\" Discussed the issue of code status and recommend consideration of DNAR/DNI status. Discussed burden vs benefit of resuscitative efforts and educated that DNAR status does not preclude pt from receiving all appropriate medical treatment up until the time of cardiac arrest. Natasha was appropriately tearful and verbalized understanding. She shares that she will discuss with her sisters but does not feel comfortable changing code status without taking with them. Empathetic support provided and discussed PC availability to assist with this discussion.    Active listening, reflection, reminiscing, validation & normalization, and empathic support utilized throughout this encounter.  All questions answered and contact information provided.     Code Status: Full    ACP Documents: None    20 minutes spent discussing advance care planning, this time excludes any other billed services.    Interval " diagnostic studies and medical documentation entries pertinent to this case were reviewed independently by me. This patient has at least one acute or chronic illness or injury that poses a threat to life or bodily function. This patient suffers from a high risk of morbidity from additional invasive diagnostic testing or intensive treatment. Discussion of recommendations and coordination of care undertaken with primary provider/treatment team.      ED Tijerina  Palliative Care Nurse Practitioner   [phone]
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
## 2024-12-18 NOTE — ED PROVIDER NOTES
ED Provider Note    CHIEF COMPLAINT  Chief Complaint   Patient presents with    ALOC       EXTERNAL RECORDS REVIEWED  Patient was admitted to the hospital October 22 to 25 for a UTI.  She has a history of CVA with residual right-sided weakness, hypertension, aortic regurgitation, CKD 3 and hypothyroidism.    HPI/ROS  LIMITATION TO HISTORY   Altered mental status  OUTSIDE HISTORIAN(S):  Daughters at bedside    Mariely Easley is a 84 y.o. female who presents for evaluation of altered mental status.  Daughter said that she has not been acting herself this week.  She does have cognitive deficits since her stroke says that she has been worse than usual.  Her daughters felt that she looked more dehydrated and has not been breathing well.  The flu has been going around her facility and they said that her roommate seemed to be very sick.  Patient says that she has difficulty feeding herself due to her stroke and they do not think that she has been eating or drinking.  They also said that she has been increasingly fatigued.  She was given Tylenol at the nursing facility at 1230.  EMS gave the 250 cc bolus prior to arrival.    PAST MEDICAL HISTORY   has a past medical history of Abnormal albumin (09/16/2022), Acute renal failure superimposed on stage 3 chronic kidney disease (HCC) (07/24/2019), Chronic abdominal pain (03/21/2024), Cognitive and behavioral changes (07/01/2024), Dyspnea on minimal exertion (02/06/2023), Hypermagnesemia (09/16/2022), Hypertension, Hypokalemia (07/12/2024), Hyponatremia (06/28/2022), Nausea and vomiting (03/21/2024), Pain and swelling of left lower extremity (05/18/2022), Pneumonia (06/27/2024), Severe aortic valve regurgitation (02/09/2023), SOB (shortness of breath) (01/08/2023), Stage 4 chronic kidney disease (HCC) (06/26/2023), Stroke ([facility]), Superficial thrombosis of lower extremity, right (07/05/2022), Vitamin B12 deficiency (12/16/2019), and Vitamin D deficiency  (11/10/2023).    SURGICAL HISTORY   has a past surgical history that includes other cardiac surgery ().    FAMILY HISTORY  Family History   Problem Relation Age of Onset    Cancer Mother         Brain    Cancer Brother         Stomach       SOCIAL HISTORY  Social History     Tobacco Use    Smoking status: Former     Current packs/day: 0.00     Types: Cigarettes     Quit date: 1979     Years since quittin.4    Smokeless tobacco: Never   Vaping Use    Vaping status: Never Used   Substance and Sexual Activity    Alcohol use: Not Currently     Alcohol/week: 8.4 oz     Types: 14 Glasses of wine per week     Comment: 2 glasses wine daily    Drug use: No    Sexual activity: Not on file       CURRENT MEDICATIONS  Home Medications       Reviewed by Mariam Arzola (Pharmacy Tech) on 24 at 2107  Med List Status: Complete     Medication Last Dose Status   acetaminophen (TYLENOL) 325 MG Tab 2024 Active   Amino Acids-Protein Hydrolys (PRO-STAT PO) 2024 Active   aspirin 81 MG EC tablet 2024 Active   atorvastatin (LIPITOR) 40 MG Tab 2024 Active   bisacodyl (DULCOLAX) 10 MG Suppos Unknown Active   CARBOXYMETHYLCELLULOSE SODIUM OP 2024 Active   diclofenac sodium (VOLTAREN) 1 % Gel 2024 Active   ferrous sulfate 325 (65 Fe) MG tablet 2024 Active   folic acid (FOLVITE) 1 MG Tab 2024 Active   levothyroxine (SYNTHROID) 50 MCG Tab 2024 Active   losartan (COZAAR) 25 MG Tab 2024 Active   magnesium hydroxide (MILK OF MAGNESIA) 400 MG/5ML Suspension Unknown Active   metoprolol tartrate (LOPRESSOR) 50 MG Tab 2024 Active   mirtazapine (REMERON) 7.5 MG tablet 2024 Active   MULTIPLE VITAMIN PO 2024 Active   oxyCODONE immediate-release (ROXICODONE) 5 MG Tab 2024 Active   polymixin-trimethoprim (POLYTRIM) 77161-5.1 UNIT/ML-% Solution 2024 Active   senna-docusate (PERICOLACE OR SENOKOT S) 8.6-50 MG Tab 2024 Active   sodium  "phosphate 7-19 GM/118ML Enema Unknown Active                  Audit from Redirected Encounters    **Home medications have not yet been reviewed for this encounter**         ALLERGIES  No Known Allergies    PHYSICAL EXAM  VITAL SIGNS: BP (!) 154/61 Comment: ivan pepper aware  Pulse (!) 113 Comment: ivan pepper aware  Temp 36.8 °C (98.2 °F) (Temporal)   Resp 20   Ht 1.549 m (5' 1\")   Wt 49.9 kg (110 lb)   SpO2 96%   BMI 20.78 kg/m²    Constitutional: Awake and alert Ill  appearing   HENT: Normal inspection  Dry mucous membranes  Eyes: Normal inspection  Neck: Grossly normal range of motion.  Cardiovascular: Tachycardic heart rate, Normal rhythm.  Symmetric peripheral pulses.   Thorax & Lungs: No respiratory distress, No wheezing, No rales, No rhonchi, No chest tenderness.   Abdomen: Soft, non-distended, nontender to palpation in all 4 quadrants, no mass  Skin: No obvious rash.  Extremities: Warm, well perfused. No clubbing, cyanosis, edema   Neurologic: Grossly normal   Psychiatric: Normal for situation      EKG/LABS  Results for orders placed or performed during the hospital encounter of 12/17/24   Lactic Acid    Collection Time: 12/17/24  5:04 PM   Result Value Ref Range    Lactic Acid 1.0 0.5 - 2.0 mmol/L   CBC with Differential    Collection Time: 12/17/24  5:04 PM   Result Value Ref Range    WBC 14.6 (H) 4.8 - 10.8 K/uL    RBC 2.54 (L) 4.20 - 5.40 M/uL    Hemoglobin 7.1 (L) 12.0 - 16.0 g/dL    Hematocrit 24.7 (L) 37.0 - 47.0 %    MCV 97.2 81.4 - 97.8 fL    MCH 28.0 27.0 - 33.0 pg    MCHC 28.7 (L) 32.2 - 35.5 g/dL    RDW 59.2 (H) 35.9 - 50.0 fL    Platelet Count 425 164 - 446 K/uL    MPV 11.6 9.0 - 12.9 fL    Neutrophils-Polys 87.80 (H) 44.00 - 72.00 %    Lymphocytes 5.20 (L) 22.00 - 41.00 %    Monocytes 2.60 0.00 - 13.40 %    Eosinophils 3.50 0.00 - 6.90 %    Basophils 0.00 0.00 - 1.80 %    Nucleated RBC 0.30 (H) 0.00 - 0.20 /100 WBC    Neutrophils (Absolute) 12.95 (H) 1.82 - 7.42 K/uL    Lymphs (Absolute) " 0.76 (L) 1.00 - 4.80 K/uL    Monos (Absolute) 0.38 0.00 - 0.85 K/uL    Eos (Absolute) 0.51 0.00 - 0.51 K/uL    Baso (Absolute) 0.00 0.00 - 0.12 K/uL    NRBC (Absolute) 0.04 K/uL    Anisocytosis 1+     Microcytosis 1+    Complete Metabolic Panel    Collection Time: 12/17/24  5:04 PM   Result Value Ref Range    Sodium 145 135 - 145 mmol/L    Potassium 4.1 3.6 - 5.5 mmol/L    Chloride 115 (H) 96 - 112 mmol/L    Co2 18 (L) 20 - 33 mmol/L    Anion Gap 12.0 7.0 - 16.0    Glucose 115 (H) 65 - 99 mg/dL    Bun 44 (H) 8 - 22 mg/dL    Creatinine 1.51 (H) 0.50 - 1.40 mg/dL    Calcium 8.3 (L) 8.5 - 10.5 mg/dL    Correct Calcium 9.3 8.5 - 10.5 mg/dL    AST(SGOT) 17 12 - 45 U/L    ALT(SGPT) 11 2 - 50 U/L    Alkaline Phosphatase 70 30 - 99 U/L    Total Bilirubin 0.3 0.1 - 1.5 mg/dL    Albumin 2.8 (L) 3.2 - 4.9 g/dL    Total Protein 5.6 (L) 6.0 - 8.2 g/dL    Globulin 2.8 1.9 - 3.5 g/dL    A-G Ratio 1.0 g/dL   ESTIMATED GFR    Collection Time: 12/17/24  5:04 PM   Result Value Ref Range    GFR (CKD-EPI) 34 (A) >60 mL/min/1.73 m 2   DIFFERENTIAL MANUAL    Collection Time: 12/17/24  5:04 PM   Result Value Ref Range    Bands-Stabs 0.90 0.00 - 10.00 %    Manual Diff Status PERFORMED    PERIPHERAL SMEAR REVIEW    Collection Time: 12/17/24  5:04 PM   Result Value Ref Range    Peripheral Smear Review see below    PLATELET ESTIMATE    Collection Time: 12/17/24  5:04 PM   Result Value Ref Range    Plt Estimation Normal    MORPHOLOGY    Collection Time: 12/17/24  5:04 PM   Result Value Ref Range    RBC Morphology Present     Poikilocytosis 1+     Ovalocytes 1+     Tear Drop Cells 1+     Toxic Vacuoles Few    MAGNESIUM    Collection Time: 12/17/24  5:04 PM   Result Value Ref Range    Magnesium 2.0 1.5 - 2.5 mg/dL   PHOSPHORUS    Collection Time: 12/17/24  5:04 PM   Result Value Ref Range    Phosphorus 4.4 2.5 - 4.5 mg/dL   CRP QUANTITIVE (NON-CARDIAC)    Collection Time: 12/17/24  5:04 PM   Result Value Ref Range    Stat C-Reactive Protein 12.43  (H) 0.00 - 0.75 mg/dL   PROCALCITONIN    Collection Time: 12/17/24  5:04 PM   Result Value Ref Range    Procalcitonin 0.31 (H) <0.25 ng/mL   proBrain Natriuretic Peptide, NT    Collection Time: 12/17/24  5:04 PM   Result Value Ref Range    NT-proBNP 6533 (H) 0 - 125 pg/mL   CREATINE KINASE    Collection Time: 12/17/24  5:04 PM   Result Value Ref Range    CPK Total 78 0 - 154 U/L   Blood Culture - Draw one from central line and one from peripheral site    Collection Time: 12/17/24  5:08 PM    Specimen: Line; Blood   Result Value Ref Range    Significant Indicator NEG     Source BLD     Site Peripheral     Culture Result       No Growth  Note: Blood cultures are incubated for 5 days and  are monitored continuously.Positive blood cultures  are called to the RN and reported as soon as  they are identified.     POC CoV-2, FLU A/B, RSV by PCR    Collection Time: 12/17/24  5:21 PM   Result Value Ref Range    POC Influenza A RNA, PCR POSITIVE (A) Negative    POC Influenza B RNA, PCR Negative Negative    POC RSV, by PCR Negative Negative    POC SARS-CoV-2, PCR NotDetected NotDetected   Urinalysis    Collection Time: 12/17/24  5:23 PM    Specimen: Urine, Cath   Result Value Ref Range    Color Yellow     Character Turbid (A)     Specific Gravity 1.013 <1.035    Ph 6.0 5.0 - 8.0    Glucose Negative Negative mg/dL    Ketones Negative Negative mg/dL    Protein 100 (A) Negative mg/dL    Bilirubin Negative Negative    Urobilinogen, Urine 0.2 <=1.0 EU/dL    Nitrite Positive (A) Negative    Leukocyte Esterase Large (A) Negative    Occult Blood Small (A) Negative    Micro Urine Req Microscopic    URINE MICROSCOPIC (W/UA)    Collection Time: 12/17/24  5:23 PM   Result Value Ref Range    WBC >100 (A) /hpf    RBC 0-2 0 - 2 /hpf    Bacteria Many (A) None /hpf    Epithelial Cells 0-2 0 - 5 /hpf    Urine Casts 0-2 0 - 2 /lpf   Blood Culture - Draw one from central line and one from peripheral site    Collection Time: 12/17/24  5:42 PM     Specimen: Peripheral; Blood   Result Value Ref Range    Significant Indicator NEG     Source BLD     Site PERIPHERAL     Culture Result       No Growth  Note: Blood cultures are incubated for 5 days and  are monitored continuously.Positive blood cultures  are called to the RN and reported as soon as  they are identified.     EKG    Collection Time: 24  6:03 PM   Result Value Ref Range    Report       [facility] Emergency Dept.    Test Date:  2024  Pt Name:    JNOES BERMUDEZ                Department: ER  MRN:        [id number]                      Room:       [facility]  Gender:     Female                       Technician: [id number]  :        [id number]                   Requested By:ER TRIAGE PROTOCOL  Order #:    [id number]                    Reading MD:    Measurements  Intervals                                Axis  Rate:       100                          P:          -41  AK:         183                          QRS:        28  QRSD:       77                           T:          63  QT:         352  QTc:        454    Interpretive Statements  Sinus tachycardia  Compared to ECG 10/22/2024 21:06:56  Sinus rhythm no longer present  Possible ischemia no longer present         I have independently interpreted this EKG    RADIOLOGY/PROCEDURES   I have independently interpreted the diagnostic imaging associated with this visit and am waiting the final reading from the radiologist.   My preliminary interpretation is as follows: No focal infiltrate    Radiologist interpretation:  DX-CHEST-PORTABLE (1 VIEW)   Final Result      Cardiomegaly with interstitial opacities.          COURSE & MEDICAL DECISION MAKING    ASSESSMENT, COURSE AND PLAN  Care Narrative: This is an 84-year-old female with a history of CVA with right sided deficits who presents with altered mental status.  She arrives mildly tachycardic and is ill-appearing.  She is not hypoxic. She has right sided deficits but this is her baseline  otherwise no focal deficits.  She is minimally participatory with history but daughters state it is her baseline.  Patient's labs notable for an TIFFANI with a creatinine of 1.51, mild acidosis.  She is also flu A positive and has a urinary tract infection.  Her lactic acid is normal.  X-ray shows interstitial opacities but no focal infiltrate.  I will start her on ceftriaxone and Tamiflu as well.  Presentation is concerning for sepsis with leukocytosis, tachycardia and endorgan dysfunction.  She is not hypotensive, no signs of septic shock.  Discussed with the hospitalist who will accept the patient for further management.    Sepsis: Infection was suspected 7.00PM (Time). Sepsis pathway was initiated. Fluids not needed (no hypotension or lactate greater than or equal to 4). Antibiotics were given per protocol.  Additionally patient with an elevated BNP and therefore was not given additional fluid resuscitation.      Hydration: Based on the patient's presentation of Dehydration the patient was given IV fluids. IV Hydration was used because oral hydration was not adequate alone. Upon recheck following hydration, the patient was improved.              DISPOSITION AND DISCUSSIONS  I have discussed management of the patient with the following physicians and SATURNINO's:  Dr. Peace    Discussion of management with other [facility] or appropriate source(s): None     FINAL DIAGNOSIS  1. Altered mental status, unspecified altered mental status type Acute   2. Acute UTI Acute   3. Influenza A Acute        Electronically signed by: Regina Carter M.D., 12/17/2024 6:13 PM

## 2024-12-18 NOTE — THERAPY
Occupational Therapy Contact Note    Patient Name: Mariely Easley  Age:  84 y.o., Sex:  female  Medical Record #: [id number]  Today's Date: 12/18/2024    Discussed missed therapy with RN     12/18/24 [id number]   Initial Contact Note    Initial Contact Note Order Received and Verified, Occupational Therapy Evaluation in Progress with Full Report to Follow.   Interdisciplinary Plan of Care Collaboration   IDT Collaboration with  Nursing  (EMR)   Collaboration Comments OT orders received and chart reviewed. Per chart and RN, patient with low H&H (6.8 & 24.3), tachycardic with HR in the low 100s -140s and demonstrates very limited arousal. Will hold OT eval and re-attempt as appropriate/able.   Session Information   Date / Session Number  12/18 Hold - needs eval

## 2024-12-18 NOTE — H&P
Hospital Medicine History & Physical Note    Date of Service  12/17/2024    Primary Care Physician  Luz Vieira D.O.    Consultants  None    Code Status  Full Code    Chief Complaint  Chief Complaint   Patient presents with    ALOC       History of Presenting Illness  Mariely Easley is a 84 y.o. 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    Patient is at [facility], they are currently having influenza outbreak.  Patient presented after noted to be more fatigued, cognitive deficits more pronounced than usual, dyspneic and dehydrated.  Per daughters at bedside they did not believe she has been having any significant p.o. intake while there, they note multiple times they arrived and there is no water in reach or in the room, or sometimes it is on her right side which she is suffering from some hemineglect and right-sided deficits though unable to retrieve.  Due to the above she presented for evaluation.    In the ED afebrile pulse up to 125 respiratory rate 16-20 systolic blood pressure from 90s to 150s pulse ox 91 to 96% on room air.  She has leukocytosis 14.6 anemia hemoglobin 7.1 trending down, TIFFANI serum creatinine 1.51 metabolic acidosis bicarb 18 low albumin and total protein lactic acid 1.0 proBNP 6500 positive influenza A chest x-ray shows cardiomegaly with interstitial opacities, UA suggestive of infection.  Patient was given antibiotics, subsequently referred to hospitalist for admission.    I discussed the plan of care with patient, family, bedside RN, charge RN, , and pharmacy.    Review of Systems  Review of Systems   Constitutional:  Positive for malaise/fatigue.   Respiratory:  Positive for cough and shortness of breath.    Musculoskeletal:  Positive for myalgias.   Neurological:  Positive for weakness.       Past Medical History   has a past medical  history of Abnormal albumin (09/16/2022), Acute renal failure superimposed on stage 3 chronic kidney disease (HCC) (07/24/2019), Chronic abdominal pain (03/21/2024), Cognitive and behavioral changes (07/01/2024), Dyspnea on minimal exertion (02/06/2023), Hypermagnesemia (09/16/2022), Hypertension, Hypokalemia (07/12/2024), Hyponatremia (06/28/2022), Nausea and vomiting (03/21/2024), Pain and swelling of left lower extremity (05/18/2022), Pneumonia (06/27/2024), Severe aortic valve regurgitation (02/09/2023), SOB (shortness of breath) (01/08/2023), Stage 4 chronic kidney disease (HCC) (06/26/2023), Stroke ([facility]), Superficial thrombosis of lower extremity, right (07/05/2022), Vitamin B12 deficiency (12/16/2019), and Vitamin D deficiency (11/10/2023).    Surgical History   has a past surgical history that includes other cardiac surgery (2022).     Family History  family history includes Cancer in her brother and mother.   Family history reviewed with patient. There is no family history that is pertinent to the chief complaint.     Social History   reports that she quit smoking about 45 years ago. Her smoking use included cigarettes. She has never used smokeless tobacco. She reports that she does not currently use alcohol after a past usage of about 8.4 oz of alcohol per week. She reports that she does not use drugs.    Allergies  No Known Allergies    Medications  Prior to Admission Medications   Prescriptions Last Dose Informant Patient Reported? Taking?   Amino Acids-Protein Hydrolys (PRO-STAT PO) 12/17/2024 at  8:00 AM MAR from Other Facility Yes Yes   Sig: Take 30 mL by mouth every day.   CARBOXYMETHYLCELLULOSE SODIUM OP 12/17/2024 at  3:00 PM MAR from Other Facility Yes Yes   Sig: Administer 2 Drops into affected eye(s) in the morning, at noon, and at bedtime.   MULTIPLE VITAMIN PO 12/17/2024 Morning MAR from Other Facility Yes Yes   Sig: Take 1 Tablet by mouth every morning.   acetaminophen (TYLENOL) 325 MG Tab  12/17/2024 at 12:27 PM MAR from Other Facility Yes Yes   Sig: Take 650 mg by mouth every 6 hours as needed for Mild Pain. 2 tablets = 650 mg  Indications: Fever, Pain   aspirin 81 MG EC tablet 12/17/2024 Morning MAR from Other Facility Yes Yes   Sig: Take 81 mg by mouth every day. Indications: blood thinner   atorvastatin (LIPITOR) 40 MG Tab 12/16/2024 Bedtime MAR from Other Facility No Yes   Sig: Take 1 Tablet by mouth every evening.   bisacodyl (DULCOLAX) 10 MG Suppos Unknown MAR from Other Facility Yes No   Sig: Insert 10 mg into the rectum as needed.   diclofenac sodium (VOLTAREN) 1 % Gel 12/17/2024 at 12:00 PM MAR from Other Facility No Yes   Sig: Apply 2 g topically 4 times a day as needed (right ankle pain).   ferrous sulfate 325 (65 Fe) MG tablet 12/17/2024 Morning MAR from Other Facility No Yes   Sig: Take 1 Tablet by mouth every day. Indications: Iron Deficiency   folic acid (FOLVITE) 1 MG Tab 12/17/2024 Morning MAR from Other Facility No Yes   Sig: TAKE 1 TABLET BY MOUTH EVERY DAY   levothyroxine (SYNTHROID) 50 MCG Tab 12/17/2024 MAR from Other Facility No Yes   Sig: Take 1 Tab by mouth Every morning on an empty stomach.   losartan (COZAAR) 25 MG Tab 12/16/2024 Morning MAR from Other Facility No Yes   Sig: Take 1 Tablet by mouth every day. Indications: High Blood Pressure Disorder   magnesium hydroxide (MILK OF MAGNESIA) 400 MG/5ML Suspension Unknown MAR from Other Facility Yes No   Sig: Take 30 mL by mouth 1 time a day as needed.   metoprolol tartrate (LOPRESSOR) 50 MG Tab 12/17/2024 Morning MAR from Other Facility No Yes   Sig: TAKE 1 TABLET BY MOUTH TWICE A DAY FOR HIGH BLOOD PRESSURE DISORDER   mirtazapine (REMERON) 7.5 MG tablet 12/16/2024 at  8:00 PM MAR from Other Facility Yes Yes   Sig: Take 7.5 mg by mouth every evening.   oxyCODONE immediate-release (ROXICODONE) 5 MG Tab 12/16/2024 at  4:31 AM MAR from Other Facility Yes Yes   Sig: Take 2.5 mg by mouth every 6 hours as needed for Severe Pain.  2.5 mg = 1/2 tab   polymixin-trimethoprim (POLYTRIM) 83601-2.1 UNIT/ML-% Solution 12/12/2024 MAR from Other Facility Yes Yes   Sig: Administer 1 Drop into the right eye every 3 hours. X 7 days  PRESCRIPTION COURSE WAS COMPLETED   senna-docusate (PERICOLACE OR SENOKOT S) 8.6-50 MG Tab 12/16/2024 Bedtime MAR from Other Facility Yes Yes   Sig: Take 2 Tablets by mouth every evening. Hold for loose stools   sodium phosphate 7-19 GM/118ML Enema Unknown MAR from Other Facility Yes No   Sig: Insert 1 Enema into the rectum.      Facility-Administered Medications: None       Physical Exam  Temp:  [36.2 °C (97.1 °F)-37.2 °C (99 °F)] 36.2 °C (97.2 °F)  Pulse:  [100-125] 108  Resp:  [16-20] 18  BP: ()/(48-63) 107/48  SpO2:  [91 %-96 %] 95 %  Blood Pressure : 107/48   Temperature: 36.2 °C (97.2 °F)   Pulse: (!) 108   Respiration: 18   Pulse Oximetry: 95 %       Physical Exam  Vitals and nursing note reviewed. Exam conducted with a chaperone present.   Constitutional:       General: She is not in acute distress.     Appearance: She is ill-appearing.   HENT:      Head: Normocephalic and atraumatic.      Nose: Nose normal. No rhinorrhea.      Mouth/Throat:      Mouth: Mucous membranes are dry.      Comments: Dried blood around lower jaw tooth, anterior  Eyes:      General: No scleral icterus.     Extraocular Movements: Extraocular movements intact.      Comments: Right eye with crusting on eyelid   Cardiovascular:      Rate and Rhythm: Normal rate and regular rhythm.      Pulses: Normal pulses.   Pulmonary:      Effort: No respiratory distress.      Breath sounds: Rales present. No wheezing or rhonchi.   Abdominal:      General: There is no distension.      Palpations: Abdomen is soft.      Tenderness: There is no abdominal tenderness. There is no guarding or rebound.   Musculoskeletal:      Cervical back: Normal range of motion and neck supple. No rigidity.      Comments: ROM decreased on the right since her CVA   Skin:      "General: Skin is warm and dry.      Capillary Refill: Capillary refill takes less than 2 seconds.   Neurological:      Mental Status: She is alert.      Cranial Nerves: Cranial nerve deficit present.      Sensory: Sensory deficit present.      Motor: Weakness present.      Coordination: Coordination abnormal.      Comments: CVA in July with residual right-sided deficits, weakness, cognitive deficits, speech deficits, swallow deficits, no new deficits         Laboratory:  Recent Labs     12/17/24  1704 12/18/24  0038   WBC 14.6* 17.9*   RBC 2.54* 2.38*   HEMOGLOBIN 7.1* 6.8*   HEMATOCRIT 24.7* 24.3*   MCV 97.2 102.1*   MCH 28.0 28.6   MCHC 28.7* 28.0*   RDW 59.2* 61.1*   PLATELETCT 425 415   MPV 11.6 11.7     Recent Labs     12/17/24  1704 12/18/24  0038   SODIUM 145 145   POTASSIUM 4.1 4.2   CHLORIDE 115* 115*   CO2 18* 17*   GLUCOSE 115* 114*   BUN 44* 43*   CREATININE 1.51* 1.46*   CALCIUM 8.3* 8.5     Recent Labs     12/17/24  1704 12/18/24  0038   ALTSGPT 11  --    ASTSGOT 17  --    ALKPHOSPHAT 70  --    TBILIRUBIN 0.3  --    GLUCOSE 115* 114*         Recent Labs     12/17/24 1704   NTPROBNP 6533*         No results for input(s): \"TROPONINT\" in the last 72 hours.    Imaging:  DX-CHEST-PORTABLE (1 VIEW)   Final Result      Cardiomegaly with interstitial opacities.          X-Ray:  I have personally reviewed the images and compared with prior images.  EKG:  I have personally reviewed the images and compared with prior images.    Assessment/Plan:  Justification for Admission Status  I anticipate this patient will require at least two midnights for appropriate medical management, necessitating inpatient admission because acute respiratory failure secondary to influenza, possible bacterial coinfection, TIFFANI, encephalopathy, UTI      * TIFFANI (acute kidney injury) (HCC)- (present on admission)  Assessment & Plan  Suspecting Pre-renal etiology   Check U/a & CPK  Rule out post obstruction  IVF  Renal dose meds and avoid " nephrotoxins  Monitor I&O's  Follow renal function    Influenza A  Assessment & Plan  Tamiflu, supportive care    Recent cerebrovascular accident (CVA)  Assessment & Plan  With ongoing right sided deficits, coming from [facility].  Deficits unchanged per family at bedside, also with speech and swallow deficits.   SLP/PT/OT ordered.  Continue aspirin and Lipitor 40 mg daily.      ACP (advance care planning)- (present on admission)  Assessment & Plan  I discussed advance care planning with the patient and family for at least 20 minutes, including diagnosis, prognosis, plan of care, risks and benefits of any therapies that could be offered, as well as alternatives including palliation and hospice, as appropriate.  Long discussion with patient and daughters at bedside, will keep full code per their wishes.  Recommend palliative consult    History of aortic valve replacement with bioprosthetic valve- (present on admission)  Assessment & Plan  Monitor volume status    Pneumonia due to infectious organism- (present on admission)  Assessment & Plan  Diagnosed with influenza A, imaging findings suspicious for possible bacterial infection as well.  Check procalcitonin.  On Unasyn for now    Recurrent UTI- (present on admission)  Assessment & Plan  On Unasyn for possible pneumonia as well, follow cultures, previously had pansensitive E. coli    Iron deficiency anemia- (present on admission)  Assessment & Plan  Continue iron supplementation, patient has been bleeding from recent dental procedure, has evidence of dried blood around tooth/post.  Anemia has been downtrending, hemoglobin 7.1 on admission, no reports of GI bleeding    Mixed hyperlipidemia- (present on admission)  Assessment & Plan  Lipitor 40 mg daily    Acquired hypothyroidism- (present on admission)  Assessment & Plan  Continue Synthroid    Essential hypertension- (present on admission)  Assessment & Plan  Continue metoprolol, holding losartan due to TIFFANI        VTE  prophylaxis: SCDs/TEDs  Total time spent on [id number].    This included my review with ER physician, review of ED events, patient's history, outside records, recent records, face to face interview, physical examination; my review of lab results (CBC, chemistry panel), imaging review (CXR) and ECG. Also includes counseling patient on admission, treatment plan, and documentation of encounter.  In addition, speaking with specialist Dr. Carter

## 2024-12-18 NOTE — PROGRESS NOTES
Received report from ED nurse  Assessment complete.  A&O x 1.   Patient  bedbound per report.   Patient has 3/10 pain. Pain managed with prescribed medications.  Denies N&V. Tolerating diet.  + void, Last BM 12/17  Patient denies SOB..  Patient lying in bed. Family at bedside   Review plan with of care with patient. Call light and personal belongings with in reach. Hourly rounding in place. All needs met at this time.

## 2024-12-18 NOTE — THERAPY
Speech Language Therapy Contact Note    Patient Name: Mariely Easley  Age:  84 y.o., Sex:  female  Medical Record #: [id number]  Today's Date: 12/18/2024    Discussed missed therapy with RN       12/18/24 1425   Treatment Variance   Reason For Missed Therapy Medical - Patient Unarousable   Initial Contact Note    Initial Contact Note  Order Received and Verified, Speech Therapy Evaluation in Progress with Full Report to Follow.   Interdisciplinary Plan of Care Collaboration   IDT Collaboration with  Nursing   Collaboration Comments This SLP attempted to see patient for CSE x2 today. Per RN, hold for today as patient is lethargic and not appropriate for same. SLP will hold evaluation and re-attempt tomorrow as able and appropriate. Thank you.

## 2024-12-18 NOTE — ED TRIAGE NOTES
Chief Complaint   Patient presents with    DUC MANNING from [facility]. Per EMS family states she has been not her normal self since the weekend. EMS states she had a fever at [place] given tylenol at 1230. No fever during triage.   Patient unable to answer most questions at this time.   Family will be coming. EMS states that family states she is more verbal and she has not been eating or drinking. Report fatigue over the last several day.     Flu A has been present at [place].       250 cc bolus given by ems    Blood Pressure : 111/53, Pulse: (!) 109, Respiration: 18, Temperature: 36.2 °C (97.1 °F), Pulse Oximetry: 92 %, O2 (LPM): 0, O2 Delivery Device: None - Room Air

## 2024-12-18 NOTE — ED NOTES
Med Rec complete per MAR from [place]   Allergies reviewed  Antibiotics in the past 30 days:no  Anticoagulant in past 14 days:no  Pharmacy patient utilizes:[facility]

## 2024-12-18 NOTE — PROGRESS NOTES
4 Eyes Skin Assessment Completed by HANS Mann and HANS Yoo.    Head WDL  Ears WDL  Nose WDL  Mouth WDL  Neck WDL  Breast/Chest Redness  Shoulder Blades Small scab  Spine WDL  (R) Arm/Elbow/Hand Bruising and Shiny, PIV, PI elbow  (L) Arm/Elbow/Hand Redness and Shiny  Abdomen WDL  Groin WDL  Scrotum/Coccyx/Buttocks Redness, Blanching, and Excoriation. PI right hip  (R) Leg Bruising and Shiny,   (R) Heel/Foot/Toe 2 Ulcer(s) ankle  (L) Heel/Foot/Toe Redness and Blanching          Devices In Places Pulse Ox      Interventions In Place Heel Mepilex, TAP System, Pillows, Q2 Turns, Barrier Cream, and Heels Loaded W/Pillows. Heel boot    Possible Skin Injury Yes    Pictures Uploaded Into Epic Yes  Wound Consult Placed Yes  RN Wound Prevention Protocol Ordered Yes

## 2024-12-18 NOTE — THERAPY
Physical Therapy Contact Note    PT consult received and acknowledged. Discussed patient with bedside RN; patient with low H&H, tachycardic, and limited arousal. Will hold PT evaluation, will initiate as able and appropriate.     Peggy Chaves, PT, DPT  [phone]

## 2024-12-18 NOTE — DISCHARGE PLANNING
"HTH/SCP TCN chart review completed. Collaborated with GHAZALA Joseph.  The most current review of medical record, knowledge of pt's PLOF and social support, LACE+ score of 73, 6 clicks scores of 8 ADL's and 10 mobility were considered.  Per chart review, patient BIBA from [facility] with ALOC, acute UTI and Influenza A.   She has a Hx of CVA with R sided deficits on 7/24.  Recent admit 10/22 to 10/25 for UTI.  Palliative following.      Pt seen at bedside. Introduced TCN program. Provided education regarding post acute levels of care. Education provided regarding case management policy for blanket SNF referrals. Discussed HTH/SCP plan benefits. Pt verbalizes understanding.     Patient seen at bedside with daughter Natasha present.  Daughter states [facility] for [facility] days then went to [facility].  She reports patient was independent with ADL's and mobility with walker prior to CVA.  Daughter states she she wants to discuss with her family but does not want her to return to  [facility] as she feels she was was \"not well taken care of\".  She reports that she was working with SW at [facility] to complete paperwork for LTC placement at [facility].  Daughter reports working on Medicaid paperwork and is awaiting process of the [employer] which she reports can take 3 to 4 weeks to complete.      In collaboration with GHAZALA, current discharge considerations are noted to be for post-acute placement when medically cleared.  TCN will continue to follow and collaborate with discharge planning team as additional post acute needs arise. Thank you.     Addendum:  1158-  Spoke with daughter Natasha and she states she talked to Kodi the Director of [facility] and per meeting, patient has been accepted to [facility].  Daughter wants patient to discharge to [facility].  Choice proactively obtained for [facility], faxed to [facility] and given to GHAZALA.  Plan was for LTC at [facility] but now family wants skilled at [facility] with transition to LTC at " [facility].      Completed today:  PT/OT consults in chart.   Choice obtained: SNF (1. [facility]).    Pt aware of [facility]'s blanket referral policy  SCP with [facility] PCP.

## 2024-12-18 NOTE — ASSESSMENT & PLAN NOTE
Patient acutely ill, lethargic; discussed with 3 daughters at bedside. Updated on clinical course and plan, discussed patient is very ill due to sepsis, infection. Recommended DNR/DNI for patient, ultimately they have agreed to this and are understanding. They are okay with continued medical treatment, antibiotics, IV fluids, NG tube placement for medications. Palliative care consulted, following.  Over 40 minutes spent face to face with family discussing the above today.

## 2024-12-18 NOTE — ASSESSMENT & PLAN NOTE
Mostly due to  dehydration, prerenal failure, ongoing infection  Intravenous fluids  Avoid nephrotoxins, monitor inputs and outputs  Renal ultrasound  Started on bicarbonate drip for low bicarbonate  Monitor Giraldo output  Nephrology consulted

## 2024-12-18 NOTE — PROGRESS NOTES
Provider notified of hemoglobin drop from 7.1 to 6.8. Patient denies lightheadedness, SOB, palpitations.     VS: /52, , Spo2 91% RA, RR 18, Temp 97.9F.     Orders received.     Next of kin/emergency contact, Natasha Maurer contacted with HANS Yoo to obtain blood transfusion consent.

## 2024-12-18 NOTE — WOUND TEAM
Assisted Naila MONK (Wound RN), with wound care, non-selective debridement performed using wound cleanser/NS and gauze. Please see Naila MONK (Wound RN) wound note for further wound care details.

## 2024-12-19 PROBLEM — E87.0 HYPERNATREMIA: Status: ACTIVE | Noted: 2024-01-01

## 2024-12-19 PROBLEM — G93.41 ACUTE METABOLIC ENCEPHALOPATHY: Status: ACTIVE | Noted: 2024-01-01

## 2024-12-19 NOTE — PROGRESS NOTES
Patient transferring to [facility] at this time. Report called to receiving RN, Debbie.

## 2024-12-19 NOTE — PROGRESS NOTES
[facility] Medicine Daily Progress Note    Date of Service  12/18/2024    Chief Complaint  Mariely Easley is a 84 y.o. female admitted 12/17/2024 with AMS.    Hospital Course  Mariely Easley is a 84 y.o. 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     Patient is at [facility], they are currently having influenza outbreak.  Patient presented after noted to be more fatigued, cognitive deficits more pronounced than usual, dyspneic and dehydrated.  Per daughters at bedside they did not believe she has been having any significant p.o. intake while there, they note multiple times they arrived and there is no water in reach or in the room, or sometimes it is on her right side which she is suffering from some hemineglect and right-sided deficits though unable to retrieve.  Due to the above she presented for evaluation.     In the ED afebrile pulse up to 125 respiratory rate 16-20 systolic blood pressure from 90s to 150s pulse ox 91 to 96% on room air.  She has leukocytosis 14.6 anemia hemoglobin 7.1 trending down, TIFFANI serum creatinine 1.51 metabolic acidosis bicarb 18 low albumin and total protein lactic acid 1.0 proBNP 6500 positive influenza A chest x-ray shows cardiomegaly with interstitial opacities, UA suggestive of infection.  Patient was given antibiotics, subsequently referred to hospitalist for admission.    Interval Problem Update  No acute events overnight.  Received 1 unit RBC transfusion for anemia hgb 6.8 this morning.  Patient non responsive at bedside, lethargic.  Family at bedside note that patient before becoming acutely ill this past week has had progressive deterioration of health status for last few months. Family states for some time, patient is not able to make her needs known, does not communicate her needs, requires assistance for eating, drinking, dressing,  hygiene, movement.  Patient with influenza A infection, UTI, TIFFANI, anemia.  Discussed with family that patient is acutely ill, with sepsis from infections. They are agreeable to continuing antibiotics and IV fluids for treatment of current medical conditions. They are agreeable to nasogastric tube placement for medication administration as patient is unable to take PO due to mentation.  Discussed code status, given her relatively poor quality of life prior to hospitalization, I recommended DNR/DNI for patient. They are agreeable to DNR/DNI after discussing with each other. Reinforced with family that we are not stopping medical treatment, but will not perform CPR or intubation should her heart or lungs stop. They are tearful but understanding.  Notified by nursing that patient with 's. Additional fluid boluses given. Do not want to fluid overload patient, she will have received 2.5L fluid bolus after current bolus, she is 110 lbs and frail.  Fever spike to 104 which may be from underlying flu vs other infection. Given severity of illness, will expand antibiotic coverage to zosyn for now.  Giraldo ordered for retention and acute illness.  Will transfer to telemetry for closer cardiac monitoring.  Patient is at high risk of deterioration.      I have discussed this patient's plan of care and discharge plan at IDT rounds today with Case Management, Nursing, Nursing leadership, and other members of the IDT team.    Consultants/Specialty  none    Code Status  DNAR/DNI    Disposition  The patient is not medically cleared for discharge to home or a post-acute facility.      I have placed the appropriate orders for post-discharge needs.    Review of Systems  Review of Systems   Unable to perform ROS: Acuity of condition        Physical Exam  Temp:  [36.2 °C (97.2 °F)-40 °C (104 °F)] 40 °C (104 °F)  Pulse:  [100-146] 146  Resp:  [16-32] 32  BP: ()/(48-68) 140/68  SpO2:  [91 %-97 %] 96 %    Physical Exam  Vitals  reviewed.   Constitutional:       General: She is not in acute distress.     Appearance: She is ill-appearing. She is not diaphoretic.      Comments: Frail, malnourished appearing   HENT:      Head: Normocephalic and atraumatic.      Nose: Nose normal. No congestion.      Mouth/Throat:      Pharynx: No oropharyngeal exudate or posterior oropharyngeal erythema.   Eyes:      Pupils: Pupils are equal, round, and reactive to light.   Cardiovascular:      Rate and Rhythm: Regular rhythm. Tachycardia present.   Pulmonary:      Effort: Pulmonary effort is normal. No respiratory distress.      Breath sounds: Normal breath sounds. No wheezing or rales.   Abdominal:      General: Bowel sounds are normal. There is no distension.      Palpations: Abdomen is soft.      Tenderness: There is no abdominal tenderness.   Musculoskeletal:         General: No swelling. Normal range of motion.      Cervical back: Neck supple.      Right lower leg: No edema.      Left lower leg: No edema.   Skin:     General: Skin is warm and dry.   Neurological:      Comments: Unable to assess focal deficits; moves extremities weakly         Fluids    Intake/Output Summary (Last 24 hours) at 12/18/2024 1703  Last data filed at 12/18/2024 0718  Gross per 24 hour   Intake 535.83 ml   Output --   Net 535.83 ml        Laboratory  Recent Labs     12/17/24  1704 12/18/24  0038   WBC 14.6* 17.9*   RBC 2.54* 2.38*   HEMOGLOBIN 7.1* 6.8*   HEMATOCRIT 24.7* 24.3*   MCV 97.2 102.1*   MCH 28.0 28.6   MCHC 28.7* 28.0*   RDW 59.2* 61.1*   PLATELETCT 425 415   MPV 11.6 11.7     Recent Labs     12/17/24  1704 12/18/24  0038   SODIUM 145 145   POTASSIUM 4.1 4.2   CHLORIDE 115* 115*   CO2 18* 17*   GLUCOSE 115* 114*   BUN 44* 43*   CREATININE 1.51* 1.46*   CALCIUM 8.3* 8.5                   Imaging  DX-CHEST-PORTABLE (1 VIEW)   Final Result      Cardiomegaly with interstitial opacities.           Assessment/Plan  * TIFFANI (acute kidney injury) (HCC)- (present on  admission)  Assessment & Plan  Suspecting Pre-renal etiology   Check U/a & CPK  Rule out post obstruction  IVF  Renal dose meds and avoid nephrotoxins  Monitor I&O's  Follow renal function  Giraldo for retention  Continue IV fluids for sepsis    Influenza A  Assessment & Plan  Tamiflu, supportive care    Recent cerebrovascular accident (CVA)  Assessment & Plan  With ongoing right sided deficits, coming from [place].  Deficits unchanged per family at bedside, also with speech and swallow deficits.   SLP/PT/OT ordered.  Continue aspirin and Lipitor 40 mg daily.  Nasogastric tube ordered for medications given lack of safe PO intake    Advanced care planning/counseling discussion- (present on admission)  Assessment & Plan  Patient acutely ill, lethargic; discussed with 3 daughters at bedside. Updated on clinical course and plan, discussed patient is very ill due to sepsis, infection. Recommended DNR/DNI for patient, ultimately they have agreed to this and are understanding. They are okay with continued medical treatment, antibiotics, IV fluids, NG tube placement for medications. Palliative care consulted, following.  Over 40 minutes spent face to face with family discussing the above today.    History of aortic valve replacement with bioprosthetic valve- (present on admission)  Assessment & Plan  Monitor volume status    Pneumonia due to infectious organism- (present on admission)  Assessment & Plan  Diagnosed with influenza A, imaging findings suspicious for possible bacterial infection as well.  Check procalcitonin.  Escalate to zosyn    Recurrent UTI- (present on admission)  Assessment & Plan  On Unasyn for possible pneumonia as well, follow cultures, previously had pansensitive E. Coli  Escalate to zosyn given severe illness and fever spikes    Iron deficiency anemia- (present on admission)  Assessment & Plan  Continue iron supplementation, patient has been bleeding from recent dental procedure, has evidence of dried  blood around tooth/post.  Anemia has been downtrending, hemoglobin 7.1 on admission, no reports of GI bleeding  Hgb 6.8 this morning, given 1 unit RBC  No signs of active bleeding, monitor    Mixed hyperlipidemia- (present on admission)  Assessment & Plan  Lipitor 40 mg daily    Acquired hypothyroidism- (present on admission)  Assessment & Plan  Continue Synthroid    Essential hypertension- (present on admission)  Assessment & Plan  Continue metoprolol, holding losartan due to TIFFANI         VTE prophylaxis: VTE Selection    I have performed a physical exam and reviewed and updated ROS and Plan today (12/18/2024). In review of yesterday's note (12/17/2024), there are no changes except as documented above.    My total time spent caring for the patient on the day of the encounter was 70 minutes. This time includes reviewing the hospital chart vitals, lab tests, and imaging; counseling and educating the patient about their diagnoses; coordinating care with the nurse, pharmacist, and specialists; documenting clinical information in the electronic medical record.  This does not include time spent on separately billable procedures/tests.

## 2024-12-19 NOTE — DISCHARGE PLANNING
"HTH/SCP TCN chart review completed.  Current discharge considerations are SNF with transition to LTC.  Noted prior TCN obtained choice for [facility], [facility] currently pending.  Currently [facility] and [facility] accepted, with multiple other SNFs pending.  Per TCN note, patient/POA do not want to go back to [facility]. Noted per TCN note on 12/18, \"She reports that she was working with  at [place] to complete paperwork for LTC placement at [facility].  Daughter reports working on Medicaid paperwork and is awaiting process of the [employer] which she reports can take 3 to 4 weeks to complete.\" TCN communicated with post acute TCN about patient/POA decision.  No further TCN needs at this time.    TCN will continue to follow and collaborate with discharge planning team as additional post acute needs arise. Thank you.    Completed:  PT/OT/SLP orders in chart.   Choice obtained: SNF (1. [facility])  Pt aware of [facility]'s blanket referral policy  SCP with [facility] PCP.   "

## 2024-12-19 NOTE — THERAPY
Occupational Therapy   Initial Evaluation     Patient Name: Mariely Easley  Age:  84 y.o., Sex:  female  Medical Record #: [id number]  Today's Date: 12/19/2024     Precautions  Precautions: (P) Fall Risk  Comments: (P) hx CVA with R deficits, R shoulder subluxation    Assessment  Patient is an 84 y.o. female with a diagnosis of TIFFANI after presenting 12/17 from [facility] with influenza. Additional factors influencing patient status / progress: PMHx includes CVA 7/2024 with residual right-sided weakness, speech deficits, hypertension, aortic regurgitation, hypothyroidism.      During OT eval, pt presented with impaired strength, activity tolerance, balance, coordination, cognition, mobility and R sided deficits with a one finger width R shoulder subluxation, grossly flaccid RUE with edematous hand. Pt needed grossly totalA for all ADLs, bed mobility and sitting EOB with no righting reactions observed. Pt initiated holding the bed rail with her LUE when sitting EOB but lacks strength to effectively help her maintain midline and needed constant mod-maxA or pt loses balance to the L and posteriorly. Unclear baseline/recent PLOF since pt has been at [facility]; see flowsheet below for info acquired from chart review. Recommend return to post-acute placement when medically cleared.     Plan    Occupational Therapy Initial Treatment Plan   Treatment Interventions: (P) Orthotics Treatment, Self Care / Activities of Daily Living, Electrical Stimulation (Attended), Adaptive Equipment, Cognitive Skill Development, Manual Therapy Techniques, Neuro Re-Education / Balance, Therapeutic Exercises, Therapeutic Activity, Family / Caregiver Training  Treatment Frequency: (P) 2 Times per Week  Duration: (P) Until Therapy Goals Met    DC Equipment Recommendations: (P) Unable to determine at this time  Discharge Recommendations: (P) Recommend post-acute placement for additional occupational therapy services prior to discharge home       Objective     12/19/24 1008   Initial Contact Note    Initial Contact Note Order Received and Verified, Occupational Therapy Evaluation in Progress with Full Report to Follow.   Prior Living Situation   Prior Services Continuous (24 Hour) Care Giving Per Service   Lives with - Patient's Self Care Capacity Attendant / Paid Care Giver   Comments Per chart, pt was admitted from [facility]. Unclear hospital/SNF course and what pt's most recent baseline functional status is. Per chart, pt had a CVA in July but returned home, then had a GLF with headstrike in October though was ambulatory with therapy that admit and had d/c planned to [facility] but was admitted here from [facility]. Unclear when/where pt had functional decline. Current case mgmt notes indicate family is planning to transition pt to LTC.   Prior Level of ADL Function   Self Feeding Unable To Determine At This Time   Grooming / Hygiene Unable To Determine At This Time   Bathing Unable To Determine At This Time   Dressing Unable To Determine At This Time   Toileting Unable To Determine At This Time   Comments Per therapy notes last admit in October, pt had been independent with her ADLs. This admit, chart indicates pt needs substantial assist for all ADLs.   Prior Level of IADL Function   Medication Management Dependent   Laundry Dependent   Kitchen Mobility Dependent   Finances Dependent   Home Management Dependent   Shopping Dependent   Prior Level Of Mobility Unable to Determine At This Time   Precautions   Precautions Fall Risk   Comments hx CVA with R deficits, R shoulder subluxation   Pain   Intervention Declines   Non Verbal Descriptors   Non Verbal Scale  Calm   Cognition    Cognition / Consciousness X   Speech/ Communication Unable to Communicate   Orientation Level Not Oriented to Month;Not Oriented to Day;Not Oriented to Time;Not Oriented to Place;Not Oriented to Reason   Level of Consciousness Responds to voice   Ability To Follow Commands Unable to  Follow 1 Step Commands   Safety Awareness Impaired   New Learning Impaired   Attention Impaired   Sequencing Impaired   Initiation Impaired   Comments Minimal responses, opens L eye to voice but kept R eye closed. Pt initiated holding the bed rail with LUE but otherwise unable to follow directions   Active ROM Upper Body   Active ROM Upper Body  X   Comments LUE able to hold bedrail for support but lacks strength to hold self in midline. RUE appears grossly flaccid with a one finger sublux   Strength Upper Body   Upper Body Strength  X   Comments Globally weak, R>L and RUE appears nonfunctional, does not follow directions to fully assess   Sensation Upper Body   Upper Extremity Sensation  Not Tested   Upper Body Muscle Tone   Upper Body Muscle Tone  X   Rt Upper Extremity Muscle Tone Non Functional;Gross Assist;Hypotonic   Comments R shoulder sublux   Neurological Concerns   Neurological Concerns Yes   Sitting Posture During ADL's Lateral Lean Left;Posterior Lean   Rt Upper Extremity Functional Use Absent   Shoulder Subluxation Moderate Right   Coordination Upper Body   Coordination X   Comments RUE nonfunctional, L able to hold bedrail   Balance Assessment   Sitting Balance (Static) Trace   Sitting Balance (Dynamic) Dependent   Weight Shift Sitting Absent   Comments two person assist, no righting reactions, can hold bed rail with LUE but lacks strength to be effective and needs constant mod-maxA to maintain midline   Bed Mobility    Supine to Sit Total Assist   Sit to Supine Total Assist   Scooting Total Assist   Rolling Total Assist to Rt.;Total Assist to Lt.   Comments HOB elevated, two person   ADL Assessment   Eating Total Assist  (NPO, NGT)   Grooming Total Assist   Upper Body Dressing Total Assist   Lower Body Dressing Total Assist   Toileting Total Assist   How much help from another person does the patient currently need...   Putting on and taking off regular lower body clothing? 1   Bathing (including  washing, rinsing, and drying)? 1   Toileting, which includes using a toilet, bedpan, or urinal? 1   Putting on and taking off regular upper body clothing? 1   Taking care of personal grooming such as brushing teeth? 1   Eating meals? 1   6 Clicks Daily Activity Score 6   mRS Prior to admission   Prior to admission mRS 4   Modified Jason (mRS)   Modified Jason Score 5   Functional Mobility   Sit to Stand Unable to Participate   Bed, Chair, Wheelchair Transfer Unable to Participate   Toilet Transfers Unable to Participate   Mobility EOB only, two person assist   Edema / Skin Assessment   Edema / Skin  X   Comments R hand edematous   Activity Tolerance   Sitting Edge of Bed <10 min   Short Term Goals   Short Term Goal # 1 Pt will sit EOB with [place] in prep for seated ADLs   Short Term Goal # 2 Pt will perform oral care seated EOB with [place]   Short Term Goal # 3 Pt will transfer to [facility] with modA   Short Term Goal # 4 Pt's caregiver will demo safe and appropriate handling and repositioning of pt's RUE   Education Group   Role of Occupational Therapist Patient Response Patient;Acceptance;Explanation;No Learning Evidence   Occupational Therapy Initial Treatment Plan    Treatment Interventions Orthotics Treatment;Self Care / Activities of Daily Living;Electrical Stimulation (Attended);Adaptive Equipment;Cognitive Skill Development;Manual Therapy Techniques;Neuro Re-Education / Balance;Therapeutic Exercises;Therapeutic Activity;Family / Caregiver Training   Treatment Frequency 2 Times per Week   Duration Until Therapy Goals Met   Problem List   Problem List Decreased Active Daily Living Skills;Decreased Homemaking Skills;Decreased Upper Extremity Strength Right;Decreased Upper Extremity Strength Left;Decreased Upper Extremity AROM Right;Decreased Functional Mobility;Decreased Activity Tolerance;Safety Awareness Deficits / Cognition;Impaired Posture / Trunk Alignment;Impaired Coordination Right Upper Extremity;Impaired  Cognitive Function;Impaired Upper Extremity Tone Right;Impaired Postural Control / Balance   Anticipated Discharge Equipment and Recommendations   DC Equipment Recommendations Unable to determine at this time   Discharge Recommendations Recommend post-acute placement for additional occupational therapy services prior to discharge home   Interdisciplinary Plan of Care Collaboration   IDT Collaboration with  Nursing;Physical Therapist   Patient Position at End of Therapy In Bed;Bed Alarm On;Call Light within Reach   Collaboration Comments RN updated   Session Information   Date / Session Number  12/19 #1 (1/2, 12/25) trial 2x/wk? Pt may be at new baseline but unclear     Pt seen for OT eval in coordination with PT due to the need for two skilled therapists due to profound cognitive deficits and limited mobility. Due to the medical complexity, the skill of both practitioners is needed to monitor vitals, patient status, and adjust the intervention to fit the patient's needs and goals.

## 2024-12-19 NOTE — CARE PLAN
The patient is Stable - Low risk of patient condition declining or worsening    Shift Goals  Clinical Goals: D5, IV Abx, aspiration precautions  Patient Goals: giorgio  Family Goals: updates    Progress made toward(s) clinical / shift goals:    Problem: Skin Integrity  Goal: Skin integrity is maintained or improved  Outcome: Progressing     Problem: Risk for Aspiration  Goal: Patient's risk for aspiration will be absent or decrease  Outcome: Progressing     Problem: Nutrition  Goal: Patient's nutritional and fluid intake will be adequate or improve  Outcome: Progressing     Problem: Urinary Elimination  Goal: Establish and maintain regular urinary output  Outcome: Progressing       Patient is not progressing towards the following goals:      Problem: Dysphagia  Goal: Dysphagia will improve  Outcome: Not Progressing

## 2024-12-19 NOTE — ASSESSMENT & PLAN NOTE
Severe malnutrition in the context of chronic illness.  Severe malnutrition evidenced by severe muscle wasting   Dietitian consulted  Monitor weight, prevent wt loss   Monitor electrolytes to assess risk for refeeding syndrome  Initiated tube feeding

## 2024-12-19 NOTE — PROGRESS NOTES
Monitor Summary  Rhythm: JT/SR  Rate:  Ectopy: (F) PVC, (O) PVC, (R) PVC, big, coup, 14 bts PSVT ^169  Measurements: .15/.06/.32

## 2024-12-19 NOTE — PROGRESS NOTES
Iris Placement    Tube Team verified patient name and medical record number prior to tube placement. Iris feeding tube (43 inches, 10 Polish) placed at 55cm in left nare. Per Iris picture, tube appears to be in the stomach.    Nursing Instructions: Await KUB to confirm placement before use for medications or feeding. Stylet, may be removed, please place in labeled bag with insufflation bulb and save in patient medication drawer.

## 2024-12-19 NOTE — PROGRESS NOTES
Bedside report received and patient care assumed. Pt is resting in bed, A&O2, disoriented to time and situation, pt can shake her head yes to place and person, with no signs of pain, and is on 1LNC. Tele box on. All fall precautions are in place, belongings at bedside table.  Pt was updated on POC, no questions or concerns. Pt educated on use of call light for assistance.

## 2024-12-19 NOTE — RESPIRATORY CARE
Respiratory Therapy Update       Called to bedside to assess pt. Pt with retained secretions; moderate amount of secretions NT suctioned.   Pt unable to follow and cough on command and participate in bronchopulmonary hygiene.

## 2024-12-19 NOTE — PROGRESS NOTES
Hospital Medicine Daily Progress Note    Date of Service  12/19/2024    Chief Complaint  Mariely Easley is a 84 y.o. female admitted 12/17/2024 with altered mental status    Hospital Course  84 female with past medical history of CVA with residual right-sided weakness, hypertension, aortic regurgitation, hypothyroidism presented with change in mental status.  On ED arrival patient noted to be confused, labs remarkable for leukocytosis, TIFFANI, positive for influenza, UTI, hypoxia.  Admitted for evaluation and treatment for acute metabolic insulin setting of influenza, acute UTI    Interval Problem Update    12/19/2024  Seen and examined at bedside  Vital remained stable  Overall appear cachectic, ill-appearing  Labs reviewed, noted to have leukocytosis with worsening white count 22.1, hemoglobin 12.6, chemistry sodium 148, worsening renal function BUN/creatinine 52/2.39, bicarbonate 15, urine culture growing Citrobacter, Streptococcus anginosus  Chest x-ray reviewed, no acute cardiopulmonary disease noted    Continue on IV Zosyn, added azithromycin  Started on bicarbonate drip  Initiating feeding tube, gastric tube flushes 300 mL every 6 hours  Repeat BMP in a.m. to monitor electrolytes and renal function  Repeat CBC in a.m. to monitor white count and hemoglobin  Monitor intake output closely  Requiring IV antibiotics, need close monitoring for toxicity  Dietitian consulted  Case discussed with nephrology Dr. Kent  High risk  of decompensation from ongoing acute issue including influenza, UTI acute renal failure.    A phone call was made to the primary contact number listed for the patient's family member.  The call was placed today however it went unanswered and was directed to voicemail.    Patient has a high medical complexity, complex decision making and is at high risk of complication, morbidity and mortality        I have discussed this patient's plan of care and discharge plan at IDT rounds today with  Case Management, Nursing, Nursing leadership, and other members of the IDT team.    Consultants/Specialty  nephrology    Code Status  DNAR/DNI    Disposition  The patient is not medically cleared for discharge to home or a post-acute facility.  Anticipate discharge to: skilled nursing facility    I have placed the appropriate orders for post-discharge needs.    Review of Systems  Review of Systems   Unable to perform ROS: Acuity of condition        Physical Exam  Temp:  [36.1 °C (97 °F)-40 °C (104 °F)] 36.1 °C (97 °F)  Pulse:  [] 85  Resp:  [18-32] 20  BP: (106-140)/(51-75) 106/51  SpO2:  [90 %-97 %] 97 %    Physical Exam  Constitutional:       Appearance: She is ill-appearing.      Comments: Cachectic on exam   Cardiovascular:      Rate and Rhythm: Tachycardia present.      Pulses: Normal pulses.   Pulmonary:      Effort: Pulmonary effort is normal. No respiratory distress.   Abdominal:      General: Abdomen is flat. There is no distension.   Musculoskeletal:      Right lower leg: No edema.      Left lower leg: No edema.   Skin:     General: Skin is warm.   Neurological:      Mental Status: She is alert. She is disoriented.         Fluids    Intake/Output Summary (Last 24 hours) at 12/19/2024 1549  Last data filed at 12/19/2024 1408  Gross per 24 hour   Intake 360 ml   Output 600 ml   Net -240 ml        Laboratory  Recent Labs     12/17/24  1704 12/18/24  0038 12/18/24  1734 12/19/24  0155   WBC 14.6* 17.9*  --  22.1*   RBC 2.54* 2.38*  --  4.04*   HEMOGLOBIN 7.1* 6.8* 10.1* 11.6*   HEMATOCRIT 24.7* 24.3* 32.6* 39.1   MCV 97.2 102.1*  --  96.8   MCH 28.0 28.6  --  28.7   MCHC 28.7* 28.0*  --  29.7*   RDW 59.2* 61.1*  --  65.1*   PLATELETCT 425 415  --  412   MPV 11.6 11.7  --  11.5     Recent Labs     12/17/24  1704 12/18/24  0038 12/19/24  0155 12/19/24  1337   SODIUM 145 145 150* 148*   POTASSIUM 4.1 4.2 4.7  --    CHLORIDE 115* 115* 119*  --    CO2 18* 17* 15*  --    GLUCOSE 115* 114* 108*  --    BUN 44*  43* 52*  --    CREATININE 1.51* 1.46* 2.39*  --    CALCIUM 8.3* 8.5 8.5  --                    Imaging  DX-CHEST-LIMITED (1 VIEW)   Final Result      No acute cardiopulmonary disease.      DX-ABDOMEN FOR TUBE PLACEMENT   Final Result         1.  Nonspecific bowel gas pattern in the upper abdomen.   2.  Dobbhoff tube tip terminates overlying the expected location of the gastric body.   3.  Left basilar atelectasis and/or infiltrates   4.  Atherosclerosis      DX-CHEST-PORTABLE (1 VIEW)   Final Result      Cardiomegaly with interstitial opacities.      US-RENAL    (Results Pending)        Assessment/Plan  * TIFFANI (acute kidney injury) (HCC)- (present on admission)  Assessment & Plan      Mostly due to  dehydration, prerenal failure, ongoing infection  Intravenous fluids  Avoid nephrotoxins, monitor inputs and outputs  Renal ultrasound  Started on bicarbonate drip for low bicarbonate  Monitor Giraldo output  Nephrology consulted      Hypernatremia  Assessment & Plan  Insetting of dehydration  free water replacement through NG tube  Monitor sodium level closely         Acute metabolic encephalopathy  Assessment & Plan  Insetting of ongoing infection including flu, acute UTI      12/19/2024      Continue on IV Zosyn, added azithromycin  Started on bicarbonate drip  Initiating feeding tube, gastric tube flushes 300 mL every 6 hours  Repeat BMP in a.m. to monitor electrolytes and renal function  Repeat CBC in a.m. to monitor white count and hemoglobin  Monitor intake output closely  Requiring IV antibiotics, need close monitoring for toxicity  Dietitian consulted  Case discussed with nephrology Dr. Kent  High risk  of decompensation from ongoing acute issue including influenza, UTI acute renal failure.    Influenza A  Assessment & Plan  Initiated Tamiflu    Recent cerebrovascular accident (CVA)  Assessment & Plan  With ongoing right sided deficits, coming from [facility].  Deficits unchanged per family at bedside, also with  speech and swallow deficits.   SLP/PT/OT ordered.  Continue aspirin and Lipitor 40 mg daily.  Nasogastric tube ordered for medications given lack of safe PO intake    Advanced care planning/counseling discussion- (present on admission)  Assessment & Plan  Patient acutely ill, lethargic; discussed with 3 daughters at bedside. Updated on clinical course and plan, discussed patient is very ill due to sepsis, infection. Recommended DNR/DNI for patient, ultimately they have agreed to this and are understanding. They are okay with continued medical treatment, antibiotics, IV fluids, NG tube placement for medications. Palliative care consulted, following.  Over 40 minutes spent face to face with family discussing the above today.    Severe protein-calorie malnutrition (HCC)- (present on admission)  Assessment & Plan   Severe malnutrition in the context of chronic illness.  Severe malnutrition evidenced by severe muscle wasting   Dietitian consulted  Monitor weight, prevent wt loss   Monitor electrolytes to assess risk for refeeding syndrome  Initiated tube feeding      History of aortic valve replacement with bioprosthetic valve- (present on admission)  Assessment & Plan  Monitor volume status    Pneumonia due to infectious organism- (present on admission)  Assessment & Plan  On IV Zosyn  Added azithromycin  Monitor oxygen requirement  Follow culture    Recurrent UTI- (present on admission)  Assessment & Plan  On IV Zosyn  Monitor white count    Iron deficiency anemia- (present on admission)  Assessment & Plan  S/p 1 unit transfusion  H&H remained stable  No concern for active bleeding    Mixed hyperlipidemia- (present on admission)  Assessment & Plan  Lipitor 40 mg daily    Acquired hypothyroidism- (present on admission)  Assessment & Plan  Continue Synthroid    Essential hypertension- (present on admission)  Assessment & Plan  Continue metoprolol, holding losartan due to TIFFANI         VTE prophylaxis: lovenox    I have  performed a physical exam and reviewed and updated ROS and Plan today (12/19/2024). In review of yesterday's note (12/18/2024), there are no changes except as documented above.

## 2024-12-19 NOTE — CONSULTS
Palliative Care   Duplicate consult received for advance care planning from Dr. Haskins.  Patient originally seen by palliative care 12/18/2024 for advance care planning.  DNR/DNI CODE STATUS as of yesterday.  Chart reviewed.  Patient has developed acute renal failure.  Nephrology consulted today and per their assessment patient has a poor prognosis and would favor comfort measures; note states family conference tomorrow at 9.  Reached out to ordering provider who wanted palliative care to set up family meeting.  Reached out to Dr. Kent discussed his note about family conference at 9. Per Dr. Kent he is following up with family tomorrow morning. Offered to have palliative APRN attend. Per Dr. Kent no need to. Requested he let us know if we need to f/u. Updated Dr. Haskins via [employer].    Cindy Hensley A.P.R.N.  Palliative Care Nurse Practitioner  [phone]

## 2024-12-19 NOTE — ASSESSMENT & PLAN NOTE
Insetting of ongoing infection including flu, acute UTI      12/19/2024      Continue on IV Zosyn, added azithromycin  Started on bicarbonate drip  Initiating feeding tube, gastric tube flushes 300 mL every 6 hours  Repeat BMP in a.m. to monitor electrolytes and renal function  Repeat CBC in a.m. to monitor white count and hemoglobin  Monitor intake output closely  Requiring IV antibiotics, need close monitoring for toxicity  Dietitian consulted  Case discussed with nephrology Dr. Kent  High risk  of decompensation from ongoing acute issue including influenza, UTI acute renal failure.

## 2024-12-19 NOTE — THERAPY
Speech Language Therapy Contact Note    Patient Name: Mariely Easley  Age:  84 y.o., Sex:  female  Medical Record #: [id number]  Today's Date: 12/19/2024    Attempted to see pt for bedside swallow eval w/ RN present. Pt remains NPO w/ NGT. Despite mod tactile cueing and provision of oral care, pt was unable to sustain meaningful alertness to participate in any structured activities.

## 2024-12-19 NOTE — PROGRESS NOTES
Handoff report received from day shift nurse and pt care assumed. Pt is currently resting in bed, A&Ox0 with non verbal responses to pain to right side extremities, on 2L NC, and VSS. Tele box on and monitors notified. Call light and belongings within reach, bed in lowest and locked position, fall precautions in place. Pt's family present in room and educated on use of call light. Hourly rounding in place.

## 2024-12-19 NOTE — THERAPY
Physical Therapy   Initial Evaluation     Patient Name: Mariely Easley  Age:  84 y.o., Sex:  female  Medical Record #: [id number]  Today's Date: 12/19/2024     Precautions  Precautions: Fall Risk  Comments: +influenza, hx CVA with R deficits, R shoulder subluxation    Assessment  Patient is 84 y.o. female presenting from [facility] with encephalopathy. Pt found to have TIFFANI and influenza A. Pt with PMH including history of CVA 7/2024 with residual right-sided weakness, speech deficits, HTN, aortic regurgitation, hypothyroidism. Pt unable to provide any home setup/PLOF info. Per chart, pt was walking with therapy 50 ft at SPV level during last admit from home in Oct 2024. Unclear recent PLOF since pt has been at SNF.     During current session, pt presents with significant R hemiparesis and tone (from CVA in July 2024 vs possibly new CVA?), inability to follow any commands, generalized weakness, impaired balance, and decreased endurance requiring overall total A x2 for mobility as detailed below. Pt trying to use LUE for steadying but ultimately falling to the left requiring max-total A to maintain midline when sitting EOB. Recommend post-acute placement to address these significant functional deficits below baseline. Unclear whether this is pt's new dependent baseline thus will trial continued acute PT services in an attempt to improve functional mobility prior to d/c.    Plan    Physical Therapy Initial Treatment Plan   Treatment Plan : Bed Mobility, Equipment, Family / Caregiver Training, Gait Training, Manual Therapy, Neuro Re-Education / Balance, Self Care / Home Evaluation, Therapeutic Activities, Therapeutic Exercise  Treatment Frequency: 3 Times per Week  Duration: Until Therapy Goals Met    DC Equipment Recommendations: Unable to determine at this time  Discharge Recommendations: Recommend post-acute placement for additional physical therapy services prior to discharge home       Subjective    Pt received  resting in bed, agreeable to participate.      Objective       12/19/24 1009   Initial Contact Note    Initial Contact Note Order Received and Verified, Physical Therapy Evaluation in Progress with Full Report to Follow.   Precautions   Precautions Fall Risk   Comments +influenza, hx CVA with R deficits, R shoulder subluxation   Vitals   O2 (LPM) 1   O2 Delivery Device Silicone Nasal Cannula   Pain 0 - 10 Group   Therapist Pain Assessment Post Activity Pain Same as Prior to Activity;Nurse Notified  (no s/s of pain)   Prior Living Situation   Prior Services Continuous (24 Hour) Care Giving Per Service   Housing / Facility Skilled Nursing Facility   Lives with - Patient's Self Care Capacity Attendant / Paid Care Giver   Comments Pt admitted here from [facility]. Pt unable to provide any home setup/PLOF info. Per chart, pt was walking with therapy 50 ft at SPV level during last admit from home in Oct 2024   Prior Level of Functional Mobility   Bed Mobility Independent   Transfer Status Independent   Ambulation Independent   Ambulation Distance household?   Assistive Devices Used Single Point Cane   Stairs Independent   Comments per PT note from last admit in Oct 2024   History of Falls   History of Falls Yes   Date of Last Fall   (related to prior admits this year)   Cognition    Cognition / Consciousness X   Speech/ Communication Unable to Communicate   Level of Consciousness Responds to voice   Ability To Follow Commands Unable to Follow 1 Step Commands   Safety Awareness Impaired   New Learning Impaired   Attention Impaired   Sequencing Impaired   Initiation Impaired   Comments cooperative, unable to follow commands or make needs known, new dependent baseline?   Passive ROM Lower Body   Passive ROM Lower Body X   Comments able to achieve right ankle to neutral with overpressure, unable to full extend right knee (lacking ~15 deg)   Active ROM Lower Body    Active ROM Lower Body  X   Comments see below, RLE limited by  hemiparesis and tone   Strength Lower Body   Lower Body Strength  X   Comments no voluntary movement RLE, some movement LLE against gravity in bed but not through full ROM   Sensation Lower Body   Lower Extremity Sensation   X   Comments no response to pain stimulus distal RLE   Lower Body Muscle Tone   Lower Body Muscle Tone  X   Modified Fady Scale Right Lower Extremity 2   Comments right ankle   Neurological Concerns   Neurological Concerns Yes   Sitting Posture During ADL's Lateral Lean Left;Posterior Lean   Comments R merrick, from prior CVA?   Coordination Lower Body    Coordination Lower Body  X   Comments limited by R merrick and pt cog   Vision   Vision Comments able to partially open L eye but R eye remained closed   Balance Assessment   Sitting Balance (Static) Trace   Sitting Balance (Dynamic) Dependent   Standing Balance (Static)   (NT)   Standing Balance (Dynamic)   (NT)   Weight Shift Sitting Absent   Weight Shift Standing   (NT)   Comments sitting EOB, pt trying to use LUE for steadying but ultimately falling to the left requiring max-total A to maintain midline   Bed Mobility    Supine to Sit Total Assist   Sit to Supine Total Assist   Scooting Total Assist   Rolling Total Assist to Rt.;Total Assist to Lt.   Comments HOBE, 2 person assist   Gait Analysis   Gait Level Of Assist Unable to Participate   Functional Mobility   Sit to Stand Unable to Participate   Bed, Chair, Wheelchair Transfer Unable to Participate   Mobility sitting EOB only   6 Clicks Assessment - How much HELP from from another person do you currently need... (If the patient hasn't done an activity recently, how much help from another person do you think he/she would need if he/she tried?)   Turning from your back to your side while in a flat bed without using bedrails? 1   Moving from lying on your back to sitting on the side of a flat bed without using bedrails? 1   Moving to and from a bed to a chair (including a wheelchair)? 1    Standing up from a chair using your arms (e.g., wheelchair, or bedside chair)? 1   Walking in hospital room? 1   Climbing 3-5 steps with a railing? 1   6 clicks Mobility Score 6   Edema / Skin Assessment   Edema / Skin  X   Comments right hand swollen   Patient / Family Goals    Patient / Family Goal #1   (pt unable to state)   Short Term Goals    Short Term Goal # 1 Pt will perform supine<>sit with min A to progress bed mobility in 6 visits.   Short Term Goal # 2 Pt will demo poor+ sitting balance to progress OOB mobility in 6 visits.   Short Term Goal # 3 Pt will perform sit<>stand with FWW and max A to progress OOB mobility in 6 visits.   Education Group   Education Provided Role of Physical Therapist   Role of Physical Therapist Patient Response Patient;Nonacceptance;Explanation;Demonstration;Reinforcement Needed;No Learning Evidence   Physical Therapy Initial Treatment Plan    Treatment Plan  Bed Mobility;Equipment;Family / Caregiver Training;Gait Training;Manual Therapy;Neuro Re-Education / Balance;Self Care / Home Evaluation;Therapeutic Activities;Therapeutic Exercise   Treatment Frequency 3 Times per Week   Duration Until Therapy Goals Met   Problem List    Problems Pain;Impaired Bed Mobility;Impaired Transfers;Impaired Ambulation;Functional ROM Deficit;Functional Strength Deficit;Impaired Balance;Impaired Coordination;Impaired Vision;Decreased Activity Tolerance;Safety Awareness Deficits / Cognition;Motor Planning / Sequencing   Anticipated Discharge Equipment and Recommendations   DC Equipment Recommendations Unable to determine at this time   Discharge Recommendations Recommend post-acute placement for additional physical therapy services prior to discharge home   Interdisciplinary Plan of Care Collaboration   IDT Collaboration with  Nursing;Occupational Therapist   Patient Position at End of Therapy In Bed;Bed Alarm On;Call Light within Reach;Tray Table within Reach;Phone within Reach   Collaboration  Comments RN and OT updated   Session Information   Date / Session Number  12/19- 1(1/3, 12/25)     Patient seen for team evaluation with Occupational Therapist for the following reason(s):  Patient required 2 person assistance for safety and to provide effective interventions. Each discipline assisted patient with appropriate and separate goals. Due to the medical complexity, the skill of both practitioners is needed to monitor vitals, patient status, and adjust the intervention to fit the patient's needs and goals. Therapy sessions needed to be done by a certain time period for both disciplines, and did not impede patient's progress. Physical Therapist facilitated bed mobility and balance while Occupational Therapist simultaneously treated pt according to POC.

## 2024-12-19 NOTE — PROGRESS NOTES
Report received and patient care assumed. Pt is resting in bed, A&O 0, with no signs of pain, and is on 2LNC. Tele box on. All fall precautions are in place, belongings at bedside table.  Pt was updated on POC, no questions or concerns. Pt educated on use of call light for assistance.

## 2024-12-19 NOTE — PROGRESS NOTES
1045: Received report of patient at start of shift. Patient fatigued, briefly opens eyes to verbal stimulation, quickly drifts back to sleep. Patient not answering questions or participating in assessment. Daughter, Natasha at bedside. HR 120s. Dr. Earl updated. Received orders for IV fluid bolus and continuous IV fluids. Patient's daughter updated on plan of care, encouraged to notify staff for any needs/assistance. Call light within reach.     1300: HR remains elevated post bolus administration, 120-125. Temperature 102. Patient remains fatigued, not easily awakening or participating in care. Dr. Earl updated.     1445: Dr. Earl at bedside speaking with family. Updated Dr. Ealr of elevated HR in 140s and elevated temperature. Received orders to give additional 500 cc bolus.     1622: HR remains in 140's post bolus administration, occasionally reaching 150-160. Axillary temp 104. Dr. Earl notified. Dr. Earl came to bedside and spoke with family. Received orders for patient to transfer to telemetry for higher level of care.

## 2024-12-19 NOTE — CONSULTS
"[facility] -  CONSULTATION NOTE               Author: Taras Kent M.D. Date & Time: 12/19/2024  3:16 PM       REASON FOR CONSULTATION:   TIFFANI    CHIEF COMPLAINT:   \"confusion\"    HISTORY OF PRESENT ILLNESS:    Mariely Easley is a 84 y.o. female who presents for evaluation of altered mental status.  Pt has several comorbifiets including CKD stage 3 per report. She had a CVA with signficant deficits, and she presented from nursing home.  Pt noted to have UTI/Flu TIFFANI, hyper natremia and acidemia  Pt is not coherent, so no additional HX could be completed.      REVIEW OF SYSTEMS:    10 point ROS was not performed second to mental status    PAST MEDICAL HISTORY:   Past Medical History:   Diagnosis Date    Abnormal albumin 09/16/2022    Acute renal failure superimposed on stage 3 chronic kidney disease (HCC) 07/24/2019    Chronic abdominal pain 03/21/2024    Cognitive and behavioral changes 07/01/2024    Dyspnea on minimal exertion 02/06/2023    Hypermagnesemia 09/16/2022    Hypertension     Hypokalemia 07/12/2024    Hyponatremia 06/28/2022    Nausea and vomiting 03/21/2024    Pain and swelling of left lower extremity 05/18/2022    Pneumonia 06/27/2024    Severe aortic valve regurgitation 02/09/2023    SOB (shortness of breath) 01/08/2023    Stage 4 chronic kidney disease (HCC) 06/26/2023    Stroke (HCC)     Superficial thrombosis of lower extremity, right 07/05/2022    Vitamin B12 deficiency 12/16/2019    Vitamin D deficiency 11/10/2023       PAST SURGICAL HISTORY:   Past Surgical History:   Procedure Laterality Date    OTHER CARDIAC SURGERY  2022    valve replacement - in [place]       FAMILY HISTORY:   Family History   Problem Relation Age of Onset    Cancer Mother         Brain    Cancer Brother         Stomach       SOCIAL HISTORY:   Social History     Tobacco Use   Smoking Status Former    Current packs/day: 0.00    Types: Cigarettes    Quit date: 7/24/1979    Years since quitting: " "45.4   Smokeless Tobacco Never     Social History     Substance and Sexual Activity   Alcohol Use Not Currently    Alcohol/week: 8.4 oz    Types: 14 Glasses of wine per week    Comment: 2 glasses wine daily     Social History     Substance and Sexual Activity   Drug Use No       HOME MEDICATIONS:   Reviewed and documented in chart    LABORATORY STUDIES:   Recent Labs     12/17/24  1704 12/18/24  0038 12/19/24  0155 12/19/24  1337   SODIUM 145 145 150* 148*   POTASSIUM 4.1 4.2 4.7  --    CHLORIDE 115* 115* 119*  --    CO2 18* 17* 15*  --    GLUCOSE 115* 114* 108*  --    BUN 44* 43* 52*  --    CREATININE 1.51* 1.46* 2.39*  --    CALCIUM 8.3* 8.5 8.5  --        ALLERGIES:  Patient has no known allergies.    VS:  /51   Pulse 85   Temp 36.1 °C (97 °F) (Temporal)   Resp 20   Ht 1.549 m (5' 1\")   Wt 51.4 kg (113 lb 5.1 oz)   SpO2 97%   BMI 21.41 kg/m²   General: ill appearing  HEENT: EOMI, no scleral icterus  Heart: tachy  Pulm: tachypnic and shaloow breath sounds  Abdomen: soft  xtremites: no edema  Derm: no rashes  Neuro:non responsive    Physical Exam    FLUID BALANCE:  In: 535.8 [Blood:535.8]  Out: 600     IMAGING:  All imaging reviewed from admission to present day    IMPRESSION:  83 y/o female has multiple issues including TIFFANI  #TIFFANI evaley hemodynamic  #Hypernatremia second to free water deficit  #Acidemia ? GI loss/ renal failure  #Gram negative UTI  #Influenza  #failure to thrive  #HX of CVA    PLAN:  - No compelling indication for RRT  - change IVF to hypotonic with bicarb  -abx  -antivirals  -prognosis poor, favor comfort measures  -family conference tmrw at 9    Thank you for the consultation!    "

## 2024-12-19 NOTE — PROGRESS NOTES
4 Eyes Skin Assessment Completed by HANS Lewis and HANS May.    Head Redness diana  Ears Redness blanching   Nose Redness and Blanching  Mouth WDL  Neck Redness and Blanching  Breast/Chest Redness and Abrasion  Shoulder Blades Redness and Blanching  Spine Redness and Blanching  (R) Arm/Elbow/Hand Scab  (L) Arm/Elbow/Hand Redness and Blanching  Abdomen Redness and Blanching  Groin Redness and Blanching  Scrotum/Coccyx/Buttocks Excoriation and Scab  (R) Leg Redness and Blanching  (L) Leg Redness and Blanching  (R) Heel/Foot/Toe Ulcer(s) and Scab  (L) Heel/Foot/Toe Redness and Blanching          Devices In Places Tele Box, Pulse Ox, and Giraldo      Interventions In Place Gray Ear Foams, Pillows, and Q2 Turns    Possible Skin Injury Yes    Pictures Uploaded Into Epic Yes  Wound Consult Placed Yes  RN Wound Prevention Protocol Ordered Yes

## 2024-12-19 NOTE — CARE PLAN
The patient is Watcher - Medium risk of patient condition declining or worsening    Shift Goals  Clinical Goals: admit profile, safety, education  Patient Goals: giorgio  Family Goals: updates      Problem: Knowledge Deficit - Standard  Goal: Patient and family/care givers will demonstrate understanding of plan of care, disease process/condition, diagnostic tests and medications  Outcome: Progressing     Problem: Pain - Standard  Goal: Alleviation of pain or a reduction in pain to the patient’s comfort goal  Outcome: Progressing     Problem: Skin Integrity  Goal: Skin integrity is maintained or improved  Outcome: Progressing

## 2024-12-20 PROBLEM — A41.9 SEPSIS (HCC): Status: ACTIVE | Noted: 2024-01-01

## 2024-12-20 NOTE — PROGRESS NOTES
Monitor Summary  Rhythm: SR  Rate:   Ectopy: (R) PVC (R) PAC  Measurements: .17/.04/.42  ---12 hr Chart Review---

## 2024-12-20 NOTE — PROGRESS NOTES
NOC HOSPITALIST CROSS COVER    Notified by RN regarding hemoglobin decrease from 11.6 to 8.6 overnight, which was redrawn and resulted lower at 8.4. Patient did receive some fluid boluses, which may represent dilution; however, given that she has required blood transfusion this admission, will continue to trend H&H every 12 hours.     Vitals:    12/19/24 2334   BP: 98/48   Pulse: (!) 107   Resp: (!) 24   Temp: 36.6 °C (97.9 °F)   SpO2: 94%      Plan:  #Anemia  -Dilution vs acute blood loss from unclear source  -Occult stool pending  -Monitor for bleeding  -Trend H&H every 12 hours   -Protonix 40 mg IV BID    -----------------------------------------------------------------------------------------------------------    Electronically signed by:  Meme Lira, EFRNY, APRN, ARIANAP-BC  Hospitalist Services

## 2024-12-20 NOTE — THERAPY
Speech Language Therapy Contact Note    Patient Name: Mariely Easley  Age:  84 y.o., Sex:  female  Medical Record #: [id number]  Today's Date: 12/20/2024 12/20/24 0913   Treatment Variance   Reason For Missed Therapy Medical - Patient not Able To Participate;Medical - Patient Unarousable   Interdisciplinary Plan of Care Collaboration   IDT Collaboration with  Nursing   Collaboration Comments Per RN, pt unable to maintain alertness at this time. RN requesting to continue to hold CSE. SLP to follow as pt is appropriate.

## 2024-12-20 NOTE — ASSESSMENT & PLAN NOTE
Insetting of dehydration  free water replacement through NG tube  Monitor sodium level closely

## 2024-12-20 NOTE — DISCHARGE PLANNING
HTH/SCP TCN chart review completed. Current discharge considerations are SNF. Choice provided to prior TCN on 12/18 for [facility], which is currently pending.  Noted [facility] and [facility] have accepted.  Patient. Please see TCN note on 12/19 for additional discharge considerations if needed.     TCN will continue to follow and collaborate with discharge planning team as additional post acute needs arise. Thank you.    Completed:  PT/OT recommend post acute placement - 12/19  SLP Held - 12/20   Choice obtained: SNF (1. [facility])  Pt aware of [facility]'s blanket referral policy  SCP with [facility] PCP.

## 2024-12-20 NOTE — DIETARY
"Nutrition Services: Initial Assessment     Day 3 of admit. Mariely Easley is 84 y.o., female with admitting DX of TIFFANI (acute kidney injury) (HCC) [N17.9].    Consult Received for: EN    Current Hospital Problems List: Acute Metabolic encephalopathy, Recent cerebrovascular incident, Influenza A , Severe Calorie malnutrition. Iron deficiency anemia, UTI, Pneumonia, Acquired hypothyroidism        Nutrition Assessment:      Height: 154.9 cm (5' 1\")  Weight: 56.9 kg (125 lb 7.1 oz)  Weight taken via: Bed Scale  BMI Calculated: 20.78  BMI Classification: WNL     Weight Readings from Last 5 encounters:   Wt Readings from Last 5 Encounters:   24 56.9 kg (125 lb 7.1 oz)   10/23/24 50.7 kg (111 lb 12.4 oz)     None noted weight loss x 2 month. Patient weight been Trend up     Calculation Equation:   Total Calories / day:  25- 28 Calories / k- 1593  Total Grams Protein / day:   1 - 1.5  Grams Protein / k - 85   Objective:   Pertinent Medical Hx: Acute Metabolic encephalopathy, Recent cerebrovascular incident, Influenza A , Severe Calorie malnutrition. Iron deficiency anemia, UTI, Pneumonia, Acquired hypothyroidism   Indication for Nutrition Support: Unable to meet nutrition needs orally  Enteral Access:   Enteral Tube 24  10 Fr. Left nare (Active)      Pertinent Labs: Chloride, Co2, Glucose 113, BUN 51,Creatinine 2.16, GFR 22  Pertinent Meds: Statin, ferrous sulfate, folic acid. Furosemide, Levothyroxine, metoprolol tartrate  Skin/Wounds:  Yes, pressure in elbow, right foot ankle.Sacrum   Food Allergies: NKFA  Last BM:  Type 6: Fluffy pieces with ragged edges, a mushy stool  24   Formula based on RD Eval: Jevity 1.5 Frans[place] at goal @ of 40 ml/ hr provides 1440 kcal, 61 g protein, 729 ml free water    Current Diet Order/Intake:   NPO    Subjective:      Attempt visit patient at bedside today for severe protein malnutrition as diagnosed by the MD. The patient is surrounded by medical staff, and " according to the MD’s chart, the patient's condition has deteriorated with severe sepsis and acute organ failure in the setting of a UTI, influenza, and pneumonia. The plan is to transition to comfort care/hospice. Not able to perform a NFPE today    Patient reported UBW: n/a   Dietary Recall/Energy Intake: n/a     Nutrition Focused Physical Exam (NFPE)  Weight Loss: A weight increase of 14 lbs x 2 months per Bed weight     Muscle Mass: Unable to identify at this time  Subcutaneous Fat: Unable to identify at this time  Fluid Accumulation: n/a   Reduced  Strength: N/A in acute care setting.    Nutrition Diagnosis:      Inadequate Oral Intake related to acute metabolic encephalopathy as evidenced by dependent on EN .       based on RD assessment at this time, Unable to fully assess for malnutrition at this time    Nutrition Interventions:      Initiate Jevity 1.5  at 25 ml/hr continuous and advance per protocol to goal rate of 40 ml/hr.  Goal tube feed volume provides 1440 kcals, 61 g protein, and 729 ml free water daily.     Patient aware of active plan of care as appropriate.       Nutrition Monitoring and Evaluation:      Monitor nutrition POC, goal for > 85% intake from Nutrition support ( NG ) tube   Additional fluids per MD/DO  Monitor vital signs pertinent to nutrition.    RD following and will provide updated recommendations as indicated.      Yadira ARNOLD/AND CRITERIA FOR MALNUTRITION

## 2024-12-20 NOTE — PROGRESS NOTES
"[facility] -  PROGRESS NOTE               Author: Taras Kent M.D. Date & Time: 12/20/2024  11:47 AM     HPI:    Mariely Easley is a 84 y.o. female who presents for evaluation of altered mental status.  Pt has several comorbifiets including CKD stage 3 per report. She had a CVA with signficant deficits, and she presented from nursing home.  Pt noted to have UTI/Flu TIFFANI, hyper natremia and acidemia  Pt is not coherent, so no additional HX could be completed.  DAILY NEPHROLOGY SUMMARY:  12/20: Worsening clinical status, low H/H    REVIEW OF SYSTEMS:    10 point ROS rnot completed second to mentals tatus    PMH/PSH/SH/FH:   Reviewed and unchanged since admission note    CURRENT MEDICATIONS:   Reviewed from admission to present day    VS:  /71   Pulse 85   Temp 37.8 °C (100.1 °F) Comment: Rn Notified  Resp (!) 28   Ht 1.549 m (5' 0.98\")   Wt 56.9 kg (125 lb 7.1 oz)   SpO2 97%   BMI 23.71 kg/m²     General: ill appearing  HEENT: EOMI, no scleral icterus  Heart: tachy  Pulm: tachypnic and shaloow breath sounds  Abdomen: soft  xtremites: no edema  Derm: no rashes  Neuro:non responsive  Physical Exam    Fluids:  In: 860 [Enteral:860]  Out: 550     LABS:  Recent Labs     12/19/24  0155 12/19/24  1337 12/20/24  0035 12/20/24  0920   SODIUM 150* 148* 146* 144   POTASSIUM 4.7  --  3.9 4.0   CHLORIDE 119*  --  116* 113*   CO2 15*  --  15* 16*   GLUCOSE 108*  --  90 113*   BUN 52*  --  50* 51*   CREATININE 2.39*  --  2.08* 2.16*   CALCIUM 8.5  --  7.8* 7.9*       IMAGING:   All imaging reviewed from admission to present day    IMPRESSION:  83 y/o female has multiple issues including TIFFANI  #TIFFANI likley hemodynamic-uncahnged  #Hypernatremia second to free water deficit  - improved with supprtoive carres  #Acidemia ? GI loss/ renal failure  #Gram negative UTI  #Influenza  #failure to thrive  #HX of CVA     PLAN:  - continue IVF hypotonic with bicarb  -abx  -antivirals  -prognosis " poor, favor comfort measures-discussed with family 12/20

## 2024-12-20 NOTE — PROGRESS NOTES
FSBG 71, pt given 240 ml apple juice through NG tube.    Spoke to patient advised to go to hospital for EKG and labs, will go a week before pre-op appt with     Patient explained about the 2 appointments at once, declined AWV with Pre-op only wants to do pre-op.     Patient verbalized understanding

## 2024-12-20 NOTE — PROGRESS NOTES
Monitor Summary  Rhythm: SR/ST  Rate:   Ectopy: rare to freq pac's, rare pvc's, rare coup  Measurements: 0.17/0.08/0.38

## 2024-12-20 NOTE — ASSESSMENT & PLAN NOTE
This is Sepsis Not present on admission  SIRS criteria identified on my evaluation include: Tachycardia, with heart rate greater than 90 BPM, Tachypnea, with respirations greater than 20 per minute, and Leukocytosis, with WBC greater than 12,000  Clinical indicators of end organ dysfunction include Toxic Metabolic Encephalopathy and Acute Renal Failure, with a finding of creatinine greater than 2 (patient does not have ESRD or CKD  Source is UTI  Sepsis protocol initiated  Crystalloid Fluid Administration: Received bolus  IV antibiotics as appropriate for source of sepsis  Reassessment: I have reassessed the patient's hemodynamic status

## 2024-12-20 NOTE — PROGRESS NOTES
Hospital Medicine Daily Progress Note    Date of Service  12/20/2024    Chief Complaint  Mariely Easley is a 84 y.o. female admitted 12/17/2024 with altered mental status    Hospital Course  84 female with past medical history of CVA with residual right-sided weakness, hypertension, aortic regurgitation, hypothyroidism presented with change in mental status.  On ED arrival patient noted to be confused, labs remarkable for leukocytosis, TIFFANI, positive for influenza, UTI, hypoxia.  Admitted for evaluation and treatment for acute metabolic insulin setting of influenza, acute UTI.  During hospital course patient continued to worsen with severe sepsis with acute organ failure in setting UTI/influenza/pneumonia.  Discussed comfort care/hospice with patient's family.        Consultants/Specialty  nephrology    Code Status  DNAR/DNI    Disposition  Medically Cleared  I have placed the appropriate orders for post-discharge needs.    Review of Systems  Review of Systems   Unable to perform ROS: Acuity of condition        Physical Exam  Temp:  [36 °C (96.8 °F)-37.8 °C (100.1 °F)] 37.8 °C (100.1 °F)  Pulse:  [] 85  Resp:  [18-28] 28  BP: ()/(45-71) 127/71  SpO2:  [94 %-97 %] 97 %    Physical Exam  Constitutional:       Appearance: She is ill-appearing.      Comments: Cachectic on exam   Cardiovascular:      Rate and Rhythm: Tachycardia present.      Pulses: Normal pulses.   Pulmonary:      Effort: Pulmonary effort is normal. No respiratory distress.   Abdominal:      General: Abdomen is flat. There is no distension.   Musculoskeletal:      Right lower leg: No edema.      Left lower leg: No edema.   Skin:     General: Skin is warm.   Neurological:      Mental Status: She is alert. She is disoriented.         Fluids    Intake/Output Summary (Last 24 hours) at 12/20/2024 1107  Last data filed at 12/20/2024 0833  Gross per 24 hour   Intake 950 ml   Output 550 ml   Net 400 ml        Laboratory  Recent Labs      12/18/24  0038 12/18/24  1734 12/19/24  0155 12/20/24  0255 12/20/24  0920   WBC 17.9*  --  22.1* 27.4*  --    RBC 2.38*  --  4.04* 2.91*  --    HEMOGLOBIN 6.8*   < > 11.6* 8.4* 9.5*   HEMATOCRIT 24.3*   < > 39.1 27.6* 30.6*   .1*  --  96.8 94.8  --    MCH 28.6  --  28.7 28.9  --    MCHC 28.0*  --  29.7* 30.4*  --    RDW 61.1*  --  65.1* 61.5*  --    PLATELETCT 415  --  412 303  --    MPV 11.7  --  11.5 12.1  --     < > = values in this interval not displayed.     Recent Labs     12/19/24  0155 12/19/24  1337 12/20/24  0035 12/20/24  0920   SODIUM 150* 148* 146* 144   POTASSIUM 4.7  --  3.9 4.0   CHLORIDE 119*  --  116* 113*   CO2 15*  --  15* 16*   GLUCOSE 108*  --  90 113*   BUN 52*  --  50* 51*   CREATININE 2.39*  --  2.08* 2.16*   CALCIUM 8.5  --  7.8* 7.9*                   Imaging  DX-CHEST-PORTABLE (1 VIEW)   Final Result         1.  Interstitial pulmonary parenchymal prominence suggest chronic underlying lung disease, component of interstitial edema and/or infiltrates not excluded.   2.  Cardiomegaly   3.  Atherosclerosis      US-RENAL   Final Result      1.  New simple appearing exophytic right renal cortical cyst in the interpolar region measuring 1.5 x 1.9 x 1.5 cm in diameter.   2.  Stable interpolar region left renal cortical cyst measuring 1 cm in diameter.   3.  The kidneys are otherwise unremarkable sonographically.   4.  Splenomegaly.   5.  The bladder is decompressed.      DX-CHEST-LIMITED (1 VIEW)   Final Result      No acute cardiopulmonary disease.      DX-ABDOMEN FOR TUBE PLACEMENT   Final Result         1.  Nonspecific bowel gas pattern in the upper abdomen.   2.  Dobbhoff tube tip terminates overlying the expected location of the gastric body.   3.  Left basilar atelectasis and/or infiltrates   4.  Atherosclerosis      DX-CHEST-PORTABLE (1 VIEW)   Final Result      Cardiomegaly with interstitial opacities.           Assessment/Plan     Patient Active Problem List   Diagnosis     Essential hypertension    Acquired hypothyroidism    Mixed hyperlipidemia    Anxiety    Muscle spasm of back    Varicose veins of both lower extremities with pain    Hyponatremia    Iron deficiency anemia    Folate deficiency    Muscle cramping    Stress incontinence of urine    Recurrent UTI    Elevated troponin    Renal cyst, left    Aortic insufficiency    Leukocytosis    Alkaline phosphatase elevation    Bilateral edema of lower extremity    Superficial thrombosis of leg, right    Chronic use of benzodiazepine for therapeutic purpose    Diastolic CHF, acute on chronic (HCC)    Chronic anticoagulation    History of DVT (deep vein thrombosis)    S/P aortic valve replacement    Stage 2 chronic kidney disease    Neuropathic pain of foot    Secondary hyperaldosteronism (HCC)    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[facility])    Microalbuminuria    Abnormal CT scan, liver    Heartburn    Pneumonia due to infectious organism    History of aortic valve replacement with bioprosthetic valve    Anemia    Fatigue    Severe protein-calorie malnutrition ([facility])    CVA (cerebral vascular accident) ([facility])    Pulmonary hypertension ([facility])    Mitral stenosis    Nasal congestion    Vitreous floaters of left eye    Acute UTI    Advanced care planning/counseling discussion    Acidosis, metabolic    TIFFANI (acute kidney injury) ([facility])    Recent cerebrovascular accident (CVA)    Influenza A    Acute metabolic encephalopathy    Hypernatremia    Sepsis ([facility])       12/20/2024  Patient seen and examined morning around  Noted to be in significant distress  Nephrology discussed with family regarding poor prognosis.  I spoke with patient's daughter  at the bedside. Explained current clinical status and poor prognosis given.They were aware of poor prognosis and worsening mental status and patient's family reported they would like to take some time before transition for comfort care.Patient continued worsen and  noted to be in severe respiratory distress.    I I was at bedside for follow-up evaluation.  She was noted to be hypoxic  Patient was placed on NRBM, given IV Lasix  I informed family over phone regarding worsening clinical status and patient  quickly before family arrived at bedside.  Family were aware about poor prognosis.  Condolences and emotional support provided to the patient's daughter at bedside.     Greater than 67  minutes spent preparing to see patient (e.g. review of tests) obtaining and/or reviewing separately obtained history. Discussed ACP with patient's family.  Performing a medically appropriate examination and/ evaluation.  Counseling and educating the patient/family/caregiver.  Ordering medications, tests, or procedures.  Referring and communicating with other health care professionals.  Documenting clinical information in EPIC.  Independently interpreting results and communicating results to patient/family/caregiver.  Care coordinati        VTE prophylaxis: lovenox    I have performed a physical exam and reviewed and updated ROS and Plan today (2024). In review of yesterday's note (2024), there are no changes except as documented above.

## 2024-12-20 NOTE — PROGRESS NOTES
Bedside report received and patient care assumed. Pt is resting in bed, A&O 0 and unable to assess, with no signs of pain, and is on 1LNC. Tele box on. All fall precautions are in place, belongings at bedside table.

## 2024-12-20 NOTE — DISCHARGE SUMMARY
Death Summary    Cause of Death  Cardiac arrest due to sepsis due to multiorgan failure    Comorbid Conditions at the Time of Death  Principal Problem:    TIFFANI (acute kidney injury) (HCC) (POA: Yes)  Active Problems:    Essential hypertension (POA: Yes)    Acquired hypothyroidism (POA: Yes)    Mixed hyperlipidemia (POA: Yes)    Iron deficiency anemia (POA: Yes)    Recurrent UTI (POA: Yes)    Pneumonia due to infectious organism (POA: Yes)    History of aortic valve replacement with bioprosthetic valve (POA: Yes)    Severe protein-calorie malnutrition (HCC) (POA: Yes)    Advanced care planning/counseling discussion (POA: Yes)    Recent cerebrovascular accident (CVA) (POA: Unknown)    Influenza A (POA: Unknown)    Acute metabolic encephalopathy (POA: Unknown)    Hypernatremia (POA: Unknown)    Sepsis (HCC) (POA: Unknown)  Resolved Problems:    * No resolved hospital problems. *      History of Presenting Illness and Hospital Course    84 female with past medical history of CVA with residual right-sided weakness, hypertension, aortic regurgitation, hypothyroidism presented with change in mental status.  On ED arrival patient noted to be confused, labs remarkable for leukocytosis, TIFFANI, positive for influenza, UTI, hypoxia.  Admitted for evaluation and treatment for acute metabolic insulin setting of influenza, acute UTI.  During hospital course patient continued to worsen with severe sepsis with acute organ failure in setting UTI/influenza/pneumonia.  Discussed comfort care/hospice with patient's family.  Patient clinically deteriorated into severe respiratory failure before family had to main edition for comfort care.   on 2024.  Condolence and emotional support provided to the family at bedside.    Death Date: 24   Death Time: 1107         Pronounced By (RN1): Debbie Sharma  Pronounced By (RN2): Lori Graf

## 2024-12-20 NOTE — CARE PLAN
The patient is Unstable - High likelihood or risk of patient condition declining or worsening    Shift Goals  Clinical Goals: tube feed start, safety, family education  Patient Goals: giorgio  Family Goals: updates      Problem: Knowledge Deficit - Standard  Goal: Patient and family/care givers will demonstrate understanding of plan of care, disease process/condition, diagnostic tests and medications  Outcome: Progressing     Problem: Pain - Standard  Goal: Alleviation of pain or a reduction in pain to the patient’s comfort goal  Outcome: Progressing     Problem: Skin Integrity  Goal: Skin integrity is maintained or improved  Outcome: Progressing     Problem: Fall Risk  Goal: Patient will remain free from falls  Outcome: Progressing     Problem: Discharge Barriers/Planning  Goal: Patient's continuum of care needs are met  Outcome: Progressing

## 2024-12-23 ENCOUNTER — TELEPHONE (OUTPATIENT)
Dept: HEMATOLOGY ONCOLOGY | Facility: MEDICAL CENTER | Age: 84
End: 2024-12-23
Payer: MEDICARE

## 2024-12-23 NOTE — TELEPHONE ENCOUNTER
"PAR attempted to pre-register appointment for , and this chart is flagged as \"\".    Date of death 26   "

## 2024-12-25 NOTE — DOCUMENTATION QUERY
"                                                                         [facility]                                                                       Query Response Note      PATIENT:               JONES BERMUDEZ  ACCT #:                  [id number]  MRN:                     [id number]  :                      1940  ADMIT DATE:       2024 4:49 PM  DISCH DATE:        2024 11:07 AM  RESPONDING  PROVIDER #:        [id number]           QUERY TEXT:    Documentation in the medical record indicates that this patient is being treated for ulcer of the following sites:     Wound Team: \"Pressure Injury Hip;Ischium Posterior Right POA DTI ... Pressure Injury Elbow Posterior Right POA Unstageable ... Pressure Injury Foot;Ankle Lateral Right POA Unstageable ... Pressure Injury Sacrum POA Evolving DTI ... Pressure Injury Shoulder;Flank Right POA Unstageable ... R Heel with POA unstageable pressure injury.?    Can the diagnosis of ulcer be further clarified as to type/etiology of the wound, location, and if present on admission (POA) based on the clinical indicators and treatment?    The patient's Clinical Indicators include:   4 Eyes RN Assessment: \"R arm/elbow pressure injury; red, scabbed, flaky. R foot pressure injuries, drainage, redness. Buttocks/ hip redness, discoloration, non blanching in some areas.... Redness, tear, scab, to back ...  PI right hip ... Possible Skin Injury Yes Pictures Uploaded Into Epic Yes Wound Consult Placed Yes\"     Wound Team: \"Pressure Injury Hip;Ischium Posterior Right POA DTI ... Pressure Injury Elbow Posterior Right POA Unstageable ... Pressure Injury Foot;Ankle Lateral Right POA Unstageable ... Pressure Injury Sacrum POA Evolving DTI ... Pressure Injury Shoulder;Flank Right POA Unstageable ... Patient with numerous POA injuries. ... R Heel with POA unstageable pressure injury.?    Risk Factors: 84 y.o. F with history of CVA with residual right-sided weakness, " incontinence, severe malnutrition.  Treatment: Wound care consult, dressing changes, offloading, barrier paste, turn q2h, q shift pressure point assessments.    Thank you,  Josseline Grimaldo RN, CCS  Clinical    Connect via [employer] or [email or web address]  Options provided:   -- Pressure Ulcer on right hip, right elbow, right ankle, sacrum, right shoulder, right flank, and right heel present on admission   -- Wound is not a Pressure Ulcer/DTI, (please specify type of wound)   -- Other explanation, (please specify other explanation)      Query created by: Josseline Grimaldo on 12/20/2024 10:40 AM    RESPONSE TEXT:    Pressure Ulcer on right hip, right elbow, right ankle, sacrum, right shoulder, right flank, and right heel present on admission          Electronically signed by:  VIN GONSALVES MD 12/25/2024 2:16 PM